# Patient Record
Sex: FEMALE | Race: WHITE | NOT HISPANIC OR LATINO | Employment: OTHER | URBAN - METROPOLITAN AREA
[De-identification: names, ages, dates, MRNs, and addresses within clinical notes are randomized per-mention and may not be internally consistent; named-entity substitution may affect disease eponyms.]

---

## 2017-08-07 DIAGNOSIS — R91.1 SOLITARY PULMONARY NODULE: ICD-10-CM

## 2017-08-07 DIAGNOSIS — J20.9 ACUTE BRONCHITIS: ICD-10-CM

## 2017-08-17 ENCOUNTER — HOSPITAL ENCOUNTER (OUTPATIENT)
Dept: RADIOLOGY | Facility: HOSPITAL | Age: 67
Discharge: HOME | End: 2017-08-17
Payer: MEDICARE

## 2017-08-17 DIAGNOSIS — J20.9 ACUTE BRONCHITIS: ICD-10-CM

## 2017-08-17 DIAGNOSIS — R91.1 SOLITARY PULMONARY NODULE: ICD-10-CM

## 2017-08-17 PROCEDURE — 71250 CT THORAX DX C-: CPT

## 2017-08-21 ENCOUNTER — ALLSCRIPTS OFFICE VISIT (OUTPATIENT)
Dept: OTHER | Facility: OTHER | Age: 67
End: 2017-08-21

## 2017-11-29 ENCOUNTER — ALLSCRIPTS OFFICE VISIT (OUTPATIENT)
Dept: OTHER | Facility: OTHER | Age: 67
End: 2017-11-29

## 2017-11-29 DIAGNOSIS — E78.2 MIXED HYPERLIPIDEMIA: ICD-10-CM

## 2017-11-29 DIAGNOSIS — Z12.31 ENCOUNTER FOR SCREENING MAMMOGRAM FOR MALIGNANT NEOPLASM OF BREAST: ICD-10-CM

## 2017-11-29 DIAGNOSIS — I10 ESSENTIAL (PRIMARY) HYPERTENSION: ICD-10-CM

## 2017-11-29 DIAGNOSIS — F41.8 OTHER SPECIFIED ANXIETY DISORDERS: ICD-10-CM

## 2017-11-30 NOTE — PROGRESS NOTES
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)   7  Mixed hyperlipidemia (272 2) (E78 2)   8  Rosacea (695 3) (L71 9)   9  Encounter for special screening examination for genitourinary disorder (V81 6) (Z13 89)   10  History of fall (V15 88) (Z91 81)   11  Insomnia (780 52) (G47 00)   12  BMI 29 0-29 9,adult (V85 25) (Z68 29)    Plan   Anxiety and depression    · ALPRAZolam 0 25 MG Oral Tablet; 1 Three Times A Day, As Needed   · Escitalopram Oxalate 10 MG Oral Tablet; take 1 tablet by mouth once daily   · (1) TSH WITH FT4 REFLEX; Status:Active; Requested for:29Nov2017;    · Avoid alcoholic beverages ; Status:Complete;   Done: 85NZB9133  Anxiety and depression, Benign essential hypertension    · Follow-up visit in 3 months Evaluation and Treatment  Follow-Emelinar  travis  Status: Complete - Scheduling  Done: 62NDU7612 10:26AM  Benign essential hypertension    · Begin a limited exercise program ; Status:Complete;   Done: 86WUG7880   · Begin or continue regular aerobic exercise  Gradually work up to at least {count1}sessions of {dur1} of exercise a week ; Status:Complete;   Done: 55QGY5026   · Eat a low fat and low cholesterol diet ; Status:Complete;   Done: 18OCK2481  Benign essential hypertension, Mixed hyperlipidemia    · (1) CBC/PLT/DIFF; Status:Active; Requested for:29Nov2017;    · (1) COMPREHENSIVE METABOLIC PANEL; Status:Active; Requested for:29Nov2017;    · (1) LIPID PANEL, FASTING; Status:Active; Requested for:29Nov2017;   Encounter for special screening examination for genitourinary disorder    · *VB - Urinary Incontinence Screen (Dx Z13 89 Screen for UI);  Status:Complete; Done:29Nov2017 09:40AM  Encounter for special screening examination for nervous system disorder    · *VB - Fall Risk Assessment  (Dx Z13 89 Screen for Neurologic Disorder);Status:Complete;   Done: 95KQC5458 09:40AM  Gastrojejunal ulcer, Status post gastric bypass for obesity    · Esomeprazole Magnesium 20 MG Oral Capsule Delayed Release; TAKE 2CAPSULES DAILY  History of fall    · There ways to avoid falling ; Status:Complete;   Done: 75RPI0484   · These are things you can do to prevent falls in and around the home ; Status:Complete;  Done: 00XUG0999  Insomnia    · Zolpidem Tartrate 10 MG Oral Tablet (Ambien); TAKE 1 TABLET Bedtime PRN  Mixed hyperlipidemia    · Atorvastatin Calcium 20 MG Oral Tablet; 1/2 tablet daily  Need for influenza vaccination    · Fluzone High-Dose 0 5 ML Intramuscular Suspension Prefilled Syringe  Primary osteoarthritis    · Diclofenac Sodium 1 % Transdermal Gel (Voltaren); APPLY TO LOWEREXTREMITIES, 4 GM OF GEL TO AFFECTED AREA 4 TIMES DAILY  DONOT APPLY MORE THAN 16 GM DAILY TO ANY ONE AFFECTED JOINT  Rosacea    · MetroNIDAZOLE 0 75 % External Gel; APPLY  AND RUB  IN A THIN FILM TOAFFECTED AREAS TWICE DAILY  (AM AND PM)  * MAMMO SCREENING BILATERAL W CAD; Status:Hold For - Scheduling; Requested for:29Nov2017;  Perform:[facility] Radiology; [phone]; Ordered;   For:Encounter for screening mammogram for malignant neoplasm of breast; Ordered By:Dilcia Hernandez;    Discussion/Summary    Anxiety and depression- worsening  Will start Lexapro as she has done well with this in the past  Advised to use Xanax sparingly, and advised not to take this in combination with her Ambien  Do not drink alcohol while taking these medications  and hyperlipidemia- due for labs  Medications refilled  up in 3 months with Dr Keshia Osorio  Possible side effects of new medications were reviewed with the patient/guardian today  The treatment plan was reviewed with the patient/guardian   The patient/guardian understands and agrees with the treatment plan      Chief Complaint  Discuss anxiety  Her son is ill and in the [facility] LPN      History of Present Illness  HPI: Here today for medication refills and bloodwork order  is under a lot of stress and her anxiety has been worse  She takes care of her son who had a brainstem stroke and is now in the hospital with heart failure  Her other son also lives with her, as well as her grandchildren  She is also working while taking care of her family  Review of Systems   Constitutional: No fever, no chills, feels well, no tiredness, no recent weight gain or loss  Cardiovascular: no chest pain-- and-- no palpitations  Respiratory: no shortness of breath-- and-- no cough  Neurological: no headache  Preventive Quality 65 and Older: Falls Risk:   2  Urinary Incontinence Symptoms includes: urinary incontinence and nocturia, but no incomplete bladder emptying, no urinary frequency, no urinary urgency, no dysuria, no straining, no weak stream, no intermittent stream, no post-void dribbling, no vaginal pressure and no vaginal dryness       Active Problems    1  Actinic keratosis (702 0) (L57 0)   2  Allergic rhinitis (477 9) (J30 9)   3  Arteriosclerotic cardiovascular disease (429 2,440 9) (I25 10)   4  Arthropathy (716 90) (M12 9)   5  Benign essential hypertension (401 1) (I10)   6  Chest pain (786 50) (R07 9)   7  Chronic obstructive pulmonary disease (496) (J44 9)   8  Degenerative spondylolisthesis (738 4) (M43 10)   9  Dyspnea on exertion (786 09) (R06 09)   10  Encounter for screening mammogram for malignant neoplasm of breast (V76 12)  (Z12 31)   11  Esophageal reflux (530 81) (K21 9)   12  Female Stress Incontinence (625 6)   13  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)   14  History of tobacco use (V15 82) (Z87 891)   15  Insomnia (780 52) (G47 00)   16  Intervertebral Disc Degeneration (722 6)   17  Mixed hyperlipidemia (272 2) (E78 2)   18   Mononeuritis (355 9) (G58 9)   19  Need for influenza vaccination (V04 81) (Z23)   20  Obesity (278 00) (E66 9)   21  Onychomycosis of toenail (110 1) (B35 1)   22  Osteoarthrosis Of The Hand (715 94)   23  Postgastrectomy malabsorption (579 3) (K91 2,Z90 3)   24  Primary osteoarthritis (715 10) (M19 91)   25  Rosacea (695 3) (L71 9)   26  Solitary pulmonary nodule (793 11) (R91 1)   27  Status post gastric bypass for obesity (V45 86) (Z98 84)   28  Thiamin deficiency (265 1) (E51 9)   29  Vitamin B12 deficiency (266 2) (E53 8)   30  Vitamin D deficiency (268 9) (E55 9)    Past Medical History  1  History of _ (272 2)   2  History of Abnormal glucose (790 29) (R73 09)   3  Acute upper respiratory infection (465 9) (J06 9)   4  History of Acute upper respiratory infection (465 9) (J06 9)   5  History of Asymptomatic postmenopausal status (age-related) (natural) (V49 81) (Z78 0)   6  History of Diabetes Mellitus (250 00)   7  History of Diabetes Mellitus (250 00)   8  History of Disorders Of The Globe (360 9)   9  Diverticular Disease Of Intestine (562)   10  Encounter for routine pelvic examination (V72 31) (Z01 419)   11  History of acute bronchitis (V12 69) (Z87 09)   12  History of acute sinusitis (V12 69) (Z87 09)   13  History of acute sinusitis (V12 69) (Z87 09)   14  History of backache (V13 59) (Z87 39)   15  History of breast lump (V13 89) (Z87 898)   16  History of dermatitis (V13 3) (Z87 2)   17  History of diverticulitis of colon (V12 79) (Z87 19)   18  History of ear pain (V12 49) (Z86 69)   19  History of epigastric pain (V12 79) (Z87 19)   20  History of nicotine dependence (V15 82) (Z87 891)   21  History of numbness (V17 2) (Z87 898)   22  History of pneumonia (V12 61) (Z87 01)   23  History of seborrheic keratosis (V13 3) (Z87 2)   24  History of tachycardia (V13 89) (Z87 898)   25  History of tinea corporis (V12 09) (Z86 19)   26  History of wheezing (V12 69) (Z87 898)   27   History of Laceration of finger, subsequent encounter (V58 89,883 0) (S64 276D)   28  History of Morbid or severe obesity due to excess calories (278 01) (E66 01)   29  History of Morbid or severe obesity due to excess calories (278 01) (E66 01)   30  History of Myalgia And Myositis (729 1)   31  History of Shoulder joint pain, unspecified laterality   32  History of Sprain Of The Left Thumb (842 13)    Family History  Mother    1  Family history of Congenital Heart Disease   2  Family history of Congestive Heart Failure   3  Family history of Hypertension (V17 49)   4  Family history of Stroke Syndrome (V17 1)  Father    5  Family history of Congestive Heart Failure    Social History   · Active smoker (305 1) (Z72 0)   · Denied: History of Alcohol Use (History)   · Denied: History of Drug Use   · Former smoker   · History of tobacco use (V15 82) (Z87 891)  The social history was reviewed and is unchanged  Surgical History    1  History of Appendectomy   2  History of Cholecystectomy   3  History of Gastric Surgery For Morbid Obesity Bypass With Debra-en-Y   4  History of Hysterectomy   5  History of Tonsillectomy  Surgical History Reviewed: The surgical history was reviewed and updated today  Current Meds   1  Advair Diskus 250-50 MCG/DOSE Inhalation Aerosol Powder Breath Activated; INHALE 1 PUFF EVERY 12 HOURS; Therapy: 71QAT5278 to (Evaluate:72Kcv3647)  Requested for: 76Wzb8907; Last Rx:93Fol9331 Ordered   2  ALPRAZolam 0 25 MG Oral Tablet; 1 Three Times A Day, As Needed; Therapy: 81LLH0250 to (Last Rx:01Buk7525)  Requested for: 34VTS2574 Ordered   3  Atorvastatin Calcium 20 MG Oral Tablet; 1/2 tablet daily  Requested for: 06IQZ3373; Last Rx:03Een8246 Ordered   4  Baby Aspirin 81 MG CHEW; 1 DAILY; Therapy: (Recorded:29Ggj0693) to Recorded   5  Esomeprazole Magnesium 20 MG Oral Capsule Delayed Release; TAKE 2 CAPSULES DAILY; Therapy: 82CZG2670 to 51-30-20-57)  Requested for: 0676 543 19 15; Last Rx:83Rrl1697 Ordered   6   Fluticasone Propionate 50 MCG/ACT Nasal Suspension; USE 2 SPRAYS IN EACH NOSTRIL ONCE DAILY; Therapy: 24Kwd1902 to (Evaluate:09Rsj4521)  Requested for: 64Dns0331; Last Rx:30Kte6623 Ordered   7  MetroNIDAZOLE 0 75 % External Gel; APPLY  AND RUB  IN A THIN FILM TO AFFECTED AREAS TWICE DAILY  (AM AND PM); Therapy: 41IHI3083 to (Last Rx:06Apr2015)  Requested for: 32Khg0723 Ordered   8  Multi-Day Oral Tablet; 1 Every Day; Therapy: 71XCI2514 to  Requested for: 95AMK5660 Recorded   9  Patanol 0 1 % Ophthalmic Solution; 2 drops OU BID; Therapy: 39OQM8543 to (Last Rx:03Paj4375)  Requested for: 28Hsz0637 Ordered   10  ProAir  (90 Base) MCG/ACT Inhalation Aerosol Solution; 2 PUFFS EVERY 6  HOURS; Therapy: 50VAJ6866 to (Gabi Alvarez)  Requested for: 05PXV7410; Last  Rx:24Cdu4091 Ordered   11  Voltaren 1 % Transdermal Gel; APPLY TO LOWER EXTREMITIES, 4 GM OF GEL TO  AFFECTED AREA 4 TIMES DAILY  DO NOT APPLY MORE THAN 16 GM DAILY TO ANY  ONE AFFECTED JOINT; Therapy: 91XUT4746 to (Last Rx:63Ttb7444)  Requested for: 78Ugf1256 Ordered   12  Zolpidem Tartrate 10 MG Oral Tablet; TAKE 1 TABLET Bedtime PRN; Therapy: 14RNX9948 to (Evaluate:25Nue1992)  Requested for: 46KRJ3966; Last  Rx:69Wga7348 Ordered    The medication list was reviewed and updated today  Allergies  1  AUGMENTIN   2  Sulfa Drugs    Vitals   Recorded: 80WIL9644 09:32AM   Temperature 97 F   Heart Rate 64   Respiration 20   Systolic 520   Diastolic 70   Height 4 ft 11 in   Weight 148 lb    BMI Calculated 29 89   BSA Calculated 1 62       Physical Exam   Constitutional  General appearance: No acute distress, well appearing and well nourished  Eyes  Conjunctiva and lids: No swelling, erythema or discharge  Ears, Nose, Mouth, and Throat  External inspection of ears and nose: Abnormal  -- hearing aids  Otoscopic examination: Tympanic membranes translucent with normal light reflex  Canals patent without erythema     Nasal mucosa, septum, and turbinates: Normal without edema or erythema  Oropharynx: Normal with no erythema, edema, exudate or lesions  Pulmonary  Respiratory effort: No increased work of breathing or signs of respiratory distress  Auscultation of lungs: Clear to auscultation  Cardiovascular  Auscultation of heart: Normal rate and rhythm, normal S1 and S2, without murmurs  Examination of extremities for edema and/or varicosities: Normal    Lymphatic  Palpation of lymph nodes in neck: No lymphadenopathy  Skin  Skin and subcutaneous tissue: Normal without rashes or lesions  Psychiatric  Mood and affect: Normal          Results/Data  *VB - Fall Risk Assessment  (Dx Z13 89 Screen for Neurologic Disorder) 68SMO1102 09:40AM Sugey Mohamuddy     Test Name Result Flag Reference   Falls Risk      2 or more falls past year     *VB - Urinary Incontinence Screen (Dx Z13 89 Screen for UI) 44LGE9005 09:40AM Sugey Marcus     Test Name Result Flag Reference   Urinary Incontinence Assessment 69NXN4922         Attending Note  Collaborating Physician Note: Collaborating Note: I agree with the Advanced Practitioner note  Future Appointments    Date/Time Provider Specialty Site   03/01/2018 08:45 AM CLAUDIO Rankin   [address] [place] [address]   02/21/2018 08:45 AM HARSHA Petersen [facility]       Signatures   Electronically signed by : Marta Shook; Nov 29 2017  1:40PM EST                       (Author)    Electronically signed by : CLAUDIO Kirkpatrick ; Nov 29 2017  1:45PM EST                       (Author)

## 2017-12-01 ENCOUNTER — TRANSCRIBE ORDERS (OUTPATIENT)
Dept: ADMINISTRATIVE | Facility: HOSPITAL | Age: 67
End: 2017-12-01

## 2017-12-01 ENCOUNTER — APPOINTMENT (OUTPATIENT)
Dept: LAB | Facility: HOSPITAL | Age: 67
End: 2017-12-01
Payer: MEDICARE

## 2017-12-01 ENCOUNTER — GENERIC CONVERSION - ENCOUNTER (OUTPATIENT)
Dept: OTHER | Facility: OTHER | Age: 67
End: 2017-12-01

## 2017-12-01 DIAGNOSIS — F41.8 OTHER SPECIFIED ANXIETY DISORDERS: ICD-10-CM

## 2017-12-01 DIAGNOSIS — I10 ESSENTIAL (PRIMARY) HYPERTENSION: ICD-10-CM

## 2017-12-01 DIAGNOSIS — E78.2 MIXED HYPERLIPIDEMIA: ICD-10-CM

## 2017-12-01 LAB
ALBUMIN SERPL BCP-MCNC: 3.7 G/DL (ref 3.5–5)
ALP SERPL-CCNC: 85 U/L (ref 46–116)
ALT SERPL W P-5'-P-CCNC: 17 U/L (ref 12–78)
ANION GAP SERPL CALCULATED.3IONS-SCNC: 11 MMOL/L (ref 4–13)
AST SERPL W P-5'-P-CCNC: 15 U/L (ref 5–45)
BASOPHILS # BLD AUTO: 0 THOUSANDS/ΜL (ref 0–0.1)
BASOPHILS NFR BLD AUTO: 1 % (ref 0–1)
BILIRUB SERPL-MCNC: 0.7 MG/DL (ref 0.2–1)
BUN SERPL-MCNC: 12 MG/DL (ref 5–25)
CALCIUM SERPL-MCNC: 9.2 MG/DL (ref 8.3–10.1)
CHLORIDE SERPL-SCNC: 103 MMOL/L (ref 100–108)
CHOLEST SERPL-MCNC: 175 MG/DL (ref 50–200)
CO2 SERPL-SCNC: 24 MMOL/L (ref 21–32)
CREAT SERPL-MCNC: 0.63 MG/DL (ref 0.6–1.3)
EOSINOPHIL # BLD AUTO: 0.2 THOUSAND/ΜL (ref 0–0.61)
EOSINOPHIL NFR BLD AUTO: 3 % (ref 0–6)
ERYTHROCYTE [DISTWIDTH] IN BLOOD BY AUTOMATED COUNT: 14.7 % (ref 11.6–15.1)
GFR SERPL CREATININE-BSD FRML MDRD: 92 ML/MIN/1.73SQ M
GLUCOSE P FAST SERPL-MCNC: 94 MG/DL (ref 65–99)
HCT VFR BLD AUTO: 46.3 % (ref 37–47)
HDLC SERPL-MCNC: 49 MG/DL (ref 40–60)
HGB BLD-MCNC: 15.2 G/DL (ref 12–16)
LDLC SERPL CALC-MCNC: 113 MG/DL (ref 0–100)
LYMPHOCYTES # BLD AUTO: 1.5 THOUSANDS/ΜL (ref 0.6–4.47)
LYMPHOCYTES NFR BLD AUTO: 23 % (ref 14–44)
MCH RBC QN AUTO: 30.1 PG (ref 27–31)
MCHC RBC AUTO-ENTMCNC: 32.9 G/DL (ref 31.4–37.4)
MCV RBC AUTO: 91 FL (ref 82–98)
MONOCYTES # BLD AUTO: 0.7 THOUSAND/ΜL (ref 0.17–1.22)
MONOCYTES NFR BLD AUTO: 10 % (ref 4–12)
NEUTROPHILS # BLD AUTO: 4.3 THOUSANDS/ΜL (ref 1.85–7.62)
NEUTS SEG NFR BLD AUTO: 64 % (ref 43–75)
NRBC BLD AUTO-RTO: 0 /100 WBCS
PLATELET # BLD AUTO: 259 THOUSANDS/UL (ref 130–400)
PMV BLD AUTO: 8.9 FL (ref 8.9–12.7)
POTASSIUM SERPL-SCNC: 3.9 MMOL/L (ref 3.5–5.3)
PROT SERPL-MCNC: 7.5 G/DL (ref 6.4–8.2)
RBC # BLD AUTO: 5.07 MILLION/UL (ref 4.2–5.4)
SODIUM SERPL-SCNC: 138 MMOL/L (ref 136–145)
TRIGL SERPL-MCNC: 66 MG/DL
TSH SERPL DL<=0.05 MIU/L-ACNC: 1.72 UIU/ML (ref 0.36–3.74)
WBC # BLD AUTO: 6.7 THOUSAND/UL (ref 4.8–10.8)

## 2017-12-01 PROCEDURE — 85025 COMPLETE CBC W/AUTO DIFF WBC: CPT

## 2017-12-01 PROCEDURE — 80061 LIPID PANEL: CPT

## 2017-12-01 PROCEDURE — 36415 COLL VENOUS BLD VENIPUNCTURE: CPT

## 2017-12-01 PROCEDURE — 80053 COMPREHEN METABOLIC PANEL: CPT

## 2017-12-01 PROCEDURE — 84443 ASSAY THYROID STIM HORMONE: CPT

## 2018-01-10 NOTE — MISCELLANEOUS
Message  I spoke with Porfirio Arevalo on 2/16/16 after her hospital discharge to her home on 2/10/16  She is feeling better but just tired  She denies chest pain and dyspnea  She has a follow up appt with Dr Pineda Whittington scheduled in 3 weeks as instructed  She is using the NicoDerm Patch as instructed  She is aware to call our office at any time with any questions or concerns and she is also aware that if she develops any further episodes of chest pain or any dizziness, weakness, dyspnea, fevers, palpitations, etc she should go to the ER [facility]      Active Problems    1  _ (272 2)   2  Abdominal pain, epigastric (789 06) (R10 13)   3  Actinic keratosis (702 0) (L57 0)   4  Acute bronchitis (466 0) (J20 9)   5  Acute sinusitis (461 9) (J01 90)   6  Acute upper respiratory infection (465 9) (J06 9)   7  Allergic rhinitis (477 9) (J30 9)   8  Anxiety disorder (300 00) (F41 9)   9  Arteriosclerotic cardiovascular disease (429 2,440 9) (I25 10)   10  Arthropathy (716 90) (M12 9)   11  Asymptomatic postmenopausal status (age-related) (natural) (V49 81) (Z78 0)   12  Benign essential hypertension (401 1) (I10)   13  Chronic obstructive pulmonary disease (496) (J44 9)   14  Degenerative spondylolisthesis (738 4) (M43 10)   15  Depression (311) (F32 9)   16  Dermatitis (692 9) (L30 9)   17  Disorders Of The Globe (360 9)   18  Diverticulitis of colon (562 11) (K57 32)   19  Encounter for screening mammogram for malignant neoplasm of breast (V76 12)    (Z12 31)   20  Esophageal reflux (530 81) (K21 9)   21  Female Stress Incontinence (625 6)   22  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)   23  History of tobacco use (V15 82) (Z87 891)   24  Insomnia (780 52) (G47 00)   25  Intervertebral Disc Degeneration (722 6)   26  Laceration of finger, subsequent encounter (V58 89,883 0) (S61 219D)   27  Lump or mass in breast (611 72) (N63)   28  Mixed hyperlipidemia (272 2) (E78 2)   29  Mononeuritis (355 9) (G58 9)   30   Myalgia And Myositis (729 1) 31  Need for influenza vaccination (V04 81) (Z23)   32  Numbness (782 0) (R20 0)   33  Obesity (278 00) (E66 9)   34  Onychomycosis of toenail (110 1) (B35 1)   35  Osteoarthrosis Of The Hand (715 94)   36  Otalgia, unspecified laterality (388 70) (H92 09)   37  Postgastrectomy malabsorption (579 3) (K91 2)   38  Primary osteoarthritis (715 10) (M19 91)   39  Rosacea (695 3) (L71 9)   40  Seborrheic keratosis (702 19) (L82 1)   41  Shoulder joint pain, unspecified laterality (719 41) (M25 519)   42  Sprain Of The Left Thumb (842 13)   43  Status post gastric bypass for obesity (V45 86) (Z98 84)   44  Tachycardia (785 0) (R00 0)   45  Thiamin deficiency (265 1) (E51 9)   46  Tinea corporis (110 5) (B35 4)   47  Vitamin B12 deficiency (266 2) (E53 8)   48  Vitamin D deficiency (268 9) (E55 9)    Current Meds   1  ALPRAZolam 0 25 MG Oral Tablet; 1 Three Times A Day, As Needed; Therapy: 94OET5847 to (Last Rx:61Yga2551)  Requested for: 51Whb0866 Ordered   2  Atorvastatin Calcium 20 MG Oral Tablet; 1/2 tablet daily; Therapy: (Recorded:63Yxy5359) to Recorded   3  Baby Aspirin 81 MG CHEW; 1 DAILY; Therapy: (Recorded:62Vbe7184) to Recorded   4  Bystolic 5 MG Oral Tablet; 1 DAILY; Therapy: (Recorded:83Ide7763) to Recorded   5  Esomeprazole Magnesium 20 MG Oral Capsule Delayed Release; TAKE 2 CAPSULES   DAILY; Therapy: 14TQI6035 to )  Requested for: 0676 543 19 15; Last   Rx:05Uxa9119 Ordered   6  MetroNIDAZOLE 0 75 % External Gel; APPLY  AND RUB  IN A THIN FILM TO   AFFECTED AREAS TWICE DAILY  (AM AND PM); Therapy: 48ZWT3981 to (Last Rx:50Fgo1450)  Requested for: 79Glf8727 Ordered   7  Multi-Day Oral Tablet; 1 Every Day; Therapy: 07TFD7306 to  Requested for: 84TGO9513 Recorded   8  Nasonex 50 MCG/ACT Nasal Suspension; 2 puffs daily to each nostril; Therapy: 66ARZ4232 to (Last Rx:06Apr2015)  Requested for: 06Apr2015 Ordered   9   Nicoderm CQ 14 MG/24HR Transdermal Patch 24 Hour; USE AS DIRECTED; Therapy: (Recorded:42Hgd1820) to Recorded   10  Patanol 0 1 % Ophthalmic Solution; 2 drops OU BID; Therapy: 40NOB7786 to (Last Rx:06Apr2015)  Requested for: 06Apr2015 Ordered   11  Voltaren 1 % Transdermal Gel; APPLY TO LOWER EXTREMITIES, 4 GM OF GEL TO    AFFECTED AREA 4 TIMES DAILY  DO NOT APPLY MORE THAN 16 GM DAILY TO ANY    ONE AFFECTED JOINT; Therapy: 39KND4858 to (Last Rx:06Apr2015)  Requested for: 06Apr2015 Ordered   12  Zolpidem Tartrate 10 MG Oral Tablet; TAKE 1 TABLET Bedtime PRN; Therapy: 92RPD8171 to (Deborra Mins)  Requested for: 61EQL4652; Last    Rx:59Qor0330 Ordered    Allergies    1  AUGMENTIN   2   Sulfa Drugs    Signatures   Electronically signed by : CLAUDIO Grover ; Feb 17 2016  9:22AM EST                       (Author)

## 2018-01-12 VITALS
SYSTOLIC BLOOD PRESSURE: 132 MMHG | HEART RATE: 64 BPM | WEIGHT: 148 LBS | DIASTOLIC BLOOD PRESSURE: 70 MMHG | RESPIRATION RATE: 20 BRPM | HEIGHT: 59 IN | TEMPERATURE: 97 F | BODY MASS INDEX: 29.84 KG/M2

## 2018-01-13 NOTE — MISCELLANEOUS
Provider Comments  Provider Comments:     Dear Felicia Deleon had a scheduled appointment at our office for 5/4/2016 but were unable to keep  We attempted to call you back but were unable to reach you  It is very important that you follow up with us so that we can assess your physical and nutritional safety after your surgery  Please call our office at [phone] to reschedule your appointment       Sincerely,     [facility]              Signatures   Electronically signed by : Miguel Alston, ; May  3 2016  8:19AM EST                       (Author)    Electronically signed by : Philomena Burris, [facility]; May  3 2016  1:46PM EST                       (Author)

## 2018-01-16 NOTE — PROGRESS NOTES
Assessment    1  Chronic obstructive pulmonary disease (496) (J44 9)   2  Solitary pulmonary nodule (793 11) (R91 1)    Plan  Allergic rhinitis    · Fluticasone Propionate 50 MCG/ACT Nasal Suspension (Flonase Allergy Relief); USE 2 SPRAYS IN EACH NOSTRIL ONCE DAILY   Rx By: Aravind Danielson; Dispense: 30 Days ; #:1 ML; Refill: 0; For: Allergic rhinitis; LILI = N; Verified Transmission to [address] [place] [address]; Last Updated By: System, [employer]; 8/21/2017 9:18:58 AM  Chronic obstructive pulmonary disease    · Advair Diskus 250-50 MCG/DOSE Inhalation Aerosol Powder Breath Activated;  INHALE 1 PUFF EVERY 12 HOURS   Rx By: Aravind Danielson; Dispense: 90 Days ; #:3 Aerosol Powder Breath Activated; Refill: 3; For: Chronic obstructive pulmonary disease; LILI = N; Verified Transmission to [facility]; Last Updated By: System, [employer]Script[employer]; 8/21/2017 9:21:03 AM  Chronic obstructive pulmonary disease, Solitary pulmonary nodule    · * CT CHEST WO CONTRAST; Status:Need Information - Financial Authorization; Requested for:03Apr2018; Perform:[facility]; NSJ:[id number];VQULPHATTIE; For:Chronic obstructive pulmonary disease, Solitary pulmonary nodule; Ordered By:Keara Carolina; Results/Data  PFT Results v2:     Spirometry: Forced vital capacity: 1 83L and 75% Predicted Values  Forced expiratory volume in one second: 1 22L and 66% Predicted Value  FEV1/FVC ratio is 66  Post Bronchodilator Spirometry:   Lung Volumes:   DLCO:    PFT Interpretation:   moderate airway obstruction  Discussion/Summary  Discussion Summary:   Kareem Zee is here today for follow up  She had repeat CT of chest done August 17, 2017 and stable 10 x 7 mm RLL nodule is noted She will have repeat CT of chest in May 2018  Kareem Zee continues to smoke and cessation is encouraged  She has moderate COPD  FEV 1 today is 1 22L or 66% of predicted; FEV1 is March 2016 was 1 48L or 81% of predicted   She is aware of the decline in FEV1 and I encouraged her in compliance with Advair 250/50 1 puff BID and smoking cessation  Follow up in 6 months  I do not think she has acute sinusitis; she is going to trial Flonase nasal spray 2 sprays each nostril daily; avoid allergic irritants (cat and dog hair) and report to PCP if symptoms worsen  Counseling Documentation With Imm: The patient was counseled regarding  total time of encounter was 25 minutes  Goals and Barriers: The patient has the current Goals: Amadeo Whitehead will continue to decrease and or stop cigarette smoking  Patient's Capacity to Self-Care: Patient is able to Self-Care  Active Problems    1  _ (272 2)   2  Abdominal pain, epigastric (789 06) (R10 13)   3  Actinic keratosis (702 0) (L57 0)   4  Acute bronchitis (466 0) (J20 9)   5  Acute sinusitis (461 9) (J01 90)   6  Acute upper respiratory infection (465 9) (J06 9)   7  Allergic rhinitis (477 9) (J30 9)   8  Anxiety disorder (300 00) (F41 9)   9  Arteriosclerotic cardiovascular disease (429 2,440 9) (I25 10)   10  Arthropathy (716 90) (M12 9)   11  Asymptomatic postmenopausal status (age-related) (natural) (V49 81) (Z78 0)   12  Benign essential hypertension (401 1) (I10)   13  Chest pain (786 50) (R07 9)   14  Chronic obstructive pulmonary disease (496) (J44 9)   15  Degenerative spondylolisthesis (738 4) (M43 10)   16  Depression (311) (F32 9)   17  Dermatitis (692 9) (L30 9)   18  Disorders Of The Globe (360 9)   19  Diverticulitis of colon (562 11) (K57 32)   20  Dyspnea on exertion (786 09) (R06 09)   21  Encounter for screening mammogram for malignant neoplasm of breast (V76 12)    (Z12 31)   22  Esophageal reflux (530 81) (K21 9)   23  Female Stress Incontinence (625 6)   24  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)   25  History of tobacco use (V15 82) (Z87 891)   26  Insomnia (780 52) (G47 00)   27  Intervertebral Disc Degeneration (722 6)   28  Laceration of finger, subsequent encounter   29   Lump or mass in breast (611 72) (N63) 30  Mixed hyperlipidemia (272 2) (E78 2)   31  Mononeuritis (355 9) (G58 9)   32  Myalgia And Myositis (729 1)   33  Need for influenza vaccination (V04 81) (Z23)   34  Numbness (782 0) (R20 0)   35  Obesity (278 00) (E66 9)   36  Onychomycosis of toenail (110 1) (B35 1)   37  Osteoarthrosis Of The Hand (715 94)   38  Otalgia, unspecified laterality (388 70) (H92 09)   39  Postgastrectomy malabsorption (579 3) (K91 2,Z90 3)   40  Primary osteoarthritis (715 10) (M19 91)   41  Rosacea (695 3) (L71 9)   42  Seborrheic keratosis (702 19) (L82 1)   43  Shoulder joint pain, unspecified laterality   44  Solitary pulmonary nodule (793 11) (R91 1)   45  Sprain Of The Left Thumb (842 13)   46  Status post gastric bypass for obesity (V45 86) (Z98 84)   47  Tachycardia (785 0) (R00 0)   48  Thiamin deficiency (265 1) (E51 9)   49  Tinea corporis (110 5) (B35 4)   50  Vitamin B12 deficiency (266 2) (E53 8)   51  Vitamin D deficiency (268 9) (E55 9)   52  Wheezing (786 07) (R06 2)    Chief Complaint  Chief Complaint Free Text Note Form: Pt presents today for ct rvw  History of Present Illness  HPI: Melany Louis is here today for follow up  She is a smoker; she smokes 1PPD every other day  Melany Louis had repeat CT of chest on 8/11/17 and 10mm right pulmonary nodule is noted  IT has been stable for 18 months  Based on current FLeishchner guidelines, a follow up non contrast of chest is recommended 6-12 months from this exam    Melany Louis has been using Advair Diskus 250/50 1 puff BID but not every day  She does not use ProAir often  she had been doing well  Melany Louis reports having "sinus infection"  She has some thick mucous and mild headache  Review of Systems  Complete-Female - Pulm:   Constitutional: No fever, no chills, feels well, no tiredness, no recent weight gain or weight loss  Eyes: no complaints of vision problems     ENT: no rhinitis, no PND, no epistaxis    The patient presents with complaints of gradual onset of moderate bilateral nasal discharge  Her symptoms are caused by allergen exposure  Symptoms are made worse by inhaled irritants and tobacco smoke  Symptoms are worsening  Cardiovascular: no palpitations, no chest pain  Respiratory: shortness of breath and shortness of breath during exertion, but as noted in HPI    The patient presents with complaints of sudden onset of moderate cough, described as loose and productive  Her symptoms are caused by no known event  Symptoms are improved by avoiding irritants  Symptoms are made worse by animal exposure  Symptoms are unchanged  Gastrointestinal: no complaints of esophageal reflux, no abdominal pain  Genitourinary: no dysuria  Musculoskeletal: no arthralgias, no joint swelling, no myalgias  Integumentary: no rash, no lesions  Neurological: no headache, no fainting, no weakness  Psychiatric: no anxiety, no depression  Hematologic/Lymphatic: - no complaints of swollen glands  Past Medical History    1  History of Abnormal glucose (790 29) (R73 09)   2  Acute upper respiratory infection (465 9) (J06 9)   3  History of Diabetes Mellitus (250 00)   4  History of Diabetes Mellitus (250 00)   5  Diverticular Disease Of Intestine (562)   6  Encounter for routine pelvic examination (V72 31) (Z01 419)   7  History of acute sinusitis (V12 69) (Z87 09)   8  History of backache (V13 59) (Z87 39)   9  History of nicotine dependence (V15 82) (Z87 891)   10  History of pneumonia (V12 61) (Z87 01)   11  History of Morbid or severe obesity due to excess calories (278 01) (E66 01)   12  History of Morbid or severe obesity due to excess calories (278 01) (E66 01)    Surgical History    1  History of Appendectomy   2  History of Cholecystectomy   3  History of Gastric Surgery For Morbid Obesity Bypass With Debra-en-Y   4  History of Hysterectomy   5  History of Tonsillectomy  Surgical History Reviewed: The surgical history was reviewed and updated today         Family History  Mother    1  Family history of Congenital Heart Disease   2  Family history of Congestive Heart Failure   3  Family history of Hypertension (V17 49)   4  Family history of Stroke Syndrome (V17 1)  Father    5  Family history of Congestive Heart Failure  Family History Reviewed: The family history was reviewed and updated today  Social History    · Active smoker (305 1) (Z72 0)   · Denied: History of Alcohol Use (History)   · Denied: History of Drug Use   · Former smoker   · History of tobacco use (V15 82) (U85 081)  Social History Reviewed: The social history was reviewed and updated today  Current Meds   1  Advair Diskus 250-50 MCG/DOSE Inhalation Aerosol Powder Breath Activated; INHALE 1   PUFF EVERY 12 HOURS; Therapy: 70EVI7122 to (Debbie Barriga)  Requested for: 02Jun2016; Last   Rx:02Jun2016 Ordered   2  ALPRAZolam 0 25 MG Oral Tablet; 1 Three Times A Day, As Needed; Therapy: 43OSW8916 to (Last Rx:18Feb2016)  Requested for: 11JDR2938 Ordered   3  Atorvastatin Calcium 20 MG Oral Tablet; 1/2 tablet daily  Requested for: 14YHL7859; Last   Rx:72Lev6125 Ordered   4  Baby Aspirin 81 MG CHEW; 1 DAILY; Therapy: (Recorded:62Xqd5042) to Recorded   5  Bystolic 5 MG Oral Tablet; 1 DAILY  Requested for: 00VDV8621; Last Rx:25Nhc4860   Ordered   6  Esomeprazole Magnesium 20 MG Oral Capsule Delayed Release; TAKE 2 CAPSULES   DAILY; Therapy: 97NOI6181 to [phone])  Requested for: 0676 543 19 15; Last   Rx:21Kzh8445 Ordered   7  MetroNIDAZOLE 0 75 % External Gel; APPLY  AND RUB  IN A THIN FILM TO AFFECTED   AREAS TWICE DAILY  (AM AND PM); Therapy: 37LEZ4414 to (Last Rx:06Apr2015)  Requested for: 94Vga3269 Ordered   8  Multi-Day Oral Tablet; 1 Every Day; Therapy: 57HCY0649 to  Requested for: 93PNV0025 Recorded   9  Nasonex 50 MCG/ACT Nasal Suspension; 2 puffs daily to each nostril; Therapy: 16QQZ8860 to (Last Rx:06Apr2015)  Requested for: 06Apr2015 Ordered   10   Nicoderm CQ 14 MG/24HR Transdermal Patch 24 Hour; USE AS DIRECTED; Therapy: (Recorded:62Huj5734) to Recorded   11  Patanol 0 1 % Ophthalmic Solution; 2 drops OU BID; Therapy: 10SUB0434 to (Last Rx:06Apr2015)  Requested for: 06Apr2015 Ordered   12  ProAir  (90 Base) MCG/ACT Inhalation Aerosol Solution; 2 PUFFS EVERY 6    HOURS; Therapy: 61DPM3607 to (William Cool)  Requested for: 10KHR5571; Last    Rx:02Jun2016 Ordered   13  Voltaren 1 % Transdermal Gel; APPLY TO LOWER EXTREMITIES, 4 GM OF GEL TO    AFFECTED AREA 4 TIMES DAILY  DO NOT APPLY MORE THAN 16 GM DAILY TO ANY    ONE AFFECTED JOINT; Therapy: 99QED9765 to (Last Rx:06Apr2015)  Requested for: 06Apr2015 Ordered   14  Zolpidem Tartrate 10 MG Oral Tablet; TAKE 1 TABLET Bedtime PRN; Therapy: 11CXH2416 to (Evaluate:62Pew2386)  Requested for: 54IBO5129; Last    Rx:18Feb2016 Ordered  Medication List Reviewed: The medication list was reviewed and updated today  Allergies    1  AUGMENTIN   2  Sulfa Drugs    Vitals  Vital Signs    Recorded: 23Jlg7044 08:53AM   Temperature 97 6 F, Oral   Heart Rate 70   Pulse Quality Normal   Respiration Quality Normal   Respiration 12   Systolic 606, LUE, Sitting   Diastolic 84, LUE, Sitting   Height 4 ft 11 in   Weight 148 lb    BMI Calculated 29 89   BSA Calculated 1 62   O2 Saturation 95, RA     Physical Exam    Constitutional   General appearance: No acute distress, well appearing and well nourished  Eyes   Examination of pupil and irises: Anicteric, pupils reactive  Ears, Nose, Mouth, and Throat   External inspection of ears and nose: Normal     Nasal mucosa, septum, and turbinates: Normal without edema or erythema  Lips, teeth, and gums: Normal, good dentition  Oropharynx: Normal with no erythema, edema, exudate or lesions  Mallampati Classification: 3  Neck   Neck: Supple, symmetric, trachea midline, no masses      Jugular veins: Normal     Pulmonary   Chest: Normal     Respiratory effort: No increased work of breathing or signs of respiratory distress  Auscultation of lungs: Abnormal   wheezing over the right midlung field and wheezing over the right base  Cardiovascular   Auscultation of heart: Normal rate and rhythm, normal S1 and S2, no murmurs  Examination of extremities for edema and/or varicosities: Normal     Abdomen   Abdomen: Soft, non-tender  Lymphatic   Palpation of lymph nodes in neck: No lymphadenopathy  Musculoskeletal   Gait and station: Normal     Digits and nails: Normal without clubbing or cyanosis  Neurologic   Mental Status: Normal  Not confused, no evidence of dementia, good comprehension, good concentration  Skin   Skin and subcutaneous tissue: Limited exam shows no rash  Psychiatric   Orientation to person, place and time: Normal     Mood and affect: Normal        Signatures   Electronically signed by : HARSHA Griffith;  Aug 21 2017  9:35AM EST                       (Author)

## 2018-01-22 VITALS
OXYGEN SATURATION: 95 % | RESPIRATION RATE: 12 BRPM | WEIGHT: 148 LBS | BODY MASS INDEX: 29.84 KG/M2 | SYSTOLIC BLOOD PRESSURE: 138 MMHG | TEMPERATURE: 97.6 F | DIASTOLIC BLOOD PRESSURE: 84 MMHG | HEIGHT: 59 IN | HEART RATE: 70 BPM

## 2018-01-23 NOTE — RESULT NOTES
Discussion/Summary   Zenobia Lopez- your labs look very good  Your bad cholesterol is slightly elevated, but sugar, thyroid, kidney and liver function are normal   SHAHEED Abreu Barak     Verified Results  (1) CBC/PLT/DIFF 26UNG0999 08:26AM Aisha Certain Order Number: [id number]     Test Name Result Flag Reference   WBC COUNT 6 70 Thousand/uL  4 80-10 80   RBC COUNT 5 07 Million/uL  4 20-5 40   HEMOGLOBIN 15 2 g/dL  12 0-16 0   HEMATOCRIT 46 3 %  37 0-47 0   MCV 91 fL  82-98   MCH 30 1 pg  27 0-31 0   MCHC 32 9 g/dL  31 4-37 4   RDW 14 7 %  11 6-15 1   MPV 8 9 fL  8 9-12 7   PLATELET COUNT 226 Thousands/uL  130-400   nRBC AUTOMATED 0 /100 WBCs     NEUTROPHILS RELATIVE PERCENT 64 %  43-75   LYMPHOCYTES RELATIVE PERCENT 23 %  14-44   MONOCYTES RELATIVE PERCENT 10 %  4-12   EOSINOPHILS RELATIVE PERCENT 3 %  0-6   BASOPHILS RELATIVE PERCENT 1 %  0-1   NEUTROPHILS ABSOLUTE COUNT 4 30 Thousands/? ??L  1 85-7 62   LYMPHOCYTES ABSOLUTE COUNT 1 50 Thousands/? ??L  0 60-4 47   MONOCYTES ABSOLUTE COUNT 0 70 Thousand/? ??L  0 17-1 22   EOSINOPHILS ABSOLUTE COUNT 0 20 Thousand/? ??L  0 00-0 61   BASOPHILS ABSOLUTE COUNT 0 00 Thousands/? ??L  0 00-0 10     (1) COMPREHENSIVE METABOLIC PANEL 81MIC7479 71:91FU Aisha Certain Order Number: [id number]     Test Name Result Flag Reference   SODIUM 138 mmol/L  136-145   POTASSIUM 3 9 mmol/L  3 5-5 3   CHLORIDE 103 mmol/L  100-108   CARBON DIOXIDE 24 mmol/L  21-32   ANION GAP (CALC) 11 mmol/L  4-13   BLOOD UREA NITROGEN 12 mg/dL  5-25   CREATININE 0 63 mg/dL  0 60-1 30   Standardized to IDMS reference method   CALCIUM 9 2 mg/dL  8 3-10 1   BILI, TOTAL 0 70 mg/dL  0 20-1 00   ALK PHOSPHATAS 85 U/L     ALT (SGPT) 17 U/L  12-78   Specimen collection should occur prior to Sulfasalazine administration due to the potential for falsely depressed results     AST(SGOT) 15 U/L  5-45   Specimen collection should occur prior to Sulfasalazine administration due to the potential for falsely depressed results  ALBUMIN 3 7 g/dL  3 5-5 0   TOTAL PROTEIN 7 5 g/dL  6 4-8 2   eGFR 92 ml/min/1 73sq m     National Kidney Disease Education Program recommendations are as follows:  GFR calculation is accurate only with a steady state creatinine  Chronic Kidney disease less than 60 ml/min/1 73 sq  meters  Kidney failure less than 15 ml/min/1 73 sq  meters  GLUCOSE FASTING 94 mg/dL  65-99   Specimen collection should occur prior to Sulfasalazine administration due to the potential for falsely depressed results  Specimen collection should occur prior to Sulfapyridine administration due to the potential for falsely elevated results  (1) LIPID PANEL, FASTING 01Dec2017 08:26AM [employer] Order Number: [id number]     Test Name Result Flag Reference   CHOLESTEROL 175 mg/dL     HDL,DIRECT 49 mg/dL  40-60   Specimen collection should occur prior to Metamizole administration due to the potential for falsley depressed results  LDL CHOLESTEROL CALCULATED 113 mg/dL H 0-100   Triglyceride:        Normal <150 mg/dl   Borderline High 150-199 mg/dl   High 200-499 mg/dl   Very High >499 mg/dl      Cholesterol:       Desirable <200 mg/dl    Borderline High 200-239 mg/dl    High >239 mg/dl      HDL Cholesterol:       High>59 mg/dL    Low <41 mg/dL      This screening LDL is a calculated result  It does not have the accuracy of the Direct Measured LDL in the monitoring of patients with hyperlipidemia and/or statin therapy  Direct Measure LDL (XOO297) must be ordered separately in these patients  TRIGLYCERIDES 66 mg/dL  <=150   Specimen collection should occur prior to N-Acetylcysteine or Metamizole administration due to the potential for falsely depressed results       (1) TSH WITH FT4 REFLEX 01Dec2017 08:26AM [employer] Order Number: [id number]     Test Name Result Flag Reference   TSH 1 719 uIU/mL  0 358-3 740   Patients undergoing fluorescein dye angiography may retain small amounts of fluorescein in the body for 48-72 hours post procedure  Samples containing fluorescein can produce falsely depressed TSH values  If the patient had this procedure,a specimen should be resubmitted post fluorescein clearance            The recommended reference ranges for TSH during pregnancy are as follows:  First trimester 0 1 to 2 5 uIU/mL  Second trimester  0 2 to 3 0 uIU/mL  Third trimester 0 3 to 3 0 uIU/m       Signatures   Electronically signed by : Mary Jane Altman; Dec  1 2017 10:26AM EST                       (Author)

## 2018-03-13 PROBLEM — F32.A ANXIETY AND DEPRESSION: Status: ACTIVE | Noted: 2017-11-29

## 2018-03-13 PROBLEM — F41.9 ANXIETY AND DEPRESSION: Status: ACTIVE | Noted: 2017-11-29

## 2018-03-13 RX ORDER — METRONIDAZOLE 7.5 MG/G
GEL TOPICAL 2 TIMES DAILY
COMMUNITY
Start: 2015-04-06 | End: 2018-09-20 | Stop reason: SDUPTHER

## 2018-03-13 RX ORDER — ESCITALOPRAM OXALATE 10 MG/1
1 TABLET ORAL DAILY
COMMUNITY
Start: 2017-11-29 | End: 2018-03-15 | Stop reason: SDUPTHER

## 2018-03-13 RX ORDER — OLOPATADINE HYDROCHLORIDE 1 MG/ML
SOLUTION/ DROPS OPHTHALMIC
COMMUNITY
Start: 2011-01-17 | End: 2018-09-20 | Stop reason: SDUPTHER

## 2018-03-13 RX ORDER — FLUTICASONE PROPIONATE 50 MCG
2 SPRAY, SUSPENSION (ML) NASAL DAILY
COMMUNITY
Start: 2017-08-21 | End: 2018-09-20 | Stop reason: SDUPTHER

## 2018-03-15 ENCOUNTER — OFFICE VISIT (OUTPATIENT)
Dept: FAMILY MEDICINE CLINIC | Facility: CLINIC | Age: 68
End: 2018-03-15
Payer: MEDICARE

## 2018-03-15 VITALS
DIASTOLIC BLOOD PRESSURE: 82 MMHG | RESPIRATION RATE: 18 BRPM | HEIGHT: 59 IN | SYSTOLIC BLOOD PRESSURE: 128 MMHG | WEIGHT: 153 LBS | HEART RATE: 78 BPM | TEMPERATURE: 97.2 F | BODY MASS INDEX: 30.84 KG/M2

## 2018-03-15 DIAGNOSIS — F41.9 ANXIETY AND DEPRESSION: ICD-10-CM

## 2018-03-15 DIAGNOSIS — I10 ESSENTIAL HYPERTENSION: ICD-10-CM

## 2018-03-15 DIAGNOSIS — F32.A ANXIETY AND DEPRESSION: ICD-10-CM

## 2018-03-15 DIAGNOSIS — J01.80 ACUTE NON-RECURRENT SINUSITIS OF OTHER SINUS: ICD-10-CM

## 2018-03-15 DIAGNOSIS — Z12.11 SCREEN FOR COLON CANCER: ICD-10-CM

## 2018-03-15 DIAGNOSIS — E78.2 MIXED HYPERLIPIDEMIA: Primary | ICD-10-CM

## 2018-03-15 PROCEDURE — 99214 OFFICE O/P EST MOD 30 MIN: CPT | Performed by: INTERNAL MEDICINE

## 2018-03-15 RX ORDER — AMOXICILLIN 875 MG/1
875 TABLET, COATED ORAL 2 TIMES DAILY
Qty: 20 TABLET | Refills: 0 | Status: SHIPPED | OUTPATIENT
Start: 2018-03-15 | End: 2018-03-25

## 2018-03-15 RX ORDER — ALPRAZOLAM 0.25 MG/1
0.25 TABLET ORAL AS NEEDED
Qty: 30 TABLET | Refills: 0 | Status: SHIPPED | OUTPATIENT
Start: 2018-03-15 | End: 2019-01-24 | Stop reason: SDUPTHER

## 2018-03-15 RX ORDER — ESCITALOPRAM OXALATE 10 MG/1
10 TABLET ORAL DAILY
Qty: 90 TABLET | Refills: 1 | Status: SHIPPED | OUTPATIENT
Start: 2018-03-15 | End: 2018-09-20 | Stop reason: SDUPTHER

## 2018-03-15 RX ORDER — ZOLPIDEM TARTRATE 12.5 MG/1
12.5 TABLET, FILM COATED, EXTENDED RELEASE ORAL
Qty: 30 TABLET | Refills: 0 | Status: SHIPPED | OUTPATIENT
Start: 2018-03-15 | End: 2018-09-20 | Stop reason: SDUPTHER

## 2018-03-15 NOTE — PROGRESS NOTES
Subjective:      Patient ID: Laurie Whittington is a 69 y o  female  Chief Complaint   Patient presents with    Follow-up     medication review   [id number]       For past 3 days has congestion, mucus, productive cough, mild dyspnea and wheeze  No fever  No meds tried  Otherwise here for follow up of chronic conditions  Needs her lexapro refilled for anxiety/depression  Is almost out  Is working well for her  Needs a refill for zolpidem and xanax as well  Uses these infrequently  Denies cardiac symptoms  Had labs in December for lipids  The following portions of the patient's history were reviewed and updated as appropriate: allergies, current medications, past family history, past medical history, past social history, past surgical history and problem list     Review of Systems   Constitutional: Negative  Respiratory: Negative  Cardiovascular: Negative  Current Outpatient Prescriptions   Medication Sig Dispense Refill    acetaminophen (TYLENOL) 325 mg tablet Take 2 tablets (650 mg total) by mouth every 6 (six) hours as needed for mild pain, headaches or fever  30 tablet 0    albuterol (PROVENTIL HFA,VENTOLIN HFA) 90 mcg/act inhaler Inhale 2 puffs every 6 (six) hours as needed for wheezing   ALPRAZolam (XANAX) 0 25 mg tablet Take 1 tablet (0 25 mg total) by mouth as needed for anxiety 30 tablet 0    atorvastatin (LIPITOR) 20 mg tablet Take 20 mg by mouth daily   diclofenac sodium (VOLTAREN) 1 % Place on the skin      ergocalciferol (VITAMIN D2) 50,000 units Take 50,000 Units by mouth once a week  On wednesday      escitalopram (LEXAPRO) 10 mg tablet Take 1 tablet (10 mg total) by mouth daily 90 tablet 1    esomeprazole (NexIUM) 20 mg capsule Take 20 mg by mouth every morning before breakfast       fluticasone (FLONASE) 50 mcg/act nasal spray 2 sprays into each nostril daily      fluticasone-salmeterol (ADVAIR HFA) 115-21 MCG/ACT inhaler Inhale 1 puff daily        metroNIDAZOLE (METROGEL) 0 75 % gel Apply topically Twice daily      Multiple Vitamin (MULTIVITAMIN) tablet Take 1 tablet by mouth daily   olopatadine (PATANOL) 0 1 % ophthalmic solution Apply to eye      zolpidem (AMBIEN CR) 12 5 MG CR tablet Take 1 tablet (12 5 mg total) by mouth daily at bedtime as needed for sleep 30 tablet 0    amoxicillin (AMOXIL) 875 mg tablet Take 1 tablet (875 mg total) by mouth 2 (two) times a day for 10 days 20 tablet 0    b complex vitamins tablet Take 1 tablet by mouth daily   nebivolol (BYSTOLIC) 5 mg tablet Take 5 mg by mouth daily  No current facility-administered medications for this visit  Objective:    /82   Pulse 78   Temp (!) 97 2 °F (36 2 °C)   Resp 18   Ht 4' 11" (1 499 m)   Wt 69 4 kg (153 lb)   LMP  (LMP Unknown)   BMI 30 90 kg/m²        Physical Exam   Constitutional: She appears well-developed and well-nourished  HENT:   Head: Normocephalic and atraumatic  Right Ear: Tympanic membrane is retracted  Left Ear: Tympanic membrane is retracted  Nose: Mucosal edema present  Mouth/Throat: Oropharynx is clear and moist  No oropharyngeal exudate or posterior oropharyngeal edema  Eyes: Conjunctivae and EOM are normal  Pupils are equal, round, and reactive to light  Neck: Neck supple  No JVD present  No thyromegaly present  Cardiovascular: Normal rate, regular rhythm, normal heart sounds and intact distal pulses  Pulmonary/Chest: Effort normal and breath sounds normal  She has no wheezes  She has no rales  Abdominal: Soft  Bowel sounds are normal    Musculoskeletal: She exhibits no edema  Skin: No rash noted  Assessment/Plan:    Essential hypertension  Hypertension stable on bystolic, will continue  Mixed hyperlipidemia  Labs were reviewed from December, continue atorvastatin  Check labs Q6 months  Anxiety and depression  Stable, renewed medications          Diagnoses and all orders for this visit:    Mixed hyperlipidemia  -     Comprehensive metabolic panel; Future  -     Lipid panel; Future    Anxiety and depression  -     escitalopram (LEXAPRO) 10 mg tablet; Take 1 tablet (10 mg total) by mouth daily  -     ALPRAZolam (XANAX) 0 25 mg tablet; Take 1 tablet (0 25 mg total) by mouth as needed for anxiety  -     zolpidem (AMBIEN CR) 12 5 MG CR tablet; Take 1 tablet (12 5 mg total) by mouth daily at bedtime as needed for sleep    Screen for colon cancer  -     Ambulatory referral to Gastroenterology; Future    Essential hypertension    Acute non-recurrent sinusitis of other sinus  Comments: Will treat with amoxicillin as above, recommended saline NS, mucinex DM, hot steamy shower and clearance of mucus  Follow if not improving  Orders:  -     amoxicillin (AMOXIL) 875 mg tablet; Take 1 tablet (875 mg total) by mouth 2 (two) times a day for 10 days    Other orders  -     diclofenac sodium (VOLTAREN) 1 %; Place on the skin  -     Discontinue: escitalopram (LEXAPRO) 10 mg tablet; Take 1 tablet by mouth daily  -     fluticasone (FLONASE) 50 mcg/act nasal spray; 2 sprays into each nostril daily  -     metroNIDAZOLE (METROGEL) 0 75 % gel; Apply topically Twice daily  -     olopatadine (PATANOL) 0 1 % ophthalmic solution; Apply to eye          Return in about 6 months (around 9/15/2018)         Susan Chavez MD

## 2018-04-03 DIAGNOSIS — J44.9 CHRONIC OBSTRUCTIVE PULMONARY DISEASE (HCC): ICD-10-CM

## 2018-04-03 DIAGNOSIS — R91.1 SOLITARY PULMONARY NODULE: ICD-10-CM

## 2018-06-25 ENCOUNTER — OFFICE VISIT (OUTPATIENT)
Dept: FAMILY MEDICINE CLINIC | Facility: CLINIC | Age: 68
End: 2018-06-25
Payer: MEDICARE

## 2018-06-25 VITALS
WEIGHT: 156 LBS | RESPIRATION RATE: 16 BRPM | HEART RATE: 84 BPM | SYSTOLIC BLOOD PRESSURE: 132 MMHG | BODY MASS INDEX: 31.45 KG/M2 | HEIGHT: 59 IN | TEMPERATURE: 98.7 F | DIASTOLIC BLOOD PRESSURE: 72 MMHG

## 2018-06-25 DIAGNOSIS — M10.071 ACUTE IDIOPATHIC GOUT OF RIGHT FOOT: ICD-10-CM

## 2018-06-25 DIAGNOSIS — I10 ESSENTIAL HYPERTENSION: Primary | ICD-10-CM

## 2018-06-25 DIAGNOSIS — Z12.11 SCREEN FOR COLON CANCER: ICD-10-CM

## 2018-06-25 DIAGNOSIS — E78.2 MIXED HYPERLIPIDEMIA: ICD-10-CM

## 2018-06-25 PROCEDURE — 99214 OFFICE O/P EST MOD 30 MIN: CPT | Performed by: INTERNAL MEDICINE

## 2018-06-25 RX ORDER — COLCHICINE 0.6 MG/1
TABLET ORAL
Qty: 20 TABLET | Refills: 0 | Status: SHIPPED | OUTPATIENT
Start: 2018-06-25 | End: 2018-09-20 | Stop reason: SDUPTHER

## 2018-06-25 RX ORDER — COLCHICINE 0.6 MG/1
0.6 TABLET ORAL DAILY
Refills: 0
Start: 2018-06-25 | End: 2018-06-25 | Stop reason: SDUPTHER

## 2018-06-25 NOTE — PROGRESS NOTES
Subjective:      Patient ID: Shelly Terry is a 77 y o  female  Chief Complaint   Patient presents with    Pain     right foot pain-lj       Started with R foot great toe pain and swelling about 3 days ago, while at work  Had this once before  Took tylenol and ice and it is improving  Still a little swollen but improving  She still has a lot of pain and is interested in an acute medication  Cannot take NSAIDS due to history of bariatric surgery  The following portions of the patient's history were reviewed and updated as appropriate: allergies, current medications, past family history, past medical history, past social history, past surgical history and problem list     Review of Systems   Constitutional: Negative  Respiratory: Negative  Cardiovascular: Negative  Current Outpatient Prescriptions   Medication Sig Dispense Refill    acetaminophen (TYLENOL) 325 mg tablet Take 2 tablets (650 mg total) by mouth every 6 (six) hours as needed for mild pain, headaches or fever  30 tablet 0    albuterol (PROVENTIL HFA,VENTOLIN HFA) 90 mcg/act inhaler Inhale 2 puffs every 6 (six) hours as needed for wheezing   ALPRAZolam (XANAX) 0 25 mg tablet Take 1 tablet (0 25 mg total) by mouth as needed for anxiety 30 tablet 0    atorvastatin (LIPITOR) 20 mg tablet Take 20 mg by mouth daily   b complex vitamins tablet Take 1 tablet by mouth daily   diclofenac sodium (VOLTAREN) 1 % Place on the skin      ergocalciferol (VITAMIN D2) 50,000 units Take 50,000 Units by mouth once a week  On wednesday      escitalopram (LEXAPRO) 10 mg tablet Take 1 tablet (10 mg total) by mouth daily 90 tablet 1    esomeprazole (NexIUM) 20 mg capsule Take 20 mg by mouth every morning before breakfast       fluticasone (FLONASE) 50 mcg/act nasal spray 2 sprays into each nostril daily      fluticasone-salmeterol (ADVAIR HFA) 115-21 MCG/ACT inhaler Inhale 1 puff daily        metroNIDAZOLE (METROGEL) 0 75 % gel Apply topically Twice daily      Multiple Vitamin (MULTIVITAMIN) tablet Take 1 tablet by mouth daily   olopatadine (PATANOL) 0 1 % ophthalmic solution Apply to eye      zolpidem (AMBIEN CR) 12 5 MG CR tablet Take 1 tablet (12 5 mg total) by mouth daily at bedtime as needed for sleep 30 tablet 0    colchicine (COLCRYS) 0 6 mg tablet Take 2 po x 1 dose, then 1 po 12 hours later 20 tablet 0     No current facility-administered medications for this visit  Objective:    /72   Pulse 84   Temp 98 7 °F (37 1 °C)   Resp 16   Ht 4' 11" (1 499 m)   Wt 70 8 kg (156 lb)   LMP  (LMP Unknown)   BMI 31 51 kg/m²        Physical Exam   Constitutional: She appears well-developed and well-nourished  Eyes: Conjunctivae are normal    Neck: Neck supple  No JVD present  No thyromegaly present  Cardiovascular: Normal rate, regular rhythm, normal heart sounds and intact distal pulses  Exam reveals no gallop and no friction rub  No murmur heard  Pulmonary/Chest: Effort normal and breath sounds normal  She has no wheezes  She has no rales  Abdominal: Soft  Bowel sounds are normal  She exhibits no distension  There is no tenderness  Musculoskeletal: She exhibits no edema  R 1st toe with tenderness and erythema, mild edema at MTP joint  Assessment/Plan:    Essential hypertension  Stable, continue meds  Acute idiopathic gout of right foot  She will check labs  She prefers not to use prophylactic medication at this time, as she has only ever had one other outbreak  Will use colchicine PRN for now  Mixed hyperlipidemia  Reprinted lab slip, continue atorvastatin  Diagnoses and all orders for this visit:    Essential hypertension  -     CBC and differential; Future  -     Comprehensive metabolic panel; Future  -     Lipid panel; Future    Mixed hyperlipidemia  -     CBC and differential; Future  -     Comprehensive metabolic panel; Future  -     Lipid panel;  Future  -     TSH, 3rd generation with T4 reflex; Future    Acute idiopathic gout of right foot  -     Uric acid; Future  -     Discontinue: colchicine (COLCRYS) 0 6 mg tablet; Take 1 tablet (0 6 mg total) by mouth daily  -     colchicine (COLCRYS) 0 6 mg tablet; Take 2 po x 1 dose, then 1 po 12 hours later    Screen for colon cancer  -     Ambulatory referral to Gastroenterology; Future          Return in about 6 months (around 12/25/2018)         Jere Bo MD

## 2018-06-25 NOTE — LETTER
June 25, 2018     Patient: Eber Nolan   YOB: 1950   Date of Visit: 6/25/2018       To Whom it May Concern:    Jocelyn Coleman is under my professional care  She was seen in my office on 6/25/2018  Excuse due to illness 6/26/19  If you have any questions or concerns, please don't hesitate to call           Sincerely,          Patricia Roland MD        CC: No Recipients

## 2018-06-25 NOTE — ASSESSMENT & PLAN NOTE
She will check labs  She prefers not to use prophylactic medication at this time, as she has only ever had one other outbreak  Will use colchicine PRN for now

## 2018-06-26 ENCOUNTER — APPOINTMENT (OUTPATIENT)
Dept: LAB | Facility: HOSPITAL | Age: 68
End: 2018-06-26
Attending: INTERNAL MEDICINE
Payer: MEDICARE

## 2018-06-26 ENCOUNTER — TRANSCRIBE ORDERS (OUTPATIENT)
Dept: ADMINISTRATIVE | Facility: HOSPITAL | Age: 68
End: 2018-06-26

## 2018-06-26 DIAGNOSIS — E78.2 MIXED HYPERLIPIDEMIA: ICD-10-CM

## 2018-06-26 DIAGNOSIS — M10.071 ACUTE IDIOPATHIC GOUT OF RIGHT FOOT: ICD-10-CM

## 2018-06-26 DIAGNOSIS — I10 ESSENTIAL HYPERTENSION: ICD-10-CM

## 2018-06-26 LAB
ALBUMIN SERPL BCP-MCNC: 3.4 G/DL (ref 3.5–5)
ALP SERPL-CCNC: 84 U/L (ref 46–116)
ALT SERPL W P-5'-P-CCNC: 29 U/L (ref 12–78)
ANION GAP SERPL CALCULATED.3IONS-SCNC: 8 MMOL/L (ref 4–13)
AST SERPL W P-5'-P-CCNC: 15 U/L (ref 5–45)
BASOPHILS # BLD AUTO: 0.05 THOUSANDS/ΜL (ref 0–0.1)
BASOPHILS NFR BLD AUTO: 0 % (ref 0–1)
BILIRUB SERPL-MCNC: 0.7 MG/DL (ref 0.2–1)
BUN SERPL-MCNC: 15 MG/DL (ref 5–25)
CALCIUM SERPL-MCNC: 8.7 MG/DL (ref 8.3–10.1)
CHLORIDE SERPL-SCNC: 103 MMOL/L (ref 100–108)
CHOLEST SERPL-MCNC: 145 MG/DL (ref 50–200)
CO2 SERPL-SCNC: 27 MMOL/L (ref 21–32)
CREAT SERPL-MCNC: 0.58 MG/DL (ref 0.6–1.3)
EOSINOPHIL # BLD AUTO: 0.29 THOUSAND/ΜL (ref 0–0.61)
EOSINOPHIL NFR BLD AUTO: 2 % (ref 0–6)
ERYTHROCYTE [DISTWIDTH] IN BLOOD BY AUTOMATED COUNT: 14.2 % (ref 11.6–15.1)
GFR SERPL CREATININE-BSD FRML MDRD: 94 ML/MIN/1.73SQ M
GLUCOSE P FAST SERPL-MCNC: 95 MG/DL (ref 65–99)
HCT VFR BLD AUTO: 46.5 % (ref 34.8–46.1)
HDLC SERPL-MCNC: 45 MG/DL (ref 40–60)
HGB BLD-MCNC: 14.9 G/DL (ref 11.5–15.4)
IMM GRANULOCYTES # BLD AUTO: 0.08 THOUSAND/UL (ref 0–0.2)
IMM GRANULOCYTES NFR BLD AUTO: 1 % (ref 0–2)
LDLC SERPL CALC-MCNC: 86 MG/DL (ref 0–100)
LYMPHOCYTES # BLD AUTO: 2.78 THOUSANDS/ΜL (ref 0.6–4.47)
LYMPHOCYTES NFR BLD AUTO: 23 % (ref 14–44)
MCH RBC QN AUTO: 29.7 PG (ref 26.8–34.3)
MCHC RBC AUTO-ENTMCNC: 32 G/DL (ref 31.4–37.4)
MCV RBC AUTO: 93 FL (ref 82–98)
MONOCYTES # BLD AUTO: 0.72 THOUSAND/ΜL (ref 0.17–1.22)
MONOCYTES NFR BLD AUTO: 6 % (ref 4–12)
NEUTROPHILS # BLD AUTO: 8.39 THOUSANDS/ΜL (ref 1.85–7.62)
NEUTS SEG NFR BLD AUTO: 68 % (ref 43–75)
NONHDLC SERPL-MCNC: 100 MG/DL
NRBC BLD AUTO-RTO: 0 /100 WBCS
PLATELET # BLD AUTO: 307 THOUSANDS/UL (ref 149–390)
PMV BLD AUTO: 11.1 FL (ref 8.9–12.7)
POTASSIUM SERPL-SCNC: 3.7 MMOL/L (ref 3.5–5.3)
PROT SERPL-MCNC: 7.2 G/DL (ref 6.4–8.2)
RBC # BLD AUTO: 5.02 MILLION/UL (ref 3.81–5.12)
SODIUM SERPL-SCNC: 138 MMOL/L (ref 136–145)
TRIGL SERPL-MCNC: 72 MG/DL
TSH SERPL DL<=0.05 MIU/L-ACNC: 1.37 UIU/ML (ref 0.36–3.74)
URATE SERPL-MCNC: 3.7 MG/DL (ref 2–6.8)
WBC # BLD AUTO: 12.31 THOUSAND/UL (ref 4.31–10.16)

## 2018-06-26 PROCEDURE — 84550 ASSAY OF BLOOD/URIC ACID: CPT

## 2018-06-26 PROCEDURE — 80061 LIPID PANEL: CPT

## 2018-06-26 PROCEDURE — 85025 COMPLETE CBC W/AUTO DIFF WBC: CPT

## 2018-06-26 PROCEDURE — 36415 COLL VENOUS BLD VENIPUNCTURE: CPT

## 2018-06-26 PROCEDURE — 84443 ASSAY THYROID STIM HORMONE: CPT

## 2018-06-26 PROCEDURE — 80053 COMPREHEN METABOLIC PANEL: CPT

## 2018-09-17 NOTE — PROGRESS NOTES
Subjective:      Patient ID: Keron Zacarias is a 77 y o  female  Chief Complaint   Patient presents with    Follow-up     on medications akrma        Here for follow up of hypertension, hyperlipidemia, COPD, gout  Feels she is still having some gouty flares frequently and would like to try a preventative medication  Denies chest pain or increased dyspnea  Would like to try chantix again for smoking cessation  She has done well with this in the past and did not have side effects  Needs all her meds refilled  The following portions of the patient's history were reviewed and updated as appropriate: allergies, current medications, past family history, past medical history, past social history, past surgical history and problem list     Review of Systems   Constitutional: Negative  Respiratory: Negative  Cardiovascular: Negative  Musculoskeletal:        As per HPI  Current Outpatient Prescriptions   Medication Sig Dispense Refill    acetaminophen (TYLENOL) 325 mg tablet Take 2 tablets (650 mg total) by mouth every 6 (six) hours as needed for mild pain, headaches or fever  30 tablet 0    albuterol (PROVENTIL HFA,VENTOLIN HFA) 90 mcg/act inhaler Inhale 2 puffs every 6 (six) hours as needed for wheezing 3 Inhaler 3    ALPRAZolam (XANAX) 0 25 mg tablet Take 1 tablet (0 25 mg total) by mouth as needed for anxiety 30 tablet 0    Apple Cider Vinegar 188 MG CAPS Take by mouth daily      atorvastatin (LIPITOR) 20 mg tablet Take 1 tablet (20 mg total) by mouth daily 90 tablet 3    b complex vitamins tablet Take 1 tablet by mouth daily   colchicine (COLCRYS) 0 6 mg tablet Take 2 po x 1 dose, then 1 po 12 hours later 20 tablet 0    diclofenac sodium (VOLTAREN) 1 % Place on the skin      ergocalciferol (VITAMIN D2) 50,000 units Take 50,000 Units by mouth once a week   On wednesday      escitalopram (LEXAPRO) 10 mg tablet Take 1 tablet (10 mg total) by mouth daily 90 tablet 3    esomeprazole (NexIUM) 20 mg capsule Take 1 capsule (20 mg total) by mouth daily in the early morning 90 capsule 3    fluticasone (FLONASE) 50 mcg/act nasal spray 2 sprays into each nostril daily 48 g 3    fluticasone-salmeterol (ADVAIR HFA) 115-21 MCG/ACT inhaler Inhale 1 puff daily 3 Inhaler 3    metroNIDAZOLE (METROGEL) 0 75 % gel Apply topically 2 (two) times a day 45 g 3    Multiple Vitamin (MULTIVITAMIN) tablet Take 1 tablet by mouth daily   olopatadine (PATANOL) 0 1 % ophthalmic solution Apply 1 drop to eye 2 (two) times a day 15 mL 3    zolpidem (AMBIEN CR) 12 5 MG CR tablet Take 1 tablet (12 5 mg total) by mouth daily at bedtime as needed for sleep 30 tablet 0    allopurinol (ZYLOPRIM) 100 mg tablet Take 1 tablet (100 mg total) by mouth daily 90 tablet 1    varenicline (CHANTIX CHRISTINA) 0 5 MG X 11 & 1 MG X 42 tablet Take one 0 5mg tab by mouth 1x daily for 3 days, then increase to one 0 5mg tab 2x daily for 3 days, then increase to one 1mg tab 2x daily 53 tablet 0    varenicline (CHANTIX) 1 mg tablet Take 1 tablet (1 mg total) by mouth 2 (two) times a day 60 tablet 1     No current facility-administered medications for this visit  Objective:    /88   Pulse 64   Temp 98 2 °F (36 8 °C)   Resp 18   Ht 4' 11" (1 499 m)   Wt 70 kg (154 lb 6 4 oz)   LMP  (LMP Unknown)   BMI 31 19 kg/m²        Physical Exam   Constitutional: She appears well-developed and well-nourished  Eyes: Conjunctivae are normal    Neck: Neck supple  No JVD present  No thyromegaly present  Cardiovascular: Normal rate, regular rhythm, normal heart sounds and intact distal pulses  Exam reveals no gallop and no friction rub  No murmur heard  Pulmonary/Chest: Effort normal and breath sounds normal  She has no wheezes  She has no rales  Abdominal: Soft  Bowel sounds are normal  She exhibits no distension  There is no tenderness  Musculoskeletal: She exhibits no edema     Psychiatric: She has a normal mood and affect  Her behavior is normal  Judgment and thought content normal          Assessment/Plan:    Chronic obstructive pulmonary disease (HCC)  Stable and sees pulm regularly  Essential hypertension  Hypertension stable, continue meds as ordered  Acute idiopathic gout of right foot  Will start allopurinol for prevention as she has been having recurrence  Advised she may get a flare after starting this and should take colchicine once daily for first 3-4 days while starting this  If she continues to have flares we can titrate the allopurinol  Anxiety and depression  Doing well on lexapro with prn xanax, she will continue  Mixed hyperlipidemia  Due for labwork in November and will continue her statin  Recommended low fat diet and exercise  Diagnoses and all orders for this visit:    Essential hypertension  -     CBC and differential; Future  -     Comprehensive metabolic panel; Future  -     Lipid panel; Future    Anxiety and depression  -     zolpidem (AMBIEN CR) 12 5 MG CR tablet; Take 1 tablet (12 5 mg total) by mouth daily at bedtime as needed for sleep  -     escitalopram (LEXAPRO) 10 mg tablet; Take 1 tablet (10 mg total) by mouth daily    Mixed hyperlipidemia  -     CBC and differential; Future  -     Comprehensive metabolic panel; Future  -     Lipid panel; Future  -     atorvastatin (LIPITOR) 20 mg tablet; Take 1 tablet (20 mg total) by mouth daily    Acute idiopathic gout of right foot  -     colchicine (COLCRYS) 0 6 mg tablet; Take 2 po x 1 dose, then 1 po 12 hours later  -     allopurinol (ZYLOPRIM) 100 mg tablet; Take 1 tablet (100 mg total) by mouth daily  -     Uric acid; Future    Insomnia, unspecified type  -     influenza vaccine, 1319-0184, high-dose, PF 0 5 mL, for patients 65 yr+ (FLUZONE HIGH-DOSE)    Tobacco abuse  -     varenicline (CHANTIX CHRISTINA) 0 5 MG X 11 & 1 MG X 42 tablet;  Take one 0 5mg tab by mouth 1x daily for 3 days, then increase to one 0 5mg tab 2x daily for 3 days, then increase to one 1mg tab 2x daily  -     varenicline (CHANTIX) 1 mg tablet; Take 1 tablet (1 mg total) by mouth 2 (two) times a day    Chronic obstructive pulmonary disease, unspecified COPD type (HCC)  -     albuterol (PROVENTIL HFA,VENTOLIN HFA) 90 mcg/act inhaler; Inhale 2 puffs every 6 (six) hours as needed for wheezing  -     fluticasone (FLONASE) 50 mcg/act nasal spray; 2 sprays into each nostril daily  -     fluticasone-salmeterol (ADVAIR HFA) 115-21 MCG/ACT inhaler; Inhale 1 puff daily    Gastroesophageal reflux disease without esophagitis  -     esomeprazole (NexIUM) 20 mg capsule; Take 1 capsule (20 mg total) by mouth daily in the early morning    Rosacea  -     metroNIDAZOLE (METROGEL) 0 75 % gel; Apply topically 2 (two) times a day    Allergic conjunctivitis of both eyes  -     olopatadine (PATANOL) 0 1 % ophthalmic solution; Apply 1 drop to eye 2 (two) times a day    Other orders  -     Apple Cider Vinegar 188 MG CAPS; Take by mouth daily          Return in about 4 months (around 1/20/2019)         Shady Boston MD

## 2018-09-20 ENCOUNTER — OFFICE VISIT (OUTPATIENT)
Dept: FAMILY MEDICINE CLINIC | Facility: CLINIC | Age: 68
End: 2018-09-20
Payer: MEDICARE

## 2018-09-20 VITALS
HEART RATE: 64 BPM | RESPIRATION RATE: 18 BRPM | HEIGHT: 59 IN | BODY MASS INDEX: 31.12 KG/M2 | DIASTOLIC BLOOD PRESSURE: 88 MMHG | TEMPERATURE: 98.2 F | WEIGHT: 154.4 LBS | SYSTOLIC BLOOD PRESSURE: 150 MMHG

## 2018-09-20 DIAGNOSIS — I10 ESSENTIAL HYPERTENSION: Primary | ICD-10-CM

## 2018-09-20 DIAGNOSIS — M10.071 ACUTE IDIOPATHIC GOUT OF RIGHT FOOT: ICD-10-CM

## 2018-09-20 DIAGNOSIS — H10.13 ALLERGIC CONJUNCTIVITIS OF BOTH EYES: ICD-10-CM

## 2018-09-20 DIAGNOSIS — F32.A ANXIETY AND DEPRESSION: ICD-10-CM

## 2018-09-20 DIAGNOSIS — J44.9 CHRONIC OBSTRUCTIVE PULMONARY DISEASE, UNSPECIFIED COPD TYPE (HCC): ICD-10-CM

## 2018-09-20 DIAGNOSIS — L71.9 ROSACEA: ICD-10-CM

## 2018-09-20 DIAGNOSIS — F41.9 ANXIETY AND DEPRESSION: ICD-10-CM

## 2018-09-20 DIAGNOSIS — E78.2 MIXED HYPERLIPIDEMIA: ICD-10-CM

## 2018-09-20 DIAGNOSIS — Z72.0 TOBACCO ABUSE: ICD-10-CM

## 2018-09-20 DIAGNOSIS — K21.9 GASTROESOPHAGEAL REFLUX DISEASE WITHOUT ESOPHAGITIS: ICD-10-CM

## 2018-09-20 DIAGNOSIS — G47.00 INSOMNIA, UNSPECIFIED TYPE: ICD-10-CM

## 2018-09-20 PROCEDURE — 99214 OFFICE O/P EST MOD 30 MIN: CPT | Performed by: INTERNAL MEDICINE

## 2018-09-20 PROCEDURE — 90662 IIV NO PRSV INCREASED AG IM: CPT

## 2018-09-20 PROCEDURE — G0008 ADMIN INFLUENZA VIRUS VAC: HCPCS

## 2018-09-20 RX ORDER — METRONIDAZOLE 7.5 MG/G
GEL TOPICAL 2 TIMES DAILY
Qty: 45 G | Refills: 3 | Status: SHIPPED | OUTPATIENT
Start: 2018-09-20 | End: 2021-05-10

## 2018-09-20 RX ORDER — FLUTICASONE PROPIONATE 50 MCG
2 SPRAY, SUSPENSION (ML) NASAL DAILY
Qty: 48 G | Refills: 3 | Status: SHIPPED | OUTPATIENT
Start: 2018-09-20 | End: 2021-05-10 | Stop reason: SDUPTHER

## 2018-09-20 RX ORDER — ZOLPIDEM TARTRATE 12.5 MG/1
12.5 TABLET, FILM COATED, EXTENDED RELEASE ORAL
Qty: 30 TABLET | Refills: 0 | Status: SHIPPED | OUTPATIENT
Start: 2018-09-20 | End: 2019-01-24 | Stop reason: SDUPTHER

## 2018-09-20 RX ORDER — ATORVASTATIN CALCIUM 20 MG/1
20 TABLET, FILM COATED ORAL DAILY
Qty: 90 TABLET | Refills: 3 | Status: SHIPPED | OUTPATIENT
Start: 2018-09-20 | End: 2020-04-23

## 2018-09-20 RX ORDER — COLCHICINE 0.6 MG/1
TABLET ORAL
Qty: 20 TABLET | Refills: 0 | Status: SHIPPED | OUTPATIENT
Start: 2018-09-20 | End: 2020-03-26 | Stop reason: SDUPTHER

## 2018-09-20 RX ORDER — OLOPATADINE HYDROCHLORIDE 1 MG/ML
1 SOLUTION/ DROPS OPHTHALMIC 2 TIMES DAILY
Qty: 15 ML | Refills: 3 | Status: SHIPPED | OUTPATIENT
Start: 2018-09-20 | End: 2021-05-10 | Stop reason: SDUPTHER

## 2018-09-20 RX ORDER — ALBUTEROL SULFATE 90 UG/1
2 AEROSOL, METERED RESPIRATORY (INHALATION) EVERY 6 HOURS PRN
Qty: 3 INHALER | Refills: 3 | Status: SHIPPED | OUTPATIENT
Start: 2018-09-20 | End: 2020-12-03 | Stop reason: SDUPTHER

## 2018-09-20 RX ORDER — ESCITALOPRAM OXALATE 10 MG/1
10 TABLET ORAL DAILY
Qty: 90 TABLET | Refills: 3 | Status: SHIPPED | OUTPATIENT
Start: 2018-09-20 | End: 2019-04-17 | Stop reason: SDUPTHER

## 2018-09-20 RX ORDER — ALLOPURINOL 100 MG/1
100 TABLET ORAL DAILY
Qty: 90 TABLET | Refills: 1 | Status: SHIPPED | OUTPATIENT
Start: 2018-09-20 | End: 2019-09-11 | Stop reason: SDUPTHER

## 2018-09-20 RX ORDER — VARENICLINE TARTRATE 25 MG
KIT ORAL
Qty: 53 TABLET | Refills: 0 | Status: SHIPPED | OUTPATIENT
Start: 2018-09-20 | End: 2018-10-08

## 2018-09-20 RX ORDER — VARENICLINE TARTRATE 1 MG/1
1 TABLET, FILM COATED ORAL 2 TIMES DAILY
Qty: 60 TABLET | Refills: 1 | Status: SHIPPED | OUTPATIENT
Start: 2018-09-20 | End: 2018-10-08

## 2018-09-20 NOTE — PATIENT INSTRUCTIONS
Start allopurinol once a day for gout prevention  For the first 3 days, take a colchicine with the allopurinol, once daily

## 2018-09-20 NOTE — ASSESSMENT & PLAN NOTE
Will start allopurinol for prevention as she has been having recurrence  Advised she may get a flare after starting this and should take colchicine once daily for first 3-4 days while starting this  If she continues to have flares we can titrate the allopurinol

## 2018-09-21 ENCOUNTER — TELEPHONE (OUTPATIENT)
Dept: FAMILY MEDICINE CLINIC | Facility: CLINIC | Age: 68
End: 2018-09-21

## 2018-09-21 NOTE — TELEPHONE ENCOUNTER
Prescription Pharmacy does not cover this medication  Called pt to stated  she could pay out of pocket   Task Complete   [id number]

## 2018-10-08 ENCOUNTER — OFFICE VISIT (OUTPATIENT)
Dept: FAMILY MEDICINE CLINIC | Facility: CLINIC | Age: 68
End: 2018-10-08
Payer: MEDICARE

## 2018-10-08 ENCOUNTER — TELEPHONE (OUTPATIENT)
Dept: FAMILY MEDICINE CLINIC | Facility: CLINIC | Age: 68
End: 2018-10-08

## 2018-10-08 VITALS
TEMPERATURE: 98.4 F | RESPIRATION RATE: 16 BRPM | WEIGHT: 151 LBS | BODY MASS INDEX: 30.44 KG/M2 | DIASTOLIC BLOOD PRESSURE: 72 MMHG | SYSTOLIC BLOOD PRESSURE: 132 MMHG | HEART RATE: 72 BPM | HEIGHT: 59 IN

## 2018-10-08 DIAGNOSIS — H81.11 BENIGN PAROXYSMAL POSITIONAL VERTIGO OF RIGHT EAR: Primary | ICD-10-CM

## 2018-10-08 PROCEDURE — 99213 OFFICE O/P EST LOW 20 MIN: CPT | Performed by: NURSE PRACTITIONER

## 2018-10-08 RX ORDER — MECLIZINE HYDROCHLORIDE 25 MG/1
25 TABLET ORAL EVERY 8 HOURS PRN
Qty: 30 TABLET | Refills: 0 | Status: SHIPPED | OUTPATIENT
Start: 2018-10-08 | End: 2020-01-16

## 2018-10-08 NOTE — PROGRESS NOTES
Assessment/Plan:    Referred to balance center  She will call today to schedule an appt  Problem List Items Addressed This Visit     None      Visit Diagnoses     Benign paroxysmal positional vertigo of right ear    -  Primary    Relevant Medications    meclizine (ANTIVERT) 25 mg tablet    Other Relevant Orders    Ambulatory referral to Physical Therapy          Patient Instructions     Benign Paroxysmal Positional Vertigo   AMBULATORY CARE:   Benign paroxysmal positional vertigo (BPPV)  is an inner ear condition that causes you to suddenly feel dizzy  Benign means it is not serious or life-threatening  BPPV is caused by a problem with the nerves and structure of your inner ear  BPPV happens when small pieces of calcium break loose and lump together in one of your inner ear canals  Common symptoms include the following: You may feel that you or the room is moving or spinning  Turning your head, rolling over in bed, getting up or lying down may lead to sudden vertigo  You may also have any of the following symptoms:  · Nystagmus (quick shaky eye movement that you cannot control)    · Nausea    · Poor balance and feeling unsteady when you walk  Seek care immediately if:   · You fall during a BPPV episode and are injured  · You have a severe headache that does not go away  · You have new changes in your vision or feel weak or confused  · You have problems hearing, or you have ringing or buzzing in your ears  Contact your healthcare provider if:   · Your BPPV symptoms do not go away or they return  · You have problems with your balance, or you are falling often  · You have new or increased nausea or vomiting with vertigo  · You feel anxious or depressed and do not want to leave your home  · You have questions or concerns about your condition or care  Management of BPPV:   · Your healthcare provider will teach you how to move your head and body to prevent symptoms    For example, he or she may teach you certain ways to move your head or body  These movements usually help relieve your symptoms and keep the dizziness from returning  The exercises help move the calcium pieces to a different part of your ear  Do the movements only as directed  · Vestibular and balance rehabilitation therapy (VBRT)  is used to teach you exercises to improve your balance and strength  VBRT may help decrease your dizziness and prevent injuries if you are at risk for falls  · Medicines  may be recommended or prescribed to treat dizziness or nausea  Prevent your symptoms:   · Try to avoid sudden head movements  Stand up and lie down slowly  · Raise and support your head when you lie down  Place pillows under your upper back and head or rest in a recliner  · Change your position often when you are lying down  Try not to lie with your head on the same side for long periods of time  Roll over slowly  · Wear protective gear  when you ride a bike or play sports  A helmet helps protect your head from injury  Follow up with your healthcare provider as directed: You may need to return in 1 month to check the progress of your treatment  Write down your questions so you remember to ask them during your visits  © 2017 [address] [facility] Information is for End User's use only and may not be sold, redistributed or otherwise used for commercial purposes  All illustrations and images included in CareNotes® are the copyrighted property of A D A M [employer] Inc  or Kalyan [employer]  The above information is an  only  It is not intended as medical advice for individual conditions or treatments  Talk to your doctor, nurse or pharmacist before following any medical regimen to see if it is safe and effective for you  Return if symptoms worsen or fail to improve  Subjective:      Patient ID: Madalyn Muñoz is a 77 y o  female      Chief Complaint   Patient presents with    Dizziness random spells-       She has been getting dizzy spells for the past couple of weeks  They typically last a few minutes, but yesterday was bad  Symptoms occur on the right side  No identifiable pattern or triggers  She still feels a little shaky today  One episode it felt like her eye was crossing  Denies any ear pain, chest pain, or SOB  No recent changes        The following portions of the patient's history were reviewed and updated as appropriate: allergies, current medications, past family history, past medical history, past social history, past surgical history and problem list     Review of Systems   Constitutional: Negative  Eyes: Negative for photophobia and visual disturbance  Respiratory: Negative for cough and shortness of breath  Cardiovascular: Negative for chest pain  Neurological: Positive for dizziness  Negative for syncope, weakness and headaches  All other systems reviewed and are negative  Current Outpatient Prescriptions   Medication Sig Dispense Refill    acetaminophen (TYLENOL) 325 mg tablet Take 2 tablets (650 mg total) by mouth every 6 (six) hours as needed for mild pain, headaches or fever  30 tablet 0    albuterol (PROVENTIL HFA,VENTOLIN HFA) 90 mcg/act inhaler Inhale 2 puffs every 6 (six) hours as needed for wheezing 3 Inhaler 3    allopurinol (ZYLOPRIM) 100 mg tablet Take 1 tablet (100 mg total) by mouth daily 90 tablet 1    ALPRAZolam (XANAX) 0 25 mg tablet Take 1 tablet (0 25 mg total) by mouth as needed for anxiety 30 tablet 0    Apple Cider Vinegar 188 MG CAPS Take by mouth daily      atorvastatin (LIPITOR) 20 mg tablet Take 1 tablet (20 mg total) by mouth daily 90 tablet 3    b complex vitamins tablet Take 1 tablet by mouth daily        colchicine (COLCRYS) 0 6 mg tablet Take 2 po x 1 dose, then 1 po 12 hours later 20 tablet 0    diclofenac sodium (VOLTAREN) 1 % Place on the skin      ergocalciferol (VITAMIN D2) 50,000 units Take 50,000 Units by mouth once a week  On wednesday      escitalopram (LEXAPRO) 10 mg tablet Take 1 tablet (10 mg total) by mouth daily 90 tablet 3    esomeprazole (NexIUM) 20 mg capsule Take 1 capsule (20 mg total) by mouth daily in the early morning 90 capsule 3    fluticasone (FLONASE) 50 mcg/act nasal spray 2 sprays into each nostril daily 48 g 3    fluticasone-salmeterol (ADVAIR HFA) 115-21 MCG/ACT inhaler Inhale 1 puff daily 3 Inhaler 3    metroNIDAZOLE (METROGEL) 0 75 % gel Apply topically 2 (two) times a day 45 g 3    Multiple Vitamin (MULTIVITAMIN) tablet Take 1 tablet by mouth daily   olopatadine (PATANOL) 0 1 % ophthalmic solution Apply 1 drop to eye 2 (two) times a day 15 mL 3    zolpidem (AMBIEN CR) 12 5 MG CR tablet Take 1 tablet (12 5 mg total) by mouth daily at bedtime as needed for sleep 30 tablet 0    meclizine (ANTIVERT) 25 mg tablet Take 1 tablet (25 mg total) by mouth every 8 (eight) hours as needed for dizziness 30 tablet 0     No current facility-administered medications for this visit  Objective:    /72   Pulse 72   Temp 98 4 °F (36 9 °C)   Resp 16   Ht 4' 11" (1 499 m)   Wt 68 5 kg (151 lb)   LMP  (LMP Unknown)   BMI 30 50 kg/m²        Physical Exam   Constitutional: She appears well-developed and well-nourished  HENT:   Head: Normocephalic and atraumatic  Right Ear: Tympanic membrane, external ear and ear canal normal    Left Ear: Tympanic membrane, external ear and ear canal normal    Nose: No mucosal edema or rhinorrhea  Mouth/Throat: Uvula is midline, oropharynx is clear and moist and mucous membranes are normal    Eyes: Pupils are equal, round, and reactive to light  Conjunctivae, EOM and lids are normal  Right eye exhibits no nystagmus  Neck: Neck supple  No edema present  No thyromegaly present  Cardiovascular: Normal rate, regular rhythm, normal heart sounds and intact distal pulses  No murmur heard    Pulmonary/Chest: Effort normal and breath sounds normal    Abdominal: Bowel sounds are normal  She exhibits no distension  There is no splenomegaly or hepatomegaly  There is no tenderness  Lymphadenopathy:        Right cervical: No superficial cervical adenopathy present  Left cervical: No superficial cervical adenopathy present  Neurological:   Negative [place] bilaterally   Skin: Skin is warm, dry and intact  No rash noted  Psychiatric: She has a normal mood and affect  Nursing note and vitals reviewed               Jered Mortensen

## 2018-10-08 NOTE — LETTER
October 8, 2018     Patient: Michelle Alvares   YOB: 1950   Date of Visit: 10/8/2018       To Whom it May Concern:    Cary Rahman is under my professional care  She was seen in my office on 10/8/2018  Please excuse from work 10/7/18  May return to work 10/9/18  If you have any questions or concerns, please don't hesitate to call           Sincerely,          KYLE Stapleton        CC: No Recipients

## 2018-10-08 NOTE — PATIENT INSTRUCTIONS
Benign Paroxysmal Positional Vertigo   AMBULATORY CARE:   Benign paroxysmal positional vertigo (BPPV)  is an inner ear condition that causes you to suddenly feel dizzy  Benign means it is not serious or life-threatening  BPPV is caused by a problem with the nerves and structure of your inner ear  BPPV happens when small pieces of calcium break loose and lump together in one of your inner ear canals  Common symptoms include the following: You may feel that you or the room is moving or spinning  Turning your head, rolling over in bed, getting up or lying down may lead to sudden vertigo  You may also have any of the following symptoms:  · Nystagmus (quick shaky eye movement that you cannot control)    · Nausea    · Poor balance and feeling unsteady when you walk  Seek care immediately if:   · You fall during a BPPV episode and are injured  · You have a severe headache that does not go away  · You have new changes in your vision or feel weak or confused  · You have problems hearing, or you have ringing or buzzing in your ears  Contact your healthcare provider if:   · Your BPPV symptoms do not go away or they return  · You have problems with your balance, or you are falling often  · You have new or increased nausea or vomiting with vertigo  · You feel anxious or depressed and do not want to leave your home  · You have questions or concerns about your condition or care  Management of BPPV:   · Your healthcare provider will teach you how to move your head and body to prevent symptoms  For example, he or she may teach you certain ways to move your head or body  These movements usually help relieve your symptoms and keep the dizziness from returning  The exercises help move the calcium pieces to a different part of your ear  Do the movements only as directed  · Vestibular and balance rehabilitation therapy (VBRT)  is used to teach you exercises to improve your balance and strength   VBRT may help decrease your dizziness and prevent injuries if you are at risk for falls  · Medicines  may be recommended or prescribed to treat dizziness or nausea  Prevent your symptoms:   · Try to avoid sudden head movements  Stand up and lie down slowly  · Raise and support your head when you lie down  Place pillows under your upper back and head or rest in a recliner  · Change your position often when you are lying down  Try not to lie with your head on the same side for long periods of time  Roll over slowly  · Wear protective gear  when you ride a bike or play sports  A helmet helps protect your head from injury  Follow up with your healthcare provider as directed: You may need to return in 1 month to check the progress of your treatment  Write down your questions so you remember to ask them during your visits  © 2017 [employer] Information is for End User's use only and may not be sold, redistributed or otherwise used for commercial purposes  All illustrations and images included in CareNotes® are the copyrighted property of A D [employer] M [employer] Inc  or Kalyan Black  The above information is an  only  It is not intended as medical advice for individual conditions or treatments  Talk to your doctor, nurse or pharmacist before following any medical regimen to see if it is safe and effective for you

## 2018-10-08 NOTE — TELEPHONE ENCOUNTER
Marion Servant with pt  Dizzy and shaky for 2 days  Pt denies nausea chest  pain sob     Pt coming in with [employer]  At 1pm today 10/8/2018  dominicma

## 2018-10-09 ENCOUNTER — EVALUATION (OUTPATIENT)
Dept: PHYSICAL THERAPY | Facility: CLINIC | Age: 68
End: 2018-10-09
Payer: MEDICARE

## 2018-10-09 DIAGNOSIS — H81.11 BENIGN PAROXYSMAL POSITIONAL VERTIGO OF RIGHT EAR: Primary | ICD-10-CM

## 2018-10-09 DIAGNOSIS — R42 VERTIGO: ICD-10-CM

## 2018-10-09 PROCEDURE — G8978 MOBILITY CURRENT STATUS: HCPCS

## 2018-10-09 PROCEDURE — 97110 THERAPEUTIC EXERCISES: CPT

## 2018-10-09 PROCEDURE — 97163 PT EVAL HIGH COMPLEX 45 MIN: CPT

## 2018-10-09 PROCEDURE — G8979 MOBILITY GOAL STATUS: HCPCS

## 2018-10-09 NOTE — PROGRESS NOTES
PT Evaluation     Today's date: 10/9/2018  Patient name: Mena Alexander  : 1950  MRN: [id number]  Referring provider: YVONNE Gibson  Dx:   Encounter Diagnosis     ICD-10-CM    1  Benign paroxysmal positional vertigo of right ear H81 11 Ambulatory referral to Physical Therapy                  Assessment  Impairments: abnormal gait, impaired balance, lacks appropriate home exercise program and safety issue    Assessment details: 77 yr old female referred with BPPV, presents with BPPV by history, but negative exam today  She took meclizine last night which may mask symptoms/nystagmus  Pt presents with dynamic gait deficits, reduced static balance  She exceeds age norm for 5XSTS test, but is not at increased risk for fall per DGI  Advise continue PT for reassessment when pt has not had meclizine and to progress a clinical and home program for imbalance  Pt is in agreement with this plan  Understanding of Dx/Px/POC: excellent  Goals  STG 2-4 weeks  1  Eliminate BPPV if found under further reassessment  2  Pt will be independent with HEP  LTG 4-8 weeks  1  No position or activity of avoidance  2 No veering during gait with head turns  3  Pt will be independent with advanced HEP  4  Improve 5XSTS to age norm (10 seconds )  5  Pt will return to full speed of movement during ADL's at home and at work  Plan  Planned therapy interventions: canalith repositioning, home exercise program, therapeutic exercise, therapeutic activities, patient education and neuromuscular re-education  Frequency: 2x week  Duration in weeks: 8  Plan of Care beginning date: 10/9/2018  Plan of Care expiration date: 2018  Treatment plan discussed with: patient        Subjective Evaluation    History of Present Illness  Mechanism of injury: 77year old female reports noting lightheadedness a few months ago    It got worse this past Saturday, when she was out at a festival   She had a very small alcoholic drink and she began to notice spinning and veering and feeling a spin backwards toward the right  The symptoms lasted 20 minutes or so and then they were improved  She went home to bed  The next morning she continued to feel dizzy  She went to work, 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  She had to go home due to dizziness  Previous: She had spinning dizziness twice before, one month ago, and 6 months prior to that  These resolved spontaneously  Fall: while walking out of work in the dark while rushing, may have tripped, 8 months ago  Pt denies starting any new meds in this time frame  Pain  Current pain ratin  At best pain ratin  At worst pain ratin  Location: Right foot gout  Quality: dull ache    Patient Goals  Patient goals for therapy: improved balance  Patient goal: eliminate dizziness  Improve speed of movement  Objective     Ambulation     Ambulation: Level Surfaces     Additional Level Surfaces Ambulation Details  Coordination:   Finger to nose: nl  Heel to shin:  nl  KENYA:  UEnl     LEnl  Strength:  Hip flexion:  R=5L=5  Quads:  R = 5   L = 5  Ankles: R=5  L=5  Oculomotor:  SP:nl  Saccades:nl  Head thrust: negative   REO:30 sec   REC: 30 sec   SREO:20 sec   SREC:NT  SLS:NT    Gait speed:0 87m/sec or  2 87ft/sec no SAD  DGI:  TUG:NT  5XSTS:13 93    Positionals: She last took meclizine last night 5 pm    Right Roll test: neg  Left Roll test:neg  Right DixHallpike:negative  Left DixHallpike:neg    VOR x1 60 sec  H: no sx, holding chair  V: no sx, holding chair          Precautions:  has a past medical history of Acid reflux; Arthritis; Asthma; Cardiac disease; Chronic kidney disease; Diverticulitis; GERD (gastroesophageal reflux disease); Hayfever; [place] (hard of hearing); Hyperlipemia; Hypertension; and Tachycardia        Specialty Daily Treatment Diary     Manual                                                     Exercise Diary  10/9       Alicia Jacobo Gait w/HT HEP issued  Veering noted                  VOR x 1  60 sec  H  V   no sx                                                                                                                                    Modalities

## 2018-10-11 ENCOUNTER — OFFICE VISIT (OUTPATIENT)
Dept: PHYSICAL THERAPY | Facility: CLINIC | Age: 68
End: 2018-10-11
Payer: MEDICARE

## 2018-10-11 DIAGNOSIS — R42 VERTIGO: ICD-10-CM

## 2018-10-11 DIAGNOSIS — H81.11 BENIGN PAROXYSMAL POSITIONAL VERTIGO OF RIGHT EAR: Primary | ICD-10-CM

## 2018-10-11 PROCEDURE — 97110 THERAPEUTIC EXERCISES: CPT

## 2018-10-11 PROCEDURE — G8979 MOBILITY GOAL STATUS: HCPCS

## 2018-10-11 PROCEDURE — 97530 THERAPEUTIC ACTIVITIES: CPT

## 2018-10-11 PROCEDURE — 97112 NEUROMUSCULAR REEDUCATION: CPT

## 2018-10-11 PROCEDURE — G8980 MOBILITY D/C STATUS: HCPCS

## 2018-10-11 NOTE — PROGRESS NOTES
Daily Note and DISCHARGE NOTE     Today's date: 10/11/2018  Patient name: Albin Padilla  : 1950  MRN: [id number]  Referring provider: YVONNE Jackson  Dx:   Encounter Diagnosis     ICD-10-CM    1  Benign paroxysmal positional vertigo of right ear H81 11    2  Vertigo R42        Start Time: 73  Stop Time: 1234  Total time in clinic (min): 46 minutes    Subjective: Pt states she got dizzy at home after eval 10/9, she noted double vision for a few minutes  Diplopia resolved and the dizziness went away  She has not been dizzy since  She is able to do all her normal activities since then  Objective: See treatment diary below    Precautions:  has a past medical history of Acid reflux; Arthritis; Asthma; Cardiac disease; Chronic kidney disease; Diverticulitis; GERD (gastroesophageal reflux disease); Hayfever; [place] (hard of hearing); Hyperlipemia; Hypertension; and Tachycardia  Specialty Daily Treatment Diary     Manual   10/11        5xSTS=11 43 sec  Age norm= 10 sec                                           Exercise Diary  10/9 10/11      Recheck Hallpikes   L DHP & return to sit  No sx, negative  R DHP & RTS  Negative, no sx  Gait w/HT HEP issued  Veering noted  No veering              VOR x 1  60 sec  H  V   no sx  VOR x1  VS  90 sec  H&V  No dizziness       VOR x2  Standing  90 sec   H&V  No sx                Cone pu to shelf from foam   10 reps no LOB               SREO  30 sec      SrEC   1-2 sec                                                                                  Modalities                                    Motiion Sensitivity testing: sitting flexion and return to sit: no sx  Quick 180 degree turns: no sx   Smooth pursuits remain normal without c/o diplopia  SREO=30 sec   SREC= 1sec  Assessment: Tolerated treatment well  Resolved (suspected) BPPV  No sx  Full activities at full speed  All questions answered     Pt educated to return to PT for dizziness or imbalance  Eye exam suggested, with possibility of neuro exam need if diplopia returns  Pt verbalized understanding  Goals  STG 2-4 weeks  1  Eliminate BPPV if found under further reassessment MET  2  Pt will be independent with HEP  MET    LTG 4-8 weeks  1  No position or activity of avoidance  MET  2  No veering during gait with head turns  MET  3  Pt will be independent with advanced HEP  MET  4  Improve 5XSTS to age norm (10 seconds )  PARTIALLY MET  5  Pt will return to full speed of movement during ADL's at home and at work  MET    Plan: DC from PT  Continue HEP, tandem stance with EC  See copies in media  Episode will be left open for 2 weeks in case pt calls with return of dizziness

## 2018-10-18 ENCOUNTER — APPOINTMENT (OUTPATIENT)
Dept: PHYSICAL THERAPY | Facility: CLINIC | Age: 68
End: 2018-10-18
Payer: MEDICARE

## 2019-01-21 NOTE — PROGRESS NOTES
Subjective:      Patient ID: Greg Modi is a 77 y o  female  Chief Complaint   Patient presents with    Follow-up     blood pressure check [id number]    Nasal Congestion       Here for follow up or hypertension, hyperlipidemia  Denies chest pain, dyspnea, other cardiac symptoms  Taking meds without side effects  Depression is well controlled on current medications  Thinks she is getting a sinus infection, has sinus headaches, congestion, sore throat, cough  Breathing is not worse  The following portions of the patient's history were reviewed and updated as appropriate: allergies, current medications, past family history, past medical history, past social history, past surgical history and problem list     Review of Systems   Constitutional: Positive for fatigue  HENT: Positive for congestion and sinus pressure  Respiratory: Positive for choking  Cardiovascular: Negative  Current Outpatient Prescriptions   Medication Sig Dispense Refill    acetaminophen (TYLENOL) 325 mg tablet Take 2 tablets (650 mg total) by mouth every 6 (six) hours as needed for mild pain, headaches or fever  30 tablet 0    albuterol (PROVENTIL HFA,VENTOLIN HFA) 90 mcg/act inhaler Inhale 2 puffs every 6 (six) hours as needed for wheezing 3 Inhaler 3    allopurinol (ZYLOPRIM) 100 mg tablet Take 1 tablet (100 mg total) by mouth daily 90 tablet 1    ALPRAZolam (XANAX) 0 25 mg tablet Take 1 tablet (0 25 mg total) by mouth as needed for anxiety 30 tablet 0    Apple Cider Vinegar 188 MG CAPS Take by mouth daily      atorvastatin (LIPITOR) 20 mg tablet Take 1 tablet (20 mg total) by mouth daily 90 tablet 3    b complex vitamins tablet Take 1 tablet by mouth daily   colchicine (COLCRYS) 0 6 mg tablet Take 2 po x 1 dose, then 1 po 12 hours later 20 tablet 0    diclofenac sodium (VOLTAREN) 1 % Place on the skin      ergocalciferol (VITAMIN D2) 50,000 units Take 50,000 Units by mouth once a week   On wednesday      escitalopram (LEXAPRO) 10 mg tablet Take 1 tablet (10 mg total) by mouth daily 90 tablet 3    esomeprazole (NexIUM) 20 mg capsule Take 1 capsule (20 mg total) by mouth daily in the early morning 90 capsule 3    fluticasone (FLONASE) 50 mcg/act nasal spray 2 sprays into each nostril daily 48 g 3    fluticasone-salmeterol (ADVAIR HFA) 115-21 MCG/ACT inhaler Inhale 1 puff daily 3 Inhaler 3    meclizine (ANTIVERT) 25 mg tablet Take 1 tablet (25 mg total) by mouth every 8 (eight) hours as needed for dizziness 30 tablet 0    metroNIDAZOLE (METROGEL) 0 75 % gel Apply topically 2 (two) times a day 45 g 3    Multiple Vitamin (MULTIVITAMIN) tablet Take 1 tablet by mouth daily   olopatadine (PATANOL) 0 1 % ophthalmic solution Apply 1 drop to eye 2 (two) times a day 15 mL 3    zolpidem (AMBIEN CR) 12 5 MG CR tablet Take 1 tablet (12 5 mg total) by mouth daily at bedtime as needed for sleep 30 tablet 0    azithromycin (ZITHROMAX) 250 mg tablet Take 2 tablets today then 1 tablet daily x 4 days 6 tablet 0     No current facility-administered medications for this visit  Objective:    /70   Pulse 68   Temp 98 6 °F (37 °C)   Resp 20   Ht 4' 11" (1 499 m)   Wt 68 9 kg (152 lb)   LMP  (LMP Unknown)   BMI 30 70 kg/m²        Physical Exam   Constitutional: She appears well-developed and well-nourished  HENT:   Right Ear: Tympanic membrane normal    Left Ear: Tympanic membrane normal    Nose: Mucosal edema and rhinorrhea present  Mouth/Throat: Posterior oropharyngeal erythema present  Eyes: Conjunctivae are normal    Neck: Neck supple  No JVD present  No thyromegaly present  Cardiovascular: Normal rate, regular rhythm, normal heart sounds and intact distal pulses  Exam reveals no gallop and no friction rub  No murmur heard  Pulmonary/Chest: Effort normal  She has decreased breath sounds  She has no wheezes  She has no rales  Abdominal: Soft   Bowel sounds are normal  She exhibits no distension  There is no tenderness  Musculoskeletal: She exhibits no edema  Assessment/Plan:    Essential hypertension  Stable on current medications and will continue  Tobacco abuse  She was urged to quit and does not want any help with this currently  Will check CT screening lung study  Mixed hyperlipidemia  Will continue atorvastatin and check labs  Encouraged low fat diet  Anxiety and depression  Stable and she feels things are well controlled on lexapro with PRN xanax  Diagnoses and all orders for this visit:    Essential hypertension    Mixed hyperlipidemia    Anxiety and depression  -     zolpidem (AMBIEN CR) 12 5 MG CR tablet; Take 1 tablet (12 5 mg total) by mouth daily at bedtime as needed for sleep  -     ALPRAZolam (XANAX) 0 25 mg tablet; Take 1 tablet (0 25 mg total) by mouth as needed for anxiety    Tobacco abuse  -     CT lung screening program; Future    Upper respiratory tract infection, unspecified type  Comments:  Supportive care discussed, antibiotics as above  Follow up if not improving  Orders:  -     azithromycin (ZITHROMAX) 250 mg tablet; Take 2 tablets today then 1 tablet daily x 4 days          Return in about 4 months (around 5/24/2019) for 1/2 AWV and follow up         Kim Polk MD

## 2019-01-24 ENCOUNTER — OFFICE VISIT (OUTPATIENT)
Dept: FAMILY MEDICINE CLINIC | Facility: CLINIC | Age: 69
End: 2019-01-24
Payer: MEDICARE

## 2019-01-24 VITALS
HEIGHT: 59 IN | DIASTOLIC BLOOD PRESSURE: 70 MMHG | SYSTOLIC BLOOD PRESSURE: 130 MMHG | WEIGHT: 152 LBS | HEART RATE: 68 BPM | RESPIRATION RATE: 20 BRPM | TEMPERATURE: 98.6 F | BODY MASS INDEX: 30.64 KG/M2

## 2019-01-24 DIAGNOSIS — J06.9 UPPER RESPIRATORY TRACT INFECTION, UNSPECIFIED TYPE: ICD-10-CM

## 2019-01-24 DIAGNOSIS — F41.9 ANXIETY AND DEPRESSION: ICD-10-CM

## 2019-01-24 DIAGNOSIS — E78.2 MIXED HYPERLIPIDEMIA: ICD-10-CM

## 2019-01-24 DIAGNOSIS — Z72.0 TOBACCO ABUSE: Chronic | ICD-10-CM

## 2019-01-24 DIAGNOSIS — F32.A ANXIETY AND DEPRESSION: ICD-10-CM

## 2019-01-24 DIAGNOSIS — I10 ESSENTIAL HYPERTENSION: Primary | ICD-10-CM

## 2019-01-24 PROCEDURE — 99214 OFFICE O/P EST MOD 30 MIN: CPT | Performed by: INTERNAL MEDICINE

## 2019-01-24 RX ORDER — ZOLPIDEM TARTRATE 12.5 MG/1
12.5 TABLET, FILM COATED, EXTENDED RELEASE ORAL
Qty: 30 TABLET | Refills: 0 | Status: SHIPPED | OUTPATIENT
Start: 2019-01-24 | End: 2019-10-10 | Stop reason: SDUPTHER

## 2019-01-24 RX ORDER — AZITHROMYCIN 250 MG/1
TABLET, FILM COATED ORAL
Qty: 6 TABLET | Refills: 0 | Status: SHIPPED | OUTPATIENT
Start: 2019-01-24 | End: 2019-01-28

## 2019-01-24 RX ORDER — ALPRAZOLAM 0.25 MG/1
0.25 TABLET ORAL AS NEEDED
Qty: 30 TABLET | Refills: 0 | Status: SHIPPED | OUTPATIENT
Start: 2019-01-24 | End: 2019-10-10 | Stop reason: SDUPTHER

## 2019-01-24 NOTE — ASSESSMENT & PLAN NOTE
She was urged to quit and does not want any help with this currently  Will check CT screening lung study

## 2019-01-26 ENCOUNTER — APPOINTMENT (OUTPATIENT)
Dept: LAB | Facility: HOSPITAL | Age: 69
End: 2019-01-26
Attending: INTERNAL MEDICINE
Payer: MEDICARE

## 2019-01-26 ENCOUNTER — TRANSCRIBE ORDERS (OUTPATIENT)
Dept: ADMINISTRATIVE | Facility: HOSPITAL | Age: 69
End: 2019-01-26

## 2019-01-26 DIAGNOSIS — I10 ESSENTIAL HYPERTENSION: ICD-10-CM

## 2019-01-26 DIAGNOSIS — M10.071 ACUTE IDIOPATHIC GOUT OF RIGHT FOOT: ICD-10-CM

## 2019-01-26 DIAGNOSIS — E78.2 MIXED HYPERLIPIDEMIA: ICD-10-CM

## 2019-01-26 LAB
ALBUMIN SERPL BCP-MCNC: 3.5 G/DL (ref 3.5–5)
ALP SERPL-CCNC: 88 U/L (ref 46–116)
ALT SERPL W P-5'-P-CCNC: 9 U/L (ref 12–78)
ANION GAP SERPL CALCULATED.3IONS-SCNC: 8 MMOL/L (ref 4–13)
AST SERPL W P-5'-P-CCNC: 11 U/L (ref 5–45)
BASOPHILS # BLD AUTO: 0.07 THOUSANDS/ΜL (ref 0–0.1)
BASOPHILS NFR BLD AUTO: 1 % (ref 0–1)
BILIRUB SERPL-MCNC: 0.6 MG/DL (ref 0.2–1)
BUN SERPL-MCNC: 16 MG/DL (ref 5–25)
CALCIUM SERPL-MCNC: 9.1 MG/DL (ref 8.3–10.1)
CHLORIDE SERPL-SCNC: 104 MMOL/L (ref 100–108)
CHOLEST SERPL-MCNC: 153 MG/DL (ref 50–200)
CO2 SERPL-SCNC: 28 MMOL/L (ref 21–32)
CREAT SERPL-MCNC: 0.69 MG/DL (ref 0.6–1.3)
EOSINOPHIL # BLD AUTO: 0.4 THOUSAND/ΜL (ref 0–0.61)
EOSINOPHIL NFR BLD AUTO: 5 % (ref 0–6)
ERYTHROCYTE [DISTWIDTH] IN BLOOD BY AUTOMATED COUNT: 14.5 % (ref 11.6–15.1)
GFR SERPL CREATININE-BSD FRML MDRD: 88 ML/MIN/1.73SQ M
GLUCOSE P FAST SERPL-MCNC: 100 MG/DL (ref 65–99)
HCT VFR BLD AUTO: 47.3 % (ref 34.8–46.1)
HDLC SERPL-MCNC: 47 MG/DL (ref 40–60)
HGB BLD-MCNC: 15.2 G/DL (ref 11.5–15.4)
IMM GRANULOCYTES # BLD AUTO: 0.05 THOUSAND/UL (ref 0–0.2)
IMM GRANULOCYTES NFR BLD AUTO: 1 % (ref 0–2)
LDLC SERPL CALC-MCNC: 91 MG/DL (ref 0–100)
LYMPHOCYTES # BLD AUTO: 3.09 THOUSANDS/ΜL (ref 0.6–4.47)
LYMPHOCYTES NFR BLD AUTO: 37 % (ref 14–44)
MCH RBC QN AUTO: 29.4 PG (ref 26.8–34.3)
MCHC RBC AUTO-ENTMCNC: 32.1 G/DL (ref 31.4–37.4)
MCV RBC AUTO: 92 FL (ref 82–98)
MONOCYTES # BLD AUTO: 0.63 THOUSAND/ΜL (ref 0.17–1.22)
MONOCYTES NFR BLD AUTO: 7 % (ref 4–12)
NEUTROPHILS # BLD AUTO: 4.22 THOUSANDS/ΜL (ref 1.85–7.62)
NEUTS SEG NFR BLD AUTO: 49 % (ref 43–75)
NONHDLC SERPL-MCNC: 106 MG/DL
NRBC BLD AUTO-RTO: 0 /100 WBCS
PLATELET # BLD AUTO: 272 THOUSANDS/UL (ref 149–390)
PMV BLD AUTO: 10.4 FL (ref 8.9–12.7)
POTASSIUM SERPL-SCNC: 3.9 MMOL/L (ref 3.5–5.3)
PROT SERPL-MCNC: 7.2 G/DL (ref 6.4–8.2)
RBC # BLD AUTO: 5.17 MILLION/UL (ref 3.81–5.12)
SODIUM SERPL-SCNC: 140 MMOL/L (ref 136–145)
TRIGL SERPL-MCNC: 74 MG/DL
URATE SERPL-MCNC: 3.6 MG/DL (ref 2–6.8)
WBC # BLD AUTO: 8.46 THOUSAND/UL (ref 4.31–10.16)

## 2019-01-26 PROCEDURE — 80053 COMPREHEN METABOLIC PANEL: CPT

## 2019-01-26 PROCEDURE — 36415 COLL VENOUS BLD VENIPUNCTURE: CPT

## 2019-01-26 PROCEDURE — 84550 ASSAY OF BLOOD/URIC ACID: CPT

## 2019-01-26 PROCEDURE — 85025 COMPLETE CBC W/AUTO DIFF WBC: CPT

## 2019-01-26 PROCEDURE — 80061 LIPID PANEL: CPT

## 2019-01-31 ENCOUNTER — HOSPITAL ENCOUNTER (OUTPATIENT)
Dept: RADIOLOGY | Facility: HOSPITAL | Age: 69
Discharge: HOME | End: 2019-01-31
Attending: INTERNAL MEDICINE
Payer: COMMERCIAL

## 2019-01-31 DIAGNOSIS — Z72.0 TOBACCO ABUSE: Chronic | ICD-10-CM

## 2019-03-25 ENCOUNTER — OFFICE VISIT (OUTPATIENT)
Dept: FAMILY MEDICINE CLINIC | Facility: CLINIC | Age: 69
End: 2019-03-25
Payer: MEDICARE

## 2019-03-25 VITALS
HEIGHT: 59 IN | TEMPERATURE: 97.7 F | WEIGHT: 152 LBS | RESPIRATION RATE: 16 BRPM | BODY MASS INDEX: 30.64 KG/M2 | SYSTOLIC BLOOD PRESSURE: 146 MMHG | HEART RATE: 84 BPM | DIASTOLIC BLOOD PRESSURE: 76 MMHG

## 2019-03-25 DIAGNOSIS — K04.7 DENTAL INFECTION: Primary | ICD-10-CM

## 2019-03-25 PROCEDURE — 99213 OFFICE O/P EST LOW 20 MIN: CPT | Performed by: INTERNAL MEDICINE

## 2019-03-25 RX ORDER — CLINDAMYCIN HYDROCHLORIDE 300 MG/1
300 CAPSULE ORAL EVERY 8 HOURS SCHEDULED
Qty: 21 CAPSULE | Refills: 0 | Status: SHIPPED | OUTPATIENT
Start: 2019-03-25 | End: 2019-04-01

## 2019-03-25 NOTE — PROGRESS NOTES
Subjective:      Patient ID: Greg Modi is a 77 y o  female  Chief Complaint   Patient presents with    jaw pain-ear pain     left -tooth broke off-lj       L posterior tooth broke off about a couple of months ago and does see the dentist tomorrow  Dentist wanted her to come here for antibiotics  She is allergic to augmentin  Using ice and tylenol with some minimal relief  Can't take NSAIDS because of a history of bariatric surgery  The following portions of the patient's history were reviewed and updated as appropriate: allergies, current medications, past family history, past medical history, past social history, past surgical history and problem list     Review of Systems   Constitutional: Negative  HENT: Positive for dental problem  Respiratory: Negative  Cardiovascular: Negative  Current Outpatient Medications   Medication Sig Dispense Refill    acetaminophen (TYLENOL) 325 mg tablet Take 2 tablets (650 mg total) by mouth every 6 (six) hours as needed for mild pain, headaches or fever  30 tablet 0    albuterol (PROVENTIL HFA,VENTOLIN HFA) 90 mcg/act inhaler Inhale 2 puffs every 6 (six) hours as needed for wheezing 3 Inhaler 3    allopurinol (ZYLOPRIM) 100 mg tablet Take 1 tablet (100 mg total) by mouth daily 90 tablet 1    ALPRAZolam (XANAX) 0 25 mg tablet Take 1 tablet (0 25 mg total) by mouth as needed for anxiety 30 tablet 0    Apple Cider Vinegar 188 MG CAPS Take by mouth daily      atorvastatin (LIPITOR) 20 mg tablet Take 1 tablet (20 mg total) by mouth daily 90 tablet 3    b complex vitamins tablet Take 1 tablet by mouth daily   colchicine (COLCRYS) 0 6 mg tablet Take 2 po x 1 dose, then 1 po 12 hours later 20 tablet 0    diclofenac sodium (VOLTAREN) 1 % Place on the skin      ergocalciferol (VITAMIN D2) 50,000 units Take 50,000 Units by mouth once a week   On wednesday      escitalopram (LEXAPRO) 10 mg tablet Take 1 tablet (10 mg total) by mouth daily 90 tablet 3    esomeprazole (NexIUM) 20 mg capsule Take 1 capsule (20 mg total) by mouth daily in the early morning 90 capsule 3    fluticasone (FLONASE) 50 mcg/act nasal spray 2 sprays into each nostril daily 48 g 3    fluticasone-salmeterol (ADVAIR HFA) 115-21 MCG/ACT inhaler Inhale 1 puff daily 3 Inhaler 3    meclizine (ANTIVERT) 25 mg tablet Take 1 tablet (25 mg total) by mouth every 8 (eight) hours as needed for dizziness 30 tablet 0    metroNIDAZOLE (METROGEL) 0 75 % gel Apply topically 2 (two) times a day 45 g 3    Multiple Vitamin (MULTIVITAMIN) tablet Take 1 tablet by mouth daily   olopatadine (PATANOL) 0 1 % ophthalmic solution Apply 1 drop to eye 2 (two) times a day 15 mL 3    zolpidem (AMBIEN CR) 12 5 MG CR tablet Take 1 tablet (12 5 mg total) by mouth daily at bedtime as needed for sleep 30 tablet 0    clindamycin (CLEOCIN) 300 MG capsule Take 1 capsule (300 mg total) by mouth every 8 (eight) hours for 7 days 21 capsule 0     No current facility-administered medications for this visit  Objective:    /76   Pulse 84   Temp 97 7 °F (36 5 °C)   Resp 16   Ht 4' 11" (1 499 m)   Wt 68 9 kg (152 lb)   LMP  (LMP Unknown)   BMI 30 70 kg/m²        Physical Exam   Constitutional: She appears well-developed and well-nourished  HENT:   L lower dentitian with tooth broken off at gumline, has surrounding erythema and tenderness  Eyes: Conjunctivae are normal    Neck: Neck supple  Cardiovascular: Normal rate, regular rhythm, normal heart sounds and intact distal pulses  Exam reveals no gallop and no friction rub  No murmur heard  Pulmonary/Chest: Effort normal and breath sounds normal  She has no wheezes  Musculoskeletal: She exhibits no edema  Lymphadenopathy:     She has no cervical adenopathy  Assessment/Plan:    No problem-specific Assessment & Plan notes found for this encounter         Diagnoses and all orders for this visit:    Dental infection  Comments:  Antibiotics prescribed, has dental appointment tomorrow  Continue tylenol and ice prn  Orders:  -     clindamycin (CLEOCIN) 300 MG capsule; Take 1 capsule (300 mg total) by mouth every 8 (eight) hours for 7 days          No follow-ups on file         Pauline Kawasaki, MD

## 2019-04-17 ENCOUNTER — OFFICE VISIT (OUTPATIENT)
Dept: FAMILY MEDICINE CLINIC | Facility: CLINIC | Age: 69
End: 2019-04-17
Payer: MEDICARE

## 2019-04-17 VITALS
RESPIRATION RATE: 20 BRPM | SYSTOLIC BLOOD PRESSURE: 154 MMHG | WEIGHT: 152 LBS | HEIGHT: 59 IN | DIASTOLIC BLOOD PRESSURE: 86 MMHG | TEMPERATURE: 99.3 F | HEART RATE: 88 BPM | BODY MASS INDEX: 30.64 KG/M2

## 2019-04-17 DIAGNOSIS — F32.A ANXIETY AND DEPRESSION: ICD-10-CM

## 2019-04-17 DIAGNOSIS — I10 ESSENTIAL HYPERTENSION: ICD-10-CM

## 2019-04-17 DIAGNOSIS — F41.9 ANXIETY AND DEPRESSION: ICD-10-CM

## 2019-04-17 DIAGNOSIS — Z12.11 SCREENING FOR COLON CANCER: Primary | ICD-10-CM

## 2019-04-17 DIAGNOSIS — R09.89 BILATERAL CAROTID BRUITS: ICD-10-CM

## 2019-04-17 PROCEDURE — 93000 ELECTROCARDIOGRAM COMPLETE: CPT | Performed by: NURSE PRACTITIONER

## 2019-04-17 PROCEDURE — 99214 OFFICE O/P EST MOD 30 MIN: CPT | Performed by: NURSE PRACTITIONER

## 2019-04-17 RX ORDER — ESCITALOPRAM OXALATE 10 MG/1
10 TABLET ORAL DAILY
Qty: 90 TABLET | Refills: 1 | Status: SHIPPED | OUTPATIENT
Start: 2019-04-17 | End: 2019-09-11 | Stop reason: SDUPTHER

## 2019-04-18 ENCOUNTER — HOSPITAL ENCOUNTER (OUTPATIENT)
Dept: RADIOLOGY | Facility: HOSPITAL | Age: 69
Discharge: HOME | End: 2019-04-18
Payer: MEDICARE

## 2019-04-18 DIAGNOSIS — R09.89 BILATERAL CAROTID BRUITS: ICD-10-CM

## 2019-04-18 PROCEDURE — 93880 EXTRACRANIAL BILAT STUDY: CPT

## 2019-04-18 PROCEDURE — 93880 EXTRACRANIAL BILAT STUDY: CPT | Performed by: SURGERY

## 2019-04-22 ENCOUNTER — TELEPHONE (OUTPATIENT)
Dept: FAMILY MEDICINE CLINIC | Facility: CLINIC | Age: 69
End: 2019-04-22

## 2019-04-23 ENCOUNTER — TELEPHONE (OUTPATIENT)
Dept: FAMILY MEDICINE CLINIC | Facility: CLINIC | Age: 69
End: 2019-04-23

## 2019-04-23 RX ORDER — ASPIRIN 81 MG/1
81 TABLET ORAL DAILY
COMMUNITY

## 2019-04-25 ENCOUNTER — DOCUMENTATION (OUTPATIENT)
Dept: CARDIOLOGY CLINIC | Facility: CLINIC | Age: 69
End: 2019-04-25

## 2019-04-25 ENCOUNTER — OFFICE VISIT (OUTPATIENT)
Dept: CARDIOLOGY CLINIC | Facility: CLINIC | Age: 69
End: 2019-04-25
Payer: MEDICARE

## 2019-04-25 VITALS
OXYGEN SATURATION: 98 % | BODY MASS INDEX: 31.04 KG/M2 | WEIGHT: 154 LBS | SYSTOLIC BLOOD PRESSURE: 142 MMHG | HEART RATE: 84 BPM | DIASTOLIC BLOOD PRESSURE: 80 MMHG | HEIGHT: 59 IN

## 2019-04-25 DIAGNOSIS — I10 ESSENTIAL HYPERTENSION: ICD-10-CM

## 2019-04-25 DIAGNOSIS — R06.00 EXERTIONAL DYSPNEA: Primary | ICD-10-CM

## 2019-04-25 DIAGNOSIS — R01.1 MURMUR, CARDIAC: ICD-10-CM

## 2019-04-25 DIAGNOSIS — R07.9 CHEST PAIN IN ADULT: ICD-10-CM

## 2019-04-25 PROCEDURE — 99215 OFFICE O/P EST HI 40 MIN: CPT | Performed by: INTERNAL MEDICINE

## 2019-05-16 ENCOUNTER — HOSPITAL ENCOUNTER (OUTPATIENT)
Dept: RADIOLOGY | Facility: HOSPITAL | Age: 69
Discharge: HOME | End: 2019-05-16
Attending: INTERNAL MEDICINE
Payer: MEDICARE

## 2019-05-16 ENCOUNTER — HOSPITAL ENCOUNTER (OUTPATIENT)
Dept: NON INVASIVE DIAGNOSTICS | Facility: HOSPITAL | Age: 69
Discharge: HOME | End: 2019-05-16
Attending: INTERNAL MEDICINE
Payer: MEDICARE

## 2019-05-16 DIAGNOSIS — I10 ESSENTIAL HYPERTENSION: ICD-10-CM

## 2019-05-16 DIAGNOSIS — R06.00 EXERTIONAL DYSPNEA: ICD-10-CM

## 2019-05-16 DIAGNOSIS — R01.1 MURMUR, CARDIAC: ICD-10-CM

## 2019-05-16 DIAGNOSIS — R07.9 CHEST PAIN IN ADULT: ICD-10-CM

## 2019-05-16 LAB
CHEST PAIN STATEMENT: NORMAL
MAX DIASTOLIC BP: 86 MMHG
MAX HEART RATE: 96 BPM
MAX PREDICTED HEART RATE: 150 BPM
MAX. SYSTOLIC BP: 188 MMHG
PROTOCOL NAME: NORMAL
REASON FOR TERMINATION: NORMAL
TARGET HR FORMULA: NORMAL
TIME IN EXERCISE PHASE: NORMAL

## 2019-05-16 PROCEDURE — A9502 TC99M TETROFOSMIN: HCPCS

## 2019-05-16 PROCEDURE — 78452 HT MUSCLE IMAGE SPECT MULT: CPT

## 2019-05-16 PROCEDURE — 93306 TTE W/DOPPLER COMPLETE: CPT | Performed by: INTERNAL MEDICINE

## 2019-05-16 PROCEDURE — 93306 TTE W/DOPPLER COMPLETE: CPT

## 2019-05-16 PROCEDURE — 93017 CV STRESS TEST TRACING ONLY: CPT

## 2019-05-16 RX ADMIN — REGADENOSON 0.4 MG: 0.08 INJECTION, SOLUTION INTRAVENOUS at 10:09

## 2019-05-17 PROCEDURE — 78452 HT MUSCLE IMAGE SPECT MULT: CPT | Performed by: INTERNAL MEDICINE

## 2019-05-17 PROCEDURE — 93016 CV STRESS TEST SUPVJ ONLY: CPT | Performed by: INTERNAL MEDICINE

## 2019-05-17 PROCEDURE — 93018 CV STRESS TEST I&R ONLY: CPT | Performed by: INTERNAL MEDICINE

## 2019-05-22 ENCOUNTER — TELEPHONE (OUTPATIENT)
Dept: CARDIOLOGY CLINIC | Facility: CLINIC | Age: 69
End: 2019-05-22

## 2019-05-29 ENCOUNTER — OFFICE VISIT (OUTPATIENT)
Dept: CARDIOLOGY CLINIC | Facility: CLINIC | Age: 69
End: 2019-05-29
Payer: MEDICARE

## 2019-05-29 VITALS
DIASTOLIC BLOOD PRESSURE: 68 MMHG | OXYGEN SATURATION: 95 % | SYSTOLIC BLOOD PRESSURE: 136 MMHG | HEART RATE: 74 BPM | BODY MASS INDEX: 31.45 KG/M2 | WEIGHT: 156 LBS | HEIGHT: 59 IN

## 2019-05-29 DIAGNOSIS — I35.0 MILD AORTIC STENOSIS: ICD-10-CM

## 2019-05-29 DIAGNOSIS — I05.0 MILD MITRAL STENOSIS: Primary | ICD-10-CM

## 2019-05-29 DIAGNOSIS — R06.00 EXERTIONAL DYSPNEA: ICD-10-CM

## 2019-05-29 PROCEDURE — 99214 OFFICE O/P EST MOD 30 MIN: CPT | Performed by: INTERNAL MEDICINE

## 2019-05-29 RX ORDER — NEBIVOLOL 2.5 MG/1
2.5 TABLET ORAL EVERY MORNING
Qty: 90 TABLET | Refills: 3 | Status: SHIPPED | OUTPATIENT
Start: 2019-05-29 | End: 2019-05-29 | Stop reason: SDUPTHER

## 2019-05-29 RX ORDER — NEBIVOLOL 2.5 MG/1
2.5 TABLET ORAL EVERY MORNING
Qty: 90 TABLET | Refills: 3 | Status: SHIPPED | OUTPATIENT
Start: 2019-05-29 | End: 2020-01-09 | Stop reason: SDUPTHER

## 2019-09-11 DIAGNOSIS — F41.9 ANXIETY AND DEPRESSION: ICD-10-CM

## 2019-09-11 DIAGNOSIS — F32.A ANXIETY AND DEPRESSION: ICD-10-CM

## 2019-09-11 DIAGNOSIS — M10.071 ACUTE IDIOPATHIC GOUT OF RIGHT FOOT: ICD-10-CM

## 2019-09-11 DIAGNOSIS — K21.9 GASTROESOPHAGEAL REFLUX DISEASE WITHOUT ESOPHAGITIS: ICD-10-CM

## 2019-09-11 RX ORDER — ESCITALOPRAM OXALATE 10 MG/1
10 TABLET ORAL DAILY
Qty: 90 TABLET | Refills: 1 | Status: SHIPPED | OUTPATIENT
Start: 2019-09-11 | End: 2020-03-26 | Stop reason: SDUPTHER

## 2019-09-11 RX ORDER — ALLOPURINOL 100 MG/1
100 TABLET ORAL DAILY
Qty: 90 TABLET | Refills: 1 | Status: SHIPPED | OUTPATIENT
Start: 2019-09-11 | End: 2020-05-19

## 2019-10-09 ENCOUNTER — TELEPHONE (OUTPATIENT)
Dept: FAMILY MEDICINE CLINIC | Facility: CLINIC | Age: 69
End: 2019-10-09

## 2019-10-09 NOTE — TELEPHONE ENCOUNTER
Pt burned her hand yesterday morning on boiling water, states it is under control, not blistering  Its now a purpley color and it hurts  She wanted to know if a cream could be called into the pharmacy at [facility]  Pls call pt to let her know or if she needs to be seen   PLs call her tonight at [phone]

## 2019-10-09 NOTE — TELEPHONE ENCOUNTER
Spoke to pt-she is in [place] at work- appt made with you for tomorrow afternoon-told her if hand gets worse to all in am for a sooner appt with some one else--lj

## 2019-10-10 ENCOUNTER — OFFICE VISIT (OUTPATIENT)
Dept: FAMILY MEDICINE CLINIC | Facility: CLINIC | Age: 69
End: 2019-10-10
Payer: MEDICARE

## 2019-10-10 VITALS
HEIGHT: 59 IN | SYSTOLIC BLOOD PRESSURE: 150 MMHG | DIASTOLIC BLOOD PRESSURE: 80 MMHG | BODY MASS INDEX: 32.66 KG/M2 | HEART RATE: 81 BPM | WEIGHT: 162 LBS | TEMPERATURE: 98.4 F | OXYGEN SATURATION: 95 % | RESPIRATION RATE: 16 BRPM

## 2019-10-10 DIAGNOSIS — F32.A ANXIETY AND DEPRESSION: ICD-10-CM

## 2019-10-10 DIAGNOSIS — Z00.00 MEDICARE ANNUAL WELLNESS VISIT, SUBSEQUENT: Primary | ICD-10-CM

## 2019-10-10 DIAGNOSIS — T23.162A SUPERFICIAL BURN OF BACK OF LEFT HAND, INITIAL ENCOUNTER: ICD-10-CM

## 2019-10-10 DIAGNOSIS — F41.9 ANXIETY AND DEPRESSION: ICD-10-CM

## 2019-10-10 DIAGNOSIS — H91.93 BILATERAL HEARING LOSS, UNSPECIFIED HEARING LOSS TYPE: ICD-10-CM

## 2019-10-10 DIAGNOSIS — Z23 NEED FOR VACCINATION: ICD-10-CM

## 2019-10-10 DIAGNOSIS — R32 URINARY INCONTINENCE, UNSPECIFIED TYPE: ICD-10-CM

## 2019-10-10 DIAGNOSIS — Z12.11 COLON CANCER SCREENING: ICD-10-CM

## 2019-10-10 DIAGNOSIS — Z12.39 SCREENING BREAST EXAMINATION: ICD-10-CM

## 2019-10-10 PROCEDURE — G0008 ADMIN INFLUENZA VIRUS VAC: HCPCS

## 2019-10-10 PROCEDURE — 99213 OFFICE O/P EST LOW 20 MIN: CPT | Performed by: INTERNAL MEDICINE

## 2019-10-10 PROCEDURE — 90662 IIV NO PRSV INCREASED AG IM: CPT

## 2019-10-10 PROCEDURE — G0439 PPPS, SUBSEQ VISIT: HCPCS | Performed by: INTERNAL MEDICINE

## 2019-10-10 RX ORDER — ALPRAZOLAM 0.25 MG/1
0.25 TABLET ORAL AS NEEDED
Qty: 30 TABLET | Refills: 0 | Status: SHIPPED | OUTPATIENT
Start: 2019-10-10 | End: 2020-03-26 | Stop reason: SDUPTHER

## 2019-10-10 RX ORDER — ZOLPIDEM TARTRATE 12.5 MG/1
12.5 TABLET, FILM COATED, EXTENDED RELEASE ORAL
Qty: 30 TABLET | Refills: 0 | Status: SHIPPED | OUTPATIENT
Start: 2019-10-10 | End: 2020-03-26 | Stop reason: SDUPTHER

## 2019-10-10 NOTE — PROGRESS NOTES
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  RTO for worsening symptoms  Orders:  -     silver sulfadiazine (SILVADENE,SSD) 1 % cream; Apply topically daily To left hand    3  Anxiety and depression  -     ALPRAZolam (XANAX) 0 25 mg tablet; Take 1 tablet (0 25 mg total) by mouth as needed for anxiety  -     zolpidem (AMBIEN CR) 12 5 MG CR tablet; Take 1 tablet (12 5 mg total) by mouth daily at bedtime as needed for sleep    4  Need for vaccination  -     influenza vaccine, [id number], high-dose, PF 0 5 mL (FLUZONE HIGH-DOSE)    5  Bilateral hearing loss, unspecified hearing loss type  -     Ambulatory referral to Audiology; Future    6  Urinary incontinence, unspecified type  -     Ambulatory referral to Urology; Future    7  Colon cancer screening  -     Cologuard; Future    8  Screening breast examination  -     Mammo screening bilateral w cad; Future; Expected date: 10/10/2019    9  BMI 32 0-32 9,adult          BMI Counseling: Body mass index is 32 72 kg/m²  Discussed the patient's BMI with her  The BMI is above normal  Nutrition recommendations include reducing portion sizes and decreasing overall calorie intake  Exercise recommendations include moderate aerobic physical activity for 150 minutes/week  Tobacco Cessation Counseling: Tobacco cessation counseling was provided  The patient is sincerely urged to quit consumption of tobacco  She is not ready to quit tobacco          Patient Instructions       Medicare Preventive Visit Patient Instructions  Thank you for completing your Welcome to Medicare Visit or Medicare Annual Wellness Visit today  Your next wellness visit will be due in one year (10/10/2020)    The screening/preventive services that you may require over the next 5-10 years are detailed below  Some tests may not apply to you based off risk factors and/or age  Screening tests ordered at today's visit but not completed yet may show as past due  Also, please note that scanned in results may not display below  Preventive Screenings:  Service Recommendations Previous Testing/Comments   Colorectal Cancer Screening  * Colonoscopy    * Fecal Occult Blood Test (FOBT)/Fecal Immunochemical Test (FIT)  * Fecal DNA/Cologuard Test  * Flexible Sigmoidoscopy Age: 54-65 years old   Colonoscopy: every 10 years (may be performed more frequently if at higher risk)  OR  FOBT/FIT: every 1 year  OR  Cologuard: every 3 years  OR  Sigmoidoscopy: every 5 years  Screening may be recommended earlier than age 48 if at higher risk for colorectal cancer  Also, an individualized decision between you and your healthcare provider will decide whether screening between the ages of 74-80 would be appropriate  Colonoscopy: Not on file  FOBT/FIT: Not on file  Cologuard: Not on file  Sigmoidoscopy: Not on file         Breast Cancer Screening Age: 36 years old  Frequency: every 1-2 years  Not required if history of left and right mastectomy Mammogram: 04/13/2015    Risks and Benefits Discussed   Cervical Cancer Screening Between the ages of 21-29, pap smear recommended once every 3 years  Between the ages of 33-67, can perform pap smear with HPV co-testing every 5 years     Recommendations may differ for women with a history of total hysterectomy, cervical cancer, or abnormal pap smears in past  Pap Smear: Not on file    Screening Not Indicated   Hepatitis C Screening Once for adults born between 1945 and 1965  More frequently in patients at high risk for Hepatitis C Hep C Antibody: Not on file    Risks and Benefits Discussed   Diabetes Screening 1-2 times per year if you're at risk for diabetes or have pre-diabetes Fasting glucose: 100 mg/dL   A1C: 6 0 %    Screening Current   Cholesterol Screening Once every 5 years if you don't have a lipid disorder  May order more often based on risk factors  Lipid panel: 01/26/2019    Screening Not Indicated  History Lipid Disorder     Other Preventive Screenings Covered by Medicare:  1  Abdominal Aortic Aneurysm (AAA) Screening: covered once if your at risk  You're considered to be at risk if you have a family history of AAA  2  Lung Cancer Screening: covers low dose CT scan once per year if you meet all of the following conditions: (1) Age 50-69; (2) No signs or symptoms of lung cancer; (3) Current smoker or have quit smoking within the last 15 years; (4) You have a tobacco smoking history of at least 30 pack years (packs per day multiplied by number of years you smoked); (5) You get a written order from a healthcare provider  3  Glaucoma Screening: covered annually if you're considered high risk: (1) You have diabetes OR (2) Family history of glaucoma OR (3)  aged 48 and older OR (3)  American aged 72 and older  3  Osteoporosis Screening: covered every 2 years if you meet one of the following conditions: (1) You're estrogen deficient and at risk for osteoporosis based off medical history and other findings; (2) Have a vertebral abnormality; (3) On glucocorticoid therapy for more than 3 months; (4) Have primary hyperparathyroidism; (5) On osteoporosis medications and need to assess response to drug therapy  · Last bone density test (DXA Scan): Not on file  5  HIV Screening: covered annually if you're between the age of 12-76  Also covered annually if you are younger than 13 and older than 72 with risk factors for HIV infection  For pregnant patients, it is covered up to 3 times per pregnancy      Immunizations:  Immunization Recommendations   Influenza Vaccine Annual influenza vaccination during flu season is recommended for all persons aged >= 6 months who do not have contraindications   Pneumococcal Vaccine (Prevnar and Pneumovax)  * Prevnar = PCV13  * Pneumovax = PPSV23 Adults 25-60 years old: 1-3 doses may be recommended based on certain risk factors  Adults 72 years old: Prevnar (PCV13) vaccine recommended followed by Pneumovax (PPSV23) vaccine  If already received PPSV23 since turning 65, then PCV13 recommended at least one year after PPSV23 dose  Hepatitis B Vaccine 3 dose series if at intermediate or high risk (ex: diabetes, end stage renal disease, liver disease)   Tetanus (Td) Vaccine - COST NOT COVERED BY MEDICARE PART B Following completion of primary series, a booster dose should be given every 10 years to maintain immunity against tetanus  Td may also be given as tetanus wound prophylaxis  Tdap Vaccine - COST NOT COVERED BY MEDICARE PART B Recommended at least once for all adults  For pregnant patients, recommended with each pregnancy  Shingles Vaccine (Shingrix) - COST NOT COVERED BY MEDICARE PART B  2 shot series recommended in those aged 48 and above     Health Maintenance Due:      Topic Date Due    Hepatitis C Screening  1950    CRC Screening: Colonoscopy  1950    MAMMOGRAM  04/13/2016     Immunizations Due:      Topic Date Due    DTaP,Tdap,and Td Vaccines (2 - Td) 07/09/2017    INFLUENZA VACCINE  07/01/2019     Advance Directives   What are advance directives? Advance directives are legal documents that state your wishes and plans for medical care  These plans are made ahead of time in case you lose your ability to make decisions for yourself  Advance directives can apply to any medical decision, such as the treatments you want, and if you want to donate organs  What are the types of advance directives? There are many types of advance directives, and each state has rules about how to use them  You may choose a combination of any of the following:  · Living will: This is a written record of the treatment you want  You can also choose which treatments you do not want, which to limit, and which to stop at a certain time   This includes surgery, medicine, IV fluid, and tube feedings  · Durable power of  for healthcare [facility]): This is a written record that states who you want to make healthcare choices for you when you are unable to make them for yourself  This person, called a proxy, is usually a family member or a friend  You may choose more than 1 proxy  · Do not resuscitate (DNR) order:  A DNR order is used in case your heart stops beating or you stop breathing  It is a request not to have certain forms of treatment, such as CPR  A DNR order may be included in other types of advance directives  · Medical directive: This covers the care that you want if you are in a coma, near death, or unable to make decisions for yourself  You can list the treatments you want for each condition  Treatment may include pain medicine, surgery, blood transfusions, dialysis, IV or tube feedings, and a ventilator (breathing machine)  · Values history: This document has questions about your views, beliefs, and how you feel and think about life  This information can help others choose the care that you would choose  Why are advance directives important? An advance directive helps you control your care  Although spoken wishes may be used, it is better to have your wishes written down  Spoken wishes can be misunderstood, or not followed  Treatments may be given even if you do not want them  An advance directive may make it easier for your family to make difficult choices about your care  Fall Prevention    Fall prevention  includes ways to make your home and other areas safer  It also includes ways you can move more carefully to prevent a fall  Health conditions that cause changes in your blood pressure, vision, or muscle strength and coordination may increase your risk for falls  Medicines may also increase your risk for falls if they make you dizzy, weak, or sleepy  Fall prevention tips:   · Stand or sit up slowly      · Use assistive devices as directed  · Wear shoes that fit well and have soles that   · Wear a personal alarm  · Stay active  · Manage your medical conditions  Home Safety Tips:  · Add items to prevent falls in the bathroom  · Keep paths clear  · Install bright lights in your home  · Keep items you use often on shelves within reach  · Paint or place reflective tape on the edges of your stairs  Urinary Incontinence   Urinary incontinence (UI)  is when you lose control of your bladder  UI develops because your bladder cannot store or empty urine properly  The 3 most common types of UI are stress incontinence, urge incontinence, or both  Medicines:   · May be given to help strengthen your bladder control  Report any side effects of medication to your healthcare provider  Do pelvic muscle exercises often:  Your pelvic muscles help you stop urinating  Squeeze these muscles tight for 5 seconds, then relax for 5 seconds  Gradually work up to squeezing for 10 seconds  Do 3 sets of 15 repetitions a day, or as directed  This will help strengthen your pelvic muscles and improve bladder control  Train your bladder:  Go to the bathroom at set times, such as every 2 hours, even if you do not feel the urge to go  You can also try to hold your urine when you feel the urge to go  For example, hold your urine for 5 minutes when you feel the urge to go  As that becomes easier, hold your urine for 10 minutes  Self-care:   · Keep a UI record  Write down how often you leak urine and how much you leak  Make a note of what you were doing when you leaked urine  · Drink liquids as directed  You may need to limit the amount of liquid you drink to help control your urine leakage  Do not drink any liquid right before you go to bed  Limit or do not have drinks that contain caffeine or alcohol  · Prevent constipation  Eat a variety of high-fiber foods   Good examples are high-fiber cereals, beans, vegetables, and whole-grain breads  Walking is the best way to trigger your intestines to have a bowel movement  · Exercise regularly and maintain a healthy weight  Weight loss and exercise will decrease pressure on your bladder and help you control your leakage  · Use a catheter as directed  to help empty your bladder  A catheter is a tiny, plastic tube that is put into your bladder to drain your urine  · Go to behavior therapy as directed  Behavior therapy may be used to help you learn to control your urge to urinate  Cigarette Smoking and Your Health   Risks to your health if you smoke:  Nicotine and other chemicals found in tobacco damage every cell in your body  Even if you are a light smoker, you have an increased risk for cancer, heart disease, and lung disease  If you are pregnant or have diabetes, smoking increases your risk for complications  Benefits to your health if you stop smoking:   · You decrease respiratory symptoms such as coughing, wheezing, and shortness of breath  · You reduce your risk for cancers of the lung, mouth, throat, kidney, bladder, pancreas, stomach, and cervix  If you already have cancer, you increase the benefits of chemotherapy  You also reduce your risk for cancer returning or a second cancer from developing  · You reduce your risk for heart disease, blood clots, heart attack, and stroke  · You reduce your risk for lung infections, and diseases such as pneumonia, asthma, chronic bronchitis, and emphysema  · Your circulation improves  More oxygen can be delivered to your body  If you have diabetes, you lower your risk for complications, such as kidney, artery, and eye diseases  You also lower your risk for nerve damage  Nerve damage can lead to amputations, poor vision, and blindness  · You improve your body's ability to heal and to fight infections  For more information and support to stop smoking:   · [place]  gov  Phone: 1- 871 - 993-0314  Web Address: www [employer]  Weight Management   Why it is important to manage your weight:  Being overweight increases your risk of health conditions such as heart disease, high blood pressure, type 2 diabetes, and certain types of cancer  It can also increase your risk for osteoarthritis, sleep apnea, and other respiratory problems  Aim for a slow, steady weight loss  Even a small amount of weight loss can lower your risk of health problems  How to lose weight safely:  A safe and healthy way to lose weight is to eat fewer calories and get regular exercise  You can lose up about 1 pound a week by decreasing the number of calories you eat by 500 calories each day  Healthy meal plan for weight management:  A healthy meal plan includes a variety of foods, contains fewer calories, and helps you stay healthy  A healthy meal plan includes the following:  · Eat whole-grain foods more often  A healthy meal plan should contain fiber  Fiber is the part of grains, fruits, and vegetables that is not broken down by your body  Whole-grain foods are healthy and provide extra fiber in your diet  Some examples of whole-grain foods are whole-wheat breads and pastas, oatmeal, brown rice, and bulgur  · Eat a variety of vegetables every day  Include dark, leafy greens such as spinach, kale, david greens, and mustard greens  Eat yellow and orange vegetables such as carrots, sweet potatoes, and winter squash  · Eat a variety of fruits every day  Choose fresh or canned fruit (canned in its own juice or light syrup) instead of juice  Fruit juice has very little or no fiber  · Eat low-fat dairy foods  Drink fat-free (skim) milk or 1% milk  Eat fat-free yogurt and low-fat cottage cheese  Try low-fat cheeses such as mozzarella and other reduced-fat cheeses  · Choose meat and other protein foods that are low in fat  Choose beans or other legumes such as split peas or lentils   Choose fish, skinless poultry (chicken or turkey), or lean cuts of red meat (beef or pork)  Before you cook meat or poultry, cut off any visible fat  · Use less fat and oil  Try baking foods instead of frying them  Add less fat, such as margarine, sour cream, regular salad dressing and mayonnaise to foods  Eat fewer high-fat foods  Some examples of high-fat foods include french fries, doughnuts, ice cream, and cakes  · Eat fewer sweets  Limit foods and drinks that are high in sugar  This includes candy, cookies, regular soda, and sweetened drinks  Exercise:  Exercise at least 30 minutes per day on most days of the week  Some examples of exercise include walking, biking, dancing, and swimming  You can also fit in more physical activity by taking the stairs instead of the elevator or parking farther away from stores  Ask your healthcare provider about the best exercise plan for you  © Copyright [employer] 2018 Information is for End User's use only and may not be sold, redistributed or otherwise used for commercial purposes  All illustrations and images included in CareNotes® are the copyrighted property of [employer]  or [employer] [facility] in about 6 months (around 4/10/2020)  Subjective:      Patient ID: Melinda Acosta is a 68 y o  female  Chief Complaint   Patient presents with    burned hand     boiling water-wmcma       She was making macaroni yesterday around 11 and spilled boiling water on L 2nd finger and dorsum of hand  This is painful, taking advil with relief  Using neosporin with pain relief  No blistering, no trouble moving fingers, no erythema or loss of sensation  The following portions of the patient's history were reviewed and updated as appropriate:  past social history    Review of Systems   Constitutional: Negative  Respiratory: Negative  Cardiovascular: Negative  Skin:        As per HPI           Current Outpatient Medications   Medication Sig Dispense Refill    acetaminophen (TYLENOL) 325 mg tablet Take 2 tablets (650 mg total) by mouth every 6 (six) hours as needed for mild pain, headaches or fever  30 tablet 0    albuterol (PROVENTIL HFA,VENTOLIN HFA) 90 mcg/act inhaler Inhale 2 puffs every 6 (six) hours as needed for wheezing 3 Inhaler 3    allopurinol (ZYLOPRIM) 100 mg tablet Take 1 tablet (100 mg total) by mouth daily 90 tablet 1    ALPRAZolam (XANAX) 0 25 mg tablet Take 1 tablet (0 25 mg total) by mouth as needed for anxiety 30 tablet 0    Apple Cider Vinegar 188 MG CAPS Take by mouth daily      aspirin (ECOTRIN LOW STRENGTH) 81 mg EC tablet Take 81 mg by mouth daily      atorvastatin (LIPITOR) 20 mg tablet Take 1 tablet (20 mg total) by mouth daily 90 tablet 3    b complex vitamins tablet Take 1 tablet by mouth daily   colchicine (COLCRYS) 0 6 mg tablet Take 2 po x 1 dose, then 1 po 12 hours later 20 tablet 0    diclofenac sodium (VOLTAREN) 1 % Place on the skin      ergocalciferol (VITAMIN D2) 50,000 units Take 50,000 Units by mouth once a week  On wednesday      escitalopram (LEXAPRO) 10 mg tablet Take 1 tablet (10 mg total) by mouth daily 90 tablet 1    esomeprazole (NexIUM) 20 mg capsule Take 1 capsule (20 mg total) by mouth daily in the early morning 90 capsule 1    fluticasone (FLONASE) 50 mcg/act nasal spray 2 sprays into each nostril daily 48 g 3    fluticasone-salmeterol (ADVAIR HFA) 115-21 MCG/ACT inhaler Inhale 1 puff daily 3 Inhaler 3    meclizine (ANTIVERT) 25 mg tablet Take 1 tablet (25 mg total) by mouth every 8 (eight) hours as needed for dizziness 30 tablet 0    metroNIDAZOLE (METROGEL) 0 75 % gel Apply topically 2 (two) times a day 45 g 3    Multiple Vitamin (MULTIVITAMIN) tablet Take 1 tablet by mouth daily        nebivolol (BYSTOLIC) 2 5 mg tablet Take 1 tablet (2 5 mg total) by mouth every morning 90 tablet 3    olopatadine (PATANOL) 0 1 % ophthalmic solution Apply 1 drop to eye 2 (two) times a day 15 mL 3    zolpidem (AMBIEN CR) 12 5 MG CR tablet Take 1 tablet (12 5 mg total) by mouth daily at bedtime as needed for sleep 30 tablet 0    silver sulfadiazine (SILVADENE,SSD) 1 % cream Apply topically daily To left hand 50 g 0     No current facility-administered medications for this visit  Objective:    /80   Pulse 81   Temp 98 4 °F (36 9 °C)   Resp 16   Ht 4' 11" (1 499 m)   Wt 73 5 kg (162 lb)   LMP  (LMP Unknown)   SpO2 95%   BMI 32 72 kg/m²        Physical Exam   Constitutional: She appears well-developed and well-nourished  Eyes: Conjunctivae are normal    Neck: Neck supple  No JVD present  No thyromegaly present  Cardiovascular: Normal rate, regular rhythm, normal heart sounds and intact distal pulses  Exam reveals no gallop and no friction rub  No murmur heard  Pulmonary/Chest: Effort normal and breath sounds normal  She has no wheezes  She has no rales  Musculoskeletal: She exhibits no edema  Skin:   Dorsum of L second finger and skin overlying 2nd and 3rd MCP joints with brownish, mildly thickened skin  No blistering or open wounds                 Filiberto Weiss MD

## 2019-10-10 NOTE — PROGRESS NOTES
Diabetic Foot Exam    Diabetic Foot Exam    Assessment and Plan:     Problem List Items Addressed This Visit        Respiratory    Chronic obstructive pulmonary disease (HCC)       Other    Anxiety and depression    Relevant Medications    ALPRAZolam (XANAX) 0 25 mg tablet    zolpidem (AMBIEN CR) 12 5 MG CR tablet      Other Visit Diagnoses     Superficial burn of back of left hand, initial encounter    -  Primary    Relevant Medications    silver sulfadiazine (SILVADENE,SSD) 1 % cream    Need for vaccination        Relevant Orders    influenza vaccine, 3675-3620, high-dose, PF 0 5 mL (FLUZONE HIGH-DOSE)           Preventive health issues were discussed with patient, and age appropriate screening tests were ordered as noted in patient's After Visit Summary  Personalized health advice and appropriate referrals for health education or preventive services given if needed, as noted in patient's After Visit Summary       History of Present Illness:     Patient presents for Medicare Annual Wellness visit    Patient Care Team:  Jovany Palacios MD as PCP - General  DO Stephanie Ghotra PA-C [facility], MD     Problem List:     Patient Active Problem List   Diagnosis    Tobacco abuse    Essential hypertension    Allergic rhinitis    Anxiety and depression    Arteriosclerotic cardiovascular disease    Arthropathy    Chronic obstructive pulmonary disease ([address] )    Esophageal reflux    Mixed hyperlipidemia    Postgastrectomy malabsorption    Primary osteoarthritis    Solitary pulmonary nodule    Vitamin B12 deficiency    Vitamin D deficiency    Thiamin deficiency    Acute idiopathic gout of right foot      Past Medical and Surgical History:     Past Medical History:   Diagnosis Date    Acid reflux     Arthritis     Asthma     Cardiac disease     Chronic kidney disease     passed on own kidney stone    Diverticulitis     GERD (gastroesophageal reflux disease)     Hayfever     [place] (hard of hearing)     bilateral hearing aids    Hyperlipemia     Hypertension     Tachycardia      Past Surgical History:   Procedure Laterality Date    ABLATION OF DYSRHYTHMIC FOCUS      APPENDECTOMY      CHOLECYSTECTOMY      open    FRACTURE SURGERY      ORIF fx (L) tibia, fibula 2009    GASTRIC BYPASS OPEN      HYSTERECTOMY      NH REMV CATARACT EXTRACAP,INSERT LENS Left 4/18/2016    Procedure: EXTRACTION EXTRACAPSULAR CATARACT PHACO INTRAOCULAR LENS (IOL); Surgeon: Vivien Rock MD;  Location: [facility] MAIN OR;  Service: Ophthalmology    TONSILECTOMY AND ADNOIDECTOMY        Family History:     Family History   Problem Relation Age of Onset    Heart disease Mother     Stroke Son     Stroke Maternal Grandfather       Social History:     Social History     Socioeconomic History    Marital status:       Spouse name: None    Number of children: None    Years of education: None    Highest education level: None   Occupational History    None   Social Needs    Financial resource strain: None    Food insecurity:     Worry: None     Inability: None    Transportation needs:     Medical: None     Non-medical: None   Tobacco Use    Smoking status: Current Every Day Smoker     Packs/day: 0 50     Years: 25 00     Pack years: 12 50    Smokeless tobacco: Never Used   Substance and Sexual Activity    Alcohol use: Yes     Comment: rarely    Drug use: No    Sexual activity: Never   Lifestyle    Physical activity:     Days per week: None     Minutes per session: None    Stress: None   Relationships    Social connections:     Talks on phone: None     Gets together: None     Attends [employer] service: None     Active member of club or organization: None     Attends meetings of clubs or organizations: None     Relationship status: None    Intimate partner violence:     Fear of current or ex partner: None     Emotionally abused: None     Physically abused: None     Forced sexual activity: None Other Topics Concern    None   Social History Narrative    None       Medications and Allergies:     Current Outpatient Medications   Medication Sig Dispense Refill    acetaminophen (TYLENOL) 325 mg tablet Take 2 tablets (650 mg total) by mouth every 6 (six) hours as needed for mild pain, headaches or fever  30 tablet 0    albuterol (PROVENTIL HFA,VENTOLIN HFA) 90 mcg/act inhaler Inhale 2 puffs every 6 (six) hours as needed for wheezing 3 Inhaler 3    allopurinol (ZYLOPRIM) 100 mg tablet Take 1 tablet (100 mg total) by mouth daily 90 tablet 1    ALPRAZolam (XANAX) 0 25 mg tablet Take 1 tablet (0 25 mg total) by mouth as needed for anxiety 30 tablet 0    Apple Cider Vinegar 188 MG CAPS Take by mouth daily      aspirin (ECOTRIN LOW STRENGTH) 81 mg EC tablet Take 81 mg by mouth daily      atorvastatin (LIPITOR) 20 mg tablet Take 1 tablet (20 mg total) by mouth daily 90 tablet 3    b complex vitamins tablet Take 1 tablet by mouth daily   colchicine (COLCRYS) 0 6 mg tablet Take 2 po x 1 dose, then 1 po 12 hours later 20 tablet 0    diclofenac sodium (VOLTAREN) 1 % Place on the skin      ergocalciferol (VITAMIN D2) 50,000 units Take 50,000 Units by mouth once a week  On wednesday      escitalopram (LEXAPRO) 10 mg tablet Take 1 tablet (10 mg total) by mouth daily 90 tablet 1    esomeprazole (NexIUM) 20 mg capsule Take 1 capsule (20 mg total) by mouth daily in the early morning 90 capsule 1    fluticasone (FLONASE) 50 mcg/act nasal spray 2 sprays into each nostril daily 48 g 3    fluticasone-salmeterol (ADVAIR HFA) 115-21 MCG/ACT inhaler Inhale 1 puff daily 3 Inhaler 3    meclizine (ANTIVERT) 25 mg tablet Take 1 tablet (25 mg total) by mouth every 8 (eight) hours as needed for dizziness 30 tablet 0    metroNIDAZOLE (METROGEL) 0 75 % gel Apply topically 2 (two) times a day 45 g 3    Multiple Vitamin (MULTIVITAMIN) tablet Take 1 tablet by mouth daily        nebivolol (BYSTOLIC) 2 5 mg tablet Take 1 tablet (2 5 mg total) by mouth every morning 90 tablet 3    olopatadine (PATANOL) 0 1 % ophthalmic solution Apply 1 drop to eye 2 (two) times a day 15 mL 3    zolpidem (AMBIEN CR) 12 5 MG CR tablet Take 1 tablet (12 5 mg total) by mouth daily at bedtime as needed for sleep 30 tablet 0    silver sulfadiazine (SILVADENE,SSD) 1 % cream Apply topically daily To left hand 50 g 0     No current facility-administered medications for this visit  Allergies   Allergen Reactions    Augmentin [Amoxicillin-Pot Clavulanate] GI Intolerance, Other (See Comments) and Hives     VOMITING  VOMITING  Reaction Date: [id number];     Sulfa Antibiotics Itching, Other (See Comments) and Hives     RASH, ITCHY  RASH, ITCHY    Morphine Other (See Comments)     "DOESN'T WORK"    Latex Rash     Unsure if this is an allergy      Immunizations:     Immunization History   Administered Date(s) Administered    Influenza Split High Dose Preservative Free IM 10/30/2013, 10/08/2014, 02/18/2016, 11/29/2017    Influenza TIV (IM) 11/20/2007, 10/10/2008    Influenza, high dose seasonal 0 5 mL 09/20/2018    Pneumococcal Conjugate 13-Valent 02/18/2016    Pneumococcal Polysaccharide PPV23 07/09/2007, 11/07/2013    Tdap 07/09/2007    Zoster 10/01/2014      Health Maintenance:         Topic Date Due    Hepatitis C Screening  1950    CRC Screening: Colonoscopy  1950    MAMMOGRAM  04/13/2016         Topic Date Due    DTaP,Tdap,and Td Vaccines (2 - Td) 07/09/2017    INFLUENZA VACCINE  07/01/2019      Medicare Health Risk Assessment:     /80   Pulse 81   Temp 98 4 °F (36 9 °C)   Resp 16   Ht 4' 11" (1 499 m)   Wt 73 5 kg (162 lb)   LMP  (LMP Unknown)   SpO2 95%   BMI 32 72 kg/m²          Health Risk Assessment:   Patient rates overall health as good  Patient feels that their physical health rating is same  Eyesight was rated as slightly worse  Hearing was rated as slightly worse   Patient feels that their emotional and mental health rating is slightly better  Pain experienced in the last 7 days has been none  Patient states that she has experienced no weight loss or gain in last 6 months  Depression Screening:   PHQ-2 Score: 0      Fall Risk Screening: In the past year, patient has experienced: history of falling in past year    Number of falls: 1  Injured during fall?: No    Feels unsteady when standing or walking?: No    Worried about falling?: No      Urinary Incontinence Screening:   Patient has leaked urine accidently in the last six months  Slight, would like to see urologist     Home Safety:  Patient does not have trouble with stairs inside or outside of their home  Patient has working smoke alarms and has working carbon monoxide detector  Home safety hazards include: none  Nutrition:   Current diet is Regular  Medications:   Patient is not currently taking any over-the-counter supplements  Patient is able to manage medications  Activities of Daily Living (ADLs)/Instrumental Activities of Daily Living (IADLs):   Walk and transfer into and out of bed and chair?: Yes  Dress and groom yourself?: Yes    Bathe or shower yourself?: Yes    Feed yourself? Yes  Do your laundry/housekeeping?: Yes  Manage your money, pay your bills and track your expenses?: Yes  Make your own meals?: Yes    Do your own shopping?: Yes    Previous Hospitalizations:   Any hospitalizations or ED visits within the last 12 months?: No      Advance Care Planning:   Living will: Yes    Durable POA for healthcare:  Yes    Advanced directive: Yes    End of Life Decisions reviewed with patient: Yes      Cognitive Screening:   Provider or family/friend/caregiver concerned regarding cognition?: No    PREVENTIVE SCREENINGS      Cardiovascular Screening:    General: Screening Not Indicated and History Lipid Disorder      Diabetes Screening:     General: Screening Current      Colorectal Cancer Screening:       Due for: Cologuard      Breast Cancer Screening:     General: Risks and Benefits Discussed    Due for: Mammogram        Cervical Cancer Screening:    General: Screening Not Indicated      Abdominal Aortic Aneurysm (AAA) Screening:        General: Screening Not Indicated      Lung Cancer Screening:     General: Screening Not Indicated      Hepatitis C Screening:    General: Risks and Benefits Discussed    Other Counseling Topics:   Calcium and vitamin D intake and regular weightbearing exercise         Arlin Veloz MD

## 2019-10-10 NOTE — PATIENT INSTRUCTIONS
Medicare Preventive Visit Patient Instructions  Thank you for completing your Welcome to Medicare Visit or Medicare Annual Wellness Visit today  Your next wellness visit will be due in one year (10/10/2020)  The screening/preventive services that you may require over the next 5-10 years are detailed below  Some tests may not apply to you based off risk factors and/or age  Screening tests ordered at today's visit but not completed yet may show as past due  Also, please note that scanned in results may not display below  Preventive Screenings:  Service Recommendations Previous Testing/Comments   Colorectal Cancer Screening  * Colonoscopy    * Fecal Occult Blood Test (FOBT)/Fecal Immunochemical Test (FIT)  * Fecal DNA/Cologuard Test  * Flexible Sigmoidoscopy Age: 54-65 years old   Colonoscopy: every 10 years (may be performed more frequently if at higher risk)  OR  FOBT/FIT: every 1 year  OR  Cologuard: every 3 years  OR  Sigmoidoscopy: every 5 years  Screening may be recommended earlier than age 48 if at higher risk for colorectal cancer  Also, an individualized decision between you and your healthcare provider will decide whether screening between the ages of 74-80 would be appropriate  Colonoscopy: Not on file  FOBT/FIT: Not on file  Cologuard: Not on file  Sigmoidoscopy: Not on file         Breast Cancer Screening Age: 36 years old  Frequency: every 1-2 years  Not required if history of left and right mastectomy Mammogram: 04/13/2015    Risks and Benefits Discussed   Cervical Cancer Screening Between the ages of 21-29, pap smear recommended once every 3 years  Between the ages of 33-67, can perform pap smear with HPV co-testing every 5 years     Recommendations may differ for women with a history of total hysterectomy, cervical cancer, or abnormal pap smears in past  Pap Smear: Not on file    Screening Not Indicated   Hepatitis C Screening Once for adults born between 1945 and 1965  More frequently in patients at high risk for Hepatitis C Hep C Antibody: Not on file    Risks and Benefits Discussed   Diabetes Screening 1-2 times per year if you're at risk for diabetes or have pre-diabetes Fasting glucose: 100 mg/dL   A1C: 6 0 %    Screening Current   Cholesterol Screening Once every 5 years if you don't have a lipid disorder  May order more often based on risk factors  Lipid panel: 01/26/2019    Screening Not Indicated  History Lipid Disorder     Other Preventive Screenings Covered by Medicare:  1  Abdominal Aortic Aneurysm (AAA) Screening: covered once if your at risk  You're considered to be at risk if you have a family history of AAA  2  Lung Cancer Screening: covers low dose CT scan once per year if you meet all of the following conditions: (1) Age 50-69; (2) No signs or symptoms of lung cancer; (3) Current smoker or have quit smoking within the last 15 years; (4) You have a tobacco smoking history of at least 30 pack years (packs per day multiplied by number of years you smoked); (5) You get a written order from a healthcare provider  3  Glaucoma Screening: covered annually if you're considered high risk: (1) You have diabetes OR (2) Family history of glaucoma OR (3)  aged 48 and older OR (3)  American aged 72 and older  3  Osteoporosis Screening: covered every 2 years if you meet one of the following conditions: (1) You're estrogen deficient and at risk for osteoporosis based off medical history and other findings; (2) Have a vertebral abnormality; (3) On glucocorticoid therapy for more than 3 months; (4) Have primary hyperparathyroidism; (5) On osteoporosis medications and need to assess response to drug therapy  · Last bone density test (DXA Scan): Not on file  5  HIV Screening: covered annually if you're between the age of 12-76  Also covered annually if you are younger than 13 and older than 72 with risk factors for HIV infection   For pregnant patients, it is covered up to 3 times per pregnancy  Immunizations:  Immunization Recommendations   Influenza Vaccine Annual influenza vaccination during flu season is recommended for all persons aged >= 6 months who do not have contraindications   Pneumococcal Vaccine (Prevnar and Pneumovax)  * Prevnar = PCV13  * Pneumovax = PPSV23   Adults 25-60 years old: 1-3 doses may be recommended based on certain risk factors  Adults 72 years old: Prevnar (PCV13) vaccine recommended followed by Pneumovax (PPSV23) vaccine  If already received PPSV23 since turning 65, then PCV13 recommended at least one year after PPSV23 dose  Hepatitis B Vaccine 3 dose series if at intermediate or high risk (ex: diabetes, end stage renal disease, liver disease)   Tetanus (Td) Vaccine - COST NOT COVERED BY MEDICARE PART B Following completion of primary series, a booster dose should be given every 10 years to maintain immunity against tetanus  Td may also be given as tetanus wound prophylaxis  Tdap Vaccine - COST NOT COVERED BY MEDICARE PART B Recommended at least once for all adults  For pregnant patients, recommended with each pregnancy  Shingles Vaccine (Shingrix) - COST NOT COVERED BY MEDICARE PART B  2 shot series recommended in those aged 48 and above     Health Maintenance Due:      Topic Date Due    Hepatitis C Screening  1950    CRC Screening: Colonoscopy  1950    MAMMOGRAM  04/13/2016     Immunizations Due:      Topic Date Due    DTaP,Tdap,and Td Vaccines (2 - Td) 07/09/2017    INFLUENZA VACCINE  07/01/2019     Advance Directives   What are advance directives? Advance directives are legal documents that state your wishes and plans for medical care  These plans are made ahead of time in case you lose your ability to make decisions for yourself  Advance directives can apply to any medical decision, such as the treatments you want, and if you want to donate organs  What are the types of advance directives?   There are many types of advance directives, and each state has rules about how to use them  You may choose a combination of any of the following:  · Living will: This is a written record of the treatment you want  You can also choose which treatments you do not want, which to limit, and which to stop at a certain time  This includes surgery, medicine, IV fluid, and tube feedings  · Durable power of  for healthcare [facility]): This is a written record that states who you want to make healthcare choices for you when you are unable to make them for yourself  This person, called a proxy, is usually a family member or a friend  You may choose more than 1 proxy  · Do not resuscitate (DNR) order:  A DNR order is used in case your heart stops beating or you stop breathing  It is a request not to have certain forms of treatment, such as CPR  A DNR order may be included in other types of advance directives  · Medical directive: This covers the care that you want if you are in a coma, near death, or unable to make decisions for yourself  You can list the treatments you want for each condition  Treatment may include pain medicine, surgery, blood transfusions, dialysis, IV or tube feedings, and a ventilator (breathing machine)  · Values history: This document has questions about your views, beliefs, and how you feel and think about life  This information can help others choose the care that you would choose  Why are advance directives important? An advance directive helps you control your care  Although spoken wishes may be used, it is better to have your wishes written down  Spoken wishes can be misunderstood, or not followed  Treatments may be given even if you do not want them  An advance directive may make it easier for your family to make difficult choices about your care  Fall Prevention    Fall prevention  includes ways to make your home and other areas safer  It also includes ways you can move more carefully to prevent a fall   Health conditions that cause changes in your blood pressure, vision, or muscle strength and coordination may increase your risk for falls  Medicines may also increase your risk for falls if they make you dizzy, weak, or sleepy  Fall prevention tips:   · Stand or sit up slowly  · Use assistive devices as directed  · Wear shoes that fit well and have soles that   · Wear a personal alarm  · Stay active  · Manage your medical conditions  Home Safety Tips:  · Add items to prevent falls in the bathroom  · Keep paths clear  · Install bright lights in your home  · Keep items you use often on shelves within reach  · Paint or place reflective tape on the edges of your stairs  Urinary Incontinence   Urinary incontinence (UI)  is when you lose control of your bladder  UI develops because your bladder cannot store or empty urine properly  The 3 most common types of UI are stress incontinence, urge incontinence, or both  Medicines:   · May be given to help strengthen your bladder control  Report any side effects of medication to your healthcare provider  Do pelvic muscle exercises often:  Your pelvic muscles help you stop urinating  Squeeze these muscles tight for 5 seconds, then relax for 5 seconds  Gradually work up to squeezing for 10 seconds  Do 3 sets of 15 repetitions a day, or as directed  This will help strengthen your pelvic muscles and improve bladder control  Train your bladder:  Go to the bathroom at set times, such as every 2 hours, even if you do not feel the urge to go  You can also try to hold your urine when you feel the urge to go  For example, hold your urine for 5 minutes when you feel the urge to go  As that becomes easier, hold your urine for 10 minutes  Self-care:   · Keep a UI record  Write down how often you leak urine and how much you leak  Make a note of what you were doing when you leaked urine  · Drink liquids as directed   You may need to limit the amount of liquid you drink to help control your urine leakage  Do not drink any liquid right before you go to bed  Limit or do not have drinks that contain caffeine or alcohol  · Prevent constipation  Eat a variety of high-fiber foods  Good examples are high-fiber cereals, beans, vegetables, and whole-grain breads  Walking is the best way to trigger your intestines to have a bowel movement  · Exercise regularly and maintain a healthy weight  Weight loss and exercise will decrease pressure on your bladder and help you control your leakage  · Use a catheter as directed  to help empty your bladder  A catheter is a tiny, plastic tube that is put into your bladder to drain your urine  · Go to behavior therapy as directed  Behavior therapy may be used to help you learn to control your urge to urinate  Cigarette Smoking and Your Health   Risks to your health if you smoke:  Nicotine and other chemicals found in tobacco damage every cell in your body  Even if you are a light smoker, you have an increased risk for cancer, heart disease, and lung disease  If you are pregnant or have diabetes, smoking increases your risk for complications  Benefits to your health if you stop smoking:   · You decrease respiratory symptoms such as coughing, wheezing, and shortness of breath  · You reduce your risk for cancers of the lung, mouth, throat, kidney, bladder, pancreas, stomach, and cervix  If you already have cancer, you increase the benefits of chemotherapy  You also reduce your risk for cancer returning or a second cancer from developing  · You reduce your risk for heart disease, blood clots, heart attack, and stroke  · You reduce your risk for lung infections, and diseases such as pneumonia, asthma, chronic bronchitis, and emphysema  · Your circulation improves  More oxygen can be delivered to your body  If you have diabetes, you lower your risk for complications, such as kidney, artery, and eye diseases   You also lower your risk for nerve damage  Nerve damage can lead to amputations, poor vision, and blindness  · You improve your body's ability to heal and to fight infections  For more information and support to stop smoking:   · [employer]  Phone: 7- 323 - [phone]  Web Address: [employer]  Weight Management   Why it is important to manage your weight:  Being overweight increases your risk of health conditions such as heart disease, high blood pressure, type 2 diabetes, and certain types of cancer  It can also increase your risk for osteoarthritis, sleep apnea, and other respiratory problems  Aim for a slow, steady weight loss  Even a small amount of weight loss can lower your risk of health problems  How to lose weight safely:  A safe and healthy way to lose weight is to eat fewer calories and get regular exercise  You can lose up about 1 pound a week by decreasing the number of calories you eat by 500 calories each day  Healthy meal plan for weight management:  A healthy meal plan includes a variety of foods, contains fewer calories, and helps you stay healthy  A healthy meal plan includes the following:  · Eat whole-grain foods more often  A healthy meal plan should contain fiber  Fiber is the part of grains, fruits, and vegetables that is not broken down by your body  Whole-grain foods are healthy and provide extra fiber in your diet  Some examples of whole-grain foods are whole-wheat breads and pastas, oatmeal, brown rice, and bulgur  · Eat a variety of vegetables every day  Include dark, leafy greens such as spinach, kale, david greens, and mustard greens  Eat yellow and orange vegetables such as carrots, sweet potatoes, and winter squash  · Eat a variety of fruits every day  Choose fresh or canned fruit (canned in its own juice or light syrup) instead of juice  Fruit juice has very little or no fiber  · Eat low-fat dairy foods  Drink fat-free (skim) milk or 1% milk   Eat fat-free yogurt and low-fat cottage cheese  Try low-fat cheeses such as mozzarella and other reduced-fat cheeses  · Choose meat and other protein foods that are low in fat  Choose beans or other legumes such as split peas or lentils  Choose fish, skinless poultry (chicken or turkey), or lean cuts of red meat (beef or pork)  Before you cook meat or poultry, cut off any visible fat  · Use less fat and oil  Try baking foods instead of frying them  Add less fat, such as margarine, sour cream, regular salad dressing and mayonnaise to foods  Eat fewer high-fat foods  Some examples of high-fat foods include french fries, doughnuts, ice cream, and cakes  · Eat fewer sweets  Limit foods and drinks that are high in sugar  This includes candy, cookies, regular soda, and sweetened drinks  Exercise:  Exercise at least 30 minutes per day on most days of the week  Some examples of exercise include walking, biking, dancing, and swimming  You can also fit in more physical activity by taking the stairs instead of the elevator or parking farther away from stores  Ask your healthcare provider about the best exercise plan for you  © Copyright [employer] 2018 Information is for End User's use only and may not be sold, redistributed or otherwise used for commercial purposes   All illustrations and images included in CareNotes® are the copyrighted property of [employer]  or [address] [place] [address]

## 2020-01-09 ENCOUNTER — OFFICE VISIT (OUTPATIENT)
Dept: CARDIOLOGY CLINIC | Facility: CLINIC | Age: 70
End: 2020-01-09
Payer: MEDICARE

## 2020-01-09 VITALS
OXYGEN SATURATION: 95 % | WEIGHT: 160 LBS | HEIGHT: 59 IN | DIASTOLIC BLOOD PRESSURE: 80 MMHG | HEART RATE: 76 BPM | BODY MASS INDEX: 32.25 KG/M2 | SYSTOLIC BLOOD PRESSURE: 150 MMHG

## 2020-01-09 DIAGNOSIS — R06.00 EXERTIONAL DYSPNEA: Primary | ICD-10-CM

## 2020-01-09 DIAGNOSIS — R07.9 CHEST PAIN IN ADULT: ICD-10-CM

## 2020-01-09 DIAGNOSIS — I05.0 MILD MITRAL STENOSIS: ICD-10-CM

## 2020-01-09 DIAGNOSIS — R01.1 MURMUR, CARDIAC: ICD-10-CM

## 2020-01-09 DIAGNOSIS — I10 ESSENTIAL HYPERTENSION: ICD-10-CM

## 2020-01-09 DIAGNOSIS — I35.0 MILD AORTIC STENOSIS: ICD-10-CM

## 2020-01-09 PROCEDURE — 93000 ELECTROCARDIOGRAM COMPLETE: CPT | Performed by: INTERNAL MEDICINE

## 2020-01-09 PROCEDURE — 99214 OFFICE O/P EST MOD 30 MIN: CPT | Performed by: INTERNAL MEDICINE

## 2020-01-09 RX ORDER — NEBIVOLOL 2.5 MG/1
2.5 TABLET ORAL EVERY MORNING
Qty: 90 TABLET | Refills: 3 | Status: SHIPPED | OUTPATIENT
Start: 2020-01-09 | End: 2021-12-16 | Stop reason: SDUPTHER

## 2020-01-09 NOTE — PROGRESS NOTES
Cardiology Outpatient Follow-up                                                          Miki Asher  [id number]  1950      Consult for:  SVT/dyspnea  Appreciate consult by: Priyanka Rodriguez MD    1  Exertional dyspnea  POCT ECG   2  Mild mitral stenosis  POCT ECG   3  Mild aortic stenosis  POCT ECG   4  Essential hypertension  POCT ECG   5  Murmur, cardiac  POCT ECG   6  Chest pain in adult  POCT ECG       Discussion/Summary:  Exertional shortness of breath/abnormal stress test- nuclear stress test was negative for obstructive cad  Likely multifactorial including smoking, copd, deconditioning  Structured exercise program  Stress test is 80% sensitive  Possible chance of false negative  Will continue to monitor  Grade 1 diastolic dysfunction- plan to keep heart rate better suppressed  She will restart bystolic 6 7WP daily    Palpitations- no hx of atrial fibrillation    Mild AS/Mild MS- avoid sodium- less than 2gm sodium daily  Will repeat u/s in one year  Hx of rheumatic fevers as a child? Possible etiology  SVT status post prior ablation- no evidence of atrial fibrillation  Will continue to monitor    Smoking history- cessation  Currently not willing to stop    Mild carotid artery disease- aspirin 81mg + atorvastatin 20mg daily       HPI:  78 yo hx smoking, mild cad, SVT status post ablation presents for initial visitation  She reports having significant exertional shortness of breath  She reports having an abnormal stress test 1 year prior  She was continued on medical therapy  She has been compliant with her statin and an aspirin  She currently has exertional shortness of breath but not chest heaviness  She denies having prior atrial fibrillation  Her prior medical records were reviewed  05/29/2019: We reviewed through her recent stress test which did not show significant focal ischemia    Her echocardiogram does show mild aortic stenosis and mild mitral stenosis  She has not had lower extremity edema  She does feel infrequent episodes of palpitations  However she dies at with stress  She denies having any irregular heart rhythm  There is no history of atrial fibrillation  01/09/2020:  She stopped her Bystolic  She felt tired with the beta-blocker  She denies having lower extremity edema  She denies feeling dizziness or lightheadedness  Her EKG remains unchanged  She reports having some salt indiscretion  She will monitor her salt intake  Social Hx  Windowed, children- olderson siabled- 62, grand-daughter- burned down- 25  3 dogs - 4 room house  smoking    PMH Summarized  This 77year old lady with history of SVT status post ambulation, and no significant CAD cardiac catheterization in 2007 who presents with intermittent left chest tightness which started about a week ago; there is no evidence of acute coronary syndrome; the patient's symptoms appears to be musculoskeletal, however because of ongoing smoking, and other cardiovascular risk factors, progression of coronary artery disease remains in differential diagnosis      Fall in December 2015 with head and left shoulder/left chest injury rule out fracture  Obstructive sleep apnea or dyspnea on CPAP; apparently this resolved after significant weight loss following her gastric bypass surgery in 2011  COPD; tobacco abuse  Hypertension  Hyperlipidemia  History of borderline diabetes mellitus  Gastric bypass surgery in 2011 with weight loss of more than 70 pounds; GERD on Nexium  History of SVT status post ablation 2007  No significant obstructive coronary artery disease on cardiac catheterization performed on 4/11/2007 at [facility]; this was done because of wide-complex tachycardia at a rate of about 189 bpm ) Right Ventriculography showed normal right ventricular systolic function  Right ventricular was nondilated with no evidence of right ventricular dysplasia   The patient had normal right heart pressures with no evidence of pulmonary hypertension  Cardiac output and cardiac index were also normal   Cardiolite SPECT study performed on 4/9/2007 did not reveal any evidence of myocardial ischemia  Echocardiography performed on 8/6/2013 showed LVEF of 65-70 percent with moderate LVH and hyperdynamic left ventricle  Left atrium was mildly enlarged  1+ MR was noted  Aortic root was mildly dilated at 4 0 cm    Last adenosine Cardiolite SPECT study performed on 2/16/2011 for preoperative evaluation was mildly abnormal with evidence of fixed anterior defect suggestive of breast attenuation artifact  No reversible ischemia was noted  Gated wall motion analysis was normal  Left and he was normal in size with calculated ejection fraction of 76%  Compared to the prior similar study of 4/9/2007 performed at [facility], no significant interval changes were noted  Seasonal allergy  Left fifth finger accidental laceration 8 stitches on 12/3/2015  Fall with a complex right lower extremity fracture involving ankle, tibia, and fibula requiring open reduction internal fixation in 2012 with no postoperative complications  Status post hysterectomy in 1995 because of ovarian cysts  Elevated TSH  Prominent main pulmonary arteries concerning for mild pulmonary arterial hypertension noted on CT scan  8 mm right lower lobe pulmonary nodule  Follow-up in 3-6 months recommended as described above  Left adrenal adenoma  Past Medical History:   Diagnosis Date    Acid reflux     Arthritis     Asthma     Cardiac disease     Chronic kidney disease     passed on own kidney stone    Diverticulitis     GERD (gastroesophageal reflux disease)     Hayfever     [place] (hard of hearing)     bilateral hearing aids    Hyperlipemia     Hypertension     Tachycardia      Social History     Socioeconomic History    Marital status:       Spouse name: Not on file    Number of children: Not on file    Years of education: Not on file    Highest education level: Not on file   Occupational History    Not on file   Social Needs    Financial resource strain: Not on file    Food insecurity:     Worry: Not on file     Inability: Not on file    Transportation needs:     Medical: Not on file     Non-medical: Not on file   Tobacco Use    Smoking status: Current Every Day Smoker     Packs/day: 0 50     Years: 25 00     Pack years: 12 50    Smokeless tobacco: Never Used   Substance and Sexual Activity    Alcohol use: Yes     Comment: rarely    Drug use: No    Sexual activity: Never   Lifestyle    Physical activity:     Days per week: Not on file     Minutes per session: Not on file    Stress: Not on file   Relationships    Social connections:     Talks on phone: Not on file     Gets together: Not on file     Attends [employer] service: Not on file     Active member of club or organization: Not on file     Attends meetings of clubs or organizations: Not on file     Relationship status: Not on file    Intimate partner violence:     Fear of current or ex partner: Not on file     Emotionally abused: Not on file     Physically abused: Not on file     Forced sexual activity: Not on file   Other Topics Concern    Not on file   Social History Narrative    Not on file      Family History   Problem Relation Age of Onset    Heart disease Mother     Stroke Son     Stroke Maternal Grandfather      Past Surgical History:   Procedure Laterality Date    ABLATION OF DYSRHYTHMIC FOCUS      APPENDECTOMY      CHOLECYSTECTOMY      open    FRACTURE SURGERY      ORIF fx (L) tibia, fibula 2009    GASTRIC BYPASS OPEN      HYSTERECTOMY      MO XCAPSL CTRC RMVL INSJ IO LENS PROSTH W/O ECP Left 4/18/2016    Procedure: EXTRACTION EXTRACAPSULAR CATARACT PHACO INTRAOCULAR LENS (IOL);   Surgeon: Moncho Fulton MD;  Location: [facility] MAIN OR;  Service: Ophthalmology    TONSILECTOMY AND ADNOIDECTOMY         Current Outpatient Medications:    acetaminophen (TYLENOL) 325 mg tablet, Take 2 tablets (650 mg total) by mouth every 6 (six) hours as needed for mild pain, headaches or fever  , Disp: 30 tablet, Rfl: 0    albuterol (PROVENTIL HFA,VENTOLIN HFA) 90 mcg/act inhaler, Inhale 2 puffs every 6 (six) hours as needed for wheezing, Disp: 3 Inhaler, Rfl: 3    allopurinol (ZYLOPRIM) 100 mg tablet, Take 1 tablet (100 mg total) by mouth daily, Disp: 90 tablet, Rfl: 1    ALPRAZolam (XANAX) 0 25 mg tablet, Take 1 tablet (0 25 mg total) by mouth as needed for anxiety, Disp: 30 tablet, Rfl: 0    Apple Cider Vinegar 188 MG CAPS, Take by mouth daily, Disp: , Rfl:     aspirin (ECOTRIN LOW STRENGTH) 81 mg EC tablet, Take 81 mg by mouth daily, Disp: , Rfl:     atorvastatin (LIPITOR) 20 mg tablet, Take 1 tablet (20 mg total) by mouth daily, Disp: 90 tablet, Rfl: 3    colchicine (COLCRYS) 0 6 mg tablet, Take 2 po x 1 dose, then 1 po 12 hours later, Disp: 20 tablet, Rfl: 0    ergocalciferol (VITAMIN D2) 50,000 units, Take 50,000 Units by mouth once a week  On wednesday, Disp: , Rfl:     escitalopram (LEXAPRO) 10 mg tablet, Take 1 tablet (10 mg total) by mouth daily, Disp: 90 tablet, Rfl: 1    esomeprazole (NexIUM) 20 mg capsule, Take 1 capsule (20 mg total) by mouth daily in the early morning, Disp: 90 capsule, Rfl: 1    fluticasone (FLONASE) 50 mcg/act nasal spray, 2 sprays into each nostril daily, Disp: 48 g, Rfl: 3    fluticasone-salmeterol (ADVAIR HFA) 115-21 MCG/ACT inhaler, Inhale 1 puff daily, Disp: 3 Inhaler, Rfl: 3    metroNIDAZOLE (METROGEL) 0 75 % gel, Apply topically 2 (two) times a day, Disp: 45 g, Rfl: 3    Multiple Vitamin (MULTIVITAMIN) tablet, Take 1 tablet by mouth daily  , Disp: , Rfl:     olopatadine (PATANOL) 0 1 % ophthalmic solution, Apply 1 drop to eye 2 (two) times a day, Disp: 15 mL, Rfl: 3    zolpidem (AMBIEN CR) 12 5 MG CR tablet, Take 1 tablet (12 5 mg total) by mouth daily at bedtime as needed for sleep, Disp: 30 tablet, Rfl: 0   b complex vitamins tablet, Take 1 tablet by mouth daily  , Disp: , Rfl:     diclofenac sodium (VOLTAREN) 1 %, Place on the skin, Disp: , Rfl:     meclizine (ANTIVERT) 25 mg tablet, Take 1 tablet (25 mg total) by mouth every 8 (eight) hours as needed for dizziness (Patient not taking: Reported on 1/9/2020), Disp: 30 tablet, Rfl: 0    nebivolol (BYSTOLIC) 2 5 mg tablet, Take 1 tablet (2 5 mg total) by mouth every morning (Patient not taking: Reported on 1/9/2020), Disp: 90 tablet, Rfl: 3    silver sulfadiazine (SILVADENE,SSD) 1 % cream, Apply topically daily To left hand (Patient not taking: Reported on 1/9/2020), Disp: 50 g, Rfl: 0  Allergies   Allergen Reactions    Augmentin [Amoxicillin-Pot Clavulanate] GI Intolerance, Other (See Comments) and Hives     VOMITING  VOMITING  Reaction Date: [id number];     Sulfa Antibiotics Itching, Other (See Comments) and Hives     RASH, ITCHY  RASH, ITCHY    Morphine Other (See Comments)     "DOESN'T WORK"    Latex Rash     Unsure if this is an allergy     Vitals:    01/09/20 1313   BP: 150/80   BP Location: Right arm   Patient Position: Sitting   Cuff Size: Standard   Pulse: 76   SpO2: 95%   Weight: 72 6 kg (160 lb)   Height: 4' 11" (1 499 m)       Review of Systems:  Review of Systems   Constitutional: Negative  Negative for activity change, appetite change, chills, diaphoresis, fatigue, fever and unexpected weight change  HENT: Negative  Negative for congestion, dental problem, drooling, ear discharge, ear pain, facial swelling, hearing loss, mouth sores, nosebleeds, postnasal drip, rhinorrhea, sinus pressure, sinus pain, sneezing, sore throat, tinnitus, trouble swallowing and voice change  Eyes: Negative  Negative for photophobia, pain, redness, itching and visual disturbance  Respiratory: Positive for shortness of breath  Negative for apnea, cough, choking, chest tightness, wheezing and stridor  Cardiovascular: Positive for chest pain   Negative for palpitations and leg swelling  Gastrointestinal: Negative  Negative for abdominal distention, abdominal pain, anal bleeding, blood in stool, constipation, diarrhea, nausea, rectal pain and vomiting  Endocrine: Negative  Negative for cold intolerance, heat intolerance, polydipsia, polyphagia and polyuria  Genitourinary: Negative  Negative for decreased urine volume, difficulty urinating, dyspareunia, dysuria, enuresis, flank pain, frequency, genital sores, hematuria, menstrual problem, pelvic pain, urgency, vaginal bleeding, vaginal discharge and vaginal pain  Musculoskeletal: Negative  Negative for arthralgias, back pain, gait problem, joint swelling, myalgias, neck pain and neck stiffness  Skin: Negative  Negative for color change, pallor, rash and wound  Allergic/Immunologic: Negative  Negative for environmental allergies, food allergies and immunocompromised state  Neurological: Negative  Negative for dizziness, tremors, seizures, syncope, facial asymmetry, speech difficulty, weakness, light-headedness, numbness and headaches  Hematological: Negative  Negative for adenopathy  Does not bruise/bleed easily  Psychiatric/Behavioral: Negative  Negative for agitation, behavioral problems, confusion, decreased concentration, dysphoric mood, hallucinations, self-injury, sleep disturbance and suicidal ideas  The patient is not nervous/anxious and is not hyperactive  All other systems reviewed and are negative  Vitals:    01/09/20 1313   BP: 150/80   BP Location: Right arm   Patient Position: Sitting   Cuff Size: Standard   Pulse: 76   SpO2: 95%   Weight: 72 6 kg (160 lb)   Height: 4' 11" (1 499 m)     Physical Exam:  Physical Exam   Constitutional: She is oriented to person, place, and time  She appears well-developed and well-nourished  No distress  HENT:   Head: Normocephalic and atraumatic     Right Ear: External ear normal    Left Ear: External ear normal    Eyes: Pupils are equal, round, and reactive to light  Conjunctivae are normal  Right eye exhibits no discharge  Left eye exhibits no discharge  No scleral icterus  Neck: Normal range of motion  Neck supple  No JVD present  No tracheal deviation present  No thyromegaly present  Cardiovascular: Normal rate and regular rhythm  Exam reveals gallop  Exam reveals no friction rub  Murmur heard  Pulmonary/Chest: Effort normal  No stridor  No respiratory distress  She has wheezes  She has no rales  She exhibits no tenderness  Abdominal: Soft  Bowel sounds are normal  She exhibits no distension and no mass  There is no tenderness  There is no rebound and no guarding  Musculoskeletal: Normal range of motion  She exhibits no edema, tenderness or deformity  Neurological: She is alert and oriented to person, place, and time  She has normal reflexes  She displays normal reflexes  No cranial nerve deficit  She exhibits normal muscle tone  Coordination normal    Skin: Skin is warm and dry  No rash noted  She is not diaphoretic  No erythema  No pallor  Psychiatric: She has a normal mood and affect   Her behavior is normal  Judgment and thought content normal        Labs:     Lab Results   Component Value Date    WBC 8 46 01/26/2019    HGB 15 2 01/26/2019    HCT 47 3 (H) 01/26/2019    MCV 92 01/26/2019     01/26/2019     Lab Results   Component Value Date    K 3 9 01/26/2019     01/26/2019    CO2 28 01/26/2019    BUN 16 01/26/2019    CREATININE 0 69 01/26/2019    GLUF 100 (H) 01/26/2019    CALCIUM 9 1 01/26/2019    AST 11 01/26/2019    ALT 9 (L) 01/26/2019    ALKPHOS 88 01/26/2019    EGFR 88 01/26/2019     No results found for: CHOL  Lab Results   Component Value Date    HDL 47 01/26/2019    HDL 45 06/26/2018    HDL 49 12/01/2017     Lab Results   Component Value Date    LDLCALC 91 01/26/2019    LDLCALC 86 06/26/2018    LDLCALC 113 (H) 12/01/2017     Lab Results   Component Value Date    TRIG 74 01/26/2019    TRIG 72 06/26/2018 TRIG 66 12/01/2017     Lab Results   Component Value Date    HGBA1C 6 0 (H) 02/10/2016     No results found for: TSH    Imaging & Testing   I have personally reviewed pertinent reports  EKG: Personally reviewed  Normal sinus rhythm right bundle-branch block no acute ST T wave change      Cardiac testing:   No results found for this or any previous visit  Carotid- Impression  RIGHT:  There is <50% stenosis noted in the internal carotid artery  Plaque is  heterogenous and smooth  Vertebral artery flow is antegrade  There is no significant subclavian artery  disease  LEFT:  There is <50% stenosis noted in the internal carotid artery  Plaque is  heterogenous and smooth  Vertebral artery flow is antegrade  There is no significant subclavian artery  disease  Rome Keith MD [facility] [phone]  Please call with any questions or suggestions    Counseling :  A description of the counseling:   Goals and Barriers:  Patient's ability to self care:  Medication side effect reviewed with patient in detail and all their questions answered  "This note has been constructed using a voice recognition system  Therefore there may be syntax, spelling, and/or grammatical errors   Please call if you have any questions  "

## 2020-01-16 PROBLEM — N39.46 MIXED STRESS AND URGE URINARY INCONTINENCE: Status: ACTIVE | Noted: 2020-01-16

## 2020-02-24 ENCOUNTER — TELEPHONE (OUTPATIENT)
Dept: UROLOGY | Facility: CLINIC | Age: 70
End: 2020-02-24

## 2020-03-26 DIAGNOSIS — F41.9 ANXIETY AND DEPRESSION: ICD-10-CM

## 2020-03-26 DIAGNOSIS — F32.A ANXIETY AND DEPRESSION: ICD-10-CM

## 2020-03-26 DIAGNOSIS — M10.071 ACUTE IDIOPATHIC GOUT OF RIGHT FOOT: ICD-10-CM

## 2020-03-26 RX ORDER — ALPRAZOLAM 0.25 MG/1
0.25 TABLET ORAL AS NEEDED
Qty: 30 TABLET | Refills: 0 | Status: SHIPPED | OUTPATIENT
Start: 2020-03-26 | End: 2020-04-23 | Stop reason: SDUPTHER

## 2020-03-26 RX ORDER — ESCITALOPRAM OXALATE 10 MG/1
10 TABLET ORAL DAILY
Qty: 90 TABLET | Refills: 1 | Status: SHIPPED | OUTPATIENT
Start: 2020-03-26 | End: 2020-04-23 | Stop reason: SDUPTHER

## 2020-03-26 RX ORDER — ZOLPIDEM TARTRATE 12.5 MG/1
12.5 TABLET, FILM COATED, EXTENDED RELEASE ORAL
Qty: 30 TABLET | Refills: 0 | Status: SHIPPED | OUTPATIENT
Start: 2020-03-26 | End: 2020-04-23 | Stop reason: SDUPTHER

## 2020-03-26 RX ORDER — COLCHICINE 0.6 MG/1
TABLET ORAL
Qty: 20 TABLET | Refills: 0 | Status: SHIPPED | OUTPATIENT
Start: 2020-03-26 | End: 2020-04-23

## 2020-03-26 NOTE — TELEPHONE ENCOUNTER
Requested medication(s) are due for refill today: Yes  Patient has already received a courtesy refill: No  Other reason request has been forwarded to provider: failed protcol

## 2020-03-26 NOTE — TELEPHONE ENCOUNTER
Patient also needs escitalopram 10 mg and colchicine sent to [facility]  She states she asked for these two weeks ago

## 2020-04-23 ENCOUNTER — TELEMEDICINE (OUTPATIENT)
Dept: FAMILY MEDICINE CLINIC | Facility: CLINIC | Age: 70
End: 2020-04-23
Payer: MEDICARE

## 2020-04-23 VITALS — WEIGHT: 153 LBS | BODY MASS INDEX: 30.9 KG/M2

## 2020-04-23 DIAGNOSIS — J44.9 CHRONIC OBSTRUCTIVE PULMONARY DISEASE, UNSPECIFIED COPD TYPE (HCC): ICD-10-CM

## 2020-04-23 DIAGNOSIS — M10.071 ACUTE IDIOPATHIC GOUT OF RIGHT FOOT: ICD-10-CM

## 2020-04-23 DIAGNOSIS — Z72.0 TOBACCO ABUSE: Chronic | ICD-10-CM

## 2020-04-23 DIAGNOSIS — I10 ESSENTIAL HYPERTENSION: Primary | ICD-10-CM

## 2020-04-23 DIAGNOSIS — F32.A ANXIETY AND DEPRESSION: ICD-10-CM

## 2020-04-23 DIAGNOSIS — F41.9 ANXIETY AND DEPRESSION: ICD-10-CM

## 2020-04-23 PROCEDURE — 99442 PR PHYS/QHP TELEPHONE EVALUATION 11-20 MIN: CPT | Performed by: INTERNAL MEDICINE

## 2020-04-23 RX ORDER — ZOLPIDEM TARTRATE 12.5 MG/1
12.5 TABLET, FILM COATED, EXTENDED RELEASE ORAL
Qty: 30 TABLET | Refills: 0 | Status: SHIPPED | OUTPATIENT
Start: 2020-04-23 | End: 2020-07-09 | Stop reason: SDUPTHER

## 2020-04-23 RX ORDER — ALPRAZOLAM 0.25 MG/1
0.25 TABLET ORAL AS NEEDED
Qty: 30 TABLET | Refills: 0 | Status: SHIPPED | OUTPATIENT
Start: 2020-04-23 | End: 2020-07-09 | Stop reason: SDUPTHER

## 2020-04-23 RX ORDER — ESCITALOPRAM OXALATE 20 MG/1
20 TABLET ORAL DAILY
Qty: 90 TABLET | Refills: 0 | Status: SHIPPED | OUTPATIENT
Start: 2020-04-23 | End: 2020-05-19

## 2020-05-19 ENCOUNTER — TELEPHONE (OUTPATIENT)
Dept: FAMILY MEDICINE CLINIC | Facility: CLINIC | Age: 70
End: 2020-05-19

## 2020-05-19 DIAGNOSIS — K21.9 GASTROESOPHAGEAL REFLUX DISEASE WITHOUT ESOPHAGITIS: ICD-10-CM

## 2020-05-19 DIAGNOSIS — M10.071 ACUTE IDIOPATHIC GOUT OF RIGHT FOOT: ICD-10-CM

## 2020-05-19 DIAGNOSIS — F41.9 ANXIETY AND DEPRESSION: ICD-10-CM

## 2020-05-19 DIAGNOSIS — F32.A ANXIETY AND DEPRESSION: ICD-10-CM

## 2020-05-19 RX ORDER — ALLOPURINOL 100 MG/1
TABLET ORAL
Qty: 90 TABLET | Refills: 1 | Status: SHIPPED | OUTPATIENT
Start: 2020-05-19 | End: 2020-12-12 | Stop reason: SDUPTHER

## 2020-05-19 RX ORDER — ESCITALOPRAM OXALATE 20 MG/1
TABLET ORAL
Qty: 90 TABLET | Refills: 0 | Status: SHIPPED | OUTPATIENT
Start: 2020-05-19 | End: 2021-05-10 | Stop reason: SDUPTHER

## 2020-06-18 ENCOUNTER — TELEMEDICINE (OUTPATIENT)
Dept: UROLOGY | Facility: CLINIC | Age: 70
End: 2020-06-18
Payer: MEDICARE

## 2020-06-18 DIAGNOSIS — R32 URINARY INCONTINENCE, UNSPECIFIED TYPE: ICD-10-CM

## 2020-06-18 PROCEDURE — 99214 OFFICE O/P EST MOD 30 MIN: CPT | Performed by: PHYSICIAN ASSISTANT

## 2020-06-18 RX ORDER — SOLIFENACIN SUCCINATE 5 MG/1
5 TABLET, FILM COATED ORAL DAILY
Qty: 90 TABLET | Refills: 3 | Status: SHIPPED | OUTPATIENT
Start: 2020-06-18 | End: 2021-12-16

## 2020-07-09 ENCOUNTER — TELEMEDICINE (OUTPATIENT)
Dept: FAMILY MEDICINE CLINIC | Facility: CLINIC | Age: 70
End: 2020-07-09
Payer: MEDICARE

## 2020-07-09 DIAGNOSIS — A08.4 VIRAL GASTROENTERITIS: Primary | ICD-10-CM

## 2020-07-09 DIAGNOSIS — F32.A ANXIETY AND DEPRESSION: ICD-10-CM

## 2020-07-09 DIAGNOSIS — F41.9 ANXIETY AND DEPRESSION: ICD-10-CM

## 2020-07-09 PROCEDURE — 99441 PR PHYS/QHP TELEPHONE EVALUATION 5-10 MIN: CPT | Performed by: NURSE PRACTITIONER

## 2020-07-09 RX ORDER — ZOLPIDEM TARTRATE 12.5 MG/1
12.5 TABLET, FILM COATED, EXTENDED RELEASE ORAL
Qty: 30 TABLET | Refills: 0 | Status: SHIPPED | OUTPATIENT
Start: 2020-07-09 | End: 2021-05-13

## 2020-07-09 RX ORDER — PROCHLORPERAZINE MALEATE 10 MG
10 TABLET ORAL EVERY 6 HOURS PRN
Qty: 30 TABLET | Refills: 0 | Status: ON HOLD | OUTPATIENT
Start: 2020-07-09 | End: 2021-03-01 | Stop reason: ALTCHOICE

## 2020-07-09 RX ORDER — ALPRAZOLAM 0.25 MG/1
0.25 TABLET ORAL AS NEEDED
Qty: 30 TABLET | Refills: 0 | Status: SHIPPED | OUTPATIENT
Start: 2020-07-09 | End: 2021-05-13

## 2020-07-09 NOTE — LETTER
July 9, 2020     Patient: Dusty Wise   YOB: 1950   Date of Visit: 7/9/2020       To Whom it May Concern:    Rola Harvey is under my professional care  She was seen in my office on 7/9/2020  Please excuse from work 7/10/20  If you have any questions or concerns, please don't hesitate to call           Sincerely,          KYLE Monaco        CC: No Recipients

## 2020-07-09 NOTE — PROGRESS NOTES
Virtual Brief Visit    Assessment/Plan:    Problem List Items Addressed This Visit        Other    Anxiety and depression    Relevant Medications    prochlorperazine (COMPAZINE) 10 mg tablet    ALPRAZolam (XANAX) 0 25 mg tablet    zolpidem (AMBIEN CR) 12 5 MG CR tablet      Other Visit Diagnoses     Viral gastroenteritis    -  Primary    Relevant Medications    prochlorperazine (COMPAZINE) 10 mg tablet        Reviewed supportive care of viral illness  Recommended fluids and bland diet as tolerated  Advised to monitor for fever, abdominal pain, cough, SOB, or persistent symptoms and call with any changes  PDMP report ran including [place], [place], and [place] in the search criteria  Report reviewed and was appropriate  Reason for visit is   Chief Complaint   Patient presents with    Virtual Brief Visit        Encounter provider KYLE Lopez    Provider located at  [address]  [place] [address] [address]    Recent Visits  No visits were found meeting these conditions  Showing recent visits within past 7 days and meeting all other requirements     Today's Visits  Date Type Provider Dept   07/09/20 Telemedicine Inderjit Lopez today's visits and meeting all other requirements     Future Appointments  No visits were found meeting these conditions  Showing future appointments within next 150 days and meeting all other requirements        After connecting through telephone, the patient was identified by name and date of birth  Fortunato Govea was informed that this is a telemedicine visit and that the visit is being conducted through telephone  My office door was closed  No one else was in the room  She acknowledged consent and understanding of privacy and security of the platform  The patient has agreed to participate and understands she can discontinue the visit at any time      Patient is aware this is a billable service  Subjective    Meryle Cruel is a 65 y o  female  Yesterday afternoon, she developed nausea, vomiting, and diarrhea  She took some Imodium, which helps with the diarrhea  She is still having headaches and vomiting  Denies fevers, cough, SOB, or sinus symptoms  No known sick contacts  She is requesting a refill on her Alprazolam and Ambien       Past Medical History:   Diagnosis Date    Acid reflux     Arthritis     Asthma     Cardiac disease     Chronic kidney disease     passed on own kidney stone    Diverticulitis     GERD (gastroesophageal reflux disease)     Hayfever     [place] (hard of hearing)     bilateral hearing aids    Hyperlipemia     Hypertension     Tachycardia        Past Surgical History:   Procedure Laterality Date    ABLATION OF DYSRHYTHMIC FOCUS      APPENDECTOMY      CHOLECYSTECTOMY      open    FRACTURE SURGERY      ORIF fx (L) tibia, fibula 2009    GASTRIC BYPASS OPEN      HYSTERECTOMY      ID XCAPSL CTRC RMVL INSJ IO LENS PROSTH W/O ECP Left 4/18/2016    Procedure: EXTRACTION EXTRACAPSULAR CATARACT PHACO INTRAOCULAR LENS (IOL); Surgeon: Gaurav Shell MD;  Location[facility] MA[facility];  Service: Ophthalmology    TONSILECTOMY AND ADNOIDECTOMY         Current Outpatient Medications   Medication Sig Dispense Refill    acetaminophen (TYLENOL) 325 mg tablet Take 2 tablets (650 mg total) by mouth every 6 (six) hours as needed for mild pain, headaches or fever   30 tablet 0    albuterol (PROVENTIL HFA,VENTOLIN HFA) 90 mcg/act inhaler Inhale 2 puffs every 6 (six) hours as needed for wheezing 3 Inhaler 3    allopurinol (ZYLOPRIM) 100 mg tablet TAKE ONE TABLET BY MOUTH EVERY DAY 90 tablet 1    ALPRAZolam (XANAX) 0 25 mg tablet Take 1 tablet (0 25 mg total) by mouth as needed for anxiety 30 tablet 0    Apple Cider Vinegar 188 MG CAPS Take by mouth daily      aspirin (ECOTRIN LOW STRENGTH) 81 mg EC tablet Take 81 mg by mouth daily      diclofenac sodium (VOLTAREN) 1 % Place on the skin      ergocalciferol (VITAMIN D2) 50,000 units Take 50,000 Units by mouth once a week  On wednesday      escitalopram (LEXAPRO) 20 mg tablet TAKE ONE TABLET BY MOUTH EVERY DAY 90 tablet 0    esomeprazole (NexIUM) 20 mg capsule TAKE ONE CAPSULE BY MOUTH EVERY DAY IN THE EARLY MORNING 84 capsule 1    fluticasone (FLONASE) 50 mcg/act nasal spray 2 sprays into each nostril daily 48 g 3    fluticasone-salmeterol (ADVAIR HFA) 115-21 MCG/ACT inhaler Inhale 1 puff daily 3 Inhaler 3    metroNIDAZOLE (METROGEL) 0 75 % gel Apply topically 2 (two) times a day 45 g 3    Multiple Vitamin (MULTIVITAMIN) tablet Take 1 tablet by mouth daily   nebivolol (BYSTOLIC) 2 5 mg tablet Take 1 tablet (2 5 mg total) by mouth every morning 90 tablet 3    olopatadine (PATANOL) 0 1 % ophthalmic solution Apply 1 drop to eye 2 (two) times a day 15 mL 3    prochlorperazine (COMPAZINE) 10 mg tablet Take 1 tablet (10 mg total) by mouth every 6 (six) hours as needed for nausea or vomiting 30 tablet 0    solifenacin (VESICARE) 5 mg tablet Take 1 tablet (5 mg total) by mouth daily 90 tablet 3    zolpidem (AMBIEN CR) 12 5 MG CR tablet Take 1 tablet (12 5 mg total) by mouth daily at bedtime as needed for sleep 30 tablet 0     No current facility-administered medications for this visit  Allergies   Allergen Reactions    Augmentin [Amoxicillin-Pot Clavulanate] GI Intolerance, Other (See Comments) and Hives     VOMITING  VOMITING  Reaction Date: [id number];     Sulfa Antibiotics Itching, Other (See Comments) and Hives     RASH, ITCHY  RASH, ITCHY    Morphine Other (See Comments)     "DOESN'T WORK"    Latex Rash     Unsure if this is an allergy       Review of Systems   Constitutional: Negative for chills, fatigue and fever  HENT: Negative for congestion, ear pain, postnasal drip, rhinorrhea, sinus pressure and sore throat      Respiratory: Negative for cough, shortness of breath and wheezing  Cardiovascular: Negative for chest pain  Gastrointestinal: Positive for diarrhea, nausea and vomiting  Negative for abdominal pain  Musculoskeletal: Negative for arthralgias  Skin: Negative for rash  Neurological: Positive for headaches  There were no vitals filed for this visit  I spent 8 minutes directly with the patient during this visit    JHON Denney 57 acknowledges that she has consented to an online visit or consultation  She understands that the online visit is based solely on information provided by her, and that, in the absence of a face-to-face physical evaluation by the physician, the diagnosis she receives is both limited and provisional in terms of accuracy and completeness  This is not intended to replace a full medical face-to-face evaluation by the physician  Blease Hamman understands and accepts these terms

## 2020-12-03 ENCOUNTER — TELEMEDICINE (OUTPATIENT)
Dept: FAMILY MEDICINE CLINIC | Facility: CLINIC | Age: 70
End: 2020-12-03
Payer: MEDICARE

## 2020-12-03 ENCOUNTER — TELEPHONE (OUTPATIENT)
Dept: FAMILY MEDICINE CLINIC | Facility: CLINIC | Age: 70
End: 2020-12-03

## 2020-12-03 DIAGNOSIS — J44.9 CHRONIC OBSTRUCTIVE PULMONARY DISEASE, UNSPECIFIED COPD TYPE (HCC): ICD-10-CM

## 2020-12-03 DIAGNOSIS — J06.9 ACUTE UPPER RESPIRATORY INFECTION: ICD-10-CM

## 2020-12-03 DIAGNOSIS — J06.9 ACUTE UPPER RESPIRATORY INFECTION: Primary | ICD-10-CM

## 2020-12-03 PROCEDURE — 99213 OFFICE O/P EST LOW 20 MIN: CPT | Performed by: NURSE PRACTITIONER

## 2020-12-03 PROCEDURE — U0003 INFECTIOUS AGENT DETECTION BY NUCLEIC ACID (DNA OR RNA); SEVERE ACUTE RESPIRATORY SYNDROME CORONAVIRUS 2 (SARS-COV-2) (CORONAVIRUS DISEASE [COVID-19]), AMPLIFIED PROBE TECHNIQUE, MAKING USE OF HIGH THROUGHPUT TECHNOLOGIES AS DESCRIBED BY CMS-2020-01-R: HCPCS | Performed by: NURSE PRACTITIONER

## 2020-12-03 RX ORDER — ALBUTEROL SULFATE 90 UG/1
2 AEROSOL, METERED RESPIRATORY (INHALATION) EVERY 6 HOURS PRN
Qty: 3 INHALER | Refills: 0 | Status: SHIPPED | OUTPATIENT
Start: 2020-12-03 | End: 2021-12-16 | Stop reason: SDUPTHER

## 2020-12-04 ENCOUNTER — TELEPHONE (OUTPATIENT)
Dept: FAMILY MEDICINE CLINIC | Facility: CLINIC | Age: 70
End: 2020-12-04

## 2020-12-04 LAB — SARS-COV-2 RNA SPEC QL NAA+PROBE: NOT DETECTED

## 2020-12-04 NOTE — TELEPHONE ENCOUNTER
Melanie Valentino is aware of her negative Covid test/Dilcia's message, and she knows to follow up next week if she is not feeling better   No further action is required [address] [place] [address]

## 2020-12-04 NOTE — TELEPHONE ENCOUNTER
Please let pt know that her COVID test was negative  She should stay home from work until she is feeling better and follow up next week if her symptoms do not improve    Henna Marrero

## 2020-12-07 ENCOUNTER — TELEPHONE (OUTPATIENT)
Dept: FAMILY MEDICINE CLINIC | Facility: CLINIC | Age: 70
End: 2020-12-07

## 2020-12-12 DIAGNOSIS — M10.071 ACUTE IDIOPATHIC GOUT OF RIGHT FOOT: ICD-10-CM

## 2020-12-12 RX ORDER — ALLOPURINOL 100 MG/1
100 TABLET ORAL DAILY
Qty: 90 TABLET | Refills: 0 | Status: SHIPPED | OUTPATIENT
Start: 2020-12-12 | End: 2021-05-10 | Stop reason: SDUPTHER

## 2021-02-22 NOTE — PRE-PROCEDURE INSTRUCTIONS
Pre-Surgery Instructions:   Medication Instructions    acetaminophen (TYLENOL) 325 mg tablet Patient was instructed by Physician and understands   albuterol (PROVENTIL HFA,VENTOLIN HFA) 90 mcg/act inhaler Instructed patient per Anesthesia Guidelines   allopurinol (ZYLOPRIM) 100 mg tablet Patient was instructed by Physician and understands   ALPRAZolam (XANAX) 0 25 mg tablet Patient was instructed by Physician and understands   Apple Cider Vinegar 188 MG CAPS Patient was instructed by Physician and understands   aspirin (ECOTRIN LOW STRENGTH) 81 mg EC tablet Patient was instructed by Physician and understands   diclofenac sodium (VOLTAREN) 1 % Patient was instructed by Physician and understands   ergocalciferol (VITAMIN D2) 50,000 units Patient was instructed by Physician and understands   escitalopram (LEXAPRO) 20 mg tablet Patient was instructed by Physician and understands   esomeprazole (NexIUM) 20 mg capsule Instructed patient per Anesthesia Guidelines   fluticasone (FLONASE) 50 mcg/act nasal spray Patient was instructed by Physician and understands   fluticasone-salmeterol (ADVAIR HFA) 115-21 MCG/ACT inhaler Instructed patient per Anesthesia Guidelines   metroNIDAZOLE (METROGEL) 0 75 % gel Patient was instructed by Physician and understands   Multiple Vitamin (MULTIVITAMIN) tablet Patient was instructed by Physician and understands   nebivolol (BYSTOLIC) 2 5 mg tablet Instructed patient per Anesthesia Guidelines   olopatadine (PATANOL) 0 1 % ophthalmic solution Patient was instructed by Physician and understands   prochlorperazine (COMPAZINE) 10 mg tablet Patient was instructed by Physician and understands   solifenacin (VESICARE) 5 mg tablet Patient was instructed by Physician and understands   zolpidem (AMBIEN CR) 12 5 MG CR tablet Patient was instructed by Physician and understands  Pre op instructions reviewed with pt via phone   Instructed to take bystolic, nexium and advair a m of surgery   sue Moffett [phone]  My Surgical Experience    The following information was developed to assist you to prepare for your operation  What do I need to do before coming to the hospital?   Arrange for a responsible person to drive you to and from the hospital    Arrange care for your children at home  Children are not allowed in the recovery areas of the hospital   Plan to wear clothing that is easy to put on and take off  If you are having shoulder surgery, wear a shirt that buttons or zippers in the front  Bathing  o Shower the evening before and the morning of your surgery with an antibacterial soap  Please refer to the Pre Op Showering Instructions for Surgery Patients Sheet   o Remove nail polish and all body piercing jewelry  o Do not shave any body part for at least 24 hours before surgery-this includes face, arms, legs and upper body  Food  o Nothing to eat or drink after midnight the night before your surgery  This includes candy and chewing gum  o Exception: If your surgery is after 12:00pm (noon), you may have clear liquids such as 7-Up®, ginger ale, apple or cranberry juice, Jell-O®, water, or clear broth until 8:00 am  o Do not drink milk or juice with pulp on the morning before surgery  o Do not drink alcohol 24 hours before surgery  Medicine  o Follow instructions you received from your surgeon about which medicines you may take on the day of surgery  o If instructed to take medicine on the morning of surgery, take pills with just a small sip of water  Call your prescribing doctor for specific information on what to do if you take insulin    What should I bring to the hospital?    Bring:  Elham Gentleman or a walker, if you have them, for foot or knee surgery   A list of the daily medicines, vitamins, minerals, herbals and nutritional supplements you take   Include the dosages of medicines and the time you take them each day   Glasses, dentures or hearing aids   Minimal clothing; you will be wearing hospital sleepwear   Photo ID; required to verify your identity   If you have a Living Will or Power of , bring a copy of the documents   If you have an ostomy, bring an extra pouch and any supplies you use    Do not bring   Medicines or inhalers   Money, valuables or jewelry    What other information should I know about the day of surgery?  Notify your surgeons if you develop a cold, sore throat, cough, fever, rash or any other illness   Report to the Ambulatory Surgical/Same Day Surgery Unit   You will be instructed to stop at Registration only if you have not been pre-registered   Inform your  fi they do not stay that they will be asked by the staff to leave a phone number where they can be reached   Be available to be reached before surgery  In the event the operating room schedule changes, you may be asked to come in earlier or later than expected    *It is important to tell your doctor and others involved in your health care if you are taking or have been taking any non-prescription drugs, vitamins, minerals, herbals or other nutritional supplements   Any of these may interact with some food or medicines and cause a reaction

## 2021-02-23 DIAGNOSIS — Z20.822 COVID-19 RULED OUT BY LABORATORY TESTING: ICD-10-CM

## 2021-02-23 PROCEDURE — U0003 INFECTIOUS AGENT DETECTION BY NUCLEIC ACID (DNA OR RNA); SEVERE ACUTE RESPIRATORY SYNDROME CORONAVIRUS 2 (SARS-COV-2) (CORONAVIRUS DISEASE [COVID-19]), AMPLIFIED PROBE TECHNIQUE, MAKING USE OF HIGH THROUGHPUT TECHNOLOGIES AS DESCRIBED BY CMS-2020-01-R: HCPCS | Performed by: OPHTHALMOLOGY

## 2021-02-23 PROCEDURE — U0005 INFEC AGEN DETEC AMPLI PROBE: HCPCS | Performed by: OPHTHALMOLOGY

## 2021-02-24 LAB — SARS-COV-2 RNA RESP QL NAA+PROBE: NEGATIVE

## 2021-03-01 ENCOUNTER — HOSPITAL ENCOUNTER (OUTPATIENT)
Facility: AMBULARY SURGERY CENTER | Age: 71
Setting detail: OUTPATIENT SURGERY
Discharge: HOME | End: 2021-03-01
Attending: OPHTHALMOLOGY | Admitting: OPHTHALMOLOGY
Payer: MEDICARE

## 2021-03-01 ENCOUNTER — ANESTHESIA EVENT (OUTPATIENT)
Dept: PERIOP | Facility: AMBULARY SURGERY CENTER | Age: 71
End: 2021-03-01
Payer: MEDICARE

## 2021-03-01 ENCOUNTER — ANESTHESIA (OUTPATIENT)
Dept: PERIOP | Facility: AMBULARY SURGERY CENTER | Age: 71
End: 2021-03-01
Payer: MEDICARE

## 2021-03-01 VITALS
OXYGEN SATURATION: 96 % | RESPIRATION RATE: 18 BRPM | DIASTOLIC BLOOD PRESSURE: 62 MMHG | SYSTOLIC BLOOD PRESSURE: 128 MMHG | HEART RATE: 57 BPM | TEMPERATURE: 96.7 F

## 2021-03-01 DIAGNOSIS — Z20.822 COVID-19 RULED OUT BY LABORATORY TESTING: Primary | ICD-10-CM

## 2021-03-01 DIAGNOSIS — H25.011 CORTICAL AGE-RELATED CATARACT OF RIGHT EYE: ICD-10-CM

## 2021-03-01 PROCEDURE — V2632 POST CHMBR INTRAOCULAR LENS: HCPCS | Performed by: OPHTHALMOLOGY

## 2021-03-01 DEVICE — ACRYSOF(R) IQ ASPHERIC NATURAL IOL, SINGLE-PIECE ACRYLIC FOLDABLE PCL, UV WITH BLUE LIGHTFILTER, 13.0MM LENGTH, 6.0MM ANTERIORASYMMETRIC BICONVEX OPTIC, PLANAR HAPTICS.
Type: IMPLANTABLE DEVICE | Status: FUNCTIONAL
Brand: ACRYSOF®

## 2021-03-01 RX ORDER — LIDOCAINE HYDROCHLORIDE 10 MG/ML
INJECTION, SOLUTION EPIDURAL; INFILTRATION; INTRACAUDAL; PERINEURAL AS NEEDED
Status: DISCONTINUED | OUTPATIENT
Start: 2021-03-01 | End: 2021-03-01 | Stop reason: HOSPADM

## 2021-03-01 RX ORDER — BALANCED SALT SOLUTION 6.4; .75; .48; .3; 3.9; 1.7 MG/ML; MG/ML; MG/ML; MG/ML; MG/ML; MG/ML
SOLUTION OPHTHALMIC AS NEEDED
Status: DISCONTINUED | OUTPATIENT
Start: 2021-03-01 | End: 2021-03-01 | Stop reason: HOSPADM

## 2021-03-01 RX ORDER — PHENYLEPHRINE HCL 2.5 %
1 DROPS OPHTHALMIC (EYE)
Status: COMPLETED | OUTPATIENT
Start: 2021-03-01 | End: 2021-03-01

## 2021-03-01 RX ORDER — CYCLOPENTOLATE HYDROCHLORIDE 10 MG/ML
1 SOLUTION/ DROPS OPHTHALMIC
Status: COMPLETED | OUTPATIENT
Start: 2021-03-01 | End: 2021-03-01

## 2021-03-01 RX ORDER — KETOROLAC TROMETHAMINE 5 MG/ML
1 SOLUTION OPHTHALMIC
Status: COMPLETED | OUTPATIENT
Start: 2021-03-01 | End: 2021-03-01

## 2021-03-01 RX ORDER — GATIFLOXACIN 5 MG/ML
SOLUTION/ DROPS OPHTHALMIC AS NEEDED
Status: DISCONTINUED | OUTPATIENT
Start: 2021-03-01 | End: 2021-03-01 | Stop reason: HOSPADM

## 2021-03-01 RX ORDER — MIDAZOLAM HYDROCHLORIDE 2 MG/2ML
INJECTION, SOLUTION INTRAMUSCULAR; INTRAVENOUS AS NEEDED
Status: DISCONTINUED | OUTPATIENT
Start: 2021-03-01 | End: 2021-03-01

## 2021-03-01 RX ORDER — KETOROLAC TROMETHAMINE 5 MG/ML
1 SOLUTION OPHTHALMIC 4 TIMES DAILY
Qty: 5 ML | Refills: 0
Start: 2021-03-01 | End: 2021-05-10

## 2021-03-01 RX ORDER — TETRACAINE HYDROCHLORIDE 5 MG/ML
1 SOLUTION OPHTHALMIC ONCE
Status: COMPLETED | OUTPATIENT
Start: 2021-03-01 | End: 2021-03-01

## 2021-03-01 RX ORDER — LIDOCAINE HYDROCHLORIDE 20 MG/ML
1 JELLY TOPICAL
Status: COMPLETED | OUTPATIENT
Start: 2021-03-01 | End: 2021-03-01

## 2021-03-01 RX ORDER — ONDANSETRON 2 MG/ML
4 INJECTION INTRAMUSCULAR; INTRAVENOUS ONCE AS NEEDED
Status: CANCELLED | OUTPATIENT
Start: 2021-03-01

## 2021-03-01 RX ORDER — GATIFLOXACIN 5 MG/ML
1 SOLUTION/ DROPS OPHTHALMIC 2 TIMES DAILY
Qty: 3 ML | Refills: 0
Start: 2021-03-01 | End: 2021-05-10

## 2021-03-01 RX ORDER — TETRACAINE HYDROCHLORIDE 5 MG/ML
SOLUTION OPHTHALMIC AS NEEDED
Status: DISCONTINUED | OUTPATIENT
Start: 2021-03-01 | End: 2021-03-01 | Stop reason: HOSPADM

## 2021-03-01 RX ADMIN — KETOROLAC TROMETHAMINE 1 DROP: 5 SOLUTION OPHTHALMIC at 11:25

## 2021-03-01 RX ADMIN — CYCLOPENTOLATE HYDROCHLORIDE 1 DROP: 10 SOLUTION/ DROPS OPHTHALMIC at 10:45

## 2021-03-01 RX ADMIN — LIDOCAINE HYDROCHLORIDE 1 APPLICATION: 20 JELLY TOPICAL at 10:45

## 2021-03-01 RX ADMIN — LIDOCAINE HYDROCHLORIDE 1 APPLICATION: 20 JELLY TOPICAL at 11:00

## 2021-03-01 RX ADMIN — TETRACAINE HYDROCHLORIDE 1 DROP: 5 SOLUTION OPHTHALMIC at 10:45

## 2021-03-01 RX ADMIN — MIDAZOLAM 1 MG: 1 INJECTION INTRAMUSCULAR; INTRAVENOUS at 11:30

## 2021-03-01 RX ADMIN — PHENYLEPHRINE HYDROCHLORIDE 1 DROP: 25 SOLUTION/ DROPS OPHTHALMIC at 10:45

## 2021-03-01 RX ADMIN — LIDOCAINE HYDROCHLORIDE 1 APPLICATION: 20 JELLY TOPICAL at 11:15

## 2021-03-01 RX ADMIN — CYCLOPENTOLATE HYDROCHLORIDE 1 DROP: 10 SOLUTION/ DROPS OPHTHALMIC at 11:00

## 2021-03-01 RX ADMIN — KETOROLAC TROMETHAMINE 1 DROP: 5 SOLUTION OPHTHALMIC at 11:15

## 2021-03-01 RX ADMIN — PHENYLEPHRINE HYDROCHLORIDE 1 DROP: 25 SOLUTION/ DROPS OPHTHALMIC at 11:00

## 2021-03-01 RX ADMIN — KETOROLAC TROMETHAMINE 1 DROP: 5 SOLUTION OPHTHALMIC at 11:00

## 2021-03-01 RX ADMIN — PHENYLEPHRINE HYDROCHLORIDE 1 DROP: 25 SOLUTION/ DROPS OPHTHALMIC at 11:15

## 2021-03-01 RX ADMIN — MIDAZOLAM 1 MG: 1 INJECTION INTRAMUSCULAR; INTRAVENOUS at 11:33

## 2021-03-01 RX ADMIN — KETOROLAC TROMETHAMINE 1 DROP: 5 SOLUTION OPHTHALMIC at 10:45

## 2021-03-01 RX ADMIN — CYCLOPENTOLATE HYDROCHLORIDE 1 DROP: 10 SOLUTION/ DROPS OPHTHALMIC at 11:15

## 2021-03-01 RX ADMIN — CYCLOPENTOLATE HYDROCHLORIDE 1 DROP: 10 SOLUTION/ DROPS OPHTHALMIC at 11:30

## 2021-03-01 RX ADMIN — PHENYLEPHRINE HYDROCHLORIDE 1 DROP: 25 SOLUTION/ DROPS OPHTHALMIC at 11:30

## 2021-03-01 NOTE — ANESTHESIA PREPROCEDURE EVALUATION
Procedure:  EXTRACTION EXTRACAPSULAR CATARACT PHACO INTRAOCULAR LENS (IOL) (Right Eye)    Relevant Problems   ANESTHESIA (within normal limits)      CARDIO   (+) Essential hypertension   (+) Mixed hyperlipidemia      GI/HEPATIC   (+) Esophageal reflux      MUSCULOSKELETAL   (+) Acute idiopathic gout of right foot   (+) Primary osteoarthritis      NEURO/PSYCH   (+) Anxiety and depression      PULMONARY   (+) Chronic obstructive pulmonary disease (HCC)        Physical Exam    Airway    Mallampati score: II  TM Distance: >3 FB  Neck ROM: full     Dental   upper dentures and lower dentures,     Cardiovascular  Rhythm: regular, Rate: normal,     Pulmonary  Breath sounds clear to auscultation,     Other Findings        Anesthesia Plan  ASA Score- 3     Anesthesia Type- IV sedation with anesthesia with ASA Monitors  Additional Monitors:   Airway Plan:           Plan Factors-Exercise tolerance (METS): >4 METS  Chart reviewed  EKG reviewed  Existing labs reviewed  Patient summary reviewed  Patient is a current smoker  Patient instructed to abstain from smoking on day of procedure  Patient smoked on day of surgery  Obstructive sleep apnea risk education given perioperatively  Induction- intravenous  Postoperative Plan-     Informed Consent- Anesthetic plan and risks discussed with patient  I personally reviewed this patient with the CRNA  Discussed and agreed on the Anesthesia Plan with the CRNA  [facility]

## 2021-03-01 NOTE — DISCHARGE INSTRUCTIONS
Dr Bismark Stevenson Cataract Instructions    Activity:     1  No Driving until instructed     2  Keep shield on until seen tomorrow except when administering drops     3  No heavy lifting    30 pound limit     4  No water in eye   For one week      Diet:     1  Resume normal diet    Normal Symptoms:     1  Mild Headache   2  Scratchy or picky feeling around eye    Call the office if:     1  You have any questions or concerns   2  If eye pain is not relieved by extra strength tylenol    Office phone number:  [phone]      Next appointment:     1  See Dr Bismark Stevenson at his office tomorrow as scheduled   __________________________________________________________   2  Bring blue eye kit with you and eyedrops to the office    A new set of comprehensive instructions will be given and reviewed with you during your office visit tomorrow

## 2021-03-01 NOTE — OP NOTE
OPERATIVE REPORT    PATIENT NAME: Winsome Hodgson    :  1950  MRN: [id number]  Pt Location: [facility] OR ROOM 01    Surgery Date: 3/1/2021    Surgeon(s) and Role:     * Dafne Black MD - Primary    Cortical age-related cataract, right eye [H25 011]    Post-Op Diagnosis Codes:     * Cortical age-related cataract, right eye [H25 011]    Procedure(s):  EXTRACTION EXTRACAPSULAR CATARACT PHACO INTRAOCULAR LENS (IOL)    Anesthesia Type:   IV Sedation with Anesthesia    Operative Indications:  Cortical age-related cataract, right eye [H25 011]  Decreased vision to 20/50  With problems glare and driving  Pt requested cataract sx the right eye    Procedure and Technique:    Procedure Details     The patient was brought in the OR in stable condition and placed on the operative table  The right eye was prepped and draped in the usual sterile manner  Attention was directed to the right eye where a lid speculum was placed  A 2 4 mm clear corneal incision was made temperally  1/2 cc of 1% MPF Lidocaine was irrigated into the anterior chamber followed by viscoat  The side port incision was placed superiorly  The capsularrhexis was made and the nucleus was hydrodissected with BSS  The nucleus was then removed with the phaco handpiece followed by removal of the cortical material with the I/A handpiece  The capsular bag was then filled with Provisc  The IOL was folded and placed in to the capsular bag and centered well  The remaining Provisc was removed from the eye with the I/A  The wounds were hydrated with BSS and found to be water tight  The lid speculum was removed and 2 drops of Gatifloxicin were placed over the cornea  A protective eye shield was taped over the eye and the patient went to PACU in stable condition  I will see the patient in the office tomorrow and the expected post op period is a few weeks         Complications: None        Disposition: PACU   Condition: Stable    SIGNATURE: Dafne Black MD  DATE: March 1, 2021  TIME: 11:47 AM

## 2021-03-26 ENCOUNTER — TELEPHONE (OUTPATIENT)
Dept: FAMILY MEDICINE CLINIC | Facility: CLINIC | Age: 71
End: 2021-03-26

## 2021-04-23 ENCOUNTER — IMMUNIZATIONS (OUTPATIENT)
Dept: FAMILY MEDICINE CLINIC | Facility: HOSPITAL | Age: 71
End: 2021-04-23

## 2021-04-23 DIAGNOSIS — Z23 ENCOUNTER FOR IMMUNIZATION: Primary | ICD-10-CM

## 2021-04-23 PROCEDURE — 91300 SARS-COV-2 / COVID-19 MRNA VACCINE (PFIZER-BIONTECH) 30 MCG: CPT

## 2021-04-23 PROCEDURE — 0001A SARS-COV-2 / COVID-19 MRNA VACCINE (PFIZER-BIONTECH) 30 MCG: CPT

## 2021-05-10 ENCOUNTER — OFFICE VISIT (OUTPATIENT)
Dept: FAMILY MEDICINE CLINIC | Facility: CLINIC | Age: 71
End: 2021-05-10
Payer: MEDICARE

## 2021-05-10 ENCOUNTER — APPOINTMENT (OUTPATIENT)
Dept: RADIOLOGY | Facility: CLINIC | Age: 71
End: 2021-05-10
Payer: MEDICARE

## 2021-05-10 VITALS
WEIGHT: 158.2 LBS | OXYGEN SATURATION: 95 % | RESPIRATION RATE: 20 BRPM | HEIGHT: 59 IN | BODY MASS INDEX: 31.89 KG/M2 | DIASTOLIC BLOOD PRESSURE: 90 MMHG | HEART RATE: 64 BPM | TEMPERATURE: 98.6 F | SYSTOLIC BLOOD PRESSURE: 140 MMHG

## 2021-05-10 DIAGNOSIS — J44.9 CHRONIC OBSTRUCTIVE PULMONARY DISEASE, UNSPECIFIED COPD TYPE (HCC): ICD-10-CM

## 2021-05-10 DIAGNOSIS — Z11.59 ENCOUNTER FOR HEPATITIS C SCREENING TEST FOR LOW RISK PATIENT: ICD-10-CM

## 2021-05-10 DIAGNOSIS — F33.9 DEPRESSION, RECURRENT (HCC): ICD-10-CM

## 2021-05-10 DIAGNOSIS — Z00.00 MEDICARE ANNUAL WELLNESS VISIT, SUBSEQUENT: Primary | ICD-10-CM

## 2021-05-10 DIAGNOSIS — M25.511 ACUTE PAIN OF RIGHT SHOULDER: ICD-10-CM

## 2021-05-10 DIAGNOSIS — I25.10 ARTERIOSCLEROTIC CARDIOVASCULAR DISEASE: ICD-10-CM

## 2021-05-10 DIAGNOSIS — Z12.31 BREAST CANCER SCREENING BY MAMMOGRAM: ICD-10-CM

## 2021-05-10 DIAGNOSIS — M10.071 ACUTE IDIOPATHIC GOUT OF RIGHT FOOT: ICD-10-CM

## 2021-05-10 DIAGNOSIS — E53.8 VITAMIN B12 DEFICIENCY: ICD-10-CM

## 2021-05-10 DIAGNOSIS — Z72.0 TOBACCO ABUSE: Chronic | ICD-10-CM

## 2021-05-10 DIAGNOSIS — F41.9 ANXIETY AND DEPRESSION: ICD-10-CM

## 2021-05-10 DIAGNOSIS — I10 ESSENTIAL HYPERTENSION: ICD-10-CM

## 2021-05-10 DIAGNOSIS — F32.A ANXIETY AND DEPRESSION: ICD-10-CM

## 2021-05-10 DIAGNOSIS — H10.13 ALLERGIC CONJUNCTIVITIS OF BOTH EYES: ICD-10-CM

## 2021-05-10 DIAGNOSIS — Z12.39 SCREENING BREAST EXAMINATION: ICD-10-CM

## 2021-05-10 DIAGNOSIS — E55.9 VITAMIN D DEFICIENCY: ICD-10-CM

## 2021-05-10 PROCEDURE — 1123F ACP DISCUSS/DSCN MKR DOCD: CPT | Performed by: INTERNAL MEDICINE

## 2021-05-10 PROCEDURE — G0439 PPPS, SUBSEQ VISIT: HCPCS | Performed by: INTERNAL MEDICINE

## 2021-05-10 PROCEDURE — 99215 OFFICE O/P EST HI 40 MIN: CPT | Performed by: INTERNAL MEDICINE

## 2021-05-10 PROCEDURE — 73030 X-RAY EXAM OF SHOULDER: CPT

## 2021-05-10 RX ORDER — ALLOPURINOL 100 MG/1
100 TABLET ORAL DAILY
Qty: 90 TABLET | Refills: 3 | Status: SHIPPED | OUTPATIENT
Start: 2021-05-10 | End: 2021-12-16 | Stop reason: SDUPTHER

## 2021-05-10 RX ORDER — OLOPATADINE HYDROCHLORIDE 1 MG/ML
1 SOLUTION/ DROPS OPHTHALMIC 2 TIMES DAILY
Qty: 15 ML | Refills: 3 | Status: SHIPPED | OUTPATIENT
Start: 2021-05-10 | End: 2022-06-15 | Stop reason: SDUPTHER

## 2021-05-10 RX ORDER — ESCITALOPRAM OXALATE 20 MG/1
20 TABLET ORAL DAILY
Qty: 90 TABLET | Refills: 1 | Status: SHIPPED | OUTPATIENT
Start: 2021-05-10 | End: 2022-02-21 | Stop reason: SDUPTHER

## 2021-05-10 RX ORDER — FLUTICASONE PROPIONATE 50 MCG
2 SPRAY, SUSPENSION (ML) NASAL DAILY
Qty: 48 G | Refills: 3 | Status: SHIPPED | OUTPATIENT
Start: 2021-05-10 | End: 2021-12-16 | Stop reason: SDUPTHER

## 2021-05-10 NOTE — PATIENT INSTRUCTIONS
Medicare Preventive Visit Patient Instructions  Thank you for completing your Welcome to Medicare Visit or Medicare Annual Wellness Visit today  Your next wellness visit will be due in one year (5/11/2022)  The screening/preventive services that you may require over the next 5-10 years are detailed below  Some tests may not apply to you based off risk factors and/or age  Screening tests ordered at today's visit but not completed yet may show as past due  Also, please note that scanned in results may not display below  Preventive Screenings:  Service Recommendations Previous Testing/Comments   Colorectal Cancer Screening  * Colonoscopy    * Fecal Occult Blood Test (FOBT)/Fecal Immunochemical Test (FIT)  * Fecal DNA/Cologuard Test  * Flexible Sigmoidoscopy Age: 54-65 years old   Colonoscopy: every 10 years (may be performed more frequently if at higher risk)  OR  FOBT/FIT: every 1 year  OR  Cologuard: every 3 years  OR  Sigmoidoscopy: every 5 years  Screening may be recommended earlier than age 48 if at higher risk for colorectal cancer  Also, an individualized decision between you and your healthcare provider will decide whether screening between the ages of 74-80 would be appropriate  Colonoscopy: Not on file  FOBT/FIT: Not on file  Cologuard: Not on file  Sigmoidoscopy: Not on file          Breast Cancer Screening Age: 36 years old  Frequency: every 1-2 years  Not required if history of left and right mastectomy Mammogram: 04/13/2015        Cervical Cancer Screening Between the ages of 21-29, pap smear recommended once every 3 years  Between the ages of 33-67, can perform pap smear with HPV co-testing every 5 years     Recommendations may differ for women with a history of total hysterectomy, cervical cancer, or abnormal pap smears in past  Pap Smear: Not on file    Screening Not Indicated   Hepatitis C Screening Once for adults born between [facility]  More frequently in patients at high risk for Hepatitis C Hep C Antibody: Not on file        Diabetes Screening 1-2 times per year if you're at risk for diabetes or have pre-diabetes Fasting glucose: 100 mg/dL   A1C: No results in last 5 years        Cholesterol Screening Once every 5 years if you don't have a lipid disorder  May order more often based on risk factors  Lipid panel: 01/26/2019    Screening Not Indicated  History Lipid Disorder     Other Preventive Screenings Covered by Medicare:  1  Abdominal Aortic Aneurysm (AAA) Screening: covered once if your at risk  You're considered to be at risk if you have a family history of AAA  2  Lung Cancer Screening: covers low dose CT scan once per year if you meet all of the following conditions: (1) Age 50-69; (2) No signs or symptoms of lung cancer; (3) Current smoker or have quit smoking within the last 15 years; (4) You have a tobacco smoking history of at least 30 pack years (packs per day multiplied by number of years you smoked); (5) You get a written order from a healthcare provider  3  Glaucoma Screening: covered annually if you're considered high risk: (1) You have diabetes OR (2) Family history of glaucoma OR (3)  aged 48 and older OR (3)  American aged 72 and older  3  Osteoporosis Screening: covered every 2 years if you meet one of the following conditions: (1) You're estrogen deficient and at risk for osteoporosis based off medical history and other findings; (2) Have a vertebral abnormality; (3) On glucocorticoid therapy for more than 3 months; (4) Have primary hyperparathyroidism; (5) On osteoporosis medications and need to assess response to drug therapy  · Last bone density test (DXA Scan): Not on file  5  HIV Screening: covered annually if you're between the age of 12-76  Also covered annually if you are younger than 13 and older than 72 with risk factors for HIV infection   For pregnant patients, it is covered up to 3 times per pregnancy  Immunizations:  Immunization Recommendations   Influenza Vaccine Annual influenza vaccination during flu season is recommended for all persons aged >= 6 months who do not have contraindications   Pneumococcal Vaccine (Prevnar and Pneumovax)  * Prevnar = PCV13  * Pneumovax = PPSV23   Adults 25-60 years old: 1-3 doses may be recommended based on certain risk factors  Adults 72 years old: Prevnar (PCV13) vaccine recommended followed by Pneumovax (PPSV23) vaccine  If already received PPSV23 since turning 65, then PCV13 recommended at least one year after PPSV23 dose  Hepatitis B Vaccine 3 dose series if at intermediate or high risk (ex: diabetes, end stage renal disease, liver disease)   Tetanus (Td) Vaccine - COST NOT COVERED BY MEDICARE PART B Following completion of primary series, a booster dose should be given every 10 years to maintain immunity against tetanus  Td may also be given as tetanus wound prophylaxis  Tdap Vaccine - COST NOT COVERED BY MEDICARE PART B Recommended at least once for all adults  For pregnant patients, recommended with each pregnancy  Shingles Vaccine (Shingrix) - COST NOT COVERED BY MEDICARE PART B  2 shot series recommended in those aged 48 and above     Health Maintenance Due:      Topic Date Due    Hepatitis C Screening  Never done    Colorectal Cancer Screening  Never done    MAMMOGRAM  04/13/2016    Lung Cancer Screening  01/31/2020     Immunizations Due:      Topic Date Due    DTaP,Tdap,and Td Vaccines (2 - Td) 07/09/2017    COVID-19 Vaccine (2 - Pfizer 2-dose series) 05/14/2021     Advance Directives   What are advance directives? Advance directives are legal documents that state your wishes and plans for medical care  These plans are made ahead of time in case you lose your ability to make decisions for yourself  Advance directives can apply to any medical decision, such as the treatments you want, and if you want to donate organs     What are the types of advance directives? There are many types of advance directives, and each state has rules about how to use them  You may choose a combination of any of the following:  · Living will: This is a written record of the treatment you want  You can also choose which treatments you do not want, which to limit, and which to stop at a certain time  This includes surgery, medicine, IV fluid, and tube feedings  · Durable power of  for healthcare [facility]): This is a written record that states who you want to make healthcare choices for you when you are unable to make them for yourself  This person, called a proxy, is usually a family member or a friend  You may choose more than 1 proxy  · Do not resuscitate (DNR) order:  A DNR order is used in case your heart stops beating or you stop breathing  It is a request not to have certain forms of treatment, such as CPR  A DNR order may be included in other types of advance directives  · Medical directive: This covers the care that you want if you are in a coma, near death, or unable to make decisions for yourself  You can list the treatments you want for each condition  Treatment may include pain medicine, surgery, blood transfusions, dialysis, IV or tube feedings, and a ventilator (breathing machine)  · Values history: This document has questions about your views, beliefs, and how you feel and think about life  This information can help others choose the care that you would choose  Why are advance directives important? An advance directive helps you control your care  Although spoken wishes may be used, it is better to have your wishes written down  Spoken wishes can be misunderstood, or not followed  Treatments may be given even if you do not want them  An advance directive may make it easier for your family to make difficult choices about your care  Urinary Incontinence   Urinary incontinence (UI)  is when you lose control of your bladder   UI develops because your bladder cannot store or empty urine properly  The 3 most common types of UI are stress incontinence, urge incontinence, or both  Medicines:   · May be given to help strengthen your bladder control  Report any side effects of medication to your healthcare provider  Do pelvic muscle exercises often:  Your pelvic muscles help you stop urinating  Squeeze these muscles tight for 5 seconds, then relax for 5 seconds  Gradually work up to squeezing for 10 seconds  Do 3 sets of 15 repetitions a day, or as directed  This will help strengthen your pelvic muscles and improve bladder control  Train your bladder:  Go to the bathroom at set times, such as every 2 hours, even if you do not feel the urge to go  You can also try to hold your urine when you feel the urge to go  For example, hold your urine for 5 minutes when you feel the urge to go  As that becomes easier, hold your urine for 10 minutes  Self-care:   · Keep a UI record  Write down how often you leak urine and how much you leak  Make a note of what you were doing when you leaked urine  · Drink liquids as directed  You may need to limit the amount of liquid you drink to help control your urine leakage  Do not drink any liquid right before you go to bed  Limit or do not have drinks that contain caffeine or alcohol  · Prevent constipation  Eat a variety of high-fiber foods  Good examples are high-fiber cereals, beans, vegetables, and whole-grain breads  Walking is the best way to trigger your intestines to have a bowel movement  · Exercise regularly and maintain a healthy weight  Weight loss and exercise will decrease pressure on your bladder and help you control your leakage  · Use a catheter as directed  to help empty your bladder  A catheter is a tiny, plastic tube that is put into your bladder to drain your urine  · Go to behavior therapy as directed  Behavior therapy may be used to help you learn to control your urge to urinate      Cigarette Smoking and Your Health   Risks to your health if you smoke:  Nicotine and other chemicals found in tobacco damage every cell in your body  Even if you are a light smoker, you have an increased risk for cancer, heart disease, and lung disease  If you are pregnant or have diabetes, smoking increases your risk for complications  Benefits to your health if you stop smoking:   · You decrease respiratory symptoms such as coughing, wheezing, and shortness of breath  · You reduce your risk for cancers of the lung, mouth, throat, kidney, bladder, pancreas, stomach, and cervix  If you already have cancer, you increase the benefits of chemotherapy  You also reduce your risk for cancer returning or a second cancer from developing  · You reduce your risk for heart disease, blood clots, heart attack, and stroke  · You reduce your risk for lung infections, and diseases such as pneumonia, asthma, chronic bronchitis, and emphysema  · Your circulation improves  More oxygen can be delivered to your body  If you have diabetes, you lower your risk for complications, such as kidney, artery, and eye diseases  You also lower your risk for nerve damage  Nerve damage can lead to amputations, poor vision, and blindness  · You improve your body's ability to heal and to fight infections  For more information and support to stop smoking:   · [employer]  Phone: 7- 121 - [phone]  Web Address: www [facility]  Weight Management   Why it is important to manage your weight:  Being overweight increases your risk of health conditions such as heart disease, high blood pressure, type 2 diabetes, and certain types of cancer  It can also increase your risk for osteoarthritis, sleep apnea, and other respiratory problems  Aim for a slow, steady weight loss  Even a small amount of weight loss can lower your risk of health problems    How to lose weight safely:  A safe and healthy way to lose weight is to eat fewer calories and get regular exercise  You can lose up about 1 pound a week by decreasing the number of calories you eat by 500 calories each day  Healthy meal plan for weight management:  A healthy meal plan includes a variety of foods, contains fewer calories, and helps you stay healthy  A healthy meal plan includes the following:  · Eat whole-grain foods more often  A healthy meal plan should contain fiber  Fiber is the part of grains, fruits, and vegetables that is not broken down by your body  Whole-grain foods are healthy and provide extra fiber in your diet  Some examples of whole-grain foods are whole-wheat breads and pastas, oatmeal, brown rice, and bulgur  · Eat a variety of vegetables every day  Include dark, leafy greens such as spinach, kale, david greens, and mustard greens  Eat yellow and orange vegetables such as carrots, sweet potatoes, and winter squash  · Eat a variety of fruits every day  Choose fresh or canned fruit (canned in its own juice or light syrup) instead of juice  Fruit juice has very little or no fiber  · Eat low-fat dairy foods  Drink fat-free (skim) milk or 1% milk  Eat fat-free yogurt and low-fat cottage cheese  Try low-fat cheeses such as mozzarella and other reduced-fat cheeses  · Choose meat and other protein foods that are low in fat  Choose beans or other legumes such as split peas or lentils  Choose fish, skinless poultry (chicken or turkey), or lean cuts of red meat (beef or pork)  Before you cook meat or poultry, cut off any visible fat  · Use less fat and oil  Try baking foods instead of frying them  Add less fat, such as margarine, sour cream, regular salad dressing and mayonnaise to foods  Eat fewer high-fat foods  Some examples of high-fat foods include french fries, doughnuts, ice cream, and cakes  · Eat fewer sweets  Limit foods and drinks that are high in sugar  This includes candy, cookies, regular soda, and sweetened drinks    Exercise:  Exercise at least 30 minutes per day on most days of the week  Some examples of exercise include walking, biking, dancing, and swimming  You can also fit in more physical activity by taking the stairs instead of the elevator or parking farther away from stores  Ask your healthcare provider about the best exercise plan for you  © Copyright [employer] 2018 Information is for End User's use only and may not be sold, redistributed or otherwise used for commercial purposes   All illustrations and images included in CareNotes® are the copyrighted property of [employer]  or [address] [place] [address]

## 2021-05-10 NOTE — PROGRESS NOTES
Assessment and Plan:     Problem List Items Addressed This Visit     None           Preventive health issues were discussed with patient, and age appropriate screening tests were ordered as noted in patient's After Visit Summary  Personalized health advice and appropriate referrals for health education or preventive services given if needed, as noted in patient's After Visit Summary       History of Present Illness:     Patient presents for Medicare Annual Wellness visit    Patient Care Team:  Mamta Barrera MD as PCP - General  DO Leigh Barnard PA-C Epifania Hurst, MD     Problem List:     Patient Active Problem List   Diagnosis    Tobacco abuse    Essential hypertension    Allergic rhinitis    Anxiety and depression    Arteriosclerotic cardiovascular disease    Arthropathy    Chronic obstructive pulmonary disease ([address] )    Esophageal reflux    Mixed hyperlipidemia    Postgastrectomy malabsorption    Primary osteoarthritis    Solitary pulmonary nodule    Vitamin B12 deficiency    Vitamin D deficiency    Thiamin deficiency    Acute idiopathic gout of right foot    Mixed stress and urge urinary incontinence      Past Medical and Surgical History:     Past Medical History:   Diagnosis Date    Acid reflux     Arthritis     Asthma     Cardiac disease     Chronic kidney disease     passed on own kidney stone    Diverticulitis     GERD (gastroesophageal reflux disease)     Hayfever     [place] (hard of hearing)     bilateral hearing aids    Hyperlipemia     Hypertension     Tachycardia      Past Surgical History:   Procedure Laterality Date    ABLATION OF DYSRHYTHMIC FOCUS      APPENDECTOMY      CHOLECYSTECTOMY      open    FRACTURE SURGERY      ORIF fx (L) tibia, fibula 2009    GASTRIC BYPASS OPEN      HYSTERECTOMY      SD XCAPSL CTRC RMVL INSJ IO LENS PROSTH W/O ECP Left 4/18/2016    Procedure: EXTRACTION EXTRACAPSULAR CATARACT PHACO INTRAOCULAR LENS (IOL); Surgeon: Shawanda Del Valle MD;  Location: [facility] MAIN OR;  Service: Ophthalmology    MO XCAPSL CTRC RMVL INSJ IO LENS PROSTH W/O ECP Right 3/1/2021    Procedure: EXTRACTION EXTRACAPSULAR CATARACT PHACO INTRAOCULAR LENS (IOL); Surgeon: Shawanda Del Valle MD;  Location: [facility] MAIN OR;  Service: Ophthalmology    TONSILECTOMY AND ADNOIDECTOMY        Family History:     Family History   Problem Relation Age of Onset    Heart disease Mother     Stroke Son     Stroke Maternal Grandfather       Social History:        Social History     Socioeconomic History    Marital status:       Spouse name: None    Number of children: None    Years of education: None    Highest education level: None   Occupational History    None   Social Needs    Financial resource strain: None    Food insecurity     Worry: None     Inability: None    Transportation needs     Medical: None     Non-medical: None   Tobacco Use    Smoking status: Current Every Day Smoker     Packs/day: 1 00     Years: 25 00     Pack years: 25 00    Smokeless tobacco: Never Used   Substance and Sexual Activity    Alcohol Use     Frequency: Monthly or less     Comment: rarely    Drug use: No    Sexual activity: Never   Lifestyle    Physical activity     Days per week: None     Minutes per session: None    Stress: None   Relationships    Social connections     Talks on phone: None     Gets together: None     Attends [employer] service: None     Active member of club or organization: None     Attends meetings of clubs or organizations: None     Relationship status: None    Intimate partner violence     Fear of current or ex partner: None     Emotionally abused: None     Physically abused: None     Forced sexual activity: None   Other Topics Concern    None   Social History Narrative    None      Medications and Allergies:     Current Outpatient Medications   Medication Sig Dispense Refill    albuterol (PROVENTIL HFA,VENTOLIN HFA) 90 mcg/act inhaler Inhale 2 puffs every 6 (six) hours as needed for wheezing 3 Inhaler 0    ALPRAZolam (XANAX) 0 25 mg tablet Take 1 tablet (0 25 mg total) by mouth as needed for anxiety 30 tablet 0    aspirin (ECOTRIN LOW STRENGTH) 81 mg EC tablet Take 81 mg by mouth daily      esomeprazole (NexIUM) 20 mg capsule TAKE ONE CAPSULE BY MOUTH EVERY DAY IN THE EARLY MORNING 84 capsule 1    nebivolol (BYSTOLIC) 2 5 mg tablet Take 1 tablet (2 5 mg total) by mouth every morning 90 tablet 3    acetaminophen (TYLENOL) 325 mg tablet Take 2 tablets (650 mg total) by mouth every 6 (six) hours as needed for mild pain, headaches or fever  (Patient not taking: Reported on 5/10/2021) 30 tablet 0    allopurinol (ZYLOPRIM) 100 mg tablet Take 1 tablet (100 mg total) by mouth daily (Patient not taking: Reported on 5/10/2021) 90 tablet 0    Apple Cider Vinegar 188 MG CAPS Take by mouth daily      diclofenac sodium (VOLTAREN) 1 % Place on the skin      ergocalciferol (VITAMIN D2) 50,000 units Take 50,000 Units by mouth once a week  On wednesday      escitalopram (LEXAPRO) 20 mg tablet TAKE ONE TABLET BY MOUTH EVERY DAY (Patient not taking: Reported on 5/10/2021) 90 tablet 0    fluticasone (FLONASE) 50 mcg/act nasal spray 2 sprays into each nostril daily (Patient not taking: Reported on 5/10/2021) 48 g 3    fluticasone-salmeterol (ADVAIR HFA) 115-21 MCG/ACT inhaler Inhale 1 puff daily (Patient not taking: Reported on 5/10/2021) 3 Inhaler 3    gatifloxacin (ZYMAXID) 0 5 % Administer 1 drop to the right eye 2 (two) times a day (Patient not taking: Reported on 5/10/2021) 3 mL 0    ketorolac (ACULAR) 0 5 % ophthalmic solution Administer 1 drop to the right eye 4 (four) times a day (Patient not taking: Reported on 5/10/2021) 5 mL 0    metroNIDAZOLE (METROGEL) 0 75 % gel Apply topically 2 (two) times a day (Patient not taking: Reported on 5/10/2021) 45 g 3    Multiple Vitamin (MULTIVITAMIN) tablet Take 1 tablet by mouth daily        olopatadine (PATANOL) 0 1 % ophthalmic solution Apply 1 drop to eye 2 (two) times a day (Patient not taking: Reported on 5/10/2021) 15 mL 3    solifenacin (VESICARE) 5 mg tablet Take 1 tablet (5 mg total) by mouth daily (Patient not taking: Reported on 5/10/2021) 90 tablet 3    zolpidem (AMBIEN CR) 12 5 MG CR tablet Take 1 tablet (12 5 mg total) by mouth daily at bedtime as needed for sleep (Patient not taking: Reported on 5/10/2021) 30 tablet 0     No current facility-administered medications for this visit        Allergies   Allergen Reactions    Augmentin [Amoxicillin-Pot Clavulanate] GI Intolerance, Other (See Comments) and Hives     VOMITING  VOMITING  Reaction Date: [id number];     Sulfa Antibiotics Itching, Other (See Comments) and Hives     RASH, ITCHY  RASH, ITCHY    Morphine Other (See Comments)     "DOESN'T WORK"    Latex Rash     Unsure if this is an allergy      Immunizations:     Immunization History   Administered Date(s) Administered    Influenza Split High Dose Preservative Free IM 10/30/2013, 10/08/2014, 02/18/2016, 11/29/2017    Influenza, high dose seasonal 0 7 mL 09/20/2018, 10/10/2019    Influenza, seasonal, injectable 11/20/2007, 10/10/2008    Pneumococcal Conjugate 13-Valent 02/18/2016    Pneumococcal Polysaccharide PPV23 07/09/2007, 11/07/2013    SARS-CoV-2 / COVID-19 mRNA IM (Pfizer-BioNTAdspringr) 04/23/2021    Tdap 07/09/2007    Zoster 11/07/2013, 10/01/2014      Health Maintenance:         Topic Date Due    Hepatitis C Screening  Never done    Colorectal Cancer Screening  Never done    MAMMOGRAM  04/13/2016    Lung Cancer Screening  01/31/2020         Topic Date Due    DTaP,Tdap,and Td Vaccines (2 - Td) 07/09/2017    COVID-19 Vaccine (2 - Pfizer 2-dose series) 05/14/2021      Medicare Health Risk Assessment:     /90   Pulse 64   Temp 98 6 °F (37 °C)   Resp 20   Ht 4' 11" (1 499 m)   Wt 71 8 kg (158 lb 3 2 oz)   LMP  (LMP Unknown)   SpO2 95%   BMI 31 95 kg/m²      Corrina Mujica is here for her Subsequent Wellness visit  Health Risk Assessment:   Patient rates overall health as good  Patient feels that their physical health rating is same  Patient is dissatisfied with their life  Eyesight was rated as same  Hearing was rated as slightly worse  Patient feels that their emotional and mental health rating is slightly worse  Patients states they are never, rarely angry  Patient states they are often unusually tired/fatigued  Pain experienced in the last 7 days has been some  Patient's pain rating has been 7/10  Patient states that she has experienced no weight loss or gain in last 6 months  Depression Screening:   PHQ-2 Score: 6  PHQ-9 Score: 20      Fall Risk Screening: In the past year, patient has experienced: no history of falling in past year      Urinary Incontinence Screening:   Patient has leaked urine accidently in the last six months  Home Safety:  Patient does not have trouble with stairs inside or outside of their home  Patient has working smoke alarms and has working carbon monoxide detector  Home safety hazards include: none  Nutrition:   Current diet is Regular  Medications:   Patient is currently taking over-the-counter supplements  OTC medications include: see medication list  Patient is able to manage medications  Activities of Daily Living (ADLs)/Instrumental Activities of Daily Living (IADLs):   Walk and transfer into and out of bed and chair?: Yes  Dress and groom yourself?: Yes    Bathe or shower yourself?: Yes    Feed yourself?  Yes  Do your laundry/housekeeping?: Yes  Manage your money, pay your bills and track your expenses?: Yes  Make your own meals?: Yes    Do your own shopping?: Yes    Previous Hospitalizations:   Any hospitalizations or ED visits within the last 12 months?: No      Advance Care Planning:   Living will: Yes    Advanced directive: Yes    End of Life Decisions reviewed with patient: Yes      Comments: SonAstrid Davian    Cognitive Screening:   Provider or family/friend/caregiver concerned regarding cognition?: No    PREVENTIVE SCREENINGS      Cardiovascular Screening:    General: Screening Not Indicated and History Lipid Disorder      Diabetes Screening:     General: Risks and Benefits Discussed    Due for: Blood Glucose      Colorectal Cancer Screening:     General: Risks and Benefits Discussed and Patient Declines      Breast Cancer Screening:     General: Risks and Benefits Discussed    Due for: Mammogram        Cervical Cancer Screening:    General: Screening Not Indicated      Osteoporosis Screening:    General: Risks and Benefits Discussed and Patient Declines      Abdominal Aortic Aneurysm (AAA) Screening:        General: Screening Not Indicated      Lung Cancer Screening:     General: Screening Not Indicated      Hepatitis C Screening:    General: Risks and Benefits Discussed    Hep C Screening Accepted: Yes      Screening, Brief Intervention, and Referral to Treatment (SBIRT)    Screening  Typical number of drinks in a day: 0  Typical number of drinks in a week: 0  Interpretation: Low risk drinking behavior  Single Item Drug Screening:  How often have you used an illegal drug (including marijuana) or a prescription medication for non-medical reasons in the past year? never    Single Item Drug Screen Score: 0  Interpretation: Negative screen for possible drug use disorder    Brief Intervention  Alcohol & drug use screenings were reviewed  No concerns regarding substance use disorder identified  Other Counseling Topics:   Car/seat belt/driving safety, skin self-exam, sunscreen and regular weightbearing exercise and calcium and vitamin D intake         Daniella Lee MD

## 2021-05-10 NOTE — ASSESSMENT & PLAN NOTE
She feels she can't walk to the car without dyspnea  Will restart her inhalers  She was urged to quit smoking but refuses

## 2021-05-10 NOTE — PROGRESS NOTES
Assessment/Plan:    1  Medicare annual wellness visit, subsequent    2  Essential hypertension  Assessment & Plan:  She has been taking her bystolic and bp is well controlled  Continue bystolic at current dose  Orders:  -     CBC and differential; Future  -     Comprehensive metabolic panel; Future  -     Lipid panel; Future    3  Anxiety and depression  -     escitalopram (LEXAPRO) 20 mg tablet; Take 1 tablet (20 mg total) by mouth daily    4  Chronic obstructive pulmonary disease, unspecified COPD type ([facility] )  Assessment & Plan:  She feels she can't walk to the car without dyspnea  Will restart her inhalers  She was urged to quit smoking but refuses  Orders:  -     fluticasone-salmeterol (ADVAIR HFA) 115-21 MCG/ACT inhaler; Inhale 1 puff daily  -     fluticasone (FLONASE) 50 mcg/act nasal spray; 2 sprays into each nostril daily    5  Acute idiopathic gout of right foot  Assessment & Plan:  She will restart low dose allopurinol and will follow uric acid once back on her stable dose  Orders:  -     allopurinol (ZYLOPRIM) 100 mg tablet; Take 1 tablet (100 mg total) by mouth daily  -     Uric acid; Future    6  Allergic conjunctivitis of both eyes  -     olopatadine (Patanol) 0 1 % ophthalmic solution; Apply 1 drop to eye 2 (two) times a day    7  Tobacco abuse  Assessment & Plan:  She refuses to quit at this time  She is aware this could contribute to multiple forms of cancer  Orders:  -     CT lung screening program; Future; Expected date: 05/10/2021    8  Vitamin D deficiency  -     Vitamin D 25 hydroxy; Future    9  Vitamin B12 deficiency  -     Vitamin B2; Future    10  Depression, recurrent ([facility] )  Assessment & Plan: Will restart escitalopram   This worked well for her before she ran out  Asked patient to call when she needs refills so she would not run out of her medications          11  Screening breast examination  -     Mammo screening bilateral w 3d & cad; Future; Expected date: 05/10/2021    12  Acute pain of right shoulder  Comments: Will check xray and t/c ortho consult  Orders:  -     XR shoulder 2+ vw right; Future; Expected date: 05/10/2021  -     Diclofenac Sodium (VOLTAREN) 1 %; Apply 2 g topically 4 (four) times a day    13  Encounter for hepatitis C screening test for low risk patient  -     Hepatitis C antibody; Future    14  Breast cancer screening by mammogram   -     Mammo screening bilateral w 3d & cad; Future; Expected date: 05/10/2021    15  Arteriosclerotic cardiovascular disease  Assessment & Plan:  I gave her a new referral to cardiology  Orders:  -     Ambulatory referral to Cardiology; Future    16  BMI 31 0-31 9,adult        BMI Counseling: Body mass index is 31 95 kg/m²  The BMI is above normal  Nutrition recommendations include reducing portion sizes and decreasing overall calorie intake  Exercise recommendations include moderate aerobic physical activity for 150 minutes/week  Patient Instructions       Medicare Preventive Visit Patient Instructions  Thank you for completing your Welcome to Medicare Visit or Medicare Annual Wellness Visit today  Your next wellness visit will be due in one year (5/11/2022)  The screening/preventive services that you may require over the next 5-10 years are detailed below  Some tests may not apply to you based off risk factors and/or age  Screening tests ordered at today's visit but not completed yet may show as past due  Also, please note that scanned in results may not display below    Preventive Screenings:  Service Recommendations Previous Testing/Comments   Colorectal Cancer Screening  * Colonoscopy    * Fecal Occult Blood Test (FOBT)/Fecal Immunochemical Test (FIT)  * Fecal DNA/Cologuard Test  * Flexible Sigmoidoscopy Age: 54-65 years old   Colonoscopy: every 10 years (may be performed more frequently if at higher risk)  OR  FOBT/FIT: every 1 year  OR  Cologuard: every 3 years  OR  Sigmoidoscopy: every 5 years  Screening may be recommended earlier than age 48 if at higher risk for colorectal cancer  Also, an individualized decision between you and your healthcare provider will decide whether screening between the ages of 74-80 would be appropriate  Colonoscopy: Not on file  FOBT/FIT: Not on file  Cologuard: Not on file  Sigmoidoscopy: Not on file          Breast Cancer Screening Age: 36 years old  Frequency: every 1-2 years  Not required if history of left and right mastectomy Mammogram: 04/13/2015        Cervical Cancer Screening Between the ages of 21-29, pap smear recommended once every 3 years  Between the ages of 33-67, can perform pap smear with HPV co-testing every 5 years  Recommendations may differ for women with a history of total hysterectomy, cervical cancer, or abnormal pap smears in past  Pap Smear: Not on file    Screening Not Indicated   Hepatitis C Screening Once for adults born between 1945 and 1965  More frequently in patients at high risk for Hepatitis C Hep C Antibody: Not on file        Diabetes Screening 1-2 times per year if you're at risk for diabetes or have pre-diabetes Fasting glucose: 100 mg/dL   A1C: No results in last 5 years        Cholesterol Screening Once every 5 years if you don't have a lipid disorder  May order more often based on risk factors  Lipid panel: 01/26/2019    Screening Not Indicated  History Lipid Disorder     Other Preventive Screenings Covered by Medicare:  1  Abdominal Aortic Aneurysm (AAA) Screening: covered once if your at risk  You're considered to be at risk if you have a family history of AAA    2  Lung Cancer Screening: covers low dose CT scan once per year if you meet all of the following conditions: (1) Age 50-69; (2) No signs or symptoms of lung cancer; (3) Current smoker or have quit smoking within the last 15 years; (4) You have a tobacco smoking history of at least 30 pack years (packs per day multiplied by number of years you smoked); (5) You get a written order from a healthcare provider  3  Glaucoma Screening: covered annually if you're considered high risk: (1) You have diabetes OR (2) Family history of glaucoma OR (3)  aged 48 and older OR (3)  American aged 72 and older  3  Osteoporosis Screening: covered every 2 years if you meet one of the following conditions: (1) You're estrogen deficient and at risk for osteoporosis based off medical history and other findings; (2) Have a vertebral abnormality; (3) On glucocorticoid therapy for more than 3 months; (4) Have primary hyperparathyroidism; (5) On osteoporosis medications and need to assess response to drug therapy  · Last bone density test (DXA Scan): Not on file  5  HIV Screening: covered annually if you're between the age of 12-76  Also covered annually if you are younger than 13 and older than 72 with risk factors for HIV infection  For pregnant patients, it is covered up to 3 times per pregnancy  Immunizations:  Immunization Recommendations   Influenza Vaccine Annual influenza vaccination during flu season is recommended for all persons aged >= 6 months who do not have contraindications   Pneumococcal Vaccine (Prevnar and Pneumovax)  * Prevnar = PCV13  * Pneumovax = PPSV23   Adults 25-60 years old: 1-3 doses may be recommended based on certain risk factors  Adults 72 years old: Prevnar (PCV13) vaccine recommended followed by Pneumovax (PPSV23) vaccine  If already received PPSV23 since turning 65, then PCV13 recommended at least one year after PPSV23 dose  Hepatitis B Vaccine 3 dose series if at intermediate or high risk (ex: diabetes, end stage renal disease, liver disease)   Tetanus (Td) Vaccine - COST NOT COVERED BY MEDICARE PART B Following completion of primary series, a booster dose should be given every 10 years to maintain immunity against tetanus  Td may also be given as tetanus wound prophylaxis     Tdap Vaccine - COST NOT COVERED BY MEDICARE PART B Recommended at least once for all adults  For pregnant patients, recommended with each pregnancy  Shingles Vaccine (Shingrix) - COST NOT COVERED BY MEDICARE PART B  2 shot series recommended in those aged 48 and above     Health Maintenance Due:      Topic Date Due    Hepatitis C Screening  Never done    Colorectal Cancer Screening  Never done    MAMMOGRAM  04/13/2016    Lung Cancer Screening  01/31/2020     Immunizations Due:      Topic Date Due    DTaP,Tdap,and Td Vaccines (2 - Td) 07/09/2017    COVID-19 Vaccine (2 - Pfizer 2-dose series) 05/14/2021     Advance Directives   What are advance directives? Advance directives are legal documents that state your wishes and plans for medical care  These plans are made ahead of time in case you lose your ability to make decisions for yourself  Advance directives can apply to any medical decision, such as the treatments you want, and if you want to donate organs  What are the types of advance directives? There are many types of advance directives, and each state has rules about how to use them  You may choose a combination of any of the following:  · Living will: This is a written record of the treatment you want  You can also choose which treatments you do not want, which to limit, and which to stop at a certain time  This includes surgery, medicine, IV fluid, and tube feedings  · Durable power of  for healthcare [facility]): This is a written record that states who you want to make healthcare choices for you when you are unable to make them for yourself  This person, called a proxy, is usually a family member or a friend  You may choose more than 1 proxy  · Do not resuscitate (DNR) order:  A DNR order is used in case your heart stops beating or you stop breathing  It is a request not to have certain forms of treatment, such as CPR  A DNR order may be included in other types of advance directives  · Medical directive:   This covers the care that you want if you are in a coma, near death, or unable to make decisions for yourself  You can list the treatments you want for each condition  Treatment may include pain medicine, surgery, blood transfusions, dialysis, IV or tube feedings, and a ventilator (breathing machine)  · Values history: This document has questions about your views, beliefs, and how you feel and think about life  This information can help others choose the care that you would choose  Why are advance directives important? An advance directive helps you control your care  Although spoken wishes may be used, it is better to have your wishes written down  Spoken wishes can be misunderstood, or not followed  Treatments may be given even if you do not want them  An advance directive may make it easier for your family to make difficult choices about your care  Urinary Incontinence   Urinary incontinence (UI)  is when you lose control of your bladder  UI develops because your bladder cannot store or empty urine properly  The 3 most common types of UI are stress incontinence, urge incontinence, or both  Medicines:   · May be given to help strengthen your bladder control  Report any side effects of medication to your healthcare provider  Do pelvic muscle exercises often:  Your pelvic muscles help you stop urinating  Squeeze these muscles tight for 5 seconds, then relax for 5 seconds  Gradually work up to squeezing for 10 seconds  Do 3 sets of 15 repetitions a day, or as directed  This will help strengthen your pelvic muscles and improve bladder control  Train your bladder:  Go to the bathroom at set times, such as every 2 hours, even if you do not feel the urge to go  You can also try to hold your urine when you feel the urge to go  For example, hold your urine for 5 minutes when you feel the urge to go  As that becomes easier, hold your urine for 10 minutes  Self-care:   · Keep a UI record    Write down how often you leak urine and how much you leak  Make a note of what you were doing when you leaked urine  · Drink liquids as directed  You may need to limit the amount of liquid you drink to help control your urine leakage  Do not drink any liquid right before you go to bed  Limit or do not have drinks that contain caffeine or alcohol  · Prevent constipation  Eat a variety of high-fiber foods  Good examples are high-fiber cereals, beans, vegetables, and whole-grain breads  Walking is the best way to trigger your intestines to have a bowel movement  · Exercise regularly and maintain a healthy weight  Weight loss and exercise will decrease pressure on your bladder and help you control your leakage  · Use a catheter as directed  to help empty your bladder  A catheter is a tiny, plastic tube that is put into your bladder to drain your urine  · Go to behavior therapy as directed  Behavior therapy may be used to help you learn to control your urge to urinate  Cigarette Smoking and Your Health   Risks to your health if you smoke:  Nicotine and other chemicals found in tobacco damage every cell in your body  Even if you are a light smoker, you have an increased risk for cancer, heart disease, and lung disease  If you are pregnant or have diabetes, smoking increases your risk for complications  Benefits to your health if you stop smoking:   · You decrease respiratory symptoms such as coughing, wheezing, and shortness of breath  · You reduce your risk for cancers of the lung, mouth, throat, kidney, bladder, pancreas, stomach, and cervix  If you already have cancer, you increase the benefits of chemotherapy  You also reduce your risk for cancer returning or a second cancer from developing  · You reduce your risk for heart disease, blood clots, heart attack, and stroke  · You reduce your risk for lung infections, and diseases such as pneumonia, asthma, chronic bronchitis, and emphysema  · Your circulation improves   More oxygen can be delivered to your body  If you have diabetes, you lower your risk for complications, such as kidney, artery, and eye diseases  You also lower your risk for nerve damage  Nerve damage can lead to amputations, poor vision, and blindness  · You improve your body's ability to heal and to fight infections  For more information and support to stop smoking:   · [employer]  Phone: 9- 063 - 823-4080  Web Address: [employer]  Weight Management   Why it is important to manage your weight:  Being overweight increases your risk of health conditions such as heart disease, high blood pressure, type 2 diabetes, and certain types of cancer  It can also increase your risk for osteoarthritis, sleep apnea, and other respiratory problems  Aim for a slow, steady weight loss  Even a small amount of weight loss can lower your risk of health problems  How to lose weight safely:  A safe and healthy way to lose weight is to eat fewer calories and get regular exercise  You can lose up about 1 pound a week by decreasing the number of calories you eat by 500 calories each day  Healthy meal plan for weight management:  A healthy meal plan includes a variety of foods, contains fewer calories, and helps you stay healthy  A healthy meal plan includes the following:  · Eat whole-grain foods more often  A healthy meal plan should contain fiber  Fiber is the part of grains, fruits, and vegetables that is not broken down by your body  Whole-grain foods are healthy and provide extra fiber in your diet  Some examples of whole-grain foods are whole-wheat breads and pastas, oatmeal, brown rice, and bulgur  · Eat a variety of vegetables every day  Include dark, leafy greens such as spinach, kale, david greens, and mustard greens  Eat yellow and orange vegetables such as carrots, sweet potatoes, and winter squash  · Eat a variety of fruits every day  Choose fresh or canned fruit (canned in its own juice or light syrup) instead of juice   Fruit juice has very little or no fiber  · Eat low-fat dairy foods  Drink fat-free (skim) milk or 1% milk  Eat fat-free yogurt and low-fat cottage cheese  Try low-fat cheeses such as mozzarella and other reduced-fat cheeses  · Choose meat and other protein foods that are low in fat  Choose beans or other legumes such as split peas or lentils  Choose fish, skinless poultry (chicken or turkey), or lean cuts of red meat (beef or pork)  Before you cook meat or poultry, cut off any visible fat  · Use less fat and oil  Try baking foods instead of frying them  Add less fat, such as margarine, sour cream, regular salad dressing and mayonnaise to foods  Eat fewer high-fat foods  Some examples of high-fat foods include french fries, doughnuts, ice cream, and cakes  · Eat fewer sweets  Limit foods and drinks that are high in sugar  This includes candy, cookies, regular soda, and sweetened drinks  Exercise:  Exercise at least 30 minutes per day on most days of the week  Some examples of exercise include walking, biking, dancing, and swimming  You can also fit in more physical activity by taking the stairs instead of the elevator or parking farther away from stores  Ask your healthcare provider about the best exercise plan for you  © Copyright [employer] 2018 Information is for End User's use only and may not be sold, redistributed or otherwise used for commercial purposes  All illustrations and images included in CareNotes® are the copyrighted property of [employer]  or [employer]         Return in about 6 months (around 11/10/2021)  Subjective:      Patient ID: Loretta Stockton is a 65 y o  female  Chief Complaint   Patient presents with   [facility] Wellness Visit     [facility]       Here for follow up and AWV  She has not been taking almost any of her medications  She ran out of these weeks ago  Has not had any gouty flares since stopping the allopurinol    She feels as though she does need the escitalopram   She does not feel good without it  Denies SI/HI  Continues to have  Issues with her sleeping  Needs the name of a new cardiologist   Was given this in the past but lost this  Denies chest pain, dyspnea, cough, fever  Continues to smoke and does not want to stop, does not want any help with this  She is overdue for CT screening lung study  Has been having right shoulder pain, feels like "someone punched her in the shoulder "  Hard to sleep at night  Tylenol is not helping  No prior injury  Abduction makes this worse  There is no numbness or tingling  Got two new dogs so is having to scrub her kitchen floor a lot more  The following portions of the patient's history were reviewed and updated as appropriate: allergies, current medications, past family history, past medical history, past social history, past surgical history and problem list     Review of Systems   Constitutional: Negative  Respiratory: Negative  Cardiovascular: Negative  Gastrointestinal: Negative  Endocrine: Negative  Musculoskeletal: Negative  Psychiatric/Behavioral: Positive for sleep disturbance  Negative for self-injury and suicidal ideas  The patient is nervous/anxious            Current Outpatient Medications   Medication Sig Dispense Refill    albuterol (PROVENTIL HFA,VENTOLIN HFA) 90 mcg/act inhaler Inhale 2 puffs every 6 (six) hours as needed for wheezing 3 Inhaler 0    ALPRAZolam (XANAX) 0 25 mg tablet Take 1 tablet (0 25 mg total) by mouth as needed for anxiety 30 tablet 0    aspirin (ECOTRIN LOW STRENGTH) 81 mg EC tablet Take 81 mg by mouth daily      esomeprazole (NexIUM) 20 mg capsule TAKE ONE CAPSULE BY MOUTH EVERY DAY IN THE EARLY MORNING 84 capsule 1    nebivolol (BYSTOLIC) 2 5 mg tablet Take 1 tablet (2 5 mg total) by mouth every morning 90 tablet 3    acetaminophen (TYLENOL) 325 mg tablet Take 2 tablets (650 mg total) by mouth every 6 (six) hours as needed for mild pain, headaches or fever  (Patient not taking: Reported on 5/10/2021) 30 tablet 0    allopurinol (ZYLOPRIM) 100 mg tablet Take 1 tablet (100 mg total) by mouth daily 90 tablet 3    Apple Cider Vinegar 188 MG CAPS Take by mouth daily      diclofenac sodium (VOLTAREN) 1 % Place on the skin      Diclofenac Sodium (VOLTAREN) 1 % Apply 2 g topically 4 (four) times a day 3 g 3    ergocalciferol (VITAMIN D2) 50,000 units Take 50,000 Units by mouth once a week  On wednesday      escitalopram (LEXAPRO) 20 mg tablet Take 1 tablet (20 mg total) by mouth daily 90 tablet 1    fluticasone (FLONASE) 50 mcg/act nasal spray 2 sprays into each nostril daily 48 g 3    fluticasone-salmeterol (ADVAIR HFA) 115-21 MCG/ACT inhaler Inhale 1 puff daily 48 g 3    Multiple Vitamin (MULTIVITAMIN) tablet Take 1 tablet by mouth daily   olopatadine (Patanol) 0 1 % ophthalmic solution Apply 1 drop to eye 2 (two) times a day 15 mL 3    solifenacin (VESICARE) 5 mg tablet Take 1 tablet (5 mg total) by mouth daily (Patient not taking: Reported on 5/10/2021) 90 tablet 3    zolpidem (AMBIEN CR) 12 5 MG CR tablet Take 1 tablet (12 5 mg total) by mouth daily at bedtime as needed for sleep (Patient not taking: Reported on 5/10/2021) 30 tablet 0     No current facility-administered medications for this visit          Objective:    /90   Pulse 64   Temp 98 6 °F (37 °C)   Resp 20   Ht 4' 11" (1 499 m)   Wt 71 8 kg (158 lb 3 2 oz)   LMP  (LMP Unknown)   SpO2 95%   BMI 31 95 kg/m²        Physical Exam           Lavina Gosselin, MD

## 2021-05-10 NOTE — ASSESSMENT & PLAN NOTE
Will restart escitalopram   This worked well for her before she ran out  Asked patient to call when she needs refills so she would not run out of her medications

## 2021-05-13 DIAGNOSIS — F32.A ANXIETY AND DEPRESSION: ICD-10-CM

## 2021-05-13 DIAGNOSIS — F41.9 ANXIETY AND DEPRESSION: ICD-10-CM

## 2021-05-13 RX ORDER — ZOLPIDEM TARTRATE 12.5 MG/1
TABLET, FILM COATED, EXTENDED RELEASE ORAL
Qty: 30 TABLET | Refills: 0 | Status: SHIPPED | OUTPATIENT
Start: 2021-05-13 | End: 2021-08-06 | Stop reason: SDUPTHER

## 2021-05-13 RX ORDER — ALPRAZOLAM 0.25 MG/1
TABLET ORAL
Qty: 30 TABLET | Refills: 0 | Status: SHIPPED | OUTPATIENT
Start: 2021-05-13 | End: 2021-08-06 | Stop reason: SDUPTHER

## 2021-05-14 ENCOUNTER — IMMUNIZATIONS (OUTPATIENT)
Dept: FAMILY MEDICINE CLINIC | Facility: HOSPITAL | Age: 71
End: 2021-05-14

## 2021-05-14 DIAGNOSIS — Z23 ENCOUNTER FOR IMMUNIZATION: Primary | ICD-10-CM

## 2021-05-14 PROCEDURE — 91300 SARS-COV-2 / COVID-19 MRNA VACCINE (PFIZER-BIONTECH) 30 MCG: CPT

## 2021-05-14 PROCEDURE — 0002A SARS-COV-2 / COVID-19 MRNA VACCINE (PFIZER-BIONTECH) 30 MCG: CPT

## 2021-05-15 ENCOUNTER — TELEPHONE (OUTPATIENT)
Dept: FAMILY MEDICINE CLINIC | Facility: CLINIC | Age: 71
End: 2021-05-15

## 2021-05-15 DIAGNOSIS — M67.819 TENDINOSIS OF ROTATOR CUFF: Primary | ICD-10-CM

## 2021-05-15 NOTE — TELEPHONE ENCOUNTER
Looking for xray results  Done on 5/10 at [facility]      Please call patient back with results    Thank you

## 2021-05-17 NOTE — TELEPHONE ENCOUNTER
This shows arthritis at the shoulder joint as well as rotator cuff tendin issues  She should see ortho, please give order for Dr Leander Odell if she is agreeable

## 2021-05-18 ENCOUNTER — APPOINTMENT (OUTPATIENT)
Dept: RADIOLOGY | Facility: CLINIC | Age: 71
End: 2021-05-18
Payer: MEDICARE

## 2021-05-18 ENCOUNTER — OFFICE VISIT (OUTPATIENT)
Dept: OBGYN CLINIC | Facility: CLINIC | Age: 71
End: 2021-05-18
Payer: MEDICARE

## 2021-05-18 VITALS
WEIGHT: 156 LBS | DIASTOLIC BLOOD PRESSURE: 74 MMHG | HEART RATE: 68 BPM | SYSTOLIC BLOOD PRESSURE: 131 MMHG | BODY MASS INDEX: 31.45 KG/M2 | HEIGHT: 59 IN

## 2021-05-18 DIAGNOSIS — M75.81 RIGHT ROTATOR CUFF TENDINITIS: Primary | ICD-10-CM

## 2021-05-18 DIAGNOSIS — M62.838 TRAPEZIUS MUSCLE SPASM: ICD-10-CM

## 2021-05-18 DIAGNOSIS — M47.812 SPONDYLOSIS OF CERVICAL REGION WITHOUT MYELOPATHY OR RADICULOPATHY: ICD-10-CM

## 2021-05-18 DIAGNOSIS — M54.2 NECK PAIN: ICD-10-CM

## 2021-05-18 PROCEDURE — 20610 DRAIN/INJ JOINT/BURSA W/O US: CPT | Performed by: ORTHOPAEDIC SURGERY

## 2021-05-18 PROCEDURE — 99203 OFFICE O/P NEW LOW 30 MIN: CPT | Performed by: ORTHOPAEDIC SURGERY

## 2021-05-18 PROCEDURE — 72040 X-RAY EXAM NECK SPINE 2-3 VW: CPT

## 2021-05-18 RX ORDER — LIDOCAINE HYDROCHLORIDE 10 MG/ML
4 INJECTION, SOLUTION INFILTRATION; PERINEURAL
Status: COMPLETED | OUTPATIENT
Start: 2021-05-18 | End: 2021-05-18

## 2021-05-18 RX ORDER — DEXAMETHASONE SODIUM PHOSPHATE 100 MG/10ML
40 INJECTION INTRAMUSCULAR; INTRAVENOUS
Status: COMPLETED | OUTPATIENT
Start: 2021-05-18 | End: 2021-05-18

## 2021-05-18 RX ORDER — CYCLOBENZAPRINE HCL 10 MG
10 TABLET ORAL 3 TIMES DAILY PRN
Qty: 20 TABLET | Refills: 0 | Status: SHIPPED | OUTPATIENT
Start: 2021-05-18 | End: 2021-11-10

## 2021-05-18 RX ADMIN — LIDOCAINE HYDROCHLORIDE 4 ML: 10 INJECTION, SOLUTION INFILTRATION; PERINEURAL at 15:20

## 2021-05-18 RX ADMIN — DEXAMETHASONE SODIUM PHOSPHATE 40 MG: 100 INJECTION INTRAMUSCULAR; INTRAVENOUS at 15:20

## 2021-05-18 NOTE — LETTER
May 18, 2021     Daniella Lee MD  [address]  [address] [facility] [address]    Patient: Madalyn Muñoz   YOB: 1950   Date of Visit: 5/18/2021       Dear Dr Daralyn Jeans:    Thank you for referring Néstor Cox to me for evaluation  Below are my notes for this consultation  If you have questions, please do not hesitate to call me  I look forward to following your patient along with you  Sincerely,        Yoselin Arias DO        CC: No Recipients  Yoselin Arias DO  5/18/2021  3:25 PM  Sign when Signing Visit  Assessment/Plan:  1  Right rotator cuff tendinitis  Ambulatory referral to Orthopedic Surgery    Ambulatory referral to Physical Therapy    Large joint arthrocentesis: R subacromial bursa   2  Spondylosis of cervical region without myelopathy or radiculopathy  XR spine cervical 2 or 3 vw injury    Ambulatory referral to Physical Therapy    cyclobenzaprine (FLEXERIL) 10 mg tablet   3  Trapezius muscle spasm  Ambulatory referral to Physical Therapy    cyclobenzaprine (FLEXERIL) 10 mg tablet     Opal Kimberly has right-sided shoulder pain consistent with subacromial bursitis and rotator cuff tendinitis  She did tolerate a right subacromial cortisone injection the office today which gave her some relief  I would like for her to undergo formal physical therapy on her shoulder at this time which can continue to help with her ongoing pain  She also has significant pain along the right trapezius muscle which can often happen after modification with activity following shoulder issues  She has some osteoarthritis in the cervical spine which can be related some of her pain but she has no signs of radiculopathy on exam   Would like for her to begin PT on her neck and shoulder at this time and follow up in the office in 6 weeks for repeat evaluation  Large joint arthrocentesis: R subacromial bursa  Universal Protocol:  Consent: Verbal consent obtained    Risks and benefits: risks, benefits and alternatives were discussed  Consent given by: patient  Site marked: the operative site was marked  Supporting Documentation  Indications: pain   Procedure Details  Location: shoulder - R subacromial bursa  Preparation: Patient was prepped and draped in the usual sterile fashion  Needle size: 22 G  Ultrasound guidance: no  Approach: posterior  Medications administered: 40 mg dexamethasone 100 mg/10 mL; 4 mL lidocaine 1 %    Patient tolerance: patient tolerated the procedure well with no immediate complications  Dressing:  Sterile dressing applied          Subjective:   Alida Orozco is a 65 y o  female who presents to the office for evaluation for right-sided shoulder and neck pain  She was referred by her primary care physician Dr Valerio Schaefer  She recently had a shoulder x-ray showing some mild arthritic changes  She denies any recent injury or trauma  She denies any falls  She has been experiencing gradually increasing discomfort over the anterior aspect of her right shoulder that radiates into her right trapezius and neck region  This worsens with any movement or motion  She has been having a lot of trouble sleeping  She has been over 200 dollars on pillows in the last several months try to help with this pain which has not been helping  She denies any numbness or tingling or radiating pain down her arm  She has limited on medication she can take after gastric bypass  She has only been able take Tylenol  Review of Systems   Constitutional: Negative for chills, fever and unexpected weight change  HENT: Negative for hearing loss, nosebleeds and sore throat  Eyes: Negative for pain, redness and visual disturbance  Respiratory: Negative for cough, shortness of breath and wheezing  Cardiovascular: Negative for chest pain, palpitations and leg swelling  Gastrointestinal: Negative for abdominal pain, nausea and vomiting  Endocrine: Negative for polydipsia and polyuria     Genitourinary: Negative for dysuria and hematuria  Musculoskeletal:        See HPI   Skin: Negative for rash and wound  Neurological: Negative for dizziness, numbness and headaches  Psychiatric/Behavioral: Negative for decreased concentration and suicidal ideas  The patient is not nervous/anxious  Past Medical History:   Diagnosis Date    Acid reflux     Arthritis     Asthma     Cardiac disease     Chronic kidney disease     passed on own kidney stone    Diverticulitis     GERD (gastroesophageal reflux disease)     Hayfever     [place] (hard of hearing)     bilateral hearing aids    Hyperlipemia     Hypertension     Tachycardia        Past Surgical History:   Procedure Laterality Date    ABLATION OF DYSRHYTHMIC FOCUS      APPENDECTOMY      CHOLECYSTECTOMY      open    FRACTURE SURGERY      ORIF fx (L) tibia, fibula 2009    GASTRIC BYPASS OPEN      HYSTERECTOMY      [place] XCAPSL CTRC RMVL INSJ IO LENS PROSTH W/O ECP Left 4/18/2016    Procedure: EXTRACTION EXTRACAPSULAR CATARACT PHACO INTRAOCULAR LENS (IOL); Surgeon: Colin Kellogg MD;  Location: [facility] MAIN OR;  Service: Ophthalmology    [place] XCAPSL CTRC RMVL INSJ IO LENS PROSTH W/O ECP Right 3/1/2021    Procedure: EXTRACTION EXTRACAPSULAR CATARACT PHACO INTRAOCULAR LENS (IOL);   Surgeon: Colin Kellogg MD;  Location: [facility] MAIN OR;  Service: Ophthalmology    TONSILECTOMY AND ADNOIDECTOMY         Family History   Problem Relation Age of Onset    Heart disease Mother     Stroke Son     Stroke Maternal Grandfather        Social History     Occupational History    Not on file   Tobacco Use    Smoking status: Current Every Day Smoker     Packs/day: 1 00     Years: 25 00     Pack years: 25 00    Smokeless tobacco: Never Used   Substance and Sexual Activity    Alcohol Use     Frequency: Monthly or less     Comment: rarely    Drug use: No    Sexual activity: Never         Current Outpatient Medications:     albuterol (PROVENTIL HFA,VENTOLIN HFA) 90 mcg/act inhaler, Inhale 2 puffs every 6 (six) hours as needed for wheezing, Disp: 3 Inhaler, Rfl: 0    allopurinol (ZYLOPRIM) 100 mg tablet, Take 1 tablet (100 mg total) by mouth daily, Disp: 90 tablet, Rfl: 3    ALPRAZolam (XANAX) 0 25 mg tablet, take 1 tablet by mouth if needed for anxiety, Disp: 30 tablet, Rfl: 0    Apple Cider Vinegar 188 MG CAPS, Take by mouth daily, Disp: , Rfl:     aspirin (ECOTRIN LOW STRENGTH) 81 mg EC tablet, Take 81 mg by mouth daily, Disp: , Rfl:     diclofenac sodium (VOLTAREN) 1 %, Place on the skin, Disp: , Rfl:     Diclofenac Sodium (VOLTAREN) 1 %, Apply 2 g topically 4 (four) times a day, Disp: 3 g, Rfl: 3    ergocalciferol (VITAMIN D2) 50,000 units, Take 50,000 Units by mouth once a week  On wednesday, Disp: , Rfl:     escitalopram (LEXAPRO) 20 mg tablet, Take 1 tablet (20 mg total) by mouth daily, Disp: 90 tablet, Rfl: 1    esomeprazole (NexIUM) 20 mg capsule, TAKE ONE CAPSULE BY MOUTH EVERY DAY IN THE EARLY MORNING, Disp: 84 capsule, Rfl: 1    fluticasone (FLONASE) 50 mcg/act nasal spray, 2 sprays into each nostril daily, Disp: 48 g, Rfl: 3    fluticasone-salmeterol (ADVAIR HFA) 115-21 MCG/ACT inhaler, Inhale 1 puff daily, Disp: 48 g, Rfl: 3    Multiple Vitamin (MULTIVITAMIN) tablet, Take 1 tablet by mouth daily  , Disp: , Rfl:     nebivolol (BYSTOLIC) 2 5 mg tablet, Take 1 tablet (2 5 mg total) by mouth every morning, Disp: 90 tablet, Rfl: 3    olopatadine (Patanol) 0 1 % ophthalmic solution, Apply 1 drop to eye 2 (two) times a day, Disp: 15 mL, Rfl: 3    zolpidem (AMBIEN CR) 12 5 MG CR tablet, take 1 tablet by mouth at bedtime if needed for sleep, Disp: 30 tablet, Rfl: 0    acetaminophen (TYLENOL) 325 mg tablet, Take 2 tablets (650 mg total) by mouth every 6 (six) hours as needed for mild pain, headaches or fever   (Patient not taking: Reported on 5/10/2021), Disp: 30 tablet, Rfl: 0    solifenacin (VESICARE) 5 mg tablet, Take 1 tablet (5 mg total) by mouth daily (Patient not taking: Reported on 5/10/2021), Disp: 90 tablet, Rfl: 3    Allergies   Allergen Reactions    Augmentin [Amoxicillin-Pot Clavulanate] GI Intolerance, Other (See Comments) and Hives     VOMITING  VOMITING  Reaction Date: 07Dec2004;     Sulfa Antibiotics Itching, Other (See Comments) and Hives     RASH, ITCHY  RASH, ITCHY    Morphine Other (See Comments)     "DOESN'T WORK"    Latex Rash     Unsure if this is an allergy       Objective:  Vitals:    05/18/21 1419   BP: 131/74   Pulse: 68       Right Shoulder Exam     Tenderness   The patient is experiencing tenderness in the acromioclavicular joint (Subacromial bursa, right trapezius)  Range of Motion   Active abduction:  160 abnormal   Passive abduction: normal   Extension: normal   External rotation: normal   Forward flexion:  160 abnormal   Internal rotation 0 degrees:  Sacrum abnormal     Muscle Strength   Abduction: 5/5   Internal rotation: 5/5   External rotation: 5/5   Supraspinatus: 5/5   Subscapularis: 5/5   Biceps: 5/5     Tests   Matthews test: positive  Impingement: positive  Drop arm: negative    Other   Erythema: absent  Sensation: normal  Pulse: present            Physical Exam  Vitals signs reviewed  Constitutional:       Appearance: She is well-developed  HENT:      Head: Normocephalic and atraumatic  Eyes:      Conjunctiva/sclera: Conjunctivae normal       Pupils: Pupils are equal, round, and reactive to light  Neck:      Musculoskeletal: Normal range of motion and neck supple  Cardiovascular:      Rate and Rhythm: Normal rate  Pulmonary:      Effort: Pulmonary effort is normal  No respiratory distress  Skin:     General: Skin is warm and dry  Neurological:      Mental Status: She is alert and oriented to person, place, and time  Psychiatric:         Behavior: Behavior normal            I have personally reviewed pertinent films in PACS and my interpretation is as follows:   Three-view x-rays of the right shoulder demonstrates mild AC joint osteoarthritis  No evidence of acute fracture  Three-view x-rays of cervical spine demonstrate mild-to-moderate degenerative changes  No evidence of acute fracture

## 2021-05-18 NOTE — PROGRESS NOTES
Assessment/Plan:  1  Right rotator cuff tendinitis  Ambulatory referral to Orthopedic Surgery    Ambulatory referral to Physical Therapy    Large joint arthrocentesis: R subacromial bursa   2  Spondylosis of cervical region without myelopathy or radiculopathy  XR spine cervical 2 or 3 vw injury    Ambulatory referral to Physical Therapy    cyclobenzaprine (FLEXERIL) 10 mg tablet   3  Trapezius muscle spasm  Ambulatory referral to Physical Therapy    cyclobenzaprine (FLEXERIL) 10 mg tablet     Amanda Hamilton has right-sided shoulder pain consistent with subacromial bursitis and rotator cuff tendinitis  She did tolerate a right subacromial cortisone injection the office today which gave her some relief  I would like for her to undergo formal physical therapy on her shoulder at this time which can continue to help with her ongoing pain  She also has significant pain along the right trapezius muscle which can often happen after modification with activity following shoulder issues  She has some osteoarthritis in the cervical spine which can be related some of her pain but she has no signs of radiculopathy on exam   Would like for her to begin PT on her neck and shoulder at this time and follow up in the office in 6 weeks for repeat evaluation  Large joint arthrocentesis: R subacromial bursa  Universal Protocol:  Consent: Verbal consent obtained    Risks and benefits: risks, benefits and alternatives were discussed  Consent given by: patient  Site marked: the operative site was marked  Supporting Documentation  Indications: pain   Procedure Details  Location: shoulder - R subacromial bursa  Preparation: Patient was prepped and draped in the usual sterile fashion  Needle size: 22 G  Ultrasound guidance: no  Approach: posterior  Medications administered: 40 mg dexamethasone 100 mg/10 mL; 4 mL lidocaine 1 %    Patient tolerance: patient tolerated the procedure well with no immediate complications  Dressing:  Sterile dressing applied          Subjective:   Albin Padilla is a 65 y o  female who presents to the office for evaluation for right-sided shoulder and neck pain  She was referred by her primary care physician Dr Hermelindo Dumont  She recently had a shoulder x-ray showing some mild arthritic changes  She denies any recent injury or trauma  She denies any falls  She has been experiencing gradually increasing discomfort over the anterior aspect of her right shoulder that radiates into her right trapezius and neck region  This worsens with any movement or motion  She has been having a lot of trouble sleeping  She has been over 200 dollars on pillows in the last several months try to help with this pain which has not been helping  She denies any numbness or tingling or radiating pain down her arm  She has limited on medication she can take after gastric bypass  She has only been able take Tylenol  Review of Systems   Constitutional: Negative for chills, fever and unexpected weight change  HENT: Negative for hearing loss, nosebleeds and sore throat  Eyes: Negative for pain, redness and visual disturbance  Respiratory: Negative for cough, shortness of breath and wheezing  Cardiovascular: Negative for chest pain, palpitations and leg swelling  Gastrointestinal: Negative for abdominal pain, nausea and vomiting  Endocrine: Negative for polydipsia and polyuria  Genitourinary: Negative for dysuria and hematuria  Musculoskeletal:        See HPI   Skin: Negative for rash and wound  Neurological: Negative for dizziness, numbness and headaches  Psychiatric/Behavioral: Negative for decreased concentration and suicidal ideas  The patient is not nervous/anxious            Past Medical History:   Diagnosis Date    Acid reflux     Arthritis     Asthma     Cardiac disease     Chronic kidney disease     passed on own kidney stone    Diverticulitis     GERD (gastroesophageal reflux disease)     Hayfever     [place] (hard of hearing)     bilateral hearing aids    Hyperlipemia     Hypertension     Tachycardia        Past Surgical History:   Procedure Laterality Date    ABLATION OF DYSRHYTHMIC FOCUS      APPENDECTOMY      CHOLECYSTECTOMY      open    FRACTURE SURGERY      ORIF fx (L) tibia, fibula 2009    GASTRIC BYPASS OPEN      HYSTERECTOMY      [place] XCAPSL CTRC RMVL INSJ IO LENS PROSTH W/O ECP Left 4/18/2016    Procedure: EXTRACTION EXTRACAPSULAR CATARACT PHACO INTRAOCULAR LENS (IOL); Surgeon: Colin Kellogg MD;  Location: [facility] MAIN OR;  Service: Ophthalmology    [place] XCAPSL CTRC RMVL INSJ IO LENS PROSTH W/O ECP Right 3/1/2021    Procedure: EXTRACTION EXTRACAPSULAR CATARACT PHACO INTRAOCULAR LENS (IOL);   Surgeon: Colin Kellogg MD;  Location: [facility] MAIN OR;  Service: Ophthalmology    TONSILECTOMY AND ADNOIDECTOMY         Family History   Problem Relation Age of Onset    Heart disease Mother     Stroke Son     Stroke Maternal Grandfather        Social History     Occupational History    Not on file   Tobacco Use    Smoking status: Current Every Day Smoker     Packs/day: 1 00     Years: 25 00     Pack years: 25 00    Smokeless tobacco: Never Used   Substance and Sexual Activity    Alcohol Use     Frequency: Monthly or less     Comment: rarely    Drug use: No    Sexual activity: Never         Current Outpatient Medications:     albuterol (PROVENTIL HFA,VENTOLIN HFA) 90 mcg/act inhaler, Inhale 2 puffs every 6 (six) hours as needed for wheezing, Disp: 3 Inhaler, Rfl: 0    allopurinol (ZYLOPRIM) 100 mg tablet, Take 1 tablet (100 mg total) by mouth daily, Disp: 90 tablet, Rfl: 3    ALPRAZolam (XANAX) 0 25 mg tablet, take 1 tablet by mouth if needed for anxiety, Disp: 30 tablet, Rfl: 0    Apple Cider Vinegar 188 MG CAPS, Take by mouth daily, Disp: , Rfl:     aspirin (ECOTRIN LOW STRENGTH) 81 mg EC tablet, Take 81 mg by mouth daily, Disp: , Rfl:     diclofenac sodium (VOLTAREN) 1 %, Place on the skin, Disp: , Rfl:     Diclofenac Sodium (VOLTAREN) 1 %, Apply 2 g topically 4 (four) times a day, Disp: 3 g, Rfl: 3    ergocalciferol (VITAMIN D2) 50,000 units, Take 50,000 Units by mouth once a week  On wednesday, Disp: , Rfl:     escitalopram (LEXAPRO) 20 mg tablet, Take 1 tablet (20 mg total) by mouth daily, Disp: 90 tablet, Rfl: 1    esomeprazole (NexIUM) 20 mg capsule, TAKE ONE CAPSULE BY MOUTH EVERY DAY IN THE EARLY MORNING, Disp: 84 capsule, Rfl: 1    fluticasone (FLONASE) 50 mcg/act nasal spray, 2 sprays into each nostril daily, Disp: 48 g, Rfl: 3    fluticasone-salmeterol (ADVAIR HFA) 115-21 MCG/ACT inhaler, Inhale 1 puff daily, Disp: 48 g, Rfl: 3    Multiple Vitamin (MULTIVITAMIN) tablet, Take 1 tablet by mouth daily  , Disp: , Rfl:     nebivolol (BYSTOLIC) 2 5 mg tablet, Take 1 tablet (2 5 mg total) by mouth every morning, Disp: 90 tablet, Rfl: 3    olopatadine (Patanol) 0 1 % ophthalmic solution, Apply 1 drop to eye 2 (two) times a day, Disp: 15 mL, Rfl: 3    zolpidem (AMBIEN CR) 12 5 MG CR tablet, take 1 tablet by mouth at bedtime if needed for sleep, Disp: 30 tablet, Rfl: 0    acetaminophen (TYLENOL) 325 mg tablet, Take 2 tablets (650 mg total) by mouth every 6 (six) hours as needed for mild pain, headaches or fever   (Patient not taking: Reported on 5/10/2021), Disp: 30 tablet, Rfl: 0    solifenacin (VESICARE) 5 mg tablet, Take 1 tablet (5 mg total) by mouth daily (Patient not taking: Reported on 5/10/2021), Disp: 90 tablet, Rfl: 3    Allergies   Allergen Reactions    Augmentin [Amoxicillin-Pot Clavulanate] GI Intolerance, Other (See Comments) and Hives     VOMITING  VOMITING  Reaction Date: 07Dec2004;     Sulfa Antibiotics Itching, Other (See Comments) and Hives     RASH, ITCHY  RASH, ITCHY    Morphine Other (See Comments)     "DOESN'T WORK"    Latex Rash     Unsure if this is an allergy       Objective:  Vitals:    05/18/21 1419   BP: 131/74   Pulse: 68       Right Shoulder Exam     Tenderness The patient is experiencing tenderness in the acromioclavicular joint (Subacromial bursa, right trapezius)  Range of Motion   Active abduction:  160 abnormal   Passive abduction: normal   Extension: normal   External rotation: normal   Forward flexion:  160 abnormal   Internal rotation 0 degrees:  Sacrum abnormal     Muscle Strength   Abduction: 5/5   Internal rotation: 5/5   External rotation: 5/5   Supraspinatus: 5/5   Subscapularis: 5/5   Biceps: 5/5     Tests   Matthews test: positive  Impingement: positive  Drop arm: negative    Other   Erythema: absent  Sensation: normal  Pulse: present            Physical Exam  Vitals signs reviewed  Constitutional:       Appearance: She is well-developed  HENT:      Head: Normocephalic and atraumatic  Eyes:      Conjunctiva/sclera: Conjunctivae normal       Pupils: Pupils are equal, round, and reactive to light  Neck:      Musculoskeletal: Normal range of motion and neck supple  Cardiovascular:      Rate and Rhythm: Normal rate  Pulmonary:      Effort: Pulmonary effort is normal  No respiratory distress  Skin:     General: Skin is warm and dry  Neurological:      Mental Status: She is alert and oriented to person, place, and time  Psychiatric:         Behavior: Behavior normal            I have personally reviewed pertinent films in PACS and my interpretation is as follows: Three-view x-rays of the right shoulder demonstrates mild AC joint osteoarthritis  No evidence of acute fracture  Three-view x-rays of cervical spine demonstrate mild-to-moderate degenerative changes  No evidence of acute fracture

## 2021-05-21 ENCOUNTER — HOSPITAL ENCOUNTER (OUTPATIENT)
Dept: RADIOLOGY | Facility: HOSPITAL | Age: 71
Discharge: HOME | End: 2021-05-21
Attending: INTERNAL MEDICINE
Payer: MEDICARE

## 2021-05-21 ENCOUNTER — APPOINTMENT (OUTPATIENT)
Dept: LAB | Facility: HOSPITAL | Age: 71
End: 2021-05-21
Attending: INTERNAL MEDICINE
Payer: MEDICARE

## 2021-05-21 ENCOUNTER — TRANSCRIBE ORDERS (OUTPATIENT)
Dept: ADMINISTRATIVE | Facility: HOSPITAL | Age: 71
End: 2021-05-21

## 2021-05-21 VITALS — WEIGHT: 156 LBS | BODY MASS INDEX: 31.45 KG/M2 | HEIGHT: 59 IN

## 2021-05-21 DIAGNOSIS — I10 ESSENTIAL HYPERTENSION: ICD-10-CM

## 2021-05-21 DIAGNOSIS — E53.8 VITAMIN B12 DEFICIENCY: ICD-10-CM

## 2021-05-21 DIAGNOSIS — Z12.31 BREAST CANCER SCREENING BY MAMMOGRAM: ICD-10-CM

## 2021-05-21 DIAGNOSIS — Z12.39 SCREENING BREAST EXAMINATION: ICD-10-CM

## 2021-05-21 DIAGNOSIS — M10.071 ACUTE IDIOPATHIC GOUT OF RIGHT FOOT: ICD-10-CM

## 2021-05-21 DIAGNOSIS — Z72.0 TOBACCO ABUSE: Chronic | ICD-10-CM

## 2021-05-21 DIAGNOSIS — E55.9 VITAMIN D DEFICIENCY: ICD-10-CM

## 2021-05-21 DIAGNOSIS — Z11.59 ENCOUNTER FOR HEPATITIS C SCREENING TEST FOR LOW RISK PATIENT: ICD-10-CM

## 2021-05-21 LAB
25(OH)D3 SERPL-MCNC: 33.7 NG/ML (ref 30–100)
ALBUMIN SERPL BCP-MCNC: 3.3 G/DL (ref 3.5–5)
ALP SERPL-CCNC: 83 U/L (ref 46–116)
ALT SERPL W P-5'-P-CCNC: 13 U/L (ref 12–78)
ANION GAP SERPL CALCULATED.3IONS-SCNC: 10 MMOL/L (ref 4–13)
AST SERPL W P-5'-P-CCNC: 16 U/L (ref 5–45)
BASOPHILS # BLD AUTO: 0.08 THOUSANDS/ΜL (ref 0–0.1)
BASOPHILS NFR BLD AUTO: 1 % (ref 0–1)
BILIRUB SERPL-MCNC: 0.68 MG/DL (ref 0.2–1)
BUN SERPL-MCNC: 16 MG/DL (ref 5–25)
CALCIUM ALBUM COR SERPL-MCNC: 9.4 MG/DL (ref 8.3–10.1)
CALCIUM SERPL-MCNC: 8.8 MG/DL (ref 8.3–10.1)
CHLORIDE SERPL-SCNC: 103 MMOL/L (ref 100–108)
CHOLEST SERPL-MCNC: 154 MG/DL (ref 50–200)
CO2 SERPL-SCNC: 26 MMOL/L (ref 21–32)
CREAT SERPL-MCNC: 0.69 MG/DL (ref 0.6–1.3)
EOSINOPHIL # BLD AUTO: 0.2 THOUSAND/ΜL (ref 0–0.61)
EOSINOPHIL NFR BLD AUTO: 2 % (ref 0–6)
ERYTHROCYTE [DISTWIDTH] IN BLOOD BY AUTOMATED COUNT: 14.1 % (ref 11.6–15.1)
GFR SERPL CREATININE-BSD FRML MDRD: 87 ML/MIN/1.73SQ M
GLUCOSE P FAST SERPL-MCNC: 99 MG/DL (ref 65–99)
HCT VFR BLD AUTO: 48.3 % (ref 34.8–46.1)
HCV AB SER QL: NORMAL
HDLC SERPL-MCNC: 38 MG/DL
HGB BLD-MCNC: 15.5 G/DL (ref 11.5–15.4)
IMM GRANULOCYTES # BLD AUTO: 0.09 THOUSAND/UL (ref 0–0.2)
IMM GRANULOCYTES NFR BLD AUTO: 1 % (ref 0–2)
LDLC SERPL CALC-MCNC: 101 MG/DL (ref 0–100)
LYMPHOCYTES # BLD AUTO: 2.4 THOUSANDS/ΜL (ref 0.6–4.47)
LYMPHOCYTES NFR BLD AUTO: 26 % (ref 14–44)
MCH RBC QN AUTO: 29.8 PG (ref 26.8–34.3)
MCHC RBC AUTO-ENTMCNC: 32.1 G/DL (ref 31.4–37.4)
MCV RBC AUTO: 93 FL (ref 82–98)
MONOCYTES # BLD AUTO: 0.73 THOUSAND/ΜL (ref 0.17–1.22)
MONOCYTES NFR BLD AUTO: 8 % (ref 4–12)
NEUTROPHILS # BLD AUTO: 5.74 THOUSANDS/ΜL (ref 1.85–7.62)
NEUTS SEG NFR BLD AUTO: 62 % (ref 43–75)
NONHDLC SERPL-MCNC: 116 MG/DL
NRBC BLD AUTO-RTO: 0 /100 WBCS
PLATELET # BLD AUTO: 273 THOUSANDS/UL (ref 149–390)
PMV BLD AUTO: 10.7 FL (ref 8.9–12.7)
POTASSIUM SERPL-SCNC: 4.3 MMOL/L (ref 3.5–5.3)
PROT SERPL-MCNC: 7.1 G/DL (ref 6.4–8.2)
RBC # BLD AUTO: 5.21 MILLION/UL (ref 3.81–5.12)
SODIUM SERPL-SCNC: 139 MMOL/L (ref 136–145)
TRIGL SERPL-MCNC: 73 MG/DL
URATE SERPL-MCNC: 3.4 MG/DL (ref 2–6.8)
WBC # BLD AUTO: 9.24 THOUSAND/UL (ref 4.31–10.16)

## 2021-05-21 PROCEDURE — 86803 HEPATITIS C AB TEST: CPT

## 2021-05-21 PROCEDURE — 84550 ASSAY OF BLOOD/URIC ACID: CPT

## 2021-05-21 PROCEDURE — 80061 LIPID PANEL: CPT

## 2021-05-21 PROCEDURE — 85025 COMPLETE CBC W/AUTO DIFF WBC: CPT

## 2021-05-21 PROCEDURE — 82306 VITAMIN D 25 HYDROXY: CPT

## 2021-05-21 PROCEDURE — 77063 BREAST TOMOSYNTHESIS BI: CPT

## 2021-05-21 PROCEDURE — 77067 SCR MAMMO BI INCL CAD: CPT

## 2021-05-21 PROCEDURE — 80053 COMPREHEN METABOLIC PANEL: CPT

## 2021-05-21 PROCEDURE — 84252 ASSAY OF VITAMIN B-2: CPT

## 2021-05-21 PROCEDURE — 71271 CT THORAX LUNG CANCER SCR C-: CPT

## 2021-05-21 PROCEDURE — 36415 COLL VENOUS BLD VENIPUNCTURE: CPT

## 2021-05-27 ENCOUNTER — EVALUATION (OUTPATIENT)
Dept: PHYSICAL THERAPY | Facility: CLINIC | Age: 71
End: 2021-05-27
Payer: MEDICARE

## 2021-05-27 DIAGNOSIS — M75.81 RIGHT ROTATOR CUFF TENDINITIS: Primary | ICD-10-CM

## 2021-05-27 DIAGNOSIS — M47.812 SPONDYLOSIS OF CERVICAL REGION WITHOUT MYELOPATHY OR RADICULOPATHY: ICD-10-CM

## 2021-05-27 DIAGNOSIS — M62.838 TRAPEZIUS MUSCLE SPASM: ICD-10-CM

## 2021-05-27 PROCEDURE — 97162 PT EVAL MOD COMPLEX 30 MIN: CPT

## 2021-05-27 NOTE — PROGRESS NOTES
PT Evaluation     Today's date: 2021  Patient name: Dusty Wise  : 1950  MRN: [id number]  Referring provider: Annie Goode DO  Dx:   Encounter Diagnosis     ICD-10-CM    1  Right rotator cuff tendinitis  M75 81 Ambulatory referral to Physical Therapy   2  Spondylosis of cervical region without myelopathy or radiculopathy  M47 812 Ambulatory referral to Physical Therapy   3  Trapezius muscle spasm  M62 838 Ambulatory referral to Physical Therapy                  Assessment  Assessment details: Dusty Wise is a 66 y o  female who presents with pain, decreased strength, decreased ROM, decreased joint mobility and postural  dysfunction  Due to these impairments, patient has difficulty performing ADL's, recreational activities, work-related activities, engaging in social activities, lifting/carrying, transfers  Patient's clinical presentation is consistent with their referring diagnosis of Right rotator cuff tendinitis, Spondylosis of cervical region without myelopathy or radiculopathy, and Trapezius muscle spasm  Patient has been educated in home exercise program and plan of care  Patient would benefit from skilled physical therapy services to address their aforementioned functional limitations and progress towards prior level of function and independence with home exercise program    Impairments: abnormal or restricted ROM, activity intolerance, impaired physical strength, lacks appropriate home exercise program, pain with function, scapular dyskinesis and poor posture     Goals  STG 1: independence in home exercise program   STG 2: Increase AROM /PROM from 90 to 120 with pain free mobility   STG 3: Demonstrates understanding of precautions for shoulder       LTG 1: increase UE AROM to be without deficit and without pain in all planes   LTG 2:increase UE strength to be 4+ to 5/5 throughout   LTG 3: increase scapular and shoulder strength to be 4+ to 5/5 throughout     Plan  Plan details: HEP development, stretching, strengthening, A/AA/PROM, joint mobilizations, posture education, STM/MI as needed to reduce muscle tension, muscle reeducation,Patient has been educated in Dx, prognosis and plan of care and is in agreement  Patient would benefit from: PT eval and skilled physical therapy  Planned modality interventions: cryotherapy, TENS and manual electrical stimulation  Planned therapy interventions: manual therapy, neuromuscular re-education, self care, therapeutic activities, therapeutic exercise, home exercise program and activity modification  Frequency: 2x week  Duration in weeks: 6  Treatment plan discussed with: patient        Subjective Evaluation    History of Present Illness  Mechanism of injury: Pt reports that she has been dealing with pain in the right shoulder for approximately one month  States that she is unable to reach overhead, don on and off her shirt, vacuum, lean to the right, or wash her hair  Pt reports that x-ray indicated RTC involvement in the right shoulder  Received an injection last week which helped improve symptoms slightly  Pt feels very limited in her mobility and restricted with her ADls and recreational activities  Denies any numbness/tingling in the right arm however, reports feeling pain occasionally radiating up her neck  Pain gradually cam on and she can not attribute pain to a specific event     Quality of life: fair    Pain  Current pain ratin  At best pain rating: 3  At worst pain rating: 10  Quality: sharp, tight, pulling, knife-like and discomfort  Relieving factors: relaxation and medications  Aggravating factors: overhead activity, lifting and keyboarding  Progression: worsening    Social Support  Lives in: multiple-level home  Lives with: adult children    Employment status: not working  Hand dominance: right      Diagnostic Tests  X-ray: abnormal  Treatments  Previous treatment: physical therapy  Current treatment: physical therapy  Patient Goals  Patient goals for therapy: increased strength, independence with ADLs/IADLs, return to sport/leisure activities, increased motion and decreased pain          Objective     Postural Observations    Additional Postural Observation Details  Forward head, rounded shoulders, and increase thoracic kyphosis      Cervical/Thoracic Screen   Cervical range of motion within normal limits  Cervical range of motion within normal limits with the following exceptions: Painful with lateral flexion to the right and complains of some tightness noted with forward flexion       Active Range of Motion     Right Shoulder   Flexion: 90 degrees with pain  Abduction: 60 degrees   External rotation 0°: 15 degrees with pain    Passive Range of Motion     Right Shoulder   Flexion: 100 degrees with pain  Abduction: 95 degrees with pain  External rotation 0°: 25 degrees with pain    Strength/Myotome Testing     Right Shoulder     Planes of Motion   Flexion: 3-   Extension: 3   Abduction: 3   Adduction: 3+   External rotation at 0°: 3   Internal rotation at 0°: 3     Tests     Right Shoulder   Positive empty can, Hawkin's, Neer's, painful arc and Speed's  Negative drop arm                Precautions: standard       Manuals                                                                 Neuro Re-Ed                                                                                                        Ther Ex                                                                                                                     Ther Activity                                       Gait Training                                       Modalities

## 2021-05-28 LAB — VIT B2 BLD-MCNC: 302 UG/L (ref 137–370)

## 2021-06-04 ENCOUNTER — OFFICE VISIT (OUTPATIENT)
Dept: PHYSICAL THERAPY | Facility: CLINIC | Age: 71
End: 2021-06-04
Payer: MEDICARE

## 2021-06-04 DIAGNOSIS — M47.812 SPONDYLOSIS OF CERVICAL REGION WITHOUT MYELOPATHY OR RADICULOPATHY: ICD-10-CM

## 2021-06-04 DIAGNOSIS — M62.838 TRAPEZIUS MUSCLE SPASM: ICD-10-CM

## 2021-06-04 DIAGNOSIS — M75.81 RIGHT ROTATOR CUFF TENDINITIS: Primary | ICD-10-CM

## 2021-06-04 PROCEDURE — 97140 MANUAL THERAPY 1/> REGIONS: CPT

## 2021-06-04 PROCEDURE — 97110 THERAPEUTIC EXERCISES: CPT

## 2021-06-04 NOTE — PROGRESS NOTES
Daily Note     Today's date: 2021  Patient name: Rose Andrade  : 1950  MRN: [id number]  Referring provider: Elen Bravo DO  Dx:   Encounter Diagnosis     ICD-10-CM    1  Right rotator cuff tendinitis  M75 81    2  Spondylosis of cervical region without myelopathy or radiculopathy  M47 812    3  Trapezius muscle spasm  M62 838                   Subjective: Pt reports feeling a little better since eval  Pain level is 3/10  Objective: See treatment diary below      Assessment: Tolerated treatment well  Patient demonstrated fatigue post treatment, exhibited good technique with therapeutic exercises and would benefit from continued PT      Plan: Continue per plan of care  Precautions: standard       Manuals 21            STM UT, CX PVM, and lev IM             Cx man   Distraction  IM                                       Neuro Re-Ed             Scap ret  5sx 20             Chin tuck  5sx 20                                                                             Ther Ex             TB rows and ext  YTB x 20             UT stretch  20s x 5             Lev stretch  20s x 5             Cx Rot  20s x 4                                                                 Ther Activity                                       Gait Training                                       Modalities

## 2021-06-08 ENCOUNTER — OFFICE VISIT (OUTPATIENT)
Dept: PHYSICAL THERAPY | Facility: CLINIC | Age: 71
End: 2021-06-08
Payer: MEDICARE

## 2021-06-08 DIAGNOSIS — M75.81 RIGHT ROTATOR CUFF TENDINITIS: Primary | ICD-10-CM

## 2021-06-08 DIAGNOSIS — M62.838 TRAPEZIUS MUSCLE SPASM: ICD-10-CM

## 2021-06-08 DIAGNOSIS — M47.812 SPONDYLOSIS OF CERVICAL REGION WITHOUT MYELOPATHY OR RADICULOPATHY: ICD-10-CM

## 2021-06-08 PROCEDURE — 97140 MANUAL THERAPY 1/> REGIONS: CPT

## 2021-06-08 PROCEDURE — 97110 THERAPEUTIC EXERCISES: CPT

## 2021-06-08 NOTE — PROGRESS NOTES
Daily Note     Today's date: 2021  Patient name: Sherlyn Centeno  : 1950  MRN: [id number]  Referring provider: Patricia Graham DO  Dx:   Encounter Diagnosis     ICD-10-CM    1  Right rotator cuff tendinitis  M75 81    2  Spondylosis of cervical region without myelopathy or radiculopathy  M47 812    3  Trapezius muscle spasm  M62 838                   Subjective: Pain level is 3/10  States that she is feeling better since starting therapy  Objective: See treatment diary below      Assessment: Tolerated treatment well  Patient demonstrated fatigue post treatment, exhibited good technique with therapeutic exercises and would benefit from continued PT      Plan: Continue per plan of care  Precautions: standard       Manuals 21           STM UT, CX PVM, and lev IM  IM            Cx man  Distraction  IM  IM                                      Neuro Re-Ed             Scap ret  5sx 20  5s x 20            Chin tuck  5sx 20 5s x 20                                                                             Ther Ex             TB rows and ext  YTB x 20  YTB x 20 ea              UT stretch  20s x 5  20s x 5            Lev stretch  20s x 5  20s x 5            Cx Rot  20s x 4  20s x 5            Pulley's   5 min            nustep   10 min                                      Ther Activity                                       Gait Training                                       Modalities

## 2021-06-11 ENCOUNTER — APPOINTMENT (OUTPATIENT)
Dept: PHYSICAL THERAPY | Facility: CLINIC | Age: 71
End: 2021-06-11
Payer: MEDICARE

## 2021-06-17 ENCOUNTER — OFFICE VISIT (OUTPATIENT)
Dept: PHYSICAL THERAPY | Facility: CLINIC | Age: 71
End: 2021-06-17
Payer: MEDICARE

## 2021-06-17 DIAGNOSIS — M47.812 SPONDYLOSIS OF CERVICAL REGION WITHOUT MYELOPATHY OR RADICULOPATHY: ICD-10-CM

## 2021-06-17 DIAGNOSIS — M62.838 TRAPEZIUS MUSCLE SPASM: ICD-10-CM

## 2021-06-17 DIAGNOSIS — M75.81 RIGHT ROTATOR CUFF TENDINITIS: Primary | ICD-10-CM

## 2021-06-17 PROCEDURE — 97140 MANUAL THERAPY 1/> REGIONS: CPT

## 2021-06-17 PROCEDURE — 97110 THERAPEUTIC EXERCISES: CPT

## 2021-06-17 NOTE — PROGRESS NOTES
Daily Note   Update: 21: Pt reports feeling well and is ready to be discharged to an HEP at this time  Educated on appropriate HEP progression and provided with a program  Pt will be officially discharged to an HEP  Today's date: 2021  Patient name: Fortunato Govea  : 1950  MRN: [id number]  Referring provider: Bebeto Giron DO  Dx:   Encounter Diagnosis     ICD-10-CM    1  Right rotator cuff tendinitis  M75 81    2  Spondylosis of cervical region without myelopathy or radiculopathy  M47 812    3  Trapezius muscle spasm  M62 838                   Subjective: Pt reports feeling well and denies having any pain in her shoulder  States that she has noted improvements in symptoms  Objective: See treatment diary below      Assessment: Tolerated treatment well  Patient demonstrated fatigue post treatment, exhibited good technique with therapeutic exercises and would benefit from continued PT      Plan: Continue per plan of care  Precautions: standard       Manuals 21          STM UT, CX PVM, and lev IM  IM  IM           Cx man  Distraction  IM  IM                                      Neuro Re-Ed             Scap ret  5sx 20  5s x 20  5s x20           Chin tuck  5sx 20 5s x 20  5s x 20                                                                            Ther Ex             TB rows and ext  YTB x 20  YTB x 20 ea  RTB x  X 20 ea             UT stretch  20s x 5  20s x 5  20s x 4           Lev stretch  20s x 5  20s x 5  Bicep curls and triceps # 1           Cx Rot  20s x 4  20s x 5  20s x 3 B           Pulley's   5 min  5 min           nustep   10 min  6 min UE only                                     Ther Activity                                       Gait Training                                       Modalities             MH    5 min

## 2021-06-22 ENCOUNTER — OFFICE VISIT (OUTPATIENT)
Dept: PHYSICAL THERAPY | Facility: CLINIC | Age: 71
End: 2021-06-22
Payer: MEDICARE

## 2021-06-22 DIAGNOSIS — M47.812 SPONDYLOSIS OF CERVICAL REGION WITHOUT MYELOPATHY OR RADICULOPATHY: ICD-10-CM

## 2021-06-22 DIAGNOSIS — M62.838 TRAPEZIUS MUSCLE SPASM: ICD-10-CM

## 2021-06-22 DIAGNOSIS — M75.81 RIGHT ROTATOR CUFF TENDINITIS: Primary | ICD-10-CM

## 2021-06-22 PROCEDURE — 97140 MANUAL THERAPY 1/> REGIONS: CPT

## 2021-06-22 PROCEDURE — 97110 THERAPEUTIC EXERCISES: CPT

## 2021-06-22 PROCEDURE — 97112 NEUROMUSCULAR REEDUCATION: CPT

## 2021-06-22 NOTE — PROGRESS NOTES
Daily Note      Today's date: 2021  Patient name: Dusty Wise  : 1950  MRN: [id number]  Referring provider: Annie Goode DO  Dx:   Encounter Diagnosis     ICD-10-CM    1  Right rotator cuff tendinitis  M75 81    2  Spondylosis of cervical region without myelopathy or radiculopathy  M47 812    3  Trapezius muscle spasm  M62 838        Subjective: Pt reports no neck/shoulder pain currently  Pt states that she has seen "a great amount" of improvement since starting PT  Objective: See treatment diary below    Assessment: Pt tolerated treatment well  Pt provided with verbal cues for tempo of pulleys exercise in order to maximize benefit of stretching during this exercise  Pt requires verbal cuing for form with upper trap and levator scap stretches as she was not able to recall these after they were explained and demonstrated  Pt introduced to shoulder IR/ER with theraband and required verbal cuing and demonstration in order to prevent elbow extension and to prevent shoulder abduction  Pt was educated to continue with previously assigned HEP as she may have limited benefit from performing shoulder ER with poor form  Pt presented with R LS trigger point that referred to the right biceps region  Plan: Continue per plan of care  Progress treatment as tolerated  Precautions: standard       Manuals 21         STM UT, CX PVM, and lev IM  IM  IM  DJA         Cx man  Distraction  IM  IM                                      Neuro Re-Ed             Scap ret  5sx 20  5s x 20  5s x20  Reviewed         Chin tuck  5sx 20 5s x 20  5s x 20  20x5s                                                                          Ther Ex             TB rows and ext  YTB x 20  YTB x 20 ea  RTB x  X 20 ea  20x ea   RTB         UT stretch  20s x 5  20s x 5  20s x 4  5x10s         Lev stretch  20s x 5  20s x 5  Bicep curls and triceps # 1  5x10s         Cx Rot  20s x 4  20s x 5  20s x 3 B           Pulley's   5 min  5 min  5 min         nustep   10 min  6 min UE only  6 min         R shoulder IR/ER    20x ea   RTB                      Ther Activity                                       Gait Training                                       Modalities             MH    5 min

## 2021-06-23 ENCOUNTER — OFFICE VISIT (OUTPATIENT)
Dept: PHYSICAL THERAPY | Facility: CLINIC | Age: 71
End: 2021-06-23
Payer: MEDICARE

## 2021-06-23 DIAGNOSIS — M47.812 SPONDYLOSIS OF CERVICAL REGION WITHOUT MYELOPATHY OR RADICULOPATHY: ICD-10-CM

## 2021-06-23 DIAGNOSIS — M75.81 RIGHT ROTATOR CUFF TENDINITIS: Primary | ICD-10-CM

## 2021-06-23 DIAGNOSIS — M62.838 TRAPEZIUS MUSCLE SPASM: ICD-10-CM

## 2021-06-23 PROCEDURE — 97112 NEUROMUSCULAR REEDUCATION: CPT

## 2021-06-23 PROCEDURE — 97110 THERAPEUTIC EXERCISES: CPT

## 2021-06-23 NOTE — PROGRESS NOTES
Daily Note      Today's date: 2021  Patient name: Narinder Garza  : 1950  MRN: [id number]  Referring provider: Kaykay Meyer DO  Dx:   Encounter Diagnosis     ICD-10-CM    1  Right rotator cuff tendinitis  M75 81    2  Spondylosis of cervical region without myelopathy or radiculopathy  M47 812    3  Trapezius muscle spasm  M62 838        Subjective: Pt reports no neck pain or shoulder pain currently  Pt states she wishes to make today her last PT visit and then she will return to orthopedics for follow up  Pt states she would like to wait and see what she is advised at this visit before returning to PT  Objective: See treatment diary below; pt arrived for 21 11AM appointment today in error  Communicated with primary PT about D/C planning    Assessment: Pt tolerated treatment well  Pt was instructed in comprehensive HEP and provided with printed hand out including the exercises listed below  Pt demonstrates good understanding of HEP  Pt notes that she feels much better and has minimal pain and much less frequently  Plan: Discharge from PT to comprehensive HEP  Precautions: standard       Manuals 21        STM UT, CX PVM, and lev IM  IM  IM  DJA DJA        Cx man  Distraction  IM  IM                                      Neuro Re-Ed             Scap ret  5sx 20  5s x 20  5s x20  Reviewed         Chin tuck  5sx 20 5s x 20  5s x 20  20x5s 20x5s                                                                         Ther Ex             TB rows and ext  YTB x 20  YTB x 20 ea  RTB x  X 20 ea  20x ea  RTB 20x RTB        UT stretch  20s x 5  20s x 5  20s x 4  5x10s 5x10s        Lev stretch  20s x 5  20s x 5  Bicep curls and triceps # 1  5x10s 5x10s        Cx Rot  20s x 4  20s x 5  20s x 3 B   20x3s        Pulley's   5 min  5 min  5 min 20x5s        nustep   10 min  6 min UE only  6 min 8 min        R shoulder IR/ER    20x ea   RTB Ther Activity                                       Gait Training                                       Modalities             MH    5 min                           Access Code: Silverio Starr  URL: [email or web address]  Date: 06/23/2021  Prepared by: Maxcine Gills    Exercises  Seated Cervical Retraction - 1 x daily - 7 x weekly - 2 sets - 10 reps - 5 hold  Seated Upper Trapezius Stretch - 1 x daily - 7 x weekly - 1 sets - 5 reps - 10 hold  Gentle Levator Scapulae Stretch - 1 x daily - 7 x weekly - 1 sets - 5 reps - 10 hold  Standing Row with Anchored Resistance - 1 x daily - 7 x weekly - 2 sets - 10 reps - 3 hold  Shoulder Extension with Resistance - 1 x daily - 7 x weekly - 2 sets - 10 reps - 3 hold

## 2021-06-24 ENCOUNTER — APPOINTMENT (OUTPATIENT)
Dept: PHYSICAL THERAPY | Facility: CLINIC | Age: 71
End: 2021-06-24
Payer: MEDICARE

## 2021-06-29 ENCOUNTER — OFFICE VISIT (OUTPATIENT)
Dept: OBGYN CLINIC | Facility: CLINIC | Age: 71
End: 2021-06-29
Payer: MEDICARE

## 2021-06-29 VITALS
HEART RATE: 60 BPM | DIASTOLIC BLOOD PRESSURE: 70 MMHG | HEIGHT: 59 IN | BODY MASS INDEX: 31.65 KG/M2 | WEIGHT: 157 LBS | SYSTOLIC BLOOD PRESSURE: 180 MMHG

## 2021-06-29 DIAGNOSIS — M47.812 SPONDYLOSIS OF CERVICAL REGION WITHOUT MYELOPATHY OR RADICULOPATHY: Primary | ICD-10-CM

## 2021-06-29 DIAGNOSIS — M19.011 OSTEOARTHRITIS OF RIGHT AC (ACROMIOCLAVICULAR) JOINT: ICD-10-CM

## 2021-06-29 DIAGNOSIS — M62.838 TRAPEZIUS MUSCLE SPASM: ICD-10-CM

## 2021-06-29 PROCEDURE — 20605 DRAIN/INJ JOINT/BURSA W/O US: CPT | Performed by: ORTHOPAEDIC SURGERY

## 2021-06-29 PROCEDURE — 99213 OFFICE O/P EST LOW 20 MIN: CPT | Performed by: ORTHOPAEDIC SURGERY

## 2021-06-29 RX ORDER — TRIAMCINOLONE ACETONIDE 40 MG/ML
40 INJECTION, SUSPENSION INTRA-ARTICULAR; INTRAMUSCULAR
Status: COMPLETED | OUTPATIENT
Start: 2021-06-29 | End: 2021-06-29

## 2021-06-29 RX ORDER — LIDOCAINE HYDROCHLORIDE 10 MG/ML
1 INJECTION, SOLUTION INFILTRATION; PERINEURAL
Status: COMPLETED | OUTPATIENT
Start: 2021-06-29 | End: 2021-06-29

## 2021-06-29 RX ADMIN — TRIAMCINOLONE ACETONIDE 40 MG: 40 INJECTION, SUSPENSION INTRA-ARTICULAR; INTRAMUSCULAR at 11:35

## 2021-06-29 RX ADMIN — LIDOCAINE HYDROCHLORIDE 1 ML: 10 INJECTION, SOLUTION INFILTRATION; PERINEURAL at 11:35

## 2021-06-29 NOTE — PROGRESS NOTES
Assessment/Plan:  1  Spondylosis of cervical region without myelopathy or radiculopathy  MRI cervical spine wo contrast   2  Trapezius muscle spasm  MRI cervical spine wo contrast   3  Osteoarthritis of right AC (acromioclavicular) joint         Delisa Mcknight continues to have right shoulder pain which seems to be localized to the right AC joint  We did proceed with a right AC joint cortisone injection today which she tolerated well  Hopefully this will give her increased relief of her right shoulder pain going forward  I also discussed obtaining an MRI of her cervical spine for further evaluation which could potentially be treated with a localized injection clinically indicated  She will follow up in the office after the MRI of the cervical spine is complete  Medium joint arthrocentesis: R acromioclavicular  Universal Protocol:  Consent: Verbal consent obtained  Risks and benefits: risks, benefits and alternatives were discussed  Consent given by: patient  Site marked: the operative site was marked  Radiology Images displayed and confirmed  If images not available, report reviewed: imaging studies available    Supporting Documentation  Indications: pain   Procedure Details  Location: shoulder - R acromioclavicular  Needle size: 25 G  Ultrasound guidance: no  Approach: superior  Medications administered: 1 mL lidocaine 1 %; 40 mg triamcinolone acetonide 40 mg/mL    Patient tolerance: patient tolerated the procedure well with no immediate complications  Dressing:  Sterile dressing applied          Subjective:   Quintin Bazan is a 66 y o  female who presents for follow-up for right-sided shoulder pain and neck pain  At last office visit she had subacromial bursitis symptoms in her right shoulder and AC joint osteoarthritis on her x-ray  She had trapezius pain at that time as well  She also had ongoing neck pain with radiating symptoms into her right shoulder    We sent her for formal physical therapy focusing on the neck and right shoulder  She states that the therapy was helping and she was initially feeling a lot better  Now she has had recurrent symptoms and seems to have plateaued and is feeling pain in the superior aspect of the right shoulder  She states the other night pain became quite severe and it seems to be related to increasing weather changes and thunder storms  She denies any new injury  She does have increased pain sleeping on her right shoulder at night  She does report some symptoms radiating from her neck down to her right elbow  She denies any pain or weakness or numbness into her right hand  Review of Systems   Constitutional: Negative for chills, fever and unexpected weight change  HENT: Negative for hearing loss, nosebleeds and sore throat  Eyes: Negative for pain, redness and visual disturbance  Respiratory: Negative for cough, shortness of breath and wheezing  Cardiovascular: Negative for chest pain, palpitations and leg swelling  Gastrointestinal: Negative for abdominal pain, nausea and vomiting  Endocrine: Negative for polydipsia and polyuria  Genitourinary: Negative for dysuria and hematuria  Musculoskeletal:        See HPI   Skin: Negative for rash and wound  Neurological: Negative for dizziness, numbness and headaches  Psychiatric/Behavioral: Negative for decreased concentration and suicidal ideas  The patient is not nervous/anxious            Past Medical History:   Diagnosis Date    Acid reflux     Arthritis     Asthma     Cardiac disease     Chronic kidney disease     passed on own kidney stone    Diverticulitis     GERD (gastroesophageal reflux disease)     Hayfever     Torres Martinez (hard of hearing)     bilateral hearing aids    Hyperlipemia     Hypertension     Tachycardia        Past Surgical History:   Procedure Laterality Date    ABLATION OF DYSRHYTHMIC FOCUS      APPENDECTOMY      CHOLECYSTECTOMY      open    FRACTURE SURGERY      ORIF fx (L) tibia, fibula 2009    GASTRIC BYPASS OPEN      HYSTERECTOMY      VT XCAPSL CTRC RMVL INSJ IO LENS PROSTH W/O ECP Left 4/18/2016    Procedure: EXTRACTION EXTRACAPSULAR CATARACT PHACO INTRAOCULAR LENS (IOL); Surgeon: Angela Jo MD;  Location: [facility] MAIN OR;  Service: Ophthalmology    VT XCAPSL CTRC RMVL INSJ IO LENS PROSTH W/O ECP Right 3/1/2021    Procedure: EXTRACTION EXTRACAPSULAR CATARACT PHACO INTRAOCULAR LENS (IOL); Surgeon: Angela Jo MD;  Location: [facility] MAIN OR;  Service: Ophthalmology    TONSILECTOMY AND ADNOIDECTOMY         Family History   Problem Relation Age of Onset    Heart disease Mother     Stroke Son     Stroke Maternal Grandfather     Breast cancer Daughter 36       Social History     Occupational History    Not on file   Tobacco Use    Smoking status: Current Every Day Smoker     Packs/day: 1 00     Years: 25 00     Pack years: 25 00    Smokeless tobacco: Never Used   Substance and Sexual Activity    Alcohol use: Not on file     Comment: rarely    Drug use: No    Sexual activity: Never         Current Outpatient Medications:     acetaminophen (TYLENOL) 325 mg tablet, Take 2 tablets (650 mg total) by mouth every 6 (six) hours as needed for mild pain, headaches or fever   (Patient not taking: Reported on 5/10/2021), Disp: 30 tablet, Rfl: 0    albuterol (PROVENTIL HFA,VENTOLIN HFA) 90 mcg/act inhaler, Inhale 2 puffs every 6 (six) hours as needed for wheezing, Disp: 3 Inhaler, Rfl: 0    allopurinol (ZYLOPRIM) 100 mg tablet, Take 1 tablet (100 mg total) by mouth daily, Disp: 90 tablet, Rfl: 3    ALPRAZolam (XANAX) 0 25 mg tablet, take 1 tablet by mouth if needed for anxiety, Disp: 30 tablet, Rfl: 0    Apple Cider Vinegar 188 MG CAPS, Take by mouth daily, Disp: , Rfl:     aspirin (ECOTRIN LOW STRENGTH) 81 mg EC tablet, Take 81 mg by mouth daily, Disp: , Rfl:     cyclobenzaprine (FLEXERIL) 10 mg tablet, Take 1 tablet (10 mg total) by mouth 3 (three) times a day as needed for muscle spasms, Disp: 20 tablet, Rfl: 0    diclofenac sodium (VOLTAREN) 1 %, Place on the skin, Disp: , Rfl:     Diclofenac Sodium (VOLTAREN) 1 %, Apply 2 g topically 4 (four) times a day, Disp: 3 g, Rfl: 3    ergocalciferol (VITAMIN D2) 50,000 units, Take 50,000 Units by mouth once a week  On wednesday, Disp: , Rfl:     escitalopram (LEXAPRO) 20 mg tablet, Take 1 tablet (20 mg total) by mouth daily, Disp: 90 tablet, Rfl: 1    esomeprazole (NexIUM) 20 mg capsule, TAKE ONE CAPSULE BY MOUTH EVERY DAY IN THE EARLY MORNING, Disp: 84 capsule, Rfl: 1    fluticasone (FLONASE) 50 mcg/act nasal spray, 2 sprays into each nostril daily, Disp: 48 g, Rfl: 3    fluticasone-salmeterol (ADVAIR HFA) 115-21 MCG/ACT inhaler, Inhale 1 puff daily, Disp: 48 g, Rfl: 3    Multiple Vitamin (MULTIVITAMIN) tablet, Take 1 tablet by mouth daily  , Disp: , Rfl:     nebivolol (BYSTOLIC) 2 5 mg tablet, Take 1 tablet (2 5 mg total) by mouth every morning, Disp: 90 tablet, Rfl: 3    olopatadine (Patanol) 0 1 % ophthalmic solution, Apply 1 drop to eye 2 (two) times a day, Disp: 15 mL, Rfl: 3    solifenacin (VESICARE) 5 mg tablet, Take 1 tablet (5 mg total) by mouth daily (Patient not taking: Reported on 5/10/2021), Disp: 90 tablet, Rfl: 3    zolpidem (AMBIEN CR) 12 5 MG CR tablet, take 1 tablet by mouth at bedtime if needed for sleep, Disp: 30 tablet, Rfl: 0    Allergies   Allergen Reactions    Augmentin [Amoxicillin-Pot Clavulanate] GI Intolerance, Other (See Comments) and Hives     VOMITING  VOMITING  Reaction Date: [id number];     Sulfa Antibiotics Itching, Other (See Comments) and Hives     RASH, ITCHY  RASH, ITCHY    Morphine Other (See Comments)     "DOESN'T WORK"    Latex Rash     Unsure if this is an allergy       Objective:  Vitals:    06/29/21 1054   BP: (!) 180/70   Pulse: 60       Right Shoulder Exam     Tenderness   The patient is experiencing tenderness in the acromioclavicular joint      Range of Motion   Active abduction: normal   Passive abduction: normal   Extension: normal   External rotation: normal   Forward flexion: normal   Internal rotation 0 degrees: normal     Muscle Strength   Abduction: 5/5   Internal rotation: 5/5   External rotation: 5/5   Supraspinatus: 5/5   Subscapularis: 5/5   Biceps: 5/5     Tests   Matthews test: positive  Cross arm: positive  Impingement: negative  Drop arm: negative    Other   Erythema: absent  Sensation: normal  Pulse: present            Physical Exam  Vitals reviewed  Constitutional:       Appearance: She is well-developed  HENT:      Head: Normocephalic and atraumatic  Eyes:      Conjunctiva/sclera: Conjunctivae normal       Pupils: Pupils are equal, round, and reactive to light  Neck:     Cardiovascular:      Rate and Rhythm: Normal rate  Pulmonary:      Effort: Pulmonary effort is normal  No respiratory distress  Musculoskeletal:      Cervical back: Neck supple  Pain with movement and muscular tenderness present  No spinous process tenderness  Decreased range of motion  Skin:     General: Skin is warm and dry  Neurological:      Mental Status: She is alert and oriented to person, place, and time     Psychiatric:         Behavior: Behavior normal

## 2021-07-28 ENCOUNTER — HOSPITAL ENCOUNTER (OUTPATIENT)
Dept: RADIOLOGY | Facility: HOSPITAL | Age: 71
Discharge: HOME | End: 2021-07-28
Attending: ORTHOPAEDIC SURGERY
Payer: MEDICARE

## 2021-07-28 DIAGNOSIS — M62.838 TRAPEZIUS MUSCLE SPASM: ICD-10-CM

## 2021-07-28 DIAGNOSIS — M47.812 SPONDYLOSIS OF CERVICAL REGION WITHOUT MYELOPATHY OR RADICULOPATHY: ICD-10-CM

## 2021-07-28 PROCEDURE — G1004 CDSM NDSC: HCPCS

## 2021-07-28 PROCEDURE — 72141 MRI NECK SPINE W/O DYE: CPT

## 2021-08-06 DIAGNOSIS — F32.A ANXIETY AND DEPRESSION: ICD-10-CM

## 2021-08-06 DIAGNOSIS — F41.9 ANXIETY AND DEPRESSION: ICD-10-CM

## 2021-08-09 RX ORDER — ALPRAZOLAM 0.25 MG/1
0.25 TABLET ORAL DAILY PRN
Qty: 30 TABLET | Refills: 0 | Status: SHIPPED | OUTPATIENT
Start: 2021-08-09 | End: 2021-11-12 | Stop reason: SDUPTHER

## 2021-08-09 RX ORDER — ZOLPIDEM TARTRATE 12.5 MG/1
12.5 TABLET, FILM COATED, EXTENDED RELEASE ORAL
Qty: 30 TABLET | Refills: 0 | Status: SHIPPED | OUTPATIENT
Start: 2021-08-09 | End: 2021-11-12 | Stop reason: SDUPTHER

## 2021-08-12 ENCOUNTER — OFFICE VISIT (OUTPATIENT)
Dept: OBGYN CLINIC | Facility: CLINIC | Age: 71
End: 2021-08-12
Payer: MEDICARE

## 2021-08-12 VITALS
DIASTOLIC BLOOD PRESSURE: 75 MMHG | BODY MASS INDEX: 31.37 KG/M2 | SYSTOLIC BLOOD PRESSURE: 139 MMHG | HEIGHT: 59 IN | HEART RATE: 73 BPM | WEIGHT: 155.6 LBS

## 2021-08-12 DIAGNOSIS — M75.51 ACUTE SHOULDER BURSITIS, RIGHT: ICD-10-CM

## 2021-08-12 DIAGNOSIS — M47.812 SPONDYLOSIS OF CERVICAL REGION WITHOUT MYELOPATHY OR RADICULOPATHY: Primary | ICD-10-CM

## 2021-08-12 PROCEDURE — 99213 OFFICE O/P EST LOW 20 MIN: CPT | Performed by: ORTHOPAEDIC SURGERY

## 2021-08-12 NOTE — PROGRESS NOTES
Assessment/Plan:  1  Spondylosis of cervical region without myelopathy or radiculopathy     2  Acute shoulder bursitis, right        Aria Hartley has an MRI demonstrating mild disc bulging without significant disc herniation  She has normal examination and no pain in her shoulder  The [facility] joint injection relieved the significant  Amount of pain she was feeling  I do not think she needs any treatment on her neck at this time  If the pain returns we could consider treatment the cervical spine or AC joint the future  Follow-up as needed  Subjective:   Yanci Villafuerte is a 66 y o  female who presents to the office for follow up for cervical neck pain and right shoulder pain  She was seen in the office 6 weeks ago with discomfort in her shoulder and in her neck  We will proceed with an MRI of the cervical spine at that time as well as a right AC joint cortisone injection  She states that the injection significantly helped and has taken away all of her pain  She no longer has shoulder pain or neck pain or any radiating pain down the arm  She denies any new injury  She reports 0/10 pain today      Review of Systems   Constitutional: Negative for chills, fever and unexpected weight change  HENT: Negative for hearing loss, nosebleeds and sore throat  Eyes: Negative for pain, redness and visual disturbance  Respiratory: Negative for cough, shortness of breath and wheezing  Cardiovascular: Negative for chest pain, palpitations and leg swelling  Gastrointestinal: Negative for abdominal pain, nausea and vomiting  Endocrine: Negative for polydipsia and polyuria  Genitourinary: Negative for dysuria and hematuria  Musculoskeletal:        See HPI   Skin: Negative for rash and wound  Neurological: Negative for dizziness, numbness and headaches  Psychiatric/Behavioral: Negative for decreased concentration and suicidal ideas  The patient is not nervous/anxious            Past Medical History:   Diagnosis Date  Acid reflux     Arthritis     Asthma     Cardiac disease     Chronic kidney disease     passed on own kidney stone    Diverticulitis     GERD (gastroesophageal reflux disease)     Hayfever     [place] (hard of hearing)     bilateral hearing aids    Hyperlipemia     Hypertension     Tachycardia        Past Surgical History:   Procedure Laterality Date    ABLATION OF DYSRHYTHMIC FOCUS      APPENDECTOMY      CHOLECYSTECTOMY      open    FRACTURE SURGERY      ORIF fx (L) tibia, fibula 2009    GASTRIC BYPASS OPEN      HYSTERECTOMY      AL XCAPSL CTRC RMVL INSJ IO LENS PROSTH W/O ECP Left 4/18/2016    Procedure: EXTRACTION EXTRACAPSULAR CATARACT PHACO INTRAOCULAR LENS (IOL); Surgeon: Anette Burnett MD;  Location: [facility] MAIN OR;  Service: Ophthalmology    AL XCAPSL CTRC RMVL INSJ IO LENS PROSTH W/O ECP Right 3/1/2021    Procedure: EXTRACTION EXTRACAPSULAR CATARACT PHACO INTRAOCULAR LENS (IOL);   Surgeon: Anette Burnett MD;  Location: [facility] MAIN OR;  Service: Ophthalmology    TONSILECTOMY AND ADNOIDECTOMY         Family History   Problem Relation Age of Onset    Heart disease Mother     Stroke Son     Stroke Maternal Grandfather     Breast cancer Daughter 36       Social History     Occupational History    Not on file   Tobacco Use    Smoking status: Current Every Day Smoker     Packs/day: 1 00     Years: 25 00     Pack years: 25 00    Smokeless tobacco: Never Used   Substance and Sexual Activity    Alcohol use: Not on file     Comment: rarely    Drug use: No    Sexual activity: Never         Current Outpatient Medications:     albuterol (PROVENTIL HFA,VENTOLIN HFA) 90 mcg/act inhaler, Inhale 2 puffs every 6 (six) hours as needed for wheezing, Disp: 3 Inhaler, Rfl: 0    allopurinol (ZYLOPRIM) 100 mg tablet, Take 1 tablet (100 mg total) by mouth daily, Disp: 90 tablet, Rfl: 3    ALPRAZolam (XANAX) 0 25 mg tablet, Take 1 tablet (0 25 mg total) by mouth daily as needed for anxiety, Disp: 30 tablet, Rfl: 0    Apple Cider Vinegar 188 MG CAPS, Take by mouth daily, Disp: , Rfl:     aspirin (ECOTRIN LOW STRENGTH) 81 mg EC tablet, Take 81 mg by mouth daily, Disp: , Rfl:     cyclobenzaprine (FLEXERIL) 10 mg tablet, Take 1 tablet (10 mg total) by mouth 3 (three) times a day as needed for muscle spasms, Disp: 20 tablet, Rfl: 0    diclofenac sodium (VOLTAREN) 1 %, Place on the skin, Disp: , Rfl:     Diclofenac Sodium (VOLTAREN) 1 %, Apply 2 g topically 4 (four) times a day, Disp: 3 g, Rfl: 3    ergocalciferol (VITAMIN D2) 50,000 units, Take 50,000 Units by mouth once a week  On wednesday, Disp: , Rfl:     escitalopram (LEXAPRO) 20 mg tablet, Take 1 tablet (20 mg total) by mouth daily, Disp: 90 tablet, Rfl: 1    esomeprazole (NexIUM) 20 mg capsule, TAKE ONE CAPSULE BY MOUTH EVERY DAY IN THE EARLY MORNING, Disp: 84 capsule, Rfl: 1    fluticasone (FLONASE) 50 mcg/act nasal spray, 2 sprays into each nostril daily, Disp: 48 g, Rfl: 3    fluticasone-salmeterol (ADVAIR HFA) 115-21 MCG/ACT inhaler, Inhale 1 puff daily, Disp: 48 g, Rfl: 3    Multiple Vitamin (MULTIVITAMIN) tablet, Take 1 tablet by mouth daily  , Disp: , Rfl:     nebivolol (BYSTOLIC) 2 5 mg tablet, Take 1 tablet (2 5 mg total) by mouth every morning, Disp: 90 tablet, Rfl: 3    olopatadine (Patanol) 0 1 % ophthalmic solution, Apply 1 drop to eye 2 (two) times a day, Disp: 15 mL, Rfl: 3    solifenacin (VESICARE) 5 mg tablet, Take 1 tablet (5 mg total) by mouth daily, Disp: 90 tablet, Rfl: 3    zolpidem (AMBIEN CR) 12 5 MG CR tablet, Take 1 tablet (12 5 mg total) by mouth daily at bedtime as needed for sleep, Disp: 30 tablet, Rfl: 0    acetaminophen (TYLENOL) 325 mg tablet, Take 2 tablets (650 mg total) by mouth every 6 (six) hours as needed for mild pain, headaches or fever   (Patient not taking: Reported on 5/10/2021), Disp: 30 tablet, Rfl: 0    Allergies   Allergen Reactions    Augmentin [Amoxicillin-Pot Clavulanate] GI Intolerance, Other (See Comments) and Hives     VOMITING  VOMITING  Reaction Date: [id number];     Sulfa Antibiotics Itching, Other (See Comments) and Hives     RASH, ITCHY  RASH, ITCHY    Morphine Other (See Comments)     "DOESN'T WORK"    Latex Rash     Unsure if this is an allergy       Objective:  Vitals:    08/12/21 0814   BP: 139/75   Pulse: 73       Right Shoulder Exam     Tenderness   The patient is experiencing no tenderness  Range of Motion   Active abduction: normal   Passive abduction: normal   Extension: normal   External rotation: normal   Forward flexion: normal     Muscle Strength   Abduction: 5/5   Internal rotation: 5/5   External rotation: 5/5   Supraspinatus: 5/5   Subscapularis: 5/5     Other   Erythema: absent  Sensation: normal  Pulse: present            Physical Exam  Vitals reviewed  Constitutional:       Appearance: She is well-developed  HENT:      Head: Normocephalic and atraumatic  Eyes:      Conjunctiva/sclera: Conjunctivae normal       Pupils: Pupils are equal, round, and reactive to light  Neck:      Comments: No pain  Cardiovascular:      Rate and Rhythm: Normal rate  Pulmonary:      Effort: Pulmonary effort is normal  No respiratory distress  Musculoskeletal:      Cervical back: Normal range of motion and neck supple  No spinous process tenderness or muscular tenderness  Normal range of motion  Skin:     General: Skin is warm and dry  Neurological:      Mental Status: She is alert and oriented to person, place, and time  Psychiatric:         Behavior: Behavior normal          I have personally reviewed pertinent films in PACS and my interpretation is as follows:    MRI of the cervical spine demonstrates mild degenerative changes without significant disc herniation    Small disc bulge at C4-5 and C5-6

## 2021-10-27 ENCOUNTER — IMMUNIZATIONS (OUTPATIENT)
Dept: FAMILY MEDICINE CLINIC | Facility: CLINIC | Age: 71
End: 2021-10-27
Payer: MEDICARE

## 2021-10-27 DIAGNOSIS — Z23 NEED FOR INFLUENZA VACCINATION: Primary | ICD-10-CM

## 2021-10-27 PROCEDURE — G0008 ADMIN INFLUENZA VIRUS VAC: HCPCS

## 2021-10-27 PROCEDURE — 90662 IIV NO PRSV INCREASED AG IM: CPT

## 2021-11-10 ENCOUNTER — OFFICE VISIT (OUTPATIENT)
Dept: FAMILY MEDICINE CLINIC | Facility: CLINIC | Age: 71
End: 2021-11-10
Payer: MEDICARE

## 2021-11-10 VITALS
HEART RATE: 88 BPM | BODY MASS INDEX: 30.64 KG/M2 | DIASTOLIC BLOOD PRESSURE: 82 MMHG | TEMPERATURE: 97.8 F | SYSTOLIC BLOOD PRESSURE: 142 MMHG | WEIGHT: 152 LBS | HEIGHT: 59 IN | OXYGEN SATURATION: 96 % | RESPIRATION RATE: 16 BRPM

## 2021-11-10 DIAGNOSIS — I10 ESSENTIAL HYPERTENSION: Primary | ICD-10-CM

## 2021-11-10 DIAGNOSIS — J44.9 CHRONIC OBSTRUCTIVE PULMONARY DISEASE, UNSPECIFIED COPD TYPE (HCC): ICD-10-CM

## 2021-11-10 DIAGNOSIS — F41.9 ANXIETY AND DEPRESSION: ICD-10-CM

## 2021-11-10 DIAGNOSIS — J01.90 ACUTE NON-RECURRENT SINUSITIS, UNSPECIFIED LOCATION: ICD-10-CM

## 2021-11-10 DIAGNOSIS — M1A.9XX0 CHRONIC GOUT WITHOUT TOPHUS, UNSPECIFIED CAUSE, UNSPECIFIED SITE: ICD-10-CM

## 2021-11-10 DIAGNOSIS — F32.A ANXIETY AND DEPRESSION: ICD-10-CM

## 2021-11-10 PROCEDURE — 99214 OFFICE O/P EST MOD 30 MIN: CPT | Performed by: INTERNAL MEDICINE

## 2021-11-10 RX ORDER — AZITHROMYCIN 250 MG/1
TABLET, FILM COATED ORAL
Qty: 6 TABLET | Refills: 0 | Status: SHIPPED | OUTPATIENT
Start: 2021-11-10 | End: 2021-11-14

## 2021-11-10 RX ORDER — BUPROPION HYDROCHLORIDE 150 MG/1
150 TABLET ORAL EVERY MORNING
Qty: 30 TABLET | Refills: 1 | Status: SHIPPED | OUTPATIENT
Start: 2021-11-10 | End: 2021-12-29

## 2021-11-12 DIAGNOSIS — F32.A ANXIETY AND DEPRESSION: ICD-10-CM

## 2021-11-12 DIAGNOSIS — F41.9 ANXIETY AND DEPRESSION: ICD-10-CM

## 2021-11-15 RX ORDER — ZOLPIDEM TARTRATE 12.5 MG/1
12.5 TABLET, FILM COATED, EXTENDED RELEASE ORAL
Qty: 30 TABLET | Refills: 0 | Status: SHIPPED | OUTPATIENT
Start: 2021-11-15 | End: 2021-12-16 | Stop reason: SDUPTHER

## 2021-11-15 RX ORDER — ALPRAZOLAM 0.25 MG/1
0.25 TABLET ORAL DAILY PRN
Qty: 30 TABLET | Refills: 0 | Status: SHIPPED | OUTPATIENT
Start: 2021-11-15 | End: 2021-12-16 | Stop reason: SDUPTHER

## 2021-12-11 ENCOUNTER — IMMUNIZATIONS (OUTPATIENT)
Dept: FAMILY MEDICINE CLINIC | Facility: HOSPITAL | Age: 71
End: 2021-12-11

## 2021-12-11 DIAGNOSIS — Z23 ENCOUNTER FOR IMMUNIZATION: Primary | ICD-10-CM

## 2021-12-11 PROCEDURE — 91300 COVID-19 PFIZER VACC 0.3 ML: CPT

## 2021-12-11 PROCEDURE — 0001A COVID-19 PFIZER VACC 0.3 ML: CPT

## 2021-12-16 ENCOUNTER — OFFICE VISIT (OUTPATIENT)
Dept: FAMILY MEDICINE CLINIC | Facility: CLINIC | Age: 71
End: 2021-12-16
Payer: MEDICARE

## 2021-12-16 VITALS
RESPIRATION RATE: 16 BRPM | WEIGHT: 147 LBS | OXYGEN SATURATION: 97 % | HEIGHT: 59 IN | BODY MASS INDEX: 29.64 KG/M2 | DIASTOLIC BLOOD PRESSURE: 78 MMHG | HEART RATE: 95 BPM | TEMPERATURE: 97.7 F | SYSTOLIC BLOOD PRESSURE: 142 MMHG

## 2021-12-16 DIAGNOSIS — I05.0 MILD MITRAL STENOSIS: ICD-10-CM

## 2021-12-16 DIAGNOSIS — M10.071 ACUTE IDIOPATHIC GOUT OF RIGHT FOOT: ICD-10-CM

## 2021-12-16 DIAGNOSIS — F41.9 ANXIETY AND DEPRESSION: Primary | ICD-10-CM

## 2021-12-16 DIAGNOSIS — K21.9 GASTROESOPHAGEAL REFLUX DISEASE WITHOUT ESOPHAGITIS: ICD-10-CM

## 2021-12-16 DIAGNOSIS — F32.A ANXIETY AND DEPRESSION: Primary | ICD-10-CM

## 2021-12-16 DIAGNOSIS — J44.9 CHRONIC OBSTRUCTIVE PULMONARY DISEASE, UNSPECIFIED COPD TYPE (HCC): ICD-10-CM

## 2021-12-16 DIAGNOSIS — I35.0 MILD AORTIC STENOSIS: ICD-10-CM

## 2021-12-16 DIAGNOSIS — S61.210A LACERATION OF RIGHT INDEX FINGER WITHOUT FOREIGN BODY WITHOUT DAMAGE TO NAIL, INITIAL ENCOUNTER: ICD-10-CM

## 2021-12-16 DIAGNOSIS — R06.00 EXERTIONAL DYSPNEA: ICD-10-CM

## 2021-12-16 PROCEDURE — 99213 OFFICE O/P EST LOW 20 MIN: CPT | Performed by: INTERNAL MEDICINE

## 2021-12-16 PROCEDURE — 90714 TD VACC NO PRESV 7 YRS+ IM: CPT

## 2021-12-16 PROCEDURE — 90471 IMMUNIZATION ADMIN: CPT

## 2021-12-16 RX ORDER — ZOLPIDEM TARTRATE 12.5 MG/1
12.5 TABLET, FILM COATED, EXTENDED RELEASE ORAL
Qty: 30 TABLET | Refills: 0 | Status: SHIPPED | OUTPATIENT
Start: 2021-12-16 | End: 2022-02-02 | Stop reason: SDUPTHER

## 2021-12-16 RX ORDER — FLUTICASONE PROPIONATE 50 MCG
2 SPRAY, SUSPENSION (ML) NASAL DAILY
Qty: 48 G | Refills: 3 | Status: SHIPPED | OUTPATIENT
Start: 2021-12-16

## 2021-12-16 RX ORDER — ALPRAZOLAM 0.25 MG/1
0.25 TABLET ORAL DAILY PRN
Qty: 30 TABLET | Refills: 0 | Status: SHIPPED | OUTPATIENT
Start: 2021-12-16 | End: 2022-02-18 | Stop reason: SDUPTHER

## 2021-12-16 RX ORDER — NEBIVOLOL 2.5 MG/1
2.5 TABLET ORAL EVERY MORNING
Qty: 90 TABLET | Refills: 3 | Status: SHIPPED | OUTPATIENT
Start: 2021-12-16

## 2021-12-16 RX ORDER — ALLOPURINOL 100 MG/1
100 TABLET ORAL DAILY
Qty: 90 TABLET | Refills: 3 | Status: SHIPPED | OUTPATIENT
Start: 2021-12-16

## 2021-12-16 RX ORDER — ALBUTEROL SULFATE 90 UG/1
2 AEROSOL, METERED RESPIRATORY (INHALATION) EVERY 6 HOURS PRN
Qty: 54 G | Refills: 3 | Status: SHIPPED | OUTPATIENT
Start: 2021-12-16

## 2021-12-26 DIAGNOSIS — F32.A ANXIETY AND DEPRESSION: ICD-10-CM

## 2021-12-26 DIAGNOSIS — F41.9 ANXIETY AND DEPRESSION: ICD-10-CM

## 2021-12-27 ENCOUNTER — TELEPHONE (OUTPATIENT)
Dept: FAMILY MEDICINE CLINIC | Facility: CLINIC | Age: 71
End: 2021-12-27

## 2021-12-27 DIAGNOSIS — B34.9 VIRAL INFECTION, UNSPECIFIED: Primary | ICD-10-CM

## 2021-12-28 PROCEDURE — U0005 INFEC AGEN DETEC AMPLI PROBE: HCPCS | Performed by: FAMILY MEDICINE

## 2021-12-28 PROCEDURE — U0003 INFECTIOUS AGENT DETECTION BY NUCLEIC ACID (DNA OR RNA); SEVERE ACUTE RESPIRATORY SYNDROME CORONAVIRUS 2 (SARS-COV-2) (CORONAVIRUS DISEASE [COVID-19]), AMPLIFIED PROBE TECHNIQUE, MAKING USE OF HIGH THROUGHPUT TECHNOLOGIES AS DESCRIBED BY CMS-2020-01-R: HCPCS | Performed by: FAMILY MEDICINE

## 2021-12-29 ENCOUNTER — TELEPHONE (OUTPATIENT)
Dept: FAMILY MEDICINE CLINIC | Facility: CLINIC | Age: 71
End: 2021-12-29

## 2021-12-29 RX ORDER — BUPROPION HYDROCHLORIDE 150 MG/1
TABLET ORAL
Qty: 30 TABLET | Refills: 1 | Status: SHIPPED | OUTPATIENT
Start: 2021-12-29 | End: 2022-01-05 | Stop reason: SDUPTHER

## 2022-01-05 DIAGNOSIS — F41.9 ANXIETY AND DEPRESSION: ICD-10-CM

## 2022-01-05 DIAGNOSIS — F32.A ANXIETY AND DEPRESSION: ICD-10-CM

## 2022-01-05 RX ORDER — BUPROPION HYDROCHLORIDE 150 MG/1
150 TABLET ORAL EVERY MORNING
Qty: 30 TABLET | Refills: 5 | Status: SHIPPED | OUTPATIENT
Start: 2022-01-05 | End: 2022-03-21 | Stop reason: SDUPTHER

## 2022-02-02 DIAGNOSIS — F32.A ANXIETY AND DEPRESSION: ICD-10-CM

## 2022-02-02 DIAGNOSIS — F41.9 ANXIETY AND DEPRESSION: ICD-10-CM

## 2022-02-02 RX ORDER — ZOLPIDEM TARTRATE 12.5 MG/1
12.5 TABLET, FILM COATED, EXTENDED RELEASE ORAL
Qty: 30 TABLET | Refills: 0 | Status: SHIPPED | OUTPATIENT
Start: 2022-02-02 | End: 2022-02-21 | Stop reason: SDUPTHER

## 2022-02-03 ENCOUNTER — OFFICE VISIT (OUTPATIENT)
Dept: FAMILY MEDICINE CLINIC | Facility: CLINIC | Age: 72
End: 2022-02-03
Payer: MEDICARE

## 2022-02-03 VITALS
OXYGEN SATURATION: 96 % | HEART RATE: 70 BPM | BODY MASS INDEX: 29.23 KG/M2 | HEIGHT: 59 IN | TEMPERATURE: 98.5 F | SYSTOLIC BLOOD PRESSURE: 142 MMHG | WEIGHT: 145 LBS | RESPIRATION RATE: 22 BRPM | DIASTOLIC BLOOD PRESSURE: 70 MMHG

## 2022-02-03 DIAGNOSIS — J44.1 CHRONIC OBSTRUCTIVE PULMONARY DISEASE WITH ACUTE EXACERBATION (HCC): Primary | ICD-10-CM

## 2022-02-03 DIAGNOSIS — B34.9 VIRAL INFECTION, UNSPECIFIED: ICD-10-CM

## 2022-02-03 DIAGNOSIS — F33.9 DEPRESSION, RECURRENT (HCC): ICD-10-CM

## 2022-02-03 DIAGNOSIS — J12.89 OTHER VIRAL PNEUMONIA: ICD-10-CM

## 2022-02-03 PROCEDURE — 87636 SARSCOV2 & INF A&B AMP PRB: CPT | Performed by: INTERNAL MEDICINE

## 2022-02-03 PROCEDURE — 99213 OFFICE O/P EST LOW 20 MIN: CPT | Performed by: INTERNAL MEDICINE

## 2022-02-03 PROCEDURE — 96372 THER/PROPH/DIAG INJ SC/IM: CPT

## 2022-02-03 RX ORDER — AZITHROMYCIN 250 MG/1
TABLET, FILM COATED ORAL
Qty: 6 TABLET | Refills: 0 | Status: SHIPPED | OUTPATIENT
Start: 2022-02-03 | End: 2022-02-07

## 2022-02-03 RX ORDER — METHYLPREDNISOLONE ACETATE 80 MG/ML
80 INJECTION, SUSPENSION INTRA-ARTICULAR; INTRALESIONAL; INTRAMUSCULAR; SOFT TISSUE ONCE
Status: COMPLETED | OUTPATIENT
Start: 2022-02-03 | End: 2022-02-03

## 2022-02-03 RX ORDER — ALBUTEROL SULFATE 2.5 MG/3ML
2.5 SOLUTION RESPIRATORY (INHALATION) EVERY 6 HOURS PRN
Qty: 150 ML | Refills: 3 | Status: SHIPPED | OUTPATIENT
Start: 2022-02-03

## 2022-02-03 RX ADMIN — METHYLPREDNISOLONE ACETATE 80 MG: 80 INJECTION, SUSPENSION INTRA-ARTICULAR; INTRALESIONAL; INTRAMUSCULAR; SOFT TISSUE at 11:13

## 2022-02-03 NOTE — PROGRESS NOTES
Assessment/Plan:    1  Chronic obstructive pulmonary disease with acute exacerbation [facility])  Assessment & Plan: Will try to treat as outpatient, depomedrol given and antibiotics and nebulizer were sent to her pharmacy  Antibiotics as ordered  Advised ER for any worsening dyspnea  Will check COVID swab and f/u with patient with results and for follow up  Orders:  -     azithromycin (ZITHROMAX) 250 mg tablet; Take 2 tablets today then 1 tablet daily x 4 days  -     methylPREDNISolone acetate (DEPO-MEDROL) injection 80 mg  -     Nebulizer  -     Nebulizer Supplies  -     albuterol (2 5 mg/3 mL) 0 083 % nebulizer solution; Take 3 mL (2 5 mg total) by nebulization every 6 (six) hours as needed for wheezing or shortness of breath    2  Depression, recurrent ([address] )  Assessment & Plan:  She feels this is well controlled and will continue lexapro at current dose  3  Other viral pneumonia   -     Covid/Flu- Office Collect    4  Viral infection, unspecified  -     Covid/Flu- Office Collect          There are no Patient Instructions on file for this visit  No follow-ups on file  Subjective:      Patient ID: Rose Andrade is a 66 y o  female  Chief Complaint   Patient presents with    Shortness of Breath     cold for a week  SOB since yesterday  ac/lpn    Fever     taking dayquil and nyquil    Cough    Wheezing       Has had a cold for about a week, taking dayquil and nyquil  Now has shortness of breath for the past 3 days  Has a lot of wheezing  Has a fever at home, has a headache  No loss of taste or smell  Is boosted  Is taking albuterol inhaler without relief  No other medications tried  The following portions of the patient's history were reviewed and updated as appropriate: allergies, current medications, past family history, past medical history, past social history, past surgical history and problem list     Review of Systems   Constitutional: Positive for fatigue and fever     HENT: Positive for congestion, rhinorrhea and sore throat  Respiratory: Positive for cough, shortness of breath and wheezing  Current Outpatient Medications   Medication Sig Dispense Refill    acetaminophen (TYLENOL) 325 mg tablet Take 2 tablets (650 mg total) by mouth every 6 (six) hours as needed for mild pain, headaches or fever  30 tablet 0    albuterol (PROVENTIL HFA,VENTOLIN HFA) 90 mcg/act inhaler Inhale 2 puffs every 6 (six) hours as needed for wheezing 54 g 3    allopurinol (ZYLOPRIM) 100 mg tablet Take 1 tablet (100 mg total) by mouth daily 90 tablet 3    ALPRAZolam (XANAX) 0 25 mg tablet Take 1 tablet (0 25 mg total) by mouth daily as needed for anxiety 30 tablet 0    Apple Cider Vinegar 188 MG CAPS Take by mouth daily      aspirin (ECOTRIN LOW STRENGTH) 81 mg EC tablet Take 81 mg by mouth daily      buPROPion (WELLBUTRIN XL) 150 mg 24 hr tablet Take 1 tablet (150 mg total) by mouth every morning 30 tablet 5    Diclofenac Sodium (VOLTAREN) 1 % Apply 2 g topically 4 (four) times a day 3 g 3    ergocalciferol (VITAMIN D2) 50,000 units Take 50,000 Units by mouth once a week  On wednesday      escitalopram (LEXAPRO) 20 mg tablet Take 1 tablet (20 mg total) by mouth daily 90 tablet 1    esomeprazole (NexIUM) 20 mg capsule Take 1 capsule (20 mg total) by mouth daily in the early morning 90 capsule 3    fluticasone (FLONASE) 50 mcg/act nasal spray 2 sprays into each nostril daily 48 g 3    fluticasone-salmeterol (ADVAIR HFA) 115-21 MCG/ACT inhaler Inhale 1 puff daily 48 g 3    Multiple Vitamin (MULTIVITAMIN) tablet Take 1 tablet by mouth daily        nebivolol (BYSTOLIC) 2 5 mg tablet Take 1 tablet (2 5 mg total) by mouth every morning 90 tablet 3    olopatadine (Patanol) 0 1 % ophthalmic solution Apply 1 drop to eye 2 (two) times a day 15 mL 3    zolpidem (AMBIEN CR) 12 5 MG CR tablet Take 1 tablet (12 5 mg total) by mouth daily at bedtime as needed for sleep 30 tablet 0    albuterol (2 5 mg/3 mL) 0 083 % nebulizer solution Take 3 mL (2 5 mg total) by nebulization every 6 (six) hours as needed for wheezing or shortness of breath 150 mL 3    azithromycin (ZITHROMAX) 250 mg tablet Take 2 tablets today then 1 tablet daily x 4 days 6 tablet 0     No current facility-administered medications for this visit  Objective:    /70   Pulse 70   Temp 98 5 °F (36 9 °C)   Resp 22   Ht 4' 11" (1 499 m)   Wt 65 8 kg (145 lb)   LMP  (LMP Unknown)   SpO2 96%   BMI 29 29 kg/m²        Physical Exam  Constitutional:       Appearance: She is well-developed  HENT:      Right Ear: Tympanic membrane is retracted  Left Ear: Tympanic membrane is retracted  Nose: Mucosal edema and rhinorrhea present  Cardiovascular:      Rate and Rhythm: Normal rate and regular rhythm  Heart sounds: Normal heart sounds  Pulmonary:      Effort: Pulmonary effort is normal       Breath sounds: Decreased breath sounds present  No wheezing or rales  Comments: Decreased breath sounds throughout  Musculoskeletal:      Cervical back: Neck supple  Lymphadenopathy:      Cervical: No cervical adenopathy  Neurological:      Mental Status: She is alert                  Kaila Hatfield MD

## 2022-02-03 NOTE — ASSESSMENT & PLAN NOTE
Will try to treat as outpatient, depomedrol given and antibiotics and nebulizer were sent to her pharmacy  Antibiotics as ordered  Advised ER for any worsening dyspnea  Will check COVID swab and f/u with patient with results and for follow up

## 2022-02-04 ENCOUNTER — TELEPHONE (OUTPATIENT)
Dept: FAMILY MEDICINE CLINIC | Facility: CLINIC | Age: 72
End: 2022-02-04

## 2022-02-04 LAB
FLUAV RNA RESP QL NAA+PROBE: NEGATIVE
FLUBV RNA RESP QL NAA+PROBE: NEGATIVE
SARS-COV-2 RNA RESP QL NAA+PROBE: NEGATIVE

## 2022-02-04 NOTE — TELEPHONE ENCOUNTER
----- Message from Ebenezer Potter MD sent at 2/4/2022 11:17 AM EST -----  Can we please let pt know that her covid/flu is negative  Thank you

## 2022-02-18 DIAGNOSIS — F32.A ANXIETY AND DEPRESSION: ICD-10-CM

## 2022-02-18 DIAGNOSIS — F41.9 ANXIETY AND DEPRESSION: ICD-10-CM

## 2022-02-19 RX ORDER — ALPRAZOLAM 0.25 MG/1
0.25 TABLET ORAL DAILY PRN
Qty: 30 TABLET | Refills: 0 | Status: SHIPPED | OUTPATIENT
Start: 2022-02-19 | End: 2022-03-21 | Stop reason: SDUPTHER

## 2022-02-21 ENCOUNTER — OFFICE VISIT (OUTPATIENT)
Dept: FAMILY MEDICINE CLINIC | Facility: CLINIC | Age: 72
End: 2022-02-21
Payer: MEDICARE

## 2022-02-21 ENCOUNTER — TELEPHONE (OUTPATIENT)
Dept: GERIATRICS | Age: 72
End: 2022-02-21

## 2022-02-21 ENCOUNTER — TELEPHONE (OUTPATIENT)
Dept: FAMILY MEDICINE CLINIC | Facility: CLINIC | Age: 72
End: 2022-02-21

## 2022-02-21 VITALS
RESPIRATION RATE: 16 BRPM | BODY MASS INDEX: 28.63 KG/M2 | SYSTOLIC BLOOD PRESSURE: 132 MMHG | OXYGEN SATURATION: 98 % | HEART RATE: 61 BPM | TEMPERATURE: 97.4 F | DIASTOLIC BLOOD PRESSURE: 78 MMHG | HEIGHT: 59 IN | WEIGHT: 142 LBS

## 2022-02-21 DIAGNOSIS — F51.01 PRIMARY INSOMNIA: ICD-10-CM

## 2022-02-21 DIAGNOSIS — F33.1 MODERATE EPISODE OF RECURRENT MAJOR DEPRESSIVE DISORDER (HCC): Primary | ICD-10-CM

## 2022-02-21 PROCEDURE — 99213 OFFICE O/P EST LOW 20 MIN: CPT | Performed by: FAMILY MEDICINE

## 2022-02-21 RX ORDER — ESCITALOPRAM OXALATE 20 MG/1
20 TABLET ORAL DAILY
Qty: 90 TABLET | Refills: 1 | Status: SHIPPED | OUTPATIENT
Start: 2022-02-21 | End: 2022-06-15 | Stop reason: SDUPTHER

## 2022-02-21 RX ORDER — ZOLPIDEM TARTRATE 12.5 MG/1
12.5 TABLET, FILM COATED, EXTENDED RELEASE ORAL
Qty: 30 TABLET | Refills: 1 | Status: SHIPPED | OUTPATIENT
Start: 2022-02-21 | End: 2022-03-21 | Stop reason: SDUPTHER

## 2022-02-21 NOTE — ASSESSMENT & PLAN NOTE
Not controlled  Depression had worsened when she stopped her lexapro  lexapro restarted and will continue wellbutrin      I let her know that I strongly support her starting therapy this week as scheduled and going to [facility]

## 2022-02-21 NOTE — TELEPHONE ENCOUNTER
[place] [address]  [phone]    Telephone Intake: Geriatric Assessment     Referral source: Family took mother in to the office                                         Caller who is scheduling/relationship to patient: Carlito Bowman 0 daughter  Caller's phone number: [phone]              Is there a living will/healthcare POA in place/If so, who? No, not right now    Reason for referral: Patient concerns  regarding memory concerns  What is the goal of the visit? not able to drive, short term memory off and on, emothional mood swings     Has the patient been seen by a Neurologist or Geriatrician? No  Has the patient ever been diagnosed with dementia? No      Preferred language? eniglish  Highest education level? High school  Does the patient wear glasses? Yes   Does the patient use hearing aids? Yes      Does the patient and/or caregiver have access for a virtual visit (computer or smart phone, working audio and video)? Yes     Caller was informed:   Please make sure the pt is accompanied by someone who knows them well / caregiver / family member to participate in this appointment  If a pt plans to attend alone, please route a message to the assigned provider for approval  Who will accompany the patient (name and relationship)? Carlito Bowman daughter    Office packet mailed out to: Carlito Bowman,  [address] [place] [address]  Patient added to wait list for sooner appointments? Yes     NOTE FOR : If the patient was recently hospitalized, please route intake to assigned provider for chart review

## 2022-02-21 NOTE — TELEPHONE ENCOUNTER
Daughter of Noemitrisha Heriberto calling stating her mom doesn't want to live anymore and she is worried      [facility] to [facility]

## 2022-02-21 NOTE — TELEPHONE ENCOUNTER
Spoke to patients daughter  She says the patient was expressing feeling depressed  She states she said, "im better off dead"  The patient is currently home with her sons and they will stay with her until she can be seen/evaluated  Patient scheduled to see Dr Lorin Schwab today   Sending as Slade Yang MA

## 2022-03-16 NOTE — PROGRESS NOTES
Assessment/Plan:    1  Moderate episode of recurrent major depressive disorder [facility])  Assessment & Plan: This is improved but still not to goal  She also feels she has a lot of issues she needs to work through and would like to see a therapist, referral was provided  Will continue escitalopram at current dose but increase bupropion to 300 mg daily  She was asked to follow up in one month for reassessment  Asked patient to call sooner than next scheduled appointment for any complaints or issues  Orders:  -     Ambulatory Referral to Psychiatry; Future    2  Anxiety and depression  -     Ambulatory Referral to Psychiatry; Future  -     buPROPion (WELLBUTRIN XL) 300 mg 24 hr tablet; Take 1 tablet (300 mg total) by mouth every morning  -     ALPRAZolam (XANAX) 0 25 mg tablet; Take 1 tablet (0 25 mg total) by mouth daily as needed for anxiety    3  Primary insomnia  -     zolpidem (AMBIEN CR) 12 5 MG CR tablet; Take 1 tablet (12 5 mg total) by mouth daily at bedtime as needed for sleep    4  Colon cancer screening  -     Ambulatory referral for colonoscopy; Future    5  Tobacco abuse  Assessment & Plan:  She is unwilling and feels she is unable at this point to quit  She will repeat her CT screening lung exam in May  Orders:  -     CT lung screening program; Future; Expected date: 03/21/2022    6  Personal history of nicotine dependence   -     CT lung screening program; Future; Expected date: 03/21/2022          There are no Patient Instructions on file for this visit  Return in about 4 weeks (around 4/18/2022)  Subjective:      Patient ID: Candace Enriquez is a 66 y o  female  Chief Complaint   Patient presents with    Follow-up     1 month [facility]       Here for one month follow up of depression  She is feeling a bit better but still has a lot of depression  Daughter (present at visit) feels she is improved as well  She feels she could be a bit better and would like to try to titrate her medication  There is no SI/HI  She feels she is eating too much due to depression  Sleeping okay  No side effects with meds  Does not feel she is able to quit smoking at this time  The following portions of the patient's history were reviewed and updated as appropriate: allergies, current medications, past family history, past medical history, past social history, past surgical history and problem list     Review of Systems   Constitutional: Negative  Respiratory: Negative  Cardiovascular: Negative  Psychiatric/Behavioral: Positive for dysphoric mood  Current Outpatient Medications   Medication Sig Dispense Refill    acetaminophen (TYLENOL) 325 mg tablet Take 2 tablets (650 mg total) by mouth every 6 (six) hours as needed for mild pain, headaches or fever   30 tablet 0    albuterol (2 5 mg/3 mL) 0 083 % nebulizer solution Take 3 mL (2 5 mg total) by nebulization every 6 (six) hours as needed for wheezing or shortness of breath 150 mL 3    albuterol (PROVENTIL HFA,VENTOLIN HFA) 90 mcg/act inhaler Inhale 2 puffs every 6 (six) hours as needed for wheezing 54 g 3    allopurinol (ZYLOPRIM) 100 mg tablet Take 1 tablet (100 mg total) by mouth daily 90 tablet 3    ALPRAZolam (XANAX) 0 25 mg tablet Take 1 tablet (0 25 mg total) by mouth daily as needed for anxiety 30 tablet 0    Apple Cider Vinegar 188 MG CAPS Take by mouth daily      aspirin (ECOTRIN LOW STRENGTH) 81 mg EC tablet Take 81 mg by mouth daily      buPROPion (WELLBUTRIN XL) 300 mg 24 hr tablet Take 1 tablet (300 mg total) by mouth every morning 30 tablet 3    Diclofenac Sodium (VOLTAREN) 1 % Apply 2 g topically 4 (four) times a day 3 g 3    escitalopram (LEXAPRO) 20 mg tablet Take 1 tablet (20 mg total) by mouth daily 90 tablet 1    esomeprazole (NexIUM) 20 mg capsule Take 1 capsule (20 mg total) by mouth daily in the early morning 90 capsule 3    fluticasone (FLONASE) 50 mcg/act nasal spray 2 sprays into each nostril daily 48 g 3    fluticasone-salmeterol (ADVAIR HFA) 115-21 MCG/ACT inhaler Inhale 1 puff daily 48 g 3    Multiple Vitamin (MULTIVITAMIN) tablet Take 1 tablet by mouth daily   nebivolol (BYSTOLIC) 2 5 mg tablet Take 1 tablet (2 5 mg total) by mouth every morning 90 tablet 3    olopatadine (Patanol) 0 1 % ophthalmic solution Apply 1 drop to eye 2 (two) times a day 15 mL 3    VITAMIN D, ERGOCALCIFEROL, PO Take by mouth      zolpidem (AMBIEN CR) 12 5 MG CR tablet Take 1 tablet (12 5 mg total) by mouth daily at bedtime as needed for sleep 30 tablet 1     No current facility-administered medications for this visit  Objective:    /88   Pulse 67   Temp (!) 97 3 °F (36 3 °C)   Resp 16   Ht 4' 11" (1 499 m)   Wt 65 3 kg (144 lb)   LMP  (LMP Unknown)   SpO2 97%   BMI 29 08 kg/m²        Physical Exam  Constitutional:       Appearance: She is well-developed  Eyes:      Conjunctiva/sclera: Conjunctivae normal    Neck:      Thyroid: No thyromegaly  Vascular: No JVD  Cardiovascular:      Rate and Rhythm: Normal rate and regular rhythm  Heart sounds: Normal heart sounds  No murmur heard  No friction rub  No gallop  Pulmonary:      Effort: Pulmonary effort is normal       Breath sounds: Normal breath sounds  No wheezing or rales  Abdominal:      General: Bowel sounds are normal  There is no distension  Palpations: Abdomen is soft  Tenderness: There is no abdominal tenderness  Musculoskeletal:      Cervical back: Neck supple  Psychiatric:         Mood and Affect: Mood is depressed  Behavior: Behavior normal          Thought Content: Thought content normal  Thought content does not include homicidal or suicidal ideation  Thought content does not include homicidal or suicidal plan           Judgment: Judgment normal                 Daniella Lee MD

## 2022-03-21 ENCOUNTER — CONSULT (OUTPATIENT)
Dept: GERIATRICS | Age: 72
End: 2022-03-21
Payer: MEDICARE

## 2022-03-21 ENCOUNTER — OFFICE VISIT (OUTPATIENT)
Dept: FAMILY MEDICINE CLINIC | Facility: CLINIC | Age: 72
End: 2022-03-21
Payer: MEDICARE

## 2022-03-21 VITALS
BODY MASS INDEX: 29.03 KG/M2 | TEMPERATURE: 97.3 F | WEIGHT: 144 LBS | RESPIRATION RATE: 16 BRPM | SYSTOLIC BLOOD PRESSURE: 144 MMHG | DIASTOLIC BLOOD PRESSURE: 88 MMHG | HEART RATE: 67 BPM | OXYGEN SATURATION: 97 % | HEIGHT: 59 IN

## 2022-03-21 VITALS
SYSTOLIC BLOOD PRESSURE: 142 MMHG | OXYGEN SATURATION: 97 % | DIASTOLIC BLOOD PRESSURE: 78 MMHG | BODY MASS INDEX: 28.16 KG/M2 | RESPIRATION RATE: 16 BRPM | TEMPERATURE: 97.5 F | HEART RATE: 61 BPM | WEIGHT: 143.4 LBS | HEIGHT: 60 IN

## 2022-03-21 DIAGNOSIS — J44.1 CHRONIC OBSTRUCTIVE PULMONARY DISEASE WITH ACUTE EXACERBATION (HCC): ICD-10-CM

## 2022-03-21 DIAGNOSIS — N39.46 MIXED STRESS AND URGE URINARY INCONTINENCE: ICD-10-CM

## 2022-03-21 DIAGNOSIS — Z12.11 COLON CANCER SCREENING: ICD-10-CM

## 2022-03-21 DIAGNOSIS — K21.9 GASTROESOPHAGEAL REFLUX DISEASE WITHOUT ESOPHAGITIS: ICD-10-CM

## 2022-03-21 DIAGNOSIS — H91.93 BILATERAL HEARING LOSS, UNSPECIFIED HEARING LOSS TYPE: ICD-10-CM

## 2022-03-21 DIAGNOSIS — F43.21 ADJUSTMENT DISORDER WITH DEPRESSED MOOD: ICD-10-CM

## 2022-03-21 DIAGNOSIS — I25.10 ARTERIOSCLEROTIC CARDIOVASCULAR DISEASE: ICD-10-CM

## 2022-03-21 DIAGNOSIS — Z87.891 PERSONAL HISTORY OF NICOTINE DEPENDENCE: ICD-10-CM

## 2022-03-21 DIAGNOSIS — R53.81 DEBILITY: ICD-10-CM

## 2022-03-21 DIAGNOSIS — J30.2 SEASONAL ALLERGIC RHINITIS, UNSPECIFIED TRIGGER: ICD-10-CM

## 2022-03-21 DIAGNOSIS — F41.9 ANXIETY AND DEPRESSION: ICD-10-CM

## 2022-03-21 DIAGNOSIS — F32.A ANXIETY AND DEPRESSION: ICD-10-CM

## 2022-03-21 DIAGNOSIS — E78.2 MIXED HYPERLIPIDEMIA: ICD-10-CM

## 2022-03-21 DIAGNOSIS — F17.210 TOBACCO DEPENDENCE DUE TO CIGARETTES: ICD-10-CM

## 2022-03-21 DIAGNOSIS — I10 ESSENTIAL HYPERTENSION: ICD-10-CM

## 2022-03-21 DIAGNOSIS — F33.1 MODERATE EPISODE OF RECURRENT MAJOR DEPRESSIVE DISORDER (HCC): Primary | ICD-10-CM

## 2022-03-21 DIAGNOSIS — Z72.0 TOBACCO ABUSE: ICD-10-CM

## 2022-03-21 DIAGNOSIS — M17.0 PRIMARY OSTEOARTHRITIS OF BOTH KNEES: ICD-10-CM

## 2022-03-21 DIAGNOSIS — F51.02 ADJUSTMENT INSOMNIA: ICD-10-CM

## 2022-03-21 DIAGNOSIS — F51.01 PRIMARY INSOMNIA: ICD-10-CM

## 2022-03-21 DIAGNOSIS — R68.89 FORGETFULNESS: ICD-10-CM

## 2022-03-21 DIAGNOSIS — E55.9 VITAMIN D DEFICIENCY: ICD-10-CM

## 2022-03-21 PROBLEM — F33.9 DEPRESSION, RECURRENT (HCC): Status: RESOLVED | Noted: 2021-05-10 | Resolved: 2022-03-21

## 2022-03-21 PROCEDURE — 99213 OFFICE O/P EST LOW 20 MIN: CPT | Performed by: INTERNAL MEDICINE

## 2022-03-21 PROCEDURE — 99205 OFFICE O/P NEW HI 60 MIN: CPT | Performed by: INTERNAL MEDICINE

## 2022-03-21 RX ORDER — BUPROPION HYDROCHLORIDE 300 MG/1
300 TABLET ORAL EVERY MORNING
Qty: 30 TABLET | Refills: 3 | Status: SHIPPED | OUTPATIENT
Start: 2022-03-21 | End: 2022-04-27 | Stop reason: SDUPTHER

## 2022-03-21 RX ORDER — ALPRAZOLAM 0.25 MG/1
0.25 TABLET ORAL DAILY PRN
Qty: 30 TABLET | Refills: 0 | Status: SHIPPED | OUTPATIENT
Start: 2022-03-21 | End: 2022-04-27 | Stop reason: SDUPTHER

## 2022-03-21 RX ORDER — ZOLPIDEM TARTRATE 12.5 MG/1
12.5 TABLET, FILM COATED, EXTENDED RELEASE ORAL
Qty: 30 TABLET | Refills: 1 | Status: SHIPPED | OUTPATIENT
Start: 2022-03-21 | End: 2022-04-27 | Stop reason: SDUPTHER

## 2022-03-21 NOTE — ASSESSMENT & PLAN NOTE
She is unwilling and feels she is unable at this point to quit  She will repeat her CT screening lung exam in May
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
## 2022-03-21 NOTE — ASSESSMENT & PLAN NOTE
This is improved but still not to goal  She also feels she has a lot of issues she needs to work through and would like to see a therapist, referral was provided  Will continue escitalopram at current dose but increase bupropion to 300 mg daily  She was asked to follow up in one month for reassessment  Asked patient to call sooner than next scheduled appointment for any complaints or issues

## 2022-03-22 ENCOUNTER — TELEPHONE (OUTPATIENT)
Dept: PSYCHIATRY | Facility: CLINIC | Age: 72
End: 2022-03-22

## 2022-03-29 ENCOUNTER — OFFICE VISIT (OUTPATIENT)
Dept: GERIATRICS | Age: 72
End: 2022-03-29
Payer: MEDICARE

## 2022-03-29 DIAGNOSIS — R41.3 MEMORY LOSS: ICD-10-CM

## 2022-03-29 PROCEDURE — 96138 PSYCL/NRPSYC TECH 1ST: CPT | Performed by: INTERNAL MEDICINE

## 2022-03-29 PROCEDURE — 96139 PSYCL/NRPSYC TST TECH EA: CPT | Performed by: INTERNAL MEDICINE

## 2022-03-29 NOTE — PROGRESS NOTES
[address]  [phone]    Alida Orozco was here today for [facility] comprehensive test  It took the patient 76 minutes to complete

## 2022-03-30 ENCOUNTER — HOSPITAL ENCOUNTER (OUTPATIENT)
Dept: RADIOLOGY | Facility: HOSPITAL | Age: 72
Discharge: HOME | End: 2022-03-30
Attending: INTERNAL MEDICINE
Payer: MEDICARE

## 2022-03-30 DIAGNOSIS — Z72.0 TOBACCO ABUSE: ICD-10-CM

## 2022-03-30 DIAGNOSIS — Z87.891 PERSONAL HISTORY OF NICOTINE DEPENDENCE: ICD-10-CM

## 2022-03-30 PROCEDURE — 71271 CT THORAX LUNG CANCER SCR C-: CPT

## 2022-04-13 ENCOUNTER — CONSULT (OUTPATIENT)
Dept: GASTROENTEROLOGY | Facility: CLINIC | Age: 72
End: 2022-04-13
Payer: MEDICARE

## 2022-04-13 VITALS
BODY MASS INDEX: 28.27 KG/M2 | HEART RATE: 62 BPM | WEIGHT: 144 LBS | SYSTOLIC BLOOD PRESSURE: 140 MMHG | TEMPERATURE: 98.2 F | DIASTOLIC BLOOD PRESSURE: 82 MMHG | OXYGEN SATURATION: 96 % | HEIGHT: 60 IN

## 2022-04-13 DIAGNOSIS — Z86.010 HISTORY OF COLON POLYPS: Primary | ICD-10-CM

## 2022-04-13 DIAGNOSIS — Z98.84 HISTORY OF GASTRIC BYPASS: ICD-10-CM

## 2022-04-13 DIAGNOSIS — Z12.11 COLON CANCER SCREENING: ICD-10-CM

## 2022-04-13 DIAGNOSIS — K21.9 GASTROESOPHAGEAL REFLUX DISEASE WITHOUT ESOPHAGITIS: ICD-10-CM

## 2022-04-13 PROCEDURE — 99204 OFFICE O/P NEW MOD 45 MIN: CPT | Performed by: PHYSICIAN ASSISTANT

## 2022-04-13 RX ORDER — SODIUM PICOSULFATE, MAGNESIUM OXIDE, AND ANHYDROUS CITRIC ACID 10; 3.5; 12 MG/160ML; G/160ML; G/160ML
1 LIQUID ORAL ONCE
Qty: 320 ML | Refills: 0 | Status: SHIPPED | OUTPATIENT
Start: 2022-04-13 | End: 2022-04-13

## 2022-04-13 NOTE — PROGRESS NOTES
[facility] - Outpatient Follow-up Note  Winsome Hodgson 66 y o  female MRN: [id number]  Encounter: [id number]          ASSESSMENT AND PLAN:      1  History of colon polyps  Reports last colonoscopy around 10-15 years ago  Reports having polyps and repeat recommended in 5 years  Bowel movements regular at this time  No blood in stool, decreased appetite or weight loss  No family history of GI malignancies  Lab work last year with normal hemoglobin  -schedule colonoscopy   -discussed the risks of procedure including bleeding, infection perforation   -clenpiq ordered  Can use MiraLax with magnesium citrate if not covered      2  History of gastric bypass complicated by marginal ulcer  3  Tobacco abuse  4  GERD  Patient had EGD in 2015 showing marginal ulcer with pouchitis  Patient takes Nexium daily  Denies any upper abdominal pain or melena  Does continue to smoke at least half a pack a day     -will schedule EGD at same time as colonoscopy to assess for healing of marginal ulcer   -recommend continuing Nexium daily   -continue to avoid NSAIDs  -highly recommended smoking cessation  -further recommendations after endoscopy      5  Diarrhea with incontinence  Symptoms now resolved  Occurred few weeks ago, possibly secondary to medication adjustments at that time     -discussed that if symptoms reoccur, would recommend reaching out to our office  She did have some antibiotics several weeks prior to this starting  Would rule out infection if persistent   -would also recommend starting fiber supplement such as Metamucil, Benefiber or Citrucel daily now or if this were to occur again          Follow-up after procedures  ______________________________________________________________________    SUBJECTIVE:      Winsome Hodgson is a 70-year-old female with past medical history of COPD, hypertension, osteoarthritis, gastric bypass who presents as a new patient for colon cancer screening  Patient reports few weeks ago she had persistent watery bowel movements which led to episodes of incontinence  This lasted for about 3 weeks  Since then symptoms have completely stopped  She reports 2 formed bowel movements daily with no further episodes of incontinence  The believe this may be secondary to medication change around that time and significant stressors due to this  Denies any blood in the stool, decreased appetite or unintentional weight loss  Patient gastric bypass over 15 years ago complicated by marginal ulcer  She reports she is doing relatively well without any nausea, vomiting, dysphagia or odynophagia  She takes Nexium daily which controls her reflux symptoms  Avoids NSAIDs  Patient does smoke about half a pack at least a day  No alcohol use  Abdominal surgeries consistent for hysterectomy, cholecystectomy, appendectomy, gastric bypass  No family history of colon cancer  Reports having colonoscopy about 10-15 years ago  Believe she was due for repeat in 5 years due to history of polyps  EGD in 2015 showed marginal ulcer  REVIEW OF SYSTEMS IS OTHERWISE NEGATIVE  Historical Information   Past Medical History:   Diagnosis Date    Acid reflux     Arthritis     Asthma     Cardiac disease     Chronic kidney disease     passed on own kidney stone    Diverticulitis     GERD (gastroesophageal reflux disease)     Hayfever     [place] (hard of hearing)     bilateral hearing aids    Hyperlipemia     Hypertension     Tachycardia      Past Surgical History:   Procedure Laterality Date    ABLATION OF DYSRHYTHMIC FOCUS      APPENDECTOMY      CHOLECYSTECTOMY      open    FRACTURE SURGERY      ORIF fx (L) tibia, fibula 2009    GASTRIC BYPASS OPEN      HYSTERECTOMY      [place] XCAPSL CTRC RMVL INSJ IO LENS PROSTH W/O ECP Left 4/18/2016    Procedure: EXTRACTION EXTRACAPSULAR CATARACT PHACO INTRAOCULAR LENS (IOL);   Surgeon: Hosea Barth MD;  [facility];  Service: Ophthalmology    [place] XCAPSL CTRC RMVL INSJ IO LENS PROSTH W/O ECP Right 3/1/2021    Procedure: EXTRACTION EXTRACAPSULAR CATARACT PHACO INTRAOCULAR LENS (IOL);   Surgeon: Jovani Romero MD;  Location: [facility] MAIN [facility];  Service: Ophthalmology    TONSILECTOMY AND ADNOIDECTOMY       Social History   Social History     Substance and Sexual Activity   Alcohol Use Yes    Comment: rarely     Social History     Substance and Sexual Activity   Drug Use No     Social History     Tobacco Use   Smoking Status Current Every Day Smoker    Packs/day: 0 50    Years: 25 00    Pack years: 12 50   Smokeless Tobacco Never Used     Family History   Problem Relation Age of Onset    Heart disease Mother     Stroke Son     Stroke Maternal Grandfather     Breast cancer Daughter 36       Meds/Allergies       Current Outpatient Medications:     acetaminophen (TYLENOL) 325 mg tablet    albuterol (2 5 mg/3 mL) 0 083 % nebulizer solution    albuterol (PROVENTIL HFA,VENTOLIN HFA) 90 mcg/act inhaler    allopurinol (ZYLOPRIM) 100 mg tablet    ALPRAZolam (XANAX) 0 25 mg tablet    Apple Cider Vinegar 188 MG CAPS    aspirin (ECOTRIN LOW STRENGTH) 81 mg EC tablet    buPROPion (WELLBUTRIN XL) 300 mg 24 hr tablet    Diclofenac Sodium (VOLTAREN) 1 %    escitalopram (LEXAPRO) 20 mg tablet    esomeprazole (NexIUM) 20 mg capsule    fluticasone (FLONASE) 50 mcg/act nasal spray    fluticasone-salmeterol (ADVAIR HFA) 115-21 MCG/ACT inhaler    Multiple Vitamin (MULTIVITAMIN) tablet    nebivolol (BYSTOLIC) 2 5 mg tablet    olopatadine (Patanol) 0 1 % ophthalmic solution    VITAMIN D, ERGOCALCIFEROL, PO    zolpidem (AMBIEN CR) 12 5 MG CR tablet    Sod Picosulfate-Mag Ox-Cit Acd (Clenpiq) 10-3 5-12 MG-GM -GM/160ML SOLN    Allergies   Allergen Reactions    Augmentin [Amoxicillin-Pot Clavulanate] GI Intolerance, Other (See Comments) and Hives     VOMITING  VOMITING  Reaction Date: 07Dec2004;     Sulfa Antibiotics Itching, Other (See Comments) and Hives     RASH, ITCHY  RASH, ITCHY    Morphine Other (See Comments)     "DOESN'T WORK"    Latex Rash     Unsure if this is an allergy           Objective     Blood pressure 140/82, pulse 62, temperature 98 2 °F (36 8 °C), height 5' 0 25" (1 53 m), weight 65 3 kg (144 lb), SpO2 96 %, not currently breastfeeding  Body mass index is 27 89 kg/m²  PHYSICAL EXAM:      General Appearance:   Alert, cooperative, no distress   HEENT:   Normocephalic, atraumatic, anicteric      Neck:  Supple, symmetrical, trachea midline   Lungs:   Clear to auscultation bilaterally; no rales, rhonchi or wheezing; respirations unlabored    Heart[de-identified]   Regular rate and rhythm; no murmur, rub, or gallop  Abdomen:   Soft, non-tender, non-distended; normal bowel sounds; no masses, no organomegaly    Genitalia:   Deferred    Rectal:   Deferred    Extremities:  No cyanosis, clubbing or edema    Pulses:  2+ and symmetric    Skin:  No jaundice, rashes, or lesions    Lymph nodes:  No palpable cervical lymphadenopathy        Lab Results:   No visits with results within 1 Day(s) from this visit  Latest known visit with results is:   Office Visit on 02/03/2022   Component Date Value    SARS-CoV-2 02/03/2022 Negative     INFLUENZA A PCR 02/03/2022 Negative     INFLUENZA B PCR 02/03/2022 Negative          Radiology Results:   CT lung screening program    Result Date: 4/3/2022  Narrative: CT CHEST LUNG CANCER SCREENING WITHOUT IV CONTRAST INDICATION:   Z72 0: Tobacco use Z87 891: Personal history of nicotine dependence  COMPARISON: CT lung screening 1/31/2019 and 5/21/2021  CTA chest 2/9/2016  Dinorah Carr TECHNIQUE:  Unenhanced CT examination of the chest was performed utilizing a low dose protocol  Reformatted images were created in axial, sagittal, and coronal planes  Radiation dose length product (DLP) for this visit:  93 04 mGy-cm     This examination, like all CT scans performed in the [facility], was performed utilizing techniques to minimize radiation dose exposure, including the use of iterative reconstruction and automated exposure control  FINDINGS: LUNGS:  A known well-circumscribed oval 10 mm right lower lobe nodule #3/60 is unchanged since 2016  No new pulmonary findings  Mild upper lobe predominant pulmonary emphysema  Mild biapical pleural/parenchymal scarring  Mild linear areas of scarring in both lower lobes  PLEURA:  Unremarkable  HEART/GREAT VESSELS: Unchanged cardiomegaly  Dense mitral annulus and mild aortic annulus calcifications  No thoracic aortic aneurysm  Atherosclerotic calcifications of the aorta and coronary arteries  MEDIASTINUM AND ASHLY:  Unremarkable  CHEST WALL AND LOWER NECK:   Unremarkable  VISUALIZED STRUCTURES IN THE UPPER ABDOMEN:  Unchanged left adrenal adenoma  OSSEOUS STRUCTURES:  No acute fracture or destructive osseous lesion  Thoracic spine kyphosis  Impression: No nodules and/or definitely benign nodules  Lung-RADS1, negative  Continued annual screening with LDCT in 12 months   Workstation performed: [id number]

## 2022-04-13 NOTE — PATIENT INSTRUCTIONS
Scheduled date of colonoscopyEGD (as of today): 05/25/22  Physician performing colonoscopy: Jenn Fox  Location of colonoscopy: [facility]  Bowel prep reviewed with patient: CLENPIQ (LACI/MAG AS ALTERNATE)  Instructions reviewed with patient by: VIRGINIA  Clearances: MITA

## 2022-04-21 NOTE — PROGRESS NOTES
Assessment/Plan:    1  Moderate episode of recurrent major depressive disorder [facility])  Assessment & Plan:  Much improved and advised not to stop medications without medical advice and to call for any needed refills prior to next appointment  Asked patient to call sooner than next scheduled appointment for any complaints or issues  2  Anxiety and depression  -     buPROPion (WELLBUTRIN XL) 300 mg 24 hr tablet; Take 1 tablet (300 mg total) by mouth every morning  -     ALPRAZolam (XANAX) 0 25 mg tablet; Take 1 tablet (0 25 mg total) by mouth daily as needed for anxiety    3  Primary insomnia  -     zolpidem (AMBIEN CR) 12 5 MG CR tablet; Take 1 tablet (12 5 mg total) by mouth daily at bedtime as needed for sleep            There are no Patient Instructions on file for this visit  Return in about 4 months (around 8/27/2022) for 1/2 hour follow up and AWV  Subjective:      Patient ID: Carli Dyer is a 66 y o  female  Chief Complaint   Patient presents with    Follow-up     [id number]       Here for follow up of depression  She is feeling much better since she is back on her medications  There are no side effects  Feels her mood is well controlled and does not feel she needs to make any adjustments at this time  Denies HI/SI  She continues to smoke and but is down to less than 1/2 pack per day  She does not want help in quitting  Reviewed CT screening lung exam       The following portions of the patient's history were reviewed and updated as appropriate: allergies, current medications, past family history, past medical history, past social history, past surgical history and problem list     Review of Systems   Constitutional: Negative  Respiratory: Negative  Cardiovascular: Negative  Psychiatric/Behavioral: Negative            Current Outpatient Medications   Medication Sig Dispense Refill    acetaminophen (TYLENOL) 325 mg tablet Take 2 tablets (650 mg total) by mouth every 6 (six) hours as needed for mild pain, headaches or fever  30 tablet 0    albuterol (2 5 mg/3 mL) 0 083 % nebulizer solution Take 3 mL (2 5 mg total) by nebulization every 6 (six) hours as needed for wheezing or shortness of breath 150 mL 3    albuterol (PROVENTIL HFA,VENTOLIN HFA) 90 mcg/act inhaler Inhale 2 puffs every 6 (six) hours as needed for wheezing 54 g 3    allopurinol (ZYLOPRIM) 100 mg tablet Take 1 tablet (100 mg total) by mouth daily 90 tablet 3    ALPRAZolam (XANAX) 0 25 mg tablet Take 1 tablet (0 25 mg total) by mouth daily as needed for anxiety 30 tablet 0    Apple Cider Vinegar 188 MG CAPS Take by mouth daily      aspirin (ECOTRIN LOW STRENGTH) 81 mg EC tablet Take 81 mg by mouth daily      buPROPion (WELLBUTRIN XL) 300 mg 24 hr tablet Take 1 tablet (300 mg total) by mouth every morning 90 tablet 1    Diclofenac Sodium (VOLTAREN) 1 % Apply 2 g topically 4 (four) times a day 3 g 3    escitalopram (LEXAPRO) 20 mg tablet Take 1 tablet (20 mg total) by mouth daily 90 tablet 1    esomeprazole (NexIUM) 20 mg capsule Take 1 capsule (20 mg total) by mouth daily in the early morning 90 capsule 3    fluticasone (FLONASE) 50 mcg/act nasal spray 2 sprays into each nostril daily 48 g 3    fluticasone-salmeterol (ADVAIR HFA) 115-21 MCG/ACT inhaler Inhale 1 puff daily 48 g 3    Multiple Vitamin (MULTIVITAMIN) tablet Take 1 tablet by mouth daily   nebivolol (BYSTOLIC) 2 5 mg tablet Take 1 tablet (2 5 mg total) by mouth every morning 90 tablet 3    olopatadine (Patanol) 0 1 % ophthalmic solution Apply 1 drop to eye 2 (two) times a day 15 mL 3    VITAMIN D, ERGOCALCIFEROL, PO Take by mouth      zolpidem (AMBIEN CR) 12 5 MG CR tablet Take 1 tablet (12 5 mg total) by mouth daily at bedtime as needed for sleep 30 tablet 1     No current facility-administered medications for this visit         Objective:    /76   Pulse 63   Temp (!) 97 3 °F (36 3 °C)   Resp 18   Ht 5' 0 25" (1 53 m)   Wt 66 3 kg (146 lb 3 2 oz)   LMP  (LMP Unknown)   SpO2 95%   BMI 28 32 kg/m²        Physical Exam  Constitutional:       Appearance: She is well-developed  Eyes:      Conjunctiva/sclera: Conjunctivae normal    Neck:      Thyroid: No thyromegaly  Vascular: No JVD  Cardiovascular:      Rate and Rhythm: Normal rate and regular rhythm  Heart sounds: Normal heart sounds  No murmur heard  No friction rub  No gallop  Pulmonary:      Effort: Pulmonary effort is normal       Breath sounds: Normal breath sounds  No wheezing or rales  Abdominal:      General: Bowel sounds are normal  There is no distension  Palpations: Abdomen is soft  Tenderness: There is no abdominal tenderness  Musculoskeletal:      Cervical back: Neck supple  Psychiatric:         Mood and Affect: Mood normal          Behavior: Behavior normal          Thought Content:  Thought content normal          Judgment: Judgment normal                 Sheela Boston MD

## 2022-04-27 ENCOUNTER — OFFICE VISIT (OUTPATIENT)
Dept: FAMILY MEDICINE CLINIC | Facility: CLINIC | Age: 72
End: 2022-04-27
Payer: MEDICARE

## 2022-04-27 VITALS
WEIGHT: 146.2 LBS | SYSTOLIC BLOOD PRESSURE: 138 MMHG | TEMPERATURE: 97.3 F | RESPIRATION RATE: 18 BRPM | DIASTOLIC BLOOD PRESSURE: 76 MMHG | HEIGHT: 60 IN | OXYGEN SATURATION: 95 % | HEART RATE: 63 BPM | BODY MASS INDEX: 28.7 KG/M2

## 2022-04-27 DIAGNOSIS — F32.A ANXIETY AND DEPRESSION: ICD-10-CM

## 2022-04-27 DIAGNOSIS — F33.1 MODERATE EPISODE OF RECURRENT MAJOR DEPRESSIVE DISORDER (HCC): Primary | ICD-10-CM

## 2022-04-27 DIAGNOSIS — F51.01 PRIMARY INSOMNIA: ICD-10-CM

## 2022-04-27 DIAGNOSIS — F41.9 ANXIETY AND DEPRESSION: ICD-10-CM

## 2022-04-27 PROCEDURE — 99213 OFFICE O/P EST LOW 20 MIN: CPT | Performed by: INTERNAL MEDICINE

## 2022-04-27 RX ORDER — ZOLPIDEM TARTRATE 12.5 MG/1
12.5 TABLET, FILM COATED, EXTENDED RELEASE ORAL
Qty: 30 TABLET | Refills: 1 | Status: SHIPPED | OUTPATIENT
Start: 2022-04-27 | End: 2022-06-15 | Stop reason: SDUPTHER

## 2022-04-27 RX ORDER — BUPROPION HYDROCHLORIDE 300 MG/1
300 TABLET ORAL EVERY MORNING
Qty: 90 TABLET | Refills: 1 | Status: SHIPPED | OUTPATIENT
Start: 2022-04-27

## 2022-04-27 RX ORDER — ALPRAZOLAM 0.25 MG/1
0.25 TABLET ORAL DAILY PRN
Qty: 30 TABLET | Refills: 0 | Status: SHIPPED | OUTPATIENT
Start: 2022-04-27 | End: 2022-06-15 | Stop reason: SDUPTHER

## 2022-05-06 NOTE — PROGRESS NOTES
[facility]  [address], [address], [facility]  [phone]    Care Conference    NAME: Faisal Marcelo  AGE: 66 y o  SEX: female  YOB: 1950  DATE OF ASSESSMENT: 03/21/22  DATE OF CONFERENCE: 05/09/22    Family Present: Shauna Hess (Daughter)  Staff Present: Dr Jessie Medel, Renzo Livingston, LSW; Dr Simon Pederson    Patient / Family Goals of Care: Review cognitive decline and associated symptoms, discuss treatment options and care planning       Medical Concerns (Current/Historical): Depression-moderate episode of recurring major depressive disorder, anxiety, mixed stress and urge urinary incontinence, essential hypertension, mixed hyperlipidemia, chronic gout, gastroesophageal reflux disease without esophagitis, allergic rhinitis, arteriosclerotic cardiovascular disease, tobacco use disorder, COPD, Vitamin D deficiency, Insomnia, Impaired mastication    Geriatric Syndromes/Age Related Syndromes: Forgetfulness, hearing loss, glaucoma, debility, primary osteoarthritis    Neuropsychological   Forgetfulness   Severe depression likely contributing to forgetfulness  o Praful Cognitive Assessment: 27/30  o Geriatric Depression Screen: 13/15  o NeuroTrax:   - 95 9 (global cognitive score)  - 108 2 (memory)   - 86 3 (executive function)  - 102 2 (attention)   - 86 4 (information processing speed)  - 85 9 (visual spatial)  - 105 4 (verbal function)  - 96 9 (motor skills)     Remain active physically, mentally and socially   Encourage to keep appointment as scheduled by PCP for mental health evaluation   Maintain normal sleep/wake cycle   Provide frequent reorientation and redirection as needed   Pharmaceutical and non-pharmaceutical interventions discussed   Engage in cognitively challenging exercises such as crosswords and puzzles   Encourage hydration and nutrition   Encourage smoking cessation   Maintain chronic conditions under control   Repeat cognitive assessment in 6 months    Diagnostic Studies  Review of bloodwork  CT lung screening (3/20/22)- A known well-circumscribed oval 10 mm right lower lobe nodule #3/60 is unchanged since 2016  Yearly follow-up per radiology recommendation      Physical Finding Impacting Function   Timed Up and Go Test: 9 seconds   Activities of Daily Living: Independent   Instrumental Activities of Daily Living: Independent     Encourage appropriate footwear at all times   Fall 8-moths ago due to dizziness-had a sinus infection at the time   Review fall risk prevention tips and adjust within the home environment as needed    Medications Reviewed    Medications seem appropriate for present conditions   Check with PCP before using over the counter medications   Avoid over the counter medications that can affect cognition (e g , Benadryl, Tylenol PM)    Avoid medications which may precipitate or worsen delirium such as tramadol, benzodiazepine, anticholinergics, and benadryl   Avoid NSAIDs due to risk of GI bleed and renal impairment    Other Findings   Overall health   BMI: 28 32 kg/m2   Encourage a low-cholesterol, low-salt heart healthy diet and decrease caffeine intake    Continue following with primary care physician regularly  Depression-moderate episode of recurring major depressive disorder   Psychiatry and therapist referral made by PCP   Continue emotional support   Continue Lexapro 20mg p o q  day   Continue bupropion  mg p o q am  Anxiety   On chronic alprazolam 0 25mg p  o prn    Consider biofeedback, relaxation techniques: yoga  Hearing Loss   Compliant with hearing aids use  Glaucoma   Continue olopatadine 0 1% gtt twice daily  Debility   Multifactorial including age, COPD, tobacco use and multiple additional medication comorbidities   Continue optimization of chronic medical conditions   Ensure that mood symptoms are well controlled as may impact patient response to therapies as well as overall sense of well-being and quality of life  Mixed stress and urge urinary incontinence   Continue management as per her provider  Essential Hypertension   Encourage smoking cessation   Continue nebivolol 2 5 mg p o q  am            Mixed Hyperlipidemia   , HDL, 38, TG 73  Primary Osteoartritis   Continue pain management with acetaminophen 325 mg, 2 tablets po every 6 hours as needed   Continue adjuncts such as diclofenac sodium 1% topical to affected joint 4 times daily as needed for pain   Encourage addition of non-pharmacologic pain treatment including ice  Chronic gout   No recent flares   Continue Allopurinol 100mg p o  daily  Gastroesophageal reflux disease without esophagitis   Continue dietary and behavioral modifications   Continue esomeprazole 20 mg p o  daily  Allergic rhinitis   Continue symptomatic treatment   Continue fluticasone 50 mcg/act nasal, 2 sprays each nostril daily  Arteriosclerotic cardiovascular disease   Encourage exercise as tolerated   Continue aspirin 81 mg p o q day  Tobacco Use Disorder   Reports smoking cigarettes since age 4   Smoke cessation encouraged   Reports cutting down from 1PPD to 8 cigarettes   COPD   Chronic dyspnea   Smoking cessation advised   Continue albuterol 0 083% nebulizer solution 2 5 mg per 3 mL every 12 hrs as needed   Continue Proventil HFA 90 mcg/act inhaler 2 puff inhalations every 6 hours as needed for shortness of breath   Continue fluticasone-salmeterol 115-21 mcg/ACT inhaler, 1 puff inhaled q day   Consider pulmonary rehab if clinically indicated and follow up  Vitamin D Deficiency   Continue Vitamin-D supplementation  Insomnia   Avoid use of sedative hypnotics for sleep aid   Consider low-dose melatonin and increase as tolerated   Encourage sleep hygiene: dim lights, close blinds and turn off tv to minimize stimulation and encourage sleep environment in evenings  Impaired mastication   Wear dentures   Continue good dental care    Recommended Health Maintenance   Immunizations, if not contraindicated:    Influenza vaccine yearly   Pneumo vaccine every 5 years (65 years and over)   Shingles vaccine   COVID-19 vaccine    Social / Safety Concerns   Consider a [address] for positive socialization, physical exercise, cognitive stimulation and family respite   Stay in touch with family and friends   Plan self-care activities for your mental well-being each week   Recommend review of fall risk prevention tips   Recommend use of fall precautions including fall alert device   Consider assistance for medication administration such as blister packaging or use of an automated pill dispenser    Long Term Care Issues   Maintain updated advance directives and provide a copy to your primary care provider   Consider caregiver support groups and educational resources through the Alzheimer's [employer]; access [employer]'s [employer] 24/7 Helpline at 4-441.530.6148  ? [facility] does offer a monthly caregiver support group  If interested, please speak with a    Utilize reorientation and redirection as needed (dependent on situation)   Educational information provided    Patient and family verbalized understanding of above care plan  For care coordination purposes, this care plan will be shared with your primary care provider  With any questions, please contact our office at [phone]

## 2022-05-09 ENCOUNTER — TELEMEDICINE (OUTPATIENT)
Dept: GERIATRICS | Age: 72
End: 2022-05-09
Payer: MEDICARE

## 2022-05-09 DIAGNOSIS — K21.9 GASTROESOPHAGEAL REFLUX DISEASE WITHOUT ESOPHAGITIS: ICD-10-CM

## 2022-05-09 DIAGNOSIS — R53.81 DEBILITY: ICD-10-CM

## 2022-05-09 DIAGNOSIS — J44.9 CHRONIC OBSTRUCTIVE PULMONARY DISEASE, UNSPECIFIED COPD TYPE (HCC): ICD-10-CM

## 2022-05-09 DIAGNOSIS — N39.46 MIXED STRESS AND URGE URINARY INCONTINENCE: Primary | ICD-10-CM

## 2022-05-09 DIAGNOSIS — E78.2 MIXED HYPERLIPIDEMIA: ICD-10-CM

## 2022-05-09 DIAGNOSIS — H91.93 BILATERAL HEARING LOSS, UNSPECIFIED HEARING LOSS TYPE: ICD-10-CM

## 2022-05-09 DIAGNOSIS — I10 ESSENTIAL HYPERTENSION: ICD-10-CM

## 2022-05-09 DIAGNOSIS — E55.9 VITAMIN D DEFICIENCY: ICD-10-CM

## 2022-05-09 DIAGNOSIS — F33.1 MODERATE EPISODE OF RECURRENT MAJOR DEPRESSIVE DISORDER (HCC): ICD-10-CM

## 2022-05-09 DIAGNOSIS — M17.0 PRIMARY OSTEOARTHRITIS OF BOTH KNEES: ICD-10-CM

## 2022-05-09 DIAGNOSIS — R68.89 FORGETFULNESS: ICD-10-CM

## 2022-05-09 PROCEDURE — 99215 OFFICE O/P EST HI 40 MIN: CPT | Performed by: INTERNAL MEDICINE

## 2022-05-09 NOTE — PROGRESS NOTES
Virtual Regular Visit  Care Conference     Verification of patient location:    Patient is located in the following state in which I hold an active license [place]      Assessment/Plan:    Problem list items addressed this visit    Patient / Family Goals of Care: Review cognitive decline and associated symptoms, discuss treatment options and care planning       Medical Concerns (Current/Historical): Depression-moderate episode of recurring major depressive disorder, anxiety, mixed stress and urge urinary incontinence, essential hypertension, mixed hyperlipidemia, chronic gout, gastroesophageal reflux disease without esophagitis, allergic rhinitis, arteriosclerotic cardiovascular disease, tobacco use disorder, COPD, Vitamin D deficiency, Insomnia, Impaired mastication    Geriatric Syndromes/Age Related Syndromes: Forgetfulness, hearing loss, glaucoma, debility, primary osteoarthritis    Neuropsychological   Forgetfulness   Severe depression likely contributing to forgetfulness  o Praful Cognitive Assessment: 27/30  o Geriatric Depression Screen: 13/15  o NeuroTrax:   - 95 9 (global cognitive score)  - 108 2 (memory)   - 86 3 (executive function)  - 102 2 (attention)   - 86 4 (information processing speed)  - 85 9 (visual spatial)  - 105 4 (verbal function)  - 96 9 (motor skills)     Remain active physically, mentally and socially   Encourage to keep appointment as scheduled by PCP for mental health evaluation   Maintain normal sleep/wake cycle   Provide frequent reorientation and redirection as needed  [facility] Pharmaceutical and non-pharmaceutical interventions discussed   Engage in cognitively challenging exercises such as crosswords and puzzles   Encourage hydration and nutrition   Encourage smoking cessation   Maintain chronic conditions under control   Repeat cognitive assessment in 6 months    Diagnostic Studies  Review of bloodwork  CT lung screening (3/20/22)- A known well-circumscribed oval 10 mm right lower lobe nodule #3/60 is unchanged since 2016  Yearly follow-up per radiology recommendation      Physical Finding Impacting Function   Timed Up and Go Test: 9 seconds   Activities of Daily Living: Independent   Instrumental Activities of Daily Living: Independent     Encourage appropriate footwear at all times   Fall 8-moths ago due to dizziness-had a sinus infection at the time   Review fall risk prevention tips and adjust within the home environment as needed    Medications Reviewed    Medications seem appropriate for present conditions   Check with PCP before using over the counter medications   Avoid over the counter medications that can affect cognition (e g , Benadryl, Tylenol PM)    Avoid medications which may precipitate or worsen delirium such as tramadol, benzodiazepine, anticholinergics, and benadryl   Avoid NSAIDs due to risk of GI bleed and renal impairment    Other Findings   Overall health   BMI: 28 32 kg/m2   Encourage a low-cholesterol, low-salt heart healthy diet and decrease caffeine intake    Continue following with primary care physician regularly  Depression-moderate episode of recurring major depressive disorder   Psychiatry and therapist referral made by PCP   Continue emotional support   Continue Lexapro 20mg p o q  day   Continue bupropion  mg p o q am  Anxiety   On chronic alprazolam 0 25mg p  o prn    Consider biofeedback, relaxation techniques: yoga  Hearing Loss   Compliant with hearing aids use  Glaucoma   Continue olopatadine 0 1% gtt twice daily  Debility   Multifactorial including age, COPD, tobacco use and multiple additional medication comorbidities   Continue optimization of chronic medical conditions   Ensure that mood symptoms are well controlled as may impact patient response to therapies as well as overall sense of well-being and quality of life  Mixed stress and urge urinary incontinence   Continue management as per her provider  Essential Hypertension   Encourage smoking cessation   Continue nebivolol 2 5 mg p o q  am            Mixed Hyperlipidemia   , HDL, 38, TG 73  Primary Osteoartritis   Continue pain management with acetaminophen 325 mg, 2 tablets po every 6 hours as needed   Continue adjuncts such as diclofenac sodium 1% topical to affected joint 4 times daily as needed for pain   Encourage addition of non-pharmacologic pain treatment including ice  Chronic gout   No recent flares   Continue Allopurinol 100mg p o  daily  Gastroesophageal reflux disease without esophagitis   Continue dietary and behavioral modifications   Continue esomeprazole 20 mg p o  daily  Allergic rhinitis   Continue symptomatic treatment   Continue fluticasone 50 mcg/act nasal, 2 sprays each nostril daily  Arteriosclerotic cardiovascular disease   Encourage exercise as tolerated   Continue aspirin 81 mg p o q day  Tobacco Use Disorder   Reports smoking cigarettes since age 4   Smoke cessation encouraged   Reports cutting down from 1PPD to 8 cigarettes   COPD   Chronic dyspnea   Smoking cessation advised   Continue albuterol 0 083% nebulizer solution 2 5 mg per 3 mL every 12 hrs as needed   Continue Proventil HFA 90 mcg/act inhaler 2 puff inhalations every 6 hours as needed for shortness of breath   Continue fluticasone-salmeterol 115-21 mcg/ACT inhaler, 1 puff inhaled q day   Consider pulmonary rehab if clinically indicated and follow up  Vitamin D Deficiency   Continue Vitamin-D supplementation  Insomnia   Avoid use of sedative hypnotics for sleep aid   Consider low-dose melatonin and increase as tolerated   Encourage sleep hygiene: dim lights, close blinds and turn off tv to minimize stimulation and encourage sleep environment in evenings  Impaired mastication   Wear dentures   Continue good dental care    Recommended Health Maintenance   Immunizations, if not contraindicated:    Influenza vaccine yearly   Pneumo vaccine every 5 years (72 years and over)   Shingles vaccine   COVID-19 vaccine    Social / Safety Concerns   Consider a [address] for positive socialization, physical exercise, cognitive stimulation and family respite   Stay in touch with family and friends   Plan self-care activities for your mental well-being each week   Recommend review of fall risk prevention tips   Recommend use of fall precautions including fall alert device   Consider assistance for medication administration such as blister packaging or use of an automated pill dispenser    Long Term Care Issues   Maintain updated advance directives and provide a copy to your primary care provider   Consider caregiver support groups and educational resources through the Alzheimer's Association; access Alzheimer's Association 24/7 Helpline at 3-855.221.1621  ? [facility] does offer a monthly caregiver support group  If interested, please speak with a    Utilize reorientation and redirection as needed (dependent on situation)   Educational information provided    Patient and family verbalized understanding of above care plan  Reason for visit is   Chief Complaint   Patient presents with   [address]        Encounter provider Vita Lopez MD    Provider located at [address] [place] [address] Road  [address] [place]  [address]  [phone]      Recent Visits  No visits were found meeting these conditions  Showing recent visits within past 7 days and meeting all other requirements  Today's Visits  Date Type Provider Dept   05/09/22 Concepcion Carr  [address]   Showing today's visits and meeting all other requirements  Future Appointments  No visits were found meeting these conditions    Showing future appointments within next 150 days and meeting all other requirements       The patient was identified by name and date of birth  Melinda Acosta was informed that this is a telemedicine visit and that the visit is being conducted through [employer] and patient was informed that this is a secure, HIPAA-compliant platform  She agrees to proceed     My office door was closed  The patient was notified the following individuals were present in the room Dr Mary Escobar  She acknowledged consent and understanding of privacy and security of the video platform  The patient has agreed to participate and understands they can discontinue the visit at any time  Patient is aware this is a billable service  Subjective  Melinda Acosta is a 66 y o  female with history of forgetfulness in setting of depression, recurring major depressive disorder, Anxiety, hearing loss, glaucoma, debility, mixed stress and urge urinary incontinence, essential htn, mixed hld, primary osteoarthritis, chronic gout, GERD, Allergic rhinitis, arteriosclerotic cardiovascular disease, tobacco use, COPD, vitamin D deficiency, insomnia presenting for followup care conference  Since last visit, patient has been stable  She does have some trouble sleeping  No falls since the last visit  She has been socializing with friends and even is traveling to the [employer] and going to wine tasting events  She also goes regularly for walks  Her previous MOCA was a 27/30 and has had a relatively good Neurotrax score although executive function was around 86  She is continuing to take Lexapro for depression  She has been consuming regularly fresh green vegetable and fruits and has been using [employer] for these produce  Previous CAT scan noted a stable nodule when compared with the imaging from 2016  She is continuing to smoke although states she has been cutting down on her smoking  She currently smokes half a pack a day        HPI     Past Medical History:   Diagnosis Date    Acid reflux     Arthritis     Asthma     Cardiac disease     Chronic kidney disease     passed on own kidney stone    Diverticulitis     GERD (gastroesophageal reflux disease)     Hayfever     [place] (hard of hearing)     bilateral hearing aids    Hyperlipemia     Hypertension     Tachycardia        Past Surgical History:   Procedure Laterality Date    ABLATION OF DYSRHYTHMIC FOCUS      APPENDECTOMY      CHOLECYSTECTOMY      open    FRACTURE SURGERY      ORIF fx (L) tibia, fibula 2009    GASTRIC BYPASS OPEN      HYSTERECTOMY      [place] XCAPSL CTRC RMVL INSJ IO LENS PROSTH W/O ECP Left 4/18/2016    Procedure: EXTRACTION EXTRACAPSULAR CATARACT PHACO INTRAOCULAR LENS (IOL); Surgeon: Jovani Romero MD;  Location: [facility] MAIN OR;  Service: Ophthalmology    [place] XCAPSL CTRC RMVL INSJ IO LENS PROSTH W/O ECP Right 3/1/2021    Procedure: EXTRACTION EXTRACAPSULAR CATARACT PHACO INTRAOCULAR LENS (IOL); Surgeon: Jovani Romero MD;  Location: [facility] MAIN OR;  Service: Ophthalmology    TONSILECTOMY AND ADNOIDECTOMY         Current Outpatient Medications   Medication Sig Dispense Refill    acetaminophen (TYLENOL) 325 mg tablet Take 2 tablets (650 mg total) by mouth every 6 (six) hours as needed for mild pain, headaches or fever   30 tablet 0    albuterol (2 5 mg/3 mL) 0 083 % nebulizer solution Take 3 mL (2 5 mg total) by nebulization every 6 (six) hours as needed for wheezing or shortness of breath 150 mL 3    albuterol (PROVENTIL HFA,VENTOLIN HFA) 90 mcg/act inhaler Inhale 2 puffs every 6 (six) hours as needed for wheezing 54 g 3    allopurinol (ZYLOPRIM) 100 mg tablet Take 1 tablet (100 mg total) by mouth daily 90 tablet 3    ALPRAZolam (XANAX) 0 25 mg tablet Take 1 tablet (0 25 mg total) by mouth daily as needed for anxiety 30 tablet 0    Apple Cider Vinegar 188 MG CAPS Take by mouth daily      aspirin (ECOTRIN LOW STRENGTH) 81 mg EC tablet Take 81 mg by mouth daily      buPROPion (WELLBUTRIN XL) 300 mg 24 hr tablet Take 1 tablet (300 mg total) by mouth every morning 90 tablet 1    Diclofenac Sodium (VOLTAREN) 1 % Apply 2 g topically 4 (four) times a day 3 g 3    escitalopram (LEXAPRO) 20 mg tablet Take 1 tablet (20 mg total) by mouth daily 90 tablet 1    esomeprazole (NexIUM) 20 mg capsule Take 1 capsule (20 mg total) by mouth daily in the early morning 90 capsule 3    fluticasone (FLONASE) 50 mcg/act nasal spray 2 sprays into each nostril daily 48 g 3    fluticasone-salmeterol (ADVAIR HFA) 115-21 MCG/ACT inhaler Inhale 1 puff daily 48 g 3    Multiple Vitamin (MULTIVITAMIN) tablet Take 1 tablet by mouth daily   nebivolol (BYSTOLIC) 2 5 mg tablet Take 1 tablet (2 5 mg total) by mouth every morning 90 tablet 3    olopatadine (Patanol) 0 1 % ophthalmic solution Apply 1 drop to eye 2 (two) times a day 15 mL 3    VITAMIN D, ERGOCALCIFEROL, PO Take by mouth      zolpidem (AMBIEN CR) 12 5 MG CR tablet Take 1 tablet (12 5 mg total) by mouth daily at bedtime as needed for sleep 30 tablet 1     No current facility-administered medications for this visit  Allergies   Allergen Reactions    Augmentin [Amoxicillin-Pot Clavulanate] GI Intolerance, Other (See Comments) and Hives     VOMITING  VOMITING  Reaction Date: 07Dec2004;     Sulfa Antibiotics Itching, Other (See Comments) and Hives     RASH, ITCHY  RASH, ITCHY    Morphine Other (See Comments)     "DOESN'T WORK"    Latex Rash     Unsure if this is an allergy       Review of Systems    Video Exam    There were no vitals filed for this visit  Physical Exam  Constitutional:       Appearance: Normal appearance  HENT:      Head: Normocephalic and atraumatic  Musculoskeletal:      Cervical back: Normal range of motion  Neurological:      Mental Status: She is alert  Sensory: No sensory deficit  Motor: No weakness     Psychiatric:         Mood and Affect: Mood normal       Full physical exam limited due to nature of video case conference    I spent 1 hour with Dr Marino Nova and Chery loyd directly with the patient during this visit    VIRTUAL VISIT DISCLAIMER      Lars Irenechavez Marcelo verbally agrees to participate in [employer]  Pt is aware that Virtual Care Services could be limited without vital signs or the ability to perform a full hands-on physical exam  Zulma Marcelo understands she or the provider may request at any time to terminate the video visit and request the patient to seek care or treatment in person

## 2022-05-24 RX ORDER — SODIUM CHLORIDE, SODIUM LACTATE, POTASSIUM CHLORIDE, CALCIUM CHLORIDE 600; 310; 30; 20 MG/100ML; MG/100ML; MG/100ML; MG/100ML
75 INJECTION, SOLUTION INTRAVENOUS CONTINUOUS
Status: CANCELLED | OUTPATIENT
Start: 2022-05-24

## 2022-05-25 ENCOUNTER — ANESTHESIA (OUTPATIENT)
Dept: GASTROENTEROLOGY | Facility: AMBULARY SURGERY CENTER | Age: 72
End: 2022-05-25

## 2022-05-25 ENCOUNTER — HOSPITAL ENCOUNTER (OUTPATIENT)
Dept: GASTROENTEROLOGY | Facility: AMBULARY SURGERY CENTER | Age: 72
Setting detail: OUTPATIENT SURGERY
Discharge: HOME | End: 2022-05-25
Attending: INTERNAL MEDICINE
Payer: MEDICARE

## 2022-05-25 ENCOUNTER — ANESTHESIA EVENT (OUTPATIENT)
Dept: GASTROENTEROLOGY | Facility: AMBULARY SURGERY CENTER | Age: 72
End: 2022-05-25

## 2022-05-25 VITALS
HEART RATE: 81 BPM | BODY MASS INDEX: 28.66 KG/M2 | HEIGHT: 60 IN | OXYGEN SATURATION: 96 % | RESPIRATION RATE: 16 BRPM | WEIGHT: 146 LBS | TEMPERATURE: 97.4 F | SYSTOLIC BLOOD PRESSURE: 151 MMHG | DIASTOLIC BLOOD PRESSURE: 79 MMHG

## 2022-05-25 DIAGNOSIS — Z98.84 HISTORY OF GASTRIC BYPASS: ICD-10-CM

## 2022-05-25 DIAGNOSIS — K21.9 GASTROESOPHAGEAL REFLUX DISEASE WITHOUT ESOPHAGITIS: ICD-10-CM

## 2022-05-25 DIAGNOSIS — Z86.010 HISTORY OF COLON POLYPS: ICD-10-CM

## 2022-05-25 PROBLEM — J45.909 ASTHMA: Status: ACTIVE | Noted: 2022-05-25

## 2022-05-25 PROCEDURE — 88305 TISSUE EXAM BY PATHOLOGIST: CPT | Performed by: PATHOLOGY

## 2022-05-25 PROCEDURE — 43239 EGD BIOPSY SINGLE/MULTIPLE: CPT | Performed by: INTERNAL MEDICINE

## 2022-05-25 PROCEDURE — 45385 COLONOSCOPY W/LESION REMOVAL: CPT | Performed by: INTERNAL MEDICINE

## 2022-05-25 PROCEDURE — 45380 COLONOSCOPY AND BIOPSY: CPT | Performed by: INTERNAL MEDICINE

## 2022-05-25 PROCEDURE — 88302 TISSUE EXAM BY PATHOLOGIST: CPT | Performed by: PATHOLOGY

## 2022-05-25 RX ORDER — SODIUM CHLORIDE, SODIUM LACTATE, POTASSIUM CHLORIDE, CALCIUM CHLORIDE 600; 310; 30; 20 MG/100ML; MG/100ML; MG/100ML; MG/100ML
75 INJECTION, SOLUTION INTRAVENOUS CONTINUOUS
Status: DISCONTINUED | OUTPATIENT
Start: 2022-05-25 | End: 2022-05-29 | Stop reason: HOSPADM

## 2022-05-25 RX ORDER — LIDOCAINE HYDROCHLORIDE 10 MG/ML
INJECTION, SOLUTION EPIDURAL; INFILTRATION; INTRACAUDAL; PERINEURAL AS NEEDED
Status: DISCONTINUED | OUTPATIENT
Start: 2022-05-25 | End: 2022-05-25

## 2022-05-25 RX ORDER — EPHEDRINE SULFATE 50 MG/ML
INJECTION INTRAVENOUS AS NEEDED
Status: DISCONTINUED | OUTPATIENT
Start: 2022-05-25 | End: 2022-05-25

## 2022-05-25 RX ORDER — PROPOFOL 10 MG/ML
INJECTION, EMULSION INTRAVENOUS AS NEEDED
Status: DISCONTINUED | OUTPATIENT
Start: 2022-05-25 | End: 2022-05-25

## 2022-05-25 RX ORDER — PROPOFOL 10 MG/ML
INJECTION, EMULSION INTRAVENOUS CONTINUOUS PRN
Status: DISCONTINUED | OUTPATIENT
Start: 2022-05-25 | End: 2022-05-25

## 2022-05-25 RX ADMIN — EPHEDRINE SULFATE 10 MG: 50 INJECTION, SOLUTION INTRAVENOUS at 14:37

## 2022-05-25 RX ADMIN — LIDOCAINE HYDROCHLORIDE 50 MG: 10 INJECTION, SOLUTION EPIDURAL; INFILTRATION; INTRACAUDAL at 14:12

## 2022-05-25 RX ADMIN — SODIUM CHLORIDE, SODIUM LACTATE, POTASSIUM CHLORIDE, AND CALCIUM CHLORIDE 75 ML/HR: .6; .31; .03; .02 INJECTION, SOLUTION INTRAVENOUS at 13:04

## 2022-05-25 RX ADMIN — PROPOFOL 130 MG: 10 INJECTION, EMULSION INTRAVENOUS at 14:12

## 2022-05-25 RX ADMIN — PROPOFOL 130 MCG/KG/MIN: 10 INJECTION, EMULSION INTRAVENOUS at 14:12

## 2022-05-25 NOTE — ANESTHESIA PREPROCEDURE EVALUATION
Procedure:  COLONOSCOPY  EGD    Relevant Problems   CARDIO   (+) Essential hypertension   (+) Mixed hyperlipidemia      GI/HEPATIC   (+) Esophageal reflux      MUSCULOSKELETAL  hardware LLE   (+) Chronic gout   (+) Primary osteoarthritis      NEURO/PSYCH   (+) Moderate episode of recurrent major depressive disorder (HCC)      PULMONARY   (+) Asthma   (+) Chronic obstructive pulmonary disease with acute exacerbation (HCC)   (+) Smoking        Physical Exam    Airway    Mallampati score: III  TM Distance: >3 FB  Neck ROM: full     Dental   upper dentures and lower dentures,     Cardiovascular  Rhythm: regular, Rate: normal,     Pulmonary  Breath sounds clear to auscultation,     Other Findings        Anesthesia Plan  ASA Score- 2     Anesthesia Type- IV sedation with anesthesia with ASA Monitors  Additional Monitors:   Airway Plan:           Plan Factors-    Chart reviewed  Patient is not a current smoker  Induction- intravenous  Postoperative Plan-     Informed Consent- Anesthetic plan and risks discussed with patient  I personally reviewed this patient with the CRNA  Discussed and agreed on the Anesthesia Plan with the CRNA  Prateek Rodriguez

## 2022-05-25 NOTE — ANESTHESIA POSTPROCEDURE EVALUATION
Post-Op Assessment Note    CV Status:  Stable  Pain Score: 0    Pain management: adequate     Mental Status:  Awake and alert   Hydration Status:  Stable   PONV Controlled:  None   Airway Patency:  Patent and adequate      Post Op Vitals Reviewed: Yes      Staff: CRNA         No complications documented      BP      Temp     Pulse     Resp      SpO2

## 2022-05-25 NOTE — H&P
History and Physical - [facility]  Sheridan Hernández 74 y o  female MRN: [id number]                  HPI: Sheridan Hernández is a 74y o  year old female who presents for colon polyps, Debra-en-Y gastric bypass, reflux, diarrhea      REVIEW OF SYSTEMS: Per the HPI, and otherwise unremarkable  Historical Information   Past Medical History:   Diagnosis Date    Acid reflux     Arthritis     Asthma     Cardiac disease     Chronic kidney disease     passed on own kidney stone    Diverticulitis     GERD (gastroesophageal reflux disease)     Hayfever     [place] (hard of hearing)     bilateral hearing aids    Hyperlipemia     Hypertension     Tachycardia      Past Surgical History:   Procedure Laterality Date    ABLATION OF DYSRHYTHMIC FOCUS      APPENDECTOMY      CHOLECYSTECTOMY      open    FRACTURE SURGERY      ORIF fx (L) tibia, fibula 2009    GASTRIC BYPASS OPEN      HYSTERECTOMY      [place] XCAPSL CTRC RMVL INSJ IO LENS PROSTH W/O ECP Left 4/18/2016    Procedure: EXTRACTION EXTRACAPSULAR CATARACT PHACO INTRAOCULAR LENS (IOL); Surgeon: Krysta Velásquez MD;  Location: [facility] MAIN OR;  Service: Ophthalmology    [place] XCAPSL CTRC RMVL INSJ IO LENS PROSTH W/O ECP Right 3/1/2021    Procedure: EXTRACTION EXTRACAPSULAR CATARACT PHACO INTRAOCULAR LENS (IOL);   Surgeon: Krysta Velásquez MD;  Location: [facility] MAIN OR;  Service: Ophthalmology    TONSILECTOMY AND ADNOIDECTOMY       Social History   Social History     Substance and Sexual Activity   Alcohol Use Yes    Comment: rarely     Social History     Substance and Sexual Activity   Drug Use No     Social History     Tobacco Use   Smoking Status Current Every Day Smoker    Packs/day: 0 50    Years: 25 00    Pack years: 12 50   Smokeless Tobacco Never Used     Family History   Problem Relation Age of Onset    Heart disease Mother     Stroke Son     Stroke Maternal Grandfather     Breast cancer Daughter 36       Meds/Allergies       Current Outpatient Medications:     albuterol (2 5 mg/3 mL) 0 083 % nebulizer solution    albuterol (PROVENTIL HFA,VENTOLIN HFA) 90 mcg/act inhaler    allopurinol (ZYLOPRIM) 100 mg tablet    ALPRAZolam (XANAX) 0 25 mg tablet    Apple Cider Vinegar 188 MG CAPS    aspirin (ECOTRIN LOW STRENGTH) 81 mg EC tablet    buPROPion (WELLBUTRIN XL) 300 mg 24 hr tablet    Diclofenac Sodium (VOLTAREN) 1 %    escitalopram (LEXAPRO) 20 mg tablet    esomeprazole (NexIUM) 20 mg capsule    fluticasone (FLONASE) 50 mcg/act nasal spray    fluticasone-salmeterol (ADVAIR HFA) 115-21 MCG/ACT inhaler    Multiple Vitamin (MULTIVITAMIN) tablet    nebivolol (BYSTOLIC) 2 5 mg tablet    VITAMIN D, ERGOCALCIFEROL, PO    zolpidem (AMBIEN CR) 12 5 MG CR tablet    acetaminophen (TYLENOL) 325 mg tablet    olopatadine (Patanol) 0 1 % ophthalmic solution    Current Facility-Administered Medications:     lactated ringers infusion, 75 mL/hr, Intravenous, Continuous, 75 mL/hr at 05/25/22 1304    Allergies   Allergen Reactions    Augmentin [Amoxicillin-Pot Clavulanate] GI Intolerance, Other (See Comments) and Hives     VOMITING  VOMITING  Reaction Date: 07Dec2004;     Sulfa Antibiotics Itching, Other (See Comments) and Hives     RASH, ITCHY  RASH, ITCHY    Morphine Other (See Comments)     "DOESN'T WORK"    Latex Rash     Unsure if this is an allergy       Objective     /73   Pulse 61   Temp (!) 97 4 °F (36 3 °C) (Tympanic)   Resp 18   Ht 5' 0 25" (1 53 m)   Wt 66 2 kg (146 lb)   LMP  (LMP Unknown)   SpO2 98%   BMI 28 28 kg/m²       PHYSICAL EXAM    Gen: NAD  Head: NCAT  CV: RRR  CHEST: Clear  ABD: soft, NT/ND  EXT: no edema      ASSESSMENT/PLAN:  This is a 74y o  year old female here for EGD and colonoscopy, and she is stable and optimized for her procedure

## 2022-06-14 ENCOUNTER — TELEPHONE (OUTPATIENT)
Dept: GASTROENTEROLOGY | Facility: AMBULARY SURGERY CENTER | Age: 72
End: 2022-06-14

## 2022-06-14 NOTE — TELEPHONE ENCOUNTER
Patient returned call  Made aware of results  patient verbalized understanding  Advised to contact our office with any questions or concerns

## 2022-06-15 ENCOUNTER — OFFICE VISIT (OUTPATIENT)
Dept: FAMILY MEDICINE CLINIC | Facility: CLINIC | Age: 72
End: 2022-06-15
Payer: MEDICARE

## 2022-06-15 VITALS
TEMPERATURE: 97.6 F | BODY MASS INDEX: 30.63 KG/M2 | HEIGHT: 60 IN | DIASTOLIC BLOOD PRESSURE: 70 MMHG | OXYGEN SATURATION: 95 % | WEIGHT: 156 LBS | SYSTOLIC BLOOD PRESSURE: 126 MMHG | HEART RATE: 55 BPM | RESPIRATION RATE: 16 BRPM

## 2022-06-15 DIAGNOSIS — I05.0 MILD MITRAL STENOSIS: ICD-10-CM

## 2022-06-15 DIAGNOSIS — F41.9 ANXIETY AND DEPRESSION: ICD-10-CM

## 2022-06-15 DIAGNOSIS — M10.071 ACUTE IDIOPATHIC GOUT OF RIGHT FOOT: ICD-10-CM

## 2022-06-15 DIAGNOSIS — Z00.00 MEDICARE ANNUAL WELLNESS VISIT, SUBSEQUENT: Primary | ICD-10-CM

## 2022-06-15 DIAGNOSIS — M25.511 ACUTE PAIN OF RIGHT SHOULDER: ICD-10-CM

## 2022-06-15 DIAGNOSIS — K21.9 GASTROESOPHAGEAL REFLUX DISEASE WITHOUT ESOPHAGITIS: ICD-10-CM

## 2022-06-15 DIAGNOSIS — F33.1 MODERATE EPISODE OF RECURRENT MAJOR DEPRESSIVE DISORDER (HCC): ICD-10-CM

## 2022-06-15 DIAGNOSIS — Z78.0 MENOPAUSE: ICD-10-CM

## 2022-06-15 DIAGNOSIS — I35.0 MILD AORTIC STENOSIS: ICD-10-CM

## 2022-06-15 DIAGNOSIS — E55.9 VITAMIN D DEFICIENCY: ICD-10-CM

## 2022-06-15 DIAGNOSIS — E53.8 VITAMIN B12 DEFICIENCY: ICD-10-CM

## 2022-06-15 DIAGNOSIS — Z12.39 SCREENING BREAST EXAMINATION: ICD-10-CM

## 2022-06-15 DIAGNOSIS — F51.01 PRIMARY INSOMNIA: ICD-10-CM

## 2022-06-15 DIAGNOSIS — H10.13 ALLERGIC CONJUNCTIVITIS OF BOTH EYES: ICD-10-CM

## 2022-06-15 DIAGNOSIS — R06.00 EXERTIONAL DYSPNEA: ICD-10-CM

## 2022-06-15 DIAGNOSIS — J44.1 CHRONIC OBSTRUCTIVE PULMONARY DISEASE WITH ACUTE EXACERBATION (HCC): ICD-10-CM

## 2022-06-15 DIAGNOSIS — J44.9 CHRONIC OBSTRUCTIVE PULMONARY DISEASE, UNSPECIFIED COPD TYPE (HCC): ICD-10-CM

## 2022-06-15 DIAGNOSIS — Z12.31 ENCOUNTER FOR SCREENING MAMMOGRAM FOR MALIGNANT NEOPLASM OF BREAST: ICD-10-CM

## 2022-06-15 DIAGNOSIS — I10 ESSENTIAL HYPERTENSION: ICD-10-CM

## 2022-06-15 DIAGNOSIS — F32.A ANXIETY AND DEPRESSION: ICD-10-CM

## 2022-06-15 PROCEDURE — G0438 PPPS, INITIAL VISIT: HCPCS | Performed by: INTERNAL MEDICINE

## 2022-06-15 RX ORDER — ERGOCALCIFEROL 1.25 MG/1
CAPSULE ORAL
Status: CANCELLED | OUTPATIENT
Start: 2022-06-15

## 2022-06-15 RX ORDER — FLUTICASONE PROPIONATE 50 MCG
2 SPRAY, SUSPENSION (ML) NASAL DAILY
Qty: 48 G | Refills: 3 | Status: CANCELLED | OUTPATIENT
Start: 2022-06-15

## 2022-06-15 RX ORDER — ALBUTEROL SULFATE 2.5 MG/3ML
2.5 SOLUTION RESPIRATORY (INHALATION) EVERY 6 HOURS PRN
Qty: 150 ML | Refills: 3 | Status: CANCELLED | OUTPATIENT
Start: 2022-06-15

## 2022-06-15 RX ORDER — OLOPATADINE HYDROCHLORIDE 1 MG/ML
1 SOLUTION/ DROPS OPHTHALMIC 2 TIMES DAILY
Qty: 15 ML | Refills: 3 | Status: SHIPPED | OUTPATIENT
Start: 2022-06-15

## 2022-06-15 RX ORDER — ALPRAZOLAM 0.25 MG/1
0.25 TABLET ORAL DAILY PRN
Qty: 30 TABLET | Refills: 0 | Status: SHIPPED | OUTPATIENT
Start: 2022-06-15

## 2022-06-15 RX ORDER — ALLOPURINOL 100 MG/1
100 TABLET ORAL DAILY
Qty: 90 TABLET | Refills: 3 | Status: CANCELLED | OUTPATIENT
Start: 2022-06-15

## 2022-06-15 RX ORDER — ZOLPIDEM TARTRATE 12.5 MG/1
12.5 TABLET, FILM COATED, EXTENDED RELEASE ORAL
Qty: 30 TABLET | Refills: 1 | Status: CANCELLED | OUTPATIENT
Start: 2022-06-15

## 2022-06-15 RX ORDER — BUPROPION HYDROCHLORIDE 300 MG/1
300 TABLET ORAL EVERY MORNING
Qty: 90 TABLET | Refills: 1 | Status: CANCELLED | OUTPATIENT
Start: 2022-06-15

## 2022-06-15 RX ORDER — NEBIVOLOL 2.5 MG/1
2.5 TABLET ORAL EVERY MORNING
Qty: 90 TABLET | Refills: 3 | Status: CANCELLED | OUTPATIENT
Start: 2022-06-15

## 2022-06-15 RX ORDER — ESCITALOPRAM OXALATE 20 MG/1
20 TABLET ORAL DAILY
Qty: 90 TABLET | Refills: 1 | Status: SHIPPED | OUTPATIENT
Start: 2022-06-15

## 2022-06-15 RX ORDER — ALBUTEROL SULFATE 90 UG/1
2 AEROSOL, METERED RESPIRATORY (INHALATION) EVERY 6 HOURS PRN
Qty: 54 G | Refills: 3 | Status: CANCELLED | OUTPATIENT
Start: 2022-06-15

## 2022-06-15 RX ORDER — ZOLPIDEM TARTRATE 12.5 MG/1
12.5 TABLET, FILM COATED, EXTENDED RELEASE ORAL
Qty: 30 TABLET | Refills: 1 | Status: SHIPPED | OUTPATIENT
Start: 2022-06-15 | End: 2022-07-07 | Stop reason: SDUPTHER

## 2022-06-15 NOTE — PATIENT INSTRUCTIONS
Medicare Preventive Visit Patient Instructions  Thank you for completing your Welcome to Medicare Visit or Medicare Annual Wellness Visit today  Your next wellness visit will be due in one year (6/16/2023)  The screening/preventive services that you may require over the next 5-10 years are detailed below  Some tests may not apply to you based off risk factors and/or age  Screening tests ordered at today's visit but not completed yet may show as past due  Also, please note that scanned in results may not display below  Preventive Screenings:  Service Recommendations Previous Testing/Comments   Colorectal Cancer Screening  * Colonoscopy    * Fecal Occult Blood Test (FOBT)/Fecal Immunochemical Test (FIT)  * Fecal DNA/Cologuard Test  * Flexible Sigmoidoscopy Age: 54-65 years old   Colonoscopy: every 10 years (may be performed more frequently if at higher risk)  OR  FOBT/FIT: every 1 year  OR  Cologuard: every 3 years  OR  Sigmoidoscopy: every 5 years  Screening may be recommended earlier than age 48 if at higher risk for colorectal cancer  Also, an individualized decision between you and your healthcare provider will decide whether screening between the ages of 74-80 would be appropriate  Colonoscopy: 05/25/2022  FOBT/FIT: Not on file  Cologuard: Not on file  Sigmoidoscopy: Not on file    Screening Current     Breast Cancer Screening Age: 36 years old  Frequency: every 1-2 years  Not required if history of left and right mastectomy Mammogram: 05/21/2021    Screening Current   Cervical Cancer Screening Between the ages of 21-29, pap smear recommended once every 3 years  Between the ages of 33-67, can perform pap smear with HPV co-testing every 5 years     Recommendations may differ for women with a history of total hysterectomy, cervical cancer, or abnormal pap smears in past  Pap Smear: Not on file    Screening Not Indicated   Hepatitis C Screening Once for adults born between 1945 and 1965  More frequently in patients at high risk for Hepatitis C Hep C Antibody: 05/21/2021    Screening Current   Diabetes Screening 1-2 times per year if you're at risk for diabetes or have pre-diabetes Fasting glucose: 99 mg/dL   A1C: No results in last 5 years        Cholesterol Screening Once every 5 years if you don't have a lipid disorder  May order more often based on risk factors  Lipid panel: 05/21/2021    Screening Not Indicated  History Lipid Disorder     Other Preventive Screenings Covered by Medicare:  1  Abdominal Aortic Aneurysm (AAA) Screening: covered once if your at risk  You're considered to be at risk if you have a family history of AAA  2  Lung Cancer Screening: covers low dose CT scan once per year if you meet all of the following conditions: (1) Age 50-69; (2) No signs or symptoms of lung cancer; (3) Current smoker or have quit smoking within the last 15 years; (4) You have a tobacco smoking history of at least 30 pack years (packs per day multiplied by number of years you smoked); (5) You get a written order from a healthcare provider  3  Glaucoma Screening: covered annually if you're considered high risk: (1) You have diabetes OR (2) Family history of glaucoma OR (3)  aged 48 and older OR (3)  American aged 72 and older  3  Osteoporosis Screening: covered every 2 years if you meet one of the following conditions: (1) You're estrogen deficient and at risk for osteoporosis based off medical history and other findings; (2) Have a vertebral abnormality; (3) On glucocorticoid therapy for more than 3 months; (4) Have primary hyperparathyroidism; (5) On osteoporosis medications and need to assess response to drug therapy  · Last bone density test (DXA Scan): Not on file  5  HIV Screening: covered annually if you're between the age of 12-76  Also covered annually if you are younger than 13 and older than 72 with risk factors for HIV infection   For pregnant patients, it is covered up to 3 times per pregnancy  Immunizations:  Immunization Recommendations   Influenza Vaccine Annual influenza vaccination during flu season is recommended for all persons aged >= 6 months who do not have contraindications   Pneumococcal Vaccine (Prevnar and Pneumovax)  * Prevnar = PCV13  * Pneumovax = PPSV23   Adults 25-60 years old: 1-3 doses may be recommended based on certain risk factors  Adults 72 years old: Prevnar (PCV13) vaccine recommended followed by Pneumovax (PPSV23) vaccine  If already received PPSV23 since turning 65, then PCV13 recommended at least one year after PPSV23 dose  Hepatitis B Vaccine 3 dose series if at intermediate or high risk (ex: diabetes, end stage renal disease, liver disease)   Tetanus (Td) Vaccine - COST NOT COVERED BY MEDICARE PART B Following completion of primary series, a booster dose should be given every 10 years to maintain immunity against tetanus  Td may also be given as tetanus wound prophylaxis  Tdap Vaccine - COST NOT COVERED BY MEDICARE PART B Recommended at least once for all adults  For pregnant patients, recommended with each pregnancy  Shingles Vaccine (Shingrix) - COST NOT COVERED BY MEDICARE PART B  2 shot series recommended in those aged 48 and above     Health Maintenance Due:      Topic Date Due    Breast Cancer Screening: Mammogram  05/21/2022    Colorectal Cancer Screening  05/24/2025    Hepatitis C Screening  Completed     Immunizations Due:      Topic Date Due    COVID-19 Vaccine (4 - Booster for Cedexis series) 04/11/2022     Advance Directives   What are advance directives? Advance directives are legal documents that state your wishes and plans for medical care  These plans are made ahead of time in case you lose your ability to make decisions for yourself  Advance directives can apply to any medical decision, such as the treatments you want, and if you want to donate organs  What are the types of advance directives?   There are many types of advance directives, and each state has rules about how to use them  You may choose a combination of any of the following:  · Living will: This is a written record of the treatment you want  You can also choose which treatments you do not want, which to limit, and which to stop at a certain time  This includes surgery, medicine, IV fluid, and tube feedings  · Durable power of  for healthcare [facility]): This is a written record that states who you want to make healthcare choices for you when you are unable to make them for yourself  This person, called a proxy, is usually a family member or a friend  You may choose more than 1 proxy  · Do not resuscitate (DNR) order:  A DNR order is used in case your heart stops beating or you stop breathing  It is a request not to have certain forms of treatment, such as CPR  A DNR order may be included in other types of advance directives  · Medical directive: This covers the care that you want if you are in a coma, near death, or unable to make decisions for yourself  You can list the treatments you want for each condition  Treatment may include pain medicine, surgery, blood transfusions, dialysis, IV or tube feedings, and a ventilator (breathing machine)  · Values history: This document has questions about your views, beliefs, and how you feel and think about life  This information can help others choose the care that you would choose  Why are advance directives important? An advance directive helps you control your care  Although spoken wishes may be used, it is better to have your wishes written down  Spoken wishes can be misunderstood, or not followed  Treatments may be given even if you do not want them  An advance directive may make it easier for your family to make difficult choices about your care  Fall Prevention    Fall prevention  includes ways to make your home and other areas safer  It also includes ways you can move more carefully to prevent a fall   Health conditions that cause changes in your blood pressure, vision, or muscle strength and coordination may increase your risk for falls  Medicines may also increase your risk for falls if they make you dizzy, weak, or sleepy  Fall prevention tips:   · Stand or sit up slowly  · Use assistive devices as directed  · Wear shoes that fit well and have soles that   · Wear a personal alarm  · Stay active  · Manage your medical conditions  Home Safety Tips:  · Add items to prevent falls in the bathroom  · Keep paths clear  · Install bright lights in your home  · Keep items you use often on shelves within reach  · Paint or place reflective tape on the edges of your stairs  Urinary Incontinence   Urinary incontinence (UI)  is when you lose control of your bladder  UI develops because your bladder cannot store or empty urine properly  The 3 most common types of UI are stress incontinence, urge incontinence, or both  Medicines:   · May be given to help strengthen your bladder control  Report any side effects of medication to your healthcare provider  Do pelvic muscle exercises often:  Your pelvic muscles help you stop urinating  Squeeze these muscles tight for 5 seconds, then relax for 5 seconds  Gradually work up to squeezing for 10 seconds  Do 3 sets of 15 repetitions a day, or as directed  This will help strengthen your pelvic muscles and improve bladder control  Train your bladder:  Go to the bathroom at set times, such as every 2 hours, even if you do not feel the urge to go  You can also try to hold your urine when you feel the urge to go  For example, hold your urine for 5 minutes when you feel the urge to go  As that becomes easier, hold your urine for 10 minutes  Self-care:   · Keep a UI record  Write down how often you leak urine and how much you leak  Make a note of what you were doing when you leaked urine  · Drink liquids as directed   You may need to limit the amount of liquid you drink to help control your urine leakage  Do not drink any liquid right before you go to bed  Limit or do not have drinks that contain caffeine or alcohol  · Prevent constipation  Eat a variety of high-fiber foods  Good examples are high-fiber cereals, beans, vegetables, and whole-grain breads  Walking is the best way to trigger your intestines to have a bowel movement  · Exercise regularly and maintain a healthy weight  Weight loss and exercise will decrease pressure on your bladder and help you control your leakage  · Use a catheter as directed  to help empty your bladder  A catheter is a tiny, plastic tube that is put into your bladder to drain your urine  · Go to behavior therapy as directed  Behavior therapy may be used to help you learn to control your urge to urinate  Cigarette Smoking and Your Health   Risks to your health if you smoke:  Nicotine and other chemicals found in tobacco damage every cell in your body  Even if you are a light smoker, you have an increased risk for cancer, heart disease, and lung disease  If you are pregnant or have diabetes, smoking increases your risk for complications  Benefits to your health if you stop smoking:   · You decrease respiratory symptoms such as coughing, wheezing, and shortness of breath  · You reduce your risk for cancers of the lung, mouth, throat, kidney, bladder, pancreas, stomach, and cervix  If you already have cancer, you increase the benefits of chemotherapy  You also reduce your risk for cancer returning or a second cancer from developing  · You reduce your risk for heart disease, blood clots, heart attack, and stroke  · You reduce your risk for lung infections, and diseases such as pneumonia, asthma, chronic bronchitis, and emphysema  · Your circulation improves  More oxygen can be delivered to your body  If you have diabetes, you lower your risk for complications, such as kidney, artery, and eye diseases   You also lower your risk for nerve damage  Nerve damage can lead to amputations, poor vision, and blindness  · You improve your body's ability to heal and to fight infections  For more information and support to stop smoking:   · [employer]  Phone: 1- 379 - 852-8695  Web Address: [employer]  Weight Management   Why it is important to manage your weight:  Being overweight increases your risk of health conditions such as heart disease, high blood pressure, type 2 diabetes, and certain types of cancer  It can also increase your risk for osteoarthritis, sleep apnea, and other respiratory problems  Aim for a slow, steady weight loss  Even a small amount of weight loss can lower your risk of health problems  How to lose weight safely:  A safe and healthy way to lose weight is to eat fewer calories and get regular exercise  You can lose up about 1 pound a week by decreasing the number of calories you eat by 500 calories each day  Healthy meal plan for weight management:  A healthy meal plan includes a variety of foods, contains fewer calories, and helps you stay healthy  A healthy meal plan includes the following:  · Eat whole-grain foods more often  A healthy meal plan should contain fiber  Fiber is the part of grains, fruits, and vegetables that is not broken down by your body  Whole-grain foods are healthy and provide extra fiber in your diet  Some examples of whole-grain foods are whole-wheat breads and pastas, oatmeal, brown rice, and bulgur  · Eat a variety of vegetables every day  Include dark, leafy greens such as spinach, kale, david greens, and mustard greens  Eat yellow and orange vegetables such as carrots, sweet potatoes, and winter squash  · Eat a variety of fruits every day  Choose fresh or canned fruit (canned in its own juice or light syrup) instead of juice  Fruit juice has very little or no fiber  · Eat low-fat dairy foods  Drink fat-free (skim) milk or 1% milk   Eat fat-free yogurt and low-fat cottage cheese  Try low-fat cheeses such as mozzarella and other reduced-fat cheeses  · Choose meat and other protein foods that are low in fat  Choose beans or other legumes such as split peas or lentils  Choose fish, skinless poultry (chicken or turkey), or lean cuts of red meat (beef or pork)  Before you cook meat or poultry, cut off any visible fat  · Use less fat and oil  Try baking foods instead of frying them  Add less fat, such as margarine, sour cream, regular salad dressing and mayonnaise to foods  Eat fewer high-fat foods  Some examples of high-fat foods include french fries, doughnuts, ice cream, and cakes  · Eat fewer sweets  Limit foods and drinks that are high in sugar  This includes candy, cookies, regular soda, and sweetened drinks  Exercise:  Exercise at least 30 minutes per day on most days of the week  Some examples of exercise include walking, biking, dancing, and swimming  You can also fit in more physical activity by taking the stairs instead of the elevator or parking farther away from stores  Ask your healthcare provider about the best exercise plan for you  © Copyright [employer] 2018 Information is for End User's use only and may not be sold, redistributed or otherwise used for commercial purposes   All illustrations and images included in CareNotes® are the copyrighted property of [employer]  or [address] [place] [address]

## 2022-06-15 NOTE — PROGRESS NOTES
Assessment and Plan:     Problem List Items Addressed This Visit        Digestive    Esophageal reflux       Respiratory    Chronic obstructive pulmonary disease with acute exacerbation (HCC)       Cardiovascular and Mediastinum    Essential hypertension    Relevant Orders    CBC and differential    Comprehensive metabolic panel    Lipid panel       Other    Moderate episode of recurrent major depressive disorder (HCC)    Relevant Medications    ALPRAZolam (XANAX) 0 25 mg tablet    escitalopram (LEXAPRO) 20 mg tablet    zolpidem (AMBIEN CR) 12 5 MG CR tablet    Vitamin B12 deficiency    Relevant Orders    Vitamin B12    Vitamin D deficiency    Relevant Orders    Vitamin D 25 hydroxy      Other Visit Diagnoses     Medicare annual wellness visit, subsequent    -  Primary    Acute idiopathic gout of right foot        Acute pain of right shoulder        Relevant Orders    Uric acid    Allergic conjunctivitis of both eyes        Relevant Medications    olopatadine (Patanol) 0 1 % ophthalmic solution    Anxiety and depression        Relevant Medications    ALPRAZolam (XANAX) 0 25 mg tablet    escitalopram (LEXAPRO) 20 mg tablet    zolpidem (AMBIEN CR) 12 5 MG CR tablet    Chronic obstructive pulmonary disease, unspecified COPD type (HCC)        Mild mitral stenosis        Mild aortic stenosis        Exertional dyspnea        Primary insomnia        Relevant Medications    zolpidem (AMBIEN CR) 12 5 MG CR tablet    Screening breast examination        Relevant Orders    Mammo screening bilateral w 3d & cad    Menopause        Relevant Orders    DXA bone density spine hip and pelvis    Encounter for screening mammogram for malignant neoplasm of breast         Relevant Orders    Mammo screening bilateral w 3d & cad           Preventive health issues were discussed with patient, and age appropriate screening tests were ordered as noted in patient's After Visit Summary    Personalized health advice and appropriate referrals for health education or preventive services given if needed, as noted in patient's After Visit Summary  History of Present Illness:     Patient presents for a Medicare Wellness Visit    HPI   Patient Care Team:  Priyanka Rodriguez MD as PCP - DO Areli Dent PA-C Cyndi Conner, MD     Review of Systems:     Review of Systems   Constitutional: Negative  Respiratory: Negative  Cardiovascular: Negative  Problem List:     Patient Active Problem List   Diagnosis    Smoking    Essential hypertension    Allergic rhinitis    Moderate episode of recurrent major depressive disorder ([address] )    Arteriosclerotic cardiovascular disease    Arthropathy    Chronic obstructive pulmonary disease with acute exacerbation (HCC)    Esophageal reflux    Mixed hyperlipidemia    Postgastrectomy malabsorption    Primary osteoarthritis    Solitary pulmonary nodule    Vitamin B12 deficiency    Vitamin D deficiency    Thiamin deficiency    Chronic gout    Mixed stress and urge urinary incontinence    Forgetfulness    Debility    Bilateral hearing loss    Asthma      Past Medical and Surgical History:     Past Medical History:   Diagnosis Date    Acid reflux     Arthritis     Asthma     Cardiac disease     Chronic kidney disease     passed on own kidney stone    Diverticulitis     GERD (gastroesophageal reflux disease)     Hayfever     [place] (hard of hearing)     bilateral hearing aids    Hyperlipemia     Hypertension     Tachycardia      Past Surgical History:   Procedure Laterality Date    ABLATION OF DYSRHYTHMIC FOCUS      APPENDECTOMY      CHOLECYSTECTOMY      open    FRACTURE SURGERY      ORIF fx (L) tibia, fibula 2009    GASTRIC BYPASS OPEN      HYSTERECTOMY      AL XCAPSL CTRC RMVL INSJ IO LENS PROSTH W/O ECP Left 4/18/2016    Procedure: EXTRACTION EXTRACAPSULAR CATARACT PHACO INTRAOCULAR LENS (IOL);   Surgeon: Moncho Fulton MD;  Location: [facility] MAIN OR; Service: Ophthalmology    GA XCAPSL CTRC RMVL INSJ IO LENS PROSTH W/O ECP Right 3/1/2021    Procedure: EXTRACTION EXTRACAPSULAR CATARACT PHACO INTRAOCULAR LENS (IOL); Surgeon: Stanislaw Sampson MD;  Location: [facility] MAIN OR;  Service: Ophthalmology    TONSILECTOMY AND ADNOIDECTOMY        Family History:     Family History   Problem Relation Age of Onset    Heart disease Mother     Stroke Son     Stroke Maternal Grandfather     Breast cancer Daughter 36      Social History:     Social History     Socioeconomic History    Marital status:      Spouse name: None    Number of children: None    Years of education: None    Highest education level: None   Occupational History    None   Tobacco Use    Smoking status: Current Every Day Smoker     Packs/day: 0 50     Years: 25 00     Pack years: 12 50    Smokeless tobacco: Never Used   Vaping Use    Vaping Use: Never used   Substance and Sexual Activity    Alcohol use: Yes     Comment: rarely    Drug use: No    Sexual activity: Never   Other Topics Concern    None   Social History Narrative    None     Social Determinants of Health     Financial Resource Strain: Not on file   Food Insecurity: Not on file   Transportation Needs: Not on file   Physical Activity: Not on file   Stress: Not on file   Social Connections: Not on file   Intimate Partner Violence: Not on file   Housing Stability: Not on file      Medications and Allergies:     Current Outpatient Medications   Medication Sig Dispense Refill    acetaminophen (TYLENOL) 325 mg tablet Take 2 tablets (650 mg total) by mouth every 6 (six) hours as needed for mild pain, headaches or fever   30 tablet 0    albuterol (2 5 mg/3 mL) 0 083 % nebulizer solution Take 3 mL (2 5 mg total) by nebulization every 6 (six) hours as needed for wheezing or shortness of breath 150 mL 3    albuterol (PROVENTIL HFA,VENTOLIN HFA) 90 mcg/act inhaler Inhale 2 puffs every 6 (six) hours as needed for wheezing 54 g 3    allopurinol (ZYLOPRIM) 100 mg tablet Take 1 tablet (100 mg total) by mouth daily 90 tablet 3    ALPRAZolam (XANAX) 0 25 mg tablet Take 1 tablet (0 25 mg total) by mouth daily as needed for anxiety 30 tablet 0    Apple Cider Vinegar 188 MG CAPS Take by mouth daily      aspirin (ECOTRIN LOW STRENGTH) 81 mg EC tablet Take 81 mg by mouth daily      buPROPion (WELLBUTRIN XL) 300 mg 24 hr tablet Take 1 tablet (300 mg total) by mouth every morning 90 tablet 1    Diclofenac Sodium (VOLTAREN) 1 % Apply 2 g topically 4 (four) times a day 3 g 3    escitalopram (LEXAPRO) 20 mg tablet Take 1 tablet (20 mg total) by mouth daily 90 tablet 1    esomeprazole (NexIUM) 20 mg capsule Take 1 capsule (20 mg total) by mouth daily in the early morning 90 capsule 3    fluticasone (FLONASE) 50 mcg/act nasal spray 2 sprays into each nostril daily 48 g 3    fluticasone-salmeterol (ADVAIR HFA) 115-21 MCG/ACT inhaler Inhale 1 puff daily 48 g 3    Multiple Vitamin (MULTIVITAMIN) tablet Take 1 tablet by mouth daily   nebivolol (BYSTOLIC) 2 5 mg tablet Take 1 tablet (2 5 mg total) by mouth every morning 90 tablet 3    olopatadine (Patanol) 0 1 % ophthalmic solution Apply 1 drop to eye 2 (two) times a day 15 mL 3    VITAMIN D, ERGOCALCIFEROL, PO Take by mouth      zolpidem (AMBIEN CR) 12 5 MG CR tablet Take 1 tablet (12 5 mg total) by mouth daily at bedtime as needed for sleep 30 tablet 1     No current facility-administered medications for this visit       Allergies   Allergen Reactions    Augmentin [Amoxicillin-Pot Clavulanate] GI Intolerance, Other (See Comments) and Hives     VOMITING  VOMITING  Reaction Date: [id number];     Sulfa Antibiotics Itching, Other (See Comments) and Hives     RASH, ITCHY  RASH, ITCHY    Morphine Other (See Comments)     "DOESN'T WORK"    Latex Rash     Unsure if this is an allergy      Immunizations:     Immunization History   Administered Date(s) Administered    COVID-19 PFIZER VACCINE 0 3 ML IM 04/23/2021, 05/14/2021, 12/11/2021    Influenza Split High Dose Preservative Free IM 10/30/2013, 10/08/2014, 02/18/2016, 11/29/2017    Influenza, high dose seasonal 0 7 mL 09/20/2018, 10/10/2019, 10/27/2021    Influenza, seasonal, injectable 11/20/2007, 10/10/2008    Pneumococcal Conjugate 13-Valent 02/18/2016    Pneumococcal Polysaccharide PPV23 07/09/2007, 11/07/2013    TD (adult) Preservative Free 12/16/2021    Tdap 07/09/2007    Zoster 11/07/2013, 10/01/2014      Health Maintenance:         Topic Date Due    Breast Cancer Screening: Mammogram  05/21/2022    Colorectal Cancer Screening  05/24/2025    Hepatitis C Screening  Completed         Topic Date Due    COVID-19 Vaccine (4 - Booster for [place] series) 04/11/2022      Medicare Screening Tests and Risk Assessments:     Samaria Ragland is here for her Subsequent Wellness visit  Health Risk Assessment:   Patient rates overall health as good  Patient feels that their physical health rating is slightly worse  Patient is satisfied with their life  Eyesight was rated as same  Hearing was rated as slightly worse  Patient feels that their emotional and mental health rating is same  Patients states they are never, rarely angry  Patient states they are sometimes unusually tired/fatigued  Pain experienced in the last 7 days has been some  Patient's pain rating has been 3/10  Patient states that she has experienced no weight loss or gain in last 6 months  Depression Screening:   PHQ-9 Score: 0      Fall Risk Screening: In the past year, patient has experienced: history of falling in past year    Number of falls: 1  Injured during fall?: Yes    Feels unsteady when standing or walking?: No    Worried about falling?: No      Urinary Incontinence Screening:   Patient has leaked urine accidently in the last six months  Home Safety:  Patient does not have trouble with stairs inside or outside of their home   Patient has working smoke alarms and has working carbon monoxide detector  Home safety hazards include: none  Nutrition:   Current diet is Regular and Limited junk food  Medications:   Patient is currently taking over-the-counter supplements  OTC medications include: see medication list  Patient is able to manage medications  Activities of Daily Living (ADLs)/Instrumental Activities of Daily Living (IADLs):   Walk and transfer into and out of bed and chair?: Yes  Dress and groom yourself?: Yes    Bathe or shower yourself?: Yes    Feed yourself?  Yes  Do your laundry/housekeeping?: Yes  Manage your money, pay your bills and track your expenses?: Yes  Make your own meals?: Yes    Do your own shopping?: Yes    ADL comments: Daughter helps    Previous Hospitalizations:   Any hospitalizations or ED visits within the last 12 months?: No      Advance Care Planning:   Living will: Yes    Advanced directive: Yes    End of Life Decisions reviewed with patient: Yes      Cognitive Screening:   Provider or family/friend/caregiver concerned regarding cognition?: No    PREVENTIVE SCREENINGS      Cardiovascular Screening:    General: Screening Not Indicated, History Lipid Disorder and Risks and Benefits Discussed    Due for: Lipid Panel      Diabetes Screening:     General: Risks and Benefits Discussed    Due for: Blood Glucose      Colorectal Cancer Screening:     General: Screening Current      Breast Cancer Screening:     General: Risks and Benefits Discussed    Due for: Mammogram        Cervical Cancer Screening:    General: Screening Not Indicated      Osteoporosis Screening:    General: Risks and Benefits Discussed    Due for: DXA Axial      Abdominal Aortic Aneurysm (AAA) Screening:        General: Screening Not Indicated      Lung Cancer Screening:     General: Screening Not Indicated and Screening Current      Hepatitis C Screening:    General: Screening Current    Screening, Brief Intervention, and Referral to Treatment (SBIRT)    Screening      AUDIT-C Screenin) How often did you have a drink containing alcohol in the past year? never  2) How many drinks did you have on a typical day when you were drinking in the past year? 0  3) How often did you have 6 or more drinks on one occasion in the past year? never    AUDIT-C Score: 0  Interpretation: Score 0-2 (female): Negative screen for alcohol misuse    Single Item Drug Screening:  How often have you used an illegal drug (including marijuana) or a prescription medication for non-medical reasons in the past year? never    Single Item Drug Screen Score: 0  Interpretation: Negative screen for possible drug use disorder    Brief Intervention  Alcohol & drug use screenings were reviewed  No concerns regarding substance use disorder identified  Other Counseling Topics:   Car/seat belt/driving safety, skin self-exam, sunscreen and calcium and vitamin D intake and regular weightbearing exercise  No exam data present     Physical Exam:     /70   Pulse 55   Temp 97 6 °F (36 4 °C)   Resp 16   Ht 5' 0 25" (1 53 m)   Wt 70 8 kg (156 lb)   LMP  (LMP Unknown)   SpO2 95%   BMI 30 21 kg/m²     Physical Exam  Constitutional:       General: She is not in acute distress  Appearance: She is well-developed  She is not diaphoretic  HENT:      Head: Normocephalic and atraumatic  Right Ear: External ear normal       Left Ear: External ear normal       Nose: Nose normal    Eyes:      Pupils: Pupils are equal, round, and reactive to light  Neck:      Thyroid: No thyromegaly  Vascular: No JVD  Cardiovascular:      Rate and Rhythm: Regular rhythm  Heart sounds: No murmur heard  No friction rub  No gallop  Pulmonary:      Effort: Pulmonary effort is normal       Breath sounds: Normal breath sounds  No wheezing or rales  Abdominal:      General: Bowel sounds are normal  There is no distension  Palpations: Abdomen is soft  Tenderness: There is no abdominal tenderness  Musculoskeletal:         General: Normal range of motion  Cervical back: Normal range of motion and neck supple  Skin:     General: Skin is warm and dry  Findings: No rash  Neurological:      Mental Status: She is alert and oriented to person, place, and time  Cranial Nerves: No cranial nerve deficit  Sensory: No sensory deficit  Motor: No abnormal muscle tone  Coordination: Coordination normal       Deep Tendon Reflexes: Reflexes normal    Psychiatric:         Behavior: Behavior normal          Thought Content:  Thought content normal          Judgment: Judgment normal           Katelin Anthony MD

## 2022-06-16 ENCOUNTER — TELEPHONE (OUTPATIENT)
Dept: PSYCHIATRY | Facility: CLINIC | Age: 72
End: 2022-06-16

## 2022-07-07 DIAGNOSIS — F41.9 ANXIETY AND DEPRESSION: ICD-10-CM

## 2022-07-07 DIAGNOSIS — F32.A ANXIETY AND DEPRESSION: ICD-10-CM

## 2022-07-07 DIAGNOSIS — F51.01 PRIMARY INSOMNIA: ICD-10-CM

## 2022-07-07 RX ORDER — ZOLPIDEM TARTRATE 12.5 MG/1
12.5 TABLET, FILM COATED, EXTENDED RELEASE ORAL
Qty: 30 TABLET | Refills: 0 | Status: SHIPPED | OUTPATIENT
Start: 2022-07-07 | End: 2022-08-30 | Stop reason: SDUPTHER

## 2022-07-07 RX ORDER — ALPRAZOLAM 0.25 MG/1
0.25 TABLET ORAL DAILY PRN
Qty: 30 TABLET | Refills: 0 | OUTPATIENT
Start: 2022-07-07

## 2022-08-01 ENCOUNTER — OFFICE VISIT (OUTPATIENT)
Dept: FAMILY MEDICINE CLINIC | Facility: CLINIC | Age: 72
End: 2022-08-01
Payer: MEDICARE

## 2022-08-01 VITALS
OXYGEN SATURATION: 98 % | HEART RATE: 60 BPM | BODY MASS INDEX: 31.65 KG/M2 | TEMPERATURE: 96 F | RESPIRATION RATE: 19 BRPM | HEIGHT: 60 IN | DIASTOLIC BLOOD PRESSURE: 88 MMHG | SYSTOLIC BLOOD PRESSURE: 140 MMHG | WEIGHT: 161.2 LBS

## 2022-08-01 DIAGNOSIS — H66.92 LEFT OTITIS MEDIA, UNSPECIFIED OTITIS MEDIA TYPE: Primary | ICD-10-CM

## 2022-08-01 DIAGNOSIS — J44.9 CHRONIC OBSTRUCTIVE PULMONARY DISEASE, UNSPECIFIED COPD TYPE (HCC): ICD-10-CM

## 2022-08-01 PROCEDURE — 99213 OFFICE O/P EST LOW 20 MIN: CPT | Performed by: INTERNAL MEDICINE

## 2022-08-01 RX ORDER — AZITHROMYCIN 250 MG/1
TABLET, FILM COATED ORAL
Qty: 6 TABLET | Refills: 0 | Status: SHIPPED | OUTPATIENT
Start: 2022-08-01 | End: 2022-08-05 | Stop reason: SDUPTHER

## 2022-08-01 RX ORDER — AZITHROMYCIN 250 MG/1
TABLET, FILM COATED ORAL
Qty: 6 TABLET | Refills: 0 | Status: SHIPPED | OUTPATIENT
Start: 2022-08-01 | End: 2022-08-01 | Stop reason: SDUPTHER

## 2022-08-01 NOTE — PROGRESS NOTES
Assessment/Plan:    1  Left otitis media, unspecified otitis media type  Comments:  Continue flonase and may use tylenol and warm compresses for comfort  F/u if not improving  Orders:  -     azithromycin (ZITHROMAX) 250 mg tablet; Take 2 tablets today then 1 tablet daily x 4 days    2  Chronic obstructive pulmonary disease, unspecified COPD type ([facility])  -     fluticasone-salmeterol (ADVAIR HFA) 115-21 MCG/ACT inhaler; Inhale 1 puff daily          There are no Patient Instructions on file for this visit  Return if symptoms worsen or fail to improve  Subjective:      Patient ID: Josefina Card is a 74 y o  female  Chief Complaint   Patient presents with   Stephanie Myles in left ear, started Wednesday night nm lpn       She has had a L earache for 5 days  There is no discharge  She has a lot of pressure  Taking tylenol, has had a fever  Has congestion  The following portions of the patient's history were reviewed and updated as appropriate: allergies, current medications, past family history, past medical history, past social history, past surgical history and problem list     Review of Systems   Constitutional: Positive for fatigue and fever  HENT: Positive for congestion and ear pain  Negative for ear discharge, postnasal drip, rhinorrhea, sinus pressure and sinus pain  Respiratory: Negative  Cardiovascular: Negative  Current Outpatient Medications   Medication Sig Dispense Refill    acetaminophen (TYLENOL) 325 mg tablet Take 2 tablets (650 mg total) by mouth every 6 (six) hours as needed for mild pain, headaches or fever   30 tablet 0    albuterol (2 5 mg/3 mL) 0 083 % nebulizer solution Take 3 mL (2 5 mg total) by nebulization every 6 (six) hours as needed for wheezing or shortness of breath 150 mL 3    albuterol (PROVENTIL HFA,VENTOLIN HFA) 90 mcg/act inhaler Inhale 2 puffs every 6 (six) hours as needed for wheezing 54 g 3    allopurinol (ZYLOPRIM) 100 mg tablet Take 1 tablet (100 mg total) by mouth daily 90 tablet 3    ALPRAZolam (XANAX) 0 25 mg tablet Take 1 tablet (0 25 mg total) by mouth daily as needed for anxiety 30 tablet 0    Apple Cider Vinegar 188 MG CAPS Take by mouth daily      aspirin (ECOTRIN LOW STRENGTH) 81 mg EC tablet Take 81 mg by mouth daily      azithromycin (ZITHROMAX) 250 mg tablet Take 2 tablets today then 1 tablet daily x 4 days 6 tablet 0    buPROPion (WELLBUTRIN XL) 300 mg 24 hr tablet Take 1 tablet (300 mg total) by mouth every morning 90 tablet 1    Diclofenac Sodium (VOLTAREN) 1 % Apply 2 g topically 4 (four) times a day 3 g 3    escitalopram (LEXAPRO) 20 mg tablet Take 1 tablet (20 mg total) by mouth daily 90 tablet 1    esomeprazole (NexIUM) 20 mg capsule Take 1 capsule (20 mg total) by mouth daily in the early morning 90 capsule 3    fluticasone (FLONASE) 50 mcg/act nasal spray 2 sprays into each nostril daily 48 g 3    fluticasone-salmeterol (ADVAIR HFA) 115-21 MCG/ACT inhaler Inhale 1 puff daily 48 g 3    Multiple Vitamin (MULTIVITAMIN) tablet Take 1 tablet by mouth daily   nebivolol (BYSTOLIC) 2 5 mg tablet Take 1 tablet (2 5 mg total) by mouth every morning 90 tablet 3    olopatadine (Patanol) 0 1 % ophthalmic solution Apply 1 drop to eye 2 (two) times a day 15 mL 3    VITAMIN D, ERGOCALCIFEROL, PO Take by mouth      zolpidem (AMBIEN CR) 12 5 MG CR tablet Take 1 tablet (12 5 mg total) by mouth daily at bedtime as needed for sleep 30 tablet 0     No current facility-administered medications for this visit  Objective:    /88   Pulse 60   Temp (!) 96 °F (35 6 °C)   Resp 19   Ht 5' 0 25" (1 53 m)   Wt 73 1 kg (161 lb 3 2 oz)   LMP  (LMP Unknown)   SpO2 98%   BMI 31 22 kg/m²        Physical Exam  HENT:      Right Ear: Tympanic membrane is retracted  Left Ear: Tympanic membrane is erythematous and bulging  Tympanic membrane is not retracted  Nose: Mucosal edema and rhinorrhea present     Cardiovascular: Rate and Rhythm: Normal rate and regular rhythm  Heart sounds: Normal heart sounds  Pulmonary:      Effort: Pulmonary effort is normal       Breath sounds: Normal breath sounds  No wheezing or rales  Musculoskeletal:      Cervical back: Neck supple  Lymphadenopathy:      Cervical: No cervical adenopathy                  Nick Garvey MD

## 2022-08-05 ENCOUNTER — OFFICE VISIT (OUTPATIENT)
Dept: FAMILY MEDICINE CLINIC | Facility: CLINIC | Age: 72
End: 2022-08-05
Payer: MEDICARE

## 2022-08-05 VITALS
TEMPERATURE: 97.6 F | WEIGHT: 160 LBS | HEIGHT: 60 IN | OXYGEN SATURATION: 98 % | RESPIRATION RATE: 18 BRPM | SYSTOLIC BLOOD PRESSURE: 132 MMHG | DIASTOLIC BLOOD PRESSURE: 80 MMHG | HEART RATE: 66 BPM | BODY MASS INDEX: 31.41 KG/M2

## 2022-08-05 DIAGNOSIS — H66.92 LEFT OTITIS MEDIA, UNSPECIFIED OTITIS MEDIA TYPE: ICD-10-CM

## 2022-08-05 DIAGNOSIS — H66.92 LEFT OTITIS MEDIA, UNSPECIFIED OTITIS MEDIA TYPE: Primary | ICD-10-CM

## 2022-08-05 PROCEDURE — 99213 OFFICE O/P EST LOW 20 MIN: CPT | Performed by: FAMILY MEDICINE

## 2022-08-05 RX ORDER — AZITHROMYCIN 250 MG/1
TABLET, FILM COATED ORAL
Qty: 6 TABLET | Refills: 0 | Status: SHIPPED | OUTPATIENT
Start: 2022-08-05 | End: 2022-08-09

## 2022-08-05 NOTE — PROGRESS NOTES
Assessment/Plan:       Diagnoses and all orders for this visit:    Left otitis media, unspecified otitis media type  Comments:  Continue flonase and may use tylenol and warm compresses for comfort  Will repeat course of azithromcyin given some improvement, but not complete  She is allergic to penicillins  Orders:  -     azithromycin (ZITHROMAX) 250 mg tablet; Take 2 tablets today then 1 tablet daily x 4 days        Subjective:      Patient ID: Blease Hamman is a 74 y o  female  Earache   There is pain in the left ear  This is a new problem  Episode onset: 1 week ago  The problem occurs constantly  The problem has been unchanged  There has been no fever  Associated symptoms include headaches and a sore throat  Pertinent negatives include no abdominal pain, coughing, diarrhea, ear discharge, hearing loss, neck pain, rash, rhinorrhea or vomiting  Treatments tried: azithromycin, completed course yesterday  The treatment provided no relief  There is no history of a chronic ear infection, hearing loss or a tympanostomy tube  The following portions of the patient's history were reviewed and updated as appropriate: allergies, current medications, past family history, past medical history, past social history, past surgical history and problem list     Review of Systems   Constitutional: Negative  HENT: Positive for ear pain and sore throat  Negative for ear discharge, hearing loss and rhinorrhea  Eyes: Negative  Respiratory: Negative  Negative for cough  Cardiovascular: Negative  Gastrointestinal: Negative  Negative for abdominal pain, diarrhea and vomiting  Endocrine: Negative  Genitourinary: Negative  Musculoskeletal: Negative  Negative for neck pain  Skin: Negative  Negative for rash  Allergic/Immunologic: Negative  Neurological: Positive for headaches  Hematological: Negative  Psychiatric/Behavioral: Negative            Objective:      /80   Pulse 66   Temp 97 6 °F (36 4 °C)   Resp 18   Ht 5' 0 25" (1 53 m)   Wt 72 6 kg (160 lb)   LMP  (LMP Unknown)   SpO2 98%   BMI 30 99 kg/m²          Physical Exam  Constitutional:       General: She is not in acute distress  Appearance: She is well-developed  She is not diaphoretic  HENT:      Head: Normocephalic and atraumatic  Right Ear: Tympanic membrane, ear canal and external ear normal  There is no impacted cerumen  Left Ear: Ear canal and external ear normal  A middle ear effusion is present  There is no impacted cerumen  Nose: Nose normal  No congestion or rhinorrhea  Mouth/Throat:      Mouth: Mucous membranes are moist       Pharynx: Oropharynx is clear  No oropharyngeal exudate or posterior oropharyngeal erythema  Eyes:      General: No scleral icterus  Right eye: No discharge  Left eye: No discharge  Conjunctiva/sclera: Conjunctivae normal    Cardiovascular:      Rate and Rhythm: Normal rate and regular rhythm  Heart sounds: Normal heart sounds  No murmur heard  No friction rub  No gallop  Pulmonary:      Effort: Pulmonary effort is normal  No respiratory distress  Breath sounds: Normal breath sounds  No wheezing or rales  Chest:      Chest wall: No tenderness  Musculoskeletal:         General: No deformity  Normal range of motion  Cervical back: Normal range of motion and neck supple  Skin:     General: Skin is warm and dry  Neurological:      Mental Status: She is alert and oriented to person, place, and time  Psychiatric:         Behavior: Behavior normal          Thought Content:  Thought content normal          Judgment: Judgment normal

## 2022-08-08 NOTE — TELEPHONE ENCOUNTER
Called and left message for return call, number provided  If patient returns call please offer her sooner appointment tomorrow 2/25/20 with Dr Gricel Devine  5

## 2022-08-30 DIAGNOSIS — F51.01 PRIMARY INSOMNIA: ICD-10-CM

## 2022-08-31 RX ORDER — ZOLPIDEM TARTRATE 12.5 MG/1
12.5 TABLET, FILM COATED, EXTENDED RELEASE ORAL
Qty: 30 TABLET | Refills: 0 | Status: SHIPPED | OUTPATIENT
Start: 2022-08-31 | End: 2022-10-07 | Stop reason: SDUPTHER

## 2022-09-06 DIAGNOSIS — F33.1 MODERATE EPISODE OF RECURRENT MAJOR DEPRESSIVE DISORDER (HCC): ICD-10-CM

## 2022-09-07 RX ORDER — ESCITALOPRAM OXALATE 20 MG/1
20 TABLET ORAL DAILY
Qty: 90 TABLET | Refills: 1 | Status: SHIPPED | OUTPATIENT
Start: 2022-09-07

## 2022-09-15 ENCOUNTER — TELEPHONE (OUTPATIENT)
Dept: PSYCHIATRY | Facility: CLINIC | Age: 72
End: 2022-09-15

## 2022-09-15 NOTE — TELEPHONE ENCOUNTER
left message for pt to return call to intake regarding the [facility] waitlist for schedulng   Once pt returns call transfer over to me please

## 2022-10-03 ENCOUNTER — TELEPHONE (OUTPATIENT)
Dept: PSYCHIATRY | Facility: CLINIC | Age: 72
End: 2022-10-03

## 2022-10-03 NOTE — TELEPHONE ENCOUNTER
unable to leave a VM for pt because mailbox is full, reached out to pt in regards to scheduling services in [facility]

## 2022-10-07 DIAGNOSIS — F51.01 PRIMARY INSOMNIA: ICD-10-CM

## 2022-10-07 DIAGNOSIS — F41.9 ANXIETY AND DEPRESSION: ICD-10-CM

## 2022-10-07 DIAGNOSIS — F32.A ANXIETY AND DEPRESSION: ICD-10-CM

## 2022-10-07 RX ORDER — ZOLPIDEM TARTRATE 12.5 MG/1
12.5 TABLET, FILM COATED, EXTENDED RELEASE ORAL
Qty: 30 TABLET | Refills: 0 | Status: SHIPPED | OUTPATIENT
Start: 2022-10-07

## 2022-10-07 RX ORDER — ALPRAZOLAM 0.25 MG/1
0.25 TABLET ORAL DAILY PRN
Qty: 30 TABLET | Refills: 0 | Status: SHIPPED | OUTPATIENT
Start: 2022-10-07

## 2022-10-07 NOTE — TELEPHONE ENCOUNTER
Patient would like to have medications delivered to her home by pharmacy per message on refill line    Naga Finders

## 2022-10-28 ENCOUNTER — TELEPHONE (OUTPATIENT)
Dept: PSYCHIATRY | Facility: CLINIC | Age: 72
End: 2022-10-28

## 2022-10-28 NOTE — TELEPHONE ENCOUNTER
left message for pt to return call to intake in regards to scheduling services in [facility]

## 2022-11-10 DIAGNOSIS — F51.01 PRIMARY INSOMNIA: ICD-10-CM

## 2022-11-10 RX ORDER — ZOLPIDEM TARTRATE 12.5 MG/1
12.5 TABLET, FILM COATED, EXTENDED RELEASE ORAL
Qty: 30 TABLET | Refills: 0 | Status: SHIPPED | OUTPATIENT
Start: 2022-11-10

## 2022-12-02 DIAGNOSIS — F32.A ANXIETY AND DEPRESSION: ICD-10-CM

## 2022-12-02 DIAGNOSIS — F41.9 ANXIETY AND DEPRESSION: ICD-10-CM

## 2022-12-03 DIAGNOSIS — J44.1 CHRONIC OBSTRUCTIVE PULMONARY DISEASE WITH ACUTE EXACERBATION (HCC): ICD-10-CM

## 2022-12-04 RX ORDER — BUPROPION HYDROCHLORIDE 300 MG/1
300 TABLET ORAL EVERY MORNING
Qty: 90 TABLET | Refills: 1 | Status: SHIPPED | OUTPATIENT
Start: 2022-12-04

## 2022-12-05 DIAGNOSIS — F41.9 ANXIETY AND DEPRESSION: ICD-10-CM

## 2022-12-05 DIAGNOSIS — F32.A ANXIETY AND DEPRESSION: ICD-10-CM

## 2022-12-05 RX ORDER — BUPROPION HYDROCHLORIDE 300 MG/1
300 TABLET ORAL EVERY MORNING
Qty: 90 TABLET | Refills: 1 | OUTPATIENT
Start: 2022-12-05

## 2022-12-05 RX ORDER — ALBUTEROL SULFATE 2.5 MG/3ML
SOLUTION RESPIRATORY (INHALATION)
Qty: 150 ML | Refills: 1 | Status: SHIPPED | OUTPATIENT
Start: 2022-12-05

## 2022-12-09 ENCOUNTER — RA CDI HCC (OUTPATIENT)
Dept: OTHER | Facility: HOSPITAL | Age: 72
End: 2022-12-09

## 2022-12-09 NOTE — PROGRESS NOTES
[address]  coding opportunities       Chart reviewed, no opportunity found: CHART REVIEWED, NO OPPORTUNITY FOUND        Patients Insurance     Medicare Insurance: Medicare

## 2022-12-14 DIAGNOSIS — F32.A ANXIETY AND DEPRESSION: ICD-10-CM

## 2022-12-14 DIAGNOSIS — F41.9 ANXIETY AND DEPRESSION: ICD-10-CM

## 2022-12-14 DIAGNOSIS — F51.01 PRIMARY INSOMNIA: ICD-10-CM

## 2022-12-14 DIAGNOSIS — F33.1 MODERATE EPISODE OF RECURRENT MAJOR DEPRESSIVE DISORDER (HCC): ICD-10-CM

## 2022-12-14 RX ORDER — ZOLPIDEM TARTRATE 12.5 MG/1
12.5 TABLET, FILM COATED, EXTENDED RELEASE ORAL
Qty: 30 TABLET | Refills: 0 | Status: SHIPPED | OUTPATIENT
Start: 2022-12-14

## 2022-12-14 RX ORDER — ESCITALOPRAM OXALATE 20 MG/1
20 TABLET ORAL DAILY
Qty: 90 TABLET | Refills: 1 | Status: SHIPPED | OUTPATIENT
Start: 2022-12-14

## 2022-12-14 RX ORDER — ALPRAZOLAM 0.25 MG/1
0.25 TABLET ORAL DAILY PRN
Qty: 30 TABLET | Refills: 0 | Status: SHIPPED | OUTPATIENT
Start: 2022-12-14

## 2022-12-21 ENCOUNTER — OFFICE VISIT (OUTPATIENT)
Dept: FAMILY MEDICINE CLINIC | Facility: CLINIC | Age: 72
End: 2022-12-21

## 2022-12-21 VITALS
TEMPERATURE: 97.4 F | HEART RATE: 64 BPM | DIASTOLIC BLOOD PRESSURE: 68 MMHG | HEIGHT: 60 IN | RESPIRATION RATE: 22 BRPM | WEIGHT: 165 LBS | SYSTOLIC BLOOD PRESSURE: 116 MMHG | BODY MASS INDEX: 32.39 KG/M2

## 2022-12-21 DIAGNOSIS — I35.0 AORTIC VALVE STENOSIS, ETIOLOGY OF CARDIAC VALVE DISEASE UNSPECIFIED: ICD-10-CM

## 2022-12-21 DIAGNOSIS — E78.2 MIXED HYPERLIPIDEMIA: ICD-10-CM

## 2022-12-21 DIAGNOSIS — I10 ESSENTIAL HYPERTENSION: Primary | ICD-10-CM

## 2022-12-21 DIAGNOSIS — F33.1 MODERATE EPISODE OF RECURRENT MAJOR DEPRESSIVE DISORDER (HCC): ICD-10-CM

## 2022-12-21 DIAGNOSIS — Z23 NEED FOR VACCINATION: ICD-10-CM

## 2022-12-21 PROBLEM — R53.81 DEBILITY: Status: RESOLVED | Noted: 2022-05-09 | Resolved: 2022-12-21

## 2022-12-21 NOTE — PROGRESS NOTES
Name: Ciro Barrera      : 1950      MRN: [id number]  Encounter Provider: Yara Dockery MD  Encounter Date: 2022   Encounter department: [facility]     1  Essential hypertension  Assessment & Plan:  Well controlled and will continue current medications  2  Moderate episode of recurrent major depressive disorder [facility])  Assessment & Plan:  She feels control is adequate and will continue with bupropion and escitalopram as ordered  Asked patient to call sooner than next scheduled appointment for any complaints or issues  3  Mixed hyperlipidemia  Assessment & Plan:  Asked patient to get her labs done, she had forgotten and states she will go today  4  Aortic valve stenosis, etiology of cardiac valve disease unspecified  Assessment & Plan:  Murmur is more prominent, echo is 1years old  Will repeat to evaluate  Orders:  -     Echo complete w/ contrast if indicated; Future; Expected date: 2022    5  Need for vaccination  -     influenza vaccine, high-dose, PF 0 7 mL (FLUZONE HIGH-DOSE)           Subjective      Here for follow up visit  She had another family loss this fall but has her children around her and feels they help her cope  Feels she is doing okay with her medications  Denies SI/HI  There is no chest pain  Her dyspnea is unchanged  She is down to 2 cigarettes daily  She forgot to get her labwork  Review of Systems   Constitutional: Negative  Respiratory: Positive for shortness of breath  Cardiovascular: Negative  Psychiatric/Behavioral: Negative  Current Outpatient Medications on File Prior to Visit   Medication Sig   • acetaminophen (TYLENOL) 325 mg tablet Take 2 tablets (650 mg total) by mouth every 6 (six) hours as needed for mild pain, headaches or fever     • albuterol (2 5 mg/3 mL) 0 083 % nebulizer solution INHALE CONTENTS OF 1 VIAL (3 ML) IN NEBULIZER BY MOUTH AND INTO THE LUNGS EVERY 6 HOURS IF NEEDED • albuterol (PROVENTIL HFA,VENTOLIN HFA) 90 mcg/act inhaler Inhale 2 puffs every 6 (six) hours as needed for wheezing   • allopurinol (ZYLOPRIM) 100 mg tablet Take 1 tablet (100 mg total) by mouth daily   • ALPRAZolam (XANAX) 0 25 mg tablet Take 1 tablet (0 25 mg total) by mouth daily as needed for anxiety   • Apple Cider Vinegar 188 MG CAPS Take by mouth daily   • aspirin (ECOTRIN LOW STRENGTH) 81 mg EC tablet Take 81 mg by mouth daily   • buPROPion (WELLBUTRIN XL) 300 mg 24 hr tablet Take 1 tablet (300 mg total) by mouth every morning   • Diclofenac Sodium (VOLTAREN) 1 % Apply 2 g topically 4 (four) times a day   • escitalopram (LEXAPRO) 20 mg tablet Take 1 tablet (20 mg total) by mouth daily   • esomeprazole (NexIUM) 20 mg capsule Take 1 capsule (20 mg total) by mouth daily in the early morning   • fluticasone (FLONASE) 50 mcg/act nasal spray 2 sprays into each nostril daily   • fluticasone-salmeterol (ADVAIR HFA) 115-21 MCG/ACT inhaler Inhale 1 puff daily   • Multiple Vitamin (MULTIVITAMIN) tablet Take 1 tablet by mouth daily  • nebivolol (BYSTOLIC) 2 5 mg tablet Take 1 tablet (2 5 mg total) by mouth every morning   • olopatadine (Patanol) 0 1 % ophthalmic solution Apply 1 drop to eye 2 (two) times a day   • VITAMIN D, ERGOCALCIFEROL, PO Take by mouth   • zolpidem (AMBIEN CR) 12 5 MG CR tablet Take 1 tablet (12 5 mg total) by mouth daily at bedtime as needed for sleep       Objective     /68   Pulse 64   Temp (!) 97 4 °F (36 3 °C)   Resp 22   Ht 5' 0 25" (1 53 m)   Wt 74 8 kg (165 lb)   LMP  (LMP Unknown)   BMI 31 96 kg/m²     Physical Exam  Constitutional:       Appearance: She is well-developed  Eyes:      Conjunctiva/sclera: Conjunctivae normal    Neck:      Thyroid: No thyromegaly  Vascular: No JVD  Cardiovascular:      Rate and Rhythm: Normal rate and regular rhythm  Heart sounds: Murmur heard  Systolic murmur is present with a grade of 2/6  No friction rub  No gallop  Pulmonary:      Effort: Pulmonary effort is normal       Breath sounds: Decreased breath sounds present  No wheezing or rales  Abdominal:      General: Bowel sounds are normal  There is no distension  Palpations: Abdomen is soft  Tenderness: There is no abdominal tenderness  Musculoskeletal:      Cervical back: Neck supple  Psychiatric:         Mood and Affect: Mood normal          Behavior: Behavior normal          Thought Content:  Thought content normal          Judgment: Judgment normal        Minna Spatz, MD

## 2022-12-21 NOTE — PATIENT INSTRUCTIONS
Please get your bloodwork done  You don't need the paper, you can just go to [facility]  Please get your heart ultrasound (echo) done

## 2022-12-21 NOTE — ASSESSMENT & PLAN NOTE
She feels control is adequate and will continue with bupropion and escitalopram as ordered  Asked patient to call sooner than next scheduled appointment for any complaints or issues

## 2022-12-27 ENCOUNTER — APPOINTMENT (EMERGENCY)
Dept: RADIOLOGY | Facility: HOSPITAL | Age: 72
End: 2022-12-27

## 2022-12-27 ENCOUNTER — HOSPITAL ENCOUNTER (EMERGENCY)
Facility: HOSPITAL | Age: 72
Discharge: HOME | End: 2022-12-27
Attending: EMERGENCY MEDICINE

## 2022-12-27 VITALS
TEMPERATURE: 97.8 F | SYSTOLIC BLOOD PRESSURE: 171 MMHG | OXYGEN SATURATION: 96 % | DIASTOLIC BLOOD PRESSURE: 74 MMHG | WEIGHT: 166.23 LBS | BODY MASS INDEX: 32.2 KG/M2 | HEART RATE: 84 BPM | RESPIRATION RATE: 18 BRPM

## 2022-12-27 DIAGNOSIS — R07.89 ATYPICAL CHEST PAIN: Primary | ICD-10-CM

## 2022-12-27 DIAGNOSIS — K30 DELAYED GASTRIC EMPTYING: ICD-10-CM

## 2022-12-27 LAB
2HR DELTA HS TROPONIN: 0 NG/L
ALBUMIN SERPL BCP-MCNC: 3.8 G/DL (ref 3.5–5)
ALP SERPL-CCNC: 101 U/L (ref 46–116)
ALT SERPL W P-5'-P-CCNC: 9 U/L (ref 12–78)
ANION GAP SERPL CALCULATED.3IONS-SCNC: 13 MMOL/L (ref 4–13)
AST SERPL W P-5'-P-CCNC: 19 U/L (ref 5–45)
BASOPHILS # BLD AUTO: 0.04 THOUSANDS/ÂΜL (ref 0–0.1)
BASOPHILS NFR BLD AUTO: 0 % (ref 0–1)
BILIRUB DIRECT SERPL-MCNC: 0.15 MG/DL (ref 0–0.2)
BILIRUB SERPL-MCNC: 0.58 MG/DL (ref 0.2–1)
BUN SERPL-MCNC: 19 MG/DL (ref 5–25)
CALCIUM SERPL-MCNC: 9.1 MG/DL (ref 8.3–10.1)
CARDIAC TROPONIN I PNL SERPL HS: 5 NG/L
CARDIAC TROPONIN I PNL SERPL HS: 5 NG/L
CHLORIDE SERPL-SCNC: 100 MMOL/L (ref 96–108)
CO2 SERPL-SCNC: 23 MMOL/L (ref 21–32)
CREAT SERPL-MCNC: 0.83 MG/DL (ref 0.6–1.3)
EOSINOPHIL # BLD AUTO: 0.03 THOUSAND/ÂΜL (ref 0–0.61)
EOSINOPHIL NFR BLD AUTO: 0 % (ref 0–6)
ERYTHROCYTE [DISTWIDTH] IN BLOOD BY AUTOMATED COUNT: 14.5 % (ref 11.6–15.1)
GFR SERPL CREATININE-BSD FRML MDRD: 69 ML/MIN/1.73SQ M
GLUCOSE SERPL-MCNC: 157 MG/DL (ref 65–140)
HCT VFR BLD AUTO: 48.2 % (ref 34.8–46.1)
HGB BLD-MCNC: 15.9 G/DL (ref 11.5–15.4)
IMM GRANULOCYTES # BLD AUTO: 0.09 THOUSAND/UL (ref 0–0.2)
IMM GRANULOCYTES NFR BLD AUTO: 1 % (ref 0–2)
LIPASE SERPL-CCNC: 117 U/L (ref 73–393)
LYMPHOCYTES # BLD AUTO: 1.3 THOUSANDS/ÂΜL (ref 0.6–4.47)
LYMPHOCYTES NFR BLD AUTO: 13 % (ref 14–44)
MCH RBC QN AUTO: 29.8 PG (ref 26.8–34.3)
MCHC RBC AUTO-ENTMCNC: 33 G/DL (ref 31.4–37.4)
MCV RBC AUTO: 90 FL (ref 82–98)
MONOCYTES # BLD AUTO: 0.51 THOUSAND/ÂΜL (ref 0.17–1.22)
MONOCYTES NFR BLD AUTO: 5 % (ref 4–12)
NEUTROPHILS # BLD AUTO: 8.24 THOUSANDS/ÂΜL (ref 1.85–7.62)
NEUTS SEG NFR BLD AUTO: 81 % (ref 43–75)
NRBC BLD AUTO-RTO: 0 /100 WBCS
PLATELET # BLD AUTO: 327 THOUSANDS/UL (ref 149–390)
PMV BLD AUTO: 10.8 FL (ref 8.9–12.7)
POTASSIUM SERPL-SCNC: 4.2 MMOL/L (ref 3.5–5.3)
PROT SERPL-MCNC: 8 G/DL (ref 6.4–8.4)
RBC # BLD AUTO: 5.34 MILLION/UL (ref 3.81–5.12)
SODIUM SERPL-SCNC: 136 MMOL/L (ref 135–147)
WBC # BLD AUTO: 10.21 THOUSAND/UL (ref 4.31–10.16)

## 2022-12-27 RX ORDER — ONDANSETRON 2 MG/ML
4 INJECTION INTRAMUSCULAR; INTRAVENOUS ONCE
Status: COMPLETED | OUTPATIENT
Start: 2022-12-27 | End: 2022-12-27

## 2022-12-27 RX ORDER — PANTOPRAZOLE SODIUM 20 MG/1
20 TABLET, DELAYED RELEASE ORAL DAILY
Qty: 20 TABLET | Refills: 0 | Status: SHIPPED | OUTPATIENT
Start: 2022-12-27 | End: 2023-01-11 | Stop reason: ALTCHOICE

## 2022-12-27 RX ORDER — ACETAMINOPHEN 325 MG/1
975 TABLET ORAL ONCE
Status: COMPLETED | OUTPATIENT
Start: 2022-12-27 | End: 2022-12-27

## 2022-12-27 RX ORDER — ASPIRIN 81 MG/1
324 TABLET, CHEWABLE ORAL ONCE
Status: COMPLETED | OUTPATIENT
Start: 2022-12-27 | End: 2022-12-27

## 2022-12-27 RX ORDER — METOCLOPRAMIDE 10 MG/1
10 TABLET ORAL EVERY 6 HOURS
Qty: 12 TABLET | Refills: 0 | Status: SHIPPED | OUTPATIENT
Start: 2022-12-27 | End: 2022-12-30

## 2022-12-27 RX ORDER — LABETALOL HYDROCHLORIDE 5 MG/ML
10 INJECTION, SOLUTION INTRAVENOUS ONCE
Status: COMPLETED | OUTPATIENT
Start: 2022-12-27 | End: 2022-12-27

## 2022-12-27 RX ORDER — METOCLOPRAMIDE HYDROCHLORIDE 5 MG/ML
10 INJECTION INTRAMUSCULAR; INTRAVENOUS ONCE
Status: COMPLETED | OUTPATIENT
Start: 2022-12-27 | End: 2022-12-27

## 2022-12-27 RX ADMIN — ASPIRIN 81 MG CHEWABLE TABLET 324 MG: 81 TABLET CHEWABLE at 07:28

## 2022-12-27 RX ADMIN — ONDANSETRON 4 MG: 2 INJECTION INTRAMUSCULAR; INTRAVENOUS at 13:25

## 2022-12-27 RX ADMIN — ONDANSETRON 4 MG: 2 INJECTION INTRAMUSCULAR; INTRAVENOUS at 10:28

## 2022-12-27 RX ADMIN — LABETALOL HYDROCHLORIDE 10 MG: 5 INJECTION, SOLUTION INTRAVENOUS at 07:26

## 2022-12-27 RX ADMIN — METOCLOPRAMIDE 10 MG: 5 INJECTION, SOLUTION INTRAMUSCULAR; INTRAVENOUS at 07:30

## 2022-12-27 RX ADMIN — ACETAMINOPHEN 975 MG: 325 TABLET, FILM COATED ORAL at 14:26

## 2022-12-27 RX ADMIN — ONDANSETRON 4 MG: 2 INJECTION INTRAMUSCULAR; INTRAVENOUS at 13:24

## 2022-12-27 RX ADMIN — NITROGLYCERIN 1 INCH: 20 OINTMENT TOPICAL at 07:26

## 2022-12-27 RX ADMIN — IOHEXOL 100 ML: 350 INJECTION, SOLUTION INTRAVENOUS at 08:15

## 2022-12-27 NOTE — ED CARE HANDOFF
Emergency Department Sign Out Note        Patient signed out to me pending second troponin  CT reviewed moderate gastric distention noted patient was still nauseated and symptomatic so I spoke to the bariatric surgery PA on call and she recommended a CT of the abdomen pelvis with p o  contrast   The CT was obtained gastric distention noted spoke to her again outpatient follow-up with symptom control  I had spoken to GI as well and nothing GI can do as part of the stomach that is distended cannot be reached endoscopically  Patient was able to tolerate p o  in the ED symptoms improved discharged on appropriate medications and instructions she and her daughter had no further questions at time of discharge  Second troponin reviewed delta negative                           Procedures  MDM        Disposition  Final diagnoses:   Atypical chest pain   Delayed gastric emptying     Time reflects when diagnosis was documented in both MDM as applicable and the Disposition within this note     Time User Action Codes Description Comment    12/27/2022  9:58 AM Artist Lies Add [R07 89] Atypical chest pain     12/27/2022  9:58 AM Artist Lies Add [K30] Delayed gastric emptying       ED Disposition     ED Disposition   Discharge    Condition   Stable    Date/Time   Tue Dec 27, 2022  9:58 AM    Comment   Fidel Marcelo discharge to home/self care                 Follow-up Information     Follow up With Specialties Details Why Contact Info    Eddie Bautista MD Internal Medicine, Family Medicine Schedule an appointment as soon as possible for a visit   [facility] [address]      Denice Orantes, [address] [place] [address], General Surgery   [address] [facility] [address] [facility]  [phone]          Discharge Medication List as of 12/27/2022  3:41 PM      START taking these medications    Details   metoclopramide (Reglan) 10 mg tablet Take 1 tablet (10 mg total) by mouth every 6 (six) hours for 3 days, Starting Tue 12/27/2022, Until Fri 12/30/2022, Normal      pantoprazole (PROTONIX) 20 mg tablet Take 1 tablet (20 mg total) by mouth daily, Starting Tue 12/27/2022, Normal         CONTINUE these medications which have NOT CHANGED    Details   acetaminophen (TYLENOL) 325 mg tablet Take 2 tablets (650 mg total) by mouth every 6 (six) hours as needed for mild pain, headaches or fever , Starting Wed 2/10/2016, Print      albuterol (2 5 mg/3 mL) 0 083 % nebulizer solution INHALE CONTENTS OF 1 VIAL (3 ML) IN NEBULIZER BY MOUTH AND INTO THE LUNGS EVERY 6 HOURS IF NEEDED, Normal      albuterol (PROVENTIL HFA,VENTOLIN HFA) 90 mcg/act inhaler Inhale 2 puffs every 6 (six) hours as needed for wheezing, Starting Thu 12/16/2021, Normal      allopurinol (ZYLOPRIM) 100 mg tablet Take 1 tablet (100 mg total) by mouth daily, Starting Thu 12/16/2021, Normal      ALPRAZolam (XANAX) 0 25 mg tablet Take 1 tablet (0 25 mg total) by mouth daily as needed for anxiety, Starting Wed 12/14/2022, Normal      Apple Cider Vinegar 188 MG CAPS Take by mouth daily, Historical Med      aspirin (ECOTRIN LOW STRENGTH) 81 mg EC tablet Take 81 mg by mouth daily, Historical Med      buPROPion (WELLBUTRIN XL) 300 mg 24 hr tablet Take 1 tablet (300 mg total) by mouth every morning, Starting Sun 12/4/2022, Normal      Diclofenac Sodium (VOLTAREN) 1 % Apply 2 g topically 4 (four) times a day, Starting Mon 5/10/2021, Normal      escitalopram (LEXAPRO) 20 mg tablet Take 1 tablet (20 mg total) by mouth daily, Starting Wed 12/14/2022, Normal      esomeprazole (NexIUM) 20 mg capsule Take 1 capsule (20 mg total) by mouth daily in the early morning, Starting Thu 12/16/2021, Normal      fluticasone (FLONASE) 50 mcg/act nasal spray 2 sprays into each nostril daily, Starting Thu 12/16/2021, Normal      fluticasone-salmeterol (ADVAIR HFA) 115-21 MCG/ACT inhaler Inhale 1 puff daily, Starting Mon 8/1/2022, Normal      Multiple Vitamin (MULTIVITAMIN) tablet Take 1 tablet by mouth daily  , Historical Med      nebivolol (BYSTOLIC) 2 5 mg tablet Take 1 tablet (2 5 mg total) by mouth every morning, Starting Thu 12/16/2021, Normal      olopatadine (Patanol) 0 1 % ophthalmic solution Apply 1 drop to eye 2 (two) times a day, Starting Wed 6/15/2022, Normal      VITAMIN D, ERGOCALCIFEROL, PO Take by mouth, Historical Med      zolpidem (AMBIEN CR) 12 5 MG CR tablet Take 1 tablet (12 5 mg total) by mouth daily at bedtime as needed for sleep, Starting Wed 12/14/2022, Normal           No discharge procedures on file         ED Provider  Electronically Signed by     Tomie Ahumada, DO  12/27/22 [id number]

## 2022-12-27 NOTE — ED NOTES
Pt reports PO challenge success  Pt no longer nauseous or vomiting  Pt reports no chest pain        Alexander Bear RN  12/27/22 [id number]

## 2022-12-27 NOTE — ED PROVIDER NOTES
Final Diagnosis:  1  Atypical chest pain    2  Delayed gastric emptying        Chief Complaint   Patient presents with   • Chest Pain     Pt arrived BLS c/o chest pain, abd pain, back pain with SOB and nausea vomiting all started suddenly around midnight  HPI  immanuel arrives by BLS w/ chest pain and n/v  She notes abd pain, chest pain and back pain  She's htn and tachycardic  She also notes SOB  Started around midnight  yest normal  Nonexertional  Not diaphoretic  No o2 req  Patient really only saying "help me" between emesis  Improvement w/ reglan  Ho bariatric surgery but cannot tolerate PO contrast on initial eval  Soft abd w/o guarding  Also a vasculopath  Prior appendectomy cholecystectomy  Symptoms woke from night     - No language barrier    - History obtained from patient  - There are no limitations to the history obtained  - Previous charting underwent limited review with attention to last ED visits, labs, ekgs, and prior imaging  PMH:   has a past medical history of Acid reflux, Arthritis, Asthma, Cardiac disease, Chronic kidney disease, Diverticulitis, GERD (gastroesophageal reflux disease), Hayfever, [place] (hard of hearing), Hyperlipemia, Hypertension, and Tachycardia  PSH:   has a past surgical history that includes Ablation of dysrhythmic focus; Gastric bypass open; Appendectomy; Cholecystectomy; TONSILECTOMY AND ADNOIDECTOMY; Hysterectomy; pr xcapsl ctrc rmvl insj io lens prosth w/o ecp (Left, 4/18/2016); Fracture surgery; and pr xcapsl ctrc rmvl insj io lens prosth w/o ecp (Right, 3/1/2021)       Social History:    Tobacco Use: High Risk   • Smoking Tobacco Use: Every Day   • Smokeless Tobacco Use: Never   • Passive Exposure: Not on file     Alcohol Use: Not At Risk   • Frequency of Alcohol Consumption: Never   • Average Number of Drinks: 1 or 2   • Frequency of Binge Drinking: Never     Patient with no illicit use    ROS:    Pertinent positives/negatives:   Review of Systems CONSTITUTIONAL:  No dizziness  No weakness  No unexpected weight loss  EYES:  No pain, erythema, or discharge  No loss of vision  ENT:  No tinnitus, decreased hearing  No epistaxis/purulent drainage  No voice change, airway closing, trismus  CARDIOVASCULAR:  No chest pain  No palpitations  No new lower extremity edema  RESPIRATORY:  No purulent cough  No hemoptysis  No dyspnea  No paroxysmal nocturnal dyspnea  No stridor, audible wheezing bedside  GASTROINTESTINAL:  Normal appetite  No vomiting, diarrhea  No pain  No bloating  No melena  GENITOURINARY:  No frequency, urgency, nocturia  No hematuria or dysuria  No discharge  No sores/adenopathy  MUSCULOSKELETAL:  No arthralgias or myalgias that are new  INTEGUMENTARY:  No swelling  No unexpected contusions  No abrasions  No lymphangitis  NEUROLOGIC:  No meningismus  No numbness of the extremities  No new focal weakness  No postural instability  PSYCHIATRIC:  No SI HI AVH  HEMATOLOGICAL:  No bleeding  No petechiae  No bruising  ALLERGIES:  No urticaria  No sudden abd cramping  No stridor  PE:     Physical exam highlights:   Physical Exam       Vitals:    12/27/22 1230 12/27/22 1245 12/27/22 1300 12/27/22 1427   BP: (!) 182/84 (!) 188/80 (!) 180/82 (!) 171/74   BP Location: Right arm Right arm Right arm Right arm   Pulse: 74 74 78 84   Resp: 22 20 21 18   Temp:       TempSrc:       SpO2: 95% 95% 96% 96%   Weight:         Vitals reviewed by me  Nursing note reviewed  Chaperone present for all sensitive exam   Const: No acute distress  Alert  Nontoxic  Not diaphoretic  HEENT: External ears normal  No protrusion drainage swelling  Nose normal  No drainage/traumatic deformity  MMM  Mouth with baseline/symmetric movement  No trismus  Eyes: No squinting  No icterus  Tracks through the room with normal EOM  No tearing/swelling/drainage  Neck: ROM normal  No rigidity  No meningismus  Cards: Rate as per vitals   Compared to monitor sinus unless documented above  Regular  Well perfused  Pulm: able to verbalize without additional effort  Effort and excursion normal  No disress  No audible wheezing/ stridor  Normal resp rate  Abd: No distension beyond baseline  No fluctuant wave  Patient without peritoneal pain with shifting/bumping the bed  MSK: ROM normal and baseline  No deformity  Skin: No new rashes visible  Well perfused  Neuro: Nonfocal  Baseline  CN grossly intact  Moving all four with coordination  Psych: Normal behavior and affect  A:  - Nursing note reviewed  Ddx and MDM  R/o dissection w/ dissection study  Symptoms w/ reglan  Gastric remanant distension  Signed out to AM team    R/o atypical ACS  ekg w/o acute ischemia  Single trop obtained  req delta trop    HEART Risk Score    Flowsheet Row Most Recent Value   Heart Score Risk Calculator    History 0 Filed at: 12/30/2022 9829   ECG 0 Filed at: 12/30/2022 0478   Age 2 Filed at: 12/30/2022 3945   Risk Factors 2 Filed at: 12/30/2022 3538   Troponin 0 Filed at: 12/30/2022 4504   HEART Score 4 Filed at: 12/30/2022 0998                          CT abdomen pelvis wo contrast   Final Result      Status post gastrojejunostomy  The opacified stomach is distended measuring about 9 7 x 7 8 cm  No evidence for outlet obstruction or small bowel obstruction  Scattered colonic diverticulosis without evidence of diverticulitis  Additional incidental findings as above  Workstation performed: [id number]         CTA dissection protocol chest/abdomen/pelvis   Final Result      No acute abnormality identified in the chest abdomen or pelvis, specifically no acute vascular/aortic abnormality  The gastric remnant is moderately distended  The ascending aorta is borderline ectatic measuring 4 cm, consider follow up CTA in 1 year           Workstation performed: [id number]           Orders Placed This Encounter   Procedures   • CTA dissection protocol chest/abdomen/pelvis • CT abdomen pelvis wo contrast   • CBC and differential   • Basic metabolic panel   • HS Troponin 0hr (reflex protocol)   • Hepatic function panel   • Lipase   • HS Troponin I 2hr   • ECG 12 lead   • ECG 12 lead   • Transfer to other facility     Labs Reviewed   CBC AND DIFFERENTIAL - Abnormal       Result Value Ref Range Status    WBC 10 21 (*) 4 31 - 10 16 Thousand/uL Final    RBC 5 34 (*) 3 81 - 5 12 Million/uL Final    Hemoglobin 15 9 (*) 11 5 - 15 4 g/dL Final    Hematocrit 48 2 (*) 34 8 - 46 1 % Final    MCV 90  82 - 98 fL Final    MCH 29 8  26 8 - 34 3 pg Final    MCHC 33 0  31 4 - 37 4 g/dL Final    RDW 14 5  11 6 - 15 1 % Final    MPV 10 8  8 9 - 12 7 fL Final    Platelets 449  393 - 390 Thousands/uL Final    nRBC 0  /100 WBCs Final    Neutrophils Relative 81 (*) 43 - 75 % Final    Immat GRANS % 1  0 - 2 % Final    Lymphocytes Relative 13 (*) 14 - 44 % Final    Monocytes Relative 5  4 - 12 % Final    Eosinophils Relative 0  0 - 6 % Final    Basophils Relative 0  0 - 1 % Final    Neutrophils Absolute 8 24 (*) 1 85 - 7 62 Thousands/µL Final    Immature Grans Absolute 0 09  0 00 - 0 20 Thousand/uL Final    Lymphocytes Absolute 1 30  0 60 - 4 47 Thousands/µL Final    Monocytes Absolute 0 51  0 17 - 1 22 Thousand/µL Final    Eosinophils Absolute 0 03  0 00 - 0 61 Thousand/µL Final    Basophils Absolute 0 04  0 00 - 0 10 Thousands/µL Final   BASIC METABOLIC PANEL - Abnormal    Sodium 136  135 - 147 mmol/L Final    Potassium 4 2  3 5 - 5 3 mmol/L Final    Chloride 100  96 - 108 mmol/L Final    CO2 23  21 - 32 mmol/L Final    ANION GAP 13  4 - 13 mmol/L Final    BUN 19  5 - 25 mg/dL Final    Creatinine 0 83  0 60 - 1 30 mg/dL Final    Comment: Standardized to IDMS reference method    Glucose 157 (*) 65 - 140 mg/dL Final    Comment: If the patient is fasting, the ADA then defines impaired fasting glucose as > 100 mg/dL and diabetes as > or equal to 123 mg/dL    Specimen collection should occur prior to Sulfasalazine administration due to the potential for falsely depressed results  Specimen collection should occur prior to Sulfapyridine administration due to the potential for falsely elevated results  Calcium 9 1  8 3 - 10 1 mg/dL Final    eGFR 69  ml/min/1 73sq m Final    Narrative:     [place] guidelines for Chronic Kidney Disease (CKD):   •  Stage 1 with normal or high GFR (GFR > 90 mL/min/1 73 square meters)  •  Stage 2 Mild CKD (GFR = 60-89 mL/min/1 73 square meters)  •  Stage 3A Moderate CKD (GFR = 45-59 mL/min/1 73 square meters)  •  Stage 3B Moderate CKD (GFR = 30-44 mL/min/1 73 square meters)  •  Stage 4 Severe CKD (GFR = 15-29 mL/min/1 73 square meters)  •  Stage 5 End Stage CKD (GFR <15 mL/min/1 73 square meters)  Note: GFR calculation is accurate only with a steady state creatinine   HEPATIC FUNCTION PANEL - Abnormal    Total Bilirubin 0 58  0 20 - 1 00 mg/dL Final    Comment: Use of this assay is not recommended for patients undergoing treatment with eltrombopag due to the potential for falsely elevated results  Bilirubin, Direct 0 15  0 00 - 0 20 mg/dL Final    Comment: Slightly Hemolyzed; Results May be Affected    Alkaline Phosphatase 101  46 - 116 U/L Final    AST 19  5 - 45 U/L Final    Comment: Specimen collection should occur prior to Sulfasalazine administration due to the potential for falsely depressed results  ALT 9 (*) 12 - 78 U/L Final    Comment: Specimen collection should occur prior to Sulfasalazine administration due to the potential for falsely depressed results  Total Protein 8 0  6 4 - 8 4 g/dL Final    Albumin 3 8  3 5 - 5 0 g/dL Final   HS TROPONIN I 0HR - Normal    hs TnI 0hr 5  "Refer to ACS Flowchart"- see link ng/L Final    Comment:                                              Initial (time 0) result  If >=50 ng/L, Myocardial injury suggested ;  Type of myocardial injury and treatment strategy  to be determined    If 5-49 ng/L, a delta result at 2 hours and or 4 hours will be needed to further evaluate  If <4 ng/L, and chest pain has been >3 hours since onset, patient may qualify for discharge based on the HEART score in the ED  If <5 ng/L and <3hours since onset of chest pain, a delta result at 2 hours will be needed to further evaluate  HS Troponin 99th Percentile URL of a Health Population=12 ng/L with a 95% Confidence Interval of 8-18 ng/L  Second Troponin (time 2 hours)  If calculated delta >= 20 ng/L,  Myocardial injury suggested ; Type of myocardial injury and treatment strategy to be determined  If 5-49 ng/L and the calculated delta is 5-19 ng/L, consult medical service for evaluation  Continue evaluation for ischemia on ecg and other possible etiology and repeat hs troponin at 4 hours  If delta is <5 ng/L at 2 hours, consider discharge based on risk stratification via the HEART score (if in ED), or MATI risk score in IP/Observation  HS Troponin 99th Percentile URL of a Health Population=12 ng/L with a 95% Confidence Interval of 8-18 ng/L  LIPASE - Normal    Lipase 117  73 - 393 u/L Final   HS TROPONIN I 2HR - Normal    hs TnI 2hr 5  "Refer to ACS Flowchart"- see link ng/L Final    Comment:                                              Initial (time 0) result  If >=50 ng/L, Myocardial injury suggested ;  Type of myocardial injury and treatment strategy  to be determined  If 5-49 ng/L, a delta result at 2 hours and or 4 hours will be needed to further evaluate  If <4 ng/L, and chest pain has been >3 hours since onset, patient may qualify for discharge based on the HEART score in the ED  If <5 ng/L and <3hours since onset of chest pain, a delta result at 2 hours will be needed to further evaluate  HS Troponin 99th Percentile URL of a Health Population=12 ng/L with a 95% Confidence Interval of 8-18 ng/L      Second Troponin (time 2 hours)  If calculated delta >= 20 ng/L,  Myocardial injury suggested ; Type of myocardial injury and treatment strategy to be determined  If 5-49 ng/L and the calculated delta is 5-19 ng/L, consult medical service for evaluation  Continue evaluation for ischemia on ecg and other possible etiology and repeat hs troponin at 4 hours  If delta is <5 ng/L at 2 hours, consider discharge based on risk stratification via the HEART score (if in ED), or MATI risk score in IP/Observation  HS Troponin 99th Percentile URL of a Health Population=12 ng/L with a 95% Confidence Interval of 8-18 ng/L  Delta 2hr hsTnI 0  <20 ng/L Final       Final Diagnosis:  1  Atypical chest pain    2   Delayed gastric emptying        P:  - hospital tx includes   Medications   labetalol (NORMODYNE) injection 10 mg (10 mg Intravenous Given 12/27/22 0726)   nitroglycerin (NITRO-BID) 2 % TD ointment 1 inch (1 inch Topical Given 12/27/22 0726)   metoclopramide (REGLAN) injection 10 mg (10 mg Intravenous Given 12/27/22 0730)   aspirin chewable tablet 324 mg (324 mg Oral Given 12/27/22 0728)   iohexol (OMNIPAQUE) 350 MG/ML injection (MULTI-DOSE) 100 mL (100 mL Intravenous Given 12/27/22 0815)   ondansetron (ZOFRAN) injection 4 mg (4 mg Intravenous Given 12/27/22 1028)   barium (READI-CAT 2) suspension 900 mL (900 mL Oral Given 12/27/22 1105)   ondansetron (ZOFRAN) injection 4 mg (4 mg Intravenous Given 12/27/22 1325)   ondansetron (ZOFRAN) injection 4 mg (4 mg Intravenous Given 12/27/22 1324)   acetaminophen (TYLENOL) tablet 975 mg (975 mg Oral Given 12/27/22 1426)         - disposition  Time reflects when diagnosis was documented in both MDM as applicable and the Disposition within this note     Time User Action Codes Description Comment    12/27/2022  9:58 AM Mary Akers Add [R07 [id number]] Atypical chest pain     12/27/2022  9:58 AM Marilou Miranda Delayed gastric emptying       ED Disposition     ED Disposition   Discharge    Condition   Stable    Date/Time   Tue Dec 27, 2022  9:58 AM    Comment   Aria Roque Davian discharge to home/self care  Follow-up Information     Follow up With Specialties Details Why Contact Info    Townsend Schlatter, MD Internal Medicine, Family Medicine Schedule an appointment as soon as possible for a visit   [facility] [address]      Andrei Magdaleno, [address] [facility], General Surgery   [address] [facility] [address] [facility]  [phone]            - patient will call their PCP to let them know they were in the emergency department   We discuss return precautions       - additional tx intended, if consistent with primary provider:  - patient to follow with :      Discharge Medication List as of 12/27/2022  3:41 PM      START taking these medications    Details   metoclopramide (Reglan) 10 mg tablet Take 1 tablet (10 mg total) by mouth every 6 (six) hours for 3 days, Starting Tue 12/27/2022, Until Fri 12/30/2022, Normal      pantoprazole (PROTONIX) 20 mg tablet Take 1 tablet (20 mg total) by mouth daily, Starting Tue 12/27/2022, Normal         CONTINUE these medications which have NOT CHANGED    Details   acetaminophen (TYLENOL) 325 mg tablet Take 2 tablets (650 mg total) by mouth every 6 (six) hours as needed for mild pain, headaches or fever , Starting Wed 2/10/2016, Print      albuterol (2 5 mg/3 mL) 0 083 % nebulizer solution INHALE CONTENTS OF 1 VIAL (3 ML) IN NEBULIZER BY MOUTH AND INTO THE LUNGS EVERY 6 HOURS IF NEEDED, Normal      albuterol (PROVENTIL HFA,VENTOLIN HFA) 90 mcg/act inhaler Inhale 2 puffs every 6 (six) hours as needed for wheezing, Starting Thu 12/16/2021, Normal      allopurinol (ZYLOPRIM) 100 mg tablet Take 1 tablet (100 mg total) by mouth daily, Starting Thu 12/16/2021, Normal      ALPRAZolam (XANAX) 0 25 mg tablet Take 1 tablet (0 25 mg total) by mouth daily as needed for anxiety, Starting Wed 12/14/2022, Normal      Apple Cider Vinegar 188 MG CAPS Take by mouth daily, Historical Med      aspirin ([address]) 81 mg EC tablet Take 81 mg by mouth daily, Historical Med      buPROPion (WELLBUTRIN XL) 300 mg 24 hr tablet Take 1 tablet (300 mg total) by mouth every morning, Starting Sun 12/4/2022, Normal      Diclofenac Sodium (VOLTAREN) 1 % Apply 2 g topically 4 (four) times a day, Starting Mon 5/10/2021, Normal      escitalopram (LEXAPRO) 20 mg tablet Take 1 tablet (20 mg total) by mouth daily, Starting Wed 12/14/2022, Normal      fluticasone (FLONASE) 50 mcg/act nasal spray 2 sprays into each nostril daily, Starting Thu 12/16/2021, Normal      fluticasone-salmeterol (ADVAIR HFA) 115-21 MCG/ACT inhaler Inhale 1 puff daily, Starting Mon 8/1/2022, Normal      Multiple Vitamin (MULTIVITAMIN) tablet Take 1 tablet by mouth daily  , Historical Med      nebivolol (BYSTOLIC) 2 5 mg tablet Take 1 tablet (2 5 mg total) by mouth every morning, Starting Thu 12/16/2021, Normal      olopatadine (Patanol) 0 1 % ophthalmic solution Apply 1 drop to eye 2 (two) times a day, Starting Wed 6/15/2022, Normal      VITAMIN D, ERGOCALCIFEROL, PO Take by mouth, Historical Med      zolpidem (AMBIEN CR) 12 5 MG CR tablet Take 1 tablet (12 5 mg total) by mouth daily at bedtime as needed for sleep, Starting Wed 12/14/2022, Normal      esomeprazole (NexIUM) 20 mg capsule Take 1 capsule (20 mg total) by mouth daily in the early morning, Starting Thu 12/16/2021, Normal           No discharge procedures on file  Prior to Admission Medications   Prescriptions Last Dose Informant Patient Reported? Taking? ALPRAZolam (XANAX) 0 25 mg tablet   No No   Sig: Take 1 tablet (0 25 mg total) by mouth daily as needed for anxiety   Apple Cider Vinegar 188 MG CAPS   Yes No   Sig: Take by mouth daily   Diclofenac Sodium (VOLTAREN) 1 %   No No   Sig: Apply 2 g topically 4 (four) times a day   Multiple Vitamin (MULTIVITAMIN) tablet   Yes No   Sig: Take 1 tablet by mouth daily     VITAMIN D, ERGOCALCIFEROL, PO   Yes No   Sig: Take by mouth   acetaminophen (TYLENOL) 325 mg tablet   No No   Sig: Take 2 tablets (650 mg total) by mouth every 6 (six) hours as needed for mild pain, headaches or fever  albuterol (2 5 mg/3 mL) 0 083 % nebulizer solution   No No   Sig: INHALE CONTENTS OF 1 VIAL (3 ML) IN NEBULIZER BY MOUTH AND INTO THE LUNGS EVERY 6 HOURS IF NEEDED   albuterol (PROVENTIL HFA,VENTOLIN HFA) 90 mcg/act inhaler   No No   Sig: Inhale 2 puffs every 6 (six) hours as needed for wheezing   allopurinol (ZYLOPRIM) 100 mg tablet   No No   Sig: Take 1 tablet (100 mg total) by mouth daily   aspirin (ECOTRIN LOW STRENGTH) 81 mg EC tablet   Yes No   Sig: Take 81 mg by mouth daily   buPROPion (WELLBUTRIN XL) 300 mg 24 hr tablet   No No   Sig: Take 1 tablet (300 mg total) by mouth every morning   escitalopram (LEXAPRO) 20 mg tablet   No No   Sig: Take 1 tablet (20 mg total) by mouth daily   fluticasone (FLONASE) 50 mcg/act nasal spray   No No   Si sprays into each nostril daily   fluticasone-salmeterol (ADVAIR HFA) 115-21 MCG/ACT inhaler   No No   Sig: Inhale 1 puff daily   nebivolol (BYSTOLIC) 2 5 mg tablet   No No   Sig: Take 1 tablet (2 5 mg total) by mouth every morning   olopatadine (Patanol) 0 1 % ophthalmic solution   No No   Sig: Apply 1 drop to eye 2 (two) times a day   zolpidem (AMBIEN CR) 12 5 MG CR tablet   No No   Sig: Take 1 tablet (12 5 mg total) by mouth daily at bedtime as needed for sleep      Facility-Administered Medications: None       Portions of the record may have been created with voice recognition software  Occasional wrong word or "sound a like" substitutions may have occurred due to the inherent limitations of voice recognition software  Read the chart carefully and recognize, using context, where substitutions have occurred      Electronically signed by:  MD Velasquez Geiger MD  [id number]

## 2022-12-27 NOTE — ED NOTES
Patient transported to [facility] Yamilex, RN  12/27/22 3640

## 2022-12-27 NOTE — DISCHARGE INSTRUCTIONS
The gastric remnant is moderately distended  The ascending aorta is borderline ectatic measuring 4 cm, consider follow up CTA in 1 year      Please call bariatric surgery appointment for outpatient follow up in a week  Hydrate well, take in bread rice diet bland and easily digestible, small meals

## 2022-12-27 NOTE — ED NOTES
Provider Anepu was at beside to answer all pt questions and concerns  Pt PO currently being PO challenged  Pt daughter left bedside to get pt clothing  Will follow up        Bryce Nichols, [address] [facility]  12/27/22 0167

## 2022-12-28 DIAGNOSIS — K21.9 GASTROESOPHAGEAL REFLUX DISEASE WITHOUT ESOPHAGITIS: ICD-10-CM

## 2022-12-30 LAB
ATRIAL RATE: 54 BPM
P AXIS: 56 DEGREES
PR INTERVAL: 178 MS
QRS AXIS: 75 DEGREES
QRSD INTERVAL: 130 MS
QT INTERVAL: 532 MS
QTC INTERVAL: 504 MS
T WAVE AXIS: 51 DEGREES
VENTRICULAR RATE: 54 BPM

## 2023-01-06 DIAGNOSIS — R06.09 EXERTIONAL DYSPNEA: ICD-10-CM

## 2023-01-06 DIAGNOSIS — I05.0 MILD MITRAL STENOSIS: ICD-10-CM

## 2023-01-06 DIAGNOSIS — I35.0 MILD AORTIC STENOSIS: ICD-10-CM

## 2023-01-09 RX ORDER — NEBIVOLOL HYDROCHLORIDE 2.5 MG/1
TABLET ORAL
Qty: 90 TABLET | Refills: 3 | Status: SHIPPED | OUTPATIENT
Start: 2023-01-09 | End: 2023-01-11 | Stop reason: SDUPTHER

## 2023-01-11 ENCOUNTER — OFFICE VISIT (OUTPATIENT)
Dept: FAMILY MEDICINE CLINIC | Facility: CLINIC | Age: 73
End: 2023-01-11

## 2023-01-11 VITALS
RESPIRATION RATE: 20 BRPM | BODY MASS INDEX: 31.84 KG/M2 | WEIGHT: 162.2 LBS | SYSTOLIC BLOOD PRESSURE: 114 MMHG | TEMPERATURE: 97.7 F | HEIGHT: 60 IN | OXYGEN SATURATION: 98 % | DIASTOLIC BLOOD PRESSURE: 70 MMHG | HEART RATE: 60 BPM

## 2023-01-11 DIAGNOSIS — Z98.84 S/P GASTRIC BYPASS: ICD-10-CM

## 2023-01-11 DIAGNOSIS — I77.89 ASCENDING AORTA ENLARGEMENT (HCC): Primary | ICD-10-CM

## 2023-01-11 DIAGNOSIS — I35.0 AORTIC VALVE STENOSIS, ETIOLOGY OF CARDIAC VALVE DISEASE UNSPECIFIED: ICD-10-CM

## 2023-01-11 DIAGNOSIS — J06.9 ACUTE URI: ICD-10-CM

## 2023-01-11 DIAGNOSIS — F33.1 MODERATE EPISODE OF RECURRENT MAJOR DEPRESSIVE DISORDER (HCC): ICD-10-CM

## 2023-01-11 DIAGNOSIS — I25.10 ARTERIOSCLEROTIC CARDIOVASCULAR DISEASE: ICD-10-CM

## 2023-01-11 DIAGNOSIS — M10.071 ACUTE IDIOPATHIC GOUT OF RIGHT FOOT: ICD-10-CM

## 2023-01-11 DIAGNOSIS — K21.9 GASTROESOPHAGEAL REFLUX DISEASE WITHOUT ESOPHAGITIS: ICD-10-CM

## 2023-01-11 DIAGNOSIS — I35.0 MILD AORTIC STENOSIS: ICD-10-CM

## 2023-01-11 DIAGNOSIS — F41.1 GAD (GENERALIZED ANXIETY DISORDER): ICD-10-CM

## 2023-01-11 DIAGNOSIS — K30 DELAYED GASTRIC EMPTYING: ICD-10-CM

## 2023-01-11 DIAGNOSIS — J44.9 CHRONIC OBSTRUCTIVE PULMONARY DISEASE, UNSPECIFIED COPD TYPE (HCC): ICD-10-CM

## 2023-01-11 DIAGNOSIS — I77.810 ASCENDING AORTA DILATATION (HCC): ICD-10-CM

## 2023-01-11 DIAGNOSIS — R06.09 EXERTIONAL DYSPNEA: ICD-10-CM

## 2023-01-11 DIAGNOSIS — I05.0 MILD MITRAL STENOSIS: ICD-10-CM

## 2023-01-11 PROBLEM — K57.90 DIVERTICULOSIS: Status: ACTIVE | Noted: 2023-01-11

## 2023-01-11 RX ORDER — NEBIVOLOL 2.5 MG/1
2.5 TABLET ORAL EVERY MORNING
Qty: 30 TABLET | Refills: 0 | Status: SHIPPED | OUTPATIENT
Start: 2023-01-11

## 2023-01-11 RX ORDER — AZITHROMYCIN 250 MG/1
TABLET, FILM COATED ORAL
Qty: 6 TABLET | Refills: 0 | Status: SHIPPED | OUTPATIENT
Start: 2023-01-11 | End: 2023-01-16

## 2023-01-11 RX ORDER — NEBIVOLOL 2.5 MG/1
2.5 TABLET ORAL EVERY MORNING
Qty: 90 TABLET | Refills: 0 | Status: SHIPPED | OUTPATIENT
Start: 2023-01-11 | End: 2023-01-11 | Stop reason: SDUPTHER

## 2023-01-11 RX ORDER — NEBIVOLOL 2.5 MG/1
2.5 TABLET ORAL EVERY MORNING
Qty: 90 TABLET | Refills: 1 | Status: SHIPPED | OUTPATIENT
Start: 2023-01-11

## 2023-01-11 RX ORDER — ALLOPURINOL 100 MG/1
100 TABLET ORAL DAILY
Qty: 30 TABLET | Refills: 0 | Status: SHIPPED | OUTPATIENT
Start: 2023-01-11

## 2023-01-11 RX ORDER — ESCITALOPRAM OXALATE 20 MG/1
20 TABLET ORAL DAILY
Qty: 90 TABLET | Refills: 1 | Status: SHIPPED | OUTPATIENT
Start: 2023-01-11

## 2023-01-11 RX ORDER — ESCITALOPRAM OXALATE 20 MG/1
20 TABLET ORAL DAILY
Qty: 30 TABLET | Refills: 0 | Status: SHIPPED | OUTPATIENT
Start: 2023-01-11

## 2023-01-11 RX ORDER — BUSPIRONE HYDROCHLORIDE 5 MG/1
5 TABLET ORAL 2 TIMES DAILY
Qty: 60 TABLET | Refills: 1 | Status: SHIPPED | OUTPATIENT
Start: 2023-01-11

## 2023-01-11 RX ORDER — ALLOPURINOL 100 MG/1
100 TABLET ORAL DAILY
Qty: 90 TABLET | Refills: 1 | Status: SHIPPED | OUTPATIENT
Start: 2023-01-11

## 2023-01-11 NOTE — PROGRESS NOTES
Assessment/Plan:  I have spent 45 minutes with Patient and family today in which greater than 50% of this time was spent in counseling/coordination of care regarding Diagnostic results, Risks and benefits of tx options, Intructions for management, Patient and family education, Importance of tx compliance and Risk factor reductions  1  Ascending aorta enlargement (HCC)  Comments:  will follow back with PCP for repeat CT chest in a year    2  Delayed gastric emptying  -     Ambulatory Referral to Gastroenterology; Future    3  S/P gastric bypass  -     Ambulatory Referral to Gastroenterology; Future    4  Chronic obstructive pulmonary disease, unspecified COPD type ([facility] [address] )  Comments:  complaint with inhalers    5  Moderate episode of recurrent major depressive disorder (HCC)  -     escitalopram (LEXAPRO) 20 mg tablet; Take 1 tablet (20 mg total) by mouth daily  -     escitalopram (LEXAPRO) 20 mg tablet; Take 1 tablet (20 mg total) by mouth daily    6  Aortic valve stenosis, etiology of cardiac valve disease unspecified  -     Ambulatory Referral to Cardiology; Future    7  Arteriosclerotic cardiovascular disease  -     Ambulatory Referral to Cardiology; Future    8  Ascending aorta dilatation [facility])  -     Ambulatory Referral to Cardiology; Future    9  DANIELLA (generalized anxiety disorder)  -     busPIRone (BUSPAR) 5 mg tablet; Take 1 tablet (5 mg total) by mouth 2 (two) times a day    10  Mild mitral stenosis  -     nebivolol (Bystolic) 2 5 mg tablet; Take 1 tablet (2 5 mg total) by mouth every morning  -     nebivolol (Bystolic) 2 5 mg tablet; Take 1 tablet (2 5 mg total) by mouth every morning    11  Mild aortic stenosis  -     nebivolol (Bystolic) 2 5 mg tablet; Take 1 tablet (2 5 mg total) by mouth every morning  -     nebivolol (Bystolic) 2 5 mg tablet; Take 1 tablet (2 5 mg total) by mouth every morning    12  Exertional dyspnea  -     nebivolol (Bystolic) 2 5 mg tablet;  Take 1 tablet (2 5 mg total) by mouth every morning  -     nebivolol (Bystolic) 2 5 mg tablet; Take 1 tablet (2 5 mg total) by mouth every morning    13  Acute URI  -     azithromycin (ZITHROMAX) 250 mg tablet; Take 500mg on day 1, 250mg on days 2-5    14  Acute idiopathic gout of right foot  -     allopurinol (ZYLOPRIM) 100 mg tablet; Take 1 tablet (100 mg total) by mouth daily  -     allopurinol (ZYLOPRIM) 100 mg tablet; Take 1 tablet (100 mg total) by mouth daily    15  Gastroesophageal reflux disease without esophagitis  -     esomeprazole (NexIUM) 20 mg capsule; Take 1 capsule (20 mg total) by mouth daily in the early morning  -     esomeprazole (NexIUM) 20 mg capsule; Take 1 capsule (20 mg total) by mouth daily in the early morning            Patient Instructions:  Supportive care discussed and advised  Advised to RTO for any worsening and no improvement  Follow up for no improvement and worsening of conditions  Patient advised and educated when to see immediate medical care  Return in about 1 month (around 2/11/2023), or if symptoms worsen or fail to improve  Future Appointments   Date Time Provider Ezio Maya   2/15/2023 11:45 AM Gokul Ramesh MD [facility]   4/24/2023  9:30 AM Gokul Ramesh MD [facility]           Subjective:      Patient ID: Rose Andrade is a 74 y o  female  Chief Complaint   Patient presents with   • ER follow up     Patient is here today for Er follow up for Chest Pain, L Simpson/LPN         Vitals:  /70   Pulse 60   Temp 97 7 °F (36 5 °C) (Temporal)   Resp 20   Ht 5' 0 03" (1 525 m)   Wt 73 6 kg (162 lb 3 2 oz)   LMP  (LMP Unknown)   SpO2 98%   BMI 31 65 kg/m²     HPI  Patient is here to follow up after recent ER visit with her daughter now  Stated that thinks had the panic attack and was having left sided chest pain and then went to ER     CT chest showed ascending aorta dilatation and discussed with patient and will do CT chest in a year for follow up on that and will discuss that with PCP and get orders in a year  Stated that her anxiety is not well controlled, will reduce lexapro to 10 mg, will take half tablet and will start buspar to 5 mg BID and discussed about serotonin syndrome and will follow back for any concerns and will follow with PCP in a month  If buspar will be helping, then will slowly taper off lexapro and will increase buspar as needed  Has h/o aortic stenosis and echo was ordered by PCP and will schedule that and will follow with cardiologist  Ct abdomen showed Status post gastrojejunostomy  The opacified stomach is distended measuring about 9 7 x 7 8 cm  No evidence for outlet obstruction or small bowel obstruction  Concern for delayed gastric emptying and will follow with gastro  Having cough from about 2 weeks and denies any fever and chills          PHQ-2/9 Depression Screening    Little interest or pleasure in doing things: 3 - nearly every day  Feeling down, depressed, or hopeless: 3 - nearly every day  Trouble falling or staying asleep, or sleeping too much: 3 - nearly every day  Feeling tired or having little energy: 3 - nearly every day  Poor appetite or overeating: 3 - nearly every day  Feeling bad about yourself - or that you are a failure or have let yourself or your family down: 3 - nearly every day  Trouble concentrating on things, such as reading the newspaper or watching television: 3 - nearly every day  Moving or speaking so slowly that other people could have noticed  Or the opposite - being so fidgety or restless that you have been moving around a lot more than usual: 1 - several days  Thoughts that you would be better off dead, or of hurting yourself in some way: 0 - not at all  PHQ-9 Score: 22   PHQ-9 Interpretation: Severe depression        Depression Screening Follow-up Plan: Patient's depression screening was positive with a PHQ-2 score of   Their PHQ-9 score was 22   Continue regular follow-up with their psychologist/therapist/psychiatrist who is managing their mental health condition(s)  The following portions of the patient's history were reviewed and updated as appropriate: allergies, current medications, past family history, past medical history, past social history, past surgical history and problem list       Review of Systems   Constitutional: Negative for chills, diaphoresis, fatigue, fever and unexpected weight change  HENT: Negative for congestion, dental problem, drooling, ear discharge, ear pain, facial swelling, hearing loss, mouth sores, nosebleeds, postnasal drip, rhinorrhea, sinus pressure, sinus pain, sneezing, sore throat, tinnitus, trouble swallowing and voice change  Respiratory: Positive for cough  Negative for chest tightness, shortness of breath and wheezing  Cardiovascular: Negative  Gastrointestinal: Negative for abdominal pain, constipation, diarrhea, nausea and vomiting  Skin: Negative  Neurological: Negative for dizziness, light-headedness and headaches  Psychiatric/Behavioral: The patient is nervous/anxious  Objective:    Social History     Tobacco Use   Smoking Status Every Day   • Packs/day: 0 50   • Years: 25 00   • Pack years: 12 50   • Types: Cigarettes   Smokeless Tobacco Never       Allergies: Allergies   Allergen Reactions   • Augmentin [Amoxicillin-Pot Clavulanate] GI Intolerance, Other (See Comments) and Hives     VOMITING  VOMITING  Reaction Date: [id number];    • Sulfa Antibiotics Itching, Other (See Comments) and Hives     RASH, ITCHY  RASH, ITCHY   • Morphine Other (See Comments)     "DOESN'T WORK"   • Latex Rash     Unsure if this is an allergy         Current Outpatient Medications   Medication Sig Dispense Refill   • acetaminophen (TYLENOL) 325 mg tablet Take 2 tablets (650 mg total) by mouth every 6 (six) hours as needed for mild pain, headaches or fever   30 tablet 0   • albuterol (2 5 mg/3 mL) 0 083 % nebulizer solution INHALE CONTENTS OF 1 VIAL (3 ML) IN NEBULIZER BY MOUTH AND INTO THE LUNGS EVERY 6 HOURS IF NEEDED 150 mL 1   • albuterol (PROVENTIL HFA,VENTOLIN HFA) 90 mcg/act inhaler Inhale 2 puffs every 6 (six) hours as needed for wheezing 54 g 3   • allopurinol (ZYLOPRIM) 100 mg tablet Take 1 tablet (100 mg total) by mouth daily 90 tablet 1   • allopurinol (ZYLOPRIM) 100 mg tablet Take 1 tablet (100 mg total) by mouth daily 30 tablet 0   • ALPRAZolam (XANAX) 0 25 mg tablet Take 1 tablet (0 25 mg total) by mouth daily as needed for anxiety 30 tablet 0   • Apple Cider Vinegar 188 MG CAPS Take by mouth daily     • azithromycin (ZITHROMAX) 250 mg tablet Take 500mg on day 1, 250mg on days 2-5 6 tablet 0   • buPROPion (WELLBUTRIN XL) 300 mg 24 hr tablet Take 1 tablet (300 mg total) by mouth every morning 90 tablet 1   • busPIRone (BUSPAR) 5 mg tablet Take 1 tablet (5 mg total) by mouth 2 (two) times a day 60 tablet 1   • Diclofenac Sodium (VOLTAREN) 1 % Apply 2 g topically 4 (four) times a day 3 g 3   • escitalopram (LEXAPRO) 20 mg tablet Take 1 tablet (20 mg total) by mouth daily 90 tablet 1   • escitalopram (LEXAPRO) 20 mg tablet Take 1 tablet (20 mg total) by mouth daily 30 tablet 0   • esomeprazole (NexIUM) 20 mg capsule Take 1 capsule (20 mg total) by mouth daily in the early morning 90 capsule 1   • esomeprazole (NexIUM) 20 mg capsule Take 1 capsule (20 mg total) by mouth daily in the early morning 30 capsule 0   • fluticasone (FLONASE) 50 mcg/act nasal spray 2 sprays into each nostril daily 48 g 3   • fluticasone-salmeterol (ADVAIR HFA) 115-21 MCG/ACT inhaler Inhale 1 puff daily 48 g 3   • Multiple Vitamin (MULTIVITAMIN) tablet Take 1 tablet by mouth daily       • nebivolol (Bystolic) 2 5 mg tablet Take 1 tablet (2 5 mg total) by mouth every morning 30 tablet 0   • nebivolol (Bystolic) 2 5 mg tablet Take 1 tablet (2 5 mg total) by mouth every morning 90 tablet 1   • olopatadine (Patanol) 0 1 % ophthalmic solution Apply 1 drop to eye 2 (two) times a day 15 mL 3   • VITAMIN D, ERGOCALCIFEROL, PO Take by mouth     • zolpidem (AMBIEN CR) 12 5 MG CR tablet Take 1 tablet (12 5 mg total) by mouth daily at bedtime as needed for sleep 30 tablet 0   • aspirin (ECOTRIN LOW STRENGTH) 81 mg EC tablet Take 81 mg by mouth daily (Patient not taking: Reported on 1/11/2023)       No current facility-administered medications for this visit  Physical Exam  Vitals reviewed  Constitutional:       Appearance: She is well-developed  She is obese  HENT:      Head: Normocephalic  Right Ear: Tympanic membrane, ear canal and external ear normal       Left Ear: Tympanic membrane, ear canal and external ear normal       Nose: Mucosal edema present  Right Sinus: No maxillary sinus tenderness or frontal sinus tenderness  Left Sinus: No maxillary sinus tenderness or frontal sinus tenderness  Mouth/Throat:      Mouth: No oral lesions  Pharynx: No oropharyngeal exudate or posterior oropharyngeal erythema  Cardiovascular:      Rate and Rhythm: Normal rate and regular rhythm  Heart sounds: Murmur heard  Pulmonary:      Effort: Pulmonary effort is normal       Breath sounds: Normal breath sounds  Musculoskeletal:         General: Normal range of motion  Cervical back: Neck supple  Lymphadenopathy:      Cervical:      Right cervical: No superficial or posterior cervical adenopathy  Left cervical: No superficial or posterior cervical adenopathy  Skin:     General: Skin is warm and dry  Neurological:      Mental Status: She is alert and oriented to person, place, and time  Psychiatric:         Behavior: Behavior normal          Thought Content:  Thought content normal          Judgment: Judgment normal                      Delmus Comas, CRNP

## 2023-01-11 NOTE — PATIENT INSTRUCTIONS
Will take lexapro half tablet  Buspirone (By mouth)   Buspirone ([id number])  Treats anxiety  Brand Name(s):   There may be other brand names for this medicine  When This Medicine Should Not Be Used: You should not use this medicine if you have had an allergic reaction to buspirone  How to Use This Medicine:   Tablet  Your doctor will tell you how much of this medicine to take and how often  Do not take more medicine or take it more often than your doctor tells you to  You may take this medicine with or without food, but take it the same way each time  You may need to take this medicine for 1 or 2 weeks before you begin to feel better  If a dose is missed:   If you miss a dose or forget to take your medicine, take it as soon as you can  If it is almost time for your next dose, wait until then to take the medicine and skip the missed dose  Do not use extra medicine to make up for a missed dose  How to Store and Dispose of This Medicine:   Store the medicine at room temperature, away from heat, moisture, and direct light  Keep all medicine out of the reach of children and never share your medicine with anyone  Drugs and Foods to Avoid:   Ask your doctor or pharmacist before using any other medicine, including over-the-counter medicines, vitamins, and herbal products  You should not use buspirone when you are also using an MAO inhibitor (such as Eldepryl®, Marplan®, Nardil®, Parnate®)  Do not eat grapefruit, drink grapefruit juice, or drink alcohol while you are using buspirone  Make sure your doctor knows if you are also using cimetidine (Ples Carton), dexamethasone (Decadron®), diltiazem (Redge Quitter), erythromycin (Erythro-Tab®), itraconazole (Sporanox®), nefazodone (Serzone®), rifampin (Rifadin®, Rifamate®, Rifater®), verapamil (Calan®, Covera®), or medicine for seizures (such as Dilantin®, Luminal®, Tegretol®)    Make sure your doctor knows if you are using any medicines that make you sleepy (such as sleeping pills, cold and allergy medicine, narcotic pain relievers, or sedatives)  Warnings While Using This Medicine:   Make sure your doctor knows if you are pregnant or breastfeeding, or if you have kidney or liver disease  Do not stop using this medicine suddenly without asking your doctor  You may need to slowly decrease your dose before stopping it completely  This medicine may make you dizzy or drowsy  Avoid driving, using machines, or doing anything else that could be dangerous if you are not alert  Possible Side Effects While Using This Medicine:   Call your doctor right away if you notice any of these side effects: Fast or pounding heartbeat  Numbness or tingling feeling  Tremors or shaking  If you notice these less serious side effects, talk with your doctor:   Drowsiness or weakness  Dry mouth  Feeling restless or nervous, trouble sleeping  Headache  Nausea, constipation, upset stomach  If you notice other side effects that you think are caused by this medicine, tell your doctor  Call your doctor for medical advice about side effects  You may report side effects to FDA at 3-793-FDA-3934    © Copyright [facility] 2022 Information is for End User's use only and may not be sold, redistributed or otherwise used for commercial purposes  The above information is an  only  It is not intended as medical advice for individual conditions or treatments  Talk to your doctor, nurse or pharmacist before following any medical regimen to see if it is safe and effective for you

## 2023-01-18 ENCOUNTER — HOSPITAL ENCOUNTER (OUTPATIENT)
Dept: NON INVASIVE DIAGNOSTICS | Facility: HOSPITAL | Age: 73
Discharge: HOME | End: 2023-01-18
Attending: INTERNAL MEDICINE

## 2023-01-18 VITALS
DIASTOLIC BLOOD PRESSURE: 87 MMHG | SYSTOLIC BLOOD PRESSURE: 169 MMHG | WEIGHT: 162 LBS | BODY MASS INDEX: 31.8 KG/M2 | HEIGHT: 60 IN | HEART RATE: 65 BPM

## 2023-01-18 DIAGNOSIS — I35.0 AORTIC VALVE STENOSIS, ETIOLOGY OF CARDIAC VALVE DISEASE UNSPECIFIED: ICD-10-CM

## 2023-01-18 LAB
AORTIC ROOT: 3.3 CM
AORTIC VALVE MEAN VELOCITY: 20.1 M/S
APICAL FOUR CHAMBER EJECTION FRACTION: 52 %
AV AREA BY CONTINUOUS VTI: 1.6 CM2
AV AREA PEAK VELOCITY: 1.6 CM2
AV LVOT MEAN GRADIENT: 6 MMHG
AV LVOT PEAK GRADIENT: 13 MMHG
AV MEAN GRADIENT: 18 MMHG
AV PEAK GRADIENT: 36 MMHG
AV REGURGITATION PRESSURE HALF TIME: 455 MS
AV VALVE AREA: 1.57 CM2
AV VELOCITY RATIO: 0.59
AVA (PLAN): 1.8 CM2
DOP CALC AO PEAK VEL: 3.02 M/S
DOP CALC AO VTI: 66.46 CM
DOP CALC LVOT AREA: 2.83 CM2
DOP CALC LVOT DIAMETER: 1.9 CM
DOP CALC LVOT PEAK VEL VTI: 36.8 CM
DOP CALC LVOT PEAK VEL: 1.78 M/S
DOP CALC LVOT STROKE INDEX: 59.1 ML/M2
DOP CALC LVOT STROKE VOLUME: 104.29 CM3
DOP CALC MV VTI: 52.95 CM
E WAVE DECELERATION TIME: 477 MS
FRACTIONAL SHORTENING: 33 % (ref 28–44)
INTERVENTRICULAR SEPTUM IN DIASTOLE (PARASTERNAL SHORT AXIS VIEW): 1.1 CM
INTERVENTRICULAR SEPTUM: 1.1 CM (ref 0.6–1.1)
LAAS-AP2: 35.1 CM2
LAAS-AP4: 29.9 CM2
LEFT ATRIUM AREA SYSTOLE SINGLE PLANE A4C: 27.7 CM2
LEFT ATRIUM SIZE: 4.5 CM
LEFT INTERNAL DIMENSION IN SYSTOLE: 3.5 CM (ref 2.1–4)
LEFT VENTRICULAR INTERNAL DIMENSION IN DIASTOLE: 5.2 CM (ref 3.5–6)
LEFT VENTRICULAR POSTERIOR WALL IN END DIASTOLE: 1.1 CM
LEFT VENTRICULAR STROKE VOLUME: 77 ML
LVSV (TEICH): 77 ML
MV E'TISSUE VEL-LAT: 5 CM/S
MV E'TISSUE VEL-SEP: 6 CM/S
MV MEAN GRADIENT: 4 MMHG
MV PEAK A VEL: 1.52 M/S
MV PEAK E VEL: 141 CM/S
MV PEAK GRADIENT: 9 MMHG
MV STENOSIS PRESSURE HALF TIME: 138 MS
MV VALVE AREA BY CONTINUITY EQUATION: 1.97 CM2
MV VALVE AREA P 1/2 METHOD: 1.59 CM2
PV PEAK GRADIENT: 7 MMHG
RA PRESSURE ESTIMATED: 8 MMHG
RIGHT ATRIUM AREA SYSTOLE A4C: 15.7 CM2
RIGHT VENTRICLE ID DIMENSION: 3.2 CM
RV PSP: 24 MMHG
SL CV AV DECELERATION TIME RETROGRADE: 1569 MS
SL CV AV PEAK GRADIENT RETROGRADE: 105 MMHG
SL CV LEFT ATRIUM LENGTH A2C: 7.1 CM
SL CV LV EF: 60
SL CV PED ECHO LEFT VENTRICLE DIASTOLIC VOLUME (MOD BIPLANE) 2D: 129 ML
SL CV PED ECHO LEFT VENTRICLE SYSTOLIC VOLUME (MOD BIPLANE) 2D: 51 ML
TR MAX PG: 16 MMHG
TR PEAK VELOCITY: 2 M/S
TRICUSPID ANNULAR PLANE SYSTOLIC EXCURSION: 1.7 CM
TRICUSPID VALVE PEAK REGURGITATION VELOCITY: 2 M/S

## 2023-01-20 DIAGNOSIS — F51.01 PRIMARY INSOMNIA: ICD-10-CM

## 2023-01-23 RX ORDER — ZOLPIDEM TARTRATE 12.5 MG/1
12.5 TABLET, FILM COATED, EXTENDED RELEASE ORAL
Qty: 30 TABLET | Refills: 0 | Status: SHIPPED | OUTPATIENT
Start: 2023-01-23

## 2023-02-02 DIAGNOSIS — F41.1 GAD (GENERALIZED ANXIETY DISORDER): ICD-10-CM

## 2023-02-02 DIAGNOSIS — M10.071 ACUTE IDIOPATHIC GOUT OF RIGHT FOOT: ICD-10-CM

## 2023-02-02 RX ORDER — ALLOPURINOL 100 MG/1
TABLET ORAL
Qty: 30 TABLET | Refills: 3 | Status: SHIPPED | OUTPATIENT
Start: 2023-02-02

## 2023-02-02 RX ORDER — BUSPIRONE HYDROCHLORIDE 5 MG/1
TABLET ORAL
Qty: 60 TABLET | Refills: 3 | Status: SHIPPED | OUTPATIENT
Start: 2023-02-02

## 2023-02-07 ENCOUNTER — OFFICE VISIT (OUTPATIENT)
Dept: GASTROENTEROLOGY | Facility: CLINIC | Age: 73
End: 2023-02-07

## 2023-02-07 VITALS
HEIGHT: 60 IN | BODY MASS INDEX: 31.8 KG/M2 | OXYGEN SATURATION: 96 % | SYSTOLIC BLOOD PRESSURE: 151 MMHG | HEART RATE: 78 BPM | DIASTOLIC BLOOD PRESSURE: 92 MMHG | WEIGHT: 162 LBS

## 2023-02-07 DIAGNOSIS — K21.9 GASTROESOPHAGEAL REFLUX DISEASE WITHOUT ESOPHAGITIS: Primary | ICD-10-CM

## 2023-02-07 DIAGNOSIS — Z98.84 S/P GASTRIC BYPASS: ICD-10-CM

## 2023-02-07 DIAGNOSIS — R10.32 LLQ PAIN: ICD-10-CM

## 2023-02-07 DIAGNOSIS — K30 DELAYED GASTRIC EMPTYING: ICD-10-CM

## 2023-02-07 PROBLEM — R93.5 ABNORMAL CT OF THE ABDOMEN: Status: ACTIVE | Noted: 2023-02-07

## 2023-02-07 NOTE — PATIENT INSTRUCTIONS
Take gasx with every meal  Use Levsin (prescription hyoscyamine) as needed for pain, Left lower abdominal pain

## 2023-02-07 NOTE — PROGRESS NOTES
[facility]  Candelario Chakraborty 74 y o  female MRN: [id number]            Assessment & Plan:  Pleasant 78-year-old female, history of gastric bypass, remote history of anastomotic ulcer, left lower quadrant pain  Recent abnormal CT scan with distention of her gastric remnant  1   Abnormal CT scan: With distention of the gastric remnant, could have peptic ulcer disease, outlet obstruction  -Suspect that the distention of the gastric remnant was causing her pain which precipitated hospital stay  -Continue PPI therapy  -Recommend repeating CT scan to see if this has resolved    2  Left lower quadrant pain: Appears to be a component of irritable bowel syndrome, may be some gas and flatus  I recommend that she try Gas-X with each meal  -Recommended trial of Levsin when the left lower quadrant pain becomes quite severe  -Otherwise continue current regimen  We will have her follow-up in a few months time to reevaluate her symptoms      Cielo Marino was seen today for gerd, constipation and diarrhea  Diagnoses and all orders for this visit:    Gastroesophageal reflux disease without esophagitis    LLQ pain  -     hyoscyamine (LEVSIN/SL) 0 125 mg SL tablet; Take 1 tablet (0 125 mg total) by mouth every 4 (four) hours as needed for cramping (LLQ pain)  -     hyoscyamine (LEVSIN/SL) 0 125 mg SL tablet; Take 1 tablet (0 125 mg total) by mouth every 4 (four) hours as needed for cramping  -     CT abdomen pelvis w contrast; Future    Delayed gastric emptying  -     Ambulatory Referral to Gastroenterology    S/P gastric bypass  -     Ambulatory Referral to Gastroenterology  -     CT abdomen pelvis w contrast; Future            _____________________________________________________________        CC: Abdominal pain, abnormal CT scan    HPI:  Candelario Chakraborty is a 74 y  o female who is here for abdominal pain and abnormal CT scan    This is a 78-year-old female, remote history of gastric bypass, had been seen by our office in the past irregular stools  Recent EGD and colonoscopy including biopsies were relatively unremarkable, she did have some adenomatous polyps  Patient has had intermittent left lower quadrant abdominal pain, she reports that when she passes flatus this seems to improve  This has been ongoing for many years  Otherwise has fairly regular bowel moods, occasionally alternates between diarrhea and constipation  No significant reflux symptoms  Recently had an acute onset of retrosternal left-sided chest pain, with associated nausea, presented to emergency room, dissection protocol was negative, CT scan did demonstrate dilation of her diverted gastric pouch  Her symptoms have since subsided  She continues to have a left lower quadrant pain which is ongoing, different from the pain that brought her to the emergency room  She has 2 bowel movements daily, usually fairly soft  She reports that she has tried Gas-X in the past, and seems to help a little bit though mostly helps with passing flatus  Her weight has been stable  Appetite is good  ROS:  The remainder of the ROS was negative except for the pertinent positives mentioned in HPI  Allergies: Augmentin [amoxicillin-pot clavulanate], Sulfa antibiotics, Morphine, and Latex    Medications:   Current Outpatient Medications:   •  acetaminophen (TYLENOL) 325 mg tablet, Take 2 tablets (650 mg total) by mouth every 6 (six) hours as needed for mild pain, headaches or fever  , Disp: 30 tablet, Rfl: 0  •  albuterol (2 5 mg/3 mL) 0 083 % nebulizer solution, INHALE CONTENTS OF 1 VIAL (3 ML) IN NEBULIZER BY MOUTH AND INTO THE LUNGS EVERY 6 HOURS IF NEEDED, Disp: 150 mL, Rfl: 1  •  albuterol (PROVENTIL HFA,VENTOLIN HFA) 90 mcg/act inhaler, Inhale 2 puffs every 6 (six) hours as needed for wheezing, Disp: 54 g, Rfl: 3  •  allopurinol (ZYLOPRIM) 100 mg tablet, Take 1 tablet (100 mg total) by mouth daily, Disp: 90 tablet, Rfl: 1  •  ALPRAZolam Brucetabitha Guerin) 0 25 mg tablet, Take 1 tablet (0 25 mg total) by mouth daily as needed for anxiety, Disp: 30 tablet, Rfl: 0  •  Apple Cider Vinegar 188 MG CAPS, Take by mouth daily As needed, Disp: , Rfl:   •  aspirin (ECOTRIN LOW STRENGTH) 81 mg EC tablet, Take 81 mg by mouth daily, Disp: , Rfl:   •  buPROPion (WELLBUTRIN XL) 300 mg 24 hr tablet, Take 1 tablet (300 mg total) by mouth every morning, Disp: 90 tablet, Rfl: 1  •  busPIRone (BUSPAR) 5 mg tablet, TAKE 1 TABLET BY MOUTH TWICE A DAY, Disp: 60 tablet, Rfl: 3  •  Diclofenac Sodium (VOLTAREN) 1 %, Apply 2 g topically 4 (four) times a day, Disp: 3 g, Rfl: 3  •  escitalopram (LEXAPRO) 20 mg tablet, Take 1 tablet (20 mg total) by mouth daily, Disp: 90 tablet, Rfl: 1  •  esomeprazole (NexIUM) 20 mg capsule, Take 1 capsule (20 mg total) by mouth daily in the early morning, Disp: 90 capsule, Rfl: 1  •  fluticasone (FLONASE) 50 mcg/act nasal spray, 2 sprays into each nostril daily, Disp: 48 g, Rfl: 3  •  fluticasone-salmeterol (ADVAIR HFA) 115-21 MCG/ACT inhaler, Inhale 1 puff daily, Disp: 48 g, Rfl: 3  •  hyoscyamine (LEVSIN/SL) 0 125 mg SL tablet, Take 1 tablet (0 125 mg total) by mouth every 4 (four) hours as needed for cramping (LLQ pain), Disp: 90 tablet, Rfl: 1  •  hyoscyamine (LEVSIN/SL) 0 125 mg SL tablet, Take 1 tablet (0 125 mg total) by mouth every 4 (four) hours as needed for cramping, Disp: 90 tablet, Rfl: 3  •  Multiple Vitamin (MULTIVITAMIN) tablet, Take 1 tablet by mouth daily  , Disp: , Rfl:   •  nebivolol (Bystolic) 2 5 mg tablet, Take 1 tablet (2 5 mg total) by mouth every morning, Disp: 30 tablet, Rfl: 0  •  olopatadine (Patanol) 0 1 % ophthalmic solution, Apply 1 drop to eye 2 (two) times a day, Disp: 15 mL, Rfl: 3  •  VITAMIN D, ERGOCALCIFEROL, PO, Take by mouth, Disp: , Rfl:   •  zolpidem (AMBIEN CR) 12 5 MG CR tablet, TAKE 1 TABLET (12 5 MG TOTAL) BY MOUTH DAILY AT BEDTIME AS NEEDED FOR SLEEP, Disp: 30 tablet, Rfl: 0  •  allopurinol (ZYLOPRIM) 100 mg tablet, TAKE 1 TABLET BY MOUTH EVERY DAY (Patient not taking: Reported on 2/7/2023), Disp: 30 tablet, Rfl: 3  •  escitalopram (LEXAPRO) 20 mg tablet, Take 1 tablet (20 mg total) by mouth daily (Patient not taking: Reported on 2/7/2023), Disp: 30 tablet, Rfl: 0  •  esomeprazole (NexIUM) 20 mg capsule, Take 1 capsule (20 mg total) by mouth daily in the early morning (Patient not taking: Reported on 2/7/2023), Disp: 30 capsule, Rfl: 0  •  nebivolol (Bystolic) 2 5 mg tablet, Take 1 tablet (2 5 mg total) by mouth every morning (Patient not taking: Reported on 2/7/2023), Disp: 90 tablet, Rfl: 1    Past Medical History:   Diagnosis Date   • Acid reflux    • Arthritis    • Asthma    • Cardiac disease    • Chronic kidney disease     passed on own kidney stone   • Diverticulitis    • GERD (gastroesophageal reflux disease)    • Hayfever    • [place] (hard of hearing)     bilateral hearing aids   • Hyperlipemia    • Hypertension    • Tachycardia        Past Surgical History:   Procedure Laterality Date   • ABLATION OF DYSRHYTHMIC FOCUS     • APPENDECTOMY     • CHOLECYSTECTOMY      open   • FRACTURE SURGERY      ORIF fx (L) tibia, fibula 2009   • GASTRIC BYPASS OPEN     • HYSTERECTOMY     • WA XCAPSL CTRC RMVL INSJ IO LENS PROSTH W/O ECP Left 4/18/2016    Procedure: EXTRACTION EXTRACAPSULAR CATARACT PHACO INTRAOCULAR LENS (IOL); Surgeon: Eran Smith MD;  Location: [facility] MAIN OR;  Service: Ophthalmology   • WA XCAPSL CTRC RMVL INSJ IO LENS PROSTH W/O ECP Right 3/1/2021    Procedure: EXTRACTION EXTRACAPSULAR CATARACT PHACO INTRAOCULAR LENS (IOL); Surgeon: Eran Smith MD;  Location: [facility] MAIN OR;  Service: Ophthalmology   • TONSILECTOMY AND ADNOIDECTOMY         Family History   Problem Relation Age of Onset   • Heart disease Mother    • Stroke Son    • Stroke Maternal Grandfather    • Breast cancer Daughter 36        reports that she has been smoking cigarettes  She has a 12 50 pack-year smoking history   She has never used smokeless tobacco  She reports current alcohol use  She reports that she does not use drugs        Physical Exam:    /92 (BP Location: Left arm, Patient Position: Sitting, Cuff Size: Standard)   Pulse 78   Ht 5' (1 524 m)   Wt 73 5 kg (162 lb)   LMP  (LMP Unknown)   SpO2 96%   BMI 31 64 kg/m²     Gen: wn/wd, NAD  HEENT: anicteric, MMM, no cervical LAD  CVS: RRR, no m/r/g  CHEST: CTA b/l  ABD: +BS, soft, NT,ND, no hepatosplenomegaly, no reproducible left lower quadrant pain  EXT: no c/c/e  NEURO: aaox3  SKIN: NO rashes

## 2023-02-07 NOTE — LETTER
February 7, 2023     [facility]    Patient: Beulah Zhao   YOB: 1950   Date of Visit: 2/7/2023       Dear Dr Natacha Perez: Thank you for referring Gene Moore to me for evaluation  Below are my notes for this consultation  If you have questions, please do not hesitate to call me  I look forward to following your patient along with you  Sincerely,        Hallie Thomas MD        CC: MD Hallie Webber MD  2/7/2023  4:33 PM  Sign when Signing Visit  [facility]  Beulah Zhao 74 y o  female MRN: [id number]            Assessment & Plan:  Pleasant 77-year-old female, history of gastric bypass, remote history of anastomotic ulcer, left lower quadrant pain  Recent abnormal CT scan with distention of her gastric remnant  1   Abnormal CT scan: With distention of the gastric remnant, could have peptic ulcer disease, outlet obstruction  -Suspect that the distention of the gastric remnant was causing her pain which precipitated hospital stay  -Continue PPI therapy  -Recommend repeating CT scan to see if this has resolved    2  Left lower quadrant pain: Appears to be a component of irritable bowel syndrome, may be some gas and flatus  I recommend that she try Gas-X with each meal  -Recommended trial of Levsin when the left lower quadrant pain becomes quite severe  -Otherwise continue current regimen  We will have her follow-up in a few months time to reevaluate her symptoms      Tiff Click was seen today for gerd, constipation and diarrhea  Diagnoses and all orders for this visit:    Gastroesophageal reflux disease without esophagitis    LLQ pain  -     hyoscyamine (LEVSIN/SL) 0 125 mg SL tablet; Take 1 tablet (0 125 mg total) by mouth every 4 (four) hours as needed for cramping (LLQ pain)  -     hyoscyamine (LEVSIN/SL) 0 125 mg SL tablet;  Take 1 tablet (0 125 mg total) by mouth every 4 (four) hours as needed for cramping  -     CT abdomen pelvis w contrast; Future    Delayed gastric emptying  -     Ambulatory Referral to Gastroenterology    S/P gastric bypass  -     Ambulatory Referral to Gastroenterology  -     CT abdomen pelvis w contrast; Future            _____________________________________________________________        CC: Abdominal pain, abnormal CT scan    HPI:  Larissa Alanis is a 74 y  o female who is here for abdominal pain and abnormal CT scan  This is a 26-year-old female, remote history of gastric bypass, had been seen by our office in the past irregular stools  Recent EGD and colonoscopy including biopsies were relatively unremarkable, she did have some adenomatous polyps  Patient has had intermittent left lower quadrant abdominal pain, she reports that when she passes flatus this seems to improve  This has been ongoing for many years  Otherwise has fairly regular bowel moods, occasionally alternates between diarrhea and constipation  No significant reflux symptoms  Recently had an acute onset of retrosternal left-sided chest pain, with associated nausea, presented to emergency room, dissection protocol was negative, CT scan did demonstrate dilation of her diverted gastric pouch  Her symptoms have since subsided  She continues to have a left lower quadrant pain which is ongoing, different from the pain that brought her to the emergency room  She has 2 bowel movements daily, usually fairly soft  She reports that she has tried Gas-X in the past, and seems to help a little bit though mostly helps with passing flatus  Her weight has been stable  Appetite is good  ROS:  The remainder of the ROS was negative except for the pertinent positives mentioned in HPI        Allergies: Augmentin [amoxicillin-pot clavulanate], Sulfa antibiotics, Morphine, and Latex    Medications:   Current Outpatient Medications:   •  acetaminophen (TYLENOL) 325 mg tablet, Take 2 tablets (650 mg total) by mouth every 6 (six) hours as needed for mild pain, headaches or fever  , Disp: 30 tablet, Rfl: 0  •  albuterol (2 5 mg/3 mL) 0 083 % nebulizer solution, INHALE CONTENTS OF 1 VIAL (3 ML) IN NEBULIZER BY MOUTH AND INTO THE LUNGS EVERY 6 HOURS IF NEEDED, Disp: 150 mL, Rfl: 1  •  albuterol (PROVENTIL HFA,VENTOLIN HFA) 90 mcg/act inhaler, Inhale 2 puffs every 6 (six) hours as needed for wheezing, Disp: 54 g, Rfl: 3  •  allopurinol (ZYLOPRIM) 100 mg tablet, Take 1 tablet (100 mg total) by mouth daily, Disp: 90 tablet, Rfl: 1  •  ALPRAZolam (XANAX) 0 25 mg tablet, Take 1 tablet (0 25 mg total) by mouth daily as needed for anxiety, Disp: 30 tablet, Rfl: 0  •  Apple Cider Vinegar 188 MG CAPS, Take by mouth daily As needed, Disp: , Rfl:   •  aspirin (ECOTRIN LOW STRENGTH) 81 mg EC tablet, Take 81 mg by mouth daily, Disp: , Rfl:   •  buPROPion (WELLBUTRIN XL) 300 mg 24 hr tablet, Take 1 tablet (300 mg total) by mouth every morning, Disp: 90 tablet, Rfl: 1  •  busPIRone (BUSPAR) 5 mg tablet, TAKE 1 TABLET BY MOUTH TWICE A DAY, Disp: 60 tablet, Rfl: 3  •  Diclofenac Sodium (VOLTAREN) 1 %, Apply 2 g topically 4 (four) times a day, Disp: 3 g, Rfl: 3  •  escitalopram (LEXAPRO) 20 mg tablet, Take 1 tablet (20 mg total) by mouth daily, Disp: 90 tablet, Rfl: 1  •  esomeprazole (NexIUM) 20 mg capsule, Take 1 capsule (20 mg total) by mouth daily in the early morning, Disp: 90 capsule, Rfl: 1  •  fluticasone (FLONASE) 50 mcg/act nasal spray, 2 sprays into each nostril daily, Disp: 48 g, Rfl: 3  •  fluticasone-salmeterol (ADVAIR HFA) 115-21 MCG/ACT inhaler, Inhale 1 puff daily, Disp: 48 g, Rfl: 3  •  hyoscyamine (LEVSIN/SL) 0 125 mg SL tablet, Take 1 tablet (0 125 mg total) by mouth every 4 (four) hours as needed for cramping (LLQ pain), Disp: 90 tablet, Rfl: 1  •  hyoscyamine (LEVSIN/SL) 0 125 mg SL tablet, Take 1 tablet (0 125 mg total) by mouth every 4 (four) hours as needed for cramping, Disp: 90 tablet, Rfl: 3  •  Multiple Vitamin (MULTIVITAMIN) tablet, Take 1 tablet by mouth daily  , Disp: , Rfl:   •  nebivolol (Bystolic) 2 5 mg tablet, Take 1 tablet (2 5 mg total) by mouth every morning, Disp: 30 tablet, Rfl: 0  •  olopatadine (Patanol) 0 1 % ophthalmic solution, Apply 1 drop to eye 2 (two) times a day, Disp: 15 mL, Rfl: 3  •  VITAMIN D, ERGOCALCIFEROL, PO, Take by mouth, Disp: , Rfl:   •  zolpidem (AMBIEN CR) 12 5 MG CR tablet, TAKE 1 TABLET (12 5 MG TOTAL) BY MOUTH DAILY AT BEDTIME AS NEEDED FOR SLEEP, Disp: 30 tablet, Rfl: 0  •  allopurinol (ZYLOPRIM) 100 mg tablet, TAKE 1 TABLET BY MOUTH EVERY DAY (Patient not taking: Reported on 2/7/2023), Disp: 30 tablet, Rfl: 3  •  escitalopram (LEXAPRO) 20 mg tablet, Take 1 tablet (20 mg total) by mouth daily (Patient not taking: Reported on 2/7/2023), Disp: 30 tablet, Rfl: 0  •  esomeprazole (NexIUM) 20 mg capsule, Take 1 capsule (20 mg total) by mouth daily in the early morning (Patient not taking: Reported on 2/7/2023), Disp: 30 capsule, Rfl: 0  •  nebivolol (Bystolic) 2 5 mg tablet, Take 1 tablet (2 5 mg total) by mouth every morning (Patient not taking: Reported on 2/7/2023), Disp: 90 tablet, Rfl: 1    Past Medical History:   Diagnosis Date   • Acid reflux    • Arthritis    • Asthma    • Cardiac disease    • Chronic kidney disease     passed on own kidney stone   • Diverticulitis    • GERD (gastroesophageal reflux disease)    • Hayfever    • [place] (hard of hearing)     bilateral hearing aids   • Hyperlipemia    • Hypertension    • Tachycardia        Past Surgical History:   Procedure Laterality Date   • ABLATION OF DYSRHYTHMIC FOCUS     • APPENDECTOMY     • CHOLECYSTECTOMY      open   • FRACTURE SURGERY      ORIF fx (L) tibia, fibula 2009   • GASTRIC BYPASS OPEN     • HYSTERECTOMY     • IN XCAPSL CTRC RMVL INSJ IO LENS PROSTH W/O ECP Left 4/18/2016    Procedure: EXTRACTION EXTRACAPSULAR CATARACT PHACO INTRAOCULAR LENS (IOL);   Surgeon: Syeda Castro MD;  Location: [facility] MAIN OR;  Service: Ophthalmology   • IA XCAPSL CTRC RMVL INSJ IO LENS PROSTH W/O ECP Right 3/1/2021    Procedure: EXTRACTION EXTRACAPSULAR CATARACT PHACO INTRAOCULAR LENS (IOL); Surgeon: Toy Martinez MD;  Location: [facility] MAIN OR;  Service: Ophthalmology   • TONSILECTOMY AND ADNOIDECTOMY         Family History   Problem Relation Age of Onset   • Heart disease Mother    • Stroke Son    • Stroke Maternal Grandfather    • Breast cancer Daughter 36        reports that she has been smoking cigarettes  She has a 12 50 pack-year smoking history  She has never used smokeless tobacco  She reports current alcohol use  She reports that she does not use drugs        Physical Exam:    /92 (BP Location: Left arm, Patient Position: Sitting, Cuff Size: Standard)   Pulse 78   Ht 5' (1 524 m)   Wt 73 5 kg (162 lb)   LMP  (LMP Unknown)   SpO2 96%   BMI 31 64 kg/m²     Gen: wn/wd, NAD  HEENT: anicteric, MMM, no cervical LAD  CVS: RRR, no m/r/g  CHEST: CTA b/l  ABD: +BS, soft, NT,ND, no hepatosplenomegaly, no reproducible left lower quadrant pain  EXT: no c/c/e  NEURO: aaox3  SKIN: NO rashes

## 2023-02-08 ENCOUNTER — RA CDI HCC (OUTPATIENT)
Dept: OTHER | Facility: HOSPITAL | Age: 73
End: 2023-02-08

## 2023-02-14 DIAGNOSIS — R10.32 LLQ PAIN: ICD-10-CM

## 2023-02-15 ENCOUNTER — CONSULT (OUTPATIENT)
Dept: CARDIOLOGY CLINIC | Facility: CLINIC | Age: 73
End: 2023-02-15

## 2023-02-15 VITALS
HEART RATE: 61 BPM | DIASTOLIC BLOOD PRESSURE: 54 MMHG | BODY MASS INDEX: 32 KG/M2 | OXYGEN SATURATION: 96 % | SYSTOLIC BLOOD PRESSURE: 158 MMHG | WEIGHT: 163 LBS | HEIGHT: 60 IN

## 2023-02-15 DIAGNOSIS — E78.2 MIXED HYPERLIPIDEMIA: ICD-10-CM

## 2023-02-15 DIAGNOSIS — I77.810 ASCENDING AORTA DILATATION (HCC): ICD-10-CM

## 2023-02-15 DIAGNOSIS — I47.1 SVT (SUPRAVENTRICULAR TACHYCARDIA) (HCC): ICD-10-CM

## 2023-02-15 DIAGNOSIS — R06.09 EXERTIONAL DYSPNEA: ICD-10-CM

## 2023-02-15 DIAGNOSIS — I25.10 ARTERIOSCLEROTIC CARDIOVASCULAR DISEASE: ICD-10-CM

## 2023-02-15 DIAGNOSIS — J44.9 CHRONIC OBSTRUCTIVE PULMONARY DISEASE, UNSPECIFIED COPD TYPE (HCC): ICD-10-CM

## 2023-02-15 DIAGNOSIS — I05.0 MILD MITRAL STENOSIS: ICD-10-CM

## 2023-02-15 DIAGNOSIS — I10 ESSENTIAL HYPERTENSION: ICD-10-CM

## 2023-02-15 DIAGNOSIS — R06.02 SHORTNESS OF BREATH: Primary | ICD-10-CM

## 2023-02-15 DIAGNOSIS — I35.0 MILD AORTIC STENOSIS: ICD-10-CM

## 2023-02-15 DIAGNOSIS — I35.0 AORTIC VALVE STENOSIS, ETIOLOGY OF CARDIAC VALVE DISEASE UNSPECIFIED: ICD-10-CM

## 2023-02-15 PROBLEM — I47.10 SVT (SUPRAVENTRICULAR TACHYCARDIA): Status: ACTIVE | Noted: 2023-02-15

## 2023-02-15 RX ORDER — NEBIVOLOL 5 MG/1
5 TABLET ORAL EVERY MORNING
Start: 2023-02-15

## 2023-02-15 NOTE — PROGRESS NOTES
[facility]  [address]  [address]   [place], [address]    Cardiology Consultation    Emiliano Bernardo  [id number]  1950      Consult for: chest pain  Appreciate consult by: Yaz Griffiths MD      Discussion/Summary:     1  Shortness of breath  Complete PFT with post bronchodilator    NM myocardial perfusion spect (rx stress and/or rest)      2  Ascending aorta dilatation [facility])  Ambulatory Referral to Cardiology      3  Aortic valve stenosis, etiology of cardiac valve disease unspecified  Ambulatory Referral to Cardiology      4  Arteriosclerotic cardiovascular disease  Ambulatory Referral to Cardiology      5  Mixed hyperlipidemia        6  Essential hypertension        7  Chronic obstructive pulmonary disease, unspecified COPD type ([facility] [address] )  Complete PFT with post bronchodilator    NM myocardial perfusion spect (rx stress and/or rest)      8  SVT (supraventricular tachycardia) ([facility] )             - Patient's recent echocardiogram and stress test reviewed  She has exertional dyspnea likely due to chronic lung disease    - Will schedule for nuclear stress test with pharmacological agent as she cannot walk on treadmill due to advanced lung disease   -Blood pressure is elevated today  We will increase Bystolic to 5 mg daily  We will reassess during stress testing  May need to increase further   -Will need repeat echocardiogram in 1 year   -Pulmonary function test also ordered   -Follow-up with pulmonary for reinitiation of CPAP       HPI:     Emiliano Bernardo is a 74 y o  here for evaluation of chest pain and shortness of breath  The patient notes pain in the left side of her chest   It last occurred 1 month ago while she was vomiting  Pain woke her up from sleep  She has not had it since then  She does feel shortness of breath with exertion  This has been present for the past few years  She has been smoking 1 pack/day since childhood  She has COPD but no recent PFT    She also has obstructive sleep apnea but does not use CPAP  Echocardiogram was done which showed moderate valve disease  She has a history of mitral stenosis and aortic stenosis  There was some progression in his disease  She had aortic root dilation seen on CT scan in the emergency room as well  She denies any lower extremity edema, orthopnea or paroxysmal nocturnal dyspnea  She gets short of breath with minimal exertion  Cannot do her own grocery shopping  Past Medical History:   Diagnosis Date   • Acid reflux    • Arthritis    • Asthma    • Cardiac disease    • Chronic kidney disease     passed on own kidney stone   • Diverticulitis    • GERD (gastroesophageal reflux disease)    • Hayfever    • Lummi (hard of hearing)     bilateral hearing aids   • Hyperlipemia    • Hypertension    • Tachycardia      Social History     Tobacco Use   • Smoking status: Every Day     Packs/day: 0 25     Years: 25 00     Pack years: 6 25     Types: Cigarettes   • Smokeless tobacco: Never   Vaping Use   • Vaping Use: Never used   Substance Use Topics   • Alcohol use: Yes     Comment: rarely   • Drug use: No      Family History   Problem Relation Age of Onset   • Heart disease Mother    • Stroke Son    • Stroke Maternal Grandfather    • Breast cancer Daughter 36     Past Surgical History:   Procedure Laterality Date   • ABLATION OF DYSRHYTHMIC FOCUS     • APPENDECTOMY     • CHOLECYSTECTOMY      open   • FRACTURE SURGERY      ORIF fx (L) tibia, fibula 2009   • GASTRIC BYPASS OPEN     • HYSTERECTOMY     • WI XCAPSL CTRC RMVL INSJ IO LENS PROSTH W/O ECP Left 4/18/2016    Procedure: EXTRACTION EXTRACAPSULAR CATARACT PHACO INTRAOCULAR LENS (IOL); Surgeon: Stephon Eric MD;  Location: [facility] MAIN OR;  Service: Ophthalmology   • WI XCAPSL CTRC RMVL INSJ IO LENS PROSTH W/O ECP Right 3/1/2021    Procedure: EXTRACTION EXTRACAPSULAR CATARACT PHACO INTRAOCULAR LENS (IOL);   Surgeon: Stephon Eric MD;  Location: [facility] MAIN OR;  Service: Ophthalmology   • TONSILECTOMY AND ADNOIDECTOMY         Current Outpatient Medications:   •  acetaminophen (TYLENOL) 325 mg tablet, Take 2 tablets (650 mg total) by mouth every 6 (six) hours as needed for mild pain, headaches or fever  , Disp: 30 tablet, Rfl: 0  •  albuterol (2 5 mg/3 mL) 0 083 % nebulizer solution, INHALE CONTENTS OF 1 VIAL (3 ML) IN NEBULIZER BY MOUTH AND INTO THE LUNGS EVERY 6 HOURS IF NEEDED, Disp: 150 mL, Rfl: 1  •  albuterol (PROVENTIL HFA,VENTOLIN HFA) 90 mcg/act inhaler, Inhale 2 puffs every 6 (six) hours as needed for wheezing, Disp: 54 g, Rfl: 3  •  allopurinol (ZYLOPRIM) 100 mg tablet, Take 1 tablet (100 mg total) by mouth daily, Disp: 90 tablet, Rfl: 1  •  allopurinol (ZYLOPRIM) 100 mg tablet, TAKE 1 TABLET BY MOUTH EVERY DAY, Disp: 30 tablet, Rfl: 3  •  ALPRAZolam (XANAX) 0 25 mg tablet, Take 1 tablet (0 25 mg total) by mouth daily as needed for anxiety, Disp: 30 tablet, Rfl: 0  •  Apple Cider Vinegar 188 MG CAPS, Take by mouth daily As needed, Disp: , Rfl:   •  aspirin (ECOTRIN LOW STRENGTH) 81 mg EC tablet, Take 81 mg by mouth daily, Disp: , Rfl:   •  buPROPion (WELLBUTRIN XL) 300 mg 24 hr tablet, Take 1 tablet (300 mg total) by mouth every morning, Disp: 90 tablet, Rfl: 1  •  busPIRone (BUSPAR) 5 mg tablet, TAKE 1 TABLET BY MOUTH TWICE A DAY, Disp: 60 tablet, Rfl: 3  •  Diclofenac Sodium (VOLTAREN) 1 %, Apply 2 g topically 4 (four) times a day, Disp: 3 g, Rfl: 3  •  escitalopram (LEXAPRO) 20 mg tablet, Take 1 tablet (20 mg total) by mouth daily, Disp: 90 tablet, Rfl: 1  •  esomeprazole (NexIUM) 20 mg capsule, Take 1 capsule (20 mg total) by mouth daily in the early morning, Disp: 90 capsule, Rfl: 1  •  fluticasone (FLONASE) 50 mcg/act nasal spray, 2 sprays into each nostril daily, Disp: 48 g, Rfl: 3  •  fluticasone-salmeterol (ADVAIR HFA) 115-21 MCG/ACT inhaler, Inhale 1 puff daily, Disp: 48 g, Rfl: 3  •  hyoscyamine (LEVSIN/SL) 0 125 mg SL tablet, Take 1 tablet (0 125 mg total) by mouth every 4 (four) hours as needed for cramping, Disp: 90 tablet, Rfl: 3  •  Multiple Vitamin (MULTIVITAMIN) tablet, Take 1 tablet by mouth daily  , Disp: , Rfl:   •  nebivolol (BYSTOLIC) 2 5 mg tablet, TAKE 1 TABLET BY MOUTH EVERY MORNING, Disp: 90 tablet, Rfl: 1  •  olopatadine (Patanol) 0 1 % ophthalmic solution, Apply 1 drop to eye 2 (two) times a day, Disp: 15 mL, Rfl: 3  •  VITAMIN D, ERGOCALCIFEROL, PO, Take by mouth, Disp: , Rfl:   •  zolpidem (AMBIEN CR) 12 5 MG CR tablet, TAKE 1 TABLET (12 5 MG TOTAL) BY MOUTH DAILY AT BEDTIME AS NEEDED FOR SLEEP, Disp: 30 tablet, Rfl: 0  •  escitalopram (LEXAPRO) 20 mg tablet, Take 1 tablet (20 mg total) by mouth daily, Disp: 30 tablet, Rfl: 0  •  esomeprazole (NexIUM) 20 mg capsule, Take 1 capsule (20 mg total) by mouth daily in the early morning, Disp: 30 capsule, Rfl: 0  •  hyoscyamine (LEVSIN/SL) 0 125 mg SL tablet, TAKE 1 TABLET (0 125 MG TOTAL) BY MOUTH EVERY 4 (FOUR) HOURS AS NEEDED FOR CRAMPING (LLQ PAIN), Disp: 90 tablet, Rfl: 1  •  nebivolol (Bystolic) 2 5 mg tablet, Take 1 tablet (2 5 mg total) by mouth every morning, Disp: 90 tablet, Rfl: 1  Allergies   Allergen Reactions   • Augmentin [Amoxicillin-Pot Clavulanate] GI Intolerance, Other (See Comments) and Hives     VOMITING  VOMITING  Reaction Date: 07Dec2004;    • Sulfa Antibiotics Itching, Other (See Comments) and Hives     RASH, ITCHY  RASH, ITCHY   • Morphine Other (See Comments)     "DOESN'T WORK"   • Latex Rash     Unsure if this is an allergy       Review of Systems:   Review of Systems   Constitutional: Positive for fatigue  Respiratory: Positive for shortness of breath  Cardiovascular: Positive for chest pain  Negative for palpitations and leg swelling  Musculoskeletal: Positive for arthralgias, gait problem and myalgias  All other systems reviewed and are negative        Physical Examination:     Vitals:    02/15/23 0814   BP: 158/54   BP Location: Left arm   Patient Position: Sitting   Cuff Size: Standard   Pulse: 61   SpO2: 96%   Weight: 73 9 kg (163 lb)   Height: 5' (1 524 m)       Physical Exam   Constitutional: She appears healthy  No distress  Eyes: Pupils are equal, round, and reactive to light  Conjunctivae are normal    Neck: No JVD present  Cardiovascular: Normal rate and regular rhythm  Exam reveals no gallop and no friction rub  Murmur heard  Systolic murmur is present  Pulmonary/Chest: Effort normal  She has no rales  She has scattered wheezes  Musculoskeletal:         General: No tenderness, deformity or edema  Cervical back: Normal range of motion and neck supple  Neurological: She is alert and oriented to person, place, and time  Skin: Skin is warm and dry          Labs:     Lab Results   Component Value Date    WBC 10 21 (H) 12/27/2022    HGB 15 9 (H) 12/27/2022    HCT 48 2 (H) 12/27/2022    MCV 90 12/27/2022    RDW 14 5 12/27/2022     12/27/2022     BMP:  Lab Results   Component Value Date    SODIUM 136 12/27/2022    K 4 2 12/27/2022     12/27/2022    CO2 23 12/27/2022    BUN 19 12/27/2022    CREATININE 0 83 12/27/2022    GLUC 157 (H) 12/27/2022    GLUF 99 05/21/2021    CALCIUM 9 1 12/27/2022    CORRECTEDCA 9 4 05/21/2021    EGFR 69 12/27/2022    MG 2 1 02/10/2016     LFT:  Lab Results   Component Value Date    AST 19 12/27/2022    ALT 9 (L) 12/27/2022    ALKPHOS 101 12/27/2022    TP 8 0 12/27/2022    ALB 3 8 12/27/2022      Lab Results   Component Value Date    IXP2FPNZWPCO 1 372 06/26/2018     Lab Results   Component Value Date    HGBA1C 6 0 (H) 02/10/2016     Lipid Profile:   Lab Results   Component Value Date    CHOLESTEROL 154 05/21/2021    HDL 38 (L) 05/21/2021    LDLCALC 101 (H) 05/21/2021    TRIG 73 05/21/2021     Lab Results   Component Value Date    CHOLESTEROL 154 05/21/2021    CHOLESTEROL 153 01/26/2019     Lab Results   Component Value Date    TROPONINI <0 02 02/10/2016     No results found for: NTBNP   Recent Results (from the past 672 hour(s))   Echo complete w/ contrast if indicated    Collection Time: 01/18/23  1:51 PM   Result Value Ref Range    AV area peak elie 1 6 cm2    AV peak gradient 105 mmHg    LA size 4 5 cm    Aortic valve mean velocity 20 10 m/s    Triscuspid Valve Regurgitation Peak Gradient 16 0 mmHg    Tricuspid valve peak regurgitation velocity 2 00 m/s    LVPWd 1 10 cm    Left Atrium Area-systolic Apical Two Chamber 35 1 cm2    Left Atrium Area-systolic Four Chamber 50 2 cm2    MV E' Tissue Velocity Septal 6 cm/s    MV E' Tissue Velocity Lateral 5 cm/s    Tricuspid annular plane systolic excursion 3 72 cm    TR Peak Elie 2 0 m/s    IVSd 7 50 cm    LV DIASTOLIC VOLUME (MOD BIPLANE) 2D 129 mL    LEFT VENTRICLE SYSTOLIC VOLUME (MOD BIPLANE) 2D 51 mL    Left ventricular stroke volume (2D) 77 00 mL    AV Deceleration Time 1,569 ms    A4C EF 52 %    LA length (A2C) 7 10 cm    LVIDd 5 20 cm    IVS 1 1 cm    LVIDS 3 50 cm    FS 33 28 - 44 %    Ao root 3 30 cm    RVID d 3 2 cm    LVOT mn grad 6 0 mmHg    AV area by cont VTI 1 6 cm2    AV regurgitation pressure 1/2 time 455 ms    AV mean gradient 18 mmHg    AV LVOT peak gradient 13 mmHg    MV mean gradient antegrade 4 mmHg    MV valve area p 1/2 method 1 59 cm2    PV peak gradient antegrade 7 mmHg    E wave deceleration time 477 ms    LVOT diameter 1 9 cm    LVOT peak elie 1 78 m/s    LVOT peak VTI 36 8 cm    Aortic valve peak velocity 3 02 m/s    Ao VTI 66 46 cm    LVOT stroke volume 104 29 cm3    AV peak gradient 36 mmHg    MV peak gradient antegrade 9 mmHg    MV Peak E Elie 141 cm/s    MV VTI 52 95 cm    MV Peak A Elie 1 52 m/s    TOSIN A4C 27 7 cm2    RAA A4C 15 7 cm2    MV stenosis pressure 1/2 time 138 ms    LVOT stroke volume index 59 10 ml/m2    Aortic Valve Area by Planimetry 1 8 cm2    LVSV, 2D 77 mL    LVOT area 2 83 cm2    DVI 0 59     AV valve area 1 57 cm2    MV valve area by continuity eq 1 97 cm2    LV EF 60     Est  RA pres 8 0 mmHg    Right Ventricular Peak Systolic Pressure 24 00 mmHg       Imaging & Testing   I have personally reviewed pertinent reports  Cardiac Testing   Results for orders placed during the hospital encounter of 19    Echo complete with contrast if indicated    Narrative  [address]  [address] [facility]  [place] [address] [phone]    Transthoracic Echocardiogram  2D, M-mode, Doppler, and Color Doppler    Study date:  16-May-2019    Patient: Lori Gan  MR number: [id number]  Account number: [de-identified]  : 1950  Age: 77 years  Gender: Female  Status: Outpatient  Location: Echo lab  Height: 59 in  Weight: 153 8 lb  BP: 142/ 80 mmHg    Indications: Dyspnea    Diagnoses: R06 00 - Dyspnea, unspecified    Sonographer:  MARCELL Barajas  Primary Physician:  Florentino Torres MD  Referring Physician:  Lety Manuel MD  Group:  [facility]  Interpreting Physician:  Krystle Perez DO    SUMMARY    LEFT VENTRICLE:  Systolic function was normal by visual assessment  Ejection fraction was estimated to be 55 %  There were no regional wall motion abnormalities  There was moderate concentric hypertrophy  Doppler parameters were consistent with abnormal left ventricular relaxation (grade 1 diastolic dysfunction)  LEFT ATRIUM:  The atrium was moderately dilated  MITRAL VALVE:  Transmitral velocity was increased due to valvular stenosis  There was mild stenosis  Mean transmitral gradient was 4 mmHg  AORTIC VALVE:  The valve was trileaflet  Leaflets exhibited normal thickness, moderate calcification, and moderately reduced cuspal separation  There was mild stenosis  There was no regurgitation  Valve peak gradient was 30 mmHg  Valve mean gradient was 14 mmHg  TRICUSPID VALVE:  There was mild regurgitation  Pulmonary artery systolic pressure was within the normal range      HISTORY: PRIOR HISTORY: HTN, Hyperlipidemia, GERD, Hayfever, CKD, Asthma, Arthritis    PROCEDURE: The procedure was performed in the echo lab  This was a routine study  The transthoracic approach was used  The study included complete 2D imaging, M-mode, complete spectral Doppler, and color Doppler  The heart rate was 72 bpm,  at the start of the study  Image quality was adequate  LEFT VENTRICLE: Size was normal  Systolic function was normal by visual assessment  Ejection fraction was estimated to be 55 %  There were no regional wall motion abnormalities  There was moderate concentric hypertrophy  DOPPLER: Doppler  parameters were consistent with abnormal left ventricular relaxation (grade 1 diastolic dysfunction)  RIGHT VENTRICLE: The size was normal  Systolic function was normal  DOPPLER: Systolic pressure was within the normal range  LEFT ATRIUM: The atrium was moderately dilated  No thrombus was identified  RIGHT ATRIUM: Size was normal     MITRAL VALVE: There was annular calcification  Valve structure was normal  There was normal leaflet separation  No echocardiographic evidence for prolapse  DOPPLER: Transmitral velocity was increased due to valvular stenosis  There was  mild stenosis  There was no regurgitation  AORTIC VALVE: The valve was trileaflet  Leaflets exhibited normal thickness, moderate calcification, and moderately reduced cuspal separation  DOPPLER: Transaortic velocity was increased due to valvular stenosis  There was mild stenosis  There was no regurgitation  TRICUSPID VALVE: The valve structure was normal  There was normal leaflet separation  DOPPLER: The transtricuspid velocity was within the normal range  There was mild regurgitation  Pulmonary artery systolic pressure was within the normal  range  Estimated peak PA pressure was 29 mmHg  PULMONIC VALVE: Leaflets exhibited normal thickness, no calcification, and normal cuspal separation  DOPPLER: The transpulmonic velocity was within the normal range  There was no regurgitation  PERICARDIUM: There was no thickening   There was no pericardial effusion  AORTA: The root exhibited normal size  PULMONARY ARTERY: The size was normal  The morphology appeared normal     MEASUREMENT TABLES    DOPPLER MEASUREMENTS  Aortic valve   (Reference normals)  Peak gradient   30 mmHg   (--)  Mean gradient   14 mmHg   (--)  Mitral valve   (Reference normals)  Peak gradient   11 mmHg   (--)  Mean gradient   4 mmHg   (--)    SYSTEM MEASUREMENT TABLES    2D mode  AoR Diam 2D: 3 4 cm  LA Diam (2D): 4 9 cm  LA/Ao (2D): 1 44  FS (2D Teich): 26 8 %  IVSd (2D): 1 33 cm  LVDEV: 129 cmï¾³  LVESV: 62 cmï¾³  LVIDd(2D): 5 19 cm  LVISd (2D): 3 8 cm  LVOT Area 2D: 2 84 cmï¾²  LVPWd (2D): 1 32 cm  SV (Teich): 67 cmï¾³    Apical four chamber  LVEF A4C: 57 %    Unspecified Scan Mode  JAZMYNE Cont Eq (Peak Elie): 1 14 cmï¾²  JAZMYNE Cont Eq (VTI): 1 2 cmï¾²  LVOT (VTI): 22 7 cm  LVOT Diam : 1 9 cm  LVOT Vmax: 1090 mm/s  LVOT Vmax; Mean: 1090 mm/s  Peak Grad ; Mean: 5 mm[Hg]  SV (LVOT): 64 cmï¾³  VTI;Mean: 3 mm[Hg]  JAZMYNE Cont Eq (VTI): 1 13 cmï¾²  MV Peak A Elie: 1680 mm/s  MV Peak E Elie  Mean: 1270 mm/s  MVA (PHT): 2 42 cmï¾²  PHT: 84 ms  Max P mm[Hg]  V Max: 2310 mm/s  Vmax: 2140 mm/s  RA Area: 13 cmï¾²  RA Volume: 26 3 cmï¾³  TAPSE: 2 5 cm    [employer] Accredited Echocardiography Laboratory    Prepared and electronically signed by    Violetta Camp DO  Signed 16-May-2019 14:53:29    No results found for this or any previous visit  No results found for this or any previous visit  No results found for this or any previous visit  No results found for this or any previous visit      Results for orders placed during the hospital encounter of 19    NM myocardial perfusion spect (rx stress and/or rest)    [facility]  [address]   [address] [place], [place] [address]  [phone]    Rest/Stress Gated SPECT Myocardial Perfusion Imaging After Regadenoson    Patient: Raffaele Jessica  MR number: [id number]  Account number: [de-identified]  : 1950  Age: 77 years  Gender: Female  Status: Outpatient  Location: Stress lab  Height: 59 in  Weight: 154 lb  BP: 175/ 74 mmHg    Allergies: AMOXICILLIN-POT CLAVULANATE, SULFA ANTIBIOTICS, MORPHINE, LATEX    Diagnosis: I10  - Essential (primary) hypertension, R06 09 - Other forms of dyspnea, R07 9 - Chest pain, unspecified    Primary Physician:  Yari Obrien MD  Technician:  Dustin Melton  RN:  RICK Driver  Referring Physician:  Jazmín Vasquez MD  Group:  [facility]  Report Prepared By[de-identified]  RICK Driver  Interpreting Physician:  Solis Oates DO    INDICATIONS: Evaluation of known coronary artery disease  HISTORY: The patient is a 77year old  female  Chest pain status: chest pain  Other symptoms: dyspnea  Coronary artery disease risk factors: dyslipidemia, hypertension, smoking, and family history of premature coronary artery  disease  Cardiovascular history: coronary artery disease and arrhythmia  Prior cardiovascular procedures:ablation Co-morbidity: history of lung disease, sleep apnea-CPAP  Medications: bronchodilators, aspirin and a lipid lowering agent  PHYSICAL EXAM: Baseline physical exam screening: no wheezes audible  REST ECG: Normal sinus rhythm  Normal baseline ECG  The ECG showed right bundle branch block  PROCEDURE: The study was performed in the the Stress lab  A regadenoson infusion pharmacologic stress test was performed  Gated SPECT myocardial perfusion imaging was performed after stress and at rest  Systolic blood pressure was 175  mmHg, at the start of the study  Diastolic blood pressure was 74 mmHg, at the start of the study  The heart rate was 68 bpm, at the start of the study  Patient was not experiencing chest pain at the time of the injection of the  radiopharmaceutical  IV double checked    Regadenoson protocol:  Time HR bpm SBP mmHg DBP mmHg Symptoms ST change Rhythm/conduct  Baseline 12:22 68 175 74 none, mild dyspnea none NSR, no ectopy, NSR, RBBB  Immediate 10:09 91 188 86 moderate dyspnea -- --  1 min 10:10 85 102 70 moderate dyspnea, headache, nausea -- --  2 min 10:11 86 140 75 subsiding -- --  3 min 10:12 82 156 74 subsiding -- --  No medications or fluids given  STRESS SUMMARY: Duration of pharmacologic stress was 3 min  Functional capacity was normal  Maximal heart rate during stress was 96 bpm  The heart rate response to stress was normal  There was normal resting blood pressure with an  appropriate response to stress  The rate-pressure product for the peak heart rate and blood pressure was 68601  There was no chest pain during stress  The stress test was terminated due to protocol completion  Pre oxygen saturation: 97 %  Peak oxygen saturation: 96 %  The stress ECG was normal  There were no stress arrhythmias or conduction abnormalities  ISOTOPE ADMINISTRATION:  Resting isotope administration Stress isotope administration  Agent Sestamibi Sestamibi  Dose 8 1 mCi 26 3 mCi  Date 05/16/2019 05/16/2019  Injection time 09:05 10:10    The radiopharmaceutical was injected at the peak effect of pharmacologic stress  MYOCARDIAL PERFUSION IMAGING:  The image quality was good  Left ventricular size was normal     PERFUSION DEFECTS:  -  There were no perfusion defects  GATED SPECT:  The calculated left ventricular ejection fraction was 76 %  Left ventricular ejection fraction was within normal limits by visual estimate  There was no left ventricular regional abnormality  SUMMARY:  -  Stress results: Target heart rate was achieved  There was no chest pain during stress  -  ECG conclusions: The stress ECG was normal   -  Perfusion imaging: There were no perfusion defects   -  Gated SPECT: The calculated left ventricular ejection fraction was 76 %  Left ventricular ejection fraction was within normal limits by visual estimate  There was no left ventricular regional abnormality  IMPRESSIONS: Normal study after pharmacologic stress  Myocardial perfusion imaging was normal at rest and with stress  Left ventricular systolic function was normal     Prepared and signed by    Ernesto Chacon DO  Signed 05/17/2019 14:17:18      EKG: Personally reviewed  Normal SR with PACs and RBBB      Ernesto Chacon DO, [facility]  [phone]  Please call with any questions

## 2023-02-16 ENCOUNTER — RA CDI HCC (OUTPATIENT)
Dept: OTHER | Facility: HOSPITAL | Age: 73
End: 2023-02-16

## 2023-02-17 DIAGNOSIS — K21.9 GASTROESOPHAGEAL REFLUX DISEASE WITHOUT ESOPHAGITIS: ICD-10-CM

## 2023-02-22 ENCOUNTER — HOSPITAL ENCOUNTER (OUTPATIENT)
Dept: RADIOLOGY | Facility: HOSPITAL | Age: 73
Discharge: HOME | End: 2023-02-22
Attending: INTERNAL MEDICINE

## 2023-02-22 DIAGNOSIS — R10.32 LLQ PAIN: ICD-10-CM

## 2023-02-22 DIAGNOSIS — Z98.84 S/P GASTRIC BYPASS: ICD-10-CM

## 2023-02-22 RX ADMIN — IOHEXOL 100 ML: 350 INJECTION, SOLUTION INTRAVENOUS at 08:52

## 2023-02-23 ENCOUNTER — OFFICE VISIT (OUTPATIENT)
Dept: FAMILY MEDICINE CLINIC | Facility: CLINIC | Age: 73
End: 2023-02-23

## 2023-02-23 VITALS
HEART RATE: 70 BPM | RESPIRATION RATE: 16 BRPM | SYSTOLIC BLOOD PRESSURE: 134 MMHG | OXYGEN SATURATION: 94 % | BODY MASS INDEX: 31.8 KG/M2 | HEIGHT: 60 IN | DIASTOLIC BLOOD PRESSURE: 70 MMHG | TEMPERATURE: 97.6 F | WEIGHT: 162 LBS

## 2023-02-23 DIAGNOSIS — F32.A ANXIETY AND DEPRESSION: ICD-10-CM

## 2023-02-23 DIAGNOSIS — F41.9 ANXIETY AND DEPRESSION: ICD-10-CM

## 2023-02-23 DIAGNOSIS — I10 ESSENTIAL HYPERTENSION: Primary | ICD-10-CM

## 2023-02-23 DIAGNOSIS — F51.01 PRIMARY INSOMNIA: ICD-10-CM

## 2023-02-23 DIAGNOSIS — G47.30 SLEEP APNEA, UNSPECIFIED TYPE: ICD-10-CM

## 2023-02-23 RX ORDER — ZOLPIDEM TARTRATE 12.5 MG/1
12.5 TABLET, FILM COATED, EXTENDED RELEASE ORAL
Qty: 30 TABLET | Refills: 0 | Status: SHIPPED | OUTPATIENT
Start: 2023-02-23

## 2023-02-23 RX ORDER — ALPRAZOLAM 0.25 MG/1
0.25 TABLET ORAL DAILY PRN
Qty: 30 TABLET | Refills: 0 | Status: SHIPPED | OUTPATIENT
Start: 2023-02-23

## 2023-02-23 NOTE — PROGRESS NOTES
Name: Uriel Giron      : 1950      MRN: [id number]  Encounter Provider: Neo Hollingsworth MD  Encounter Date: 2023   Encounter department: [address] [place] [address]     1  Essential hypertension    2  Sleep apnea, unspecified type  -     Ambulatory Referral to Sleep Medicine; Future    3  Primary insomnia  -     zolpidem (AMBIEN CR) 12 5 MG CR tablet; Take 1 tablet (12 5 mg total) by mouth daily at bedtime as needed for sleep    4  Anxiety and depression  -     ALPRAZolam (XANAX) 0 25 mg tablet; Take 1 tablet (0 25 mg total) by mouth daily as needed for anxiety         Subjective      Here for follow up appointment for hypertension  She is feeling well  Just saw cardiology and has upcoming stress test ordered  Was told to go back to sleep medicine for SORAYA and is asking for a referral   She is down to 2 cigarettes per day  Is trying very hard to quit  Up to date with CT  Review of Systems   Constitutional: Positive for fatigue  Respiratory: Negative  Cardiovascular: Negative  Current Outpatient Medications on File Prior to Visit   Medication Sig   • acetaminophen (TYLENOL) 325 mg tablet Take 2 tablets (650 mg total) by mouth every 6 (six) hours as needed for mild pain, headaches or fever     • albuterol (2 5 mg/3 mL) 0 083 % nebulizer solution INHALE CONTENTS OF 1 VIAL (3 ML) IN NEBULIZER BY MOUTH AND INTO THE LUNGS EVERY 6 HOURS IF NEEDED   • albuterol (PROVENTIL HFA,VENTOLIN HFA) 90 mcg/act inhaler Inhale 2 puffs every 6 (six) hours as needed for wheezing   • allopurinol (ZYLOPRIM) 100 mg tablet Take 1 tablet (100 mg total) by mouth daily   • allopurinol (ZYLOPRIM) 100 mg tablet TAKE 1 TABLET BY MOUTH EVERY DAY   • Apple Cider Vinegar 188 MG CAPS Take by mouth daily As needed   • aspirin (ECOTRIN LOW STRENGTH) 81 mg EC tablet Take 81 mg by mouth daily   • buPROPion (WELLBUTRIN XL) 300 mg 24 hr tablet Take 1 tablet (300 mg total) by mouth every morning   • busPIRone (BUSPAR) 5 mg tablet TAKE 1 TABLET BY MOUTH TWICE A DAY   • Diclofenac Sodium (VOLTAREN) 1 % Apply 2 g topically 4 (four) times a day   • escitalopram (LEXAPRO) 20 mg tablet Take 1 tablet (20 mg total) by mouth daily   • escitalopram (LEXAPRO) 20 mg tablet Take 1 tablet (20 mg total) by mouth daily   • esomeprazole (NexIUM) 20 mg capsule Take 1 capsule (20 mg total) by mouth daily in the early morning   • esomeprazole (NexIUM) 20 mg capsule TAKE 1 CAPSULE BY MOUTH EVERY DAY IN THE EARLY MORNING   • fluticasone (FLONASE) 50 mcg/act nasal spray 2 sprays into each nostril daily   • fluticasone-salmeterol (ADVAIR HFA) 115-21 MCG/ACT inhaler Inhale 1 puff daily   • hyoscyamine (LEVSIN/SL) 0 125 mg SL tablet Take 1 tablet (0 125 mg total) by mouth every 4 (four) hours as needed for cramping   • hyoscyamine (LEVSIN/SL) 0 125 mg SL tablet TAKE 1 TABLET (0 125 MG TOTAL) BY MOUTH EVERY 4 (FOUR) HOURS AS NEEDED FOR CRAMPING (LLQ PAIN)   • Multiple Vitamin (MULTIVITAMIN) tablet Take 1 tablet by mouth daily  • nebivolol (BYSTOLIC) 5 mg tablet Take 1 tablet (5 mg total) by mouth every morning   • olopatadine (Patanol) 0 1 % ophthalmic solution Apply 1 drop to eye 2 (two) times a day   • VITAMIN D, ERGOCALCIFEROL, PO Take by mouth   • [DISCONTINUED] ALPRAZolam (XANAX) 0 25 mg tablet Take 1 tablet (0 25 mg total) by mouth daily as needed for anxiety   • [DISCONTINUED] zolpidem (AMBIEN CR) 12 5 MG CR tablet TAKE 1 TABLET (12 5 MG TOTAL) BY MOUTH DAILY AT BEDTIME AS NEEDED FOR SLEEP       Objective     /70 (BP Location: Right arm, Patient Position: Sitting, Cuff Size: Large)   Pulse 70   Temp 97 6 °F (36 4 °C) (Temporal)   Resp 16   Ht 5' (1 524 m)   Wt 73 5 kg (162 lb)   LMP  (LMP Unknown)   SpO2 94%   BMI 31 64 kg/m²     Physical Exam  Constitutional:       Appearance: She is well-developed  Eyes:      Conjunctiva/sclera: Conjunctivae normal    Neck:      Thyroid: No thyromegaly  Vascular: No JVD  Cardiovascular:      Rate and Rhythm: Normal rate and regular rhythm  Heart sounds: Normal heart sounds  No murmur heard  No friction rub  No gallop  Pulmonary:      Effort: Pulmonary effort is normal       Breath sounds: Decreased breath sounds present  No wheezing or rales  Abdominal:      General: Bowel sounds are normal  There is no distension  Palpations: Abdomen is soft  Tenderness: There is no abdominal tenderness  Musculoskeletal:      Cervical back: Neck supple         Mamta Barrera MD

## 2023-02-26 DIAGNOSIS — R10.32 LLQ PAIN: ICD-10-CM

## 2023-03-01 ENCOUNTER — HOSPITAL ENCOUNTER (OUTPATIENT)
Dept: PULMONOLOGY | Facility: HOSPITAL | Age: 73
Discharge: HOME | End: 2023-03-01
Attending: INTERNAL MEDICINE

## 2023-03-01 ENCOUNTER — HOSPITAL ENCOUNTER (OUTPATIENT)
Dept: NON INVASIVE DIAGNOSTICS | Facility: HOSPITAL | Age: 73
Discharge: HOME | End: 2023-03-01
Attending: INTERNAL MEDICINE

## 2023-03-01 ENCOUNTER — HOSPITAL ENCOUNTER (OUTPATIENT)
Dept: RADIOLOGY | Facility: HOSPITAL | Age: 73
Discharge: HOME | End: 2023-03-01
Attending: INTERNAL MEDICINE

## 2023-03-01 VITALS
SYSTOLIC BLOOD PRESSURE: 132 MMHG | HEIGHT: 60 IN | OXYGEN SATURATION: 99 % | DIASTOLIC BLOOD PRESSURE: 86 MMHG | BODY MASS INDEX: 31.8 KG/M2 | HEART RATE: 60 BPM | WEIGHT: 162 LBS

## 2023-03-01 DIAGNOSIS — J44.9 CHRONIC OBSTRUCTIVE PULMONARY DISEASE, UNSPECIFIED COPD TYPE (HCC): ICD-10-CM

## 2023-03-01 DIAGNOSIS — R06.02 SHORTNESS OF BREATH: ICD-10-CM

## 2023-03-01 LAB
CHEST PAIN STATEMENT: NORMAL
MAX DIASTOLIC BP: 84 MMHG
MAX HEART RATE: 82 BPM
MAX PREDICTED HEART RATE: 146 BPM
MAX. SYSTOLIC BP: 172 MMHG
NUC STRESS EJECTION FRACTION: 74 %
PROTOCOL NAME: NORMAL
RATE PRESSURE PRODUCT: NORMAL
REASON FOR TERMINATION: NORMAL
SL CV REST NUCLEAR ISOTOPE DOSE: 10.6 MCI
SL CV STRESS NUCLEAR ISOTOPE DOSE: 33 MCI
SL CV STRESS RECOVERY BP: NORMAL MMHG
SL CV STRESS RECOVERY HR: 77 BPM
SL CV STRESS RECOVERY O2 SAT: 99 %
STRESS ANGINA INDEX: 0
STRESS BASELINE BP: NORMAL MMHG
STRESS BASELINE HR: 60 BPM
STRESS O2 SAT REST: 99 %
STRESS PEAK HR: 79 BPM
STRESS POST O2 SAT PEAK: 99 %
STRESS POST PEAK BP: 172 MMHG
STRESS ST DEPRESSION: 0 MM
TARGET HR FORMULA: NORMAL
TEST INDICATION: NORMAL
TIME IN EXERCISE PHASE: NORMAL

## 2023-03-01 RX ORDER — ALBUTEROL SULFATE 2.5 MG/3ML
2.5 SOLUTION RESPIRATORY (INHALATION) ONCE
Status: COMPLETED | OUTPATIENT
Start: 2023-03-01 | End: 2023-03-01

## 2023-03-01 RX ADMIN — REGADENOSON 0.4 MG: 0.08 INJECTION, SOLUTION INTRAVENOUS at 09:52

## 2023-03-01 RX ADMIN — ALBUTEROL SULFATE 2.5 MG: 2.5 SOLUTION RESPIRATORY (INHALATION) at 07:47

## 2023-03-03 ENCOUNTER — TELEPHONE (OUTPATIENT)
Dept: CARDIOLOGY CLINIC | Facility: CLINIC | Age: 73
End: 2023-03-03

## 2023-03-03 NOTE — TELEPHONE ENCOUNTER
----- Message from Jacob Pina DO sent at 3/3/2023 12:15 PM EST -----  Can you please let the patient know stress test was normal    PFT showed she has moderate COPD which is causing the shortness of breath  Work on the cigarettes and follow up with Dr Mary Kaur  Let me know if she has any questions    Thanks

## 2023-03-11 DIAGNOSIS — M10.071 ACUTE IDIOPATHIC GOUT OF RIGHT FOOT: ICD-10-CM

## 2023-03-11 DIAGNOSIS — F41.1 GAD (GENERALIZED ANXIETY DISORDER): ICD-10-CM

## 2023-03-11 DIAGNOSIS — F32.A ANXIETY AND DEPRESSION: ICD-10-CM

## 2023-03-11 DIAGNOSIS — F41.9 ANXIETY AND DEPRESSION: ICD-10-CM

## 2023-03-13 RX ORDER — BUPROPION HYDROCHLORIDE 300 MG/1
300 TABLET ORAL EVERY MORNING
Qty: 90 TABLET | Refills: 1 | Status: SHIPPED | OUTPATIENT
Start: 2023-03-13

## 2023-03-13 RX ORDER — BUSPIRONE HYDROCHLORIDE 5 MG/1
TABLET ORAL
Qty: 180 TABLET | Refills: 1 | Status: SHIPPED | OUTPATIENT
Start: 2023-03-13

## 2023-03-13 RX ORDER — ALLOPURINOL 100 MG/1
TABLET ORAL
Qty: 90 TABLET | Refills: 1 | Status: SHIPPED | OUTPATIENT
Start: 2023-03-13

## 2023-03-14 ENCOUNTER — OFFICE VISIT (OUTPATIENT)
Dept: SLEEP CENTER | Facility: CLINIC | Age: 73
End: 2023-03-14

## 2023-03-14 VITALS
DIASTOLIC BLOOD PRESSURE: 78 MMHG | HEIGHT: 60 IN | SYSTOLIC BLOOD PRESSURE: 138 MMHG | OXYGEN SATURATION: 98 % | HEART RATE: 67 BPM | BODY MASS INDEX: 31.61 KG/M2 | WEIGHT: 161 LBS

## 2023-03-14 DIAGNOSIS — G47.30 SLEEP APNEA, UNSPECIFIED TYPE: Primary | ICD-10-CM

## 2023-03-14 DIAGNOSIS — F51.01 PRIMARY INSOMNIA: ICD-10-CM

## 2023-03-14 DIAGNOSIS — J43.9 PULMONARY EMPHYSEMA, UNSPECIFIED EMPHYSEMA TYPE (HCC): ICD-10-CM

## 2023-03-14 NOTE — PROGRESS NOTES
Sleep Consultation   Beulah Zhao 74 y o  female MRN: [id number]      Reason for consultation: Concern for sleep apnea, history of SORAYA in the past    Requesting physician: Marisa Rajput MD    Assessment/Plan  64-year-old female with class 1 obesity and past medical history of gout, GERD, insomnia, COPD, anxiety, and depression is here for evaluation of sleep apnea  1  SORAYA- History of SORAYA long time ago and was on CPAP  Lost significant weight after bariatric surgery and repeat sleep study was negative for SORAYA  Recently gained some weight back and has symptoms of snoring, waking up with choking/gasping, unrefreshing sleep, and excessive daytime sleepiness with ESS of 11/24    -A diagnostic PSG is ordered to re-evaluate  -If positive for sleep apnea, will order CPAP  -Consequences of untreated sleep apnea discussed  -Advised to avoid driving if sleepy  -Follow up in 3 months  2  Insomnia- She has been struggling with falling asleep and staying asleep  She is on ambien for insomnia which now does not appear to work as well compared to in the past  She also have poor sleep hygiene practices including daytime naps and watching TV in the bed    -Advised to avoid daytime naps in order to get better sleep at night    -Advised to not watch TV in the bed    -Advised to quit smoking    -Advised to try and stop using ambien every night  We discussed that her insomnia may worsen initially but should eventually improve    -Follow up in 3 months  History of Present Illness   HPI:  Beulah Zhao is a 74 y o  female with class 1 obesity and past medical history of gout, GERD, insomnia, COPD, anxiety, and depression is here for evaluation of sleep apnea  Patient has a remote history of sleep apnea and was on CPAP  She underwent bariatric surgery and was able to lose about 200 lbs at that time  She subsequently had another sleep study that showed resolution of her sleep apnea   She last used her CPAP more than 5 years ago  Her CPAP was recalled and she returned the device  She currently does not have a CPAP  She says that her symptoms of sleep apnea have now returned as she gained about 40 lbs  She has symptoms of snoring, waking up with gasping/choking, unrefreshing sleep, and excessive daytime sleepiness with ESS of 11/24  She also have issues with insomnia and was started on extended release ambien by her doctor  She was not using it every night in the past but has to use it every night now  She denies any symptoms of restless legs, sleep paralysis, hallucinations, or narcolepsy/cataplexy  She denies any abnormal sleep related behavior  Sleep schedule: She takes her medications including ambien after dinner and goes to bed at 8:30 PM  She falls asleep around 11 PM eventually  She wakes up 2-3 times at night, sometimes to urinate and sometimes randomly and is able to fall back asleep within 20 minutes  Occasionally it will take her up to 1 hours to fall back asleep  She wakes up at 6:30 AM and does not feel fully rested  She takes at least 1 nap every day from 11 AM to 1 PM and sometimes twice  She takes ambien to fall asleep          Review of Systems      Genitourinary none   Cardiology none   Gastrointestinal frequent heartburn/acid reflux   Neurology frequent headaches, forgetfulness, poor concentration or confusion,  and difficulty with memory   Constitutional fatigue and weight change   Integumentary none   Psychiatry anxiety and depression   Musculoskeletal joint pain   Pulmonary snoring   ENT none   Endocrine none   Hematological none               Historical Information   Past Medical History:   Diagnosis Date   • Acid reflux    • Arthritis    • Asthma    • Cardiac disease    • Chronic kidney disease     passed on own kidney stone   • Diverticulitis    • GERD (gastroesophageal reflux disease)    • Hayfever    • [place] (hard of hearing)     bilateral hearing aids   • Hyperlipemia    • Hypertension    • Tachycardia      Past Surgical History:   Procedure Laterality Date   • ABLATION OF DYSRHYTHMIC FOCUS     • APPENDECTOMY     • CHOLECYSTECTOMY      open   • FRACTURE SURGERY      ORIF fx (L) tibia, fibula 2009   • GASTRIC BYPASS OPEN     • HYSTERECTOMY     • AR XCAPSL CTRC RMVL INSJ IO LENS PROSTH W/O ECP Left 4/18/2016    Procedure: EXTRACTION EXTRACAPSULAR CATARACT PHACO INTRAOCULAR LENS (IOL); Surgeon: Sage Alonzo MD;  Location: [facility] MAIN OR;  Service: Ophthalmology   • AR XCAPSL CTRC RMVL INSJ IO LENS PROSTH W/O ECP Right 3/1/2021    Procedure: EXTRACTION EXTRACAPSULAR CATARACT PHACO INTRAOCULAR LENS (IOL); Surgeon: Sage Alonzo MD;  Location: [facility] MAIN OR;  Service: Ophthalmology   • TONSILECTOMY AND ADNOIDECTOMY       Family History   Problem Relation Age of Onset   • Heart disease Mother    • Stroke Son    • Stroke Maternal Grandfather    • Breast cancer Daughter 36     Social History     Socioeconomic History   • Marital status:       Spouse name: Not on file   • Number of children: Not on file   • Years of education: Not on file   • Highest education level: Not on file   Occupational History   • Not on file   Tobacco Use   • Smoking status: Every Day     Packs/day: 0 25     Years: 25 00     Pack years: 6 25     Types: Cigarettes   • Smokeless tobacco: Never   Vaping Use   • Vaping Use: Never used   Substance and Sexual Activity   • Alcohol use: Yes     Comment: rarely   • Drug use: No   • Sexual activity: Never   Other Topics Concern   • Not on file   Social History Narrative   • Not on file     Social Determinants of Health     Financial Resource Strain: Not on file   Food Insecurity: Not on file   Transportation Needs: Not on file   Physical Activity: Not on file   Stress: Not on file   Social Connections: Not on file   Intimate Partner Violence: Not on file   Housing Stability: Not on file           Meds/Allergies   Allergies   Allergen Reactions   • Augmentin [Amoxicillin-Pot Clavulanate] GI Intolerance, Other (See Comments) and Hives     VOMITING  VOMITING  Reaction Date: [id number];    • Sulfa Antibiotics Itching, Other (See Comments) and Hives     RASH, ITCHY  RASH, ITCHY   • Morphine Other (See Comments)     "DOESN'T WORK"   • Latex Rash     Unsure if this is an allergy       Home medications:  Prior to Admission medications    Medication Sig Start Date End Date Taking? Authorizing Provider   acetaminophen (TYLENOL) 325 mg tablet Take 2 tablets (650 mg total) by mouth every 6 (six) hours as needed for mild pain, headaches or fever   2/10/16   Radha Medina MD   albuterol (2 5 mg/3 mL) 0 083 % nebulizer solution INHALE CONTENTS OF 1 VIAL (3 ML) IN NEBULIZER BY MOUTH AND INTO THE LUNGS EVERY 6 HOURS IF NEEDED 12/5/22   Todd Villalobos MD   albuterol (PROVENTIL HFA,VENTOLIN HFA) 90 mcg/act inhaler Inhale 2 puffs every 6 (six) hours as needed for wheezing 12/16/21   Todd Villalobos MD   allopurinol (ZYLOPRIM) 100 mg tablet Take 1 tablet (100 mg total) by mouth daily 1/11/23   KYLE Vargas   allopurinol (ZYLOPRIM) 100 mg tablet TAKE 1 TABLET BY MOUTH EVERY DAY 3/13/23   Todd Villalobos MD   ALPRAZolam Charlotte Kappa) 0 25 mg tablet Take 1 tablet (0 25 mg total) by mouth daily as needed for anxiety 2/23/23   Todd Villalobos MD   Apple Cider Vinegar 188 MG CAPS Take by mouth daily As needed    Historical Provider, MD   aspirin (ECOTRIN LOW STRENGTH) 81 mg EC tablet Take 81 mg by mouth daily    Historical Provider, MD   buPROPion (WELLBUTRIN XL) 300 mg 24 hr tablet TAKE 1 TABLET (300 MG TOTAL) BY MOUTH EVERY MORNING  3/13/23   Todd Villalobos MD   busPIRone (BUSPAR) 5 mg tablet TAKE 1 TABLET BY MOUTH TWICE A DAY 3/13/23   Todd Villalobos MD   Diclofenac Sodium (VOLTAREN) 1 % Apply 2 g topically 4 (four) times a day 5/10/21   Todd Villalobos MD   escitalopram (LEXAPRO) 20 mg tablet Take 1 tablet (20 mg total) by mouth daily 1/11/23   Irene Dress, CRNP   escitalopram (LEXAPRO) 20 mg tablet Take 1 tablet (20 mg total) by mouth daily 1/11/23   KYLE Mckenna   esomeprazole (NexIUM) 20 mg capsule Take 1 capsule (20 mg total) by mouth daily in the early morning 1/11/23   KYLE Mckenna   esomeprazole (NexIUM) 20 mg capsule TAKE 1 CAPSULE BY MOUTH EVERY DAY IN THE EARLY MORNING 2/20/23   Samantha Heard MD   fluticasone [facility]) 50 mcg/act nasal spray 2 sprays into each nostril daily 12/16/21   Samantha Heard MD   fluticasone-salmeterol ([facility]) 531-53 MCG/ACT inhaler Inhale 1 puff daily 8/1/22   Samantha Heard MD   hyoscyamine (LEVSIN/SL) 0 125 mg SL tablet Take 1 tablet (0 125 mg total) by mouth every 4 (four) hours as needed for cramping 2/7/23   Tao Gonzalez MD   hyoscyamine (LEVSIN/SL) 0 125 mg SL tablet TAKE 1 TABLET (0 125 MG TOTAL) BY MOUTH EVERY 4 (FOUR) HOURS AS NEEDED FOR CRAMPING (LLQ PAIN) 2/27/23   Tao Gonzalez MD   Multiple Vitamin (MULTIVITAMIN) tablet Take 1 tablet by mouth daily  Historical Provider, MD   nebivolol (BYSTOLIC) 5 mg tablet Take 1 tablet (5 mg total) by mouth every morning 2/15/23   Stella Garcia DO   olopatadine (Patanol) 0 1 % ophthalmic solution Apply 1 drop to eye 2 (two) times a day 6/15/22   Samantha Heard MD   VITAMIN D, ERGOCALCIFEROL, PO Take by mouth    Historical Provider, MD   zolpidem (AMBIEN CR) 12 5 MG CR tablet Take 1 tablet (12 5 mg total) by mouth daily at bedtime as needed for sleep 2/23/23   Samantha Heard MD       Vitals:   not currently breastfeeding  , There is no height or weight on file to calculate BMI         Physical Exam  General: Awake alert and oriented x 3, conversant without conversational dyspnea, NAD, normal affect  HEENT:  PERRL, Sclera noninjected, nonicteric OU, Nares patent,  no craniofacial abnormalities, Mucous membranes, moist, no oral lesions, normal dentition, Mallampati class 4  NECK: Trachea midline, no accessory muscle use, no stridor, no cervical or supraclavicular adenopathy, JVP not elevated  CARDIAC: Reg, single s1/S2, no m/r/g  PULM: CTA bilaterally no wheezing, rhonchi or rales  EXT: No cyanosis, no clubbing, no edema, normal capillary refill  NEURO: no focal neurologic deficits, AAOx3, moving all extremities appropriately     Labs: I have personally reviewed pertinent lab results    Lab Results   Component Value Date    WBC 10 21 (H) 12/27/2022    HGB 15 9 (H) 12/27/2022    HCT 48 2 (H) 12/27/2022    MCV 90 12/27/2022     12/27/2022      Lab Results   Component Value Date    CALCIUM 9 1 12/27/2022    K 4 2 12/27/2022    CO2 23 12/27/2022     12/27/2022    BUN 19 12/27/2022    CREATININE 0 83 12/27/2022     No results found for: IRON, TIBC, FERRITIN  No results found for: [id number]  No results found for: MD Sh[facility]

## 2023-03-14 NOTE — PATIENT INSTRUCTIONS
Sleep Apnea   AMBULATORY CARE:   Sleep apnea  is a condition that causes you to stop breathing often during sleep  Types of sleep apnea:   Obstructive sleep apnea (SORAYA)  is the most common kind  The muscles and tissues around your throat relax and block air from passing through  Obesity, use of alcohol or cigarettes, or a family history are common causes  SORAYA may increase your risk for complications after surgery  Central sleep apnea (CSA)  means your brain does not send signals to the muscles that control breathing  You do not take a breath even though your airway is open  Common causes include medical conditions such as heart failure, being older than 40, or use of opioids  Complex (or mixed) sleep apnea  means you have both obstructive and central sleep apnea  Common signs and symptoms:   Loud snoring or long pauses in breathing    Feeling sleepy, slow, and tired during the day    Snorting, gasping, or choking while you sleep, and waking up suddenly because of these    Feeling irritable during the day    Dry mouth or a headache in the mornings    Heavy night sweating    A hard time thinking, remembering things, or focusing on your tasks the following day    Call your local emergency number (911 in the [address] [facility]) if:   You have chest pain or trouble breathing  Call your doctor if:   You have new or worsening signs or symptoms  You have questions or concerns about your condition or care  Treatment  depends on the kind of apnea you have  A mouth device  may be needed if you have mild sleep apnea  These are designed to keep your throat open  Ask your dentist or healthcare provider about the best mouth device for you  A machine  may be used to help you get more air during sleep  A mask may be placed over your nose and mouth, or just your nose  The mask is hooked to the machine  You will get air through the mask      A continuous positive airway pressure (CPAP) machine  is used to keep your airway open during sleep  The machine blows a gentle stream of air into the mask when you breathe  This helps keep your airway open so you can breathe more regularly  Extra oxygen may be given through the machine  A bilevel positive airway pressure (BiPAP) machine  gives air but lowers the pressure when you breathe out  An adaptive servo-ventilator (ASV)  is a machine that learns your usual breathing pattern  Then, it uses pressure to give you air and prevent stops in your breathing  Surgery  to expand your airway or remove extra tissues may be needed  Surgery is usually only considered if other treatments do not work  Manage or prevent sleep apnea:   Reach and maintain a healthy weight  Ask your healthcare provider what a healthy weight is for you  Ask your provider to help you create a safe weight loss plan if you are overweight  Even a small goal of a 10% weight loss can improve your symptoms  Do not smoke  Nicotine and other chemicals in cigarettes and cigars can cause lung damage  Ask your healthcare provider for information if you currently smoke and need help to quit  E-cigarettes or smokeless tobacco still contain nicotine  Talk to your healthcare provider before you use these products  Do not drink alcohol or take sedative medicine before you go to sleep  Alcohol and sedatives can relax the muscles and tissues around your throat  This can block the airflow to your lungs  Sleep on your side or use pillows designed to prevent sleep apnea  This prevents your tongue or other tissues from blocking your throat  You can also raise the head of your bed  Follow up with your doctor or specialist as directed: You may need to have blood tests during your follow-up visits  Work with your provider to find the right breathing support equipment and settings for you  Write down your questions so you remember to ask them during your visits    © Copyright [employer]ative 2022 Information is for End User's use only and may not be sold, redistributed or otherwise used for commercial purposes  The above information is an  only  It is not intended as medical advice for individual conditions or treatments  Talk to your doctor, nurse or pharmacist before following any medical regimen to see if it is safe and effective for you

## 2023-03-15 NOTE — ASSESSMENT & PLAN NOTE
Patient previously was diagnosed with sleep apnea and was on CPAP for years  She subsequently underwent bariatric surgery and lost about 200 pounds  She had a repeat sleep study in June 2007 with an AHI of 1 4  This was [facility] at the [facility] (scanned into media)    She has gained some weight back  Current BMI is 31 that she has gained approximately 40 pounds back  She now has symptoms of snoring, awakenings with gasping/choking and unrefreshing sleep  She has excessive daytime sleepiness with an [place] of 11  We will order an in lab polysomnography  If she is diagnosed with sleep apnea, due to sleep maintenance insomnia, disruptions in sleep overnight, daytime sleepiness, we will initiate treatment with nasal CPAP

## 2023-03-15 NOTE — ASSESSMENT & PLAN NOTE
FEV1 is 58% of predicted in March 2023  She does not have recent exacerbations  She has significant tobacco use history and is currently smoking    She is on Advair and albuterol PRN

## 2023-03-15 NOTE — ASSESSMENT & PLAN NOTE
She has a long history of difficulties with sleep initiation and sleep maintenance  She is on extended release Ambien  She has developed tolerance and dependence on Ambien over time  She also has poor sleep hygiene practices including daytime naps and watching TV in bed  We provided advice for sleep hygiene, advised not to watch TV in bed, advised to decrease daytime naps to improve sleep initiation and sleep maintenance at night  She will have to try to taper Ambien over time, but this will take time and might result in rebound insomnia

## 2023-03-21 DIAGNOSIS — F51.01 PRIMARY INSOMNIA: ICD-10-CM

## 2023-03-22 RX ORDER — ZOLPIDEM TARTRATE 12.5 MG/1
12.5 TABLET, FILM COATED, EXTENDED RELEASE ORAL
Qty: 30 TABLET | Refills: 0 | Status: SHIPPED | OUTPATIENT
Start: 2023-03-22

## 2023-04-24 ENCOUNTER — OFFICE VISIT (OUTPATIENT)
Dept: FAMILY MEDICINE CLINIC | Facility: CLINIC | Age: 73
End: 2023-04-24

## 2023-04-24 VITALS
SYSTOLIC BLOOD PRESSURE: 138 MMHG | BODY MASS INDEX: 32.55 KG/M2 | WEIGHT: 165.8 LBS | RESPIRATION RATE: 18 BRPM | HEIGHT: 60 IN | TEMPERATURE: 96.8 F | DIASTOLIC BLOOD PRESSURE: 74 MMHG | HEART RATE: 60 BPM

## 2023-04-24 DIAGNOSIS — J43.9 PULMONARY EMPHYSEMA, UNSPECIFIED EMPHYSEMA TYPE (HCC): Primary | ICD-10-CM

## 2023-04-24 DIAGNOSIS — F33.1 MODERATE EPISODE OF RECURRENT MAJOR DEPRESSIVE DISORDER (HCC): ICD-10-CM

## 2023-04-24 DIAGNOSIS — I05.0 MILD MITRAL STENOSIS: ICD-10-CM

## 2023-04-24 DIAGNOSIS — J44.9 CHRONIC OBSTRUCTIVE PULMONARY DISEASE, UNSPECIFIED COPD TYPE (HCC): ICD-10-CM

## 2023-04-24 DIAGNOSIS — F51.01 PRIMARY INSOMNIA: ICD-10-CM

## 2023-04-24 DIAGNOSIS — I10 ESSENTIAL HYPERTENSION: ICD-10-CM

## 2023-04-24 DIAGNOSIS — I35.0 MILD AORTIC STENOSIS: ICD-10-CM

## 2023-04-24 DIAGNOSIS — R06.09 EXERTIONAL DYSPNEA: ICD-10-CM

## 2023-04-24 RX ORDER — ESCITALOPRAM OXALATE 20 MG/1
20 TABLET ORAL DAILY
Qty: 90 TABLET | Refills: 1 | Status: SHIPPED | OUTPATIENT
Start: 2023-04-24

## 2023-04-24 RX ORDER — ALBUTEROL SULFATE 90 UG/1
2 AEROSOL, METERED RESPIRATORY (INHALATION) EVERY 6 HOURS PRN
Qty: 54 G | Refills: 3 | Status: SHIPPED | OUTPATIENT
Start: 2023-04-24

## 2023-04-24 RX ORDER — FLUTICASONE PROPIONATE AND SALMETEROL XINAFOATE 115; 21 UG/1; UG/1
1 AEROSOL, METERED RESPIRATORY (INHALATION) DAILY
Qty: 48 G | Refills: 3 | Status: SHIPPED | OUTPATIENT
Start: 2023-04-24

## 2023-04-24 RX ORDER — ZOLPIDEM TARTRATE 12.5 MG/1
12.5 TABLET, FILM COATED, EXTENDED RELEASE ORAL
Qty: 30 TABLET | Refills: 0 | Status: SHIPPED | OUTPATIENT
Start: 2023-04-24

## 2023-04-24 NOTE — PROGRESS NOTES
Name: Lillian Dior      : 1950      MRN: [id number]  Encounter Provider: Humphrey Morris MD  Encounter Date: 2023   Encounter department: [address] [place] [address]     1  Pulmonary emphysema, unspecified emphysema type ([place] [address] )  Assessment & Plan:  Unchanged and will continue current inhalers but will see pulmonology in follow up  Referral given  Advised to continue to cut back and quit tobacco use  Orders:  -     Ambulatory Referral to Pulmonology; Future    2  Essential hypertension  Assessment & Plan:  Well controlled on current medications and will continue as ordered  3  Moderate episode of recurrent major depressive disorder [facility])  Assessment & Plan:  She feels her mood is well controlled on current medications and will continue  Asked patient to call sooner than next scheduled appointment for any complaints or issues  Orders:  -     escitalopram (LEXAPRO) 20 mg tablet; Take 1 tablet (20 mg total) by mouth daily    4  Primary insomnia  -     zolpidem (AMBIEN CR) 12 5 MG CR tablet; Take 1 tablet (12 5 mg total) by mouth daily at bedtime as needed for sleep    5  Chronic obstructive pulmonary disease, unspecified COPD type ([place] [address] )  Assessment & Plan:  Unchanged and will continue current inhalers but will see pulmonology in follow up  Referral given  Advised to continue to cut back and quit tobacco use  Orders:  -     fluticasone-salmeterol (ADVAIR HFA) 115-21 MCG/ACT inhaler; Inhale 1 puff daily  -     albuterol (PROVENTIL HFA,VENTOLIN HFA) 90 mcg/act inhaler; Inhale 2 puffs every 6 (six) hours as needed for wheezing    6  Mild mitral stenosis    7  Mild aortic stenosis    8  Exertional dyspnea           Subjective      Here for follow up visit  She is down to 3 1/2 cigarettes per day  She continues to have dyspnea and her PFT showed moderately severe COPD      She feels her mood is well controlled and needs refills on her escitalopram   There is no chest pain or palpitations  Her dyspnea is unchanged  Review of Systems   Constitutional: Negative  Respiratory: Positive for shortness of breath  Negative for cough and wheezing  Cardiovascular: Negative  Psychiatric/Behavioral: Negative  Current Outpatient Medications on File Prior to Visit   Medication Sig   • acetaminophen (TYLENOL) 325 mg tablet Take 2 tablets (650 mg total) by mouth every 6 (six) hours as needed for mild pain, headaches or fever  • albuterol (2 5 mg/3 mL) 0 083 % nebulizer solution INHALE CONTENTS OF 1 VIAL (3 ML) IN NEBULIZER BY MOUTH AND INTO THE LUNGS EVERY 6 HOURS IF NEEDED   • allopurinol (ZYLOPRIM) 100 mg tablet Take 1 tablet (100 mg total) by mouth daily   • ALPRAZolam (XANAX) 0 25 mg tablet Take 1 tablet (0 25 mg total) by mouth daily as needed for anxiety   • Apple Cider Vinegar 188 MG CAPS Take by mouth daily As needed   • aspirin (ECOTRIN LOW STRENGTH) 81 mg EC tablet Take 81 mg by mouth daily   • buPROPion (WELLBUTRIN XL) 300 mg 24 hr tablet TAKE 1 TABLET (300 MG TOTAL) BY MOUTH EVERY MORNING  • busPIRone (BUSPAR) 5 mg tablet TAKE 1 TABLET BY MOUTH TWICE A DAY   • Diclofenac Sodium (VOLTAREN) 1 % Apply 2 g topically 4 (four) times a day   • esomeprazole (NexIUM) 20 mg capsule Take 1 capsule (20 mg total) by mouth daily in the early morning   • fluticasone (FLONASE) 50 mcg/act nasal spray 2 sprays into each nostril daily   • hyoscyamine (LEVSIN/SL) 0 125 mg SL tablet Take 1 tablet (0 125 mg total) by mouth every 4 (four) hours as needed for cramping   • Multiple Vitamin (MULTIVITAMIN) tablet Take 1 tablet by mouth daily     • nebivolol (BYSTOLIC) 5 mg tablet Take 1 tablet (5 mg total) by mouth every morning   • olopatadine (Patanol) 0 1 % ophthalmic solution Apply 1 drop to eye 2 (two) times a day   • VITAMIN D, ERGOCALCIFEROL, PO Take by mouth   • [DISCONTINUED] albuterol (PROVENTIL HFA,VENTOLIN HFA) 90 mcg/act inhaler Inhale 2 puffs every 6 (six) hours as needed for wheezing   • [DISCONTINUED] escitalopram (LEXAPRO) 20 mg tablet Take 1 tablet (20 mg total) by mouth daily   • [DISCONTINUED] fluticasone-salmeterol (ADVAIR HFA) 115-21 MCG/ACT inhaler Inhale 1 puff daily   • [DISCONTINUED] zolpidem (AMBIEN CR) 12 5 MG CR tablet TAKE 1 TABLET (12 5 MG TOTAL) BY MOUTH DAILY AT BEDTIME AS NEEDED FOR SLEEP   • [DISCONTINUED] allopurinol (ZYLOPRIM) 100 mg tablet TAKE 1 TABLET BY MOUTH EVERY DAY (Patient not taking: Reported on 4/24/2023)   • [DISCONTINUED] escitalopram (LEXAPRO) 20 mg tablet Take 1 tablet (20 mg total) by mouth daily (Patient not taking: Reported on 4/24/2023)   • [DISCONTINUED] esomeprazole (NexIUM) 20 mg capsule TAKE 1 CAPSULE BY MOUTH EVERY DAY IN THE EARLY MORNING (Patient not taking: Reported on 4/24/2023)   • [DISCONTINUED] hyoscyamine (LEVSIN/SL) 0 125 mg SL tablet TAKE 1 TABLET (0 125 MG TOTAL) BY MOUTH EVERY 4 (FOUR) HOURS AS NEEDED FOR CRAMPING (LLQ PAIN) (Patient not taking: Reported on 4/24/2023)       Objective     /74   Pulse 60   Temp (!) 96 8 °F (36 °C)   Resp 18   Ht 5' (1 524 m)   Wt 75 2 kg (165 lb 12 8 oz)   LMP  (LMP Unknown)   BMI 32 38 kg/m²     Physical Exam  Constitutional:       Appearance: She is well-developed  Eyes:      Conjunctiva/sclera: Conjunctivae normal    Neck:      Thyroid: No thyromegaly  Vascular: No JVD  Cardiovascular:      Rate and Rhythm: Normal rate and regular rhythm  Heart sounds: Normal heart sounds  No murmur heard  No friction rub  No gallop  Pulmonary:      Effort: Pulmonary effort is normal       Breath sounds: Normal breath sounds  Decreased air movement present  No wheezing or rales  Abdominal:      General: Bowel sounds are normal  There is no distension  Palpations: Abdomen is soft  Tenderness: There is no abdominal tenderness  Musculoskeletal:      Cervical back: Neck supple     Psychiatric:         Mood and Affect: Mood normal          Behavior: Behavior normal  Thought Content:  Thought content normal          Judgment: Judgment normal        Adolfo Chance MD

## 2023-04-24 NOTE — ASSESSMENT & PLAN NOTE
Unchanged and will continue current inhalers but will see pulmonology in follow up  Referral given  Advised to continue to cut back and quit tobacco use

## 2023-04-24 NOTE — ASSESSMENT & PLAN NOTE
She feels her mood is well controlled on current medications and will continue  Asked patient to call sooner than next scheduled appointment for any complaints or issues

## 2023-05-01 DIAGNOSIS — F32.A ANXIETY AND DEPRESSION: ICD-10-CM

## 2023-05-01 DIAGNOSIS — F41.9 ANXIETY AND DEPRESSION: ICD-10-CM

## 2023-05-02 ENCOUNTER — TELEPHONE (OUTPATIENT)
Dept: PULMONOLOGY | Facility: MEDICAL CENTER | Age: 73
End: 2023-05-02

## 2023-05-02 NOTE — TELEPHONE ENCOUNTER
LM for patient to call office back to schedule NP appointment for Pulmonary Emphysema, schedule next available

## 2023-05-03 RX ORDER — ALPRAZOLAM 0.25 MG/1
TABLET ORAL
Qty: 30 TABLET | Refills: 0 | Status: SHIPPED | OUTPATIENT
Start: 2023-05-03

## 2023-05-09 NOTE — TELEPHONE ENCOUNTER
LM for patient to call office back to schedule NP appointment for Emphysema, schedule next available

## 2023-06-12 DIAGNOSIS — F51.01 PRIMARY INSOMNIA: ICD-10-CM

## 2023-06-14 RX ORDER — ZOLPIDEM TARTRATE 12.5 MG/1
12.5 TABLET, FILM COATED, EXTENDED RELEASE ORAL
Qty: 30 TABLET | Refills: 0 | Status: SHIPPED | OUTPATIENT
Start: 2023-06-14 | End: 2023-06-21 | Stop reason: SDUPTHER

## 2023-06-20 ENCOUNTER — TELEPHONE (OUTPATIENT)
Dept: FAMILY MEDICINE CLINIC | Facility: CLINIC | Age: 73
End: 2023-06-20

## 2023-06-20 DIAGNOSIS — M25.511 ACUTE PAIN OF RIGHT SHOULDER: ICD-10-CM

## 2023-06-20 DIAGNOSIS — R10.32 LLQ PAIN: ICD-10-CM

## 2023-06-20 DIAGNOSIS — F51.01 PRIMARY INSOMNIA: ICD-10-CM

## 2023-06-20 DIAGNOSIS — H10.13 ALLERGIC CONJUNCTIVITIS OF BOTH EYES: ICD-10-CM

## 2023-06-20 NOTE — TELEPHONE ENCOUNTER
Savannah Kellogg left a message, requesting a call back about her rx for Hyoscyamine  We need to call her back to clarify her question  She said she doesn't know if she needs it or not   Her message was unclear Junior

## 2023-06-22 RX ORDER — ZOLPIDEM TARTRATE 12.5 MG/1
12.5 TABLET, FILM COATED, EXTENDED RELEASE ORAL
Qty: 30 TABLET | Refills: 0 | Status: SHIPPED | OUTPATIENT
Start: 2023-06-22

## 2023-06-22 NOTE — TELEPHONE ENCOUNTER
This is an as needed medication for abdominal pain, so if she is not having pain she should not use this

## 2023-06-22 NOTE — TELEPHONE ENCOUNTER
Pt called back, please try her number again and if she doesn't answer that is because she is having trouble with her new phone  But she will call back

## 2023-06-22 NOTE — TELEPHONE ENCOUNTER
Spoke to pt and she stated that the abdominal pain comes and goes  So she would like to the hycosimine on hand   If you approve it, she would like it sent to the [facility]

## 2023-06-25 DIAGNOSIS — I35.0 MILD AORTIC STENOSIS: ICD-10-CM

## 2023-06-25 DIAGNOSIS — I05.0 MILD MITRAL STENOSIS: ICD-10-CM

## 2023-06-25 DIAGNOSIS — R06.09 EXERTIONAL DYSPNEA: ICD-10-CM

## 2023-06-26 RX ORDER — NEBIVOLOL 2.5 MG/1
TABLET ORAL
Qty: 90 TABLET | Refills: 3 | Status: SHIPPED | OUTPATIENT
Start: 2023-06-26 | End: 2023-06-29

## 2023-06-27 ENCOUNTER — TELEPHONE (OUTPATIENT)
Dept: FAMILY MEDICINE CLINIC | Facility: CLINIC | Age: 73
End: 2023-06-27

## 2023-06-27 NOTE — TELEPHONE ENCOUNTER
Called pt, she has a virtual appt with Dr Franklin Sesay on 06/29  She said this is why she needs to talk to the doctor, she's not herself and she's afraid to drive    HANS Rebollar

## 2023-06-27 NOTE — TELEPHONE ENCOUNTER
Susan Adam left a message yesterday stating that she was returning [facility] call and she was not able to get to the phone  I don't see a message from yesterday that Farooq Reeves called her   Junior      The message from 6/20/23 was already taken care of- Stephan Marrero spoke with her about that

## 2023-06-29 ENCOUNTER — TELEMEDICINE (OUTPATIENT)
Dept: FAMILY MEDICINE CLINIC | Facility: CLINIC | Age: 73
End: 2023-06-29
Payer: MEDICARE

## 2023-06-29 DIAGNOSIS — R06.09 EXERTIONAL DYSPNEA: ICD-10-CM

## 2023-06-29 DIAGNOSIS — R41.89 COGNITIVE IMPAIRMENT: Primary | ICD-10-CM

## 2023-06-29 DIAGNOSIS — I35.0 MILD AORTIC STENOSIS: ICD-10-CM

## 2023-06-29 DIAGNOSIS — H53.9 VISUAL CHANGES: ICD-10-CM

## 2023-06-29 DIAGNOSIS — R51.9 NONINTRACTABLE EPISODIC HEADACHE, UNSPECIFIED HEADACHE TYPE: ICD-10-CM

## 2023-06-29 DIAGNOSIS — I05.0 MILD MITRAL STENOSIS: ICD-10-CM

## 2023-06-29 PROBLEM — R68.89 FORGETFULNESS: Status: RESOLVED | Noted: 2022-05-09 | Resolved: 2023-06-29

## 2023-06-29 PROBLEM — R10.32 LLQ PAIN: Status: RESOLVED | Noted: 2023-02-07 | Resolved: 2023-06-29

## 2023-06-29 PROCEDURE — 99442 PR PHYS/QHP TELEPHONE EVALUATION 11-20 MIN: CPT | Performed by: INTERNAL MEDICINE

## 2023-06-29 RX ORDER — DONEPEZIL HYDROCHLORIDE 5 MG/1
5 TABLET, FILM COATED ORAL
Qty: 30 TABLET | Refills: 3 | Status: SHIPPED | OUTPATIENT
Start: 2023-06-29

## 2023-06-29 RX ORDER — NEBIVOLOL 5 MG/1
5 TABLET ORAL EVERY MORNING
Qty: 30 TABLET | Refills: 3 | Status: SHIPPED | OUTPATIENT
Start: 2023-06-29

## 2023-06-29 NOTE — PROGRESS NOTES
Virtual Brief Visit    This Visit is being completed by telephone  The Patient is located at Home and in the following state in which I hold an active license [place]    The patient was identified by name and date of birth  Sammy Martinez was informed that this is a telemedicine visit and that the visit is being conducted through the [facility]  She agrees to proceed     My office door was closed  No one else was in the room  She acknowledged consent and understanding of privacy and security of the video platform  The patient has agreed to participate and understands they can discontinue the visit at any time  Patient is aware this is a billable service  She has been having a hard time with her memory  She is worried that she is starting with Alzheimers  Lately she feels she is losing short term memory  If she gets up too fast she will shake and get dizzy  She feels her depression is good since she got hearing aids  Son Michael Castellano came on the phone as well, states she is very unsteady on her feet and has been very anxious about her medications from the [place], which they are having a hard time getting  She has been having headaches and was recently diagnosed by her ophthalmologist with ocular migraines  No imaging has been done  There are no behavioral issues          Assessment/Plan:    Problem List Items Addressed This Visit        Other    Cognitive impairment - Primary    Relevant Medications    donepezil (ARICEPT) 5 mg tablet    Other Relevant Orders    MRI brain w wo contrast   Other Visit Diagnoses     Mild mitral stenosis        Relevant Medications    nebivolol (BYSTOLIC) 5 mg tablet    Mild aortic stenosis        Relevant Medications    nebivolol (BYSTOLIC) 5 mg tablet    Exertional dyspnea        Relevant Medications    nebivolol (BYSTOLIC) 5 mg tablet    Nonintractable episodic headache, unspecified headache type        Relevant Orders    MRI brain w wo contrast    Visual changes        Relevant Orders    MRI brain w wo contrast      Will start donepezil empirically in case of alzheimer's vs  Vascular dementia  She and her son are aware they need actual follow up in-office for this issue  Will check MRI given her other neurologic symptoms  She will return for appointment in one month to see how she is doing with the donepezil and if tolerating this, will try to increase to 10 mg daily  Asked patient to call sooner than next scheduled appointment for any complaints or issues  Recent Visits  Date Type Provider Dept   06/27/23 Telephone Kendrick Gagnon A [facility] recent visits within past 7 days and meeting all other requirements  Today's Visits  Date Type Provider Dept   06/29/23 [id number] [facility], [address] [place] [address] today's visits and meeting all other requirements  Future Appointments  No visits were found meeting these conditions    Showing future appointments within next 150 days and meeting all other requirements         Visit Time  Total Visit Duration: 20

## 2023-07-03 DIAGNOSIS — F51.01 PRIMARY INSOMNIA: ICD-10-CM

## 2023-07-03 DIAGNOSIS — F32.A ANXIETY AND DEPRESSION: ICD-10-CM

## 2023-07-03 DIAGNOSIS — F41.9 ANXIETY AND DEPRESSION: ICD-10-CM

## 2023-07-03 RX ORDER — ZOLPIDEM TARTRATE 12.5 MG/1
12.5 TABLET, FILM COATED, EXTENDED RELEASE ORAL
Qty: 30 TABLET | Refills: 0 | Status: SHIPPED | OUTPATIENT
Start: 2023-07-03

## 2023-07-03 RX ORDER — ALPRAZOLAM 0.25 MG/1
0.25 TABLET ORAL DAILY PRN
Qty: 30 TABLET | Refills: 0 | Status: SHIPPED | OUTPATIENT
Start: 2023-07-03

## 2023-07-24 DIAGNOSIS — R10.32 LLQ PAIN: ICD-10-CM

## 2023-07-27 ENCOUNTER — TELEPHONE (OUTPATIENT)
Dept: FAMILY MEDICINE CLINIC | Facility: CLINIC | Age: 73
End: 2023-07-27

## 2023-07-27 NOTE — TELEPHONE ENCOUNTER
LEFT message for patient to call back about getting the [employer] labs. If medicare doesn't cover, typically they dont, she will get a large bill.   Waiting for patient call back

## 2023-07-28 ENCOUNTER — APPOINTMENT (EMERGENCY)
Dept: RADIOLOGY | Facility: HOSPITAL | Age: 73
DRG: 918 | End: 2023-07-28
Payer: MEDICARE

## 2023-07-28 ENCOUNTER — HOSPITAL ENCOUNTER (INPATIENT)
Facility: HOSPITAL | Age: 73
LOS: 5 days | DRG: 918 | End: 2023-08-03
Attending: EMERGENCY MEDICINE | Admitting: INTERNAL MEDICINE
Payer: MEDICARE

## 2023-07-28 DIAGNOSIS — T14.91XA SUICIDE ATTEMPT (HCC): ICD-10-CM

## 2023-07-28 DIAGNOSIS — Z00.8 MEDICAL CLEARANCE FOR PSYCHIATRIC ADMISSION: ICD-10-CM

## 2023-07-28 DIAGNOSIS — T42.72XA: ICD-10-CM

## 2023-07-28 DIAGNOSIS — T50.902A INTENTIONAL OVERDOSE, INITIAL ENCOUNTER (HCC): Primary | ICD-10-CM

## 2023-07-28 DIAGNOSIS — R10.9 ABDOMINAL PAIN: ICD-10-CM

## 2023-07-28 DIAGNOSIS — K21.9 ESOPHAGEAL REFLUX: ICD-10-CM

## 2023-07-28 DIAGNOSIS — Z99.81 HYPOXEMIA REQUIRING SUPPLEMENTAL OXYGEN: ICD-10-CM

## 2023-07-28 DIAGNOSIS — R09.02 HYPOXEMIA REQUIRING SUPPLEMENTAL OXYGEN: ICD-10-CM

## 2023-07-28 LAB
ALBUMIN SERPL BCP-MCNC: 3.5 G/DL (ref 3.5–5)
ALP SERPL-CCNC: 62 U/L (ref 34–104)
ALT SERPL W P-5'-P-CCNC: 6 U/L (ref 7–52)
ANION GAP SERPL CALCULATED.3IONS-SCNC: 6 MMOL/L
APAP SERPL-MCNC: <10 UG/ML (ref 10–20)
AST SERPL W P-5'-P-CCNC: 18 U/L (ref 13–39)
BASOPHILS # BLD AUTO: 0.06 THOUSANDS/ÂΜL (ref 0–0.1)
BASOPHILS NFR BLD AUTO: 1 % (ref 0–1)
BILIRUB SERPL-MCNC: 0.55 MG/DL (ref 0.2–1)
BUN SERPL-MCNC: 13 MG/DL (ref 5–25)
CALCIUM SERPL-MCNC: 8.1 MG/DL (ref 8.4–10.2)
CHLORIDE SERPL-SCNC: 109 MMOL/L (ref 96–108)
CO2 SERPL-SCNC: 22 MMOL/L (ref 21–32)
CREAT SERPL-MCNC: 0.67 MG/DL (ref 0.6–1.3)
EOSINOPHIL # BLD AUTO: 0.18 THOUSAND/ÂΜL (ref 0–0.61)
EOSINOPHIL NFR BLD AUTO: 2 % (ref 0–6)
ERYTHROCYTE [DISTWIDTH] IN BLOOD BY AUTOMATED COUNT: 14.9 % (ref 11.6–15.1)
ETHANOL SERPL-MCNC: <10 MG/DL
GFR SERPL CREATININE-BSD FRML MDRD: 86 ML/MIN/1.73SQ M
GLUCOSE SERPL-MCNC: 95 MG/DL (ref 65–140)
HCT VFR BLD AUTO: 46.4 % (ref 34.8–46.1)
HGB BLD-MCNC: 15.2 G/DL (ref 11.5–15.4)
IMM GRANULOCYTES # BLD AUTO: 0.05 THOUSAND/UL (ref 0–0.2)
IMM GRANULOCYTES NFR BLD AUTO: 1 % (ref 0–2)
LYMPHOCYTES # BLD AUTO: 2.59 THOUSANDS/ÂΜL (ref 0.6–4.47)
LYMPHOCYTES NFR BLD AUTO: 30 % (ref 14–44)
MCH RBC QN AUTO: 29.9 PG (ref 26.8–34.3)
MCHC RBC AUTO-ENTMCNC: 32.8 G/DL (ref 31.4–37.4)
MCV RBC AUTO: 91 FL (ref 82–98)
MONOCYTES # BLD AUTO: 0.69 THOUSAND/ÂΜL (ref 0.17–1.22)
MONOCYTES NFR BLD AUTO: 8 % (ref 4–12)
NEUTROPHILS # BLD AUTO: 5.12 THOUSANDS/ÂΜL (ref 1.85–7.62)
NEUTS SEG NFR BLD AUTO: 58 % (ref 43–75)
NRBC BLD AUTO-RTO: 0 /100 WBCS
PLATELET # BLD AUTO: 274 THOUSANDS/UL (ref 149–390)
PMV BLD AUTO: 10.2 FL (ref 8.9–12.7)
POTASSIUM SERPL-SCNC: 5.6 MMOL/L (ref 3.5–5.3)
PROT SERPL-MCNC: 5.8 G/DL (ref 6.4–8.4)
RBC # BLD AUTO: 5.09 MILLION/UL (ref 3.81–5.12)
SALICYLATES SERPL-MCNC: <5 MG/DL (ref 3–20)
SODIUM SERPL-SCNC: 137 MMOL/L (ref 135–147)
TSH SERPL DL<=0.05 MIU/L-ACNC: 4.33 UIU/ML (ref 0.45–4.5)
WBC # BLD AUTO: 8.69 THOUSAND/UL (ref 4.31–10.16)

## 2023-07-28 PROCEDURE — 96360 HYDRATION IV INFUSION INIT: CPT

## 2023-07-28 PROCEDURE — 70450 CT HEAD/BRAIN W/O DYE: CPT

## 2023-07-28 PROCEDURE — 36415 COLL VENOUS BLD VENIPUNCTURE: CPT | Performed by: EMERGENCY MEDICINE

## 2023-07-28 PROCEDURE — G1004 CDSM NDSC: HCPCS

## 2023-07-28 PROCEDURE — 82077 ASSAY SPEC XCP UR&BREATH IA: CPT | Performed by: EMERGENCY MEDICINE

## 2023-07-28 PROCEDURE — 80179 DRUG ASSAY SALICYLATE: CPT | Performed by: EMERGENCY MEDICINE

## 2023-07-28 PROCEDURE — 99449 NTRPROF PH1/NTRNET/EHR 31/>: CPT | Performed by: EMERGENCY MEDICINE

## 2023-07-28 PROCEDURE — 83735 ASSAY OF MAGNESIUM: CPT | Performed by: NURSE PRACTITIONER

## 2023-07-28 PROCEDURE — 99285 EMERGENCY DEPT VISIT HI MDM: CPT | Performed by: EMERGENCY MEDICINE

## 2023-07-28 PROCEDURE — 80143 DRUG ASSAY ACETAMINOPHEN: CPT | Performed by: EMERGENCY MEDICINE

## 2023-07-28 PROCEDURE — 84443 ASSAY THYROID STIM HORMONE: CPT | Performed by: EMERGENCY MEDICINE

## 2023-07-28 PROCEDURE — 74177 CT ABD & PELVIS W/CONTRAST: CPT

## 2023-07-28 PROCEDURE — 80053 COMPREHEN METABOLIC PANEL: CPT | Performed by: EMERGENCY MEDICINE

## 2023-07-28 PROCEDURE — 99285 EMERGENCY DEPT VISIT HI MDM: CPT

## 2023-07-28 PROCEDURE — 93005 ELECTROCARDIOGRAM TRACING: CPT

## 2023-07-28 PROCEDURE — 85025 COMPLETE CBC W/AUTO DIFF WBC: CPT | Performed by: EMERGENCY MEDICINE

## 2023-07-28 RX ADMIN — SODIUM CHLORIDE 1000 ML: 0.9 INJECTION, SOLUTION INTRAVENOUS at 21:15

## 2023-07-28 RX ADMIN — IOHEXOL 100 ML: 350 INJECTION, SOLUTION INTRAVENOUS at 23:41

## 2023-07-28 NOTE — TELEPHONE ENCOUNTER
Central faxing called,  information left on answering machine. I did not see the form in [employer] yet. Thank You, MARY Simpson/CECILIA

## 2023-07-28 NOTE — TELEPHONE ENCOUNTER
Patient was pre approved with [employer] now we need to call central faxing to get the form they have been sending or check [employer].  Can we please look into this    [phone] for [employer]

## 2023-07-28 NOTE — CONSULTS
INTERPROFESSIONAL (PHONE) [phone] [facility] Toxicology  Ronie Lennox 74 y.o. female MRN: [id number]  Unit/Bed#: ED CT2 Encounter: [id number]      Reason for Consult / Principal Problem: overdose   Inpatient consult to Toxicology  Consult performed by: Donell Gomez DO  Consult ordered by: Nikita Lutz MD        07/28/23      ASSESSMENT:  74years-old female with acute, intentional overdose on zolpidem    RECOMMENDATIONS:  Given that patient is drowsy after reported overdose, I would recommend over night observation in the hospital with supportive care, including cardiac and respiratory monitoring and fall precautions. Patient may be considered cleared from a toxicology standpoint when her mental status returns to baseline in the setting of normal vital signs and ambulatory ability. For further questions, please contact the medical  on call via [place] Text or throughl the [facility]  Service or Patient [employer]. Please see additional teaching note below:    Hx and PE limited by the dynamics of a phone consultation. I have not personally interviewed or evaluated the patient, but only advised based on the information provided to me. Primary provider is responsible for all clinical decisions. Pertinent history, physical exam and clinical findings and course discussed: Ronie Lennox is a 74y.o. year old female who presents with reported overdose on zolpidem. She was drowsy and tachypneic on arrival.     Review of systems and physical exam not performed by me.     Historical Information   Past Medical History:   Diagnosis Date   • Acid reflux    • Arthritis    • Asthma    • Cardiac disease    • Chronic kidney disease     passed on own kidney stone   • Diverticulitis    • GERD (gastroesophageal reflux disease)    • Hayfever    • [place] (hard of hearing)     bilateral hearing aids   • Hyperlipemia    • Hypertension    • Tachycardia      Past Surgical History: Procedure Laterality Date   • ABLATION OF DYSRHYTHMIC FOCUS     • APPENDECTOMY     • CHOLECYSTECTOMY      open   • FRACTURE SURGERY      ORIF fx (L) tibia, fibula 2009   • GASTRIC BYPASS OPEN     • HYSTERECTOMY     • PA XCAPSL CTRC RMVL INSJ IO LENS PROSTH W/O ECP Left 4/18/2016    Procedure: EXTRACTION EXTRACAPSULAR CATARACT PHACO INTRAOCULAR LENS (IOL); Surgeon: Carl Marquez MD;  Location: [facility] MAIN OR;  Service: Ophthalmology   • PA XCAPSL CTRC RMVL INSJ IO LENS PROSTH W/O ECP Right 3/1/2021    Procedure: EXTRACTION EXTRACAPSULAR CATARACT PHACO INTRAOCULAR LENS (IOL); Surgeon: Carl Marquez MD;  Location: [facility] MAIN OR;  Service: Ophthalmology   • TONSILECTOMY AND ADNOIDECTOMY       Social History   Social History     Substance and Sexual Activity   Alcohol Use Yes    Comment: rarely     Social History     Substance and Sexual Activity   Drug Use No     Social History     Tobacco Use   Smoking Status Every Day   • Packs/day: 0.25   • Years: 25.00   • Total pack years: 6.25   • Types: Cigarettes   Smokeless Tobacco Never     Family History   Problem Relation Age of Onset   • Heart disease Mother    • Stroke Son    • Stroke Maternal Grandfather    • Breast cancer Daughter 36        Prior to Admission medications    Medication Sig Start Date End Date Taking? Authorizing Provider   acetaminophen (TYLENOL) 325 mg tablet Take 2 tablets (650 mg total) by mouth every 6 (six) hours as needed for mild pain, headaches or fever.  2/10/16   Efraín Boswell MD   albuterol (2.5 mg/3 mL) 0.083 % nebulizer solution INHALE CONTENTS OF 1 VIAL (3 ML) IN NEBULIZER BY MOUTH AND INTO THE LUNGS EVERY 6 HOURS IF NEEDED 12/5/22   Jocelyn Carvalho MD   albuterol (PROVENTIL HFA,VENTOLIN HFA) 90 mcg/act inhaler Inhale 2 puffs every 6 (six) hours as needed for wheezing 4/24/23   Jocelyn Carvalho MD   allopurinol (ZYLOPRIM) 100 mg tablet Take 1 tablet (100 mg total) by mouth daily 1/11/23   KYLE Gomes   ALPRAZolam Clarissa Prateek) 0.25 mg tablet Take 1 tablet (0.25 mg total) by mouth daily as needed for anxiety 7/3/23   Ryne Ocampo MD   Apple Cider Vinegar 188 MG CAPS Take by mouth daily As needed    Historical Provider, MD   aspirin (ECOTRIN LOW STRENGTH) 81 mg EC tablet Take 81 mg by mouth daily    Historical Provider, MD   buPROPion (WELLBUTRIN XL) 300 mg 24 hr tablet TAKE 1 TABLET (300 MG TOTAL) BY MOUTH EVERY MORNING. 3/13/23   Ryne Ocampo MD   busPIRone (BUSPAR) 5 mg tablet TAKE 1 TABLET BY MOUTH TWICE A DAY 3/13/23   Ryne Ocampo MD   Diclofenac Sodium (VOLTAREN) 1 % Apply 2 g topically 4 (four) times a day 6/22/23   Ryne Ocampo MD   donepezil (ARICEPT) 5 mg tablet Take 1 tablet (5 mg total) by mouth daily at bedtime 6/29/23   Ryne Ocampo MD   escitalopram (LEXAPRO) 20 mg tablet Take 1 tablet (20 mg total) by mouth daily 4/24/23   Ryne Ocampo MD   esomeprazole (NexIUM) 20 mg capsule Take 1 capsule (20 mg total) by mouth daily in the early morning 1/11/23   Lorretharsh MejiarineMAURONP   fluticasone [facility]) 50 mcg/act nasal spray 2 sprays into each nostril daily 12/16/21   Ryne Ocampo MD   fluticasone-salmeterol (ADVAIR HFA) 736-80 MCG/ACT inhaler Inhale 1 puff daily 4/24/23   Ryne Ocampo MD   hyoscyamine (LEVSIN/SL) 0.125 mg SL tablet TAKE 1 TABLET BY MOUTH EVERY 4 HOURS AS NEEDED FOR LEG CRAMPING 7/24/23   Miguelito Mcpherson MD   Multiple Vitamin (MULTIVITAMIN) tablet Take 1 tablet by mouth daily.     Historical Provider, MD   nebivolol (BYSTOLIC) 5 mg tablet Take 1 tablet (5 mg total) by mouth every morning 6/29/23   Ryne Ocampo MD   olopatadine (Patanol) 0.1 % ophthalmic solution Apply 1 drop to eye 2 (two) times a day 6/15/22   Ryne Ocampo MD   VITAMIN D, ERGOCALCIFEROL, PO Take by mouth    Historical Provider, MD   zolpidem (AMBIEN CR) 12.5 MG CR tablet Take 1 tablet (12.5 mg total) by mouth daily at bedtime as needed for sleep 7/3/23   Ryne Ocampo MD       No current facility-administered medications for this encounter. Allergies   Allergen Reactions   • Augmentin [Amoxicillin-Pot Clavulanate] GI Intolerance, Other (See Comments) and Hives     VOMITING  VOMITING  Reaction Date: 07Dec2004;    • Sulfa Antibiotics Itching, Other (See Comments) and Hives     RASH, ITCHY  RASH, ITCHY   • Morphine Other (See Comments)     "DOESN'T WORK"   • Latex Rash     Unsure if this is an allergy       Objective     No intake or output data in the 24 hours ending 07/28/23 2000    Invasive Devices:   Peripheral IV 07/28/23 Right;Ventral (anterior) Forearm (Active)       Peripheral IV 07/28/23 Left Antecubital (Active)       Vitals   Vitals:    07/28/23 1912 07/28/23 1915 07/28/23 1930   BP: 123/79 114/70 115/74   TempSrc: Oral     Pulse: 87 85 82   Resp: (!) 26 (!) 35 (!) 35   Patient Position - Orthostatic VS: Lying Lying Lying   Temp: 98.3 °F (36.8 °C)           EKG, Pathology, and/or Other Studies: I have personally reviewed pertinent reports. Lab Results: I have personally reviewed pertinent reports. Labs:    Results from last 7 days   Lab Units 07/28/23  1926   WBC Thousand/uL 8.69   HEMOGLOBIN g/dL 15.2   HEMATOCRIT % 46.4*   PLATELETS Thousands/uL 274   NEUTROS PCT % 58   LYMPHS PCT % 30   MONOS PCT % 8   EOS PCT % 2          Invalid input(s): "LABALBU"           0   Lab Value Date/Time    TROPONINI <0.02 02/10/2016 0102    TROPONINI <0.02 02/09/2016 1848    TROPONINI <0.02 02/09/2016 1103         Results from last 7 days   Lab Units 07/28/23  1926   ETHANOL LVL mg/dL <10     Invalid input(s): "EXTPREGUR"      Imaging Studies: I have personally reviewed pertinent reports. Counseling / Coordination of Care  Total time spent today 32 minutes. This was a phone consultation.

## 2023-07-28 NOTE — ED PROVIDER NOTES
History  Chief Complaint   Patient presents with   • Overdose - Intentional     Patient brought in by squad for intentional overdose. Pt is a 73yo F who presents for intentional overdose. Patient reports that less than an hour prior to arrival she took multiple of her sleeping pills. Patient reports she is unsure how many. EMS reported that she took 12 but unclear as to how they obtained this number. Patient reports that she took her sleeping pills in an attempt to harm herself because she "cannot take it anymore". Patient reports recent stressors at home. Patient reports history of depression but she has always been able to handle it and states that recently its been too much. Patient denies any thoughts of harming anyone else. Patient denies any hallucinations. Patient denies any coingestants. She denies any history of self-harm. Patient reports she is tired currently. She also reports abdominal pain that she has had "for a while" but states it is worse today. Patient denying any other complaints. Prior to Admission Medications   Prescriptions Last Dose Informant Patient Reported? Taking? ALPRAZolam (XANAX) 0.25 mg tablet   No No   Sig: Take 1 tablet (0.25 mg total) by mouth daily as needed for anxiety   Apple Cider Vinegar 188 MG CAPS  Self Yes No   Sig: Take by mouth daily As needed   Diclofenac Sodium (VOLTAREN) 1 %   No No   Sig: Apply 2 g topically 4 (four) times a day   Multiple Vitamin (MULTIVITAMIN) tablet  Self Yes No   Sig: Take 1 tablet by mouth daily. VITAMIN D, ERGOCALCIFEROL, PO  Self Yes No   Sig: Take by mouth   acetaminophen (TYLENOL) 325 mg tablet  Self No No   Sig: Take 2 tablets (650 mg total) by mouth every 6 (six) hours as needed for mild pain, headaches or fever.    albuterol (2.5 mg/3 mL) 0.083 % nebulizer solution  Self No No   Sig: INHALE CONTENTS OF 1 VIAL (3 ML) IN NEBULIZER BY MOUTH AND INTO THE LUNGS EVERY 6 HOURS IF NEEDED   albuterol (PROVENTIL HFA,VENTOLIN HFA) 90 mcg/act inhaler   No No   Sig: Inhale 2 puffs every 6 (six) hours as needed for wheezing   allopurinol (ZYLOPRIM) 100 mg tablet  Self No No   Sig: Take 1 tablet (100 mg total) by mouth daily   aspirin (ECOTRIN LOW STRENGTH) 81 mg EC tablet  Self Yes No   Sig: Take 81 mg by mouth daily   buPROPion (WELLBUTRIN XL) 300 mg 24 hr tablet   No No   Sig: TAKE 1 TABLET (300 MG TOTAL) BY MOUTH EVERY MORNING.   busPIRone (BUSPAR) 5 mg tablet   No No   Sig: TAKE 1 TABLET BY MOUTH TWICE A DAY   donepezil (ARICEPT) 5 mg tablet   No No   Sig: Take 1 tablet (5 mg total) by mouth daily at bedtime   escitalopram (LEXAPRO) 20 mg tablet   No No   Sig: Take 1 tablet (20 mg total) by mouth daily   esomeprazole (NexIUM) 20 mg capsule  Self No No   Sig: Take 1 capsule (20 mg total) by mouth daily in the early morning   fluticasone (FLONASE) 50 mcg/act nasal spray  Self No No   Si sprays into each nostril daily   fluticasone-salmeterol (ADVAIR HFA) 115-21 MCG/ACT inhaler   No No   Sig: Inhale 1 puff daily   hyoscyamine (LEVSIN/SL) 0.125 mg SL tablet   No No   Sig: TAKE 1 TABLET BY MOUTH EVERY 4 HOURS AS NEEDED FOR LEG CRAMPING   nebivolol (BYSTOLIC) 5 mg tablet   No No   Sig: Take 1 tablet (5 mg total) by mouth every morning   olopatadine (Patanol) 0.1 % ophthalmic solution  Self No No   Sig: Apply 1 drop to eye 2 (two) times a day   zolpidem (AMBIEN CR) 12.5 MG CR tablet   No No   Sig: Take 1 tablet (12.5 mg total) by mouth daily at bedtime as needed for sleep      Facility-Administered Medications: None       Past Medical History:   Diagnosis Date   • Acid reflux    • Arthritis    • Asthma    • Cardiac disease    • Chronic kidney disease     passed on own kidney stone   • Diverticulitis    • GERD (gastroesophageal reflux disease)    • Hayfever    • [place] (hard of hearing)     bilateral hearing aids   • Hyperlipemia    • Hypertension    • Tachycardia        Past Surgical History:   Procedure Laterality Date   • ABLATION OF DYSRHYTHMIC FOCUS     • APPENDECTOMY     • CHOLECYSTECTOMY      open   • FRACTURE SURGERY      ORIF fx (L) tibia, fibula 2009   • GASTRIC BYPASS OPEN     • HYSTERECTOMY     • KS XCAPSL CTRC RMVL INSJ IO LENS PROSTH W/O ECP Left 4/18/2016    Procedure: EXTRACTION EXTRACAPSULAR CATARACT PHACO INTRAOCULAR LENS (IOL); Surgeon: Caroline Manzano MD;  Location: [facility] MAIN OR;  Service: Ophthalmology   • KS XCAPSL CTRC RMVL INSJ IO LENS PROSTH W/O ECP Right 3/1/2021    Procedure: EXTRACTION EXTRACAPSULAR CATARACT PHACO INTRAOCULAR LENS (IOL); Surgeon: Caroline Manzano MD;  Location: [facility] MAIN OR;  Service: Ophthalmology   • TONSILECTOMY AND ADNOIDECTOMY         Family History   Problem Relation Age of Onset   • Heart disease Mother    • Stroke Son    • Stroke Maternal Grandfather    • Breast cancer Daughter 36     I have reviewed and agree with the history as documented. E-Cigarette/Vaping   • E-Cigarette Use Never User      E-Cigarette/Vaping Substances   • Nicotine No    • THC No    • CBD No    • Flavoring No    • Other No    • Unknown No      Social History     Tobacco Use   • Smoking status: Every Day     Packs/day: 0.25     Years: 25.00     Total pack years: 6.25     Types: Cigarettes   • Smokeless tobacco: Never   Vaping Use   • Vaping Use: Never used   Substance Use Topics   • Alcohol use: Yes     Comment: rarely   • Drug use: No       Review of Systems   Constitutional: Positive for fatigue. Gastrointestinal: Positive for abdominal pain. Psychiatric/Behavioral: Positive for suicidal ideas. All other systems reviewed and are negative. Physical Exam  Physical Exam  Vitals reviewed. Constitutional:       General: She is not in acute distress. Appearance: She is well-developed. She is not diaphoretic. Comments: Tired appearing   HENT:      Head: Normocephalic and atraumatic.       Right Ear: External ear normal.      Left Ear: External ear normal.      Nose: Nose normal. Mouth/Throat:      Pharynx: Oropharynx is clear. Eyes:      Extraocular Movements: Extraocular movements intact. Pupils: Pupils are equal, round, and reactive to light. Cardiovascular:      Rate and Rhythm: Normal rate and regular rhythm. Heart sounds: Normal heart sounds. No murmur heard. Pulmonary:      Effort: Pulmonary effort is normal. Tachypnea present. No respiratory distress. Breath sounds: Normal breath sounds. Abdominal:      General: There is no distension. Palpations: Abdomen is soft. Tenderness: There is abdominal tenderness (diffuse). Musculoskeletal:         General: Normal range of motion. Cervical back: Normal range of motion and neck supple. Skin:     General: Skin is warm and dry. Capillary Refill: Capillary refill takes less than 2 seconds. Neurological:      General: No focal deficit present. Mental Status: She is alert and oriented to person, place, and time. GCS: GCS eye subscore is 4. GCS verbal subscore is 5. GCS motor subscore is 6. Cranial Nerves: No cranial nerve deficit, dysarthria or facial asymmetry. Motor: No weakness or tremor. Coordination: Coordination is intact. Comments: .Speech slowed and slightly slurred   Psychiatric:         Behavior: Behavior is cooperative. Thought Content: Thought content includes suicidal ideation. Thought content does not include homicidal ideation. Thought content includes suicidal plan. Thought content does not include homicidal plan.          Vital Signs  ED Triage Vitals   Temperature Pulse Respirations Blood Pressure SpO2   07/28/23 1912 07/28/23 1912 07/28/23 1912 07/28/23 1912 07/28/23 1912   98.3 °F (36.8 °C) 87 (!) 26 123/79 95 %      Temp Source Heart Rate Source Patient Position - Orthostatic VS BP Location FiO2 (%)   07/28/23 1912 07/28/23 1912 07/28/23 1912 07/28/23 1912 --   Oral Monitor Lying Left arm       Pain Score       07/29/23 0800       No Pain Vitals:    07/29/23 0800 07/29/23 1513 07/29/23 1940 07/29/23 2311   BP: 142/63 168/71 137/64 142/69   Pulse: 59 74 73 70   Patient Position - Orthostatic VS: Lying Sitting Sitting Lying         Visual Acuity  Visual Acuity    Flowsheet Row Most Recent Value   L Pupil Size (mm) 3   R Pupil Size (mm) 3   L Pupil Shape Round   R Pupil Shape Round          ED Medications  Medications   allopurinol (ZYLOPRIM) tablet 100 mg (100 mg Oral Not Given 7/29/23 0905)   ALPRAZolam (XANAX) tablet 0.25 mg (0.25 mg Oral Given 7/29/23 2300)   aspirin (ECOTRIN LOW STRENGTH) EC tablet 81 mg (81 mg Oral Not Given 7/29/23 0905)   buPROPion (WELLBUTRIN XL) 24 hr tablet 300 mg (300 mg Oral Not Given 7/29/23 0905)   busPIRone (BUSPAR) tablet 5 mg (5 mg Oral Given 7/29/23 1737)   donepezil (ARICEPT) tablet 5 mg (5 mg Oral Given 7/29/23 2113)   escitalopram (LEXAPRO) tablet 20 mg (20 mg Oral Not Given 7/29/23 0905)   fluticasone (FLONASE) 50 mcg/act nasal spray 2 spray (2 sprays Nasal Not Given 7/29/23 0917)   multivitamin-minerals (CENTRUM) tablet 1 tablet (1 tablet Oral Not Given 7/29/23 0905)   nebivolol (BYSTOLIC) tablet 5 mg (5 mg Oral Not Given 7/29/23 0917)   ketotifen (ZADITOR) 0.025 % ophthalmic solution 1 drop (1 drop Both Eyes Given 7/29/23 1738)   sodium chloride 0.9 % infusion (70 mL/hr Intravenous New Bag 7/29/23 1737)   ondansetron (ZOFRAN) injection 4 mg (4 mg Intravenous Given 7/29/23 1342)   nicotine (NICODERM CQ) 7 mg/24hr TD 24 hr patch 1 patch (1 patch Transdermal Not Given 7/29/23 0327)   enoxaparin (LOVENOX) subcutaneous injection 40 mg (40 mg Subcutaneous Given 7/29/23 0905)   albuterol inhalation solution 2.5 mg (has no administration in time range)   acetaminophen (TYLENOL) tablet 650 mg (650 mg Oral Given 7/29/23 2258)   Fluticasone Furoate-Vilanterol 100-25 mcg/actuation 1 puff (1 puff Inhalation Given 7/29/23 2200)   sodium chloride 0.9 % bolus 1,000 mL (0 mL Intravenous Stopped 7/28/23 2234)   iohexol (OMNIPAQUE) 350 MG/ML injection (SINGLE-DOSE) 100 mL (100 mL Intravenous Given 7/28/23 9129)       Diagnostic Studies  Results Reviewed     Procedure Component Value Units Date/Time    Comprehensive metabolic panel [[id number]]  (Abnormal) Collected: 07/29/23 0626    Lab Status: Final result Specimen: Blood from Arm, Left Updated: 07/29/23 0727     Sodium 137 mmol/L      Potassium 4.1 mmol/L      Chloride 108 mmol/L      CO2 24 mmol/L      ANION GAP 5 mmol/L      BUN 10 mg/dL      Creatinine 0.67 mg/dL      Glucose 97 mg/dL      Calcium 8.5 mg/dL      AST 12 U/L      ALT 6 U/L      Alkaline Phosphatase 67 U/L      Total Protein 6.2 g/dL      Albumin 3.6 g/dL      Total Bilirubin 0.67 mg/dL      eGFR 86 ml/min/1.73sq m     Narrative:      [place] guidelines for Chronic Kidney Disease (CKD):   •  Stage 1 with normal or high GFR (GFR > 90 mL/min/1.73 square meters)  •  Stage 2 Mild CKD (GFR = 60-89 mL/min/1.73 square meters)  •  Stage 3A Moderate CKD (GFR = 45-59 mL/min/1.73 square meters)  •  Stage 3B Moderate CKD (GFR = 30-44 mL/min/1.73 square meters)  •  Stage 4 Severe CKD (GFR = 15-29 mL/min/1.73 square meters)  •  Stage 5 End Stage CKD (GFR <15 mL/min/1.73 square meters)  Note: GFR calculation is accurate only with a steady state creatinine    Magnesium [[id number]]  (Normal) Collected: 07/29/23 0626    Lab Status: Final result Specimen: Blood from Arm, Left Updated: 07/29/23 0727     Magnesium 2.1 mg/dL     Urine Microscopic [[id number]]  (Normal) Collected: 07/29/23 0634    Lab Status: Final result Specimen: Urine, Clean Catch Updated: 07/29/23 0702     RBC, UA None Seen /hpf      WBC, UA 0-1 /hpf      Epithelial Cells Occasional /hpf      Bacteria, UA Occasional /hpf     Rapid drug screen, urine [[id number]]  (Abnormal) Collected: 07/29/23 0626    Lab Status: Final result Specimen: Urine, Clean Catch Updated: 07/29/23 0701     Amph/Meth UR Negative     Barbiturate Ur Negative Benzodiazepine Urine Negative     Cocaine Urine Negative     Methadone Urine Negative     Opiate Urine Negative     PCP Ur Negative     THC Urine Positive     Oxycodone Urine Negative    Narrative:      Presumptive report. If requested, specimen will be sent to reference lab for confirmation. FOR MEDICAL PURPOSES ONLY. IF CONFIRMATION NEEDED PLEASE CONTACT THE LAB WITHIN 5 DAYS.     Drug Screen Cutoff Levels:  AMPHETAMINE/METHAMPHETAMINES  1000 ng/mL  BARBITURATES     200 ng/mL  BENZODIAZEPINES     200 ng/mL  COCAINE      300 ng/mL  METHADONE      300 ng/mL  OPIATES      300 ng/mL  PHENCYCLIDINE     25 ng/mL  THC       50 ng/mL  OXYCODONE      100 ng/mL    UA w Reflex to Microscopic w Reflex to Culture [[id number]]  (Abnormal) Collected: 07/29/23 0634    Lab Status: Final result Specimen: Urine, Clean Catch Updated: 07/29/23 0644     Color, UA Yellow     Clarity, UA Clear     Specific Gravity, UA 1.015     pH, UA 5.0     Leukocytes, UA Negative     Nitrite, UA Positive     Protein, UA Negative mg/dl      Glucose, UA Negative mg/dl      Ketones, UA Negative mg/dl      Urobilinogen, UA 0.2 E.U./dl      Bilirubin, UA Negative     Occult Blood, UA Negative    CBC [[id number]]  (Normal) Collected: 07/29/23 0626    Lab Status: Final result Specimen: Blood from Arm, Left Updated: 07/29/23 0640     WBC 7.70 Thousand/uL      RBC 4.78 Million/uL      Hemoglobin 14.3 g/dL      Hematocrit 43.8 %      MCV 92 fL      MCH 29.9 pg      MCHC 32.6 g/dL      RDW 14.9 %      Platelets 339 Thousands/uL      MPV 10.4 fL     Magnesium [[id number]]  (Normal) Collected: 07/28/23 2201    Lab Status: Final result Specimen: Blood from Arm, Left Updated: 07/29/23 0432     Magnesium 2.0 mg/dL     Comprehensive metabolic panel [[id number]]  (Abnormal) Collected: 07/28/23 2201    Lab Status: Final result Specimen: Blood from Arm, Left Updated: 07/28/23 2237     Sodium 137 mmol/L      Potassium 5.6 mmol/L      Chloride 109 mmol/L      CO2 22 mmol/L ANION GAP 6 mmol/L      BUN 13 mg/dL      Creatinine 0.67 mg/dL      Glucose 95 mg/dL      Calcium 8.1 mg/dL      AST 18 U/L      ALT 6 U/L      Alkaline Phosphatase 62 U/L      Total Protein 5.8 g/dL      Albumin 3.5 g/dL      Total Bilirubin 0.55 mg/dL      eGFR 86 ml/min/1.73sq m     Narrative:      [facility] guidelines for Chronic Kidney Disease (CKD):   •  Stage 1 with normal or high GFR (GFR > 90 mL/min/1.73 square meters)  •  Stage 2 Mild CKD (GFR = 60-89 mL/min/1.73 square meters)  •  Stage 3A Moderate CKD (GFR = 45-59 mL/min/1.73 square meters)  •  Stage 3B Moderate CKD (GFR = 30-44 mL/min/1.73 square meters)  •  Stage 4 Severe CKD (GFR = 15-29 mL/min/1.73 square meters)  •  Stage 5 End Stage CKD (GFR <15 mL/min/1.73 square meters)  Note: GFR calculation is accurate only with a steady state creatinine    Salicylate level [[id number]]  (Normal) Collected: 07/28/23 2201    Lab Status: Final result Specimen: Blood from Arm, Left Updated: 91/44/55 3460     Salicylate Lvl <5 mg/dL     Acetaminophen level-If concentration is detectable, please discuss with medical  on call.  [[id number]]  (Abnormal) Collected: 07/28/23 2201    Lab Status: Final result Specimen: Blood from Arm, Left Updated: 07/28/23 2237     Acetaminophen Level <10 ug/mL     TSH, 3rd generation with Free T4 reflex [[id number]]  (Normal) Collected: 07/28/23 1926    Lab Status: Final result Specimen: Blood from Arm, Left Updated: 07/28/23 2013     TSH 3RD GENERATON 4.327 uIU/mL     Ethanol [[id number]]  (Normal) Collected: 07/28/23 1926    Lab Status: Final result Specimen: Blood from Arm, Left Updated: 07/28/23 1955     Ethanol Lvl <10 mg/dL     CBC and differential [[id number]]  (Abnormal) Collected: 07/28/23 1926    Lab Status: Final result Specimen: Blood from Arm, Left Updated: 07/28/23 1935     WBC 8.69 Thousand/uL      RBC 5.09 Million/uL      Hemoglobin 15.2 g/dL      Hematocrit 46.4 %      MCV 91 fL MCH 29.9 pg      MCHC 32.8 g/dL      RDW 14.9 %      MPV 10.2 fL      Platelets 212 Thousands/uL      nRBC 0 /100 WBCs      Neutrophils Relative 58 %      Immat GRANS % 1 %      Lymphocytes Relative 30 %      Monocytes Relative 8 %      Eosinophils Relative 2 %      Basophils Relative 1 %      Neutrophils Absolute 5.12 Thousands/µL      Immature Grans Absolute 0.05 Thousand/uL      Lymphocytes Absolute 2.59 Thousands/µL      Monocytes Absolute 0.69 Thousand/µL      Eosinophils Absolute 0.18 Thousand/µL      Basophils Absolute 0.06 Thousands/µL                  CT head wo contrast   Final Result by Kye Alvarado DO (07/29 0025)      No evidence of acute intracranial hemorrhage or mass effect. Mild to moderate microangiopathic change, progressed from remote prior study. Workstation performed: [id number]         CT abdomen pelvis with contrast   Final Result by Kye Alvarado DO (07/29 3393)   Addendum (preliminary) 1 of 1 by Kye Alvarado DO (07/29 0738)   ADDENDUM: Addendum is made to the initial report. The following fields    should be as follows:      REPRODUCTIVE ORGANS: Supracervical hysterectomy. IMPRESSION:   No CT evidence of acute findings. Gastric bypass. Colonic diverticulosis. Final   No CT evidence of acute findings. Gastric bypass. Colonic diverticulosis. Enlarged prostate. Correlation with PSA levels is advised. Workstation performed: [id number]                    Procedures  Procedures         ED Course  ED Course as of 07/30/23 0005 Fri Jul 28, 2023 1925 Tox made aware via TT.    1935 CBC and differential(!)  Reviewed and without actionable derangement. 2009 MEDICAL ALCOHOL: <10  Negative. 2015 TSH 3RD GENERATON: 4.327  WNL   2105 Daughter updated at bedside. 2133 Awaiting CMP and CT scans prior to admission. 2238 Potassium(!): 5.6  Slightly hemolyzed. No EKG changes. 5257 SALICYLATE LEVEL: <5  Negative.     1975 Natalie Cox ACETAMINOPHEN LEVEL(!): <10  Negative. Sat Jul 29, 2023   0029 CT head wo contrast  No evidence of acute intracranial hemorrhage or mass effect. 5603 CT abdomen pelvis with contrast  No CT evidence of acute findings. [address] contacted for admission. Medical Decision Making  Pt is a 73yo F who presents with intentional overdose. Exam pertinent for slow and slurred speech in otherwise neurointact patient. Differential diagnosis to include but not limited to EKG changes, coingestants, intracranial abnormality, intraabdominal abnormality, electrolyte abnormality. Will plan for labs including coma panel, EKG, CT head, and CT A/P for medical clearance. Will consult tox. Pt will inevitably require psych eval for intentional overdose. See ED course for results and details. Plan to [facility] pt to [facility]. Pt discussed with admitting team and admission orders placed. Pt admitted without incident. Amount and/or Complexity of Data Reviewed  Labs: ordered. Decision-making details documented in ED Course. Radiology: ordered. Decision-making details documented in ED Course. Risk  Prescription drug management. Decision regarding hospitalization.           Disposition  Final diagnoses:   Intentional overdose, initial encounter ([address])   Poisoning by hypnotic drug, intentional self-harm, initial encounter ([address])   Hypoxemia requiring supplemental oxygen     Time reflects when diagnosis was documented in both MDM as applicable and the Disposition within this note     Time User Action Codes Description Comment    7/28/2023  7:39 PM Gita Bombard Add [T50.902A] Intentional overdose, initial encounter ([address])     7/29/2023  1:15 AM Gita Bombard Add [T42.72XA] Poisoning by hypnotic drug, intentional self-harm, initial encounter ([address])     7/29/2023  1:15 AM Gita Bombard Add [R09.02,  Z99.81] Hypoxemia requiring supplemental oxygen       ED Disposition     ED Disposition   Admit    Condition   Stable    Date/Time   Sat Jul 29, 2023  1:00 AM    Comment   Case was discussed with KADY and the patient's admission status was agreed to be Admission Status: inpatient status to the service of Dr. Davy Aleman. Follow-up Information    None         Current Discharge Medication List      CONTINUE these medications which have NOT CHANGED    Details   acetaminophen (TYLENOL) 325 mg tablet Take 2 tablets (650 mg total) by mouth every 6 (six) hours as needed for mild pain, headaches or fever. Qty: 30 tablet, Refills: 0      albuterol (2.5 mg/3 mL) 0.083 % nebulizer solution INHALE CONTENTS OF 1 VIAL (3 ML) IN NEBULIZER BY MOUTH AND INTO THE LUNGS EVERY 6 HOURS IF NEEDED  Qty: 150 mL, Refills: 1    Associated Diagnoses: Chronic obstructive pulmonary disease with acute exacerbation (HCC)      albuterol (PROVENTIL HFA,VENTOLIN HFA) 90 mcg/act inhaler Inhale 2 puffs every 6 (six) hours as needed for wheezing  Qty: 54 g, Refills: 3    Comments: Substitution to a formulary equivalent within the same pharmaceutical class is authorized. Associated Diagnoses: Chronic obstructive pulmonary disease, unspecified COPD type ([address])      allopurinol (ZYLOPRIM) 100 mg tablet Take 1 tablet (100 mg total) by mouth daily  Qty: 90 tablet, Refills: 1    Associated Diagnoses: Acute idiopathic gout of right foot      ALPRAZolam (XANAX) 0.25 mg tablet Take 1 tablet (0.25 mg total) by mouth daily as needed for anxiety  Qty: 30 tablet, Refills: 0    Comments: Not to exceed 5 additional fills before 08/22/2023 DX Code Needed  . Associated Diagnoses: Anxiety and depression      Apple Cider Vinegar 188 MG CAPS Take by mouth daily As needed      aspirin (ECOTRIN LOW STRENGTH) 81 mg EC tablet Take 81 mg by mouth daily      buPROPion (WELLBUTRIN XL) 300 mg 24 hr tablet TAKE 1 TABLET (300 MG TOTAL) BY MOUTH EVERY MORNING.   Qty: 90 tablet, Refills: 1    Associated Diagnoses: Anxiety and depression      busPIRone (BUSPAR) 5 mg tablet TAKE 1 TABLET BY MOUTH TWICE A DAY  Qty: 180 tablet, Refills: 1    Associated Diagnoses: DANIELLA (generalized anxiety disorder)      Diclofenac Sodium (VOLTAREN) 1 % Apply 2 g topically 4 (four) times a day  Qty: 3 g, Refills: 3    Associated Diagnoses: Acute pain of right shoulder      donepezil (ARICEPT) 5 mg tablet Take 1 tablet (5 mg total) by mouth daily at bedtime  Qty: 30 tablet, Refills: 3    Associated Diagnoses: Cognitive impairment      escitalopram (LEXAPRO) 20 mg tablet Take 1 tablet (20 mg total) by mouth daily  Qty: 90 tablet, Refills: 1    Associated Diagnoses: Moderate episode of recurrent major depressive disorder (HCC)      esomeprazole (NexIUM) 20 mg capsule Take 1 capsule (20 mg total) by mouth daily in the early morning  Qty: 90 capsule, Refills: 1    Associated Diagnoses: Gastroesophageal reflux disease without esophagitis      fluticasone (FLONASE) 50 mcg/act nasal spray 2 sprays into each nostril daily  Qty: 48 g, Refills: 3    Associated Diagnoses: Chronic obstructive pulmonary disease, unspecified COPD type ([facility])      fluticasone-salmeterol (ADVAIR HFA) 115-21 MCG/ACT inhaler Inhale 1 puff daily  Qty: 48 g, Refills: 3    Comments: Substitution to a formulary equivalent within the same pharmaceutical class is authorized. Associated Diagnoses: Chronic obstructive pulmonary disease, unspecified COPD type (HCC)      hyoscyamine (LEVSIN/SL) 0.125 mg SL tablet TAKE 1 TABLET BY MOUTH EVERY 4 HOURS AS NEEDED FOR LEG CRAMPING  Qty: 90 tablet, Refills: 3    Associated Diagnoses: LLQ pain      Multiple Vitamin (MULTIVITAMIN) tablet Take 1 tablet by mouth daily. nebivolol (BYSTOLIC) 5 mg tablet Take 1 tablet (5 mg total) by mouth every morning  Qty: 30 tablet, Refills: 3    Associated Diagnoses: Mild mitral stenosis; Mild aortic stenosis;  Exertional dyspnea      olopatadine (Patanol) 0.1 % ophthalmic solution Apply 1 drop to eye 2 (two) times a day  Qty: 15 mL, Refills: 3 Associated Diagnoses: Allergic conjunctivitis of both eyes      VITAMIN D, ERGOCALCIFEROL, PO Take by mouth      zolpidem (AMBIEN CR) 12.5 MG CR tablet Take 1 tablet (12.5 mg total) by mouth daily at bedtime as needed for sleep  Qty: 30 tablet, Refills: 0    Comments: Not to exceed 5 additional fills before 08/22/2023  Associated Diagnoses: Primary insomnia             No discharge procedures on file.     PDMP Review       Value Time User    PDMP Reviewed  Yes 7/3/2023 12:41 PM Mary Arevalo MD          ED Provider  Electronically Signed by           Aixa Lewis MD  07/30/23 0005

## 2023-07-29 PROBLEM — T14.91XA SUICIDE ATTEMPT (HCC): Status: ACTIVE | Noted: 2023-07-29

## 2023-07-29 PROBLEM — T50.902A INTENTIONAL DRUG OVERDOSE (HCC): Status: ACTIVE | Noted: 2023-07-29

## 2023-07-29 LAB
ALBUMIN SERPL BCP-MCNC: 3.6 G/DL (ref 3.5–5)
ALP SERPL-CCNC: 67 U/L (ref 34–104)
ALT SERPL W P-5'-P-CCNC: 6 U/L (ref 7–52)
AMPHETAMINES SERPL QL SCN: NEGATIVE
ANION GAP SERPL CALCULATED.3IONS-SCNC: 5 MMOL/L
AST SERPL W P-5'-P-CCNC: 12 U/L (ref 13–39)
BACTERIA UR QL AUTO: NORMAL /HPF
BARBITURATES UR QL: NEGATIVE
BENZODIAZ UR QL: NEGATIVE
BILIRUB SERPL-MCNC: 0.67 MG/DL (ref 0.2–1)
BILIRUB UR QL STRIP: NEGATIVE
BUN SERPL-MCNC: 10 MG/DL (ref 5–25)
CALCIUM SERPL-MCNC: 8.5 MG/DL (ref 8.4–10.2)
CHLORIDE SERPL-SCNC: 108 MMOL/L (ref 96–108)
CLARITY UR: CLEAR
CO2 SERPL-SCNC: 24 MMOL/L (ref 21–32)
COCAINE UR QL: NEGATIVE
COLOR UR: YELLOW
CREAT SERPL-MCNC: 0.67 MG/DL (ref 0.6–1.3)
ERYTHROCYTE [DISTWIDTH] IN BLOOD BY AUTOMATED COUNT: 14.9 % (ref 11.6–15.1)
GFR SERPL CREATININE-BSD FRML MDRD: 86 ML/MIN/1.73SQ M
GLUCOSE SERPL-MCNC: 97 MG/DL (ref 65–140)
GLUCOSE UR STRIP-MCNC: NEGATIVE MG/DL
HCT VFR BLD AUTO: 43.8 % (ref 34.8–46.1)
HGB BLD-MCNC: 14.3 G/DL (ref 11.5–15.4)
HGB UR QL STRIP.AUTO: NEGATIVE
KETONES UR STRIP-MCNC: NEGATIVE MG/DL
LEUKOCYTE ESTERASE UR QL STRIP: NEGATIVE
MAGNESIUM SERPL-MCNC: 2 MG/DL (ref 1.9–2.7)
MAGNESIUM SERPL-MCNC: 2.1 MG/DL (ref 1.9–2.7)
MCH RBC QN AUTO: 29.9 PG (ref 26.8–34.3)
MCHC RBC AUTO-ENTMCNC: 32.6 G/DL (ref 31.4–37.4)
MCV RBC AUTO: 92 FL (ref 82–98)
METHADONE UR QL: NEGATIVE
NITRITE UR QL STRIP: POSITIVE
NON-SQ EPI CELLS URNS QL MICRO: NORMAL /HPF
OPIATES UR QL SCN: NEGATIVE
OXYCODONE+OXYMORPHONE UR QL SCN: NEGATIVE
PCP UR QL: NEGATIVE
PH UR STRIP.AUTO: 5 [PH]
PLATELET # BLD AUTO: 242 THOUSANDS/UL (ref 149–390)
PMV BLD AUTO: 10.4 FL (ref 8.9–12.7)
POTASSIUM SERPL-SCNC: 4.1 MMOL/L (ref 3.5–5.3)
PROT SERPL-MCNC: 6.2 G/DL (ref 6.4–8.4)
PROT UR STRIP-MCNC: NEGATIVE MG/DL
RBC # BLD AUTO: 4.78 MILLION/UL (ref 3.81–5.12)
RBC #/AREA URNS AUTO: NORMAL /HPF
SODIUM SERPL-SCNC: 137 MMOL/L (ref 135–147)
SP GR UR STRIP.AUTO: 1.01 (ref 1–1.03)
THC UR QL: POSITIVE
UROBILINOGEN UR QL STRIP.AUTO: 0.2 E.U./DL
WBC # BLD AUTO: 7.7 THOUSAND/UL (ref 4.31–10.16)
WBC #/AREA URNS AUTO: NORMAL /HPF

## 2023-07-29 PROCEDURE — 85027 COMPLETE CBC AUTOMATED: CPT | Performed by: NURSE PRACTITIONER

## 2023-07-29 PROCEDURE — 83735 ASSAY OF MAGNESIUM: CPT | Performed by: NURSE PRACTITIONER

## 2023-07-29 PROCEDURE — 80053 COMPREHEN METABOLIC PANEL: CPT | Performed by: NURSE PRACTITIONER

## 2023-07-29 PROCEDURE — 80307 DRUG TEST PRSMV CHEM ANLYZR: CPT | Performed by: NURSE PRACTITIONER

## 2023-07-29 PROCEDURE — 99222 1ST HOSP IP/OBS MODERATE 55: CPT | Performed by: INTERNAL MEDICINE

## 2023-07-29 PROCEDURE — 81001 URINALYSIS AUTO W/SCOPE: CPT | Performed by: NURSE PRACTITIONER

## 2023-07-29 PROCEDURE — 36415 COLL VENOUS BLD VENIPUNCTURE: CPT | Performed by: NURSE PRACTITIONER

## 2023-07-29 RX ORDER — ESCITALOPRAM OXALATE 10 MG/1
20 TABLET ORAL DAILY
Status: DISCONTINUED | OUTPATIENT
Start: 2023-07-29 | End: 2023-07-31

## 2023-07-29 RX ORDER — NEBIVOLOL 5 MG/1
5 TABLET ORAL EVERY MORNING
Status: DISCONTINUED | OUTPATIENT
Start: 2023-07-29 | End: 2023-08-03 | Stop reason: HOSPADM

## 2023-07-29 RX ORDER — ENOXAPARIN SODIUM 100 MG/ML
40 INJECTION SUBCUTANEOUS DAILY
Status: DISCONTINUED | OUTPATIENT
Start: 2023-07-29 | End: 2023-08-03 | Stop reason: HOSPADM

## 2023-07-29 RX ORDER — FLUTICASONE PROPIONATE AND SALMETEROL 100; 50 UG/1; UG/1
1 POWDER RESPIRATORY (INHALATION) EVERY 12 HOURS SCHEDULED
Status: DISCONTINUED | OUTPATIENT
Start: 2023-07-29 | End: 2023-07-29

## 2023-07-29 RX ORDER — FLUTICASONE FUROATE AND VILANTEROL 100; 25 UG/1; UG/1
1 POWDER RESPIRATORY (INHALATION) DAILY
Status: DISCONTINUED | OUTPATIENT
Start: 2023-07-29 | End: 2023-08-03 | Stop reason: HOSPADM

## 2023-07-29 RX ORDER — KETOTIFEN FUMARATE 0.35 MG/ML
1 SOLUTION/ DROPS OPHTHALMIC 2 TIMES DAILY
Status: DISCONTINUED | OUTPATIENT
Start: 2023-07-29 | End: 2023-08-03 | Stop reason: HOSPADM

## 2023-07-29 RX ORDER — SODIUM CHLORIDE 9 MG/ML
70 INJECTION, SOLUTION INTRAVENOUS CONTINUOUS
Status: DISCONTINUED | OUTPATIENT
Start: 2023-07-29 | End: 2023-07-30

## 2023-07-29 RX ORDER — ALBUTEROL SULFATE 2.5 MG/3ML
2.5 SOLUTION RESPIRATORY (INHALATION) EVERY 6 HOURS PRN
Status: DISCONTINUED | OUTPATIENT
Start: 2023-07-29 | End: 2023-08-03 | Stop reason: HOSPADM

## 2023-07-29 RX ORDER — BUSPIRONE HYDROCHLORIDE 5 MG/1
5 TABLET ORAL 2 TIMES DAILY
Status: DISCONTINUED | OUTPATIENT
Start: 2023-07-29 | End: 2023-08-03 | Stop reason: HOSPADM

## 2023-07-29 RX ORDER — DONEPEZIL HYDROCHLORIDE 5 MG/1
5 TABLET, FILM COATED ORAL
Status: DISCONTINUED | OUTPATIENT
Start: 2023-07-29 | End: 2023-08-03 | Stop reason: HOSPADM

## 2023-07-29 RX ORDER — BUPROPION HYDROCHLORIDE 150 MG/1
300 TABLET ORAL EVERY MORNING
Status: DISCONTINUED | OUTPATIENT
Start: 2023-07-29 | End: 2023-08-03 | Stop reason: HOSPADM

## 2023-07-29 RX ORDER — ALLOPURINOL 100 MG/1
100 TABLET ORAL DAILY
Status: DISCONTINUED | OUTPATIENT
Start: 2023-07-29 | End: 2023-08-03 | Stop reason: HOSPADM

## 2023-07-29 RX ORDER — ACETAMINOPHEN 325 MG/1
650 TABLET ORAL EVERY 6 HOURS PRN
Status: DISCONTINUED | OUTPATIENT
Start: 2023-07-29 | End: 2023-08-03 | Stop reason: HOSPADM

## 2023-07-29 RX ORDER — ALPRAZOLAM 0.25 MG/1
0.25 TABLET ORAL DAILY PRN
Status: DISCONTINUED | OUTPATIENT
Start: 2023-07-29 | End: 2023-08-03 | Stop reason: HOSPADM

## 2023-07-29 RX ORDER — ONDANSETRON 2 MG/ML
4 INJECTION INTRAMUSCULAR; INTRAVENOUS EVERY 6 HOURS PRN
Status: DISCONTINUED | OUTPATIENT
Start: 2023-07-29 | End: 2023-08-03 | Stop reason: HOSPADM

## 2023-07-29 RX ORDER — FLUTICASONE PROPIONATE 50 MCG
2 SPRAY, SUSPENSION (ML) NASAL DAILY
Status: DISCONTINUED | OUTPATIENT
Start: 2023-07-29 | End: 2023-07-30 | Stop reason: SDUPTHER

## 2023-07-29 RX ADMIN — ONDANSETRON 4 MG: 2 INJECTION INTRAMUSCULAR; INTRAVENOUS at 13:42

## 2023-07-29 RX ADMIN — BUSPIRONE HYDROCHLORIDE 5 MG: 5 TABLET ORAL at 17:37

## 2023-07-29 RX ADMIN — DONEPEZIL HYDROCHLORIDE 5 MG: 5 TABLET ORAL at 21:13

## 2023-07-29 RX ADMIN — ACETAMINOPHEN 650 MG: 325 TABLET ORAL at 22:58

## 2023-07-29 RX ADMIN — SODIUM CHLORIDE 70 ML/HR: 0.9 INJECTION, SOLUTION INTRAVENOUS at 03:38

## 2023-07-29 RX ADMIN — ENOXAPARIN SODIUM 40 MG: 40 INJECTION SUBCUTANEOUS at 09:05

## 2023-07-29 RX ADMIN — SODIUM CHLORIDE 70 ML/HR: 0.9 INJECTION, SOLUTION INTRAVENOUS at 17:37

## 2023-07-29 RX ADMIN — ALPRAZOLAM 0.25 MG: 0.25 TABLET ORAL at 23:00

## 2023-07-29 RX ADMIN — FLUTICASONE FUROATE AND VILANTEROL TRIFENATATE 1 PUFF: 100; 25 POWDER RESPIRATORY (INHALATION) at 22:00

## 2023-07-29 RX ADMIN — KETOTIFEN FUMARATE 1 DROP: 0.25 SOLUTION/ DROPS OPHTHALMIC at 17:38

## 2023-07-29 RX ADMIN — ACETAMINOPHEN 650 MG: 325 TABLET ORAL at 15:10

## 2023-07-29 NOTE — ASSESSMENT & PLAN NOTE
· Recently started on Aricept by PCP and ordered MRI of the brain due to complaint of short-term memory loss, unsteadiness.   · We will continue Aricept

## 2023-07-29 NOTE — ASSESSMENT & PLAN NOTE
Patient presents with intentional Ambien overdose due to suicide attempt. Patient stated she could not cope anymore and wanted to die per sitter in ED. · Patient likely took 12 tablets of Ambien CR 12.5 mg an hour prior to ED arrival per ED provider. Patient was awake but tired appearing with slow speech on ED arrival per ED provider. · ED provider discussed case with toxicology on-call, recommend supportive care, including cardiac and respiratory monitoring and fall precautions. · Patient lethargic on exam, on oxygen 2 L, satting mid 90s  · Blood work unremarkable  · CT abdomen/pelvis unremarkable per ED provider, official report pending.   · Gentle IV hydration  · Continue supplemental oxygen to maintain sats over 90%  · Telemetry  · Consult psych for suicide attempt  · Continue one-to-one  · Repeat CMP, CBC in the morning

## 2023-07-29 NOTE — ASSESSMENT & PLAN NOTE
· On Advair, Flonase, albuterol as needed at home.   · No wheeze on auscultation  · Continue Advair, Flonase  · Albuterol as needed

## 2023-07-29 NOTE — H&P
[address]  H&P  Name: Rosita Godwin 74 y.o. female I MRN: [id number]  Unit/Bed#: ED CT2 I Date of Admission: 7/28/2023   Date of Service: 7/29/2023 I Hospital Day: 0      Assessment/Plan   * Intentional drug overdose [facility])  Assessment & Plan  Patient presents with intentional Ambien overdose due to suicide attempt. Patient stated she could not cope anymore and wanted to die per sitter in ED. · Patient likely took 12 tablets of Ambien CR 12.5 mg an hour prior to ED arrival per ED provider. Patient was awake but tired appearing with slow speech on ED arrival per ED provider. · ED provider discussed case with toxicology on-call, recommend supportive care, including cardiac and respiratory monitoring and fall precautions. · Patient lethargic on exam, on oxygen 2 L, satting mid 90s  · Blood work unremarkable  · CT abdomen/pelvis unremarkable per ED provider, official report pending. · Gentle IV hydration  · Continue supplemental oxygen to maintain sats over 90%  · Telemetry  · Consult psych for suicide attempt  · Continue one-to-one  · Repeat CMP, CBC in the morning    Suicide attempt [facility])  Assessment & Plan  · Continue one-to-one  · Consult psych    Moderate episode of recurrent major depressive disorder ([address])  Assessment & Plan  · Continue Wellbutrin, BuSpar, Xanax as needed  · Consult psych    Cognitive impairment  Assessment & Plan  · Recently started on Aricept by PCP and ordered MRI of the brain due to complaint of short-term memory loss, unsteadiness. · We will continue Aricept    Primary insomnia  Assessment & Plan  · On Ambien CR 12.5 mg p.o. at bedtime. · Verified at [facility] website. · Possible sleep apnea could be contributing  · Hold Ambien due to above  · Monitor    Sleep apnea  Assessment & Plan  · Uses CPAP prior to gastric bypass. Reported snoring, awakening with gasping/choking sensation per sleep medicine note in March 2023.   Patient was ordered sleep study as outpatient which is still pending. · Follow-up sleep study as outpatient    Chronic obstructive pulmonary disease ([address])  Assessment & Plan  · On Advair, Flonase, albuterol as needed at home. · No wheeze on auscultation  · Continue Advair, Flonase  · Albuterol as needed    SVT (supraventricular tachycardia) (HCC)  Assessment & Plan  · History of SVT status post ablation  · Continue Bystolic    Essential hypertension  Assessment & Plan  · On Bystolic at home. · Will continue    Smoking  Assessment & Plan  · Nicotine patch, smoking cessation            VTE Prophylaxis: Enoxaparin (Lovenox)  / sequential compression device   Code Status: Full code  POLST: POLST form is not discussed and not completed at this time. Anticipated Length of Stay:  Patient will be admitted on an Inpatient basis with an anticipated length of stay of  > 2 midnights. Justification for Hospital Stay: Intentional Ambien overdose    Total Time for Visit, including Counseling / Coordination of Care: 45 minutes. Greater than 50% of this total time spent on direct patient counseling and coordination of care. Chief Complaint: Intentional drug overdose    History of Present Illness:    Dony Kendall is a 74 y.o. female with PMH of depression anxiety insomnia, recently diagnosed cognitive impairment, hypertension, SVT, nicotine abuse, COPD who presents with intentional drug overdose on Ambien due to suicide attempt. Patient stated she could not cope anymore and wanted to die in ED per sitter. Patient lethargic on exam, arousable to sternal rub, does not follow commands. Reviewed ED note. Patient on oxygen 2 L, satting mid 90s. Review of Systems:    Review of Systems   Unable to perform ROS: Mental status change (Reviewed ED note and spoke to staff in ED)   Psychiatric/Behavioral: Positive for suicidal ideas. Suicide attempt   All other systems reviewed and are negative.       Past Medical and Surgical History:     Past Medical History:   Diagnosis Date   • Acid reflux    • Arthritis    • Asthma    • Cardiac disease    • Chronic kidney disease     passed on own kidney stone   • Diverticulitis    • GERD (gastroesophageal reflux disease)    • Hayfever    • [place] (hard of hearing)     bilateral hearing aids   • Hyperlipemia    • Hypertension    • Tachycardia        Past Surgical History:   Procedure Laterality Date   • ABLATION OF DYSRHYTHMIC FOCUS     • APPENDECTOMY     • CHOLECYSTECTOMY      open   • FRACTURE SURGERY      ORIF fx (L) tibia, fibula 2009   • GASTRIC BYPASS OPEN     • HYSTERECTOMY     • WA XCAPSL CTRC RMVL INSJ IO LENS PROSTH W/O ECP Left 4/18/2016    Procedure: EXTRACTION EXTRACAPSULAR CATARACT PHACO INTRAOCULAR LENS (IOL); Surgeon: Vonnie Hubbard MD;  Location: [facility] MAIN OR;  Service: Ophthalmology   • WA XCAPSL CTRC RMVL INSJ IO LENS PROSTH W/O ECP Right 3/1/2021    Procedure: EXTRACTION EXTRACAPSULAR CATARACT PHACO INTRAOCULAR LENS (IOL); Surgeon: Vonnie Hubbard MD;  Location: [facility] MAIN OR;  Service: Ophthalmology   • TONSILECTOMY AND ADNOIDECTOMY         Meds/Allergies:    Prior to Admission medications    Medication Sig Start Date End Date Taking? Authorizing Provider   acetaminophen (TYLENOL) 325 mg tablet Take 2 tablets (650 mg total) by mouth every 6 (six) hours as needed for mild pain, headaches or fever.  2/10/16   Luann Dunaway MD   albuterol (2.5 mg/3 mL) 0.083 % nebulizer solution INHALE CONTENTS OF 1 VIAL (3 ML) IN NEBULIZER BY MOUTH AND INTO THE LUNGS EVERY 6 HOURS IF NEEDED 12/5/22   Twila Monte MD   albuterol (PROVENTIL HFA,VENTOLIN HFA) 90 mcg/act inhaler Inhale 2 puffs every 6 (six) hours as needed for wheezing 4/24/23   Twila Monte MD   allopurinol (ZYLOPRIM) 100 mg tablet Take 1 tablet (100 mg total) by mouth daily 1/11/23   KYLE Sharp   ALPRAZolam Chago Lilly) 0.25 mg tablet Take 1 tablet (0.25 mg total) by mouth daily as needed for anxiety 7/3/23   Twila Monte MD   Apple Cider Vinegar [id number] [facility] Take by mouth daily As needed    Historical Provider, MD   aspirin (ECOTRIN LOW STRENGTH) 81 mg EC tablet Take 81 mg by mouth daily    Historical Provider, MD   buPROPion (WELLBUTRIN XL) 300 mg 24 hr tablet TAKE 1 TABLET (300 MG TOTAL) BY MOUTH EVERY MORNING. 3/13/23   Chante Iglesias MD   busPIRone (BUSPAR) 5 mg tablet TAKE 1 TABLET BY MOUTH TWICE A DAY 3/13/23   Chante Iglesias MD   Diclofenac Sodium (VOLTAREN) 1 % Apply 2 g topically 4 (four) times a day 6/22/23   Chante Iglesias MD   donepezil (ARICEPT) 5 mg tablet Take 1 tablet (5 mg total) by mouth daily at bedtime 6/29/23   Chante Iglesias MD   escitalopram (LEXAPRO) 20 mg tablet Take 1 tablet (20 mg total) by mouth daily 4/24/23   Chante Iglesias MD   esomeprazole (NexIUM) 20 mg capsule Take 1 capsule (20 mg total) by mouth daily in the early morning 1/11/23   KYLE Miller   fluticasone [facility]) 50 mcg/act nasal spray 2 sprays into each nostril daily 12/16/21   Chante Iglesias MD   fluticasone-salmeterol (ADVAIR HFA) 037-27 MCG/ACT inhaler Inhale 1 puff daily 4/24/23   Chante Iglesias MD   hyoscyamine (LEVSIN/SL) 0.125 mg SL tablet TAKE 1 TABLET BY MOUTH EVERY 4 HOURS AS NEEDED FOR LEG CRAMPING 7/24/23   Richi Perla MD   Multiple Vitamin (MULTIVITAMIN) tablet Take 1 tablet by mouth daily. Historical Provider, MD   nebivolol (BYSTOLIC) 5 mg tablet Take 1 tablet (5 mg total) by mouth every morning 6/29/23   Chante Iglesias MD   olopatadine (Patanol) 0.1 % ophthalmic solution Apply 1 drop to eye 2 (two) times a day 6/15/22   Chante Iglesias MD   VITAMIN D, ERGOCALCIFEROL, PO Take by mouth    Historical Provider, MD   zolpidem (AMBIEN CR) 12.5 MG CR tablet Take 1 tablet (12.5 mg total) by mouth daily at bedtime as needed for sleep 7/3/23   Chante Iglesias MD     I have reviewed home medications using allscripts. Allergies:    Allergies   Allergen Reactions   • Augmentin [Amoxicillin-Pot Clavulanate] GI Intolerance, Other (See Comments) and Hives     VOMITING  VOMITING  Reaction Date: 07Dec2004;    • Sulfa Antibiotics Itching, Other (See Comments) and Hives     RASH, ITCHY  RASH, ITCHY   • Morphine Other (See Comments)     "DOESN'T WORK"   • Latex Rash     Unsure if this is an allergy       Social History:     Marital Status:    Occupation: Retired  Patient Pre-hospital Living Situation: Unclear  Patient Pre-hospital Level of Mobility: Unclear  Patient Pre-hospital Diet Restrictions: Unclear  Substance Use History:   Social History     Substance and Sexual Activity   Alcohol Use Yes    Comment: rarely     Social History     Tobacco Use   Smoking Status Every Day   • Packs/day: 0.25   • Years: 25.00   • Total pack years: 6.25   • Types: Cigarettes   Smokeless Tobacco Never     Social History     Substance and Sexual Activity   Drug Use No       Family History:    non-contributory    Physical Exam:     Vitals:   Blood Pressure: 123/58 (07/29/23 0330)  Pulse: (!) 52 (07/29/23 0330)  Temperature: 98.3 °F (36.8 °C) (07/28/23 1912)  Temp Source: Oral (07/28/23 1912)  Respirations: (!) 23 (07/29/23 0330)  SpO2: 96 % (07/29/23 0330)    Physical Exam  Vitals and nursing note reviewed. Constitutional:       Appearance: She is well-developed. Comments: Patient lethargic on exam.   HENT:      Head: Normocephalic and atraumatic. Neck:      Thyroid: No thyromegaly. Vascular: No JVD. Trachea: No tracheal deviation. Cardiovascular:      Rate and Rhythm: Normal rate and regular rhythm. Heart sounds: Normal heart sounds. Pulmonary:      Effort: Pulmonary effort is normal. No respiratory distress. Breath sounds: No wheezing or rales. Comments: Limited exam due to patient noncompliance. On oxygen 2 L, satting mid 90s. Abdominal:      General: Bowel sounds are normal. There is no distension. Palpations: Abdomen is soft. Tenderness: There is no abdominal tenderness. There is no guarding.    Musculoskeletal: General: No swelling. Cervical back: Neck supple. Right lower leg: No edema. Left lower leg: No edema. Skin:     General: Skin is warm and dry. Neurological:      Comments: Patient lethargic. Additional Data:     Lab Results: I have personally reviewed pertinent reports. Results from last 7 days   Lab Units 07/28/23  1926   WBC Thousand/uL 8.69   HEMOGLOBIN g/dL 15.2   HEMATOCRIT % 46.4*   PLATELETS Thousands/uL 274   NEUTROS PCT % 58   LYMPHS PCT % 30   MONOS PCT % 8   EOS PCT % 2     Results from last 7 days   Lab Units 07/28/23  2201   POTASSIUM mmol/L 5.6*   CHLORIDE mmol/L 109*   CO2 mmol/L 22   BUN mg/dL 13   CREATININE mg/dL 0.67   CALCIUM mg/dL 8.1*   ALK PHOS U/L 62   ALT U/L 6*   AST U/L 18           Imaging: I have personally reviewed pertinent reports. CT abdomen pelvis with contrast    Addendum Date: 7/29/2023 Addendum:   ADDENDUM: Addendum is made to the initial report. The following fields should be as follows: REPRODUCTIVE ORGANS: Supracervical hysterectomy. IMPRESSION: No CT evidence of acute findings. Gastric bypass. Colonic diverticulosis. Result Date: 7/29/2023  Narrative: CT ABDOMEN AND PELVIS WITH IV CONTRAST INDICATION:   Abdominal tenderness. COMPARISON: CT abdomen pelvis dated 2/23/2023 TECHNIQUE:  CT examination of the abdomen and pelvis was performed. Multiplanar 2D reformatted images were created from the source data. This examination, like all CT scans performed in the [facility], was performed utilizing techniques to minimize radiation dose exposure, including the use of iterative reconstruction and automated exposure control. Radiation dose length product (DLP) for this visit:  1026 mGy-cm IV Contrast:  100 mL of iohexol (OMNIPAQUE) Enteric Contrast:  Enteric contrast was not administered. FINDINGS: ABDOMEN LOWER CHEST: Atherosclerotic calcification at the aortic root. Mitral annular calcification incidentally noted.  Dependent atelectasis. Mild interstitial changes at the left lung base. LIVER/BILIARY TREE:  Unremarkable. GALLBLADDER: Cholecystectomy. SPLEEN:  Unremarkable. PANCREAS:  Unremarkable. ADRENAL GLANDS: Stable 2.7 cm left adrenal nodule, previously characterized as an adenoma and present albeit mildly increased in size from studies dating back to 2012. KIDNEYS/URETERS: Stable left renal cyst. Additional too small to characterize bilateral renal hypoattenuating lesions. No hydronephrosis. STOMACH AND BOWEL: Findings of prior gastric bypass again seen. Nondilated bowel without evidence of inflammatory changes. Colonic diverticulosis. APPENDIX:  No findings to suggest appendicitis. ABDOMINOPELVIC CAVITY:  No ascites. No pneumoperitoneum. No lymphadenopathy. VESSELS: Aortoiliac atherosclerosis without aneurysm. PELVIS REPRODUCTIVE ORGANS: Mildly enlarged prostate measuring up to 5.0 cm transaxially. URINARY BLADDER:  Unremarkable. ABDOMINAL WALL/INGUINAL REGIONS:  Unremarkable. OSSEOUS STRUCTURES:  No acute fracture or destructive osseous lesion. Grade 1 anterolisthesis at L3-L4. Scattered degenerative endplate and disc disease. Multilevel facet arthrosis. Impression: No CT evidence of acute findings. Gastric bypass. Colonic diverticulosis. Enlarged prostate. Correlation with PSA levels is advised. Workstation performed: [id number]     CT head wo contrast    Result Date: 7/29/2023  Narrative: CT BRAIN - WITHOUT CONTRAST INDICATION:   AMS. COMPARISON: CT head dated 4/23/2007 TECHNIQUE:  CT examination of the brain was performed. Multiplanar 2D reformatted images were created from the source data. Radiation dose length product (DLP) for this visit:  1061 mGy-cm . This examination, like all CT scans performed in the [facility], was performed utilizing techniques to minimize radiation dose exposure, including the use of iterative reconstruction and automated exposure control. IMAGE QUALITY:  Diagnostic. FINDINGS: PARENCHYMA: Decreased attenuation is noted in periventricular and subcortical white matter demonstrating an appearance that is statistically most likely to represent mild to moderate microangiopathic change, progressed from remote prior. No CT signs of acute infarction. No intracranial mass, mass effect or midline shift. No acute parenchymal hemorrhage. VENTRICLES AND EXTRA-AXIAL SPACES:  Normal for the patient's age. VISUALIZED ORBITS: Normal visualized orbits. PARANASAL SINUSES: Minimal ethmoid sinus mucosal thickening the patient is edentulous. CALVARIUM AND EXTRACRANIAL SOFT TISSUES:  Normal.     Impression: No evidence of acute intracranial hemorrhage or mass effect. Mild to moderate microangiopathic change, progressed from remote prior study. Workstation performed: [id number]       EKG, Pathology, and Other Studies Reviewed on Admission:   · EKG: NSR with SA,RBBB,rate 87,QTc 483. Allscripts Records Reviewed: Yes     ** Please Note: Dragon 360 Dictation voice to text software may have been used in the creation of this document.  **

## 2023-07-29 NOTE — PLAN OF CARE
Problem: RESPIRATORY - ADULT  Goal: Achieves optimal ventilation and oxygenation  Description: INTERVENTIONS:  - Assess for changes in respiratory status  - Assess for changes in mentation and behavior  - Position to facilitate oxygenation and minimize respiratory effort  - Oxygen administered by appropriate delivery if ordered  - Initiate smoking cessation education as indicated  - Encourage broncho-pulmonary hygiene including cough, deep breathe, Incentive Spirometry  - Assess the need for suctioning and aspirate as needed  - Assess and instruct to report SOB or any respiratory difficulty  - Respiratory Therapy support as indicated  7/29/2023 1031 by Radha Dickinson RN  Outcome: Progressing  7/29/2023 1031 by Radha Dickinson RN  Outcome: Progressing     Problem: MOBILITY - ADULT  Goal: Maintain or return to baseline ADL function  Description: INTERVENTIONS:  -  Assess patient's ability to carry out ADLs; assess patient's baseline for ADL function and identify physical deficits which impact ability to perform ADLs (bathing, care of mouth/teeth, toileting, grooming, dressing, etc.)  - Assess/evaluate cause of self-care deficits   - Assess range of motion  - Assess patient's mobility; develop plan if impaired  - Assess patient's need for assistive devices and provide as appropriate  - Encourage maximum independence but intervene and supervise when necessary  - Involve family in performance of ADLs  - Assess for home care needs following discharge   - Consider OT consult to assist with ADL evaluation and planning for discharge  - Provide patient education as appropriate  7/29/2023 1031 by Radha Dickinson RN  Outcome: Progressing  7/29/2023 1031 by Radha Dickinson RN  Outcome: Progressing  Goal: Maintains/Returns to pre admission functional level  Description: INTERVENTIONS:  - Perform BMAT or MOVE assessment daily.   - Set and communicate daily mobility goal to care team and patient/family/caregiver.    - Collaborate with rehabilitation services on mobility goals if consulted  - Perform Range of Motion 3 times a day. - Reposition patient every 2 hours.   - Dangle patient 3 times a day  - Stand patient 3 times a day  - Ambulate patient 3 times a day  - Out of bed to chair 3 times a day   - Out of bed for meals 3 times a day  - Out of bed for toileting  - Record patient progress and toleration of activity level   7/29/2023 1031 by Cahtie Mckeon RN  Outcome: Progressing  7/29/2023 1031 by Cathie Mckeon RN  Outcome: Progressing     Problem: PAIN - ADULT  Goal: Verbalizes/displays adequate comfort level or baseline comfort level  Description: Interventions:  - Encourage patient to monitor pain and request assistance  - Assess pain using appropriate pain scale  - Administer analgesics based on type and severity of pain and evaluate response  - Implement non-pharmacological measures as appropriate and evaluate response  - Consider cultural and social influences on pain and pain management  - Notify physician/advanced practitioner if interventions unsuccessful or patient reports new pain  Outcome: Progressing     Problem: INFECTION - ADULT  Goal: Absence or prevention of progression during hospitalization  Description: INTERVENTIONS:  - Assess and monitor for signs and symptoms of infection  - Monitor lab/diagnostic results  - Monitor all insertion sites, i.e. indwelling lines, tubes, and drains  - Monitor endotracheal if appropriate and nasal secretions for changes in amount and color  - [place] appropriate cooling/warming therapies per order  - Administer medications as ordered  - Instruct and encourage patient and family to use good hand hygiene technique  - Identify and instruct in appropriate isolation precautions for identified infection/condition  Outcome: Progressing  Goal: Absence of fever/infection during neutropenic period  Description: INTERVENTIONS:  - Monitor WBC    Outcome: Progressing     Problem: SAFETY ADULT  Goal: Patient will remain free of falls  Description: INTERVENTIONS:  - Educate patient/family on patient safety including physical limitations  - Instruct patient to call for assistance with activity   - Consult OT/PT to assist with strengthening/mobility   - Keep Call bell within reach  - Keep bed low and locked with side rails adjusted as appropriate  - Keep care items and personal belongings within reach  - Initiate and maintain comfort rounds  - Make Fall Risk Sign visible to staff  - Offer Toileting every 2 Hours, in advance of need  - Initiate/Maintain alarm  - Obtain necessary fall risk management equipment  - Apply yellow socks and bracelet for high fall risk patients  - Consider moving patient to room near nurses station  Outcome: Progressing     Problem: DISCHARGE PLANNING  Goal: Discharge to home or other facility with appropriate resources  Description: INTERVENTIONS:  - Identify barriers to discharge w/patient and caregiver  - Arrange for needed discharge resources and transportation as appropriate  - Identify discharge learning needs (meds, wound care, etc.)  - Arrange for interpretive services to assist at discharge as needed  - Refer to Case Management Department for coordinating discharge planning if the patient needs post-hospital services based on physician/advanced practitioner order or complex needs related to functional status, cognitive ability, or social support system  Outcome: Progressing     Problem: Knowledge Deficit  Goal: Patient/family/caregiver demonstrates understanding of disease process, treatment plan, medications, and discharge instructions  Description: Complete learning assessment and assess knowledge base.   Interventions:  - Provide teaching at level of understanding  - Provide teaching via preferred learning methods  Outcome: Progressing

## 2023-07-29 NOTE — NURSING NOTE
Daughter, Alfie Harper, updated. Patient made aware and stating that she does not want to see rafi or talk to her daughter. Declines any visitors except, Slime Sandwich (granddaughter).

## 2023-07-29 NOTE — ASSESSMENT & PLAN NOTE
· On Ambien CR 12.5 mg p.o. at bedtime. · Verified at [facility] website.    · Possible sleep apnea could be contributing  · Hold Ambien due to above  · Monitor

## 2023-07-29 NOTE — ASSESSMENT & PLAN NOTE
· Uses CPAP prior to gastric bypass. Reported snoring, awakening with gasping/choking sensation per sleep medicine note in March 2023. Patient was ordered sleep study as outpatient which is still pending.   · Follow-up sleep study as outpatient

## 2023-07-29 NOTE — NURSING NOTE
Patient received from ED. A/O to person, place, and situation. Disoriented to time. Patient dressed from gown into paper scrubs.

## 2023-07-30 LAB
ANION GAP SERPL CALCULATED.3IONS-SCNC: 8 MMOL/L
BASOPHILS # BLD AUTO: 0.03 THOUSANDS/ÂΜL (ref 0–0.1)
BASOPHILS NFR BLD AUTO: 0 % (ref 0–1)
BUN SERPL-MCNC: 9 MG/DL (ref 5–25)
CALCIUM SERPL-MCNC: 8.7 MG/DL (ref 8.4–10.2)
CHLORIDE SERPL-SCNC: 108 MMOL/L (ref 96–108)
CO2 SERPL-SCNC: 22 MMOL/L (ref 21–32)
CREAT SERPL-MCNC: 0.64 MG/DL (ref 0.6–1.3)
EOSINOPHIL # BLD AUTO: 0.18 THOUSAND/ÂΜL (ref 0–0.61)
EOSINOPHIL NFR BLD AUTO: 3 % (ref 0–6)
ERYTHROCYTE [DISTWIDTH] IN BLOOD BY AUTOMATED COUNT: 14.6 % (ref 11.6–15.1)
GFR SERPL CREATININE-BSD FRML MDRD: 87 ML/MIN/1.73SQ M
GLUCOSE SERPL-MCNC: 94 MG/DL (ref 65–140)
HCT VFR BLD AUTO: 42.9 % (ref 34.8–46.1)
HGB BLD-MCNC: 13.9 G/DL (ref 11.5–15.4)
IMM GRANULOCYTES # BLD AUTO: 0.03 THOUSAND/UL (ref 0–0.2)
IMM GRANULOCYTES NFR BLD AUTO: 0 % (ref 0–2)
LYMPHOCYTES # BLD AUTO: 1.44 THOUSANDS/ÂΜL (ref 0.6–4.47)
LYMPHOCYTES NFR BLD AUTO: 20 % (ref 14–44)
MAGNESIUM SERPL-MCNC: 1.9 MG/DL (ref 1.9–2.7)
MCH RBC QN AUTO: 29.5 PG (ref 26.8–34.3)
MCHC RBC AUTO-ENTMCNC: 32.4 G/DL (ref 31.4–37.4)
MCV RBC AUTO: 91 FL (ref 82–98)
MONOCYTES # BLD AUTO: 0.64 THOUSAND/ÂΜL (ref 0.17–1.22)
MONOCYTES NFR BLD AUTO: 9 % (ref 4–12)
NEUTROPHILS # BLD AUTO: 4.92 THOUSANDS/ÂΜL (ref 1.85–7.62)
NEUTS SEG NFR BLD AUTO: 68 % (ref 43–75)
NRBC BLD AUTO-RTO: 0 /100 WBCS
PLATELET # BLD AUTO: 268 THOUSANDS/UL (ref 149–390)
PMV BLD AUTO: 10.9 FL (ref 8.9–12.7)
POTASSIUM SERPL-SCNC: 3.5 MMOL/L (ref 3.5–5.3)
RBC # BLD AUTO: 4.71 MILLION/UL (ref 3.81–5.12)
SODIUM SERPL-SCNC: 138 MMOL/L (ref 135–147)
WBC # BLD AUTO: 7.24 THOUSAND/UL (ref 4.31–10.16)

## 2023-07-30 PROCEDURE — 83735 ASSAY OF MAGNESIUM: CPT | Performed by: INTERNAL MEDICINE

## 2023-07-30 PROCEDURE — 80048 BASIC METABOLIC PNL TOTAL CA: CPT | Performed by: INTERNAL MEDICINE

## 2023-07-30 PROCEDURE — 85025 COMPLETE CBC W/AUTO DIFF WBC: CPT | Performed by: INTERNAL MEDICINE

## 2023-07-30 PROCEDURE — 99232 SBSQ HOSP IP/OBS MODERATE 35: CPT | Performed by: STUDENT IN AN ORGANIZED HEALTH CARE EDUCATION/TRAINING PROGRAM

## 2023-07-30 PROCEDURE — 83690 ASSAY OF LIPASE: CPT | Performed by: NURSE PRACTITIONER

## 2023-07-30 RX ORDER — FLUTICASONE PROPIONATE 50 MCG
1 SPRAY, SUSPENSION (ML) NASAL DAILY
Status: DISCONTINUED | OUTPATIENT
Start: 2023-07-31 | End: 2023-08-03 | Stop reason: HOSPADM

## 2023-07-30 RX ORDER — PANTOPRAZOLE SODIUM 40 MG/1
40 TABLET, DELAYED RELEASE ORAL
Status: DISCONTINUED | OUTPATIENT
Start: 2023-07-31 | End: 2023-07-30

## 2023-07-30 RX ORDER — LANOLIN ALCOHOL/MO/W.PET/CERES
6 CREAM (GRAM) TOPICAL
Status: DISCONTINUED | OUTPATIENT
Start: 2023-07-30 | End: 2023-08-03 | Stop reason: HOSPADM

## 2023-07-30 RX ORDER — PANTOPRAZOLE SODIUM 40 MG/1
40 TABLET, DELAYED RELEASE ORAL
Status: DISCONTINUED | OUTPATIENT
Start: 2023-07-30 | End: 2023-07-31

## 2023-07-30 RX ORDER — GUAIFENESIN/DEXTROMETHORPHAN 100-10MG/5
10 SYRUP ORAL ONCE
Status: COMPLETED | OUTPATIENT
Start: 2023-07-31 | End: 2023-07-31

## 2023-07-30 RX ADMIN — ALPRAZOLAM 0.25 MG: 0.25 TABLET ORAL at 21:19

## 2023-07-30 RX ADMIN — Medication 1 TABLET: at 08:34

## 2023-07-30 RX ADMIN — ASPIRIN 81 MG: 81 TABLET, COATED ORAL at 08:34

## 2023-07-30 RX ADMIN — ENOXAPARIN SODIUM 40 MG: 40 INJECTION SUBCUTANEOUS at 08:33

## 2023-07-30 RX ADMIN — ONDANSETRON 4 MG: 2 INJECTION INTRAMUSCULAR; INTRAVENOUS at 16:34

## 2023-07-30 RX ADMIN — KETOTIFEN FUMARATE 1 DROP: 0.25 SOLUTION/ DROPS OPHTHALMIC at 18:01

## 2023-07-30 RX ADMIN — SODIUM CHLORIDE 70 ML/HR: 0.9 INJECTION, SOLUTION INTRAVENOUS at 07:31

## 2023-07-30 RX ADMIN — NEBIVOLOL 5 MG: 5 TABLET ORAL at 08:36

## 2023-07-30 RX ADMIN — ALLOPURINOL 100 MG: 100 TABLET ORAL at 08:33

## 2023-07-30 RX ADMIN — DONEPEZIL HYDROCHLORIDE 5 MG: 5 TABLET ORAL at 21:19

## 2023-07-30 RX ADMIN — KETOTIFEN FUMARATE 1 DROP: 0.25 SOLUTION/ DROPS OPHTHALMIC at 08:37

## 2023-07-30 RX ADMIN — FLUTICASONE PROPIONATE 2 SPRAY: 50 SPRAY, METERED NASAL at 08:34

## 2023-07-30 RX ADMIN — NICOTINE 1 PATCH: 7 PATCH, EXTENDED RELEASE TRANSDERMAL at 08:34

## 2023-07-30 RX ADMIN — BUSPIRONE HYDROCHLORIDE 5 MG: 5 TABLET ORAL at 08:34

## 2023-07-30 RX ADMIN — ONDANSETRON 4 MG: 2 INJECTION INTRAMUSCULAR; INTRAVENOUS at 03:48

## 2023-07-30 RX ADMIN — BUSPIRONE HYDROCHLORIDE 5 MG: 5 TABLET ORAL at 18:00

## 2023-07-30 RX ADMIN — ONDANSETRON 4 MG: 2 INJECTION INTRAMUSCULAR; INTRAVENOUS at 21:18

## 2023-07-30 RX ADMIN — BUPROPION HYDROCHLORIDE 300 MG: 150 TABLET, EXTENDED RELEASE ORAL at 08:34

## 2023-07-30 RX ADMIN — Medication 6 MG: at 23:27

## 2023-07-30 RX ADMIN — PANTOPRAZOLE SODIUM 40 MG: 40 TABLET, DELAYED RELEASE ORAL at 21:19

## 2023-07-30 RX ADMIN — FLUTICASONE FUROATE AND VILANTEROL TRIFENATATE 1 PUFF: 100; 25 POWDER RESPIRATORY (INHALATION) at 08:36

## 2023-07-30 RX ADMIN — ESCITALOPRAM OXALATE 20 MG: 10 TABLET ORAL at 08:34

## 2023-07-30 NOTE — PLAN OF CARE
Problem: RESPIRATORY - ADULT  Goal: Achieves optimal ventilation and oxygenation  Description: INTERVENTIONS:  - Assess for changes in respiratory status  - Assess for changes in mentation and behavior  - Position to facilitate oxygenation and minimize respiratory effort  - Oxygen administered by appropriate delivery if ordered  - Initiate smoking cessation education as indicated  - Encourage broncho-pulmonary hygiene including cough, deep breathe, Incentive Spirometry  - Assess the need for suctioning and aspirate as needed  - Assess and instruct to report SOB or any respiratory difficulty  - Respiratory Therapy support as indicated  Outcome: Progressing     Problem: MOBILITY - ADULT  Goal: Maintain or return to baseline ADL function  Description: INTERVENTIONS:  -  Assess patient's ability to carry out ADLs; assess patient's baseline for ADL function and identify physical deficits which impact ability to perform ADLs (bathing, care of mouth/teeth, toileting, grooming, dressing, etc.)  - Assess/evaluate cause of self-care deficits   - Assess range of motion  - Assess patient's mobility; develop plan if impaired  - Assess patient's need for assistive devices and provide as appropriate  - Encourage maximum independence but intervene and supervise when necessary  - Involve family in performance of ADLs  - Assess for home care needs following discharge   - Consider OT consult to assist with ADL evaluation and planning for discharge  - Provide patient education as appropriate  Outcome: Progressing  Goal: Maintains/Returns to pre admission functional level  Description: INTERVENTIONS:  - Perform BMAT or MOVE assessment daily.   - Set and communicate daily mobility goal to care team and patient/family/caregiver. - Collaborate with rehabilitation services on mobility goals if consulted  - Perform Range of Motion 2 times a day. - Reposition patient every 2 hours.   - Dangle patient 2 times a day  - Stand patient 2 times a day  - Ambulate patient 2 times a day  - Out of bed to chair 2 times a day   - Out of bed for meals 2 times a day  - Out of bed for toileting  - Record patient progress and toleration of activity level   Outcome: Progressing     Problem: PAIN - ADULT  Goal: Verbalizes/displays adequate comfort level or baseline comfort level  Description: Interventions:  - Encourage patient to monitor pain and request assistance  - Assess pain using appropriate pain scale  - Administer analgesics based on type and severity of pain and evaluate response  - Implement non-pharmacological measures as appropriate and evaluate response  - Consider cultural and social influences on pain and pain management  - Notify physician/advanced practitioner if interventions unsuccessful or patient reports new pain  Outcome: Progressing     Problem: INFECTION - ADULT  Goal: Absence or prevention of progression during hospitalization  Description: INTERVENTIONS:  - Assess and monitor for signs and symptoms of infection  - Monitor lab/diagnostic results  - Monitor all insertion sites, i.e. indwelling lines, tubes, and drains  - Monitor endotracheal if appropriate and nasal secretions for changes in amount and color  - [place] appropriate cooling/warming therapies per order  - Administer medications as ordered  - Instruct and encourage patient and family to use good hand hygiene technique  - Identify and instruct in appropriate isolation precautions for identified infection/condition  Outcome: Progressing  Goal: Absence of fever/infection during neutropenic period  Description: INTERVENTIONS:  - Monitor WBC    Outcome: Progressing     Problem: SAFETY ADULT  Goal: Patient will remain free of falls  Description: INTERVENTIONS:  - Educate patient/family on patient safety including physical limitations  - Instruct patient to call for assistance with activity   - Consult OT/PT to assist with strengthening/mobility   - Keep Call bell within reach  - Keep bed low and locked with side rails adjusted as appropriate  - Keep care items and personal belongings within reach  - Initiate and maintain comfort rounds  - Make Fall Risk Sign visible to staff  - Offer Toileting every 2 Hours, in advance of need  - Initiate/Maintain bed alarm  - Obtain necessary fall risk management equipment: call bell   - Apply yellow socks and bracelet for high fall risk patients  - Consider moving patient to room near nurses station  Outcome: Progressing     Problem: DISCHARGE PLANNING  Goal: Discharge to home or other facility with appropriate resources  Description: INTERVENTIONS:  - Identify barriers to discharge w/patient and caregiver  - Arrange for needed discharge resources and transportation as appropriate  - Identify discharge learning needs (meds, wound care, etc.)  - Arrange for interpretive services to assist at discharge as needed  - Refer to Case Management Department for coordinating discharge planning if the patient needs post-hospital services based on physician/advanced practitioner order or complex needs related to functional status, cognitive ability, or social support system  Outcome: Progressing     Problem: Knowledge Deficit  Goal: Patient/family/caregiver demonstrates understanding of disease process, treatment plan, medications, and discharge instructions  Description: Complete learning assessment and assess knowledge base.   Interventions:  - Provide teaching at level of understanding  - Provide teaching via preferred learning methods  Outcome: Progressing

## 2023-07-30 NOTE — ASSESSMENT & PLAN NOTE
· Continue wellbutrin buspirone and alprazolam.  · Psychiatry consulted recommending inpatient psychiatric treatment and decreasing escitalopram to home dosing 10 mg

## 2023-07-30 NOTE — ASSESSMENT & PLAN NOTE
History of cognitive decline depression insomnia and COPD presented to the hospital after intentional overdose of zolpidem  · This was a suicide attempt. The patient was seen by psychiatry with recommendation for inpatient treatment. · Patient is agreeable to voluntary treatment. · Continue 1: 1 observation  · Will need to wean off oxygen today.

## 2023-07-31 LAB
ATRIAL RATE: 87 BPM
LIPASE SERPL-CCNC: 316 U/L (ref 11–82)
P AXIS: 88 DEGREES
PR INTERVAL: 176 MS
QRS AXIS: 70 DEGREES
QRSD INTERVAL: 126 MS
QT INTERVAL: 402 MS
QTC INTERVAL: 483 MS
T WAVE AXIS: 27 DEGREES
VENTRICULAR RATE: 87 BPM

## 2023-07-31 PROCEDURE — 87505 NFCT AGENT DETECTION GI: CPT | Performed by: INTERNAL MEDICINE

## 2023-07-31 PROCEDURE — 99223 1ST HOSP IP/OBS HIGH 75: CPT | Performed by: PSYCHIATRY & NEUROLOGY

## 2023-07-31 PROCEDURE — 87329 GIARDIA AG IA: CPT | Performed by: INTERNAL MEDICINE

## 2023-07-31 PROCEDURE — 99232 SBSQ HOSP IP/OBS MODERATE 35: CPT | Performed by: INTERNAL MEDICINE

## 2023-07-31 PROCEDURE — 99222 1ST HOSP IP/OBS MODERATE 55: CPT | Performed by: INTERNAL MEDICINE

## 2023-07-31 PROCEDURE — 93010 ELECTROCARDIOGRAM REPORT: CPT | Performed by: INTERNAL MEDICINE

## 2023-07-31 PROCEDURE — 89055 LEUKOCYTE ASSESSMENT FECAL: CPT | Performed by: INTERNAL MEDICINE

## 2023-07-31 RX ORDER — SACCHAROMYCES BOULARDII 250 MG
250 CAPSULE ORAL 2 TIMES DAILY
Status: DISCONTINUED | OUTPATIENT
Start: 2023-07-31 | End: 2023-08-03 | Stop reason: HOSPADM

## 2023-07-31 RX ORDER — PANTOPRAZOLE SODIUM 40 MG/1
40 TABLET, DELAYED RELEASE ORAL
Status: DISCONTINUED | OUTPATIENT
Start: 2023-08-01 | End: 2023-08-03 | Stop reason: HOSPADM

## 2023-07-31 RX ORDER — ESCITALOPRAM OXALATE 10 MG/1
10 TABLET ORAL DAILY
Status: DISCONTINUED | OUTPATIENT
Start: 2023-08-01 | End: 2023-08-03 | Stop reason: HOSPADM

## 2023-07-31 RX ORDER — DIPHENHYDRAMINE HYDROCHLORIDE AND LIDOCAINE HYDROCHLORIDE AND ALUMINUM HYDROXIDE AND MAGNESIUM HYDRO
10 KIT ONCE
Status: COMPLETED | OUTPATIENT
Start: 2023-07-31 | End: 2023-07-31

## 2023-07-31 RX ORDER — LIDOCAINE HYDROCHLORIDE 20 MG/ML
15 SOLUTION OROPHARYNGEAL ONCE
Status: DISCONTINUED | OUTPATIENT
Start: 2023-07-31 | End: 2023-07-31

## 2023-07-31 RX ORDER — SUCRALFATE 1 G/1
1 TABLET ORAL
Status: DISCONTINUED | OUTPATIENT
Start: 2023-07-31 | End: 2023-08-03 | Stop reason: HOSPADM

## 2023-07-31 RX ORDER — DIPHENHYDRAMINE HYDROCHLORIDE AND LIDOCAINE HYDROCHLORIDE AND ALUMINUM HYDROXIDE AND MAGNESIUM HYDRO
10 KIT ONCE
Status: DISCONTINUED | OUTPATIENT
Start: 2023-07-31 | End: 2023-07-31

## 2023-07-31 RX ORDER — AZITHROMYCIN 250 MG/1
250 TABLET, FILM COATED ORAL EVERY 24 HOURS
Status: DISCONTINUED | OUTPATIENT
Start: 2023-08-01 | End: 2023-08-03 | Stop reason: HOSPADM

## 2023-07-31 RX ORDER — LIDOCAINE HYDROCHLORIDE 40 MG/ML
5 SOLUTION TOPICAL ONCE
Status: DISCONTINUED | OUTPATIENT
Start: 2023-07-31 | End: 2023-07-31

## 2023-07-31 RX ORDER — AZITHROMYCIN 250 MG/1
500 TABLET, FILM COATED ORAL ONCE
Status: COMPLETED | OUTPATIENT
Start: 2023-07-31 | End: 2023-07-31

## 2023-07-31 RX ADMIN — KETOTIFEN FUMARATE 1 DROP: 0.25 SOLUTION/ DROPS OPHTHALMIC at 11:05

## 2023-07-31 RX ADMIN — ONDANSETRON 4 MG: 2 INJECTION INTRAMUSCULAR; INTRAVENOUS at 11:03

## 2023-07-31 RX ADMIN — AZITHROMYCIN MONOHYDRATE 500 MG: 250 TABLET ORAL at 11:39

## 2023-07-31 RX ADMIN — FLUTICASONE PROPIONATE 1 SPRAY: 50 SPRAY, METERED NASAL at 11:05

## 2023-07-31 RX ADMIN — SUCRALFATE 1 G: 1 TABLET ORAL at 18:15

## 2023-07-31 RX ADMIN — KETOTIFEN FUMARATE 1 DROP: 0.25 SOLUTION/ DROPS OPHTHALMIC at 18:16

## 2023-07-31 RX ADMIN — ALPRAZOLAM 0.25 MG: 0.25 TABLET ORAL at 21:05

## 2023-07-31 RX ADMIN — DIPHENHYDRAMINE HYDROCHLORIDE AND LIDOCAINE HYDROCHLORIDE AND ALUMINUM HYDROXIDE AND MAGNESIUM HYDRO 10 ML: KIT at 14:51

## 2023-07-31 RX ADMIN — ASPIRIN 81 MG: 81 TABLET, COATED ORAL at 11:04

## 2023-07-31 RX ADMIN — GUAIFENESIN AND DEXTROMETHORPHAN 10 ML: 100; 10 SYRUP ORAL at 00:09

## 2023-07-31 RX ADMIN — ONDANSETRON 4 MG: 2 INJECTION INTRAMUSCULAR; INTRAVENOUS at 05:38

## 2023-07-31 RX ADMIN — FLUTICASONE PROPIONATE 1 SPRAY: 50 SPRAY, METERED NASAL at 00:09

## 2023-07-31 RX ADMIN — BUSPIRONE HYDROCHLORIDE 5 MG: 5 TABLET ORAL at 18:16

## 2023-07-31 RX ADMIN — ESCITALOPRAM OXALATE 20 MG: 10 TABLET ORAL at 11:04

## 2023-07-31 RX ADMIN — BUSPIRONE HYDROCHLORIDE 5 MG: 5 TABLET ORAL at 11:04

## 2023-07-31 RX ADMIN — ALLOPURINOL 100 MG: 100 TABLET ORAL at 11:04

## 2023-07-31 RX ADMIN — DONEPEZIL HYDROCHLORIDE 5 MG: 5 TABLET ORAL at 21:05

## 2023-07-31 RX ADMIN — NEBIVOLOL 5 MG: 5 TABLET ORAL at 11:06

## 2023-07-31 RX ADMIN — ENOXAPARIN SODIUM 40 MG: 40 INJECTION SUBCUTANEOUS at 11:03

## 2023-07-31 RX ADMIN — PANTOPRAZOLE SODIUM 40 MG: 40 TABLET, DELAYED RELEASE ORAL at 05:38

## 2023-07-31 RX ADMIN — FLUTICASONE FUROATE AND VILANTEROL TRIFENATATE 1 PUFF: 100; 25 POWDER RESPIRATORY (INHALATION) at 11:05

## 2023-07-31 RX ADMIN — NICOTINE 1 PATCH: 7 PATCH, EXTENDED RELEASE TRANSDERMAL at 11:06

## 2023-07-31 RX ADMIN — BUPROPION HYDROCHLORIDE 300 MG: 150 TABLET, EXTENDED RELEASE ORAL at 11:03

## 2023-07-31 RX ADMIN — Medication 250 MG: at 18:16

## 2023-07-31 RX ADMIN — SUCRALFATE 1 G: 1 TABLET ORAL at 21:05

## 2023-07-31 RX ADMIN — Medication 1 TABLET: at 11:03

## 2023-07-31 NOTE — ED NOTES
11:50 am BED search began     Intake- Spoke with [facility] no beds available. But can fax the information over for review if bed becomes available. [facility] : Spoke with [place]. Faxed over clinical and they will review and call us back.        Merceda Councilman MSW

## 2023-07-31 NOTE — PROGRESS NOTES
Patient having diarrhea without other associated symptoms. Does report some heartburn & requesting nexium. [place] RN, nexium non formulary so protonix ordered and stool studies also ordered. CT abdomen pelvis recently was negative for acute abnormalities. No t on abx recently.

## 2023-07-31 NOTE — ED NOTES
16:55 - 2nd PES call to 3N - there is no note of medical clearance in the chart - spoke with patient's RN - PES can't start to place patient until that note is in the chart - Rn will check with patient's physician. Attending wrote an updated note - patient should be clear by 8/3/23 - staff to contact PES once the note of medical clearance is written.

## 2023-07-31 NOTE — CASE MANAGEMENT
Case Management Assessment & Discharge Planning Note    Patient name Alix Dunn  Location 3 [place] [address]-* MRN [id number]  : 1950 Date 2023       Current Admission Date: 2023  Current Admission Diagnosis:Intentional drug overdose [facility])   Patient Active Problem List    Diagnosis Date Noted   • Intentional drug overdose ([address]) 2023   • Suicide attempt ([address]) 2023   • Cognitive impairment 2023   • Sleep apnea 2023   • Primary insomnia 2023   • Chronic obstructive pulmonary disease ([address])    • SVT (supraventricular tachycardia) ([address]) 02/15/2023   • Abnormal CT of the abdomen 2023   • Diverticulosis 2023   • Ascending aorta dilatation ([address]) 2023   • Aortic valve stenosis 2022   • Asthma 2022   • Bilateral hearing loss 2022   • Mixed stress and urge urinary incontinence 2020   • Chronic gout 2018   • Moderate episode of recurrent major depressive disorder ([address]) 2017   • Solitary pulmonary nodule 2016   • Essential hypertension 02/10/2016   • Smoking 2016   • Vitamin B12 deficiency 2015   • Thiamin deficiency 2015   • Mixed hyperlipidemia 2015   • Postgastrectomy malabsorption 2014   • Vitamin D deficiency 2013   • Allergic rhinitis 2012   • Arteriosclerotic cardiovascular disease 2012   • Arthropathy 2012   • Simple chronic bronchitis ([address]) 2012   • Esophageal reflux 2012   • Primary osteoarthritis 2012      LOS (days): 2  Geometric Mean LOS (GMLOS) (days):   Days to GMLOS:     OBJECTIVE:    Risk of Unplanned Readmission Score: 11.34      Current admission status: Inpatient     [facility] Pharm[facility]   [facility], [address] [facility]  [address]  Phone: [phone] Fax: [phone] [address] [place], [place] [address]vard [address] [address]  Phone: [phone] Fax: [phone]    Primary Care Provider: Williams Ureña MD    Primary Insurance: MEDICARE  Secondary Insurance: [employer]    ASSESSMENT:  Active Health Care Proxies    There are no active Health Care Proxies on file. Readmission Root Cause  30 Day Readmission: No    Patient Information  Admitted from[de-identified] Home  Mental Status: Alert  During Assessment patient was accompanied by: Not accompanied during assessment  Assessment information provided by[de-identified] Patient  Primary Caregiver: Self  Support Systems: Children, [address] of Residence: [address] [place] [address] do you live in?: [place], [place]  Type of Current Residence: [facility]  In the last 12 months, was there a time when you were not able to pay the mortgage or rent on time?: No  In the last 12 months, how many places have you lived?: 1  In the last 12 months, was there a time when you did not have a steady place to sleep or slept in a shelter (including now)?: No  Homeless/housing insecurity resource given?: N/A  Living Arrangements: Lives w/ Son (Lives with sons Carl Norman and Aparna Muñiz)    Activities of Daily Living Prior to Admission  Functional Status: Independent  Completes ADLs independently?: Yes  Ambulates independently?: Yes  Does patient use assisted devices?: No     Patient Information Continued  Income Source: Pension/intermediate  Does patient have prescription coverage?: Yes  Within the past 12 months, you worried that your food would run out before you got the money to buy more.: Never true  Within the past 12 months, the food you bought just didn't last and you didn't have money to get more.: Never true  Food insecurity resource given?: N/A  Does patient receive dialysis treatments?: No  Does patient have a history of Mental Health Diagnosis?: Yes (Hx of Major Depression)  Is patient receiving treatment for mental health?: No. Treatment options were provided.  (Waiting list for OP treatment)  Has patient received inpatient treatment related to mental health in the last 2 years?: No     Means of Transportation  Means of Transport to Appts[de-identified] Family transport  In the past 12 months, has lack of transportation kept you from medical appointments or from getting medications?: No  In the past 12 months, has lack of transportation kept you from meetings, work, or from getting things needed for daily living?: No  Was application for public transport provided?: N/A    DISCHARGE DETAILS:    Discharge planning discussed with[de-identified] Patient, Kb Edmonds  [place] of Choice: Yes     CM contacted family/caregiver?: Yes  Were Treatment Team discharge recommendations reviewed with patient/caregiver?: Yes  Did patient/caregiver verbalize understanding of patient care needs?: Yes  Were patient/caregiver advised of the risks associated with not following Treatment Team discharge recommendations?: Yes    Contacts  Patient Contacts: Aliya cardona)  Relationship to Patient[de-identified] Family  Contact Method: Phone  Phone Number: [phone]  Reason/Outcome: Emergency [address]         Is the patient interested in [facility] at discharge?: No    DME Referral Provided  Referral made for DME?: No    Treatment Team Recommendation: Inpatient Behavioral Health  Discharge Destination Plan[de-identified] Inpatient [facility] reached out to jatinder Costa to introduce role and discuss discharge plan. Tasha Costa confirmed she is aware of pending psychiatric evaluation and possible need for [facility] placement. SW reviewed basic information re process of [facility] placement. Tasha Costa had no questions at this time and requested to be updated with discharge planning.

## 2023-07-31 NOTE — PROGRESS NOTES
Patient who also has hx of gastric bypass, complaining of mild abdominal discomfort mostly heart burn and flatulence but reports it is more than usual and also had few episodes of non bloody diarrhea. She was on Nexium at home - ordered Protonix inpatient. No s/s of acute abdomen on examination.    Also complaining of PND - Flonase and one dose of robitussin ordered   [place] rn

## 2023-07-31 NOTE — PROGRESS NOTES
[address]  Progress Note  Name: Rosalba South  MRN: [id number]  Unit/Bed#: 3 [place] [address] I Date of Admission: 7/28/2023   Date of Service: 7/31/2023 I Hospital Day: 2    Assessment/Plan   * Intentional drug overdose [facility])  Assessment & Plan  History of cognitive decline depression insomnia and COPD presented to the hospital after intentional overdose of zolpidem  · This was a suicide attempt. The patient was seen by psychiatry with recommendation for inpatient treatment. · Patient is agreeable to voluntary treatment. · Continue 1: 1 observation  · Will need to wean off oxygen today. Suicide attempt [facility])  Assessment & Plan  · Appreciate psychiatry evaluation. Continue 1: 1 observation. · Will need psychiatric placement. Cognitive impairment  Assessment & Plan  · Continue donepezil    Primary insomnia  Assessment & Plan  · Prior to admission on zolpidem CR. · Currently on hold due to overdose    Sleep apnea  Assessment & Plan  · Has yet to follow-up for overnight sleep study    Chronic obstructive pulmonary disease ([address])  Assessment & Plan  · No exacerbation. Continue Breo  · Does have tracheobronchitis. Ordered azithromycin    SVT (supraventricular tachycardia) (HCC)  Assessment & Plan  · History of SVT status post ablation. Continue nebivolol. Esophageal reflux  Assessment & Plan  · Quite symptomatic. Continue pantoprazole. · GI was consulted    Moderate episode of recurrent major depressive disorder ([address])  Assessment & Plan  · Continue wellbutrin buspirone and alprazolam.  · Psychiatry consulted recommending inpatient psychiatric treatment and decreasing escitalopram to home dosing 10 mg    Essential hypertension  Assessment & Plan  · Continue nebivolol    Smoking  Assessment & Plan  · Nicotine patch ordered    VTE Pharmacologic Prophylaxis:   High Risk (Score >/= 5) - Pharmacological DVT Prophylaxis Ordered: enoxaparin (Lovenox).  Sequential Compression Devices Ordered. Patient Centered Rounds: I have performed bedside rounds with nursing staff today. Discussions with Specialists or Other Care Team Provider: Case management and psychiatrist    Education and Discussions with Family / Patient: Updated  (granddaughter) via phone. Time Spent for Care: This time was spent on one or more of the following: performing physical exam; counseling and coordination of care; obtaining or reviewing history; documenting in the medical record; reviewing/ordering tests, medications or procedures; communicating with other healthcare professionals and discussing with patient's family/caregivers. Current Length of Stay: 2 day(s)  Current Patient Status: Inpatient   Certification Statement: The patient will continue to require additional inpatient hospital stay due to weaning oxygen before she can go to psychiatry  Discharge Plan: Anticipate discharge tomorrow to inpatient psych. Code Status: Level 1 - Full Code      Subjective:   Patient seen and examined. Complaining of heartburn and mucus production    Objective:   Vitals: Blood pressure 139/63, pulse 61, temperature 97.6 °F (36.4 °C), temperature source Oral, resp. rate 20, SpO2 99 %, not currently breastfeeding. Intake/Output Summary (Last 24 hours) at 7/31/2023 1230  Last data filed at 7/31/2023 1050  Gross per 24 hour   Intake 120 ml   Output 300 ml   Net -180 ml       Physical Exam  Vitals reviewed. Constitutional:       General: She is not in acute distress. Appearance: Normal appearance. HENT:      Head: Atraumatic. Eyes:      General: No scleral icterus. Cardiovascular:      Rate and Rhythm: Regular rhythm. Pulmonary:      Breath sounds: Decreased breath sounds present. No wheezing. Abdominal:      Palpations: Abdomen is soft. Tenderness: There is no guarding or rebound. Musculoskeletal:         General: No swelling. Skin:     General: Skin is warm.    Neurological: Mental Status: She is alert. Psychiatric:         Mood and Affect: Mood normal.       Additional Data:   Labs:  Results from last 7 days   Lab Units 07/30/23  0456 07/29/23  0626 07/28/23  1926   WBC Thousand/uL 7.24 7.70 8.69   HEMOGLOBIN g/dL 13.9 14.3 15.2   PLATELETS Thousands/uL 268 242 274   MCV fL 91 92 91     Results from last 7 days   Lab Units 07/30/23  0456 07/29/23  0626 07/28/23  2201   SODIUM mmol/L 138 137 137   POTASSIUM mmol/L 3.5 4.1 5.6*   CHLORIDE mmol/L 108 108 109*   CO2 mmol/L 22 24 22   ANION GAP mmol/L 8 5 6   BUN mg/dL 9 10 13   CREATININE mg/dL 0.64 0.67 0.67   CALCIUM mg/dL 8.7 8.5 8.1*   ALBUMIN g/dL  --  3.6 3.5   TOTAL BILIRUBIN mg/dL  --  0.67 0.55   ALK PHOS U/L  --  67 62   ALT U/L  --  6* 6*   AST U/L  --  12* 18   EGFR ml/min/1.73sq m 87 86 86   GLUCOSE RANDOM mg/dL 94 97 95     Results from last 7 days   Lab Units 07/30/23  0456 07/29/23 0626 07/28/23 2201   MAGNESIUM mg/dL 1.9 2.1 2.0                              Results from last 7 days   Lab Units 07/28/23 1926   TSH 3RD GENERATON uIU/mL 4.327     * I Have Reviewed All Lab Data Listed Above. Cultures:                   Lines/Drains:  Invasive Devices     Peripheral Intravenous Line  Duration           Peripheral IV 07/28/23 Right;Ventral (anterior) Forearm 2 days              Telemetry:      Imaging:  Imaging Reports Reviewed Today Include:   CT abdomen pelvis with contrast    Addendum Date: 7/29/2023    ADDENDUM: Addendum is made to the initial report. The following fields should be as follows: REPRODUCTIVE ORGANS: Supracervical hysterectomy. IMPRESSION: No CT evidence of acute findings. Gastric bypass. Colonic diverticulosis. Result Date: 7/29/2023  Impression: No CT evidence of acute findings. Gastric bypass. Colonic diverticulosis. Enlarged prostate. Correlation with PSA levels is advised.  Workstation performed: [id number]     CT head wo contrast    Result Date: 7/29/2023  Impression: No evidence of acute intracranial hemorrhage or mass effect. Mild to moderate microangiopathic change, progressed from remote prior study. Workstation performed: [id number]       Scheduled Meds:  Current Facility-Administered Medications   Medication Dose Route Frequency Provider Last Rate   • acetaminophen  650 mg Oral Q6H PRN Tiburcio Shi MD     • albuterol  2.5 mg Nebulization Q6H PRN KYLE Irwin     • allopurinol  100 mg Oral Daily KYLE Irwin     • ALPRAZolam  0.25 mg Oral Daily PRN KYLE Irwin     • aspirin  81 mg Oral Daily KYLE Irwin     • [START ON 8/1/2023] azithromycin  250 mg Oral Q24H Bipin Araujo DO     • buPROPion  300 mg Oral QAM KYLE Irwin     • busPIRone  5 mg Oral BID KYLE Irwin     • donepezil  5 mg Oral HS KYLE Irwin     • enoxaparin  40 mg Subcutaneous Daily KYLE Irwin     • [START ON 8/1/2023] escitalopram  10 mg Oral Daily Moshe Lovell DO     • fluticasone  1 spray Each Nare Daily Sallye Closs, MD     • Fluticasone Furoate-Vilanterol  1 puff Inhalation Daily KYLE Irwin     • ketotifen  1 drop Both Eyes BID KYLE Irwin     • Lidocaine Viscous HCl  15 mL Swish & Spit Once Broderick Marin DO     • melatonin  6 mg Oral HS PRN Sallye Closs, MD     • multivitamin-minerals  1 tablet Oral Daily KYLE Irwin     • nebivolol  5 mg Oral QAM KYLE Irwin     • nicotine  1 patch Transdermal Daily KYLE Irwin     • ondansetron  4 mg Intravenous Q6H PRN KYLE Irwin     • pantoprazole  40 mg Oral Early Morning Sallye Closs, MD         Today, Patient Was Seen By: Broderick Marin DO    ** Please Note: Dictation voice to text software may have been used in the creation of this document.  **
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
## 2023-07-31 NOTE — PROGRESS NOTES
Plan of care. Patient needs inpatient psychiatric treatment. Unfortunately at this time not medically stable for transfer yet as GI planning EGD on Wednesday. In addition patient needs to be weaned off supplemental oxygen. Anticipated stability for discharge to psychiatry on/approximately Thursday, 8/3/2023.

## 2023-07-31 NOTE — PLAN OF CARE
Problem: RESPIRATORY - ADULT  Goal: Achieves optimal ventilation and oxygenation  Description: INTERVENTIONS:  - Assess for changes in respiratory status  - Assess for changes in mentation and behavior  - Position to facilitate oxygenation and minimize respiratory effort  - Oxygen administered by appropriate delivery if ordered  - Initiate smoking cessation education as indicated  - Encourage broncho-pulmonary hygiene including cough, deep breathe, Incentive Spirometry  - Assess the need for suctioning and aspirate as needed  - Assess and instruct to report SOB or any respiratory difficulty  - Respiratory Therapy support as indicated  Outcome: Progressing     Problem: MOBILITY - ADULT  Goal: Maintain or return to baseline ADL function  Description: INTERVENTIONS:  -  Assess patient's ability to carry out ADLs; assess patient's baseline for ADL function and identify physical deficits which impact ability to perform ADLs (bathing, care of mouth/teeth, toileting, grooming, dressing, etc.)  - Assess/evaluate cause of self-care deficits   - Assess range of motion  - Assess patient's mobility; develop plan if impaired  - Assess patient's need for assistive devices and provide as appropriate  - Encourage maximum independence but intervene and supervise when necessary  - Involve family in performance of ADLs  - Assess for home care needs following discharge   - Consider OT consult to assist with ADL evaluation and planning for discharge  - Provide patient education as appropriate  Outcome: Progressing  Goal: Maintains/Returns to pre admission functional level  Description: INTERVENTIONS:  - Perform BMAT or MOVE assessment daily.   - Set and communicate daily mobility goal to care team and patient/family/caregiver. - Collaborate with rehabilitation services on mobility goals if consulted  - Perform Range of Motion 3 times a day. - Reposition patient every 3 hours.   - Dangle patient 3 times a day  - Stand patient 3 times a day  - Ambulate patient 3 times a day  - Out of bed to chair 3 times a day   - Out of bed for meals 3 times a day  - Out of bed for toileting  - Record patient progress and toleration of activity level   Outcome: Progressing     Problem: PAIN - ADULT  Goal: Verbalizes/displays adequate comfort level or baseline comfort level  Description: Interventions:  - Encourage patient to monitor pain and request assistance  - Assess pain using appropriate pain scale  - Administer analgesics based on type and severity of pain and evaluate response  - Implement non-pharmacological measures as appropriate and evaluate response  - Consider cultural and social influences on pain and pain management  - Notify physician/advanced practitioner if interventions unsuccessful or patient reports new pain  Outcome: Progressing     Problem: INFECTION - ADULT  Goal: Absence or prevention of progression during hospitalization  Description: INTERVENTIONS:  - Assess and monitor for signs and symptoms of infection  - Monitor lab/diagnostic results  - Monitor all insertion sites, i.e. indwelling lines, tubes, and drains  - Monitor endotracheal if appropriate and nasal secretions for changes in amount and color  - [place] appropriate cooling/warming therapies per order  - Administer medications as ordered  - Instruct and encourage patient and family to use good hand hygiene technique  - Identify and instruct in appropriate isolation precautions for identified infection/condition  Outcome: Progressing  Goal: Absence of fever/infection during neutropenic period  Description: INTERVENTIONS:  - Monitor WBC    Outcome: Progressing     Problem: SAFETY ADULT  Goal: Patient will remain free of falls  Description: INTERVENTIONS:  - Educate patient/family on patient safety including physical limitations  - Instruct patient to call for assistance with activity   - Consult OT/PT to assist with strengthening/mobility   - Keep Call bell within reach  - Keep bed low and locked with side rails adjusted as appropriate  - Keep care items and personal belongings within reach  - Initiate and maintain comfort rounds  - Make Fall Risk Sign visible to staff  - Offer Toileting every 2 Hours, in advance of need  - Initiate/Maintain bed alarm  - Obtain necessary fall risk management equipment: call bell   - Apply yellow socks and bracelet for high fall risk patients  - Consider moving patient to room near nurses station  Outcome: Progressing     Problem: DISCHARGE PLANNING  Goal: Discharge to home or other facility with appropriate resources  Description: INTERVENTIONS:  - Identify barriers to discharge w/patient and caregiver  - Arrange for needed discharge resources and transportation as appropriate  - Identify discharge learning needs (meds, wound care, etc.)  - Arrange for interpretive services to assist at discharge as needed  - Refer to Case Management Department for coordinating discharge planning if the patient needs post-hospital services based on physician/advanced practitioner order or complex needs related to functional status, cognitive ability, or social support system  Outcome: Progressing     Problem: Knowledge Deficit  Goal: Patient/family/caregiver demonstrates understanding of disease process, treatment plan, medications, and discharge instructions  Description: Complete learning assessment and assess knowledge base.   Interventions:  - Provide teaching at level of understanding  - Provide teaching via preferred learning methods  Outcome: Progressing

## 2023-08-01 PROBLEM — E87.6 HYPOKALEMIA: Status: ACTIVE | Noted: 2023-08-01

## 2023-08-01 LAB
ALBUMIN SERPL BCP-MCNC: 3.6 G/DL (ref 3.5–5)
ALP SERPL-CCNC: 57 U/L (ref 34–104)
ALT SERPL W P-5'-P-CCNC: 5 U/L (ref 7–52)
ANION GAP SERPL CALCULATED.3IONS-SCNC: 8 MMOL/L
AST SERPL W P-5'-P-CCNC: 13 U/L (ref 13–39)
BILIRUB SERPL-MCNC: 0.73 MG/DL (ref 0.2–1)
BUN SERPL-MCNC: 11 MG/DL (ref 5–25)
CALCIUM SERPL-MCNC: 8.8 MG/DL (ref 8.4–10.2)
CAMPYLOBACTER DNA SPEC NAA+PROBE: NORMAL
CHLORIDE SERPL-SCNC: 104 MMOL/L (ref 96–108)
CO2 SERPL-SCNC: 25 MMOL/L (ref 21–32)
CREAT SERPL-MCNC: 0.73 MG/DL (ref 0.6–1.3)
ERYTHROCYTE [DISTWIDTH] IN BLOOD BY AUTOMATED COUNT: 14.6 % (ref 11.6–15.1)
G LAMBLIA AG STL QL IA: NEGATIVE
GFR SERPL CREATININE-BSD FRML MDRD: 80 ML/MIN/1.73SQ M
GLUCOSE SERPL-MCNC: 86 MG/DL (ref 65–140)
HCT VFR BLD AUTO: 40.3 % (ref 34.8–46.1)
HGB BLD-MCNC: 13.3 G/DL (ref 11.5–15.4)
MCH RBC QN AUTO: 29.8 PG (ref 26.8–34.3)
MCHC RBC AUTO-ENTMCNC: 33 G/DL (ref 31.4–37.4)
MCV RBC AUTO: 90 FL (ref 82–98)
PLATELET # BLD AUTO: 249 THOUSANDS/UL (ref 149–390)
PMV BLD AUTO: 10.8 FL (ref 8.9–12.7)
POTASSIUM SERPL-SCNC: 3.2 MMOL/L (ref 3.5–5.3)
PROT SERPL-MCNC: 6.2 G/DL (ref 6.4–8.4)
RBC # BLD AUTO: 4.47 MILLION/UL (ref 3.81–5.12)
SALMONELLA DNA SPEC QL NAA+PROBE: NORMAL
SHIGA TOXIN STX GENE SPEC NAA+PROBE: NORMAL
SHIGELLA DNA SPEC QL NAA+PROBE: NORMAL
SODIUM SERPL-SCNC: 137 MMOL/L (ref 135–147)
WBC # BLD AUTO: 9.2 THOUSAND/UL (ref 4.31–10.16)
WBC SPEC QL GRAM STN: NORMAL

## 2023-08-01 PROCEDURE — 99232 SBSQ HOSP IP/OBS MODERATE 35: CPT | Performed by: INTERNAL MEDICINE

## 2023-08-01 PROCEDURE — 85027 COMPLETE CBC AUTOMATED: CPT | Performed by: INTERNAL MEDICINE

## 2023-08-01 PROCEDURE — 80053 COMPREHEN METABOLIC PANEL: CPT | Performed by: INTERNAL MEDICINE

## 2023-08-01 RX ORDER — POTASSIUM CHLORIDE 20 MEQ/1
40 TABLET, EXTENDED RELEASE ORAL ONCE
Status: COMPLETED | OUTPATIENT
Start: 2023-08-01 | End: 2023-08-01

## 2023-08-01 RX ADMIN — ACETAMINOPHEN 650 MG: 325 TABLET ORAL at 05:42

## 2023-08-01 RX ADMIN — ALPRAZOLAM 0.25 MG: 0.25 TABLET ORAL at 22:04

## 2023-08-01 RX ADMIN — ASPIRIN 81 MG: 81 TABLET, COATED ORAL at 08:36

## 2023-08-01 RX ADMIN — FLUTICASONE PROPIONATE 1 SPRAY: 50 SPRAY, METERED NASAL at 08:34

## 2023-08-01 RX ADMIN — ALLOPURINOL 100 MG: 100 TABLET ORAL at 08:36

## 2023-08-01 RX ADMIN — AZITHROMYCIN MONOHYDRATE 250 MG: 250 TABLET ORAL at 09:47

## 2023-08-01 RX ADMIN — PANTOPRAZOLE SODIUM 40 MG: 40 TABLET, DELAYED RELEASE ORAL at 17:36

## 2023-08-01 RX ADMIN — ESCITALOPRAM OXALATE 10 MG: 10 TABLET ORAL at 09:47

## 2023-08-01 RX ADMIN — SUCRALFATE 1 G: 1 TABLET ORAL at 11:57

## 2023-08-01 RX ADMIN — Medication 1 TABLET: at 08:36

## 2023-08-01 RX ADMIN — SUCRALFATE 1 G: 1 TABLET ORAL at 05:41

## 2023-08-01 RX ADMIN — POTASSIUM CHLORIDE 40 MEQ: 1500 TABLET, EXTENDED RELEASE ORAL at 17:36

## 2023-08-01 RX ADMIN — DONEPEZIL HYDROCHLORIDE 5 MG: 5 TABLET ORAL at 22:07

## 2023-08-01 RX ADMIN — PANTOPRAZOLE SODIUM 40 MG: 40 TABLET, DELAYED RELEASE ORAL at 05:42

## 2023-08-01 RX ADMIN — FLUTICASONE FUROATE AND VILANTEROL TRIFENATATE 1 PUFF: 100; 25 POWDER RESPIRATORY (INHALATION) at 08:35

## 2023-08-01 RX ADMIN — NICOTINE 1 PATCH: 7 PATCH, EXTENDED RELEASE TRANSDERMAL at 08:38

## 2023-08-01 RX ADMIN — KETOTIFEN FUMARATE 1 DROP: 0.25 SOLUTION/ DROPS OPHTHALMIC at 17:37

## 2023-08-01 RX ADMIN — SUCRALFATE 1 G: 1 TABLET ORAL at 22:07

## 2023-08-01 RX ADMIN — KETOTIFEN FUMARATE 1 DROP: 0.25 SOLUTION/ DROPS OPHTHALMIC at 08:38

## 2023-08-01 RX ADMIN — Medication 250 MG: at 08:35

## 2023-08-01 RX ADMIN — ACETAMINOPHEN 650 MG: 325 TABLET ORAL at 13:20

## 2023-08-01 RX ADMIN — Medication 250 MG: at 17:36

## 2023-08-01 RX ADMIN — SUCRALFATE 1 G: 1 TABLET ORAL at 17:36

## 2023-08-01 RX ADMIN — ENOXAPARIN SODIUM 40 MG: 40 INJECTION SUBCUTANEOUS at 08:37

## 2023-08-01 RX ADMIN — BUSPIRONE HYDROCHLORIDE 5 MG: 5 TABLET ORAL at 08:36

## 2023-08-01 RX ADMIN — NEBIVOLOL 5 MG: 5 TABLET ORAL at 08:35

## 2023-08-01 RX ADMIN — BUPROPION HYDROCHLORIDE 300 MG: 150 TABLET, EXTENDED RELEASE ORAL at 08:36

## 2023-08-01 RX ADMIN — BUSPIRONE HYDROCHLORIDE 5 MG: 5 TABLET ORAL at 17:36

## 2023-08-01 NOTE — PROGRESS NOTES
Progress Note- Lary Schwab 74 y.o. female MRN: [id number]    Unit/Bed#: [address] [place] [address] Encounter: [id number]      Assessment and Plan:    1. Epigastric abdominal pain with associated nausea/vomiting  2. History of Debra-en-Y gastric bypass w/ remote history of marginal ulcer  3. GERD with esophagitis  Epigastric abdominal pain with associated nausea/vomiting may be secondary to marginal ulcer in the setting of continued tobacco use, GERD with esophagitis/gastritis, gastroparesis, mild pancreatitis. Lipase 3x ULN at 316 but CT abdomen pelvis showed no acute findings. LFTs normal and she is s/p cholecystectomy. She had EGD last May showing grade A esophagitis otherwise normal and no H. pylori on biopsy. Pt reports N/V, epigastric pain is resolved today. She is also feeling better with less PND after Z-pack ordered.     -Continue pantoprazole to twice daily, add Carafate 4 times daily  -Continue soft, non ulcerogenic, low-fat/low fiber  -Continue avoidance of NSAIDs  -Recommend smoking cessation  -We will plan for EGD tomorrow. Prep and procedure explained     4. Diarrhea:  Pt having loose stool yesterday. She is up-to-date on colonoscopy with last colonoscopy in May 2022 negative for microscopic colitis on random biopsies. She is currently on Azithromycin for suspected URI and loose stool may be antibiotic induced, viral, or from IBS, SIBO. Diarrhea now resolved.     -Continue probiotic  -Enteric panel, giardia negative; fecal leukocytes pending  -Monitor stool output    ______________________________________________________________________    Subjective:     Patient in good spirits today. She reports epigastric pain, nausea/vomiting and postnasal drip has resolved. She tolerated breakfast this morning. Has not had any further diarrhea but is passing lots of gas.     Medication Administration - last 24 hours from 07/31/2023 1639 to 08/01/2023 1639       Date/Time Order Dose Route Action Action by 08/01/2023 0836 EDT allopurinol (ZYLOPRIM) tablet 100 mg 100 mg Oral Given [facility]     07/31/2023 2105 EDT ALPRAZolam Chago Lilly) tablet 0.25 mg 0.25 mg Oral Given Ariel Carmen RN     08/01/2023 8238 EDT aspirin (ECOTRIN LOW STRENGTH) EC tablet 81 mg 81 mg Oral Given [facility]     08/01/2023 2454 EDT buPROPion (WELLBUTRIN XL) 24 hr tablet 300 mg 300 mg Oral Given [facility]     08/01/2023 0836 EDT busPIRone (BUSPAR) tablet 5 mg 5 mg Oral Given [facility]     07/31/2023 1816 EDT busPIRone (BUSPAR) tablet 5 mg 5 mg Oral Given Melody Folds, CHAD     07/31/2023 2105 EDT donepezil (ARICEPT) tablet 5 mg 5 mg Oral Given Ariel Carmen RN     08/01/2023 2134 EDT multivitamin-minerals (CENTRUM) tablet 1 tablet 1 tablet Oral Given [facility]     08/01/2023 0835 EDT nebivolol (BYSTOLIC) tablet 5 mg 5 mg Oral Given [facility]     08/01/2023 0097 EDT ketotifen (ZADITOR) 0.025 % ophthalmic solution 1 drop 1 drop Both Eyes Given [facility]     07/31/2023 1816 EDT ketotifen (ZADITOR) 0.025 % ophthalmic solution 1 drop 1 drop Both Eyes Given Melody [facility], RN     08/01/2023 6969 EDT nicotine (NICODERM CQ) 7 mg/24hr TD 24 hr patch 1 patch 1 patch Transdermal Medication Applied Katrina Merly     08/01/2023 0835 EDT nicotine (NICODERM CQ) 7 mg/24hr TD 24 hr patch 1 patch 1 patch Transdermal Patch Removed [facility]     08/01/2023 0837 EDT enoxaparin (LOVENOX) subcutaneous injection 40 mg 40 mg Subcutaneous Given [facility]     08/01/2023 1320 EDT acetaminophen (TYLENOL) tablet 650 mg 650 mg Oral Given Madiha Stoll     08/01/2023 0542 EDT acetaminophen (TYLENOL) tablet 650 mg 650 mg Oral Given Ariel Carmen RN     08/01/2023 4166 EDT Fluticasone Furoate-Vilanterol 100-25 mcg/actuation 1 puff 1 puff Inhalation Given Annamarie Cohen     08/01/2023 0834 EDT fluticasone (FLONASE) 50 mcg/act nasal spray 1 spray 1 spray Each Nare Given Annamarie Cohen     08/01/2023 0947 EDT azithromycin (ZITHROMAX) tablet 250 mg 250 mg Oral Given Madiha Stoll     08/01/2023 0947 EDT escitalopram (LEXAPRO) tablet 10 mg 10 mg Oral Given Madiha Stoll     08/01/2023 0542 EDT pantoprazole (PROTONIX) EC tablet 40 mg 40 mg Oral Given Christ Montalvo RN     08/01/2023 1157 EDT sucralfate (CARAFATE) tablet 1 g 1 g Oral Given Madiha Stoll     08/01/2023 0541 EDT sucralfate (CARAFATE) tablet 1 g 1 g Oral Given Christ Montalvo RN     07/31/2023 2105 EDT sucralfate (CARAFATE) tablet 1 g 1 g Oral Given Christ Montalvo RN     07/31/2023 1815 EDT sucralfate (CARAFATE) tablet 1 g 1 g Oral Given Blanco Hooks RN     08/01/2023 3313 EDT saccharomyces boulardii (FLORASTOR) capsule 250 mg 250 mg Oral Given Kalen Yi     07/31/2023 1816 EDT saccharomyces boulardii (FLORASTOR) capsule 250 mg 250 mg Oral Given Blanco Hooks RN          Objective:     Vitals: Blood pressure 113/61, pulse 80, temperature 98.8 °F (37.1 °C), temperature source Oral, resp. rate 18, SpO2 95 %, not currently breastfeeding. ,There is no height or weight on file to calculate BMI.     No intake or output data in the 24 hours ending 08/01/23 1639    Physical Exam:   General Appearance: Awake and alert, in no acute distress  Chest: clear to auscultation, no rales or rhonchi  Cardiac: S1 & S2 normal, no murmur or gallop  Abdomen: Soft, non-tender, non-distended; bowel sounds normal; no masses or no organomegaly    Invasive Devices     Peripheral Intravenous Line  Duration           Peripheral IV 07/28/23 Right;Ventral (anterior) Forearm 3 days                Lab Results:  Admission on 07/28/2023   Component Date Value   • WBC 07/28/2023 8.69    • RBC 07/28/2023 5.09    • Hemoglobin 07/28/2023 15.2    • Hematocrit 07/28/2023 46.4 (H)    • MCV 07/28/2023 91    • MCH 07/28/2023 29.9    • MCHC 07/28/2023 32.8    • RDW 07/28/2023 14.9    • MPV 07/28/2023 10.2    • Platelets 29/44/8388 274    • nRBC 07/28/2023 0    • Neutrophils Relative 07/28/2023 58    • Immat GRANS % 07/28/2023 1    • Lymphocytes Relative 07/28/2023 30    • Monocytes Relative 07/28/2023 8    • Eosinophils Relative 07/28/2023 2    • Basophils Relative 07/28/2023 1    • Neutrophils Absolute 07/28/2023 5.12    • Immature Grans Absolute 07/28/2023 0.05    • Lymphocytes Absolute 07/28/2023 2.59    • Monocytes Absolute 07/28/2023 0.69    • Eosinophils Absolute 07/28/2023 0.18    • Basophils Absolute 07/28/2023 0.06    • Sodium 07/28/2023 137    • Potassium 07/28/2023 5.6 (H)    • Chloride 07/28/2023 109 (H)    • CO2 07/28/2023 22    • ANION GAP 07/28/2023 6    • BUN 07/28/2023 13    • Creatinine 07/28/2023 0.67    • Glucose 07/28/2023 95    • Calcium 07/28/2023 8.1 (L)    • AST 07/28/2023 18    • ALT 07/28/2023 6 (L)    • Alkaline Phosphatase 07/28/2023 62    • Total Protein 07/28/2023 5.8 (L)    • Albumin 07/28/2023 3.5    • Total Bilirubin 07/28/2023 0.55    • eGFR 07/28/2023 86    • Amph/Meth UR 07/29/2023 Negative    • Barbiturate Ur 07/29/2023 Negative    • Benzodiazepine Urine 07/29/2023 Negative    • Cocaine Urine 07/29/2023 Negative    • Methadone Urine 07/29/2023 Negative    • Opiate Urine 07/29/2023 Negative    • PCP Ur 07/29/2023 Negative    • THC Urine 07/29/2023 Positive (A)    • Oxycodone Urine 07/29/2023 Negative    • Ethanol Lvl 39/25/7854 <34    • Salicylate Lvl 43/12/6408 <5    • Acetaminophen Level 07/28/2023 <10 (L)    • TSH 3RD GENERATON 07/28/2023 4. 327    • Color, UA 07/29/2023 Yellow    • Clarity, UA 07/29/2023 Clear    • Specific Gravity, UA 07/29/2023 1.015    • pH, UA 07/29/2023 5.0    • Leukocytes, UA 07/29/2023 Negative    • Nitrite, UA 07/29/2023 Positive (A)    • Protein, UA 07/29/2023 Negative    • Glucose, UA 07/29/2023 Negative    • Ketones, UA 07/29/2023 Negative    • Urobilinogen, UA 07/29/2023 0.2    • Bilirubin, UA 07/29/2023 Negative    • Occult Blood, UA 07/29/2023 Negative    • Magnesium 07/28/2023 2.0    • Sodium 07/29/2023 137    • Potassium 07/29/2023 4.1    • Chloride 07/29/2023 108    • CO2 07/29/2023 24    • ANION GAP 07/29/2023 5    • BUN 07/29/2023 10    • Creatinine 07/29/2023 0.67    • Glucose 07/29/2023 97    • Calcium 07/29/2023 8.5    • AST 07/29/2023 12 (L)    • ALT 07/29/2023 6 (L)    • Alkaline Phosphatase 07/29/2023 67    • Total Protein 07/29/2023 6.2 (L)    • Albumin 07/29/2023 3.6    • Total Bilirubin 07/29/2023 0.67    • eGFR 07/29/2023 86    • Magnesium 07/29/2023 2.1    • WBC 07/29/2023 7.70    • RBC 07/29/2023 4.78    • Hemoglobin 07/29/2023 14.3    • Hematocrit 07/29/2023 43.8    • MCV 07/29/2023 92    • MCH 07/29/2023 29.9    • MCHC 07/29/2023 32.6    • RDW 07/29/2023 14.9    • Platelets 96/05/3639 242    • MPV 07/29/2023 10.4    • RBC, UA 07/29/2023 None Seen    • WBC, UA 07/29/2023 0-1    • Epithelial Cells 07/29/2023 Occasional    • Bacteria, UA 07/29/2023 Occasional    • WBC 07/30/2023 7.24    • RBC 07/30/2023 4.71    • Hemoglobin 07/30/2023 13.9    • Hematocrit 07/30/2023 42.9    • MCV 07/30/2023 91    • MCH 07/30/2023 29.5    • MCHC 07/30/2023 32.4    • RDW 07/30/2023 14.6    • MPV 07/30/2023 10.9    • Platelets 75/48/3598 268    • nRBC 07/30/2023 0    • Neutrophils Relative 07/30/2023 68    • Immat GRANS % 07/30/2023 0    • Lymphocytes Relative 07/30/2023 20    • Monocytes Relative 07/30/2023 9    • Eosinophils Relative 07/30/2023 3    • Basophils Relative 07/30/2023 0    • Neutrophils Absolute 07/30/2023 4.92    • Immature Grans Absolute 07/30/2023 0.03    • Lymphocytes Absolute 07/30/2023 1.44    • Monocytes Absolute 07/30/2023 0.64    • Eosinophils Absolute 07/30/2023 0.18    • Basophils Absolute 07/30/2023 0.03    • Sodium 07/30/2023 138    • Potassium 07/30/2023 3.5    • Chloride 07/30/2023 108    • CO2 07/30/2023 22    • ANION GAP 07/30/2023 8    • BUN 07/30/2023 9    • Creatinine 07/30/2023 0.64    • Glucose 07/30/2023 94    • Calcium 07/30/2023 8.7    • eGFR 07/30/2023 87    • Magnesium 07/30/2023 1.9    • Salmonella sp PCR 07/31/2023 None Detected    • Shigella sp/Enteroinvasi* 07/31/2023 None Detected    • Campylobacter sp (jejuni* 07/31/2023 None Detected    • Shiga toxin 1/Shiga toxi* 07/31/2023 None Detected    • Giardia Ag, Stl 07/31/2023 Negative    • Ventricular Rate 07/28/2023 87    • Atrial Rate 07/28/2023 87    • RI Interval 07/28/2023 176    • QRSD Interval 07/28/2023 126    • QT Interval 07/28/2023 402    • QTC Interval 07/28/2023 483    • P Axis 07/28/2023 88    • QRS Axis 07/28/2023 70    • T Wave Axis 07/28/2023 27    • Lipase 07/30/2023 316 (H)    • Sodium 08/01/2023 137    • Potassium 08/01/2023 3.2 (L)    • Chloride 08/01/2023 104    • CO2 08/01/2023 25    • ANION GAP 08/01/2023 8    • BUN 08/01/2023 11    • Creatinine 08/01/2023 0.73    • Glucose 08/01/2023 86    • Calcium 08/01/2023 8.8    • AST 08/01/2023 13    • ALT 08/01/2023 5 (L)    • Alkaline Phosphatase 08/01/2023 57    • Total Protein 08/01/2023 6.2 (L)    • Albumin 08/01/2023 3.6    • Total Bilirubin 08/01/2023 0.73    • eGFR 08/01/2023 80    • WBC 08/01/2023 9.20    • RBC 08/01/2023 4.47    • Hemoglobin 08/01/2023 13.3    • Hematocrit 08/01/2023 40.3    • MCV 08/01/2023 90    • MCH 08/01/2023 29.8    • MCHC 08/01/2023 33.0    • RDW 08/01/2023 14.6    • Platelets 14/49/8855 249    • MPV 08/01/2023 10.8        Imaging Studies: I have personally reviewed pertinent imaging studies.

## 2023-08-01 NOTE — ASSESSMENT & PLAN NOTE
· Continue donepezil  · Patient being worked up as an outpatient and PCP has ordered MRI of brain with/without contrast

## 2023-08-01 NOTE — ASSESSMENT & PLAN NOTE
History of cognitive decline depression insomnia and COPD presented to the hospital after intentional overdose of zolpidem  · This was a suicide attempt. The patient was seen by psychiatry with recommendation for inpatient treatment. · Patient is agreeable to voluntary treatment. · Continue 1: 1 observation  · Weaned off supplemental oxygen. · Disposition:  To psychiatry when cleared by GI after EGD

## 2023-08-01 NOTE — ASSESSMENT & PLAN NOTE
· Will replace again    Results from last 7 days   Lab Units 08/02/23  0556 08/01/23  0524 07/30/23  0456 07/29/23  0626   POTASSIUM mmol/L 3.4* 3.2* 3.5 4.1

## 2023-08-01 NOTE — PROGRESS NOTES
[address] [facility] ([place], [place])  Progress Note  Name: Yari Toledo  MRN: [id number]  Unit/Bed#: 3 [address]  Date of Admission: 7/28/2023   Date of Service: 8/1/2023 I Hospital Day: 3    Assessment/Plan   * Intentional drug overdose [facility])  Assessment & Plan  History of cognitive decline depression insomnia and COPD presented to the hospital after intentional overdose of zolpidem  · This was a suicide attempt. The patient was seen by psychiatry with recommendation for inpatient treatment. · Patient is agreeable to voluntary treatment. · Continue 1: 1 observation  · Weaned off supplemental oxygen. · Disposition: To psychiatry when cleared by GI after EGD    Suicide attempt [facility])  Assessment & Plan  · Appreciate psychiatry evaluation. Continue 1: 1 observation. · Will need psychiatric placement. Cognitive impairment  Assessment & Plan  · Continue donepezil  · Patient being worked up as an outpatient and PCP has ordered MRI of brain with/without contrast    Primary insomnia  Assessment & Plan  · Prior to admission on zolpidem CR. · Currently on hold due to overdose    Sleep apnea  Assessment & Plan  · Was scheduled to see sleep medicine on day of admission    Chronic obstructive pulmonary disease (HCC)  Assessment & Plan  · No exacerbation. Continue Breo  · Does have tracheobronchitis. Ordered azithromycin with much improvement    SVT (supraventricular tachycardia) ([facility])  Assessment & Plan  · History of SVT status post ablation. Continue nebivolol. Esophageal reflux  Assessment & Plan  · Quite symptomatic. Continue pantoprazole.     · GI was consulted with recommendation for EGD    Moderate episode of recurrent major depressive disorder ([address])  Assessment & Plan  · Continue wellbutrin buspirone and alprazolam.  · Psychiatry consulted recommending inpatient psychiatric treatment and decreasing escitalopram to home dosing 10 mg    Essential hypertension  Assessment & Plan  · Continue nebivolol    Smoking  Assessment & Plan  · Nicotine patch ordered    VTE Pharmacologic Prophylaxis:   High Risk (Score >/= 5) - Pharmacological DVT Prophylaxis Ordered: enoxaparin (Lovenox). Sequential Compression Devices Ordered. Patient Centered Rounds: I have performed bedside rounds with nursing staff today. Discussions with Specialists or Other Care Team Provider: GI and case management    Education and Discussions with Family / Patient: Updated  (granddaughter) via phone. Time Spent for Care: This time was spent on one or more of the following: performing physical exam; counseling and coordination of care; obtaining or reviewing history; documenting in the medical record; reviewing/ordering tests, medications or procedures; communicating with other healthcare professionals and discussing with patient's family/caregivers. Current Length of Stay: 3 day(s)  Current Patient Status: Inpatient   Certification Statement: The patient will continue to require additional inpatient hospital stay due to Need for GI work-up  Discharge Plan: Anticipate discharge in 24-48 hrs to inpatient psych. Code Status: Level 1 - Full Code      Subjective:   Patient seen and examined. Reports that her nasal congestion/sinusitis symptoms have much improved. Skin about MRI which was ordered as an outpatient    Objective:   Vitals: Blood pressure 113/61, pulse 80, temperature 98.8 °F (37.1 °C), temperature source Oral, resp. rate 18, SpO2 95 %, not currently breastfeeding. No intake or output data in the 24 hours ending 08/01/23 1553    Physical Exam  Vitals reviewed. Constitutional:       General: She is not in acute distress. Appearance: Normal appearance. HENT:      Head: Atraumatic. Cardiovascular:      Rate and Rhythm: Regular rhythm. Heart sounds: Normal heart sounds. Pulmonary:      Effort: Pulmonary effort is normal.      Breath sounds: No wheezing.    Abdominal:      General: There is no distension. Tenderness: There is no rebound. Musculoskeletal:         General: No swelling. Skin:     General: Skin is warm. Neurological:      Mental Status: She is alert. Additional Data:   Labs:  Results from last 7 days   Lab Units 08/01/23  0524 07/30/23  0456 07/29/23  0626   WBC Thousand/uL 9.20 7.24 7.70   HEMOGLOBIN g/dL 13.3 13.9 14.3   PLATELETS Thousands/uL 249 268 242   MCV fL 90 91 92     Results from last 7 days   Lab Units 08/01/23  0524 07/30/23  0456 07/29/23  0626 07/28/23  2201   SODIUM mmol/L 137 138 137 137   POTASSIUM mmol/L 3.2* 3.5 4.1 5.6*   CHLORIDE mmol/L 104 108 108 109*   CO2 mmol/L 25 22 24 22   ANION GAP mmol/L 8 8 5 6   BUN mg/dL 11 9 10 13   CREATININE mg/dL 0.73 0.64 0.67 0.67   CALCIUM mg/dL 8.8 8.7 8.5 8.1*   ALBUMIN g/dL 3.6  --  3.6 3.5   TOTAL BILIRUBIN mg/dL 0.73  --  0.67 0.55   ALK PHOS U/L 57  --  67 62   ALT U/L 5*  --  6* 6*   AST U/L 13  --  12* 18   EGFR ml/min/1.73sq m 80 87 86 86   GLUCOSE RANDOM mg/dL 86 94 97 95     Results from last 7 days   Lab Units 07/30/23  0456 07/29/23  0626 07/28/23  2201   MAGNESIUM mg/dL 1.9 2.1 2.0                              Results from last 7 days   Lab Units 07/28/23  1926   TSH 3RD GENERATON uIU/mL 4.327     * I Have Reviewed All Lab Data Listed Above. Cultures:                   Lines/Drains:  Invasive Devices     Peripheral Intravenous Line  Duration           Peripheral IV 07/28/23 Right;Ventral (anterior) Forearm 3 days              Telemetry:      Imaging:  Imaging Reports Reviewed Today Include:   CT abdomen pelvis with contrast    Addendum Date: 7/29/2023    ADDENDUM: Addendum is made to the initial report. The following fields should be as follows: REPRODUCTIVE ORGANS: Supracervical hysterectomy. IMPRESSION: No CT evidence of acute findings. Gastric bypass. Colonic diverticulosis. Result Date: 7/29/2023  Impression: No CT evidence of acute findings. Gastric bypass. Colonic diverticulosis. Enlarged prostate. Correlation with PSA levels is advised. Workstation performed: [id number]     CT head wo contrast    Result Date: 7/29/2023  Impression: No evidence of acute intracranial hemorrhage or mass effect. Mild to moderate microangiopathic change, progressed from remote prior study. Workstation performed: [id number]       Scheduled Meds:  Current Facility-Administered Medications   Medication Dose Route Frequency Provider Last Rate   • acetaminophen  650 mg Oral Q6H PRN Demetrio Valentino MD     • albuterol  2.5 mg Nebulization Q6H PRN KYLE Irwin     • allopurinol  100 mg Oral Daily KYLE Irwin     • ALPRAZolam  0.25 mg Oral Daily PRN KYLE Irwin     • aspirin  81 mg Oral Daily KYLE Irwin     • azithromycin  250 mg Oral Q24H Bipin Araujo DO     • buPROPion  300 mg Oral QAM KYLE Irwin     • busPIRone  5 mg Oral BID KYLE Irwin     • donepezil  5 mg Oral HS KYLE Irwin     • enoxaparin  40 mg Subcutaneous Daily KYLE Irwin     • escitalopram  10 mg Oral Daily Moshe Lovell DO     • fluticasone  1 spray Each Nare Daily Akira Graves MD     • Fluticasone Furoate-Vilanterol  1 puff Inhalation Daily KYLE Irwin     • ketotifen  1 drop Both Eyes BID KYLE Irwin     • melatonin  6 mg Oral HS PRN Akira Graves MD     • multivitamin-minerals  1 tablet Oral Daily KYLE Irwin     • nebivolol  5 mg Oral QAM KYLE Irwin     • nicotine  1 patch Transdermal Daily KYLE Irwin     • ondansetron  4 mg Intravenous Q6H PRN KYLE Irwin     • pantoprazole  40 mg Oral BID AC KYLE Cam[place]     • saccharomyces boulardii  250 mg Oral BID KYLE Crawford     • sucralfate  1 g Oral 4x Daily (AC & HS) KYLE Crawford         Today, Patient Was Seen By: Rayma Other, DO    ** Please Note: Dictation voice to text software may have been used in the creation of this document.  **

## 2023-08-01 NOTE — PLAN OF CARE
Problem: RESPIRATORY - ADULT  Goal: Achieves optimal ventilation and oxygenation  Description: INTERVENTIONS:  - Assess for changes in respiratory status  - Assess for changes in mentation and behavior  - Position to facilitate oxygenation and minimize respiratory effort  - Oxygen administered by appropriate delivery if ordered  - Initiate smoking cessation education as indicated  - Encourage broncho-pulmonary hygiene including cough, deep breathe, Incentive Spirometry  - Assess the need for suctioning and aspirate as needed  - Assess and instruct to report SOB or any respiratory difficulty  - Respiratory Therapy support as indicated  Outcome: Progressing     Problem: MOBILITY - ADULT  Goal: Maintain or return to baseline ADL function  Description: INTERVENTIONS:  -  Assess patient's ability to carry out ADLs; assess patient's baseline for ADL function and identify physical deficits which impact ability to perform ADLs (bathing, care of mouth/teeth, toileting, grooming, dressing, etc.)  - Assess/evaluate cause of self-care deficits   - Assess range of motion  - Assess patient's mobility; develop plan if impaired  - Assess patient's need for assistive devices and provide as appropriate  - Encourage maximum independence but intervene and supervise when necessary  - Involve family in performance of ADLs  - Assess for home care needs following discharge   - Consider OT consult to assist with ADL evaluation and planning for discharge  - Provide patient education as appropriate  Outcome: Progressing  Goal: Maintains/Returns to pre admission functional level  Description: INTERVENTIONS:  - Perform BMAT or MOVE assessment daily.   - Set and communicate daily mobility goal to care team and patient/family/caregiver. - Collaborate with rehabilitation services on mobility goals if consulted  - Perform Range of Motion 3 times a day. - Reposition patient every 2 hours.   - Dangle patient 3 times a day  - Stand patient 3 times a day  - Ambulate patient 3 times a day  - Out of bed to chair 3 times a day   - Out of bed for meals 3 times a day  - Out of bed for toileting  - Record patient progress and toleration of activity level   Outcome: Progressing     Problem: PAIN - ADULT  Goal: Verbalizes/displays adequate comfort level or baseline comfort level  Description: Interventions:  - Encourage patient to monitor pain and request assistance  - Assess pain using appropriate pain scale  - Administer analgesics based on type and severity of pain and evaluate response  - Implement non-pharmacological measures as appropriate and evaluate response  - Consider cultural and social influences on pain and pain management  - Notify physician/advanced practitioner if interventions unsuccessful or patient reports new pain  Outcome: Progressing     Problem: INFECTION - ADULT  Goal: Absence or prevention of progression during hospitalization  Description: INTERVENTIONS:  - Assess and monitor for signs and symptoms of infection  - Monitor lab/diagnostic results  - Monitor all insertion sites, i.e. indwelling lines, tubes, and drains  - Monitor endotracheal if appropriate and nasal secretions for changes in amount and color  - [place] appropriate cooling/warming therapies per order  - Administer medications as ordered  - Instruct and encourage patient and family to use good hand hygiene technique  - Identify and instruct in appropriate isolation precautions for identified infection/condition  Outcome: Progressing  Goal: Absence of fever/infection during neutropenic period  Description: INTERVENTIONS:  - Monitor WBC    Outcome: Progressing     Problem: SAFETY ADULT  Goal: Patient will remain free of falls  Description: INTERVENTIONS:  - Educate patient/family on patient safety including physical limitations  - Instruct patient to call for assistance with activity   - Consult OT/PT to assist with strengthening/mobility   - Keep Call bell within reach  - Keep bed low and locked with side rails adjusted as appropriate  - Keep care items and personal belongings within reach  - Initiate and maintain comfort rounds  - Make Fall Risk Sign visible to staff  - Offer Toileting every 2 Hours, in advance of need  - Initiate/Maintain bed alarm  - Obtain necessary fall risk management equipment: yellow socks  - Apply yellow socks and bracelet for high fall risk patients  - Consider moving patient to room near nurses station  Outcome: Progressing     Problem: DISCHARGE PLANNING  Goal: Discharge to home or other facility with appropriate resources  Description: INTERVENTIONS:  - Identify barriers to discharge w/patient and caregiver  - Arrange for needed discharge resources and transportation as appropriate  - Identify discharge learning needs (meds, wound care, etc.)  - Arrange for interpretive services to assist at discharge as needed  - Refer to Case Management Department for coordinating discharge planning if the patient needs post-hospital services based on physician/advanced practitioner order or complex needs related to functional status, cognitive ability, or social support system  Outcome: Progressing     Problem: Knowledge Deficit  Goal: Patient/family/caregiver demonstrates understanding of disease process, treatment plan, medications, and discharge instructions  Description: Complete learning assessment and assess knowledge base.   Interventions:  - Provide teaching at level of understanding  - Provide teaching via preferred learning methods  Outcome: Progressing

## 2023-08-01 NOTE — PLAN OF CARE
Problem: RESPIRATORY - ADULT  Goal: Achieves optimal ventilation and oxygenation  Description: INTERVENTIONS:  - Assess for changes in respiratory status  - Assess for changes in mentation and behavior  - Position to facilitate oxygenation and minimize respiratory effort  - Oxygen administered by appropriate delivery if ordered  - Initiate smoking cessation education as indicated  - Encourage broncho-pulmonary hygiene including cough, deep breathe, Incentive Spirometry  - Assess the need for suctioning and aspirate as needed  - Assess and instruct to report SOB or any respiratory difficulty  - Respiratory Therapy support as indicated  Outcome: Progressing     Problem: MOBILITY - ADULT  Goal: Maintain or return to baseline ADL function  Description: INTERVENTIONS:  -  Assess patient's ability to carry out ADLs; assess patient's baseline for ADL function and identify physical deficits which impact ability to perform ADLs (bathing, care of mouth/teeth, toileting, grooming, dressing, etc.)  - Assess/evaluate cause of self-care deficits   - Assess range of motion  - Assess patient's mobility; develop plan if impaired  - Assess patient's need for assistive devices and provide as appropriate  - Encourage maximum independence but intervene and supervise when necessary  - Involve family in performance of ADLs  - Assess for home care needs following discharge   - Consider OT consult to assist with ADL evaluation and planning for discharge  - Provide patient education as appropriate  Outcome: Progressing     Problem: SAFETY ADULT  Goal: Patient will remain free of falls  Description: INTERVENTIONS:  - Educate patient/family on patient safety including physical limitations  - Instruct patient to call for assistance with activity   - Consult OT/PT to assist with strengthening/mobility   - Keep Call bell within reach  - Keep bed low and locked with side rails adjusted as appropriate  - Keep care items and personal belongings within reach  - Initiate and maintain comfort rounds  - Make Fall Risk Sign visible to staff  - Offer Toileting every 2 Hours, in advance of need  - Initiate/Maintain bed alarm  - Obtain necessary fall risk management equipment: walker  - Apply yellow socks and bracelet for high fall risk patients  - Consider moving patient to room near nurses station  Outcome: Progressing     Problem: Knowledge Deficit  Goal: Patient/family/caregiver demonstrates understanding of disease process, treatment plan, medications, and discharge instructions  Description: Complete learning assessment and assess knowledge base.   Interventions:  - Provide teaching at level of understanding  - Provide teaching via preferred learning methods  Outcome: Progressing

## 2023-08-01 NOTE — ASSESSMENT & PLAN NOTE
· No exacerbation. Continue Breo  · Does have tracheobronchitis.   Ordered azithromycin with much improvement

## 2023-08-01 NOTE — PLAN OF CARE
Problem: RESPIRATORY - ADULT  Goal: Achieves optimal ventilation and oxygenation  Description: INTERVENTIONS:  - Assess for changes in respiratory status  - Assess for changes in mentation and behavior  - Position to facilitate oxygenation and minimize respiratory effort  - Oxygen administered by appropriate delivery if ordered  - Initiate smoking cessation education as indicated  - Encourage broncho-pulmonary hygiene including cough, deep breathe, Incentive Spirometry  - Assess the need for suctioning and aspirate as needed  - Assess and instruct to report SOB or any respiratory difficulty  - Respiratory Therapy support as indicated  Outcome: Progressing     Problem: MOBILITY - ADULT  Goal: Maintain or return to baseline ADL function  Description: INTERVENTIONS:  -  Assess patient's ability to carry out ADLs; assess patient's baseline for ADL function and identify physical deficits which impact ability to perform ADLs (bathing, care of mouth/teeth, toileting, grooming, dressing, etc.)  - Assess/evaluate cause of self-care deficits   - Assess range of motion  - Assess patient's mobility; develop plan if impaired  - Assess patient's need for assistive devices and provide as appropriate  - Encourage maximum independence but intervene and supervise when necessary  - Involve family in performance of ADLs  - Assess for home care needs following discharge   - Consider OT consult to assist with ADL evaluation and planning for discharge  - Provide patient education as appropriate  Outcome: Progressing  Goal: Maintains/Returns to pre admission functional level  Description: INTERVENTIONS:  - Perform BMAT or MOVE assessment daily.   - Set and communicate daily mobility goal to care team and patient/family/caregiver. - Collaborate with rehabilitation services on mobility goals if consulted  - Perform Range of Motion 3 times a day. - Reposition patient every 2 hours.   - Dangle patient 3 times a day  - Stand patient 3 times a day  - Ambulate patient 3 times a day  - Out of bed to chair 3 times a day   - Out of bed for meals 3 times a day  - Out of bed for toileting  - Record patient progress and toleration of activity level   Outcome: Progressing     Problem: PAIN - ADULT  Goal: Verbalizes/displays adequate comfort level or baseline comfort level  Description: Interventions:  - Encourage patient to monitor pain and request assistance  - Assess pain using appropriate pain scale  - Administer analgesics based on type and severity of pain and evaluate response  - Implement non-pharmacological measures as appropriate and evaluate response  - Consider cultural and social influences on pain and pain management  - Notify physician/advanced practitioner if interventions unsuccessful or patient reports new pain  Outcome: Progressing     Problem: INFECTION - ADULT  Goal: Absence or prevention of progression during hospitalization  Description: INTERVENTIONS:  - Assess and monitor for signs and symptoms of infection  - Monitor lab/diagnostic results  - Monitor all insertion sites, i.e. indwelling lines, tubes, and drains  - Monitor endotracheal if appropriate and nasal secretions for changes in amount and color  - [place] appropriate cooling/warming therapies per order  - Administer medications as ordered  - Instruct and encourage patient and family to use good hand hygiene technique  - Identify and instruct in appropriate isolation precautions for identified infection/condition  Outcome: Progressing  Goal: Absence of fever/infection during neutropenic period  Description: INTERVENTIONS:  - Monitor WBC    Outcome: Progressing     Problem: SAFETY ADULT  Goal: Patient will remain free of falls  Description: INTERVENTIONS:  - Educate patient/family on patient safety including physical limitations  - Instruct patient to call for assistance with activity   - Consult OT/PT to assist with strengthening/mobility   - Keep Call bell within reach  - Keep bed low and locked with side rails adjusted as appropriate  - Keep care items and personal belongings within reach  - Initiate and maintain comfort rounds  - Make Fall Risk Sign visible to staff  - Offer Toileting every 2 Hours, in advance of need  - Initiate/Maintain bed alarm  - Obtain necessary fall risk management equipment: non skid socks  - Apply yellow socks and bracelet for high fall risk patients  - Consider moving patient to room near nurses station  Outcome: Progressing     Problem: DISCHARGE PLANNING  Goal: Discharge to home or other facility with appropriate resources  Description: INTERVENTIONS:  - Identify barriers to discharge w/patient and caregiver  - Arrange for needed discharge resources and transportation as appropriate  - Identify discharge learning needs (meds, wound care, etc.)  - Arrange for interpretive services to assist at discharge as needed  - Refer to Case Management Department for coordinating discharge planning if the patient needs post-hospital services based on physician/advanced practitioner order or complex needs related to functional status, cognitive ability, or social support system  Outcome: Progressing     Problem: Knowledge Deficit  Goal: Patient/family/caregiver demonstrates understanding of disease process, treatment plan, medications, and discharge instructions  Description: Complete learning assessment and assess knowledge base.   Interventions:  - Provide teaching at level of understanding  - Provide teaching via preferred learning methods  Outcome: Progressing

## 2023-08-01 NOTE — TELEPHONE ENCOUNTER
[employer] calling again stating they are faxing over a form regarding genetic testing for patient. They are stating patient wants these tests done. Please call patient before signing forms.   Please also check [employer] for these

## 2023-08-02 ENCOUNTER — APPOINTMENT (OUTPATIENT)
Dept: PERIOP | Facility: HOSPITAL | Age: 73
DRG: 918 | End: 2023-08-02
Payer: MEDICARE

## 2023-08-02 ENCOUNTER — ANESTHESIA EVENT (INPATIENT)
Dept: PERIOP | Facility: HOSPITAL | Age: 73
DRG: 918 | End: 2023-08-02
Payer: MEDICARE

## 2023-08-02 ENCOUNTER — ANESTHESIA (INPATIENT)
Dept: PERIOP | Facility: HOSPITAL | Age: 73
DRG: 918 | End: 2023-08-02
Payer: MEDICARE

## 2023-08-02 LAB
ANION GAP SERPL CALCULATED.3IONS-SCNC: 6 MMOL/L
BUN SERPL-MCNC: 13 MG/DL (ref 5–25)
CALCIUM SERPL-MCNC: 8.4 MG/DL (ref 8.4–10.2)
CHLORIDE SERPL-SCNC: 106 MMOL/L (ref 96–108)
CO2 SERPL-SCNC: 27 MMOL/L (ref 21–32)
CREAT SERPL-MCNC: 0.67 MG/DL (ref 0.6–1.3)
GFR SERPL CREATININE-BSD FRML MDRD: 86 ML/MIN/1.73SQ M
GLUCOSE SERPL-MCNC: 84 MG/DL (ref 65–140)
POTASSIUM SERPL-SCNC: 3.4 MMOL/L (ref 3.5–5.3)
SARS-COV-2 RNA RESP QL NAA+PROBE: NEGATIVE
SODIUM SERPL-SCNC: 139 MMOL/L (ref 135–147)

## 2023-08-02 PROCEDURE — 43239 EGD BIOPSY SINGLE/MULTIPLE: CPT | Performed by: INTERNAL MEDICINE

## 2023-08-02 PROCEDURE — 80048 BASIC METABOLIC PNL TOTAL CA: CPT | Performed by: INTERNAL MEDICINE

## 2023-08-02 PROCEDURE — 88313 SPECIAL STAINS GROUP 2: CPT | Performed by: PATHOLOGY

## 2023-08-02 PROCEDURE — 0DB68ZX EXCISION OF STOMACH, VIA NATURAL OR ARTIFICIAL OPENING ENDOSCOPIC, DIAGNOSTIC: ICD-10-PCS | Performed by: INTERNAL MEDICINE

## 2023-08-02 PROCEDURE — 88305 TISSUE EXAM BY PATHOLOGIST: CPT | Performed by: PATHOLOGY

## 2023-08-02 PROCEDURE — 99232 SBSQ HOSP IP/OBS MODERATE 35: CPT | Performed by: INTERNAL MEDICINE

## 2023-08-02 PROCEDURE — 88342 IMHCHEM/IMCYTCHM 1ST ANTB: CPT | Performed by: PATHOLOGY

## 2023-08-02 PROCEDURE — 87635 SARS-COV-2 COVID-19 AMP PRB: CPT | Performed by: INTERNAL MEDICINE

## 2023-08-02 RX ORDER — LIDOCAINE HYDROCHLORIDE 10 MG/ML
INJECTION, SOLUTION EPIDURAL; INFILTRATION; INTRACAUDAL; PERINEURAL AS NEEDED
Status: DISCONTINUED | OUTPATIENT
Start: 2023-08-02 | End: 2023-08-02

## 2023-08-02 RX ORDER — SODIUM CHLORIDE, SODIUM LACTATE, POTASSIUM CHLORIDE, CALCIUM CHLORIDE 600; 310; 30; 20 MG/100ML; MG/100ML; MG/100ML; MG/100ML
INJECTION, SOLUTION INTRAVENOUS CONTINUOUS PRN
Status: DISCONTINUED | OUTPATIENT
Start: 2023-08-02 | End: 2023-08-02

## 2023-08-02 RX ORDER — PROPOFOL 10 MG/ML
INJECTION, EMULSION INTRAVENOUS AS NEEDED
Status: DISCONTINUED | OUTPATIENT
Start: 2023-08-02 | End: 2023-08-02

## 2023-08-02 RX ORDER — DIPHENHYDRAMINE HYDROCHLORIDE AND LIDOCAINE HYDROCHLORIDE AND ALUMINUM HYDROXIDE AND MAGNESIUM HYDRO
10 KIT EVERY 8 HOURS PRN
Qty: 120 ML | Refills: 0 | Status: SHIPPED | OUTPATIENT
Start: 2023-08-02

## 2023-08-02 RX ORDER — POTASSIUM CHLORIDE 20 MEQ/1
40 TABLET, EXTENDED RELEASE ORAL ONCE
Status: COMPLETED | OUTPATIENT
Start: 2023-08-02 | End: 2023-08-02

## 2023-08-02 RX ADMIN — LIDOCAINE HYDROCHLORIDE 5 ML: 10 INJECTION, SOLUTION EPIDURAL; INFILTRATION; INTRACAUDAL; PERINEURAL at 14:24

## 2023-08-02 RX ADMIN — ALLOPURINOL 100 MG: 100 TABLET ORAL at 16:19

## 2023-08-02 RX ADMIN — ASPIRIN 81 MG: 81 TABLET, COATED ORAL at 16:20

## 2023-08-02 RX ADMIN — PANTOPRAZOLE SODIUM 40 MG: 40 TABLET, DELAYED RELEASE ORAL at 16:20

## 2023-08-02 RX ADMIN — Medication 1 TABLET: at 16:19

## 2023-08-02 RX ADMIN — ENOXAPARIN SODIUM 40 MG: 40 INJECTION SUBCUTANEOUS at 16:19

## 2023-08-02 RX ADMIN — SUCRALFATE 1 G: 1 TABLET ORAL at 07:33

## 2023-08-02 RX ADMIN — DONEPEZIL HYDROCHLORIDE 5 MG: 5 TABLET ORAL at 21:28

## 2023-08-02 RX ADMIN — SODIUM CHLORIDE, SODIUM LACTATE, POTASSIUM CHLORIDE, AND CALCIUM CHLORIDE: .6; .31; .03; .02 INJECTION, SOLUTION INTRAVENOUS at 14:07

## 2023-08-02 RX ADMIN — FLUTICASONE FUROATE AND VILANTEROL TRIFENATATE 1 PUFF: 100; 25 POWDER RESPIRATORY (INHALATION) at 09:19

## 2023-08-02 RX ADMIN — Medication 250 MG: at 16:21

## 2023-08-02 RX ADMIN — BUSPIRONE HYDROCHLORIDE 5 MG: 5 TABLET ORAL at 16:19

## 2023-08-02 RX ADMIN — PANTOPRAZOLE SODIUM 40 MG: 40 TABLET, DELAYED RELEASE ORAL at 07:33

## 2023-08-02 RX ADMIN — SUCRALFATE 1 G: 1 TABLET ORAL at 21:28

## 2023-08-02 RX ADMIN — KETOTIFEN FUMARATE 1 DROP: 0.25 SOLUTION/ DROPS OPHTHALMIC at 17:50

## 2023-08-02 RX ADMIN — SUCRALFATE 1 G: 1 TABLET ORAL at 16:20

## 2023-08-02 RX ADMIN — PROPOFOL 50 MG: 10 INJECTION, EMULSION INTRAVENOUS at 14:25

## 2023-08-02 RX ADMIN — AZITHROMYCIN MONOHYDRATE 250 MG: 250 TABLET ORAL at 16:19

## 2023-08-02 RX ADMIN — ALPRAZOLAM 0.25 MG: 0.25 TABLET ORAL at 21:32

## 2023-08-02 RX ADMIN — FLUTICASONE PROPIONATE 1 SPRAY: 50 SPRAY, METERED NASAL at 09:18

## 2023-08-02 RX ADMIN — POTASSIUM CHLORIDE 40 MEQ: 1500 TABLET, EXTENDED RELEASE ORAL at 16:20

## 2023-08-02 RX ADMIN — PROPOFOL 50 MG: 10 INJECTION, EMULSION INTRAVENOUS at 14:27

## 2023-08-02 RX ADMIN — NICOTINE 1 PATCH: 7 PATCH, EXTENDED RELEASE TRANSDERMAL at 09:18

## 2023-08-02 RX ADMIN — PROPOFOL 100 MG: 10 INJECTION, EMULSION INTRAVENOUS at 14:24

## 2023-08-02 RX ADMIN — NEBIVOLOL 5 MG: 5 TABLET ORAL at 09:20

## 2023-08-02 RX ADMIN — KETOTIFEN FUMARATE 1 DROP: 0.25 SOLUTION/ DROPS OPHTHALMIC at 09:19

## 2023-08-02 RX ADMIN — BUPROPION HYDROCHLORIDE 300 MG: 150 TABLET, EXTENDED RELEASE ORAL at 16:20

## 2023-08-02 RX ADMIN — ESCITALOPRAM OXALATE 10 MG: 10 TABLET ORAL at 16:19

## 2023-08-02 NOTE — ANESTHESIA POSTPROCEDURE EVALUATION
Post-Op Assessment Note    CV Status:  Stable       Mental Status:  Sleepy   Hydration Status:  Stable   PONV Controlled:  Controlled   Airway Patency:  Patent      Post Op Vitals Reviewed: Yes      Staff: CRNA         No notable events documented.     BP   118/43   Temp      Pulse 59   Resp   20   SpO2   100

## 2023-08-02 NOTE — ANESTHESIA PREPROCEDURE EVALUATION
Procedure:  EGD    Relevant Problems   CARDIO   (+) Aortic valve stenosis   (+) Ascending aorta dilatation (HCC)   (+) Essential hypertension   (+) Mixed hyperlipidemia   (+) SVT (supraventricular tachycardia) (HCC)      GI/HEPATIC   (+) Esophageal reflux      MUSCULOSKELETAL   (+) Chronic gout   (+) Primary osteoarthritis      NEURO/PSYCH   (+) Moderate episode of recurrent major depressive disorder (HCC)      PULMONARY   (+) Asthma   (+) Chronic obstructive pulmonary disease (HCC)   (+) Simple chronic bronchitis (HCC)   (+) Sleep apnea   (+) Smoking      Nervous and Auditory   (+) Bilateral hearing loss        Physical Exam    Airway    Mallampati score: II  TM Distance: >3 FB  Neck ROM: full     Dental   upper dentures and lower dentures,     Cardiovascular  Rhythm: regular, Rate: normal,     Pulmonary  Breath sounds clear to auscultation,     Other Findings        Anesthesia Plan  ASA Score- 2     Anesthesia Type- IV sedation with anesthesia with ASA Monitors. Additional Monitors:   Airway Plan:           Plan Factors-    Chart reviewed. Patient is a current smoker. Patient instructed to abstain from smoking on day of procedure. Patient did not smoke on day of surgery. Induction- intravenous. Postoperative Plan-     Informed Consent- Anesthetic plan and risks discussed with patient. I personally reviewed this patient with the CRNA. Discussed and agreed on the Anesthesia Plan with the CRNA. Helen Tian

## 2023-08-02 NOTE — PROGRESS NOTES
[id number] [facility]  Progress Note  Name: Jake Apodaca  MRN: [id number]  Unit/Bed#: 3 [address]  Date of Admission: 7/28/2023   Date of Service: 8/2/2023 I Hospital Day: 4    Assessment/Plan   * Intentional drug overdose [facility])  Assessment & Plan  History of cognitive decline depression insomnia and COPD presented to the hospital after intentional overdose of zolpidem  · This was a suicide attempt. The patient was seen by psychiatry with recommendation for inpatient treatment. · Patient is agreeable to voluntary treatment. · Continue 1: 1 observation  · Weaned off supplemental oxygen. · Disposition: To psychiatry when cleared by GI after EGD    Hypokalemia  Assessment & Plan  · Will replace again    Results from last 7 days   Lab Units 08/02/23  0556 08/01/23  0524 07/30/23  0456 07/29/23  0626   POTASSIUM mmol/L 3.4* 3.2* 3.5 4.1       Suicide attempt [facility])  Assessment & Plan  · Appreciate psychiatry evaluation. Continue 1: 1 observation. · Will need psychiatric placement when medically stable    Cognitive impairment  Assessment & Plan  · Continue donepezil  · Patient being worked up as an outpatient and PCP has ordered MRI of brain with/without contrast    Primary insomnia  Assessment & Plan  · Prior to admission on zolpidem CR. · Currently on hold due to overdose    Sleep apnea  Assessment & Plan  · Was scheduled to see sleep medicine on day of admission    Chronic obstructive pulmonary disease (HCC)  Assessment & Plan  · No exacerbation. Continue Breo  · Does have tracheobronchitis. Ordered azithromycin with much improvement    SVT (supraventricular tachycardia) ([facility])  Assessment & Plan  · History of SVT status post ablation. Continue nebivolol. Esophageal reflux  Assessment & Plan  · Quite symptomatic. Continue pantoprazole.     · GI was consulted with plan for EGD later today    Moderate episode of recurrent major depressive disorder ([address])  Assessment & Plan  · Continue wellbutrin buspirone and alprazolam.  · Psychiatry consulted recommending inpatient psychiatric treatment and decreasing escitalopram to home dosing 10 mg    Essential hypertension  Assessment & Plan  · Continue nebivolol    Smoking  Assessment & Plan  · Nicotine patch ordered    VTE Pharmacologic Prophylaxis:   Moderate Risk (Score 3-4) - Pharmacological DVT Prophylaxis Ordered: enoxaparin (Lovenox). Patient Centered Rounds: I have performed bedside rounds with nursing staff today. Discussions with Specialists or Other Care Team Provider: Case management    Education and Discussions with Family / Patient: Attempted to update  (granddaughter) via phone. Left voicemail. Time Spent for Care: This time was spent on one or more of the following: performing physical exam; counseling and coordination of care; obtaining or reviewing history; documenting in the medical record; reviewing/ordering tests, medications or procedures; communicating with other healthcare professionals and discussing with patient's family/caregivers. Current Length of Stay: 4 day(s)  Current Patient Status: Inpatient   Certification Statement: The patient will continue to require additional inpatient hospital stay due to awaiting EGD  Discharge Plan: Anticipate discharge later today or tomorrow to inpatient psych. Code Status: Level 1 - Full Code      Subjective:   Patient seen and examined. Feeling okay. Better sinus symptoms    Objective:   Vitals: Blood pressure 114/82, pulse (!) 52, temperature 99 °F (37.2 °C), temperature source Oral, resp. rate 18, SpO2 95 %, not currently breastfeeding. No intake or output data in the 24 hours ending 08/02/23 0951    Physical Exam  Vitals reviewed. Constitutional:       General: She is not in acute distress. Appearance: Normal appearance. HENT:      Head: Atraumatic. Cardiovascular:      Rate and Rhythm: Regular rhythm. Heart sounds: Normal heart sounds. Pulmonary:      Breath sounds: Normal breath sounds. No wheezing. Abdominal:      General: Bowel sounds are normal.      Palpations: Abdomen is soft. Tenderness: There is no guarding or rebound. Musculoskeletal:         General: No swelling. Cervical back: Normal range of motion. Skin:     General: Skin is warm. Neurological:      Mental Status: She is alert and oriented to person, place, and time. Mental status is at baseline. Additional Data:   Labs:  Results from last 7 days   Lab Units 08/01/23  0524 07/30/23  0456 07/29/23  0626   WBC Thousand/uL 9.20 7.24 7.70   HEMOGLOBIN g/dL 13.3 13.9 14.3   PLATELETS Thousands/uL 249 268 242   MCV fL 90 91 92     Results from last 7 days   Lab Units 08/02/23  0556 08/01/23  0524 07/30/23  0456 07/29/23  0626 07/28/23  2201   SODIUM mmol/L 139 137 138 137 137   POTASSIUM mmol/L 3.4* 3.2* 3.5 4.1 5.6*   CHLORIDE mmol/L 106 104 108 108 109*   CO2 mmol/L 27 25 22 24 22   ANION GAP mmol/L 6 8 8 5 6   BUN mg/dL 13 11 9 10 13   CREATININE mg/dL 0.67 0.73 0.64 0.67 0.67   CALCIUM mg/dL 8.4 8.8 8.7 8.5 8.1*   ALBUMIN g/dL  --  3.6  --  3.6 3.5   TOTAL BILIRUBIN mg/dL  --  0.73  --  0.67 0.55   ALK PHOS U/L  --  57  --  67 62   ALT U/L  --  5*  --  6* 6*   AST U/L  --  13  --  12* 18   EGFR ml/min/1.73sq m 86 80 87 86 86   GLUCOSE RANDOM mg/dL 84 86 94 97 95     Results from last 7 days   Lab Units 07/30/23  0456 07/29/23  0626 07/28/23  2201   MAGNESIUM mg/dL 1.9 2.1 2.0                              Results from last 7 days   Lab Units 07/28/23  1926   TSH 3RD GENERATON uIU/mL 4.327     * I Have Reviewed All Lab Data Listed Above.     Cultures:                   Lines/Drains:  Invasive Devices     Peripheral Intravenous Line  Duration           Peripheral IV 07/28/23 Right;Ventral (anterior) Forearm 4 days              Telemetry:      Imaging:  Imaging Reports Reviewed Today Include:   CT abdomen pelvis with contrast    Addendum Date: 7/29/2023    ADDENDUM: Addendum is made to the initial report. The following fields should be as follows: REPRODUCTIVE ORGANS: Supracervical hysterectomy. IMPRESSION: No CT evidence of acute findings. Gastric bypass. Colonic diverticulosis. Result Date: 7/29/2023  Impression: No CT evidence of acute findings. Gastric bypass. Colonic diverticulosis. Enlarged prostate. Correlation with PSA levels is advised. Workstation performed: [id number]     CT head wo contrast    Result Date: 7/29/2023  Impression: No evidence of acute intracranial hemorrhage or mass effect. Mild to moderate microangiopathic change, progressed from remote prior study.  Workstation performed: [id number]       Scheduled Meds:  Current Facility-Administered Medications   Medication Dose Route Frequency Provider Last Rate   • acetaminophen  650 mg Oral Q6H PRN Laz Underwood MD     • albuterol  2.5 mg Nebulization Q6H PRN KYLE Irwin     • allopurinol  100 mg Oral Daily KYLE Irwin     • ALPRAZolam  0.25 mg Oral Daily PRN KYLE Irwin     • aspirin  81 mg Oral Daily KYLE Irwin     • azithromycin  250 mg Oral Q24H Bipin Araujo DO     • buPROPion  300 mg Oral QAM KYLE Irwin     • busPIRone  5 mg Oral BID KYLE Irwin     • donepezil  5 mg Oral HS KYLE Irwin     • enoxaparin  40 mg Subcutaneous Daily KYLE Irwin     • escitalopram  10 mg Oral Daily Moshe Lovell DO     • fluticasone  1 spray Each Nare Daily Buzz Cason MD     • Fluticasone Furoate-Vilanterol  1 puff Inhalation Daily KYLE Irwin     • ketotifen  1 drop Both Eyes BID KYLE Irwin     • melatonin  6 mg Oral HS PRN Buzz Cason MD     • multivitamin-minerals  1 tablet Oral Daily YKLE Irwin     • nebivolol  5 mg Oral QAM KYLE Irwin     • nicotine  1 patch Transdermal Daily KYLE Irwin     • ondansetron  4 mg Intravenous Q6H PRN KYLE Irwin     • pantoprazole  40 mg Oral BID AC KYLE Hoover     • saccharomyces boulardii  250 mg Oral BID KYLE Gunn     • sucralfate  1 g Oral 4x Daily (AC & HS) KYLE Patricio         Today, Patient Was Seen By: Sophia Hanson DO    ** Please Note: Dictation voice to text software may have been used in the creation of this document.  **

## 2023-08-02 NOTE — ASSESSMENT & PLAN NOTE
· Appreciate psychiatry evaluation. Continue 1: 1 observation.     · Will need psychiatric placement when medically stable

## 2023-08-02 NOTE — PLAN OF CARE
Problem: RESPIRATORY - ADULT  Goal: Achieves optimal ventilation and oxygenation  Description: INTERVENTIONS:  - Assess for changes in respiratory status  - Assess for changes in mentation and behavior  - Position to facilitate oxygenation and minimize respiratory effort  - Oxygen administered by appropriate delivery if ordered  - Initiate smoking cessation education as indicated  - Encourage broncho-pulmonary hygiene including cough, deep breathe, Incentive Spirometry  - Assess the need for suctioning and aspirate as needed  - Assess and instruct to report SOB or any respiratory difficulty  - Respiratory Therapy support as indicated  Outcome: Progressing     Problem: MOBILITY - ADULT  Goal: Maintain or return to baseline ADL function  Description: INTERVENTIONS:  -  Assess patient's ability to carry out ADLs; assess patient's baseline for ADL function and identify physical deficits which impact ability to perform ADLs (bathing, care of mouth/teeth, toileting, grooming, dressing, etc.)  - Assess/evaluate cause of self-care deficits   - Assess range of motion  - Assess patient's mobility; develop plan if impaired  - Assess patient's need for assistive devices and provide as appropriate  - Encourage maximum independence but intervene and supervise when necessary  - Involve family in performance of ADLs  - Assess for home care needs following discharge   - Consider OT consult to assist with ADL evaluation and planning for discharge  - Provide patient education as appropriate  Outcome: Progressing  Goal: Maintains/Returns to pre admission functional level  Description: INTERVENTIONS:  - Perform BMAT or MOVE assessment daily.   - Set and communicate daily mobility goal to care team and patient/family/caregiver. - Collaborate with rehabilitation services on mobility goals if consulted  - Perform Range of Motion 3 times a day. - Reposition patient every 2 hours.   - Dangle patient 3 times a day  - Stand patient 3 times a day  - Ambulate patient 3 times a day  - Out of bed to chair 3 times a day   - Out of bed for meals 3 times a day  - Out of bed for toileting  - Record patient progress and toleration of activity level   Outcome: Progressing     Problem: PAIN - ADULT  Goal: Verbalizes/displays adequate comfort level or baseline comfort level  Description: Interventions:  - Encourage patient to monitor pain and request assistance  - Assess pain using appropriate pain scale  - Administer analgesics based on type and severity of pain and evaluate response  - Implement non-pharmacological measures as appropriate and evaluate response  - Consider cultural and social influences on pain and pain management  - Notify physician/advanced practitioner if interventions unsuccessful or patient reports new pain  Outcome: Progressing     Problem: INFECTION - ADULT  Goal: Absence or prevention of progression during hospitalization  Description: INTERVENTIONS:  - Assess and monitor for signs and symptoms of infection  - Monitor lab/diagnostic results  - Monitor all insertion sites, i.e. indwelling lines, tubes, and drains  - Monitor endotracheal if appropriate and nasal secretions for changes in amount and color  - [place] appropriate cooling/warming therapies per order  - Administer medications as ordered  - Instruct and encourage patient and family to use good hand hygiene technique  - Identify and instruct in appropriate isolation precautions for identified infection/condition  Outcome: Progressing  Goal: Absence of fever/infection during neutropenic period  Description: INTERVENTIONS:  - Monitor WBC    Outcome: Progressing     Problem: SAFETY ADULT  Goal: Patient will remain free of falls  Description: INTERVENTIONS:  - Educate patient/family on patient safety including physical limitations  - Instruct patient to call for assistance with activity   - Consult OT/PT to assist with strengthening/mobility   - Keep Call bell within reach  - Keep bed low and locked with side rails adjusted as appropriate  - Keep care items and personal belongings within reach  - Initiate and maintain comfort rounds  - Make Fall Risk Sign visible to staff  - Offer Toileting every 2 Hours, in advance of need  - Initiate/Maintain bed alarm  - Obtain necessary fall risk management equipment: yellow socks  - Apply yellow socks and bracelet for high fall risk patients  - Consider moving patient to room near nurses station  Outcome: Progressing     Problem: DISCHARGE PLANNING  Goal: Discharge to home or other facility with appropriate resources  Description: INTERVENTIONS:  - Identify barriers to discharge w/patient and caregiver  - Arrange for needed discharge resources and transportation as appropriate  - Identify discharge learning needs (meds, wound care, etc.)  - Arrange for interpretive services to assist at discharge as needed  - Refer to Case Management Department for coordinating discharge planning if the patient needs post-hospital services based on physician/advanced practitioner order or complex needs related to functional status, cognitive ability, or social support system  Outcome: Progressing     Problem: Knowledge Deficit  Goal: Patient/family/caregiver demonstrates understanding of disease process, treatment plan, medications, and discharge instructions  Description: Complete learning assessment and assess knowledge base.   Interventions:  - Provide teaching at level of understanding  - Provide teaching via preferred learning methods  Outcome: Progressing

## 2023-08-02 NOTE — PROGRESS NOTES
Interval update    EGD demonstrates anastomotic ulcer. Patient should continue PPI twice daily and stop smoking. Patient has been stable on room air. At this time the patient is medically stable for transfer to psychiatric facility. Will re-consult crisis. Patient's granddaughter was updated at time of this note.

## 2023-08-03 ENCOUNTER — HOSPITAL ENCOUNTER (INPATIENT)
Facility: HOSPITAL | Age: 73
LOS: 7 days | Discharge: HOME | DRG: 885 | End: 2023-08-10
Attending: STUDENT IN AN ORGANIZED HEALTH CARE EDUCATION/TRAINING PROGRAM | Admitting: STUDENT IN AN ORGANIZED HEALTH CARE EDUCATION/TRAINING PROGRAM
Payer: MEDICARE

## 2023-08-03 VITALS
TEMPERATURE: 99.5 F | SYSTOLIC BLOOD PRESSURE: 141 MMHG | RESPIRATION RATE: 18 BRPM | OXYGEN SATURATION: 97 % | HEART RATE: 55 BPM | DIASTOLIC BLOOD PRESSURE: 72 MMHG

## 2023-08-03 DIAGNOSIS — J44.9 CHRONIC OBSTRUCTIVE PULMONARY DISEASE, UNSPECIFIED COPD TYPE (HCC): ICD-10-CM

## 2023-08-03 DIAGNOSIS — F51.01 PRIMARY INSOMNIA: ICD-10-CM

## 2023-08-03 DIAGNOSIS — E53.8 VITAMIN B12 DEFICIENCY: ICD-10-CM

## 2023-08-03 DIAGNOSIS — E55.9 VITAMIN D DEFICIENCY: ICD-10-CM

## 2023-08-03 DIAGNOSIS — Z00.8 MEDICAL CLEARANCE FOR PSYCHIATRIC ADMISSION: ICD-10-CM

## 2023-08-03 DIAGNOSIS — F33.1 MODERATE EPISODE OF RECURRENT MAJOR DEPRESSIVE DISORDER (HCC): Primary | ICD-10-CM

## 2023-08-03 DIAGNOSIS — Z98.84 HISTORY OF ROUX-EN-Y GASTRIC BYPASS: ICD-10-CM

## 2023-08-03 LAB
ALBUMIN SERPL BCP-MCNC: 3.3 G/DL (ref 3.5–5)
ALP SERPL-CCNC: 56 U/L (ref 34–104)
ALT SERPL W P-5'-P-CCNC: 4 U/L (ref 7–52)
ANION GAP SERPL CALCULATED.3IONS-SCNC: 7 MMOL/L
AST SERPL W P-5'-P-CCNC: 12 U/L (ref 13–39)
ATRIAL RATE: 56 BPM
BILIRUB SERPL-MCNC: 0.69 MG/DL (ref 0.2–1)
BUN SERPL-MCNC: 14 MG/DL (ref 5–25)
CALCIUM ALBUM COR SERPL-MCNC: 8.9 MG/DL (ref 8.3–10.1)
CALCIUM SERPL-MCNC: 8.3 MG/DL (ref 8.4–10.2)
CHLORIDE SERPL-SCNC: 106 MMOL/L (ref 96–108)
CO2 SERPL-SCNC: 24 MMOL/L (ref 21–32)
CREAT SERPL-MCNC: 0.81 MG/DL (ref 0.6–1.3)
ERYTHROCYTE [DISTWIDTH] IN BLOOD BY AUTOMATED COUNT: 14.8 % (ref 11.6–15.1)
GFR SERPL CREATININE-BSD FRML MDRD: 71 ML/MIN/1.73SQ M
GLUCOSE SERPL-MCNC: 86 MG/DL (ref 65–140)
HCT VFR BLD AUTO: 41.8 % (ref 34.8–46.1)
HGB BLD-MCNC: 13.4 G/DL (ref 11.5–15.4)
MCH RBC QN AUTO: 29.8 PG (ref 26.8–34.3)
MCHC RBC AUTO-ENTMCNC: 32.1 G/DL (ref 31.4–37.4)
MCV RBC AUTO: 93 FL (ref 82–98)
P AXIS: 41 DEGREES
PLATELET # BLD AUTO: 228 THOUSANDS/UL (ref 149–390)
PMV BLD AUTO: 11 FL (ref 8.9–12.7)
POTASSIUM SERPL-SCNC: 4.1 MMOL/L (ref 3.5–5.3)
PR INTERVAL: 182 MS
PROT SERPL-MCNC: 5.9 G/DL (ref 6.4–8.4)
QRS AXIS: 27 DEGREES
QRSD INTERVAL: 130 MS
QT INTERVAL: 480 MS
QTC INTERVAL: 463 MS
RBC # BLD AUTO: 4.49 MILLION/UL (ref 3.81–5.12)
SODIUM SERPL-SCNC: 137 MMOL/L (ref 135–147)
T WAVE AXIS: 25 DEGREES
VENTRICULAR RATE: 56 BPM
WBC # BLD AUTO: 7.91 THOUSAND/UL (ref 4.31–10.16)

## 2023-08-03 PROCEDURE — 93005 ELECTROCARDIOGRAM TRACING: CPT

## 2023-08-03 PROCEDURE — 93010 ELECTROCARDIOGRAM REPORT: CPT

## 2023-08-03 PROCEDURE — 85027 COMPLETE CBC AUTOMATED: CPT | Performed by: INTERNAL MEDICINE

## 2023-08-03 PROCEDURE — 99232 SBSQ HOSP IP/OBS MODERATE 35: CPT | Performed by: INTERNAL MEDICINE

## 2023-08-03 PROCEDURE — 99239 HOSP IP/OBS DSCHRG MGMT >30: CPT | Performed by: INTERNAL MEDICINE

## 2023-08-03 PROCEDURE — 80053 COMPREHEN METABOLIC PANEL: CPT | Performed by: INTERNAL MEDICINE

## 2023-08-03 RX ORDER — HALOPERIDOL 5 MG/ML
5 INJECTION INTRAMUSCULAR
Status: DISCONTINUED | OUTPATIENT
Start: 2023-08-03 | End: 2023-08-10 | Stop reason: HOSPADM

## 2023-08-03 RX ORDER — ESCITALOPRAM OXALATE 10 MG/1
10 TABLET ORAL DAILY
Status: CANCELLED | OUTPATIENT
Start: 2023-08-04

## 2023-08-03 RX ORDER — HYDROXYZINE 50 MG/1
50 TABLET, FILM COATED ORAL
Status: DISCONTINUED | OUTPATIENT
Start: 2023-08-03 | End: 2023-08-10 | Stop reason: HOSPADM

## 2023-08-03 RX ORDER — DONEPEZIL HYDROCHLORIDE 5 MG/1
5 TABLET, FILM COATED ORAL
Status: CANCELLED | OUTPATIENT
Start: 2023-08-03

## 2023-08-03 RX ORDER — RISPERIDONE 0.25 MG/1
0.25 TABLET ORAL
Status: DISCONTINUED | OUTPATIENT
Start: 2023-08-03 | End: 2023-08-10 | Stop reason: HOSPADM

## 2023-08-03 RX ORDER — RISPERIDONE 1 MG/1
1 TABLET ORAL
Status: CANCELLED | OUTPATIENT
Start: 2023-08-03

## 2023-08-03 RX ORDER — HYDROXYZINE HYDROCHLORIDE 25 MG/1
25 TABLET, FILM COATED ORAL
Status: CANCELLED | OUTPATIENT
Start: 2023-08-03

## 2023-08-03 RX ORDER — ACETAMINOPHEN 325 MG/1
975 TABLET ORAL EVERY 6 HOURS PRN
Status: DISCONTINUED | OUTPATIENT
Start: 2023-08-03 | End: 2023-08-10 | Stop reason: HOSPADM

## 2023-08-03 RX ORDER — BUPROPION HYDROCHLORIDE 300 MG/1
300 TABLET ORAL EVERY MORNING
Status: DISCONTINUED | OUTPATIENT
Start: 2023-08-04 | End: 2023-08-10 | Stop reason: HOSPADM

## 2023-08-03 RX ORDER — ACETAMINOPHEN 325 MG/1
650 TABLET ORAL EVERY 4 HOURS PRN
Status: DISCONTINUED | OUTPATIENT
Start: 2023-08-03 | End: 2023-08-10 | Stop reason: HOSPADM

## 2023-08-03 RX ORDER — HYDROXYZINE HYDROCHLORIDE 25 MG/1
50 TABLET, FILM COATED ORAL
Status: CANCELLED | OUTPATIENT
Start: 2023-08-03

## 2023-08-03 RX ORDER — HYDROXYZINE HYDROCHLORIDE 25 MG/1
25 TABLET, FILM COATED ORAL
Status: DISCONTINUED | OUTPATIENT
Start: 2023-08-03 | End: 2023-08-10 | Stop reason: HOSPADM

## 2023-08-03 RX ORDER — RISPERIDONE 0.25 MG/1
0.25 TABLET ORAL
Status: CANCELLED | OUTPATIENT
Start: 2023-08-03

## 2023-08-03 RX ORDER — LORAZEPAM 1 MG/1
1 TABLET ORAL
Status: DISCONTINUED | OUTPATIENT
Start: 2023-08-03 | End: 2023-08-10 | Stop reason: HOSPADM

## 2023-08-03 RX ORDER — RISPERIDONE 0.5 MG/1
0.5 TABLET ORAL
Status: DISCONTINUED | OUTPATIENT
Start: 2023-08-03 | End: 2023-08-10 | Stop reason: HOSPADM

## 2023-08-03 RX ORDER — LORAZEPAM 2 MG/ML
1 INJECTION INTRAMUSCULAR
Status: CANCELLED | OUTPATIENT
Start: 2023-08-03

## 2023-08-03 RX ORDER — DONEPEZIL HYDROCHLORIDE 5 MG/1
5 TABLET, FILM COATED ORAL
Status: DISCONTINUED | OUTPATIENT
Start: 2023-08-03 | End: 2023-08-10 | Stop reason: HOSPADM

## 2023-08-03 RX ORDER — ACETAMINOPHEN 325 MG/1
650 TABLET ORAL EVERY 4 HOURS PRN
Status: CANCELLED | OUTPATIENT
Start: 2023-08-03

## 2023-08-03 RX ORDER — RISPERIDONE 1 MG/1
1 TABLET ORAL
Status: DISCONTINUED | OUTPATIENT
Start: 2023-08-03 | End: 2023-08-10 | Stop reason: HOSPADM

## 2023-08-03 RX ORDER — TRAZODONE HYDROCHLORIDE 50 MG/1
50 TABLET ORAL
Status: CANCELLED | OUTPATIENT
Start: 2023-08-03

## 2023-08-03 RX ORDER — RISPERIDONE 0.25 MG/1
0.5 TABLET ORAL
Status: CANCELLED | OUTPATIENT
Start: 2023-08-03

## 2023-08-03 RX ORDER — LORAZEPAM 1 MG/1
1 TABLET ORAL
Status: CANCELLED | OUTPATIENT
Start: 2023-08-03

## 2023-08-03 RX ORDER — HALOPERIDOL 5 MG/ML
5 INJECTION INTRAMUSCULAR
Status: CANCELLED | OUTPATIENT
Start: 2023-08-03

## 2023-08-03 RX ORDER — ACETAMINOPHEN 325 MG/1
975 TABLET ORAL EVERY 6 HOURS PRN
Status: CANCELLED | OUTPATIENT
Start: 2023-08-03

## 2023-08-03 RX ORDER — BUSPIRONE HYDROCHLORIDE 5 MG/1
5 TABLET ORAL 2 TIMES DAILY
Status: DISCONTINUED | OUTPATIENT
Start: 2023-08-03 | End: 2023-08-10 | Stop reason: HOSPADM

## 2023-08-03 RX ORDER — BUSPIRONE HYDROCHLORIDE 5 MG/1
5 TABLET ORAL 2 TIMES DAILY
Status: CANCELLED | OUTPATIENT
Start: 2023-08-03

## 2023-08-03 RX ORDER — LORAZEPAM 2 MG/ML
1 INJECTION INTRAMUSCULAR
Status: DISCONTINUED | OUTPATIENT
Start: 2023-08-03 | End: 2023-08-10 | Stop reason: HOSPADM

## 2023-08-03 RX ORDER — TRAZODONE HYDROCHLORIDE 50 MG/1
50 TABLET ORAL
Status: DISCONTINUED | OUTPATIENT
Start: 2023-08-03 | End: 2023-08-08

## 2023-08-03 RX ORDER — BUPROPION HYDROCHLORIDE 150 MG/1
300 TABLET ORAL EVERY MORNING
Status: CANCELLED | OUTPATIENT
Start: 2023-08-04

## 2023-08-03 RX ORDER — ESCITALOPRAM OXALATE 10 MG/1
10 TABLET ORAL DAILY
Status: DISCONTINUED | OUTPATIENT
Start: 2023-08-04 | End: 2023-08-04

## 2023-08-03 RX ADMIN — FLUTICASONE PROPIONATE 1 SPRAY: 50 SPRAY, METERED NASAL at 09:53

## 2023-08-03 RX ADMIN — Medication 250 MG: at 09:53

## 2023-08-03 RX ADMIN — SUCRALFATE 1 G: 1 TABLET ORAL at 11:56

## 2023-08-03 RX ADMIN — SUCRALFATE 1 G: 1 TABLET ORAL at 06:00

## 2023-08-03 RX ADMIN — NICOTINE 1 PATCH: 7 PATCH, EXTENDED RELEASE TRANSDERMAL at 09:55

## 2023-08-03 RX ADMIN — PANTOPRAZOLE SODIUM 40 MG: 40 TABLET, DELAYED RELEASE ORAL at 06:00

## 2023-08-03 RX ADMIN — ENOXAPARIN SODIUM 40 MG: 40 INJECTION SUBCUTANEOUS at 09:52

## 2023-08-03 RX ADMIN — ESCITALOPRAM OXALATE 10 MG: 10 TABLET ORAL at 09:53

## 2023-08-03 RX ADMIN — ACETAMINOPHEN 650 MG: 325 TABLET ORAL at 09:54

## 2023-08-03 RX ADMIN — AZITHROMYCIN MONOHYDRATE 250 MG: 250 TABLET ORAL at 09:52

## 2023-08-03 RX ADMIN — KETOTIFEN FUMARATE 1 DROP: 0.25 SOLUTION/ DROPS OPHTHALMIC at 09:54

## 2023-08-03 RX ADMIN — BUSPIRONE HYDROCHLORIDE 5 MG: 5 TABLET ORAL at 09:53

## 2023-08-03 RX ADMIN — ASPIRIN 81 MG: 81 TABLET, COATED ORAL at 09:53

## 2023-08-03 RX ADMIN — DONEPEZIL HYDROCHLORIDE 5 MG: 5 TABLET, FILM COATED ORAL at 21:36

## 2023-08-03 RX ADMIN — Medication 1 TABLET: at 09:53

## 2023-08-03 RX ADMIN — BUSPIRONE HYDROCHLORIDE 5 MG: 5 TABLET ORAL at 17:19

## 2023-08-03 RX ADMIN — FLUTICASONE FUROATE AND VILANTEROL TRIFENATATE 1 PUFF: 100; 25 POWDER RESPIRATORY (INHALATION) at 09:53

## 2023-08-03 RX ADMIN — ALLOPURINOL 100 MG: 100 TABLET ORAL at 09:54

## 2023-08-03 RX ADMIN — BUPROPION HYDROCHLORIDE 300 MG: 150 TABLET, EXTENDED RELEASE ORAL at 09:53

## 2023-08-03 NOTE — NURSING NOTE
Pt medically cleared to be d/c to [facility]. All paperwork signed. This nurse gave report to receiving facility RN, Tico Velasco. All questions answered. IV removed per policy and procedure. All pt belongings given to transport.

## 2023-08-03 NOTE — ASSESSMENT & PLAN NOTE
· Continue wellbutrin buspirone and alprazolam.  · Psychiatry consulted recommending inpatient psychiatric treatment and decreasing escitalopram to home dosing 10 mg  · Discharge to psychiatry today.

## 2023-08-03 NOTE — ASSESSMENT & PLAN NOTE
History of cognitive decline depression insomnia and COPD presented to the hospital after intentional overdose of zolpidem  · This was a suicide attempt. The patient was seen by psychiatry with recommendation for inpatient treatment. · Patient is agreeable to voluntary treatment. · Continue 1: 1 observation  · Weaned off supplemental oxygen. · Disposition:  To psychiatry today

## 2023-08-03 NOTE — ASSESSMENT & PLAN NOTE
· Replaced    Results from last 7 days   Lab Units 08/03/23  0538 08/02/23  0556 08/01/23  0524 07/30/23  0456   POTASSIUM mmol/L 4.1 3.4* 3.2* 3.5

## 2023-08-03 NOTE — PLAN OF CARE
Problem: DISCHARGE PLANNING  Goal: Discharge to home or other facility with appropriate resources  Description: INTERVENTIONS:  - Identify barriers to discharge w/patient and caregiver  - Arrange for needed discharge resources and transportation as appropriate  - Identify discharge learning needs (meds, wound care, etc.)  - Arrange for interpretive services to assist at discharge as needed  - Refer to Case Management Department for coordinating discharge planning if the patient needs post-hospital services based on physician/advanced practitioner order or complex needs related to functional status, cognitive ability, or social support system  8/2/2023 2012 by Jarrod Coates RN  Outcome: Progressing  8/2/2023 2011 by Jarrod Coates RN  Outcome: Progressing     Problem: Risk for Self Injury/Neglect  Goal: Treatment Goal: Remain safe during length of stay, learn and adopt new coping skills, and be free of self-injurious ideation, impulses and acts at the time of discharge  Outcome: Progressing  Goal: Verbalize thoughts and feelings  Description: Interventions:  - Assess and re-assess patient's lethality and potential for self-injury  - Engage patient in 1:1 interactions, daily, for a minimum of 15 minutes  - Encourage patient to express feelings, fears, frustrations, hopes  - Establish rapport/trust with patient   Outcome: Progressing  Goal: Refrain from harming self  Description: Interventions:  - Monitor patient closely, per order  - Develop a trusting relationship  - Supervise medication ingestion, monitor effects and side effects   Outcome: Progressing  Goal: Attend and participate in unit activities, including therapeutic, recreational, and educational groups  Description: Interventions:  - Provide therapeutic and educational activities daily, encourage attendance and participation, and document same in the medical record  - Obtain collateral information, encourage visitation and family involvement in care Outcome: Progressing  Goal: Recognize maladaptive responses and adopt new coping mechanisms  Outcome: Progressing  Goal: Complete daily ADLs, including personal hygiene independently, as able  Description: Interventions:  - Observe, teach, and assist patient with ADLS  - Monitor and promote a balance of rest/activity, with adequate nutrition and elimination  Outcome: Progressing Home

## 2023-08-03 NOTE — PLAN OF CARE
Problem: Alteration in Thoughts and Perception  Goal: Treatment Goal: Gain control of psychotic behaviors/thinking, reduce/eliminate presenting symptoms and demonstrate improved reality functioning upon discharge  Outcome: Progressing  Goal: Verbalize thoughts and feelings  Description: Interventions:  - Promote a nonjudgmental and trusting relationship with the patient through active listening and therapeutic communication  - Assess patient's level of functioning, behavior and potential for risk  - Engage patient in 1 on 1 interactions  - Encourage patient to express fears, feelings, frustrations, and discuss symptoms    - [place] patient to reality, help patient recognize reality-based thinking   - Administer medications as ordered and assess for potential side effects  - Provide the patient education related to the signs and symptoms of the illness and desired effects of prescribed medications  Outcome: Progressing  Goal: Refrain from acting on delusional thinking/internal stimuli  Description: Interventions:  - Monitor patient closely, per order   - Utilize least restrictive measures   - Set reasonable limits, give positive feedback for acceptable   - Administer medications as ordered and monitor of potential side effects  Outcome: Progressing  Goal: Agree to be compliant with medication regime, as prescribed and report medication side effects  Description: Interventions:  - Offer appropriate PRN medication and supervise ingestion; conduct AIMS, as needed   Outcome: Progressing  Goal: Attend and participate in unit activities, including therapeutic, recreational, and educational groups  Description: Interventions:  -Encourage Visitation and family involvement in care  Outcome: Progressing  Goal: Recognize dysfunctional thoughts, communicate reality-based thoughts at the time of discharge  Description: Interventions:  - Provide medication and psycho-education to assist patient in compliance and developing insight into his/her illness   Outcome: Progressing  Goal: Complete daily ADLs, including personal hygiene independently, as able  Description: Interventions:  - Observe, teach, and assist patient with ADLS  - Monitor and promote a balance of rest/activity, with adequate nutrition and elimination   Outcome: Progressing

## 2023-08-03 NOTE — DISCHARGE SUMMARY
[address]  Discharge- Colton Starr 1950, 74 y.o. female MRN: [id number]  Unit/Bed#: [address] [place] [address] Encounter: [id number]  Primary Care Provider: Tegan Ram MD   Date and time admitted to hospital: 7/28/2023  7:07 PM    Admitting Provider:  Damaris Ferrell MD  Discharge Provider:  Dora Torres DO  Admission Date: 7/28/2023       Discharge Date: 08/03/23   LOS: 5  Primary Care Physician at Discharge: Tegan Ram [phone]    DISCHARGE DIAGNOSES  * Intentional drug overdose [facility])  Assessment & Plan  History of cognitive decline depression insomnia and COPD presented to the hospital after intentional overdose of zolpidem  · This was a suicide attempt. The patient was seen by psychiatry with recommendation for inpatient treatment. · Patient is agreeable to voluntary treatment. · Continue 1: 1 observation  · Weaned off supplemental oxygen. · Disposition: To psychiatry today    Hypokalemia  Assessment & Plan  · Replaced    Results from last 7 days   Lab Units 08/03/23  0538 08/02/23  0556 08/01/23  0524 07/30/23  0456   POTASSIUM mmol/L 4.1 3.4* 3.2* 3.5       Suicide attempt [facility])  Assessment & Plan  · Appreciate psychiatry evaluation. Transferring to inpatient psychiatry today    Cognitive impairment  Assessment & Plan  · Continue donepezil  · Patient being worked up as an outpatient and PCP has ordered MRI of brain with/without contrast    Primary insomnia  Assessment & Plan  · Prior to admission on zolpidem CR. · Currently on hold due to overdose    Sleep apnea  Assessment & Plan  · Was scheduled to see sleep medicine on day of admission  · Needs to follow up as an outpatient    Chronic obstructive pulmonary disease ([address])  Assessment & Plan  · No exacerbation. Continue Breo  · Does have tracheobronchitis. Ordered azithromycin with much improvement    SVT (supraventricular tachycardia) (HCC)  Assessment & Plan  · History of SVT status post ablation.   Continue nebivolol. Esophageal reflux  Assessment & Plan  · Was symptomatic. Continue PPI    Moderate episode of recurrent major depressive disorder ([address])  Assessment & Plan  · Continue wellbutrin buspirone and alprazolam.  · Psychiatry consulted recommending inpatient psychiatric treatment and decreasing escitalopram to home dosing 10 mg  · Discharge to psychiatry today. Essential hypertension  Assessment & Plan  · Continue nebivolol    Smoking  Assessment & Plan  · Nicotine patch ordered. Advised cessation especially given GI ulcers    REASON FOR ADMISSION  Overdose - Intentional (Patient brought in by squad for intentional overdose.)    HOSPITAL COURSE:  Dionne Jorge is a 74 y.o. female with a history of SVT status post ablation COPD sleep apnea hypertension and depression who presented to the hospital for overdose. Patient had difficulty coping and eventually took an overdose of Ambien in a suicide attempt. In the ED she was admitted and placed on 1: 1 observation. She had complained of abdominal pain and sinusitis with sinus drainage. She was given azithromycin and GI cocktail with much improvement. She underwent EGD with anastomotic ulcer. Her mood has much improved but due to suicide attempt psychiatry recommended inpatient psychiatric treatment. She understands the need to stop smoking. The case was discussed with daughter on telephone on day of discharge. Please see problem list listed above. CONSULTING PROVIDERS   IP CONSULT TO TOXICOLOGY  IP CONSULT TO PSYCHIATRY  IP CONSULT TO GASTROENTEROLOGY  IP CONSULT TO ED CRISIS WORKER    PROCEDURES PERFORMED  EGD    Result Date: 8/2/2023  Impression: 3 ulcers with clean base (Michael III); performed cold forceps biopsy RECOMMENDATION:  Await pathology results  Continue PPI. Avoid aspirin and NSAIDs. Advance diet as tolerated.     Aretha Gamboa MD     RADIOLOGY RESULTS  CT abdomen pelvis with contrast    Addendum Date: 7/29/2023    ADDENDUM: Addendum is made to the initial report. The following fields should be as follows: REPRODUCTIVE ORGANS: Supracervical hysterectomy. IMPRESSION: No CT evidence of acute findings. Gastric bypass. Colonic diverticulosis. Result Date: 7/29/2023  Impression: No CT evidence of acute findings. Gastric bypass. Colonic diverticulosis. Enlarged prostate. Correlation with PSA levels is advised. Workstation performed: [id number]     CT head wo contrast    Result Date: 7/29/2023  Impression: No evidence of acute intracranial hemorrhage or mass effect. Mild to moderate microangiopathic change, progressed from remote prior study.  Workstation performed: [id number]       LABS  Results from last 7 days   Lab Units 08/03/23  0538 08/01/23  0524 07/30/23  0456 07/29/23  0626 07/28/23  1926   WBC Thousand/uL 7.91 9.20 7.24 7.70 8.69   HEMOGLOBIN g/dL 13.4 13.3 13.9 14.3 15.2   HEMATOCRIT % 41.8 40.3 42.9 43.8 46.4*   MCV fL 93 90 91 92 91   PLATELETS Thousands/uL 228 249 268 242 274     Results from last 7 days   Lab Units 08/03/23  0538 08/02/23  0556 08/01/23  0524 07/30/23  0456 07/29/23  0626 07/28/23  2201   SODIUM mmol/L 137 139 137 138 137 137   POTASSIUM mmol/L 4.1 3.4* 3.2* 3.5 4.1 5.6*   CHLORIDE mmol/L 106 106 104 108 108 109*   CO2 mmol/L 24 27 25 22 24 22   BUN mg/dL 14 13 11 9 10 13   CREATININE mg/dL 0.81 0.67 0.73 0.64 0.67 0.67   CALCIUM mg/dL 8.3* 8.4 8.8 8.7 8.5 8.1*   ALBUMIN g/dL 3.3*  --  3.6  --  3.6 3.5   TOTAL BILIRUBIN mg/dL 0.69  --  0.73  --  0.67 0.55   ALK PHOS U/L 56  --  57  --  67 62   ALT U/L 4*  --  5*  --  6* 6*   AST U/L 12*  --  13  --  12* 18   EGFR ml/min/1.73sq m 71 86 80 87 86 86   GLUCOSE RANDOM mg/dL 86 84 86 94 97 95         Results from last 7 days   Lab Units 07/28/23  1926   TSH 3RD GENERATON uIU/mL 4.327               Cultures:   Results from last 7 days   Lab Units 07/29/23  0634   COLOR UA  Yellow   CLARITY UA  Clear   SPEC GRAV UA  1.015   PH UA  5.0   LEUKOCYTES UA  Negative NITRITE UA  Positive*   GLUCOSE UA mg/dl Negative   KETONES UA mg/dl Negative   BILIRUBIN UA  Negative   BLOOD UA  Negative      Results from last 7 days   Lab Units 07/29/23  0634   RBC UA /hpf None Seen   WBC UA /hpf 0-1   EPITHELIAL CELLS WET PREP /hpf Occasional   BACTERIA UA /hpf Occasional          Results from last 7 days   Lab Units 08/02/23  1307   SARS-COV-2  Negative         DISCHARGE DAY VISIT AND PHYSICAL EXAM:  Subjective: Patient seen and examined. Feeling great no other complaints. Remains agreeable to go to inpatient psychiatry. Vitals:   Blood Pressure: 108/64 (08/03/23 0915)  Pulse: 63 (08/03/23 0915)  Temperature: 98.2 °F (36.8 °C) (08/02/23 2226)  Temp Source: Oral (08/02/23 2226)  Respirations: 16 (08/03/23 0915)  SpO2: 95 % (08/03/23 0915)    Physical Exam  Vitals reviewed. Constitutional:       General: She is not in acute distress. Appearance: Normal appearance. HENT:      Head: Atraumatic. Cardiovascular:      Rate and Rhythm: Regular rhythm. Pulmonary:      Breath sounds: Normal breath sounds. No wheezing. Abdominal:      General: Bowel sounds are normal.      Palpations: Abdomen is soft. Tenderness: There is no guarding or rebound. Musculoskeletal:         General: No swelling. Skin:     General: Skin is warm. Neurological:      Mental Status: She is alert and oriented to person, place, and time. Mental status is at baseline. Planned Re-admission: Yes  [address] [address] [facility]    Test Results Pending at Discharge:   Pending Labs     Order Current Status    Tissue Exam In process      Incidental findings: GI ulcers. Follow-up with GI for biopsy results. Medications   · Discharge Medication List: See after visit summary for reconciled discharge medications.      Diet restrictions: Non ulcer diet    Activity restrictions: No strenuous activity  Discharge Condition: stable    Outpatient Follow-Up and Discharge Instructions  See after visit summary section titled Discharge Instructions for information provided to patient and family. Code Status: Level 1 - Full Code  Discharge Statement   I spent 35 minutes discharging the patient. This time was spent on the day of discharge. Greater than 50% of total time was spent with the patient and / or family counseling and / or coordination of care. ** Please Note: This note has been constructed using a voice recognition system.  **

## 2023-08-03 NOTE — PLAN OF CARE
Problem: PAIN - ADULT  Goal: Verbalizes/displays adequate comfort level or baseline comfort level  Description: Interventions:  - Encourage patient to monitor pain and request assistance  - Assess pain using appropriate pain scale  - Administer analgesics based on type and severity of pain and evaluate response  - Implement non-pharmacological measures as appropriate and evaluate response  - Consider cultural and social influences on pain and pain management  - Notify physician/advanced practitioner if interventions unsuccessful or patient reports new pain  Outcome: Progressing     Problem: INFECTION - ADULT  Goal: Absence or prevention of progression during hospitalization  Description: INTERVENTIONS:  - Assess and monitor for signs and symptoms of infection  - Monitor lab/diagnostic results  - Monitor all insertion sites, i.e. indwelling lines, tubes, and drains  - Monitor endotracheal if appropriate and nasal secretions for changes in amount and color  - [place] appropriate cooling/warming therapies per order  - Administer medications as ordered  - Instruct and encourage patient and family to use good hand hygiene technique  - Identify and instruct in appropriate isolation precautions for identified infection/condition  Outcome: Progressing     Problem: Risk for Self Injury/Neglect  Goal: Treatment Goal: Remain safe during length of stay, learn and adopt new coping skills, and be free of self-injurious ideation, impulses and acts at the time of discharge  Outcome: Progressing     Problem: Risk for Self Injury/Neglect  Goal: Refrain from harming self  Description: Interventions:  - Monitor patient closely, per order  - Develop a trusting relationship  - Supervise medication ingestion, monitor effects and side effects   Outcome: Progressing

## 2023-08-03 NOTE — PROGRESS NOTES
Progress Note- Jerald Fuentes 74 y.o. female MRN: [id number]    Unit/Bed#: [address] [place] [address] Encounter: [id number]      Assessment and Plan:    52-year-old female with history of anxiety, depression,COPD, SVT, Debra-en-Y gastric bypass, GERD, and tobacco use admitted with intentional ambien overdose, GI consulted due to epigastric pain, N/V. Lipase 316, CTAP with no acute findings. #1 Marginal ulcers: Epigastric pain, nausea/vomiting likely secondary to 3 marginal ulcers seen on EGD yesterday, symptoms now resolved and patient is tolerating diet. Biopsy was taken to check for H. Pylori. -Await biopsy results  -Continue Protonix twice daily  -continue to avoid all NSAIDs  -Discussed the importance of smoking cessation ulcer healing  -Follow-up with GI Dr. Rubens Lee in 6-8 weeks    ______________________________________________________________________    Subjective:     Patient lying in bed with one-to-one sitter at bedside. She continues to tolerate diet with no further epigastric pain, nausea or vomiting. She is supposed to go to inpatient psych at some point today.     Medication Administration - last 24 hours from 08/02/2023 1458 to 08/03/2023 1458       Date/Time Order Dose Route Action Action by     08/03/2023 0954 EDT allopurinol (ZYLOPRIM) tablet 100 mg 100 mg Oral Given Isabella Matute RN     08/02/2023 1619 EDT allopurinol (ZYLOPRIM) tablet 100 mg 100 mg Oral Given Mich Saucer     08/02/2023 2132 EDT ALPRAZolam Rhonda Poke) tablet 0.25 mg 0.25 mg Oral Given Jarrod Coates RN     08/03/2023 1708 EDT aspirin (ECOTRIN LOW STRENGTH) EC tablet 81 mg 81 mg Oral Given Isabella Matute RN     08/02/2023 1620 EDT aspirin (ECOTRIN LOW STRENGTH) EC tablet 81 mg 81 mg Oral Given Madiha Stoll     08/03/2023 0953 EDT buPROPion (WELLBUTRIN XL) 24 hr tablet 300 mg 300 mg Oral Given Isabella Matute RN     08/02/2023 1620 EDT buPROPion (WELLBUTRIN XL) 24 hr tablet 300 mg 300 mg Oral Given Mich Arroyo     08/03/2023 0953 EDT busPIRone (BUSPAR) tablet 5 mg 5 mg Oral Given Theresa Watson RN     08/02/2023 1724 EDT busPIRone (BUSPAR) tablet 5 mg 5 mg Oral Not Given Laura Castellon     08/02/2023 1619 EDT busPIRone (BUSPAR) tablet 5 mg 5 mg Oral Given Madiha Stoll     08/02/2023 2128 EDT donepezil (ARICEPT) tablet 5 mg 5 mg Oral Given Vera Sharp RN     08/03/2023 8255 EDT multivitamin-minerals (CENTRUM) tablet 1 tablet 1 tablet Oral Given Theresa Watson RN     08/02/2023 1619 EDT multivitamin-minerals (CENTRUM) tablet 1 tablet 1 tablet Oral Given Madiha Stoll     08/03/2023 0949 EDT nebivolol (BYSTOLIC) tablet 5 mg 5 mg Oral Not Given Theresa Watson RN     08/03/2023 7126 EDT ketotifen (ZADITOR) 0.025 % ophthalmic solution 1 drop 1 drop Both Eyes Given Theresa Watson RN     08/02/2023 1750 EDT ketotifen (ZADITOR) 0.025 % ophthalmic solution 1 drop 1 drop Both Eyes Given Madiha Stoll     08/03/2023 0955 EDT nicotine (NICODERM CQ) 7 mg/24hr TD 24 hr patch 1 patch 1 patch Transdermal Medication Applied Theresa Watson RN     08/03/2023 0936 EDT nicotine (NICODERM CQ) 7 mg/24hr TD 24 hr patch 1 patch 1 patch Transdermal Patch Removed Theresa Watson RN     08/03/2023 4074 EDT enoxaparin (LOVENOX) subcutaneous injection 40 mg 40 mg Subcutaneous Given Theresa Watson RN     08/02/2023 1619 EDT enoxaparin (LOVENOX) subcutaneous injection 40 mg 40 mg Subcutaneous Given Madiha Stoll     08/03/2023 0954 EDT acetaminophen (TYLENOL) tablet 650 mg 650 mg Oral Given Theresa Watson RN     08/03/2023 0914 EDT Fluticasone Furoate-Vilanterol 100-25 mcg/actuation 1 puff 1 puff Inhalation Given Theresa Watson RN     08/03/2023 2022 EDT fluticasone (FLONASE) 50 mcg/act nasal spray 1 spray 1 spray Each Nare Given Theresa Watson RN     08/03/2023 5191 EDT azithromycin (ZITHROMAX) tablet 250 mg 250 mg Oral Given Theresa Watson RN     08/02/2023 1619 EDT azithromycin (ZITHROMAX) tablet 250 mg 250 mg Oral Given Laura Castellon     08/03/2023 0953 EDT escitalopram (LEXAPRO) tablet 10 mg 10 mg Oral Given Billi Olszewski, RN     08/02/2023 1619 EDT escitalopram (LEXAPRO) tablet 10 mg 10 mg Oral Given Madiha Stoll     08/03/2023 0600 EDT pantoprazole (PROTONIX) EC tablet 40 mg 40 mg Oral Given Candace Davis, CHAD     08/02/2023 1620 EDT pantoprazole (PROTONIX) EC tablet 40 mg 40 mg Oral Given Madiha Rosaharsh     08/03/2023 1156 EDT sucralfate (CARAFATE) tablet 1 g 1 g Oral Given Billi Olszewski, RN     08/03/2023 0600 EDT sucralfate (CARAFATE) tablet 1 g 1 g Oral Given Candace Davis, CHAD     08/02/2023 2128 EDT sucralfate (CARAFATE) tablet 1 g 1 g Oral Given Candace Davis, CHAD     08/02/2023 1620 EDT sucralfate (CARAFATE) tablet 1 g 1 g Oral Given Madiha Rosata     08/02/2023 1616 EDT sucralfate (CARAFATE) tablet 1 g 1 g Oral Not Given Madiha Rosaharsh     08/03/2023 4013 EDT saccharomyces boulardii (FLORASTOR) capsule 250 mg 250 mg Oral Given Billi Olszewski, RN     08/02/2023 1724 EDT saccharomyces boulardii (FLORASTOR) capsule 250 mg 250 mg Oral Not Given Madiha Rosaharsh     08/02/2023 1621 EDT saccharomyces boulardii (FLORASTOR) capsule 250 mg 250 mg Oral Given Madiha Rosaharsh     08/02/2023 1620 EDT potassium chloride (K-DUR,KLOR-CON) CR tablet 40 mEq 40 mEq Oral Given Madiha Rosaharsh          Objective:     Vitals: Blood pressure 108/64, pulse 63, temperature 98.2 °F (36.8 °C), temperature source Oral, resp. rate 16, SpO2 95 %, not currently breastfeeding. ,There is no height or weight on file to calculate BMI.       Intake/Output Summary (Last 24 hours) at 8/3/2023 1458  Last data filed at 8/3/2023 0900  Gross per 24 hour   Intake 120 ml   Output --   Net 120 ml       Physical Exam:   General Appearance: Awake and alert, in no acute distress  Chest: clear to auscultation, no rales or rhonchi  Cardiac: S1 & S2 normal, no murmur or gallop  Abdomen: Soft, non-tender, non-distended; bowel sounds normal; no masses or no organomegaly    Invasive Devices     Peripheral Intravenous Line  Duration           Peripheral IV 08/02/23 Left Antecubital 1 day                Lab Results:  No results displayed because visit has over 200 results. Imaging Studies: I have personally reviewed pertinent imaging studies.
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
## 2023-08-03 NOTE — ED NOTES
Patient is accepted at [facility]. Patient is accepted by Dr. Emmie Fraire per Rajan Alberto at Intake . Transportation is arranged with [employer].     Transportation is scheduled for 3:15pm.       Merceda Councilman MSW

## 2023-08-03 NOTE — ED NOTES
11:15 PES started Bed search   Intake - has available beds .   201 signed and faxed to intake for review       Ochoa KRISHNAN

## 2023-08-04 ENCOUNTER — TELEPHONE (OUTPATIENT)
Dept: PSYCHIATRY | Facility: CLINIC | Age: 73
End: 2023-08-04

## 2023-08-04 PROBLEM — Z00.8 MEDICAL CLEARANCE FOR PSYCHIATRIC ADMISSION: Status: ACTIVE | Noted: 2023-08-04

## 2023-08-04 PROBLEM — M1A.0720 CHRONIC IDIOPATHIC GOUT OF LEFT FOOT: Status: ACTIVE | Noted: 2023-08-04

## 2023-08-04 PROBLEM — J01.10 SUBACUTE FRONTAL SINUSITIS: Status: ACTIVE | Noted: 2023-08-04

## 2023-08-04 PROBLEM — Z98.84 HISTORY OF ROUX-EN-Y GASTRIC BYPASS: Status: ACTIVE | Noted: 2023-08-04

## 2023-08-04 PROBLEM — K28.9 ANASTOMOTIC ULCER: Status: ACTIVE | Noted: 2023-08-04

## 2023-08-04 PROBLEM — F33.2 SEVERE EPISODE OF RECURRENT MAJOR DEPRESSIVE DISORDER, WITHOUT PSYCHOTIC FEATURES (HCC): Status: ACTIVE | Noted: 2023-08-04

## 2023-08-04 LAB
25(OH)D3 SERPL-MCNC: 9.3 NG/ML (ref 30–100)
ALBUMIN SERPL BCP-MCNC: 3.5 G/DL (ref 3.5–5)
ALP SERPL-CCNC: 55 U/L (ref 34–104)
ALT SERPL W P-5'-P-CCNC: 7 U/L (ref 7–52)
ANION GAP SERPL CALCULATED.3IONS-SCNC: 10 MMOL/L
AST SERPL W P-5'-P-CCNC: 14 U/L (ref 13–39)
BASOPHILS # BLD AUTO: 0.05 THOUSANDS/ÂΜL (ref 0–0.1)
BASOPHILS NFR BLD AUTO: 1 % (ref 0–1)
BILIRUB SERPL-MCNC: 0.76 MG/DL (ref 0.2–1)
BUN SERPL-MCNC: 13 MG/DL (ref 5–25)
CALCIUM SERPL-MCNC: 8.5 MG/DL (ref 8.4–10.2)
CHLORIDE SERPL-SCNC: 105 MMOL/L (ref 96–108)
CO2 SERPL-SCNC: 22 MMOL/L (ref 21–32)
CREAT SERPL-MCNC: 0.7 MG/DL (ref 0.6–1.3)
EOSINOPHIL # BLD AUTO: 0.2 THOUSAND/ÂΜL (ref 0–0.61)
EOSINOPHIL NFR BLD AUTO: 3 % (ref 0–6)
ERYTHROCYTE [DISTWIDTH] IN BLOOD BY AUTOMATED COUNT: 14.8 % (ref 11.6–15.1)
FOLATE SERPL-MCNC: 10 NG/ML
GFR SERPL CREATININE-BSD FRML MDRD: 85 ML/MIN/1.73SQ M
GLUCOSE SERPL-MCNC: 95 MG/DL (ref 65–140)
HCT VFR BLD AUTO: 41.7 % (ref 34.8–46.1)
HGB BLD-MCNC: 13.6 G/DL (ref 11.5–15.4)
IMM GRANULOCYTES # BLD AUTO: 0.06 THOUSAND/UL (ref 0–0.2)
IMM GRANULOCYTES NFR BLD AUTO: 1 % (ref 0–2)
LYMPHOCYTES # BLD AUTO: 1.68 THOUSANDS/ÂΜL (ref 0.6–4.47)
LYMPHOCYTES NFR BLD AUTO: 21 % (ref 14–44)
MAGNESIUM SERPL-MCNC: 2.2 MG/DL (ref 1.9–2.7)
MCH RBC QN AUTO: 30 PG (ref 26.8–34.3)
MCHC RBC AUTO-ENTMCNC: 32.6 G/DL (ref 31.4–37.4)
MCV RBC AUTO: 92 FL (ref 82–98)
MONOCYTES # BLD AUTO: 0.7 THOUSAND/ÂΜL (ref 0.17–1.22)
MONOCYTES NFR BLD AUTO: 9 % (ref 4–12)
NEUTROPHILS # BLD AUTO: 5.31 THOUSANDS/ÂΜL (ref 1.85–7.62)
NEUTS SEG NFR BLD AUTO: 65 % (ref 43–75)
NRBC BLD AUTO-RTO: 0 /100 WBCS
PHOSPHATE SERPL-MCNC: 3.7 MG/DL (ref 2.3–4.1)
PLATELET # BLD AUTO: 240 THOUSANDS/UL (ref 149–390)
PMV BLD AUTO: 10.8 FL (ref 8.9–12.7)
POTASSIUM SERPL-SCNC: 3.8 MMOL/L (ref 3.5–5.3)
PROT SERPL-MCNC: 5.9 G/DL (ref 6.4–8.4)
RBC # BLD AUTO: 4.54 MILLION/UL (ref 3.81–5.12)
SODIUM SERPL-SCNC: 137 MMOL/L (ref 135–147)
T4 FREE SERPL-MCNC: 0.87 NG/DL (ref 0.61–1.12)
TSH SERPL DL<=0.05 MIU/L-ACNC: 4.72 UIU/ML (ref 0.45–4.5)
VIT B12 SERPL-MCNC: 187 PG/ML (ref 180–914)
WBC # BLD AUTO: 8 THOUSAND/UL (ref 4.31–10.16)

## 2023-08-04 PROCEDURE — 85025 COMPLETE CBC W/AUTO DIFF WBC: CPT

## 2023-08-04 PROCEDURE — 84439 ASSAY OF FREE THYROXINE: CPT

## 2023-08-04 PROCEDURE — 84100 ASSAY OF PHOSPHORUS: CPT

## 2023-08-04 PROCEDURE — 83735 ASSAY OF MAGNESIUM: CPT

## 2023-08-04 PROCEDURE — 82607 VITAMIN B-12: CPT

## 2023-08-04 PROCEDURE — 82306 VITAMIN D 25 HYDROXY: CPT

## 2023-08-04 PROCEDURE — 80053 COMPREHEN METABOLIC PANEL: CPT

## 2023-08-04 PROCEDURE — 82746 ASSAY OF FOLIC ACID SERUM: CPT

## 2023-08-04 PROCEDURE — 99223 1ST HOSP IP/OBS HIGH 75: CPT | Performed by: STUDENT IN AN ORGANIZED HEALTH CARE EDUCATION/TRAINING PROGRAM

## 2023-08-04 PROCEDURE — 99222 1ST HOSP IP/OBS MODERATE 55: CPT | Performed by: NURSE PRACTITIONER

## 2023-08-04 PROCEDURE — 84443 ASSAY THYROID STIM HORMONE: CPT

## 2023-08-04 RX ORDER — FLUTICASONE FUROATE AND VILANTEROL 100; 25 UG/1; UG/1
1 POWDER RESPIRATORY (INHALATION)
Status: DISCONTINUED | OUTPATIENT
Start: 2023-08-04 | End: 2023-08-10 | Stop reason: HOSPADM

## 2023-08-04 RX ORDER — GUAIFENESIN/DEXTROMETHORPHAN 100-10MG/5
10 SYRUP ORAL EVERY 4 HOURS PRN
Status: DISCONTINUED | OUTPATIENT
Start: 2023-08-04 | End: 2023-08-10 | Stop reason: HOSPADM

## 2023-08-04 RX ORDER — ALBUTEROL SULFATE 90 UG/1
2 AEROSOL, METERED RESPIRATORY (INHALATION) EVERY 4 HOURS PRN
Status: DISCONTINUED | OUTPATIENT
Start: 2023-08-04 | End: 2023-08-10 | Stop reason: HOSPADM

## 2023-08-04 RX ORDER — KETOTIFEN FUMARATE 0.35 MG/ML
1 SOLUTION/ DROPS OPHTHALMIC 2 TIMES DAILY
Status: DISCONTINUED | OUTPATIENT
Start: 2023-08-04 | End: 2023-08-10 | Stop reason: HOSPADM

## 2023-08-04 RX ORDER — NEBIVOLOL 5 MG/1
5 TABLET ORAL DAILY
Status: DISCONTINUED | OUTPATIENT
Start: 2023-08-04 | End: 2023-08-10 | Stop reason: HOSPADM

## 2023-08-04 RX ORDER — PANTOPRAZOLE SODIUM 40 MG/1
40 TABLET, DELAYED RELEASE ORAL
Status: DISCONTINUED | OUTPATIENT
Start: 2023-08-04 | End: 2023-08-10 | Stop reason: HOSPADM

## 2023-08-04 RX ORDER — ALLOPURINOL 100 MG/1
100 TABLET ORAL DAILY
Status: DISCONTINUED | OUTPATIENT
Start: 2023-08-04 | End: 2023-08-10 | Stop reason: HOSPADM

## 2023-08-04 RX ORDER — SUCRALFATE 1 G/1
1 TABLET ORAL
Status: DISCONTINUED | OUTPATIENT
Start: 2023-08-04 | End: 2023-08-10 | Stop reason: HOSPADM

## 2023-08-04 RX ORDER — ERGOCALCIFEROL 1.25 MG/1
50000 CAPSULE ORAL WEEKLY
Status: DISCONTINUED | OUTPATIENT
Start: 2023-08-04 | End: 2023-08-10 | Stop reason: HOSPADM

## 2023-08-04 RX ORDER — FLUTICASONE PROPIONATE 50 MCG
1 SPRAY, SUSPENSION (ML) NASAL DAILY
Status: DISCONTINUED | OUTPATIENT
Start: 2023-08-04 | End: 2023-08-10 | Stop reason: HOSPADM

## 2023-08-04 RX ORDER — CYANOCOBALAMIN 1000 UG/ML
1000 INJECTION, SOLUTION INTRAMUSCULAR; SUBCUTANEOUS
Status: DISCONTINUED | OUTPATIENT
Start: 2023-08-04 | End: 2023-08-10 | Stop reason: HOSPADM

## 2023-08-04 RX ADMIN — KETOTIFEN FUMARATE 1 DROP: 0.25 SOLUTION/ DROPS OPHTHALMIC at 17:34

## 2023-08-04 RX ADMIN — BUPROPION HYDROCHLORIDE 300 MG: 300 TABLET, FILM COATED, EXTENDED RELEASE ORAL at 08:43

## 2023-08-04 RX ADMIN — BUSPIRONE HYDROCHLORIDE 5 MG: 5 TABLET ORAL at 17:20

## 2023-08-04 RX ADMIN — FLUTICASONE FUROATE AND VILANTEROL TRIFENATATE 1 PUFF: 100; 25 POWDER RESPIRATORY (INHALATION) at 15:32

## 2023-08-04 RX ADMIN — MULTIPLE VITAMINS W/ MINERALS TAB 1 TABLET: TAB ORAL at 15:32

## 2023-08-04 RX ADMIN — ASPIRIN 81 MG: 81 TABLET, COATED ORAL at 15:32

## 2023-08-04 RX ADMIN — CYANOCOBALAMIN 1000 MCG: 1000 INJECTION, SOLUTION INTRAMUSCULAR; SUBCUTANEOUS at 15:32

## 2023-08-04 RX ADMIN — SUCRALFATE 1 G: 1 TABLET ORAL at 15:32

## 2023-08-04 RX ADMIN — PANTOPRAZOLE SODIUM 40 MG: 40 TABLET, DELAYED RELEASE ORAL at 15:31

## 2023-08-04 RX ADMIN — ERGOCALCIFEROL 50000 UNITS: 1.25 CAPSULE ORAL at 15:32

## 2023-08-04 RX ADMIN — NEBIVOLOL HYDROCHLORIDE 5 MG: 5 TABLET ORAL at 15:32

## 2023-08-04 RX ADMIN — SUCRALFATE 1 G: 1 TABLET ORAL at 21:22

## 2023-08-04 RX ADMIN — ALLOPURINOL 100 MG: 100 TABLET ORAL at 15:32

## 2023-08-04 RX ADMIN — ESCITALOPRAM OXALATE 10 MG: 10 TABLET ORAL at 08:43

## 2023-08-04 RX ADMIN — BUSPIRONE HYDROCHLORIDE 5 MG: 5 TABLET ORAL at 08:43

## 2023-08-04 RX ADMIN — CYANOCOBALAMIN TAB 1000 MCG 1000 MCG: 1000 TAB at 15:32

## 2023-08-04 RX ADMIN — FLUTICASONE PROPIONATE 1 SPRAY: 50 SPRAY, METERED NASAL at 15:32

## 2023-08-04 RX ADMIN — DONEPEZIL HYDROCHLORIDE 5 MG: 5 TABLET, FILM COATED ORAL at 21:22

## 2023-08-04 NOTE — ASSESSMENT & PLAN NOTE
· Patient's vitamin B level 187  · Patient will receive a vitamin B12 1000 mcg IM dose x1 today and then monthly  · Patient will start on vitamin vitamin B12 oral 1000 mcg p.o. daily  · Patient will follow-up with her PCP and have a vitamin B12 level and 8 to 12 weeks

## 2023-08-04 NOTE — ASSESSMENT & PLAN NOTE
· Patient had an episode of acute on chronic sinusitis while at [facility] when she was an inpatient  · Patient completed a 5-day course of azithromycin

## 2023-08-04 NOTE — ASSESSMENT & PLAN NOTE
· Vital signs stable afebrile patient seen and examined by myself all labs reviewed sodium 138 potassium 3.8 creatinine 0.7 vitamin B12 level 187 vitamin D level 9.3  · Patient medically cleared for admit  · SL IM will sign off please call with any questions or concerns

## 2023-08-04 NOTE — CONSULTS
[facility]  Consult  Name: Alex Perkins 74 y.o. female I MRN: [id number]  Unit/Bed#: Senait Cano 956-34 I Date of Admission: 8/3/2023   Date of Service: 8/4/2023 I Hospital Day: 1    Inpatient consult for Medical Clearance for [facility] patient  Consult performed by: KYLE Wilkinson  Consult ordered by:  KYLE Verde          Assessment/Plan   Medical clearance for psychiatric admission  Assessment & Plan  · Vital signs stable afebrile patient seen and examined by myself all labs reviewed sodium 138 potassium 3.8 creatinine 0.7 vitamin B12 level 187 vitamin D level 9.3  · Patient medically cleared for admit  · SL IM will sign off please call with any questions or concerns    Subacute frontal sinusitis  Assessment & Plan  · Patient had an episode of acute on chronic sinusitis while at [facility] when she was an inpatient  · Patient completed a 5-day course of azithromycin    Chronic idiopathic gout of left foot  Assessment & Plan  · Patient has a left old leg injury that required surgery and has had ongoing gout issues with that  · She uses gout as an outpatient her creatinine is stable at 0.7  · We will continue to use her outpatient allopurinol daily    History of Debra-en-Y gastric bypass  Assessment & Plan  · Patient has a longstanding history greater than 1 year of gastric bypass  · Patient now with an anastomotic ulcer  · Patient will continue to be on her PPI as well as her Carafate  · Smoking cessation education  · Small frequent meal  · No fluid 30 minutes before meal no fluids with a meal and no fluids 30 minutes after meal  · Follow gastric bypass post op instructions even though this is a longstanding prior surgery    Anastomotic ulcer  Assessment & Plan  · While patient was an inpatient at [facility] she underwent an EGD and was found to have an anastomotic ulcer  · Patient will be on PPI p.o. twice daily  · She will be on Carafate p.o. 4 times daily  · She needs to follow-up with Dr. Alexander Kramer in 6 to 8 weeks    COPD without exacerbation [facility])  Assessment & Plan  · Patient will have an albuterol inhaler to use every 4 as needed cough shortness of breath or wheeze  · Patient uses Advair at home we will substitute fluticasone while she is in inpatient    Vitamin D deficiency  Assessment & Plan  · Patient's vitamin D level 9.3  · We will start ergocalciferol 50,000 units p.o. weekly x8 weeks  · Patient needs to follow-up with her PCP and have a vitamin D level in 8 to 12 weeks    Vitamin B12 deficiency  Assessment & Plan  · Patient's vitamin B level 187  · Patient will receive a vitamin B12 1000 mcg IM dose x1 today and then monthly  · Patient will start on vitamin vitamin B12 oral 1000 mcg p.o. daily  · Patient will follow-up with her PCP and have a vitamin B12 level and 8 to 12 weeks    Allergic rhinitis  Assessment & Plan  · Patient will continue with her outpatient Flonase    Essential hypertension  Assessment & Plan  · Patient will continue with her Bystolic 5 mg p.o. daily  · We will monitor blood pressure and add or delete agents as needed    Tobacco abuse  Assessment & Plan  · Patient has a longstanding history greater than 1 year of tobacco abuse  · Patient will use a nicotine teen patch while she is in inpatient  · Smoking cessation education           Counseling / Coordination of Care Time: 1 hour. Greater than 50% of total time spent on patient counseling and coordination of care. Collaboration of Care: Were Recommendations Directly Discussed with Primary Treatment Team? - No     History of Present Illness:    Felicia Anaya is a 74 y.o. female who is originally admitted to the psychiatry service due to intentional overdose with Ambien. We are consulted for medical clearance for admission to [facility] and treatment of underlying psychiatric illness. Patient presents to [address] [place] [address] ED on 7/28/2023 with an intentional overdose of Ambien. Patient has a complex past medical history including hypertension SVT COPD without exacerbation GERD with esophagitis status post gastric bypass Debra-en-Y seasonal allergies chronic sinusitis a left lower extremity acute injury that has left her with chronic gout, active tobacco abuse. On evaluation patient denies any physical complaints such as headache, dizziness, chest pain, shortness of breath, nausea/vomiting/diarrhea at this time. Inpatient admission for psychiatric evaluation. Labs ECG were reviewed. Review of Systems:    Review of Systems   Constitutional: Positive for activity change and appetite change. HENT: Positive for congestion, rhinorrhea, sinus pressure and sinus pain. Eyes: Positive for itching. Respiratory: Positive for cough. Gastrointestinal: Positive for abdominal pain. Musculoskeletal: Positive for arthralgias. Psychiatric/Behavioral: Positive for behavioral problems and suicidal ideas. Past Medical and Surgical History:     Past Medical History:   Diagnosis Date   • Acid reflux    • Anxiety    • Arthritis    • Asthma    • Cardiac disease    • Chronic kidney disease     passed on own kidney stone   • Depression    • Diverticulitis    • GERD (gastroesophageal reflux disease)    • Hayfever    • [place] (hard of hearing)     bilateral hearing aids   • Hyperlipemia    • Hypertension    • Panic attack    • Tachycardia        Past Surgical History:   Procedure Laterality Date   • ABLATION OF DYSRHYTHMIC FOCUS     • APPENDECTOMY     • CHOLECYSTECTOMY      open   • FRACTURE SURGERY      ORIF fx (L) tibia, fibula 2009   • GASTRIC BYPASS OPEN     • HYSTERECTOMY     • DC XCAPSL CTRC RMVL INSJ IO LENS PROSTH W/O ECP Left 4/18/2016    Procedure: EXTRACTION EXTRACAPSULAR CATARACT PHACO INTRAOCULAR LENS (IOL);   Surgeon: Donn Zaragoza MD;  Location: [facility] MAIN OR;  Service: Ophthalmology   • DC XCAPSL CTRC RMVL INSJ IO LENS PROSTH W/O ECP Right 3/1/2021    Procedure: EXTRACTION EXTRACAPSULAR CATARACT PHACO INTRAOCULAR LENS (IOL); Surgeon: Roxi Avilez MD;  Location: [facility] MAIN OR;  Service: Ophthalmology   • TONSILECTOMY AND ADNOIDECTOMY         Meds/Allergies:    all medications and allergies reviewed    Allergies: Allergies   Allergen Reactions   • Augmentin [Amoxicillin-Pot Clavulanate] GI Intolerance, Other (See Comments) and Hives     VOMITING  VOMITING  Reaction Date: 07Dec2004;    • Sulfa Antibiotics Itching, Other (See Comments) and Hives     RASH, ITCHY  RASH, ITCHY   • Morphine Other (See Comments)     "DOESN'T WORK"   • Latex Rash     Unsure if this is an allergy       Social History:     Marital Status:     Substance Use History:   Social History     Substance and Sexual Activity   Alcohol Use Yes    Comment: rarely     Social History     Tobacco Use   Smoking Status Every Day   • Packs/day: 0.25   • Years: 25.00   • Total pack years: 6.25   • Types: Cigarettes   Smokeless Tobacco Never     Social History     Substance and Sexual Activity   Drug Use No       Family History:    non-contributory    Physical Exam:     Vitals:   Blood Pressure: 155/67 (08/04/23 0729)  Pulse: 60 (08/04/23 0729)  Temperature: (!) 97.4 °F (36.3 °C) (08/04/23 0729)  Temp Source: Temporal (08/04/23 0729)  Respirations: 18 (08/04/23 0729)  Height: 4' 11" (149.9 cm) (08/03/23 1620)  Weight - Scale: 73.5 kg (162 lb) (08/03/23 1620)  SpO2: 96 % (08/04/23 0729)    Physical Exam  Constitutional:       General: She is not in acute distress. Appearance: Normal appearance. She is not ill-appearing. HENT:      Head: Normocephalic and atraumatic. Nose: Nose normal.      Mouth/Throat:      Mouth: Mucous membranes are moist.   Eyes:      Extraocular Movements: Extraocular movements intact. Cardiovascular:      Rate and Rhythm: Normal rate and regular rhythm. Heart sounds: Normal heart sounds. Pulmonary:      Breath sounds: Normal breath sounds. No wheezing.    Abdominal:      General: Abdomen is flat. Bowel sounds are normal.      Palpations: Abdomen is soft. Skin:     General: Skin is warm and dry. Neurological:      Mental Status: She is alert. Additional Data:     Lab Results: I have personally reviewed pertinent reports. Results from last 7 days   Lab Units 08/04/23  0557   WBC Thousand/uL 8.00   HEMOGLOBIN g/dL 13.6   HEMATOCRIT % 41.7   PLATELETS Thousands/uL 240   NEUTROS PCT % 65   LYMPHS PCT % 21   MONOS PCT % 9   EOS PCT % 3     Results from last 7 days   Lab Units 08/04/23  0557   SODIUM mmol/L 137   POTASSIUM mmol/L 3.8   CHLORIDE mmol/L 105   CO2 mmol/L 22   BUN mg/dL 13   CREATININE mg/dL 0.70   ANION GAP mmol/L 10   CALCIUM mg/dL 8.5   ALBUMIN g/dL 3.5   TOTAL BILIRUBIN mg/dL 0.76   ALK PHOS U/L 55   ALT U/L 7   AST U/L 14   GLUCOSE RANDOM mg/dL 95             Lab Results   Component Value Date/Time    HGBA1C 6.0 (H) 02/10/2016 06:55 AM           EKG, Pathology, and Other Studies Reviewed on Admission:   · EKG 8/3/2023 twelve-lead EKG strip reviewed by myself in agreement with assessment ventricular rate 56 QTc 463 sinus bradycardia with a right bundle branch block there is an inferior infarct before February 10, 2016 when compared with an EKG of July 28, 2023 there are no new acute EKG changes other than the fact that her heart rate has decreased by 30 bpm    ** Please Note: This note has been constructed using a voice recognition system. **    I have spent a total time of 61 minutes on 08/04/23 in caring for this patient including Instructions for management, Patient and family education, Importance of tx compliance, Risk factor reductions, Impressions, Counseling / Coordination of care, Documenting in the medical record, Reviewing / ordering tests, medicine, procedures   and Obtaining or reviewing history  .
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
## 2023-08-04 NOTE — ASSESSMENT & PLAN NOTE
· Patient has a longstanding history greater than 1 year of tobacco abuse  · Patient will use a nicotine teen patch while she is in inpatient  · Smoking cessation education

## 2023-08-04 NOTE — ASSESSMENT & PLAN NOTE
· While patient was an inpatient at [facility] she underwent an EGD and was found to have an anastomotic ulcer  · Patient will be on PPI p.o. twice daily  · She will be on Carafate p.o. 4 times daily  · She needs to follow-up with Dr. Pallavi Smith in 6 to 8 weeks

## 2023-08-04 NOTE — PROGRESS NOTES
08/04/23 1015 08/04/23 1330   Activity/Group Checklist   Group [facility] Life Skills  (task on personal preferences.)   Attendance Attended Attended   Attendance Duration (min) 0-15 31-45   Interactions Interacted appropriately Interacted appropriately  (Pt. able to complete a task on "instructions for self care"Pt. stated multiple sensory prfeeferences)   Affect/Mood Normal range  (yet anxiety noted.) Appropriate;Bright;Normal range   Goals Achieved Able to listen to others Able to engage in interactions; Able to listen to others;Discussed coping strategies; Identified feelings

## 2023-08-04 NOTE — ASSESSMENT & PLAN NOTE
· Patient will have an albuterol inhaler to use every 4 as needed cough shortness of breath or wheeze  · Patient uses Advair at home we will substitute fluticasone while she is in inpatient
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
## 2023-08-04 NOTE — PROGRESS NOTES
08/04/23 1404   Team Meeting   Meeting Type Tx Team Meeting   Initial Conference Date 08/04/23   Next Conference Date 09/01/23   Team Members Present   Team Members Present Physician;Nurse;   Physician Team Member Dr. Dionne Irizarry Team Member [facility] Management Team Member Perfecto Davila   Patient/Family Present   Patient Present Yes   Patient's Family Present No     Tx plan was reviewed with the patient who was in agreement with all of her goals and signed the document. The patient had no questions or concerns regarding her treatment plan.

## 2023-08-04 NOTE — ASSESSMENT & PLAN NOTE
· Patient has a left old leg injury that required surgery and has had ongoing gout issues with that  · She uses gout as an outpatient her creatinine is stable at 0.7  · We will continue to use her outpatient allopurinol daily

## 2023-08-04 NOTE — ASSESSMENT & PLAN NOTE
· Patient has a longstanding history greater than 1 year of gastric bypass  · Patient now with an anastomotic ulcer  · Patient will continue to be on her PPI as well as her Carafate  · Smoking cessation education  · Small frequent meal  · No fluid 30 minutes before meal no fluids with a meal and no fluids 30 minutes after meal  · Follow gastric bypass post op instructions even though this is a longstanding prior surgery

## 2023-08-04 NOTE — CMS CERTIFICATION NOTE
Certification: Based upon physical, mental and social evaluations, I certify that inpatient psychiatric services are medically necessary for this patient for a duration of 12 midnights for the treatment of  MDD, severe without psychosis.

## 2023-08-04 NOTE — ASSESSMENT & PLAN NOTE
· Patient's vitamin D level 9.3  · We will start ergocalciferol 50,000 units p.o. weekly x8 weeks  · Patient needs to follow-up with her PCP and have a vitamin D level in 8 to 12 weeks

## 2023-08-04 NOTE — NURSING NOTE
Patient states she feels, "a lot better" today. She expresses that her anxiety is manageable. Her affect has been bright today with the exception of tearfulness when discussing grief and past trauma. She attributes he feeling "a lot better" to beginning to acknowledge and work on things from her past specifically referencing a productive conversation with a psychiatrist today.

## 2023-08-04 NOTE — TREATMENT TEAM
08/04/23 1100   Activity/Group Checklist   Group Other (Comment)  (MH Recovery and reflection)   Attendance Attended   Attendance Duration (min) 31-45   Interactions Other (Comment)  (needed redirection for focus)   Affect/Mood Other (Comment)  (anxious)   Goals Achieved Identified feelings; Identified relapse prevention strategies; Able to listen to others; Able to engage in interactions; Able to reflect/comment on own behavior;Discussed self-esteem issues; Discussed coping strategies

## 2023-08-04 NOTE — TREATMENT PLAN
TREATMENT PLAN REVIEW - [address] Davian 74 y.o. 1950 female MRN: [id number]    [address] [place] [address] [facility] / [facility] [place] [facility] Encounter: [id number]          Admit Date/Time:  8/3/2023  4:13 PM    Treatment Team: Attending Provider: Janes Acevedo MD; Patient Care Assistant: Gee Cortez; Patient Care Assistant: Darrick Gusman; Patient Care Technician: Shar Delgado; Patient Care Technician: Manuel Dupree; : Dannielle Cisse LM; Occupational Therapy Assistant: CRYS Recinos;  Registered Nurse: Phyllis Talbot RN; Nurse Manager: Tanna Hidalgo RN    Diagnosis: Principal Problem:    Severe episode of recurrent major depressive disorder, without psychotic features [facility])      Patient Strengths/Assets: ability for insight, cooperative, communication skills, compliant with medication, motivated, motivation for treatment/growth, negotiates basic needs, patient is on a voluntary commitment, patient is willing to work on problems    Patient Barriers/Limitations: difficulty adapting, lack of stable employment, marital/family conflict, poor reasoning ability, poor self-care, self-care deficit, substance abuse    Short Term Goals: decrease in depressive symptoms, decrease in anxiety symptoms, decrease in suicidal thoughts, ability to stay safe on the unit, improvement in reasoning ability, improvement in self care, increase in socialization with peers on the unit    Long Term Goals: improvement in depression, improvement in anxiety, resolution of depressive symptoms, free of suicidal thoughts, improvement in reasoning ability, improved insight, able to express basic needs, acceptance of need for psychiatric medications, adequate self care, adequate sleep, adequate appetite, adequate oral intake, appropriate interaction with peers, appropriate interaction with family    Progress Towards Goals: starting psychiatric medications as prescribed    Recommended Treatment: medication management, patient medication education, group therapy, milieu therapy, continued Behavioral Health psychiatric evaluation/assessment process    Treatment Frequency: daily medication monitoring, group and milieu therapy daily, monitoring through interdisciplinary rounds, monitoring through weekly patient care conferences    Expected Discharge Date:  T+7    Discharge Plan: discharge to home, referrals as indicated    Treatment Plan Created/Updated By: Mc Bronson MD
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
## 2023-08-04 NOTE — PLAN OF CARE
Problem: Alteration in Thoughts and Perception  Goal: Treatment Goal: Gain control of psychotic behaviors/thinking, reduce/eliminate presenting symptoms and demonstrate improved reality functioning upon discharge  Outcome: Progressing  Goal: Verbalize thoughts and feelings  Description: Interventions:  - Promote a nonjudgmental and trusting relationship with the patient through active listening and therapeutic communication  - Assess patient's level of functioning, behavior and potential for risk  - Engage patient in 1 on 1 interactions  - Encourage patient to express fears, feelings, frustrations, and discuss symptoms    - Assumption patient to reality, help patient recognize reality-based thinking   - Administer medications as ordered and assess for potential side effects  - Provide the patient education related to the signs and symptoms of the illness and desired effects of prescribed medications  Outcome: Progressing  Goal: Refrain from acting on delusional thinking/internal stimuli  Description: Interventions:  - Monitor patient closely, per order   - Utilize least restrictive measures   - Set reasonable limits, give positive feedback for acceptable   - Administer medications as ordered and monitor of potential side effects  Outcome: Progressing  Goal: Agree to be compliant with medication regime, as prescribed and report medication side effects  Description: Interventions:  - Offer appropriate PRN medication and supervise ingestion; conduct AIMS, as needed   Outcome: Progressing  Goal: Recognize dysfunctional thoughts, communicate reality-based thoughts at the time of discharge  Description: Interventions:  - Provide medication and psycho-education to assist patient in compliance and developing insight into his/her illness   Outcome: Progressing  Goal: Complete daily ADLs, including personal hygiene independently, as able  Description: Interventions:  - Observe, teach, and assist patient with ADLS  - Monitor and promote a balance of rest/activity, with adequate nutrition and elimination   Outcome: Progressing     Problem: Anxiety  Goal: Anxiety is at manageable level  Description: Interventions:  - Assess and monitor patient's anxiety level. - Monitor for signs and symptoms (heart palpitations, chest pain, shortness of breath, headaches, nausea, feeling jumpy, restlessness, irritable, apprehensive). - Collaborate with interdisciplinary team and initiate plan and interventions as ordered.   - [place] patient to unit/surroundings  - Explain treatment plan  - Encourage participation in care  - Encourage verbalization of concerns/fears  - Identify coping mechanisms  - Assist in developing anxiety-reducing skills  - Administer/offer alternative therapies  - Limit or eliminate stimulants  Outcome: Progressing     Problem: Depression  Goal: Treatment Goal: Demonstrate behavioral control of depressive symptoms, verbalize feelings of improved mood/affect, and adopt new coping skills prior to discharge  Outcome: Progressing  Goal: Verbalize thoughts and feelings  Description: Interventions:  - Assess and re-assess patient's level of risk   - Engage patient in 1:1 interactions, daily, for a minimum of 15 minutes   - Encourage patient to express feelings, fears, frustrations, hopes   Outcome: Progressing  Goal: Refrain from harming self  Description: Interventions:  - Monitor patient closely, per order   - Supervise medication ingestion, monitor effects and side effects   Outcome: Progressing  Goal: Refrain from isolation  Description: Interventions:  - Develop a trusting relationship   - Encourage socialization   Outcome: Progressing  Goal: Refrain from self-neglect  Outcome: Progressing

## 2023-08-04 NOTE — ASSESSMENT & PLAN NOTE
PULMONARY CONSULT    HPI: 86 y/o M with PMH sarcoidosis (on chronic steroids), HTN, HLD, BPH, spinal stenosis s/p multiple surgeries c/b paralysis of left hemidiaphragm. Presents from assisted living with cough and acutely SOB for the past day. O2 sat reportedly in 70s. Per son, pt was , presents for cough and shortness of breath for the past day. In ED pt placed on bipap, however became agitated and combative and forcibly removed bipap.       PAST MEDICAL & SURGICAL HISTORY:  Hypertension  GERD (Gastroesophageal Reflux Disease)  High Cholesterol  Arthritis  SS (Spinal Stenosis)  Torn Rotator Cuff left shoulder  BPH (Benign Prostatic Hyperplasia)  Tendon Rupture  left knee-s/p repair x 2  Sarcoid  Hypogonadism in male  History of Tonsillectomy  S/P Hernia Repair  S/P Laminectomy    Allergies  No Known Allergies    FAMILY HISTORY:  No pertinent family history in first degree relatives    Social history:     Review of Systems:  CONSTITUTIONAL: No fever, chills, or fatigue  EYES: No eye pain, visual disturbances, or discharge  ENMT:  No difficulty hearing, tinnitus, vertigo; No sinus or throat pain  NECK: No pain or stiffness  RESPIRATORY: Per above  CARDIOVASCULAR: No chest pain, palpitations, dizziness, or leg swelling  GASTROINTESTINAL: No abdominal or epigastric pain. No nausea, vomiting, or hematemesis; No diarrhea or constipation. No melena or hematochezia.  GENITOURINARY: No dysuria, frequency, hematuria, or incontinence  NEUROLOGICAL: No headaches, memory loss, loss of strength, numbness, or tremors  SKIN: No itching, burning, rashes, or lesions   MUSCULOSKELETAL: No joint pain or swelling; No muscle, back, or extremity pain  PSYCHIATRIC: No depression, anxiety, mood swings, or difficulty sleeping      Medications:  MEDICATIONS  (STANDING):  albuterol/ipratropium for Nebulization 3 milliLiter(s) Nebulizer every 6 hours  atorvastatin 20 milliGRAM(s) Oral at bedtime  azithromycin   Tablet 500 milliGRAM(s) Oral every 24 hours  buDESOnide    Inhalation Suspension 0.5 milliGRAM(s) Inhalation two times a day  DULoxetine 30 milliGRAM(s) Oral daily  furosemide    Tablet 40 milliGRAM(s) Oral two times a day  heparin   Injectable 5000 Unit(s) SubCutaneous every 12 hours  nortriptyline 50 milliGRAM(s) Oral two times a day  predniSONE   Tablet 40 milliGRAM(s) Oral daily  tamsulosin 0.4 milliGRAM(s) Oral at bedtime  testosterone 1% Gel 25 milliGRAM(s) Topical daily    MEDICATIONS  (PRN):  acetaminophen   Tablet .. 650 milliGRAM(s) Oral every 6 hours PRN Temp greater or equal to 38.5C (101.3F), Mild Pain (1 - 3)  haloperidol    Injectable 2.5 milliGRAM(s) IntraMuscular every 6 hours PRN Agitation  lactulose Syrup 10 Gram(s) Oral daily PRN constipation            Vital Signs Last 24 Hrs  T(C): 36.8 (16 Aug 2021 09:36), Max: 37.4 (15 Aug 2021 15:26)  T(F): 98.2 (16 Aug 2021 09:36), Max: 99.4 (15 Aug 2021 15:26)  HR: 90 (16 Aug 2021 09:36) (73 - 94)  BP: 138/79 (16 Aug 2021 09:36) (123/75 - 187/79)  BP(mean): 106 (15 Aug 2021 21:50) (106 - 123)  RR: 20 (16 Aug 2021 09:36) (20 - 48)  SpO2: 99% (16 Aug 2021 09:36) (96% - 100%)    ABG - ( 16 Aug 2021 02:57 )  pH, Arterial: 7.32  pH, Blood: x     /  pCO2: 58    /  pO2: 318   / HCO3: 29    / Base Excess: 2.0   /  SaO2: 100               VBG pH 7.30 08-15 @ 20:41  VBG pCO2 62 08-15 @ 20:41  VBG O2 sat 76 08-15 @ 20:41  VBG lactate 2.7 08-15 @ 20:41  VBG pH 7.21 08-15 @ 20:08  VBG pCO2 75 -15 @ 20:08  VBG O2 sat 47 08-15 @ 20:08  VBG lactate 7.2 08-15 @ 20:08  VBG pH 7.36 08-15 @ 16:38  VBG pCO2 57 08-15 @ 16:38  VBG O2 sat 84 08-15 @ 16:38  VBG lactate 1.8 08-15 @ 16:38  VBG pH 7.33 08-15 @ 15:17  VBG pCO2 63 08-15 @ 15:17  VBG O2 sat 66 08-15 @ 15:17  VBG lactate 2.6 08-15 @ 15:17        08-15 @ 07:01  -  0816 @ 07:00  --------------------------------------------------------  IN: 0 mL / OUT: 650 mL / NET: -650 mL          LABS:                        15.7   15.02 )-----------( 162      ( 16 Aug 2021 07:48 )             49.1         137  |  97  |  13  ----------------------------<  109<H>  4.0   |  30  |  0.91    Ca    8.8      16 Aug 2021 07:48  Phos  4.0       Mg     2.2         TPro  6.6  /  Alb  3.9  /  TBili  0.5  /  DBili  x   /  AST  32  /  ALT  53<H>  /  AlkPhos  76  16      CARDIAC MARKERS ( 16 Aug 2021 07:48 )  x     / x     / x     / x     / 8.5 ng/mL  CARDIAC MARKERS ( 15 Aug 2021 18:33 )  x     / x     / 82 U/L / x     / 6.9 ng/mL  CARDIAC MARKERS ( 15 Aug 2021 15:17 )  x     / x     / 101 U/L / x     / 8.2 ng/mL      CAPILLARY BLOOD GLUCOSE      POCT Blood Glucose.: 154 mg/dL (16 Aug 2021 02:10)    PT/INR - ( 15 Aug 2021 15:17 )   PT: 12.0 sec;   INR: 1.00 ratio         PTT - ( 15 Aug 2021 15:17 )  PTT:31.3 sec  Urinalysis Basic - ( 15 Aug 2021 17:58 )    Color: Yellow / Appearance: Clear / S.024 / pH: x  Gluc: x / Ketone: Negative  / Bili: Negative / Urobili: Negative   Blood: x / Protein: 30 mg/dL / Nitrite: Negative   Leuk Esterase: Negative / RBC: 2 /hpf / WBC 1 /HPF   Sq Epi: x / Non Sq Epi: 0 /hpf / Bacteria: Negative      Procalcitonin, Serum: 0.09 ng/mL (08-15-21 @ 18:33)  Procalcitonin, Serum: 0.09 ng/mL (08-15-21 @ 15:17)    Serum Pro-Brain Natriuretic Peptide: 1612 pg/mL (08-15-21 @ 18:33)  Serum Pro-Brain Natriuretic Peptide: 1597 pg/mL (08-15-21 @ 15:17)              Physical Examination:    General: No acute distress.      HEENT: Pupils equal, reactive to light.  Symmetric.    PULM:     CVS: S1, S2    ABD: Soft, nondistended, nontender, normoactive bowel sounds, no masses    EXT: No edema, nontender    SKIN: Warm and well perfused, no rashes noted.    NEURO: Alert, oriented, interactive, nonfocal    RADIOLOGY REVIEWED    CT chest: < from: CT Angio Chest PE Protocol w/ IV Cont (08.15.21 @ 16:55) >  FINDINGS:    LUNGS AND AIRWAYS: There is atelectasis of the majority of the left lower lobe with nonvisualization of the left lower lobe segmental airways distal to the superior segmental bronchus. Right basilar atelectasis. Redemonstrated cyst in the posterolateral right upper lobe is grossly unchanged when compared to prior study 2014. Right middle lobe 3 mm pulmonary nodule (5:58) is new. A nodular opacity in the peripheral right middle lobe measures 3 mm (5:52) grossly unchanged when compared to prior study 2014. New 7 mm nodular opacity in the right lower lobe (5:64).    Secretions within the trachea. Lunate-shaped trachea can be seen in the setting of tracheomalacia.  PLEURA: Small left pleural effusion.  MEDIASTINUM AND AIMEE: No lymphadenopathy.  VESSELS: No pulmonary embolism. Aortic and coronary artery calcifications.  HEART: Cardiomegaly. No pericardial effusion.  CHEST WALL AND LOWER NECK: Scattered prominent left retropectoral and axillary lymph nodes.  VISUALIZED UPPER ABDOMEN: Marked elevation of the left hemidiaphragm..  BONES: Degenerative changes.    IMPRESSION:    No pulmonary embolism.    Atelectasis of the majority of the left lower lobe with nonvisualization or opacification of the majority of the left lower lobe segmental and subsegmental airways.    Small left pleural effusion.    Prominent 7 mm right lower lobe nodular opacity new since normal second 2014. 6-month follow-up noncontrast chest CT is recommended to ensure stability.    Lunate-shaped trachea can be seen in the setting of tracheomalacia. Dynamic expiratory CT can be performed for further evaluation as clinically warranted.                --- End of Report ---    < end of copied text >   · Patient will continue with her Bystolic 5 mg p.o. daily  · We will monitor blood pressure and add or delete agents as needed PULMONARY CONSULT    HPI: 84 y/o M with PMH sarcoidosis (eye and prostate - on chronic steroids, no hx of sarcoidosis on lungs), HTN, HLD, BPH, spinal stenosis s/p multiple surgeries c/b paralysis of left hemidiaphragm. Presents from assisted living with cough and acutely SOB for the past day. O2 sat reportedly in 70s. Per son, pt was , presents for cough and shortness of breath for the past day. In ED pt placed on bipap, however became agitated and combative and forcibly removed bipap. Per pts outpatient pulm - pt has had outpatient PFTs with no evidence of obstruction but has been on bronchodilators PRN for intermittent bronchospasm. Seen in ED - alert, confused, Sats 100% 3LNC. Denies SOB, CP.       PAST MEDICAL & SURGICAL HISTORY:  Hypertension  GERD (Gastroesophageal Reflux Disease)  High Cholesterol  Arthritis  SS (Spinal Stenosis)  Torn Rotator Cuff left shoulder  BPH (Benign Prostatic Hyperplasia)  Tendon Rupture  left knee-s/p repair x 2  Sarcoid  Hypogonadism in male  History of Tonsillectomy  S/P Hernia Repair  S/P Laminectomy    Allergies  No Known Allergies    FAMILY HISTORY:  No pertinent family history in first degree relatives    Social history: remote smoking hx  +exposure to second hand smoke     Review of Systems: limited, pt poor historian   CONSTITUTIONAL: No fever, chills, or fatigue  EYES: No eye pain, visual disturbances, or discharge  ENMT:  No difficulty hearing, tinnitus, vertigo; No sinus or throat pain  NECK: No pain or stiffness  RESPIRATORY: Per above  CARDIOVASCULAR: No chest pain, palpitations, dizziness, or leg swelling  GASTROINTESTINAL: No abdominal or epigastric pain. No nausea, vomiting, or hematemesis; No diarrhea or constipation. No melena or hematochezia.  GENITOURINARY: No dysuria, frequency, hematuria, or incontinence  NEUROLOGICAL: No headaches, memory loss, loss of strength, numbness, or tremors  SKIN: No itching, burning, rashes, or lesions   MUSCULOSKELETAL: No joint pain or swelling; No muscle, back, or extremity pain  PSYCHIATRIC: No depression, anxiety, mood swings, or difficulty sleeping      Medications:  MEDICATIONS  (STANDING):  albuterol/ipratropium for Nebulization 3 milliLiter(s) Nebulizer every 6 hours  atorvastatin 20 milliGRAM(s) Oral at bedtime  azithromycin   Tablet 500 milliGRAM(s) Oral every 24 hours  buDESOnide    Inhalation Suspension 0.5 milliGRAM(s) Inhalation two times a day  DULoxetine 30 milliGRAM(s) Oral daily  furosemide    Tablet 40 milliGRAM(s) Oral two times a day  heparin   Injectable 5000 Unit(s) SubCutaneous every 12 hours  nortriptyline 50 milliGRAM(s) Oral two times a day  predniSONE   Tablet 40 milliGRAM(s) Oral daily  tamsulosin 0.4 milliGRAM(s) Oral at bedtime  testosterone 1% Gel 25 milliGRAM(s) Topical daily    MEDICATIONS  (PRN):  acetaminophen   Tablet .. 650 milliGRAM(s) Oral every 6 hours PRN Temp greater or equal to 38.5C (101.3F), Mild Pain (1 - 3)  haloperidol    Injectable 2.5 milliGRAM(s) IntraMuscular every 6 hours PRN Agitation  lactulose Syrup 10 Gram(s) Oral daily PRN constipation            Vital Signs Last 24 Hrs  T(C): 36.8 (16 Aug 2021 09:36), Max: 37.4 (15 Aug 2021 15:26)  T(F): 98.2 (16 Aug 2021 09:36), Max: 99.4 (15 Aug 2021 15:26)  HR: 90 (16 Aug 2021 09:36) (73 - 94)  BP: 138/79 (16 Aug 2021 09:36) (123/75 - 187/79)  BP(mean): 106 (15 Aug 2021 21:50) (106 - 123)  RR: 20 (16 Aug 2021 09:36) (20 - 48)  SpO2: 99% (16 Aug 2021 09:36) (96% - 100%)    ABG - ( 16 Aug 2021 02:57 )  pH, Arterial: 7.32  pH, Blood: x     /  pCO2: 58    /  pO2: 318   / HCO3: 29    / Base Excess: 2.0   /  SaO2: 100               VBG pH 7.30 08-15 @ 20:41  VBG pCO2 62 08-15 @ 20:41  VBG O2 sat 76 08-15 @ 20:41  VBG lactate 2.7 08-15 @ 20:41  VBG pH 7.21 08-15 @ 20:08  VBG pCO2 75 08-15 @ 20:08  VBG O2 sat 47 08-15 @ 20:08  VBG lactate 7.2 08-15 @ 20:08  VBG pH 7.36 08-15 @ 16:38  VBG pCO2 57 08-15 @ 16:38  VBG O2 sat 84 08-15 @ 16:38  VBG lactate 1.8 08-15 @ 16:38  VBG pH 7.33 08-15 @ 15:17  VBG pCO2 63 08-15 @ 15:17  VBG O2 sat 66 -15 @ 15:17  VBG lactate 2.6 08-15 @ 15:17        08-15 @ 07:01  -  08-16 @ 07:00  --------------------------------------------------------  IN: 0 mL / OUT: 650 mL / NET: -650 mL          LABS:                        15.7   15.02 )-----------( 162      ( 16 Aug 2021 07:48 )             49.1     08-16    137  |  97  |  13  ----------------------------<  109<H>  4.0   |  30  |  0.91    Ca    8.8      16 Aug 2021 07:48  Phos  4.0     08-16  Mg     2.2     08-16    TPro  6.6  /  Alb  3.9  /  TBili  0.5  /  DBili  x   /  AST  32  /  ALT  53<H>  /  AlkPhos  76  08-16      CARDIAC MARKERS ( 16 Aug 2021 07:48 )  x     / x     / x     / x     / 8.5 ng/mL  CARDIAC MARKERS ( 15 Aug 2021 18:33 )  x     / x     / 82 U/L / x     / 6.9 ng/mL  CARDIAC MARKERS ( 15 Aug 2021 15:17 )  x     / x     / 101 U/L / x     / 8.2 ng/mL      CAPILLARY BLOOD GLUCOSE      POCT Blood Glucose.: 154 mg/dL (16 Aug 2021 02:10)    PT/INR - ( 15 Aug 2021 15:17 )   PT: 12.0 sec;   INR: 1.00 ratio         PTT - ( 15 Aug 2021 15:17 )  PTT:31.3 sec  Urinalysis Basic - ( 15 Aug 2021 17:58 )    Color: Yellow / Appearance: Clear / S.024 / pH: x  Gluc: x / Ketone: Negative  / Bili: Negative / Urobili: Negative   Blood: x / Protein: 30 mg/dL / Nitrite: Negative   Leuk Esterase: Negative / RBC: 2 /hpf / WBC 1 /HPF   Sq Epi: x / Non Sq Epi: 0 /hpf / Bacteria: Negative      Procalcitonin, Serum: 0.09 ng/mL (08-15-21 @ 18:33)  Procalcitonin, Serum: 0.09 ng/mL (08-15-21 @ 15:17)    Serum Pro-Brain Natriuretic Peptide: 1612 pg/mL (08-15-21 @ 18:33)  Serum Pro-Brain Natriuretic Peptide: 1597 pg/mL (08-15-21 @ 15:17)              Physical Examination:    General: No acute distress.      HEENT: Pupils equal, reactive to light.  Symmetric.    PULM: decreased BS at bases, no wheezing     CVS: S1, S2    ABD: Soft, nondistended, nontender, normoactive bowel sounds, no masses    EXT: No edema, nontender    SKIN: Warm and well perfused, no rashes noted.    NEURO: Alert, oriented, interactive, nonfocal    RADIOLOGY REVIEWED    CT chest: < from: CT Angio Chest PE Protocol w/ IV Cont (08.15.21 @ 16:55) >  FINDINGS:    LUNGS AND AIRWAYS: There is atelectasis of the majority of the left lower lobe with nonvisualization of the left lower lobe segmental airways distal to the superior segmental bronchus. Right basilar atelectasis. Redemonstrated cyst in the posterolateral right upper lobe is grossly unchanged when compared to prior study 2014. Right middle lobe 3 mm pulmonary nodule (5:58) is new. A nodular opacity in the peripheral right middle lobe measures 3 mm (5:52) grossly unchanged when compared to prior study 2014. New 7 mm nodular opacity in the right lower lobe (5:64).    Secretions within the trachea. Lunate-shaped trachea can be seen in the setting of tracheomalacia.  PLEURA: Small left pleural effusion.  MEDIASTINUM AND AIMEE: No lymphadenopathy.  VESSELS: No pulmonary embolism. Aortic and coronary artery calcifications.  HEART: Cardiomegaly. No pericardial effusion.  CHEST WALL AND LOWER NECK: Scattered prominent left retropectoral and axillary lymph nodes.  VISUALIZED UPPER ABDOMEN: Marked elevation of the left hemidiaphragm..  BONES: Degenerative changes.    IMPRESSION:    No pulmonary embolism.    Atelectasis of the majority of the left lower lobe with nonvisualization or opacification of the majority of the left lower lobe segmental and subsegmental airways.    Small left pleural effusion.    Prominent 7 mm right lower lobe nodular opacity new since normal second . 6-month follow-up noncontrast chest CT is recommended to ensure stability.    Lunate-shaped trachea can be seen in the setting of tracheomalacia. Dynamic expiratory CT can be performed for further evaluation as clinically warranted.                --- End of Report ---    < end of copied text >

## 2023-08-05 PROCEDURE — 99232 SBSQ HOSP IP/OBS MODERATE 35: CPT | Performed by: STUDENT IN AN ORGANIZED HEALTH CARE EDUCATION/TRAINING PROGRAM

## 2023-08-05 RX ADMIN — SUCRALFATE 1 G: 1 TABLET ORAL at 12:29

## 2023-08-05 RX ADMIN — SUCRALFATE 1 G: 1 TABLET ORAL at 17:09

## 2023-08-05 RX ADMIN — KETOTIFEN FUMARATE 1 DROP: 0.25 SOLUTION/ DROPS OPHTHALMIC at 08:21

## 2023-08-05 RX ADMIN — TRAZODONE HYDROCHLORIDE 50 MG: 50 TABLET ORAL at 22:43

## 2023-08-05 RX ADMIN — BUPROPION HYDROCHLORIDE 300 MG: 300 TABLET, FILM COATED, EXTENDED RELEASE ORAL at 08:19

## 2023-08-05 RX ADMIN — ESCITSLOPRAM 15 MG: 5 TABLET ORAL at 08:19

## 2023-08-05 RX ADMIN — SUCRALFATE 1 G: 1 TABLET ORAL at 21:25

## 2023-08-05 RX ADMIN — PANTOPRAZOLE SODIUM 40 MG: 40 TABLET, DELAYED RELEASE ORAL at 17:09

## 2023-08-05 RX ADMIN — MULTIPLE VITAMINS W/ MINERALS TAB 1 TABLET: TAB ORAL at 08:19

## 2023-08-05 RX ADMIN — ALLOPURINOL 100 MG: 100 TABLET ORAL at 08:19

## 2023-08-05 RX ADMIN — FLUTICASONE PROPIONATE 1 SPRAY: 50 SPRAY, METERED NASAL at 08:21

## 2023-08-05 RX ADMIN — ACETAMINOPHEN 975 MG: 325 TABLET ORAL at 21:28

## 2023-08-05 RX ADMIN — NEBIVOLOL HYDROCHLORIDE 5 MG: 5 TABLET ORAL at 08:22

## 2023-08-05 RX ADMIN — BUSPIRONE HYDROCHLORIDE 5 MG: 5 TABLET ORAL at 17:09

## 2023-08-05 RX ADMIN — BUSPIRONE HYDROCHLORIDE 5 MG: 5 TABLET ORAL at 08:19

## 2023-08-05 RX ADMIN — ASPIRIN 81 MG: 81 TABLET, COATED ORAL at 08:19

## 2023-08-05 RX ADMIN — FLUTICASONE FUROATE AND VILANTEROL TRIFENATATE 1 PUFF: 100; 25 POWDER RESPIRATORY (INHALATION) at 08:21

## 2023-08-05 RX ADMIN — KETOTIFEN FUMARATE 1 DROP: 0.25 SOLUTION/ DROPS OPHTHALMIC at 17:10

## 2023-08-05 RX ADMIN — PANTOPRAZOLE SODIUM 40 MG: 40 TABLET, DELAYED RELEASE ORAL at 06:26

## 2023-08-05 RX ADMIN — CYANOCOBALAMIN TAB 1000 MCG 1000 MCG: 1000 TAB at 08:19

## 2023-08-05 RX ADMIN — SUCRALFATE 1 G: 1 TABLET ORAL at 06:26

## 2023-08-05 RX ADMIN — DONEPEZIL HYDROCHLORIDE 5 MG: 5 TABLET, FILM COATED ORAL at 21:25

## 2023-08-05 NOTE — PLAN OF CARE
Problem: Alteration in Thoughts and Perception  Goal: Treatment Goal: Gain control of psychotic behaviors/thinking, reduce/eliminate presenting symptoms and demonstrate improved reality functioning upon discharge  Outcome: Progressing  Goal: Verbalize thoughts and feelings  Description: Interventions:  - Promote a nonjudgmental and trusting relationship with the patient through active listening and therapeutic communication  - Assess patient's level of functioning, behavior and potential for risk  - Engage patient in 1 on 1 interactions  - Encourage patient to express fears, feelings, frustrations, and discuss symptoms    - [place] patient to reality, help patient recognize reality-based thinking   - Administer medications as ordered and assess for potential side effects  - Provide the patient education related to the signs and symptoms of the illness and desired effects of prescribed medications  Outcome: Progressing  Goal: Refrain from acting on delusional thinking/internal stimuli  Description: Interventions:  - Monitor patient closely, per order   - Utilize least restrictive measures   - Set reasonable limits, give positive feedback for acceptable   - Administer medications as ordered and monitor of potential side effects  Outcome: Progressing  Goal: Agree to be compliant with medication regime, as prescribed and report medication side effects  Description: Interventions:  - Offer appropriate PRN medication and supervise ingestion; conduct AIMS, as needed   Outcome: Progressing  Goal: Recognize dysfunctional thoughts, communicate reality-based thoughts at the time of discharge  Description: Interventions:  - Provide medication and psycho-education to assist patient in compliance and developing insight into his/her illness   Outcome: Progressing  Goal: Complete daily ADLs, including personal hygiene independently, as able  Description: Interventions:  - Observe, teach, and assist patient with ADLS  - Monitor and promote a balance of rest/activity, with adequate nutrition and elimination   Outcome: Progressing     Problem: Anxiety  Goal: Anxiety is at manageable level  Description: Interventions:  - Assess and monitor patient's anxiety level. - Monitor for signs and symptoms (heart palpitations, chest pain, shortness of breath, headaches, nausea, feeling jumpy, restlessness, irritable, apprehensive). - Collaborate with interdisciplinary team and initiate plan and interventions as ordered.   - [place] patient to unit/surroundings  - Explain treatment plan  - Encourage participation in care  - Encourage verbalization of concerns/fears  - Identify coping mechanisms  - Assist in developing anxiety-reducing skills  - Administer/offer alternative therapies  - Limit or eliminate stimulants  Outcome: Progressing     Problem: Depression  Goal: Treatment Goal: Demonstrate behavioral control of depressive symptoms, verbalize feelings of improved mood/affect, and adopt new coping skills prior to discharge  Outcome: Progressing  Goal: Verbalize thoughts and feelings  Description: Interventions:  - Assess and re-assess patient's level of risk   - Engage patient in 1:1 interactions, daily, for a minimum of 15 minutes   - Encourage patient to express feelings, fears, frustrations, hopes   Outcome: Progressing  Goal: Refrain from harming self  Description: Interventions:  - Monitor patient closely, per order   - Supervise medication ingestion, monitor effects and side effects   Outcome: Progressing  Goal: Refrain from isolation  Description: Interventions:  - Develop a trusting relationship   - Encourage socialization   Outcome: Progressing  Goal: Refrain from self-neglect  Outcome: Progressing

## 2023-08-05 NOTE — PROGRESS NOTES
Progress Note - Behavioral Health   Addy Wagner 74 y.o. female MRN: [id number]  Unit/Bed#: [place] 135-92 Encounter: [id number]    The patient was seen for continuing care and reviewed with treatment team. Patient is brighter but remains tearful when discussing her stressors and the admission. He had a talk with her 3 children and reports they are beginning to communicate better. She hopes for family therapy at discharge. Appetite is good but sleep is still disturbed. /62 (BP Location: Left arm)   Pulse 58   Temp 97.7 °F (36.5 °C) (Temporal)   Resp 16   Ht 4' 11" (1.499 m)   Wt 73.5 kg (162 lb)   LMP  (LMP Unknown)   SpO2 93%   BMI 32.72 kg/m²     Current Mental Status Evaluation:  Appearance:  Adequate hygiene and grooming   Behavior:  Calm, Cooperative and Pleasant   Mood:  Anxious   Affect: constricted and Tearful at times     Speech: Normal volume and Normal rate   Thought Process:  Goal directed and coherent, concerns for relationship with her children   Thought Content:  Does not verbalize delusional material   Perceptual Disturbances: Denies hallucinations and does not appear to be responding to internal stimuli   Risk Potential: No suicidal or homicidal ideation   Orientation:   Patient is alert, awake and oriented x 3    insight, Judgment and impulse control are improving.      Recent Results (from the past 168 hour(s))   CBC and differential    Collection Time: 07/30/23  4:56 AM   Result Value Ref Range    WBC 7.24 4.31 - 10.16 Thousand/uL    RBC 4.71 3.81 - 5.12 Million/uL    Hemoglobin 13.9 11.5 - 15.4 g/dL    Hematocrit 42.9 34.8 - 46.1 %    MCV 91 82 - 98 fL    MCH 29.5 26.8 - 34.3 pg    MCHC 32.4 31.4 - 37.4 g/dL    RDW 14.6 11.6 - 15.1 %    MPV 10.9 8.9 - 12.7 fL    Platelets 490 538 - 376 Thousands/uL    nRBC 0 /100 WBCs    Neutrophils Relative 68 43 - 75 %    Immat GRANS % 0 0 - 2 %    Lymphocytes Relative 20 14 - 44 %    Monocytes Relative 9 4 - 12 %    Eosinophils Relative 3 0 - 6 %    Basophils Relative 0 0 - 1 %    Neutrophils Absolute 4.92 1.85 - 7.62 Thousands/µL    Immature Grans Absolute 0.03 0.00 - 0.20 Thousand/uL    Lymphocytes Absolute 1.44 0.60 - 4.47 Thousands/µL    Monocytes Absolute 0.64 0.17 - 1.22 Thousand/µL    Eosinophils Absolute 0.18 0.00 - 0.61 Thousand/µL    Basophils Absolute 0.03 0.00 - 0.10 Thousands/µL   Basic metabolic panel    Collection Time: 07/30/23  4:56 AM   Result Value Ref Range    Sodium 138 135 - 147 mmol/L    Potassium 3.5 3.5 - 5.3 mmol/L    Chloride 108 96 - 108 mmol/L    CO2 22 21 - 32 mmol/L    ANION GAP 8 mmol/L    BUN 9 5 - 25 mg/dL    Creatinine 0.64 0.60 - 1.30 mg/dL    Glucose 94 65 - 140 mg/dL    Calcium 8.7 8.4 - 10.2 mg/dL    eGFR 87 ml/min/1.73sq m   Magnesium    Collection Time: 07/30/23  4:56 AM   Result Value Ref Range    Magnesium 1.9 1.9 - 2.7 mg/dL   Lipase    Collection Time: 07/30/23  4:56 AM   Result Value Ref Range    Lipase 316 (H) 11 - 82 u/L   Stool Enteric Bacterial Panel by PCR    Collection Time: 07/31/23 11:42 AM    Specimen: Rectum; Stool   Result Value Ref Range    Salmonella sp PCR None Detected None Detected    Shigella sp/Enteroinvasive E. coli (EIEC) PCR None Detected None Detected    Campylobacter sp (jejuni and coli) PCR None Detected None Detected    Shiga toxin 1/Shiga toxin 2 genes PCR None Detected None Detected   Giardia antigen    Collection Time: 07/31/23 11:42 AM    Specimen: Rectum; Stool   Result Value Ref Range    Giardia Ag, Stl Negative Negative   Fecal leukocytes    Collection Time: 07/31/23 11:42 AM    Specimen: Rectum; Stool   Result Value Ref Range    White Blood Cells, Stool No white blood cells seen.  None Seen   Comprehensive metabolic panel    Collection Time: 08/01/23  5:24 AM   Result Value Ref Range    Sodium 137 135 - 147 mmol/L    Potassium 3.2 (L) 3.5 - 5.3 mmol/L    Chloride 104 96 - 108 mmol/L    CO2 25 21 - 32 mmol/L    ANION GAP 8 mmol/L    BUN 11 5 - 25 mg/dL    Creatinine 0.73 0.60 - 1.30 mg/dL    Glucose 86 65 - 140 mg/dL    Calcium 8.8 8.4 - 10.2 mg/dL    AST 13 13 - 39 U/L    ALT 5 (L) 7 - 52 U/L    Alkaline Phosphatase 57 34 - 104 U/L    Total Protein 6.2 (L) 6.4 - 8.4 g/dL    Albumin 3.6 3.5 - 5.0 g/dL    Total Bilirubin 0.73 0.20 - 1.00 mg/dL    eGFR 80 ml/min/1.73sq m   CBC    Collection Time: 08/01/23  5:24 AM   Result Value Ref Range    WBC 9.20 4. 31 - 10.16 Thousand/uL    RBC 4.47 3.81 - 5.12 Million/uL    Hemoglobin 13.3 11.5 - 15.4 g/dL    Hematocrit 40.3 34.8 - 46.1 %    MCV 90 82 - 98 fL    MCH 29.8 26.8 - 34.3 pg    MCHC 33.0 31.4 - 37.4 g/dL    RDW 14.6 11.6 - 15.1 %    Platelets 112 989 - 583 Thousands/uL    MPV 10.8 8.9 - 12.7 fL   Basic metabolic panel    Collection Time: 08/02/23  5:56 AM   Result Value Ref Range    Sodium 139 135 - 147 mmol/L    Potassium 3.4 (L) 3.5 - 5.3 mmol/L    Chloride 106 96 - 108 mmol/L    CO2 27 21 - 32 mmol/L    ANION GAP 6 mmol/L    BUN 13 5 - 25 mg/dL    Creatinine 0.67 0.60 - 1.30 mg/dL    Glucose 84 65 - 140 mg/dL    Calcium 8.4 8.4 - 10.2 mg/dL    eGFR 86 ml/min/1.73sq m   COVID only    Collection Time: 08/02/23  1:07 PM    Specimen: Nose; Nares   Result Value Ref Range    SARS-CoV-2 Negative Negative   Comprehensive metabolic panel    Collection Time: 08/03/23  5:38 AM   Result Value Ref Range    Sodium 137 135 - 147 mmol/L    Potassium 4.1 3.5 - 5.3 mmol/L    Chloride 106 96 - 108 mmol/L    CO2 24 21 - 32 mmol/L    ANION GAP 7 mmol/L    BUN 14 5 - 25 mg/dL    Creatinine 0.81 0.60 - 1.30 mg/dL    Glucose 86 65 - 140 mg/dL    Calcium 8.3 (L) 8.4 - 10.2 mg/dL    Corrected Calcium 8.9 8.3 - 10.1 mg/dL    AST 12 (L) 13 - 39 U/L    ALT 4 (L) 7 - 52 U/L    Alkaline Phosphatase 56 34 - 104 U/L    Total Protein 5.9 (L) 6.4 - 8.4 g/dL    Albumin 3.3 (L) 3.5 - 5.0 g/dL    Total Bilirubin 0.69 0.20 - 1.00 mg/dL    eGFR 71 ml/min/1.73sq m   CBC    Collection Time: 08/03/23  5:38 AM   Result Value Ref Range    WBC 7.91 4.31 - 10.16 Thousand/uL RBC 4.49 3.81 - 5.12 Million/uL    Hemoglobin 13.4 11.5 - 15.4 g/dL    Hematocrit 41.8 34.8 - 46.1 %    MCV 93 82 - 98 fL    MCH 29.8 26.8 - 34.3 pg    MCHC 32.1 31.4 - 37.4 g/dL    RDW 14.8 11.6 - 15.1 %    Platelets 574 834 - 982 Thousands/uL    MPV 11.0 8.9 - 12.7 fL   ECG 12 lead    Collection Time: 08/03/23  8:18 PM   Result Value Ref Range    Ventricular Rate 56 BPM    Atrial Rate 56 BPM    RI Interval 182 ms    QRSD Interval 130 ms    QT Interval 480 ms    QTC Interval 463 ms    P Axis 41 degrees    QRS Axis 27 degrees    T Wave Axis 25 degrees   Comprehensive metabolic panel    Collection Time: 08/04/23  5:57 AM   Result Value Ref Range    Sodium 137 135 - 147 mmol/L    Potassium 3.8 3.5 - 5.3 mmol/L    Chloride 105 96 - 108 mmol/L    CO2 22 21 - 32 mmol/L    ANION GAP 10 mmol/L    BUN 13 5 - 25 mg/dL    Creatinine 0.70 0.60 - 1.30 mg/dL    Glucose 95 65 - 140 mg/dL    Calcium 8.5 8.4 - 10.2 mg/dL    AST 14 13 - 39 U/L    ALT 7 7 - 52 U/L    Alkaline Phosphatase 55 34 - 104 U/L    Total Protein 5.9 (L) 6.4 - 8.4 g/dL    Albumin 3.5 3.5 - 5.0 g/dL    Total Bilirubin 0.76 0.20 - 1.00 mg/dL    eGFR 85 ml/min/1.73sq m   Magnesium    Collection Time: 08/04/23  5:57 AM   Result Value Ref Range    Magnesium 2.2 1.9 - 2.7 mg/dL   Phosphorus    Collection Time: 08/04/23  5:57 AM   Result Value Ref Range    Phosphorus 3.7 2.3 - 4.1 mg/dL   CBC and differential    Collection Time: 08/04/23  5:57 AM   Result Value Ref Range    WBC 8.00 4.31 - 10.16 Thousand/uL    RBC 4.54 3.81 - 5.12 Million/uL    Hemoglobin 13.6 11.5 - 15.4 g/dL    Hematocrit 41.7 34.8 - 46.1 %    MCV 92 82 - 98 fL    MCH 30.0 26.8 - 34.3 pg    MCHC 32.6 31.4 - 37.4 g/dL    RDW 14.8 11.6 - 15.1 %    MPV 10.8 8.9 - 12.7 fL    Platelets 212 503 - 437 Thousands/uL    nRBC 0 /100 WBCs    Neutrophils Relative 65 43 - 75 %    Immat GRANS % 1 0 - 2 %    Lymphocytes Relative 21 14 - 44 %    Monocytes Relative 9 4 - 12 %    Eosinophils Relative 3 0 - 6 % Basophils Relative 1 0 - 1 %    Neutrophils Absolute 5.31 1.85 - 7.62 Thousands/µL    Immature Grans Absolute 0.06 0.00 - 0.20 Thousand/uL    Lymphocytes Absolute 1.68 0.60 - 4.47 Thousands/µL    Monocytes Absolute 0.70 0.17 - 1.22 Thousand/µL    Eosinophils Absolute 0.20 0.00 - 0.61 Thousand/µL    Basophils Absolute 0.05 0.00 - 0.10 Thousands/µL   TSH, 3rd generation with Free T4 reflex    Collection Time: 08/04/23  5:57 AM   Result Value Ref Range    TSH 3RD GENERATON 4.721 (H) 0.450 - 4.500 uIU/mL   Vitamin B12    Collection Time: 08/04/23  5:57 AM   Result Value Ref Range    Vitamin B-12 187 180 - 914 pg/mL   Folate    Collection Time: 08/04/23  5:57 AM   Result Value Ref Range    Folate 10.0 >5.9 ng/mL   Vitamin D 25 hydroxy    Collection Time: 08/04/23  5:57 AM   Result Value Ref Range    Vit D, 25-Hydroxy 9.3 (L) 30.0 - 100.0 ng/mL   T4, free    Collection Time: 08/04/23  5:57 AM   Result Value Ref Range    Free T4 0.87 0.61 - 1.12 ng/dL       Progress Toward Goals: No significant events in the past 24 hours, Slowly improving, Tolerating medications, Improved insight. No medication adjustment needed today     Principal Problem:    Severe episode of recurrent major depressive disorder, without psychotic features ([address])  Active Problems:    Tobacco abuse    Essential hypertension    Allergic rhinitis    Vitamin B12 deficiency    Vitamin D deficiency    COPD without exacerbation ([facility])    Medical clearance for psychiatric admission    Anastomotic ulcer    History of Debra-en-Y gastric bypass    Chronic idiopathic gout of left foot    Subacute frontal sinusitis    Discharge planning update: The patient will return to previous living arrangement    Recommended Treatment: Continue with pharmacotherapy, group therapy, milieu therapy and occupational therapy.   The patient will be maintained on the following medications:  Current Facility-Administered Medications   Medication Dose Route Frequency Provider Last Rate   • acetaminophen  650 mg Oral Q4H PRN KYLE Dumont     • acetaminophen  650 mg Oral Q4H PRN KYLE Dumont     • acetaminophen  975 mg Oral Q6H PRN KYLE Dumont     • albuterol  2 puff Inhalation Q4H PRN KYLE Subramanian     • allopurinol  100 mg Oral Daily KYLE Subramanian     • aspirin  81 mg Oral Daily KYLE Subramanian     • buPROPion  300 mg Oral QAM KYLE Dumont     • busPIRone  5 mg Oral BID KYLE Dumont     • cyanocobalamin  1,000 mcg Oral Daily KYLE Subramanian     • cyanocobalamin  1,000 mcg Intramuscular Q30 Days KYLE Subramanian     • dextromethorphan-guaiFENesin  10 mL Oral Q4H PRN KLYE Subramanian     • donepezil  5 mg Oral HS KYLE Dumont     • ergocalciferol  50,000 Units Oral Weekly KYLE Subramanian     • escitalopram  15 mg Oral Daily Jacqueline Madera MD     • fluticasone  1 spray Each Nare Daily KYLE Subramanian     • Fluticasone Furoate-Vilanterol  1 puff Inhalation Daily KYLE Subramanian     • haloperidol lactate  5 mg Intramuscular Q4H PRN Max 4/day KYLE Dumont     • hydrOXYzine HCL  25 mg Oral Q6H PRN Max 4/day KYLE Dumont     • hydrOXYzine HCL  50 mg Oral Q6H PRN Max 4/day KYLE Dumont     • ketotifen  1 drop Both Eyes BID KYLE Subramanian     • LORazepam  1 mg Intramuscular Q6H PRN Max 3/day KYLE Dumont     • LORazepam  1 mg Oral Q6H PRN Max 3/day KYLE Dumont     • multivitamin-minerals  1 tablet Oral Daily KYLE Subramanian     • nebivolol  5 mg Oral Daily KYLE Subramanian     • pantoprazole  40 mg Oral BID AC KYLE Subramanian     • risperiDONE  0.25 mg Oral Q4H PRN Max 6/day KYLE Dumont     • risperiDONE  0.5 mg Oral Q4H PRN Max 3/day KYLE Dumont     • risperiDONE  1 mg Oral Q2H PRN Max 3/day KYLE Dumont     • sucralfate  1 g Oral 4x Daily (AC & HS) KYLE Subramanian     • traZODone  50 mg Oral HS PRN KYLE Quinteros

## 2023-08-06 PROCEDURE — 99232 SBSQ HOSP IP/OBS MODERATE 35: CPT | Performed by: STUDENT IN AN ORGANIZED HEALTH CARE EDUCATION/TRAINING PROGRAM

## 2023-08-06 RX ADMIN — BUPROPION HYDROCHLORIDE 300 MG: 300 TABLET, FILM COATED, EXTENDED RELEASE ORAL at 08:21

## 2023-08-06 RX ADMIN — BUSPIRONE HYDROCHLORIDE 5 MG: 5 TABLET ORAL at 08:21

## 2023-08-06 RX ADMIN — SUCRALFATE 1 G: 1 TABLET ORAL at 12:29

## 2023-08-06 RX ADMIN — ESCITSLOPRAM 15 MG: 5 TABLET ORAL at 08:21

## 2023-08-06 RX ADMIN — SUCRALFATE 1 G: 1 TABLET ORAL at 06:21

## 2023-08-06 RX ADMIN — PANTOPRAZOLE SODIUM 40 MG: 40 TABLET, DELAYED RELEASE ORAL at 17:16

## 2023-08-06 RX ADMIN — KETOTIFEN FUMARATE 1 DROP: 0.25 SOLUTION/ DROPS OPHTHALMIC at 08:22

## 2023-08-06 RX ADMIN — FLUTICASONE PROPIONATE 1 SPRAY: 50 SPRAY, METERED NASAL at 08:26

## 2023-08-06 RX ADMIN — DONEPEZIL HYDROCHLORIDE 5 MG: 5 TABLET, FILM COATED ORAL at 21:34

## 2023-08-06 RX ADMIN — PANTOPRAZOLE SODIUM 40 MG: 40 TABLET, DELAYED RELEASE ORAL at 06:21

## 2023-08-06 RX ADMIN — FLUTICASONE FUROATE AND VILANTEROL TRIFENATATE 1 PUFF: 100; 25 POWDER RESPIRATORY (INHALATION) at 08:27

## 2023-08-06 RX ADMIN — ASPIRIN 81 MG: 81 TABLET, COATED ORAL at 08:21

## 2023-08-06 RX ADMIN — CYANOCOBALAMIN TAB 1000 MCG 1000 MCG: 1000 TAB at 08:21

## 2023-08-06 RX ADMIN — NEBIVOLOL HYDROCHLORIDE 5 MG: 5 TABLET ORAL at 08:25

## 2023-08-06 RX ADMIN — SUCRALFATE 1 G: 1 TABLET ORAL at 17:15

## 2023-08-06 RX ADMIN — SUCRALFATE 1 G: 1 TABLET ORAL at 21:34

## 2023-08-06 RX ADMIN — KETOTIFEN FUMARATE 1 DROP: 0.25 SOLUTION/ DROPS OPHTHALMIC at 17:16

## 2023-08-06 RX ADMIN — MULTIPLE VITAMINS W/ MINERALS TAB 1 TABLET: TAB ORAL at 08:21

## 2023-08-06 RX ADMIN — BUSPIRONE HYDROCHLORIDE 5 MG: 5 TABLET ORAL at 17:16

## 2023-08-06 RX ADMIN — TRAZODONE HYDROCHLORIDE 50 MG: 50 TABLET ORAL at 21:51

## 2023-08-06 RX ADMIN — ALLOPURINOL 100 MG: 100 TABLET ORAL at 08:21

## 2023-08-06 NOTE — NURSING NOTE
Patient is calm and cooperative. Bright in affect. Social in milieu. She makes her needs known appropriately.

## 2023-08-06 NOTE — PLAN OF CARE
Problem: Alteration in Thoughts and Perception  Goal: Treatment Goal: Gain control of psychotic behaviors/thinking, reduce/eliminate presenting symptoms and demonstrate improved reality functioning upon discharge  Outcome: Progressing  Goal: Verbalize thoughts and feelings  Description: Interventions:  - Promote a nonjudgmental and trusting relationship with the patient through active listening and therapeutic communication  - Assess patient's level of functioning, behavior and potential for risk  - Engage patient in 1 on 1 interactions  - Encourage patient to express fears, feelings, frustrations, and discuss symptoms    - [place] patient to reality, help patient recognize reality-based thinking   - Administer medications as ordered and assess for potential side effects  - Provide the patient education related to the signs and symptoms of the illness and desired effects of prescribed medications  Outcome: Progressing  Goal: Refrain from acting on delusional thinking/internal stimuli  Description: Interventions:  - Monitor patient closely, per order   - Utilize least restrictive measures   - Set reasonable limits, give positive feedback for acceptable   - Administer medications as ordered and monitor of potential side effects  Outcome: Progressing  Goal: Agree to be compliant with medication regime, as prescribed and report medication side effects  Description: Interventions:  - Offer appropriate PRN medication and supervise ingestion; conduct AIMS, as needed   Outcome: Progressing  Goal: Recognize dysfunctional thoughts, communicate reality-based thoughts at the time of discharge  Description: Interventions:  - Provide medication and psycho-education to assist patient in compliance and developing insight into his/her illness   Outcome: Progressing  Goal: Complete daily ADLs, including personal hygiene independently, as able  Description: Interventions:  - Observe, teach, and assist patient with ADLS  - Monitor and promote a balance of rest/activity, with adequate nutrition and elimination   Outcome: Progressing     Problem: Anxiety  Goal: Anxiety is at manageable level  Description: Interventions:  - Assess and monitor patient's anxiety level. - Monitor for signs and symptoms (heart palpitations, chest pain, shortness of breath, headaches, nausea, feeling jumpy, restlessness, irritable, apprehensive). - Collaborate with interdisciplinary team and initiate plan and interventions as ordered.   - Foster patient to unit/surroundings  - Explain treatment plan  - Encourage participation in care  - Encourage verbalization of concerns/fears  - Identify coping mechanisms  - Assist in developing anxiety-reducing skills  - Administer/offer alternative therapies  - Limit or eliminate stimulants  Outcome: Progressing     Problem: Depression  Goal: Treatment Goal: Demonstrate behavioral control of depressive symptoms, verbalize feelings of improved mood/affect, and adopt new coping skills prior to discharge  Outcome: Progressing  Goal: Verbalize thoughts and feelings  Description: Interventions:  - Assess and re-assess patient's level of risk   - Engage patient in 1:1 interactions, daily, for a minimum of 15 minutes   - Encourage patient to express feelings, fears, frustrations, hopes   Outcome: Progressing  Goal: Refrain from harming self  Description: Interventions:  - Monitor patient closely, per order   - Supervise medication ingestion, monitor effects and side effects   Outcome: Progressing  Goal: Refrain from isolation  Description: Interventions:  - Develop a trusting relationship   - Encourage socialization   Outcome: Progressing  Goal: Refrain from self-neglect  Outcome: Progressing

## 2023-08-06 NOTE — NURSING NOTE
PRN Trazodone seemingly effective. Patient resting comfortably in bed with no outward signs of distress.

## 2023-08-07 PROCEDURE — 88313 SPECIAL STAINS GROUP 2: CPT | Performed by: PATHOLOGY

## 2023-08-07 PROCEDURE — 99232 SBSQ HOSP IP/OBS MODERATE 35: CPT | Performed by: STUDENT IN AN ORGANIZED HEALTH CARE EDUCATION/TRAINING PROGRAM

## 2023-08-07 PROCEDURE — 88305 TISSUE EXAM BY PATHOLOGIST: CPT | Performed by: PATHOLOGY

## 2023-08-07 PROCEDURE — 88342 IMHCHEM/IMCYTCHM 1ST ANTB: CPT | Performed by: PATHOLOGY

## 2023-08-07 RX ADMIN — BUSPIRONE HYDROCHLORIDE 5 MG: 5 TABLET ORAL at 17:01

## 2023-08-07 RX ADMIN — SUCRALFATE 1 G: 1 TABLET ORAL at 17:01

## 2023-08-07 RX ADMIN — CYANOCOBALAMIN TAB 1000 MCG 1000 MCG: 1000 TAB at 08:04

## 2023-08-07 RX ADMIN — KETOTIFEN FUMARATE 1 DROP: 0.25 SOLUTION/ DROPS OPHTHALMIC at 08:06

## 2023-08-07 RX ADMIN — BUPROPION HYDROCHLORIDE 300 MG: 300 TABLET, FILM COATED, EXTENDED RELEASE ORAL at 08:02

## 2023-08-07 RX ADMIN — ESCITSLOPRAM 15 MG: 5 TABLET ORAL at 08:02

## 2023-08-07 RX ADMIN — PANTOPRAZOLE SODIUM 40 MG: 40 TABLET, DELAYED RELEASE ORAL at 08:01

## 2023-08-07 RX ADMIN — SUCRALFATE 1 G: 1 TABLET ORAL at 08:01

## 2023-08-07 RX ADMIN — SUCRALFATE 1 G: 1 TABLET ORAL at 11:47

## 2023-08-07 RX ADMIN — BUSPIRONE HYDROCHLORIDE 5 MG: 5 TABLET ORAL at 08:02

## 2023-08-07 RX ADMIN — SUCRALFATE 1 G: 1 TABLET ORAL at 21:15

## 2023-08-07 RX ADMIN — FLUTICASONE FUROATE AND VILANTEROL TRIFENATATE 1 PUFF: 100; 25 POWDER RESPIRATORY (INHALATION) at 08:04

## 2023-08-07 RX ADMIN — ALLOPURINOL 100 MG: 100 TABLET ORAL at 08:01

## 2023-08-07 RX ADMIN — DONEPEZIL HYDROCHLORIDE 5 MG: 5 TABLET, FILM COATED ORAL at 21:15

## 2023-08-07 RX ADMIN — PANTOPRAZOLE SODIUM 40 MG: 40 TABLET, DELAYED RELEASE ORAL at 17:01

## 2023-08-07 RX ADMIN — MULTIPLE VITAMINS W/ MINERALS TAB 1 TABLET: TAB ORAL at 08:02

## 2023-08-07 RX ADMIN — NEBIVOLOL HYDROCHLORIDE 5 MG: 5 TABLET ORAL at 08:05

## 2023-08-07 RX ADMIN — ASPIRIN 81 MG: 81 TABLET, COATED ORAL at 08:01

## 2023-08-07 RX ADMIN — FLUTICASONE PROPIONATE 1 SPRAY: 50 SPRAY, METERED NASAL at 08:04

## 2023-08-07 RX ADMIN — KETOTIFEN FUMARATE 1 DROP: 0.25 SOLUTION/ DROPS OPHTHALMIC at 17:20

## 2023-08-07 RX ADMIN — TRAZODONE HYDROCHLORIDE 50 MG: 50 TABLET ORAL at 22:05

## 2023-08-07 NOTE — PROGRESS NOTES
08/07/23 1100   Activity/Group Checklist   Group Other (Comment)  ([facility]: my self care plan group.)   Attendance Attended   Attendance Duration (min) 31-45   Interactions Interacted appropriately   Affect/Mood Appropriate   Goals Achieved Identified feelings; Identified relapse prevention strategies; Increased hopefulness; Discussed self-esteem issues; Discussed coping strategies; Able to listen to others; Able to engage in interactions; Able to reflect/comment on own behavior;Able to manage/cope with feelings;Verbalized increased hopefulness; Able to self-disclose

## 2023-08-07 NOTE — PROGRESS NOTES
Progress Note - [address] Vanessa Marcelo 74 y.o. female MRN: [id number]  Unit/Bed#: [facility] 691-96 Encounter: [id number]    The patient was seen for continuing care and reviewed with treatment team.  Pt reports improvement in mood but still appears constricted but now more reactive. She is declining PHP, but reports she has been working with her children on obtaining therapy. She found a grief therapy session  she plans to attend at discharge. She is is tolerating her medications well, does not report side effects. Sleep was poor, But improved with Trazodone 50mg PRN  ROS: negative    /84 (BP Location: Left arm)   Pulse 62   Temp (!) 97.2 °F (36.2 °C) (Temporal)   Resp 16   Ht 4' 11" (1.499 m)   Wt 73.5 kg (162 lb)   LMP  (LMP Unknown)   SpO2 94%   BMI 32.72 kg/m²     Current Mental Status Evaluation:  Appearance:  Adequate hygiene and grooming   Behavior:  Calm, Cooperative and Pleasant, less guarded, More engaged   Mood:  Anxious   Affect: Reactive, Brighter     Speech: Normal volume and Normal rate   Thought Process:  Goal directed and coherent,    Thought Content:  Does not verbalize delusional material   Perceptual Disturbances: Denies hallucinations and does not appear to be responding to internal stimuli   Risk Potential: No suicidal or homicidal ideation   Orientation:   Patient is alert, awake and oriented x 3    insight, Judgment and impulse control are improving.      Recent Results (from the past 96 hour(s))   ECG 12 lead    Collection Time: 08/03/23  8:18 PM   Result Value Ref Range    Ventricular Rate 56 BPM    Atrial Rate 56 BPM    TX Interval 182 ms    QRSD Interval 130 ms    QT Interval 480 ms    QTC Interval 463 ms    P Axis 41 degrees    QRS Axis 27 degrees    T Wave Axis 25 degrees   Comprehensive metabolic panel    Collection Time: 08/04/23  5:57 AM   Result Value Ref Range    Sodium 137 135 - 147 mmol/L    Potassium 3.8 3.5 - 5.3 mmol/L    Chloride 105 96 - 108 mmol/L CO2 22 21 - 32 mmol/L    ANION GAP 10 mmol/L    BUN 13 5 - 25 mg/dL    Creatinine 0.70 0.60 - 1.30 mg/dL    Glucose 95 65 - 140 mg/dL    Calcium 8.5 8.4 - 10.2 mg/dL    AST 14 13 - 39 U/L    ALT 7 7 - 52 U/L    Alkaline Phosphatase 55 34 - 104 U/L    Total Protein 5.9 (L) 6.4 - 8.4 g/dL    Albumin 3.5 3.5 - 5.0 g/dL    Total Bilirubin 0.76 0.20 - 1.00 mg/dL    eGFR 85 ml/min/1.73sq m   Magnesium    Collection Time: 08/04/23  5:57 AM   Result Value Ref Range    Magnesium 2.2 1.9 - 2.7 mg/dL   Phosphorus    Collection Time: 08/04/23  5:57 AM   Result Value Ref Range    Phosphorus 3.7 2.3 - 4.1 mg/dL   CBC and differential    Collection Time: 08/04/23  5:57 AM   Result Value Ref Range    WBC 8.00 4.31 - 10.16 Thousand/uL    RBC 4.54 3.81 - 5.12 Million/uL    Hemoglobin 13.6 11.5 - 15.4 g/dL    Hematocrit 41.7 34.8 - 46.1 %    MCV 92 82 - 98 fL    MCH 30.0 26.8 - 34.3 pg    MCHC 32.6 31.4 - 37.4 g/dL    RDW 14.8 11.6 - 15.1 %    MPV 10.8 8.9 - 12.7 fL    Platelets 537 486 - 736 Thousands/uL    nRBC 0 /100 WBCs    Neutrophils Relative 65 43 - 75 %    Immat GRANS % 1 0 - 2 %    Lymphocytes Relative 21 14 - 44 %    Monocytes Relative 9 4 - 12 %    Eosinophils Relative 3 0 - 6 %    Basophils Relative 1 0 - 1 %    Neutrophils Absolute 5.31 1.85 - 7.62 Thousands/µL    Immature Grans Absolute 0.06 0.00 - 0.20 Thousand/uL    Lymphocytes Absolute 1.68 0.60 - 4.47 Thousands/µL    Monocytes Absolute 0.70 0.17 - 1.22 Thousand/µL    Eosinophils Absolute 0.20 0.00 - 0.61 Thousand/µL    Basophils Absolute 0.05 0.00 - 0.10 Thousands/µL   TSH, 3rd generation with Free T4 reflex    Collection Time: 08/04/23  5:57 AM   Result Value Ref Range    TSH 3RD GENERATON 4.721 (H) 0.450 - 4.500 uIU/mL   Vitamin B12    Collection Time: 08/04/23  5:57 AM   Result Value Ref Range    Vitamin B-12 187 180 - 914 pg/mL   Folate    Collection Time: 08/04/23  5:57 AM   Result Value Ref Range    Folate 10.0 >5.9 ng/mL   Vitamin D 25 hydroxy    Collection Time: 08/04/23  5:57 AM   Result Value Ref Range    Vit D, 25-Hydroxy 9.3 (L) 30.0 - 100.0 ng/mL   T4, free    Collection Time: 08/04/23  5:57 AM   Result Value Ref Range    Free T4 0.87 0.61 - 1.12 ng/dL     Progress Toward Goals: No significant events in the past 24 hours, Slowly improving, Tolerating medications, Improved insight. Declining PHP. Tentative discharge end of this week  Principal Problem:    Severe episode of recurrent major depressive disorder, without psychotic features ([address])  Active Problems:    Tobacco abuse    Essential hypertension    Allergic rhinitis    Vitamin B12 deficiency    Vitamin D deficiency    COPD without exacerbation (H[facility])    Medical clearance for psychiatric admission    Anastomotic ulcer    History of Debra-en-Y gastric bypass    Chronic idiopathic gout of left foot    Subacute frontal sinusitis    Discharge planning update: The patient will return to previous living arrangement    Recommended Treatment: Continue with pharmacotherapy, group therapy, milieu therapy and occupational therapy.   The patient will be maintained on the following medications:  Current Facility-Administered Medications   Medication Dose Route Frequency Provider Last Rate   • acetaminophen  650 mg Oral Q4H PRN KYLE Vanegas     • acetaminophen  650 mg Oral Q4H PRN Kristyn KYLE Pritchett     • acetaminophen  975 mg Oral Q6H PRN Nojagjitn KYLE Pritchett     • albuterol  2 puff Inhalation Q4H PRN KYLE Subramanian     • allopurinol  100 mg Oral Daily KYLE Subramanian     • aspirin  81 mg Oral Daily KYLE Subramanian     • buPROPion  300 mg Oral QAM KYLE Vanegas     • busPIRone  5 mg Oral BID KYLE Vanegas     • cyanocobalamin  1,000 mcg Oral Daily KYLE Subramanian     • cyanocobalamin  1,000 mcg Intramuscular Q30 Days KYLE Subramanian     • dextromethorphan-guaiFENesin  10 mL Oral Q4H PRN KYLE Subramanian     • donepezil  5 mg Oral HS Twsri Helamar, KYLE     • ergocalciferol  50,000 Units Oral Weekly KYLE Subramanian     • escitalopram  15 mg Oral Daily Evens Fox MD     • fluticasone  1 spray Each Nare Daily KYLE Subramanian     • Fluticasone Furoate-Vilanterol  1 puff Inhalation Daily KYLE Subramanian     • haloperidol lactate  5 mg Intramuscular Q4H PRN Max 4/day Twilla Hews, CRDARBY     • hydrOXYzine HCL  25 mg Oral Q6H PRN Max 4/day Twilla Hews, CRNP     • hydrOXYzine HCL  50 mg Oral Q6H PRN Max 4/day Twilla Hews, CRDARBY     • ketotifen  1 drop Both Eyes BID KYLE Subramanian     • LORazepam  1 mg Intramuscular Q6H PRN Max 3/day Twilla Hews, CRDARBY     • LORazepam  1 mg Oral Q6H PRN Max 3/day Twilla Hews, KYLE     • multivitamin-minerals  1 tablet Oral Daily KYLE Subramanian     • nebivolol  5 mg Oral Daily KYLE Subramanian     • pantoprazole  40 mg Oral BID AC KYLE Subramanian     • risperiDONE  0.25 mg Oral Q4H PRN Max 6/day Twilla Hews, KYLE     • risperiDONE  0.5 mg Oral Q4H PRN Max 3/day Twilla Hews, CRDARBY     • risperiDONE  1 mg Oral Q2H PRN Max 3/day Twsri Hews, CRDARBY     • sucralfate  1 g Oral 4x Daily (AC & HS) KYLE Subramanian     • traZODone  50 mg Oral HS PRN Twilla Hews, CRDARBY

## 2023-08-07 NOTE — CASE MANAGEMENT
CM spoke to the patient regarding services upon d/c. The patient reported she is declining PHP at this time due to the in person one being too far and not being comfortable with the virtual option. The patient was in agreement with and happy for an initial therapy session on Friday 8/11 through [address] [place] [address] in [place]. The patient was made aware that she is on their wait list for medication management for now, but that her PCP will continue to prescribe her medications. The patient reported she will also be going to a grief support group through [facility] upon d/c. The patient reported feeling much better overall and looking forward to going home once the doctor believes she is ready.

## 2023-08-07 NOTE — PLAN OF CARE
Problem: Alteration in Thoughts and Perception  Goal: Treatment Goal: Gain control of psychotic behaviors/thinking, reduce/eliminate presenting symptoms and demonstrate improved reality functioning upon discharge  Outcome: Progressing  Goal: Verbalize thoughts and feelings  Description: Interventions:  - Promote a nonjudgmental and trusting relationship with the patient through active listening and therapeutic communication  - Assess patient's level of functioning, behavior and potential for risk  - Engage patient in 1 on 1 interactions  - Encourage patient to express fears, feelings, frustrations, and discuss symptoms    - [place] patient to reality, help patient recognize reality-based thinking   - Administer medications as ordered and assess for potential side effects  - Provide the patient education related to the signs and symptoms of the illness and desired effects of prescribed medications  Outcome: Progressing  Goal: Refrain from acting on delusional thinking/internal stimuli  Description: Interventions:  - Monitor patient closely, per order   - Utilize least restrictive measures   - Set reasonable limits, give positive feedback for acceptable   - Administer medications as ordered and monitor of potential side effects  Outcome: Progressing  Goal: Agree to be compliant with medication regime, as prescribed and report medication side effects  Description: Interventions:  - Offer appropriate PRN medication and supervise ingestion; conduct AIMS, as needed   Outcome: Progressing  Goal: Recognize dysfunctional thoughts, communicate reality-based thoughts at the time of discharge  Description: Interventions:  - Provide medication and psycho-education to assist patient in compliance and developing insight into his/her illness   Outcome: Progressing  Goal: Complete daily ADLs, including personal hygiene independently, as able  Description: Interventions:  - Observe, teach, and assist patient with ADLS  - Monitor and promote a balance of rest/activity, with adequate nutrition and elimination   Outcome: Progressing     Problem: Anxiety  Goal: Anxiety is at manageable level  Description: Interventions:  - Assess and monitor patient's anxiety level. - Monitor for signs and symptoms (heart palpitations, chest pain, shortness of breath, headaches, nausea, feeling jumpy, restlessness, irritable, apprehensive). - Collaborate with interdisciplinary team and initiate plan and interventions as ordered.   - [place] patient to unit/surroundings  - Explain treatment plan  - Encourage participation in care  - Encourage verbalization of concerns/fears  - Identify coping mechanisms  - Assist in developing anxiety-reducing skills  - Administer/offer alternative therapies  - Limit or eliminate stimulants  Outcome: Progressing     Problem: Depression  Goal: Treatment Goal: Demonstrate behavioral control of depressive symptoms, verbalize feelings of improved mood/affect, and adopt new coping skills prior to discharge  Outcome: Progressing  Goal: Verbalize thoughts and feelings  Description: Interventions:  - Assess and re-assess patient's level of risk   - Engage patient in 1:1 interactions, daily, for a minimum of 15 minutes   - Encourage patient to express feelings, fears, frustrations, hopes   Outcome: Progressing  Goal: Refrain from harming self  Description: Interventions:  - Monitor patient closely, per order   - Supervise medication ingestion, monitor effects and side effects   Outcome: Progressing  Goal: Refrain from isolation  Description: Interventions:  - Develop a trusting relationship   - Encourage socialization   Outcome: Progressing  Goal: Refrain from self-neglect  Outcome: Progressing

## 2023-08-07 NOTE — PROGRESS NOTES
Progress Note - 16 Kideahammad Marcelo 74 y.o. female MRN: [id number]  Unit/Bed#: [facility] 808-68 Encounter: [id number]    The patient was seen for continuing care and reviewed with treatment team. She reports she is doing well. " I feel much better". He had "good talks" with her children and they are planning to attend support groups to improve their relationships. ROS: negative    /63 (BP Location: Left arm)   Pulse (!) 54   Temp (!) 97.1 °F (36.2 °C) (Temporal)   Resp 16   Ht 4' 11" (1.499 m)   Wt 73.5 kg (162 lb)   LMP  (LMP Unknown)   SpO2 95%   BMI 32.72 kg/m²     Current Mental Status Evaluation:  Appearance:  Adequate hygiene and grooming   Behavior:  Calm, Cooperative and Pleasant   Mood:  Anxious   Affect: reactive     Speech: Normal volume and Normal rate   Thought Process:  Goal directed and coherent,more hopeful   Thought Content:  Does not verbalize delusional material   Perceptual Disturbances: Denies hallucinations and does not appear to be responding to internal stimuli   Risk Potential: No suicidal or homicidal ideation   Orientation:   Patient is alert, awake and oriented x 3    insight, Judgment and impulse control are improving. Recent Results (from the past 168 hour(s))   Stool Enteric Bacterial Panel by PCR    Collection Time: 07/31/23 11:42 AM    Specimen: Rectum; Stool   Result Value Ref Range    Salmonella sp PCR None Detected None Detected    Shigella sp/Enteroinvasive E. coli (EIEC) PCR None Detected None Detected    Campylobacter sp (jejuni and coli) PCR None Detected None Detected    Shiga toxin 1/Shiga toxin 2 genes PCR None Detected None Detected   Giardia antigen    Collection Time: 07/31/23 11:42 AM    Specimen: Rectum; Stool   Result Value Ref Range    Giardia Ag, Stl Negative Negative   Fecal leukocytes    Collection Time: 07/31/23 11:42 AM    Specimen: Rectum; Stool   Result Value Ref Range    White Blood Cells, Stool No white blood cells seen.  None Seen   Comprehensive metabolic panel    Collection Time: 08/01/23  5:24 AM   Result Value Ref Range    Sodium 137 135 - 147 mmol/L    Potassium 3.2 (L) 3.5 - 5.3 mmol/L    Chloride 104 96 - 108 mmol/L    CO2 25 21 - 32 mmol/L    ANION GAP 8 mmol/L    BUN 11 5 - 25 mg/dL    Creatinine 0.73 0.60 - 1.30 mg/dL    Glucose 86 65 - 140 mg/dL    Calcium 8.8 8.4 - 10.2 mg/dL    AST 13 13 - 39 U/L    ALT 5 (L) 7 - 52 U/L    Alkaline Phosphatase 57 34 - 104 U/L    Total Protein 6.2 (L) 6.4 - 8.4 g/dL    Albumin 3.6 3.5 - 5.0 g/dL    Total Bilirubin 0.73 0.20 - 1.00 mg/dL    eGFR 80 ml/min/1.73sq m   CBC    Collection Time: 08/01/23  5:24 AM   Result Value Ref Range    WBC 9.20 4. 31 - 10.16 Thousand/uL    RBC 4.47 3.81 - 5.12 Million/uL    Hemoglobin 13.3 11.5 - 15.4 g/dL    Hematocrit 40.3 34.8 - 46.1 %    MCV 90 82 - 98 fL    MCH 29.8 26.8 - 34.3 pg    MCHC 33.0 31.4 - 37.4 g/dL    RDW 14.6 11.6 - 15.1 %    Platelets 443 418 - 527 Thousands/uL    MPV 10.8 8.9 - 12.7 fL   Basic metabolic panel    Collection Time: 08/02/23  5:56 AM   Result Value Ref Range    Sodium 139 135 - 147 mmol/L    Potassium 3.4 (L) 3.5 - 5.3 mmol/L    Chloride 106 96 - 108 mmol/L    CO2 27 21 - 32 mmol/L    ANION GAP 6 mmol/L    BUN 13 5 - 25 mg/dL    Creatinine 0.67 0.60 - 1.30 mg/dL    Glucose 84 65 - 140 mg/dL    Calcium 8.4 8.4 - 10.2 mg/dL    eGFR 86 ml/min/1.73sq m   COVID only    Collection Time: 08/02/23  1:07 PM    Specimen: Nose; Nares   Result Value Ref Range    SARS-CoV-2 Negative Negative   Comprehensive metabolic panel    Collection Time: 08/03/23  5:38 AM   Result Value Ref Range    Sodium 137 135 - 147 mmol/L    Potassium 4.1 3.5 - 5.3 mmol/L    Chloride 106 96 - 108 mmol/L    CO2 24 21 - 32 mmol/L    ANION GAP 7 mmol/L    BUN 14 5 - 25 mg/dL    Creatinine 0.81 0.60 - 1.30 mg/dL    Glucose 86 65 - 140 mg/dL    Calcium 8.3 (L) 8.4 - 10.2 mg/dL    Corrected Calcium 8.9 8.3 - 10.1 mg/dL    AST 12 (L) 13 - 39 U/L    ALT 4 (L) 7 - 52 U/L Alkaline Phosphatase 56 34 - 104 U/L    Total Protein 5.9 (L) 6.4 - 8.4 g/dL    Albumin 3.3 (L) 3.5 - 5.0 g/dL    Total Bilirubin 0.69 0.20 - 1.00 mg/dL    eGFR 71 ml/min/1.73sq m   CBC    Collection Time: 08/03/23  5:38 AM   Result Value Ref Range    WBC 7.91 4.31 - 10.16 Thousand/uL    RBC 4.49 3.81 - 5.12 Million/uL    Hemoglobin 13.4 11.5 - 15.4 g/dL    Hematocrit 41.8 34.8 - 46.1 %    MCV 93 82 - 98 fL    MCH 29.8 26.8 - 34.3 pg    MCHC 32.1 31.4 - 37.4 g/dL    RDW 14.8 11.6 - 15.1 %    Platelets 374 789 - 364 Thousands/uL    MPV 11.0 8.9 - 12.7 fL   ECG 12 lead    Collection Time: 08/03/23  8:18 PM   Result Value Ref Range    Ventricular Rate 56 BPM    Atrial Rate 56 BPM    PA Interval 182 ms    QRSD Interval 130 ms    QT Interval 480 ms    QTC Interval 463 ms    P Axis 41 degrees    QRS Axis 27 degrees    T Wave Axis 25 degrees   Comprehensive metabolic panel    Collection Time: 08/04/23  5:57 AM   Result Value Ref Range    Sodium 137 135 - 147 mmol/L    Potassium 3.8 3.5 - 5.3 mmol/L    Chloride 105 96 - 108 mmol/L    CO2 22 21 - 32 mmol/L    ANION GAP 10 mmol/L    BUN 13 5 - 25 mg/dL    Creatinine 0.70 0.60 - 1.30 mg/dL    Glucose 95 65 - 140 mg/dL    Calcium 8.5 8.4 - 10.2 mg/dL    AST 14 13 - 39 U/L    ALT 7 7 - 52 U/L    Alkaline Phosphatase 55 34 - 104 U/L    Total Protein 5.9 (L) 6.4 - 8.4 g/dL    Albumin 3.5 3.5 - 5.0 g/dL    Total Bilirubin 0.76 0.20 - 1.00 mg/dL    eGFR 85 ml/min/1.73sq m   Magnesium    Collection Time: 08/04/23  5:57 AM   Result Value Ref Range    Magnesium 2.2 1.9 - 2.7 mg/dL   Phosphorus    Collection Time: 08/04/23  5:57 AM   Result Value Ref Range    Phosphorus 3.7 2.3 - 4.1 mg/dL   CBC and differential    Collection Time: 08/04/23  5:57 AM   Result Value Ref Range    WBC 8.00 4.31 - 10.16 Thousand/uL    RBC 4.54 3.81 - 5.12 Million/uL    Hemoglobin 13.6 11.5 - 15.4 g/dL    Hematocrit 41.7 34.8 - 46.1 %    MCV 92 82 - 98 fL    MCH 30.0 26.8 - 34.3 pg    MCHC 32.6 31.4 - 37.4 g/dL    RDW 14.8 11.6 - 15.1 %    MPV 10.8 8.9 - 12.7 fL    Platelets 086 660 - 052 Thousands/uL    nRBC 0 /100 WBCs    Neutrophils Relative 65 43 - 75 %    Immat GRANS % 1 0 - 2 %    Lymphocytes Relative 21 14 - 44 %    Monocytes Relative 9 4 - 12 %    Eosinophils Relative 3 0 - 6 %    Basophils Relative 1 0 - 1 %    Neutrophils Absolute 5.31 1.85 - 7.62 Thousands/µL    Immature Grans Absolute 0.06 0.00 - 0.20 Thousand/uL    Lymphocytes Absolute 1.68 0.60 - 4.47 Thousands/µL    Monocytes Absolute 0.70 0.17 - 1.22 Thousand/µL    Eosinophils Absolute 0.20 0.00 - 0.61 Thousand/µL    Basophils Absolute 0.05 0.00 - 0.10 Thousands/µL   TSH, 3rd generation with Free T4 reflex    Collection Time: 08/04/23  5:57 AM   Result Value Ref Range    TSH 3RD GENERATON 4.721 (H) 0.450 - 4.500 uIU/mL   Vitamin B12    Collection Time: 08/04/23  5:57 AM   Result Value Ref Range    Vitamin B-12 187 180 - 914 pg/mL   Folate    Collection Time: 08/04/23  5:57 AM   Result Value Ref Range    Folate 10.0 >5.9 ng/mL   Vitamin D 25 hydroxy    Collection Time: 08/04/23  5:57 AM   Result Value Ref Range    Vit D, 25-Hydroxy 9.3 (L) 30.0 - 100.0 ng/mL   T4, free    Collection Time: 08/04/23  5:57 AM   Result Value Ref Range    Free T4 0.87 0.61 - 1.12 ng/dL       Progress Toward Goals: No significant events in the past 24 hours, Slowly improving, Tolerating medications, Improved insight. No medication adjustment needed today. Ongoing discharge planning , possible PHP.  No medication increase needed    Principal Problem:    Severe episode of recurrent major depressive disorder, without psychotic features ([address])  Active Problems:    Tobacco abuse    Essential hypertension    Allergic rhinitis    Vitamin B12 deficiency    Vitamin D deficiency    COPD without exacerbation (HCC)    Medical clearance for psychiatric admission    Anastomotic ulcer    History of Debra-en-Y gastric bypass    Chronic idiopathic gout of left foot    Subacute frontal sinusitis    Discharge planning update: The patient will return to previous living arrangement    Recommended Treatment: Continue with pharmacotherapy, group therapy, milieu therapy and occupational therapy.   The patient will be maintained on the following medications:  Current Facility-Administered Medications   Medication Dose Route Frequency Provider Last Rate   • acetaminophen  650 mg Oral Q4H PRN KYLE Saenz     • acetaminophen  650 mg Oral Q4H PRN KYLE Saenz     • acetaminophen  975 mg Oral Q6H PRN KYLE Saenz     • albuterol  2 puff Inhalation Q4H PRN KYLE Subramanian     • allopurinol  100 mg Oral Daily KYLE Subramanian     • aspirin  81 mg Oral Daily KYLE Subramanian     • buPROPion  300 mg Oral QAM KYLE Saenz     • busPIRone  5 mg Oral BID KYLE Saenz     • cyanocobalamin  1,000 mcg Oral Daily KYLE Subramanian     • cyanocobalamin  1,000 mcg Intramuscular Q30 Days KYLE Subramanian     • dextromethorphan-guaiFENesin  10 mL Oral Q4H PRN KYLE Subramanian     • donepezil  5 mg Oral HS KYLE Saenz     • ergocalciferol  50,000 Units Oral Weekly KYLE Subramanian     • escitalopram  15 mg Oral Daily Ceasar Uribe MD     • fluticasone  1 spray Each Nare Daily KYLE Subramanian     • Fluticasone Furoate-Vilanterol  1 puff Inhalation Daily KYLE Subramanian     • haloperidol lactate  5 mg Intramuscular Q4H PRN Max 4/day KYLE Saenz     • hydrOXYzine HCL  25 mg Oral Q6H PRN Max 4/day KYLE Saenz     • hydrOXYzine HCL  50 mg Oral Q6H PRN Max 4/day KYLE Saenz     • ketotifen  1 drop Both Eyes BID KYLE Subramanian     • LORazepam  1 mg Intramuscular Q6H PRN Max 3/day KYLE Saenz     • LORazepam  1 mg Oral Q6H PRN Max 3/day KYLE Saenz     • multivitamin-minerals  1 tablet Oral Daily KYLE Subramanian     • nebivolol 5 mg Oral Daily KYLE Subramanian     • pantoprazole  40 mg Oral BID AC KYLE Subramanian     • risperiDONE  0.25 mg Oral Q4H PRN Max 6/day Laine Horse, CRNP     • risperiDONE  0.5 mg Oral Q4H PRN Max 3/day Laine Horse, CRNP     • risperiDONE  1 mg Oral Q2H PRN Max 3/day Laine Horse, CRNP     • sucralfate  1 g Oral 4x Daily (AC & HS) KYLE Subramanian     • traZODone  50 mg Oral HS PRN Laine Horse, MAURONP

## 2023-08-07 NOTE — NURSING NOTE
Patient is present in dinning room socializing with peers during the evening. Patient reports depression. Patient states, "I felt useless for long time. I lost my job after 25 years and some health issues. My sons live with me and they argue a lot. I feel better now. There is life after 73 years. I want to learn how to use a computer and an I phone. I have to put myself first". Patient is currently denying anxiety, SI/HI/AVH at this time. Patient is medications and meal complaint. Patient appears positive when sharing her future plans. Patient is friendly and social to staff and peers. Able to make needs known.

## 2023-08-07 NOTE — DISCHARGE INSTR - APPOINTMENTS
Dafne Burciaga or Yecenia, our Edwin and Jemal, will be calling you after your discharge, on the phone number that you provided. They will be available as an additional support, if needed. If you wish to speak with one of them, you may contact Dafne Burciaga at [phone] or Quincy Godoy at [phone].

## 2023-08-07 NOTE — NURSING NOTE
Patient reported anxiety and depression better because her kids are getting help. Patient is medication compliant, and cooperative. Patient is visible, social, and bright approach. Patient offers no c/o at the moment.

## 2023-08-07 NOTE — TREATMENT TEAM
08/07/23 0722   Team Meeting   Meeting Type Daily Rounds   Initial Conference Date 08/07/23   Team Members Present   Team Members Present Physician;Nurse;;   Physician Team Member Dr. Gilma Mendez Team Member [facility] [address] [place] Management Team Member [address] [place] [address] Work Team Member Joellen Waldron   Patient/Family Present   Patient Present No   Patient's Family Present No     Pleasant, cooperative. PRN trazodone last night was effective, slept well. Potential PHP after d/c. No potential d/c date at this time due to medication adjustments.

## 2023-08-07 NOTE — DISCHARGE INSTR - OTHER ORDERS
You are being discharged to Home. [facility] Crisis Number: [phone]   Call 911 if you are having an emergency.

## 2023-08-07 NOTE — CASE MANAGEMENT
CM called  central scheduling and rescheduled the patient's MRI for 9/13 @ 11:15 and rescheduled the patient's sleep study for 9/22/23.

## 2023-08-08 PROCEDURE — 99232 SBSQ HOSP IP/OBS MODERATE 35: CPT | Performed by: STUDENT IN AN ORGANIZED HEALTH CARE EDUCATION/TRAINING PROGRAM

## 2023-08-08 RX ORDER — TRAZODONE HYDROCHLORIDE 50 MG/1
75 TABLET ORAL
Status: DISCONTINUED | OUTPATIENT
Start: 2023-08-08 | End: 2023-08-10 | Stop reason: HOSPADM

## 2023-08-08 RX ADMIN — ESCITSLOPRAM 15 MG: 5 TABLET ORAL at 08:21

## 2023-08-08 RX ADMIN — FLUTICASONE FUROATE AND VILANTEROL TRIFENATATE 1 PUFF: 100; 25 POWDER RESPIRATORY (INHALATION) at 08:24

## 2023-08-08 RX ADMIN — SUCRALFATE 1 G: 1 TABLET ORAL at 16:57

## 2023-08-08 RX ADMIN — PANTOPRAZOLE SODIUM 40 MG: 40 TABLET, DELAYED RELEASE ORAL at 16:57

## 2023-08-08 RX ADMIN — SUCRALFATE 1 G: 1 TABLET ORAL at 11:49

## 2023-08-08 RX ADMIN — KETOTIFEN FUMARATE 1 DROP: 0.25 SOLUTION/ DROPS OPHTHALMIC at 08:23

## 2023-08-08 RX ADMIN — SUCRALFATE 1 G: 1 TABLET ORAL at 06:24

## 2023-08-08 RX ADMIN — CYANOCOBALAMIN TAB 1000 MCG 1000 MCG: 1000 TAB at 08:22

## 2023-08-08 RX ADMIN — TRAZODONE HYDROCHLORIDE 75 MG: 50 TABLET ORAL at 21:42

## 2023-08-08 RX ADMIN — NEBIVOLOL HYDROCHLORIDE 5 MG: 5 TABLET ORAL at 08:24

## 2023-08-08 RX ADMIN — BUSPIRONE HYDROCHLORIDE 5 MG: 5 TABLET ORAL at 17:00

## 2023-08-08 RX ADMIN — ASPIRIN 81 MG: 81 TABLET, COATED ORAL at 08:21

## 2023-08-08 RX ADMIN — SUCRALFATE 1 G: 1 TABLET ORAL at 21:34

## 2023-08-08 RX ADMIN — BUSPIRONE HYDROCHLORIDE 5 MG: 5 TABLET ORAL at 08:21

## 2023-08-08 RX ADMIN — KETOTIFEN FUMARATE 1 DROP: 0.25 SOLUTION/ DROPS OPHTHALMIC at 17:00

## 2023-08-08 RX ADMIN — HYDROXYZINE HYDROCHLORIDE 50 MG: 50 TABLET, FILM COATED ORAL at 02:18

## 2023-08-08 RX ADMIN — ALLOPURINOL 100 MG: 100 TABLET ORAL at 08:21

## 2023-08-08 RX ADMIN — PANTOPRAZOLE SODIUM 40 MG: 40 TABLET, DELAYED RELEASE ORAL at 06:24

## 2023-08-08 RX ADMIN — MULTIPLE VITAMINS W/ MINERALS TAB 1 TABLET: TAB ORAL at 08:21

## 2023-08-08 RX ADMIN — DONEPEZIL HYDROCHLORIDE 5 MG: 5 TABLET, FILM COATED ORAL at 21:34

## 2023-08-08 RX ADMIN — BUPROPION HYDROCHLORIDE 300 MG: 300 TABLET, FILM COATED, EXTENDED RELEASE ORAL at 08:21

## 2023-08-08 RX ADMIN — FLUTICASONE PROPIONATE 1 SPRAY: 50 SPRAY, METERED NASAL at 08:23

## 2023-08-08 NOTE — NURSING NOTE
Patient is bright affect, calm, visible in the milieu, socializing  With peers. Patient is medication compliant, and cooperative with assessment questions. Patient currently denies SI, AH, VH. Patient reports no anxiety and no depression. Able to make needs known. No further distress noted.

## 2023-08-08 NOTE — CONSULTS
Consultation - Neuropsychology/Psychology Department  Femi Callahan 74 y.o. female MRN: [id number]  Unit/Bed#: [facility] 353-96 Encounter: [id number]        Reason for Consultation:  Femi Callahan is a 74y.o. year old female who was referred for a Neuropsychological EXam to assess cognitive functioning and comment on capacity to make informed medical decisions. History of Present Illness  Intentional OD attempt;  Ambien    Physician Requesting Consult: Monisha Jin MD    PROBLEM LIST:  Patient Active Problem List   Diagnosis   • Tobacco abuse   • Essential hypertension   • Allergic rhinitis   • Moderate episode of recurrent major depressive disorder (HCC)   • Arteriosclerotic cardiovascular disease   • Arthropathy   • Simple chronic bronchitis ([address])   • Esophageal reflux   • Mixed hyperlipidemia   • Postgastrectomy malabsorption   • Primary osteoarthritis   • Solitary pulmonary nodule   • Vitamin B12 deficiency   • Vitamin D deficiency   • Thiamin deficiency   • Chronic gout   • Mixed stress and urge urinary incontinence   • Bilateral hearing loss   • Asthma   • Aortic valve stenosis   • Diverticulosis   • Ascending aorta dilatation (HCC)   • Abnormal CT of the abdomen   • SVT (supraventricular tachycardia) ([facility])   • COPD without exacerbation ([facility])   • Sleep apnea   • Primary insomnia   • Cognitive impairment   • Intentional drug overdose ([address])   • Suicide attempt ([address])   • Hypokalemia   • Severe episode of recurrent major depressive disorder, without psychotic features ([address])   • Medical clearance for psychiatric admission   • Anastomotic ulcer   • History of Debra-en-Y gastric bypass   • Chronic idiopathic gout of left foot   • Subacute frontal sinusitis         Historical Information   Past Medical History:   Diagnosis Date   • Acid reflux    • Anxiety    • Arthritis    • Asthma    • Cardiac disease    • Chronic kidney disease     passed on own kidney stone   • Depression    • Diverticulitis    • GERD (gastroesophageal reflux disease)    • Hayfever    • [place] (hard of hearing)     bilateral hearing aids   • Hyperlipemia    • Hypertension    • Panic attack    • Tachycardia      Past Surgical History:   Procedure Laterality Date   • ABLATION OF DYSRHYTHMIC FOCUS     • APPENDECTOMY     • CHOLECYSTECTOMY      open   • FRACTURE SURGERY      ORIF fx (L) tibia, fibula 2009   • GASTRIC BYPASS OPEN     • HYSTERECTOMY     • CA XCAPSL CTRC RMVL INSJ IO LENS PROSTH W/O ECP Left 4/18/2016    Procedure: EXTRACTION EXTRACAPSULAR CATARACT PHACO INTRAOCULAR LENS (IOL); Surgeon: Eyal Devi MD;  Location: [facility] MAIN OR;  Service: Ophthalmology   • CA XCAPSL CTRC RMVL INSJ IO LENS PROSTH W/O ECP Right 3/1/2021    Procedure: EXTRACTION EXTRACAPSULAR CATARACT PHACO INTRAOCULAR LENS (IOL);   Surgeon: Eyal Devi MD;  Location: [facility] MAIN OR;  Service: Ophthalmology   • TONSILECTOMY AND ADNOIDECTOMY       Social History   Social History     Substance and Sexual Activity   Alcohol Use Yes    Comment: rarely     Social History     Substance and Sexual Activity   Drug Use No     Social History     Tobacco Use   Smoking Status Every Day   • Packs/day: 0.25   • Years: 25.00   • Total pack years: 6.25   • Types: Cigarettes   Smokeless Tobacco Never     Family History:   Family History   Problem Relation Age of Onset   • Heart disease Mother    • Stroke Son    • Stroke Maternal Grandfather    • Breast cancer Daughter 36       Meds/Allergies   current meds:   Current Facility-Administered Medications   Medication Dose Route Frequency   • acetaminophen (TYLENOL) tablet 650 mg  650 mg Oral Q4H PRN   • acetaminophen (TYLENOL) tablet 650 mg  650 mg Oral Q4H PRN   • acetaminophen (TYLENOL) tablet 975 mg  975 mg Oral Q6H PRN   • albuterol (PROVENTIL HFA,VENTOLIN HFA) inhaler 2 puff  2 puff Inhalation Q4H PRN   • allopurinol (ZYLOPRIM) tablet 100 mg  100 mg Oral Daily   • aspirin (ECOTRIN LOW STRENGTH) EC tablet 81 mg  81 mg Oral Daily   • buPROPion (WELLBUTRIN XL) 24 hr tablet 300 mg  300 mg Oral QAM   • busPIRone (BUSPAR) tablet 5 mg  5 mg Oral BID   • cyanocobalamin (VITAMIN B-12) tablet 1,000 mcg  1,000 mcg Oral Daily   • cyanocobalamin injection 1,000 mcg  1,000 mcg Intramuscular Q30 Days   • dextromethorphan-guaiFENesin (ROBITUSSIN DM) oral syrup 10 mL  10 mL Oral Q4H PRN   • donepezil (ARICEPT) tablet 5 mg  5 mg Oral HS   • ergocalciferol (VITAMIN D2) capsule 50,000 Units  50,000 Units Oral Weekly   • escitalopram (LEXAPRO) tablet 15 mg  15 mg Oral Daily   • fluticasone (FLONASE) 50 mcg/act nasal spray 1 spray  1 spray Each Nare Daily   • Fluticasone Furoate-Vilanterol 100-25 mcg/actuation 1 puff  1 puff Inhalation Daily   • haloperidol lactate (HALDOL) injection 5 mg  5 mg Intramuscular Q4H PRN Max 4/day   • hydrOXYzine HCL (ATARAX) tablet 25 mg  25 mg Oral Q6H PRN Max 4/day   • hydrOXYzine HCL (ATARAX) tablet 50 mg  50 mg Oral Q6H PRN Max 4/day   • ketotifen (ZADITOR) 0.025 % ophthalmic solution 1 drop  1 drop Both Eyes BID   • LORazepam (ATIVAN) injection 1 mg  1 mg Intramuscular Q6H PRN Max 3/day   • LORazepam (ATIVAN) tablet 1 mg  1 mg Oral Q6H PRN Max 3/day   • multivitamin-minerals (CENTRUM) tablet 1 tablet  1 tablet Oral Daily   • nebivolol (BYSTOLIC) tablet 5 mg  5 mg Oral Daily   • pantoprazole (PROTONIX) EC tablet 40 mg  40 mg Oral BID AC   • risperiDONE (RisperDAL) tablet 0.25 mg  0.25 mg Oral Q4H PRN Max 6/day   • risperiDONE (RisperDAL) tablet 0.5 mg  0.5 mg Oral Q4H PRN Max 3/day   • risperiDONE (RisperDAL) tablet 1 mg  1 mg Oral Q2H PRN Max 3/day   • sucralfate (CARAFATE) tablet 1 g  1 g Oral 4x Daily (AC & HS)   • traZODone (DESYREL) tablet 75 mg  75 mg Oral HS PRN       Allergies   Allergen Reactions   • Augmentin [Amoxicillin-Pot Clavulanate] GI Intolerance, Other (See Comments) and Hives     VOMITING  VOMITING  Reaction Date: 07Dec2004;    • Sulfa Antibiotics Itching, Other (See Comments) and Hives     RASH, ITCHY  RASH, ITCHY   • Morphine Other (See Comments)     "DOESN'T WORK"   • Latex Rash     Unsure if this is an allergy         Family and Social Support:   No data recorded    Behavioral Observations: Alert, oriented x 3, cooperative; affect appeared pleasant and patient denied depressed mood and anxiety; no overt evidence of psychotic process    Cognitive Examination    General Cognitive Functioning MMSE = Average 27/28; General Fund of Information = Average    Attention/Concentration Auditory Selective Attention = Average; Auditory Vigilance = Average; Information Processing Speed = Average    Frontal Systems/Executive Functioning Mental Flexibility/Cognitive Control = Average; Working Memory = Average Abstract Reasoning = Average; Generative Ability = Average,     Language Functioning Confrontation naming = Within Normal Limits, Phonemic Fluency = Within Normal Limits; Semantic Retrieval = Within Normal Limits; Comprehension of Complex Ideational Material = Within Normal Limits; Vocabulary Knowledge = Within Normal Limits; Praxis = Within Normal Limits; Repetition = Within Normal Limits; Basic Reading = Within Normal Limits; Following Commands = Within Normal Limits    Memory Functioning Narrative Recall - Short Delay = Average; Long Delay Narrative Recall = Superior;     Visuo-Spatial Abilities Not Assessed    Summary/Impression:  Results of Neuropsychological Exam revealed adequate cognitive functioning (no deficits were noted in tested areas). In fact, patient demonstrated superior declarative recall after a long delay.

## 2023-08-08 NOTE — TREATMENT TEAM
08/08/23 0739   Team Meeting   Meeting Type Daily Rounds   Initial Conference Date 08/08/23   Team Members Present   Team Members Present Physician;Nurse;;   Physician Team Member Dr. Nevin Roberto Team Member [facility] Management Team Member [address] [place] [address] Work Team Member Gayathri Colon   Patient/Family Present   Patient Present No     PRN Trazodone last night 22h05, PRN Atarax [id number] last night. Planned d/c for Thursday 8/10/23 to home. Patient has outpatient therapy scheduled for Friday 8/11 through [facility] in [place]. Patient on wait list for medication management and will continue to follow with PCP.

## 2023-08-08 NOTE — NURSING NOTE
Patient c/o insomnia and requesting an additional trazodone for sleep. When reminded that she had trazodone at 2205 she stated "I was promised I could get two!" Education provided on as needed medications to which the patient responded "then I'm anxious that I can't sleep!" PRN Atarax 50 mg administered at 0218. Che score 18.

## 2023-08-08 NOTE — PROGRESS NOTES
Pt attended Self esteem and strengths group. Pt cooperative and pleasant. Pt displayed positive thinking patterns and able to self express. Pt noted d/c.     08/08/23 1100   Activity/Group Checklist   Group Other (Comment)  (Self esteem and strengths group)   Attendance Attended   Attendance Duration (min) 31-45   Interactions Interacted appropriately   Affect/Mood Appropriate   Goals Achieved Identified feelings; Identified relapse prevention strategies; Able to engage in interactions; Able to listen to others; Able to reflect/comment on own behavior;Verbalized increased hopefulness; Able to manage/cope with feelings; Able to self-disclose; Able to recieve feedback

## 2023-08-08 NOTE — PROGRESS NOTES
Progress Note - [address] Geena Marcelo 74 y.o. female MRN: [id number]  Unit/Bed#: Lizeth Hanna 373-01 Encounter: [id number]    The patient was seen for continuing care and reviewed with treatment team. Pt is excited about a plan for discharge this week. She reports most of her problems have been addressed, she anticipates a better relationship with her children and amongst them. Sleep was suboptimal even after trazodone 50mg. Plan to increase this to 75mg HS  ROS: negative    /67 (BP Location: Right arm)   Pulse 55   Temp 97.5 °F (36.4 °C) (Temporal)   Resp 18   Ht 4' 11" (1.499 m)   Wt 73.5 kg (162 lb)   LMP  (LMP Unknown)   SpO2 97%   BMI 32.72 kg/m²     Current Mental Status Evaluation:  Appearance:  Adequate hygiene and grooming   Behavior:  Calm, Cooperative and Pleasant, less guarded, More engaged   Mood:  Anxious   Affect: Reactive, Brighter     Speech: Normal volume and Normal rate   Thought Process:  Goal directed and coherent,    Thought Content:  Does not verbalize delusional material   Perceptual Disturbances: Denies hallucinations and does not appear to be responding to internal stimuli   Risk Potential: No suicidal or homicidal ideation   Orientation:   Patient is alert, awake and oriented x 3    insight, Judgment and impulse control are improving. No results found for this or any previous visit (from the past 96 hour(s)). Progress Toward Goals: No significant events in the past 24 hours, Slowly improving, Tolerating medications, Improved insight. Declining PHP. Tentative discharge end this Thursday. Major neuropsychology completed given pt I on Donepezil from her outpatient provider.      Principal Problem:    Severe episode of recurrent major depressive disorder, without psychotic features ([address])  Active Problems:    Tobacco abuse    Essential hypertension    Allergic rhinitis    Vitamin B12 deficiency    Vitamin D deficiency    COPD without exacerbation [facility])    Medical clearance for psychiatric admission    Anastomotic ulcer    History of Debra-en-Y gastric bypass    Chronic idiopathic gout of left foot    Subacute frontal sinusitis    Discharge planning update: The patient will return to previous living arrangement    Recommended Treatment: Continue with pharmacotherapy, group therapy, milieu therapy and occupational therapy.   The patient will be maintained on the following medications:  Current Facility-Administered Medications   Medication Dose Route Frequency Provider Last Rate   • acetaminophen  650 mg Oral Q4H PRN Michaeleen Kehr, CRNP     • acetaminophen  650 mg Oral Q4H PRN Michaeleen Kehr, CRNP     • acetaminophen  975 mg Oral Q6H PRN Michaeleen Kehr, CRNP     • albuterol  2 puff Inhalation Q4H PRN KYLE Subramanian     • allopurinol  100 mg Oral Daily KYLE Subramanian     • aspirin  81 mg Oral Daily KYLE Subramanian     • buPROPion  300 mg Oral QAM Michaeleen Kehr, CRNP     • busPIRone  5 mg Oral BID Michaeleen Kehr, CRNP     • cyanocobalamin  1,000 mcg Oral Daily KYLE Subramanian     • cyanocobalamin  1,000 mcg Intramuscular Q30 Days KYLE Subramanian     • dextromethorphan-guaiFENesin  10 mL Oral Q4H PRN KYLE Subramanian     • donepezil  5 mg Oral HS Michaeleen Kehr, CRNP     • ergocalciferol  50,000 Units Oral Weekly KYLE Subramanian     • escitalopram  15 mg Oral Daily Nesha Garza MD     • fluticasone  1 spray Each Nare Daily KYLE Subramanian     • Fluticasone Furoate-Vilanterol  1 puff Inhalation Daily KYLE Subramanian     • haloperidol lactate  5 mg Intramuscular Q4H PRN Max 4/day Michaeleen Kehr, CRNP     • hydrOXYzine HCL  25 mg Oral Q6H PRN Max 4/day Michaeleen Kehr, CRNP     • hydrOXYzine HCL  50 mg Oral Q6H PRN Max 4/day Michaeleen Kehr, CRNP     • ketotifen  1 drop Both Eyes BID KYLE Subramanian     • LORazepam  1 mg Intramuscular Q6H PRN Max 3/day Michaeleen Kehr, CRNP • LORazepam  1 mg Oral Q6H PRN Max 3/day KYLE Osorio     • multivitamin-minerals  1 tablet Oral Daily KYLE Subramanian     • nebivolol  5 mg Oral Daily KYLE Subramanian     • pantoprazole  40 mg Oral BID AC KYLE Subramanian     • risperiDONE  0.25 mg Oral Q4H PRN Max 6/day KYLE Osorio     • risperiDONE  0.5 mg Oral Q4H PRN Max 3/day KYLE Osorio     • risperiDONE  1 mg Oral Q2H PRN Max 3/day KYLE Osorio     • sucralfate  1 g Oral 4x Daily (AC & HS) KYLE Subramanian     • traZODone  75 mg Oral HS PRN Glee Sandhoff, MD

## 2023-08-09 RX ADMIN — PANTOPRAZOLE SODIUM 40 MG: 40 TABLET, DELAYED RELEASE ORAL at 08:32

## 2023-08-09 RX ADMIN — SUCRALFATE 1 G: 1 TABLET ORAL at 21:25

## 2023-08-09 RX ADMIN — SUCRALFATE 1 G: 1 TABLET ORAL at 16:56

## 2023-08-09 RX ADMIN — TRAZODONE HYDROCHLORIDE 75 MG: 50 TABLET ORAL at 21:34

## 2023-08-09 RX ADMIN — NEBIVOLOL HYDROCHLORIDE 5 MG: 5 TABLET ORAL at 08:33

## 2023-08-09 RX ADMIN — ESCITSLOPRAM 15 MG: 5 TABLET ORAL at 08:31

## 2023-08-09 RX ADMIN — BUPROPION HYDROCHLORIDE 300 MG: 300 TABLET, FILM COATED, EXTENDED RELEASE ORAL at 08:31

## 2023-08-09 RX ADMIN — KETOTIFEN FUMARATE 1 DROP: 0.25 SOLUTION/ DROPS OPHTHALMIC at 17:00

## 2023-08-09 RX ADMIN — KETOTIFEN FUMARATE 1 DROP: 0.25 SOLUTION/ DROPS OPHTHALMIC at 08:31

## 2023-08-09 RX ADMIN — PANTOPRAZOLE SODIUM 40 MG: 40 TABLET, DELAYED RELEASE ORAL at 16:56

## 2023-08-09 RX ADMIN — SUCRALFATE 1 G: 1 TABLET ORAL at 08:32

## 2023-08-09 RX ADMIN — ASPIRIN 81 MG: 81 TABLET, COATED ORAL at 08:32

## 2023-08-09 RX ADMIN — DONEPEZIL HYDROCHLORIDE 5 MG: 5 TABLET, FILM COATED ORAL at 21:25

## 2023-08-09 RX ADMIN — MULTIPLE VITAMINS W/ MINERALS TAB 1 TABLET: TAB ORAL at 08:31

## 2023-08-09 RX ADMIN — CYANOCOBALAMIN TAB 1000 MCG 1000 MCG: 1000 TAB at 08:32

## 2023-08-09 RX ADMIN — BUSPIRONE HYDROCHLORIDE 5 MG: 5 TABLET ORAL at 17:00

## 2023-08-09 RX ADMIN — BUSPIRONE HYDROCHLORIDE 5 MG: 5 TABLET ORAL at 08:31

## 2023-08-09 RX ADMIN — FLUTICASONE FUROATE AND VILANTEROL TRIFENATATE 1 PUFF: 100; 25 POWDER RESPIRATORY (INHALATION) at 08:33

## 2023-08-09 RX ADMIN — ALLOPURINOL 100 MG: 100 TABLET ORAL at 08:31

## 2023-08-09 RX ADMIN — SUCRALFATE 1 G: 1 TABLET ORAL at 12:00

## 2023-08-09 RX ADMIN — FLUTICASONE PROPIONATE 1 SPRAY: 50 SPRAY, METERED NASAL at 08:33

## 2023-08-09 NOTE — NURSING NOTE
No behavioral issues noted, patient is calm, visible, and pleasant. Patient is medication compliant, and cooperative with assessment questions. Patient denies all psych s/s.

## 2023-08-09 NOTE — PLAN OF CARE
Problem: Alteration in Thoughts and Perception  Goal: Treatment Goal: Gain control of psychotic behaviors/thinking, reduce/eliminate presenting symptoms and demonstrate improved reality functioning upon discharge  Outcome: Progressing  Goal: Verbalize thoughts and feelings  Description: Interventions:  - Promote a nonjudgmental and trusting relationship with the patient through active listening and therapeutic communication  - Assess patient's level of functioning, behavior and potential for risk  - Engage patient in 1 on 1 interactions  - Encourage patient to express fears, feelings, frustrations, and discuss symptoms    - [place] patient to reality, help patient recognize reality-based thinking   - Administer medications as ordered and assess for potential side effects  - Provide the patient education related to the signs and symptoms of the illness and desired effects of prescribed medications  Outcome: Progressing  Goal: Refrain from acting on delusional thinking/internal stimuli  Description: Interventions:  - Monitor patient closely, per order   - Utilize least restrictive measures   - Set reasonable limits, give positive feedback for acceptable   - Administer medications as ordered and monitor of potential side effects  Outcome: Progressing  Goal: Agree to be compliant with medication regime, as prescribed and report medication side effects  Description: Interventions:  - Offer appropriate PRN medication and supervise ingestion; conduct AIMS, as needed   Outcome: Progressing  Goal: Recognize dysfunctional thoughts, communicate reality-based thoughts at the time of discharge  Description: Interventions:  - Provide medication and psycho-education to assist patient in compliance and developing insight into his/her illness   Outcome: Progressing  Goal: Complete daily ADLs, including personal hygiene independently, as able  Description: Interventions:  - Observe, teach, and assist patient with ADLS  - Monitor and promote a balance of rest/activity, with adequate nutrition and elimination   Outcome: Progressing     Problem: Anxiety  Goal: Anxiety is at manageable level  Description: Interventions:  - Assess and monitor patient's anxiety level. - Monitor for signs and symptoms (heart palpitations, chest pain, shortness of breath, headaches, nausea, feeling jumpy, restlessness, irritable, apprehensive). - Collaborate with interdisciplinary team and initiate plan and interventions as ordered.   - [place] patient to unit/surroundings  - Explain treatment plan  - Encourage participation in care  - Encourage verbalization of concerns/fears  - Identify coping mechanisms  - Assist in developing anxiety-reducing skills  - Administer/offer alternative therapies  - Limit or eliminate stimulants  Outcome: Progressing     Problem: Depression  Goal: Verbalize thoughts and feelings  Description: Interventions:  - Assess and re-assess patient's level of risk   - Engage patient in 1:1 interactions, daily, for a minimum of 15 minutes   - Encourage patient to express feelings, fears, frustrations, hopes   Outcome: Progressing  Goal: Refrain from harming self  Description: Interventions:  - Monitor patient closely, per order   - Supervise medication ingestion, monitor effects and side effects   Outcome: Progressing  Goal: Refrain from isolation  Description: Interventions:  - Develop a trusting relationship   - Encourage socialization   Outcome: Progressing  Goal: Refrain from self-neglect  Outcome: Progressing

## 2023-08-09 NOTE — TREATMENT TEAM
Pt completed  relapse prevention plan. Pt noted signs and symptoms upon admission as "depression, anxiety (see relapse plan)"  Pt signed and copy in chart. Crisis and wamline phone numbers provided. Pt attends groups. 08/09/23 1150   Activity/Group Checklist   Group Admission/Discharge   Attendance Attended   Attendance Duration (min) 16-30   Interactions Interacted appropriately   Affect/Mood Appropriate   Goals Achieved Identified feelings; Identified relapse prevention strategies; Able to listen to others; Able to engage in interactions; Able to reflect/comment on own behavior;Verbalized increased hopefulness; Able to manage/cope with feelings; Able to self-disclose

## 2023-08-09 NOTE — TREATMENT TEAM
08/09/23 0752   Team Meeting   Meeting Type Daily Rounds   Initial Conference Date 08/09/23   Team Members Present   Team Members Present Physician;Nurse;;   Physician Team Member Dr. Christa Cox Team Member [facility], [address] [place] [address] Drive Management Team Member [address] [facility] Work Team Member Wally Barrientos   Patient/Family Present   Patient Present No   Patient's Family Present No     Calm, cooperative, medication compliant. Neuro-psych evaluation came back, patient has full capacity. Trazodone for sleep last night, effective. D/C tomorrow to home at 11h.

## 2023-08-09 NOTE — PROGRESS NOTES
Progress Note - [address] Geena Marcelo 74 y.o. female MRN: [id number]  Unit/Bed#: Lizeth Hanna 061-36 Encounter: [id number]    The patient was seen for continuing care and reviewed with treatment team.  She is well groomed, bright, positive, reports looking forward to attending therapy  Friday. She is bright, happy about the help she is receiving here. Sleep is good with increased  Trazodone to 75mg HS. Appetite, energy are improved. She is interacting well with peers and her children. ROS: negative   Per Note by Dr. Phyllistine Babinski  Results of Neuropsychological Exam revealed adequate cognitive functioning (no deficits were noted in tested areas). In fact, patient demonstrated superior declarative recall after a long delay. /70 (BP Location: Left arm)   Pulse 59   Temp 97.9 °F (36.6 °C) (Temporal)   Resp 18   Ht 4' 11" (1.499 m)   Wt 73.5 kg (162 lb)   LMP  (LMP Unknown)   SpO2 93%   BMI 32.72 kg/m²     Current Mental Status Evaluation:  Appearance:  Adequate hygiene and grooming, well dressed in street clothes   Behavior:  Calm, Cooperative and Pleasant, less guarded, more engaged   Mood:  Euthymic   Affect: Reactive, Brighter     Speech: Normal volume and Normal rate   Thought Process:  Goal directed and coherent,    Thought Content:  Does not verbalize delusional material   Perceptual Disturbances: Denies hallucinations and does not appear to be responding to internal stimuli   Risk Potential: No suicidal or homicidal ideation   Orientation:   Patient is alert, awake and oriented x 3    insight, Judgment and impulse control are improving. No results found for this or any previous visit (from the past 96 hour(s)). Progress Toward Goals: No significant events in the past 24 hours, Slowly improving, Tolerating medications, Improved insight.   Discharge tomorrow  Principal Problem:    Severe episode of recurrent major depressive disorder, without psychotic features ([address])  Active Problems: Tobacco abuse    Essential hypertension    Allergic rhinitis    Vitamin B12 deficiency    Vitamin D deficiency    COPD without exacerbation (HCC)    Medical clearance for psychiatric admission    Anastomotic ulcer    History of Debra-en-Y gastric bypass    Chronic idiopathic gout of left foot    Subacute frontal sinusitis    Discharge planning update: The patient will return to previous living arrangement    Recommended Treatment: Continue with pharmacotherapy, group therapy, milieu therapy and occupational therapy.   The patient will be maintained on the following medications:  Current Facility-Administered Medications   Medication Dose Route Frequency Provider Last Rate   • acetaminophen  650 mg Oral Q4H PRN Ney Miu, KYLE     • acetaminophen  650 mg Oral Q4H PRN Ney Miu, CRNP     • acetaminophen  975 mg Oral Q6H PRN Ney Miu, CRDARBY     • albuterol  2 puff Inhalation Q4H PRN KYLE Subramanian     • allopurinol  100 mg Oral Daily KYLE Subramanian     • aspirin  81 mg Oral Daily KYLE Subramanian     • buPROPion  300 mg Oral QAM NeyKYLE Talley     • busPIRone  5 mg Oral BID Ney Ashu, KYLE     • cyanocobalamin  1,000 mcg Oral Daily KYLE Subramanian     • cyanocobalamin  1,000 mcg Intramuscular Q30 Days KYLE Subramanian     • dextromethorphan-guaiFENesin  10 mL Oral Q4H PRN KYLE Subramanian     • donepezil  5 mg Oral HS Ney Miu, KYLE     • ergocalciferol  50,000 Units Oral Weekly KYLE Subramanian     • escitalopram  15 mg Oral Daily Hua Elizabeth MD     • fluticasone  1 spray Each Nare Daily KYLE Subramanian     • Fluticasone Furoate-Vilanterol  1 puff Inhalation Daily KYLE Subramanian     • haloperidol lactate  5 mg Intramuscular Q4H PRN Max 4/day Ney Mirichard, KYLE     • hydrOXYzine HCL  25 mg Oral Q6H PRN Max 4/day Ney Miu, KYLE     • hydrOXYzine HCL  50 mg Oral Q6H PRN Max 4/day KYLE Mahoney     • ketotifen  1 drop Both Eyes BID KYLE Subramanian     • LORazepam  1 mg Intramuscular Q6H PRN Max 3/day KYLE Mahoney     • LORazepam  1 mg Oral Q6H PRN Max 3/day KYLE Mahoney     • multivitamin-minerals  1 tablet Oral Daily KYLE Subramanian     • nebivolol  5 mg Oral Daily KYLE Subramanian     • pantoprazole  40 mg Oral BID AC KYLE Subramanian     • risperiDONE  0.25 mg Oral Q4H PRN Max 6/day KYLE Mahoney     • risperiDONE  0.5 mg Oral Q4H PRN Max 3/day KYLE Mahoney     • risperiDONE  1 mg Oral Q2H PRN Max 3/day KYLE Mahoney     • sucralfate  1 g Oral 4x Daily (AC & HS) KYLE Subramanian     • traZODone  75 mg Oral HS PRN Monique Rawls MD

## 2023-08-09 NOTE — NURSING NOTE
Patient is visible in dinning room and milieu throughout the shift. Patient is currently denying anxiety, depression, SI/HI/AVH at this time. Patient appears motivated for her plans. Patient reports she did not sleep last night. Patient is medications and meal complaint. Patient is social and friendly to staff and peers. Able to make needs known.

## 2023-08-09 NOTE — PROGRESS NOTES
Pt attended [facility] Recovery: routine group. Pt calm and cooperative. Pt noted her fears as she recognized anxiety/control issues when laundry does not get done on a specific day. 08/09/23 1100   Activity/Group Checklist   Group Other (Comment)  ( Recovery: routine)   Attendance Attended   Attendance Duration (min) 31-45   Interactions Interacted appropriately   Affect/Mood Appropriate   Goals Achieved Identified feelings; Identified relapse prevention strategies; Discussed self-esteem issues; Discussed coping strategies; Able to engage in interactions; Identified resources and support systems; Able to listen to others; Displayed empathy;Identified distorted thoughts/beliefs; Able to reflect/comment on own behavior;Able to manage/cope with feelings;Verbalized increased hopefulness; Able to self-disclose

## 2023-08-10 VITALS
OXYGEN SATURATION: 94 % | TEMPERATURE: 97.5 F | HEIGHT: 59 IN | DIASTOLIC BLOOD PRESSURE: 67 MMHG | HEART RATE: 58 BPM | SYSTOLIC BLOOD PRESSURE: 143 MMHG | WEIGHT: 162 LBS | BODY MASS INDEX: 32.66 KG/M2 | RESPIRATION RATE: 18 BRPM

## 2023-08-10 RX ORDER — CYANOCOBALAMIN 1000 UG/ML
1000 INJECTION, SOLUTION INTRAMUSCULAR; SUBCUTANEOUS
Qty: 1 ML | Refills: 0
Start: 2023-09-03

## 2023-08-10 RX ORDER — CYANOCOBALAMIN 1000 UG/ML
1000 INJECTION, SOLUTION INTRAMUSCULAR; SUBCUTANEOUS
Qty: 1 ML | Refills: 0
Start: 2023-09-03 | End: 2023-08-10 | Stop reason: SDUPTHER

## 2023-08-10 RX ORDER — SUCRALFATE 1 G/1
1 TABLET ORAL
Qty: 40 TABLET | Refills: 0 | Status: SHIPPED | OUTPATIENT
Start: 2023-08-10

## 2023-08-10 RX ORDER — ESCITALOPRAM OXALATE 10 MG/1
15 TABLET ORAL DAILY
Qty: 45 TABLET | Refills: 0 | Status: SHIPPED | OUTPATIENT
Start: 2023-08-10

## 2023-08-10 RX ORDER — SUCRALFATE 1 G/1
1 TABLET ORAL
Qty: 40 TABLET | Refills: 0 | Status: SHIPPED | OUTPATIENT
Start: 2023-08-10 | End: 2023-08-10 | Stop reason: SDUPTHER

## 2023-08-10 RX ORDER — FLUTICASONE PROPIONATE 50 MCG
1 SPRAY, SUSPENSION (ML) NASAL DAILY
Qty: 48 G | Refills: 0
Start: 2023-08-10

## 2023-08-10 RX ORDER — TRAZODONE HYDROCHLORIDE 150 MG/1
75 TABLET ORAL
Qty: 14 TABLET | Refills: 0 | Status: SHIPPED | OUTPATIENT
Start: 2023-08-10

## 2023-08-10 RX ORDER — ERGOCALCIFEROL 1.25 MG/1
50000 CAPSULE ORAL WEEKLY
Qty: 7 CAPSULE | Refills: 0 | Status: SHIPPED | OUTPATIENT
Start: 2023-08-11 | End: 2023-09-23

## 2023-08-10 RX ORDER — ESCITALOPRAM OXALATE 10 MG/1
15 TABLET ORAL DAILY
Qty: 45 TABLET | Refills: 0 | Status: SHIPPED | OUTPATIENT
Start: 2023-08-10 | End: 2023-08-10 | Stop reason: SDUPTHER

## 2023-08-10 RX ORDER — ERGOCALCIFEROL 1.25 MG/1
50000 CAPSULE ORAL WEEKLY
Qty: 4 CAPSULE | Refills: 0 | Status: SHIPPED | OUTPATIENT
Start: 2023-08-11 | End: 2023-08-10 | Stop reason: SDUPTHER

## 2023-08-10 RX ORDER — TRAZODONE HYDROCHLORIDE 150 MG/1
75 TABLET ORAL
Qty: 14 TABLET | Refills: 0 | Status: SHIPPED | OUTPATIENT
Start: 2023-08-10 | End: 2023-08-10 | Stop reason: SDUPTHER

## 2023-08-10 RX ADMIN — PANTOPRAZOLE SODIUM 40 MG: 40 TABLET, DELAYED RELEASE ORAL at 06:28

## 2023-08-10 RX ADMIN — KETOTIFEN FUMARATE 1 DROP: 0.25 SOLUTION/ DROPS OPHTHALMIC at 08:36

## 2023-08-10 RX ADMIN — FLUTICASONE FUROATE AND VILANTEROL TRIFENATATE 1 PUFF: 100; 25 POWDER RESPIRATORY (INHALATION) at 08:57

## 2023-08-10 RX ADMIN — SUCRALFATE 1 G: 1 TABLET ORAL at 06:28

## 2023-08-10 RX ADMIN — BUSPIRONE HYDROCHLORIDE 5 MG: 5 TABLET ORAL at 08:35

## 2023-08-10 RX ADMIN — CYANOCOBALAMIN TAB 1000 MCG 1000 MCG: 1000 TAB at 08:36

## 2023-08-10 RX ADMIN — FLUTICASONE PROPIONATE 1 SPRAY: 50 SPRAY, METERED NASAL at 08:36

## 2023-08-10 RX ADMIN — BUPROPION HYDROCHLORIDE 300 MG: 300 TABLET, FILM COATED, EXTENDED RELEASE ORAL at 08:36

## 2023-08-10 RX ADMIN — ALLOPURINOL 100 MG: 100 TABLET ORAL at 08:35

## 2023-08-10 RX ADMIN — NEBIVOLOL HYDROCHLORIDE 5 MG: 5 TABLET ORAL at 08:36

## 2023-08-10 RX ADMIN — MULTIPLE VITAMINS W/ MINERALS TAB 1 TABLET: TAB ORAL at 08:36

## 2023-08-10 RX ADMIN — ESCITSLOPRAM 15 MG: 5 TABLET ORAL at 08:35

## 2023-08-10 RX ADMIN — ASPIRIN 81 MG: 81 TABLET, COATED ORAL at 08:36

## 2023-08-10 NOTE — PLAN OF CARE
Problem: DISCHARGE PLANNING - CARE MANAGEMENT  Goal: Discharge to post-acute care or home with appropriate resources  Description: INTERVENTIONS:  - Conduct assessment to determine patient/family and health care team treatment goals, and need for post-acute services based on payer coverage, community resources, and patient preferences, and barriers to discharge  - Address psychosocial, clinical, and financial barriers to discharge as identified in assessment in conjunction with the patient/family and health care team  - Arrange appropriate level of post-acute services according to patient’s   needs and preference and payer coverage in collaboration with the physician and health care team  - Communicate with and update the patient/family, physician, and health care team regarding progress on the discharge plan  - Arrange appropriate transportation to post-acute venues  Outcome: Adequate for Discharge      The patient is cleared for d/c today back home. CM arranged transportation for the patient via [address] [place] [address] Road at 11h. The patient is in agreement with her d/c plan and has no safety concerns or questions. The following appts have been scheduled for the patient:    Therapy: [facility] [place]: Friday 8/11/23 @ 9:00am  PCP: Dr. Kurtz Guard: Wednesday 8/23/23 @ 1:30pm    The patient is on a wait list for psychiatry/medication management through [address] [place] [address]. Medications to be sent to [facility] in [place].

## 2023-08-10 NOTE — PLAN OF CARE
Problem: Alteration in Thoughts and Perception  Goal: Treatment Goal: Gain control of psychotic behaviors/thinking, reduce/eliminate presenting symptoms and demonstrate improved reality functioning upon discharge  Outcome: Adequate for Discharge  Goal: Verbalize thoughts and feelings  Description: Interventions:  - Promote a nonjudgmental and trusting relationship with the patient through active listening and therapeutic communication  - Assess patient's level of functioning, behavior and potential for risk  - Engage patient in 1 on 1 interactions  - Encourage patient to express fears, feelings, frustrations, and discuss symptoms    - [place] patient to reality, help patient recognize reality-based thinking   - Administer medications as ordered and assess for potential side effects  - Provide the patient education related to the signs and symptoms of the illness and desired effects of prescribed medications  Outcome: Adequate for Discharge  Goal: Refrain from acting on delusional thinking/internal stimuli  Description: Interventions:  - Monitor patient closely, per order   - Utilize least restrictive measures   - Set reasonable limits, give positive feedback for acceptable   - Administer medications as ordered and monitor of potential side effects  Outcome: Adequate for Discharge  Goal: Agree to be compliant with medication regime, as prescribed and report medication side effects  Description: Interventions:  - Offer appropriate PRN medication and supervise ingestion; conduct AIMS, as needed   Outcome: Adequate for Discharge  Goal: Attend and participate in unit activities, including therapeutic, recreational, and educational groups  Description: Interventions:  -Encourage Visitation and family involvement in care  Outcome: Adequate for Discharge  Goal: Recognize dysfunctional thoughts, communicate reality-based thoughts at the time of discharge  Description: Interventions:  - Provide medication and psycho-education to assist patient in compliance and developing insight into his/her illness   Outcome: Adequate for Discharge  Goal: Complete daily ADLs, including personal hygiene independently, as able  Description: Interventions:  - Observe, teach, and assist patient with ADLS  - Monitor and promote a balance of rest/activity, with adequate nutrition and elimination   Outcome: Adequate for Discharge     Problem: Anxiety  Goal: Anxiety is at manageable level  Description: Interventions:  - Assess and monitor patient's anxiety level. - Monitor for signs and symptoms (heart palpitations, chest pain, shortness of breath, headaches, nausea, feeling jumpy, restlessness, irritable, apprehensive). - Collaborate with interdisciplinary team and initiate plan and interventions as ordered.   - [place] patient to unit/surroundings  - Explain treatment plan  - Encourage participation in care  - Encourage verbalization of concerns/fears  - Identify coping mechanisms  - Assist in developing anxiety-reducing skills  - Administer/offer alternative therapies  - Limit or eliminate stimulants  Outcome: Adequate for Discharge     Problem: Depression  Goal: Treatment Goal: Demonstrate behavioral control of depressive symptoms, verbalize feelings of improved mood/affect, and adopt new coping skills prior to discharge  Outcome: Adequate for Discharge  Goal: Verbalize thoughts and feelings  Description: Interventions:  - Assess and re-assess patient's level of risk   - Engage patient in 1:1 interactions, daily, for a minimum of 15 minutes   - Encourage patient to express feelings, fears, frustrations, hopes   Outcome: Adequate for Discharge  Goal: Refrain from harming self  Description: Interventions:  - Monitor patient closely, per order   - Supervise medication ingestion, monitor effects and side effects   Outcome: Adequate for Discharge  Goal: Refrain from isolation  Description: Interventions:  - Develop a trusting relationship   - Encourage socialization   Outcome: Adequate for Discharge  Goal: Refrain from self-neglect  Outcome: Adequate for Discharge     Problem: DISCHARGE PLANNING - CARE MANAGEMENT  Goal: Discharge to post-acute care or home with appropriate resources  Description: INTERVENTIONS:  - Conduct assessment to determine patient/family and health care team treatment goals, and need for post-acute services based on payer coverage, community resources, and patient preferences, and barriers to discharge  - Address psychosocial, clinical, and financial barriers to discharge as identified in assessment in conjunction with the patient/family and health care team  - Arrange appropriate level of post-acute services according to patient’s   needs and preference and payer coverage in collaboration with the physician and health care team  - Communicate with and update the patient/family, physician, and health care team regarding progress on the discharge plan  - Arrange appropriate transportation to post-acute venues  Outcome: Adequate for Discharge

## 2023-08-10 NOTE — BH TRANSITION RECORD
Contact Information: If you have any questions, concerns, pended studies, tests and/or procedures, or emergencies regarding your inpatient behavioral health visit. Please contact [address] [facility] 6T [phone] and ask to speak to a , nurse or physician. A contact is available 24 hours/ 7 days a week at this number. Summary of Procedures Performed During your Stay:  Below is a list of major procedures performed during your hospital stay and a summary of results:  - Cardiac Procedures/Studies: EKG. Sinus bradycardia  Right bundle branch block  Inferior infarct (cited on or before 10-FEB-2016)  Abnormal ECG  When compared with ECG of 28-JUL-2023 19:07,    Pending Studies (From admission, onward)    None        Please follow up on the above pending studies with your PCP and/or referring provider.

## 2023-08-10 NOTE — TREATMENT TEAM
08/10/23 0719   Team Meeting   Meeting Type Daily Rounds   Initial Conference Date 08/10/23   Team Members Present   Team Members Present Physician;Nurse;;   Physician Team Member Dr. Sofia Delaney Team Member [address], [address] Management Team Member [address] [place] [address] Work Team Member Devendra Berkowitz   Patient/Family Present   Patient Present No   Patient's Family Present No     D/C today at 11h. Bright, reported feeling well, looking forward to therapy appt tomorrow. All aftercare set up, including rescheduled MRI and sleep study.

## 2023-08-10 NOTE — PLAN OF CARE
Problem: Alteration in Thoughts and Perception  Goal: Treatment Goal: Gain control of psychotic behaviors/thinking, reduce/eliminate presenting symptoms and demonstrate improved reality functioning upon discharge  Outcome: Progressing  Goal: Verbalize thoughts and feelings  Description: Interventions:  - Promote a nonjudgmental and trusting relationship with the patient through active listening and therapeutic communication  - Assess patient's level of functioning, behavior and potential for risk  - Engage patient in 1 on 1 interactions  - Encourage patient to express fears, feelings, frustrations, and discuss symptoms    - [place] patient to reality, help patient recognize reality-based thinking   - Administer medications as ordered and assess for potential side effects  - Provide the patient education related to the signs and symptoms of the illness and desired effects of prescribed medications  Outcome: Progressing  Goal: Refrain from acting on delusional thinking/internal stimuli  Description: Interventions:  - Monitor patient closely, per order   - Utilize least restrictive measures   - Set reasonable limits, give positive feedback for acceptable   - Administer medications as ordered and monitor of potential side effects  Outcome: Progressing  Goal: Agree to be compliant with medication regime, as prescribed and report medication side effects  Description: Interventions:  - Offer appropriate PRN medication and supervise ingestion; conduct AIMS, as needed   Outcome: Progressing  Goal: Recognize dysfunctional thoughts, communicate reality-based thoughts at the time of discharge  Description: Interventions:  - Provide medication and psycho-education to assist patient in compliance and developing insight into his/her illness   Outcome: Progressing  Goal: Complete daily ADLs, including personal hygiene independently, as able  Description: Interventions:  - Observe, teach, and assist patient with ADLS  - Monitor and promote a balance of rest/activity, with adequate nutrition and elimination   Outcome: Progressing     Problem: Anxiety  Goal: Anxiety is at manageable level  Description: Interventions:  - Assess and monitor patient's anxiety level. - Monitor for signs and symptoms (heart palpitations, chest pain, shortness of breath, headaches, nausea, feeling jumpy, restlessness, irritable, apprehensive). - Collaborate with interdisciplinary team and initiate plan and interventions as ordered.   - [place] patient to unit/surroundings  - Explain treatment plan  - Encourage participation in care  - Encourage verbalization of concerns/fears  - Identify coping mechanisms  - Assist in developing anxiety-reducing skills  - Administer/offer alternative therapies  - Limit or eliminate stimulants  Outcome: Progressing     Problem: Depression  Goal: Treatment Goal: Demonstrate behavioral control of depressive symptoms, verbalize feelings of improved mood/affect, and adopt new coping skills prior to discharge  Outcome: Progressing  Goal: Verbalize thoughts and feelings  Description: Interventions:  - Assess and re-assess patient's level of risk   - Engage patient in 1:1 interactions, daily, for a minimum of 15 minutes   - Encourage patient to express feelings, fears, frustrations, hopes   Outcome: Progressing  Goal: Refrain from harming self  Description: Interventions:  - Monitor patient closely, per order   - Supervise medication ingestion, monitor effects and side effects   Outcome: Progressing  Goal: Refrain from isolation  Description: Interventions:  - Develop a trusting relationship   - Encourage socialization   Outcome: Progressing  Goal: Refrain from self-neglect  Outcome: Progressing

## 2023-08-10 NOTE — NURSING NOTE
Patient is visible in dinning room watching TV and socializing with peers. Patient is medications and meal complaint. Patient is currently denying denying anxiety, depression, SI/HI/AVH at this time. Patient appears motivated and bright on approach. Patient states, "my plan is to put myself first". Patient is social and friendly to staff and peers. Able to make needs known.

## 2023-08-10 NOTE — NURSING NOTE
Patient is d/c home today, calm, visible, and social. Patient is pleasant on approach, medication compliant, and cooperative with assessment questions. Patient denies SI, AH, VH. Patient reports no anxiety and no depression. Medication and F/U appt reviewed with patient. Patient verbalizes understanding. Patient left unit with all belongings to lobby.
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
## 2023-08-11 ENCOUNTER — SOCIAL WORK (OUTPATIENT)
Dept: BEHAVIORAL/MENTAL HEALTH CLINIC | Facility: CLINIC | Age: 73
End: 2023-08-11
Payer: MEDICARE

## 2023-08-11 ENCOUNTER — TELEPHONE (OUTPATIENT)
Dept: PSYCHIATRY | Facility: CLINIC | Age: 73
End: 2023-08-11

## 2023-08-11 DIAGNOSIS — Z91.51 HISTORY OF SUICIDE ATTEMPT: ICD-10-CM

## 2023-08-11 DIAGNOSIS — Z62.811 PERSONAL HISTORY OF PSYCHOLOGICAL ABUSE IN CHILDHOOD: ICD-10-CM

## 2023-08-11 DIAGNOSIS — F33.1 MODERATE EPISODE OF RECURRENT MAJOR DEPRESSIVE DISORDER (HCC): Primary | ICD-10-CM

## 2023-08-11 PROCEDURE — 90791 PSYCH DIAGNOSTIC EVALUATION: CPT | Performed by: SOCIAL WORKER

## 2023-08-11 NOTE — TELEPHONE ENCOUNTER
Received a signed SEPIDEH from patient for communication for Dilcia Zapata while patient was in office for new patient appointment with Gary Lazcano.
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
## 2023-08-16 ENCOUNTER — RA CDI HCC (OUTPATIENT)
Dept: OTHER | Facility: HOSPITAL | Age: 73
End: 2023-08-16

## 2023-08-23 ENCOUNTER — OFFICE VISIT (OUTPATIENT)
Dept: FAMILY MEDICINE CLINIC | Facility: CLINIC | Age: 73
End: 2023-08-23
Payer: MEDICARE

## 2023-08-23 VITALS
WEIGHT: 163 LBS | HEIGHT: 60 IN | RESPIRATION RATE: 16 BRPM | DIASTOLIC BLOOD PRESSURE: 80 MMHG | TEMPERATURE: 97.7 F | BODY MASS INDEX: 32 KG/M2 | OXYGEN SATURATION: 92 % | SYSTOLIC BLOOD PRESSURE: 130 MMHG | HEART RATE: 59 BPM

## 2023-08-23 DIAGNOSIS — Z12.31 ENCOUNTER FOR SCREENING MAMMOGRAM FOR MALIGNANT NEOPLASM OF BREAST: ICD-10-CM

## 2023-08-23 DIAGNOSIS — E55.9 VITAMIN D DEFICIENCY: ICD-10-CM

## 2023-08-23 DIAGNOSIS — F17.211 NICOTINE DEPENDENCE, CIGARETTES, IN REMISSION: ICD-10-CM

## 2023-08-23 DIAGNOSIS — T50.902S INTENTIONAL DRUG OVERDOSE, SEQUELA (HCC): ICD-10-CM

## 2023-08-23 DIAGNOSIS — Z72.0 TOBACCO USE: ICD-10-CM

## 2023-08-23 DIAGNOSIS — Z59.9 FINANCIAL DIFFICULTIES: ICD-10-CM

## 2023-08-23 DIAGNOSIS — R41.89 COGNITIVE IMPAIRMENT: ICD-10-CM

## 2023-08-23 DIAGNOSIS — J44.9 CHRONIC OBSTRUCTIVE PULMONARY DISEASE, UNSPECIFIED COPD TYPE (HCC): ICD-10-CM

## 2023-08-23 DIAGNOSIS — Z59.82 INABILITY TO ACQUIRE TRANSPORTATION: ICD-10-CM

## 2023-08-23 DIAGNOSIS — Z00.00 MEDICARE ANNUAL WELLNESS VISIT, SUBSEQUENT: Primary | ICD-10-CM

## 2023-08-23 DIAGNOSIS — J44.9 COPD WITHOUT EXACERBATION (HCC): ICD-10-CM

## 2023-08-23 DIAGNOSIS — Z12.39 SCREENING BREAST EXAMINATION: ICD-10-CM

## 2023-08-23 DIAGNOSIS — Z78.0 ASYMPTOMATIC MENOPAUSAL STATE: ICD-10-CM

## 2023-08-23 PROBLEM — Z00.8 MEDICAL CLEARANCE FOR PSYCHIATRIC ADMISSION: Status: RESOLVED | Noted: 2023-08-04 | Resolved: 2023-08-23

## 2023-08-23 PROCEDURE — 99214 OFFICE O/P EST MOD 30 MIN: CPT | Performed by: INTERNAL MEDICINE

## 2023-08-23 PROCEDURE — G0439 PPPS, SUBSEQ VISIT: HCPCS | Performed by: INTERNAL MEDICINE

## 2023-08-23 RX ORDER — DONEPEZIL HYDROCHLORIDE 10 MG/1
10 TABLET, FILM COATED ORAL
Qty: 90 TABLET | Refills: 3 | Status: SHIPPED | OUTPATIENT
Start: 2023-08-23

## 2023-08-23 RX ORDER — ALBUTEROL SULFATE 90 UG/1
2 AEROSOL, METERED RESPIRATORY (INHALATION) EVERY 6 HOURS PRN
Qty: 54 G | Refills: 3 | Status: SHIPPED | OUTPATIENT
Start: 2023-08-23

## 2023-08-23 RX ORDER — FLUTICASONE PROPIONATE AND SALMETEROL XINAFOATE 115; 21 UG/1; UG/1
1 AEROSOL, METERED RESPIRATORY (INHALATION) DAILY
Qty: 48 G | Refills: 3 | Status: SHIPPED | OUTPATIENT
Start: 2023-08-23

## 2023-08-23 SDOH — ECONOMIC STABILITY - TRANSPORTATION SECURITY: TRANSPORTATION INSECURITY: Z59.82

## 2023-08-23 SDOH — ECONOMIC STABILITY - INCOME SECURITY: PROBLEM RELATED TO HOUSING AND ECONOMIC CIRCUMSTANCES, UNSPECIFIED: Z59.9
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
## 2023-08-23 NOTE — ASSESSMENT & PLAN NOTE
She states she is feeling better on current medications, is following with psychiatry once a week. Now off ambien and will no longer have controlled substances.

## 2023-08-23 NOTE — PATIENT INSTRUCTIONS
Medicare Preventive Visit Patient Instructions  Thank you for completing your Welcome to Medicare Visit or Medicare Annual Wellness Visit today. Your next wellness visit will be due in one year (8/23/2024). The screening/preventive services that you may require over the next 5-10 years are detailed below. Some tests may not apply to you based off risk factors and/or age. Screening tests ordered at today's visit but not completed yet may show as past due. Also, please note that scanned in results may not display below. Preventive Screenings:  Service Recommendations Previous Testing/Comments   Colorectal Cancer Screening  * Colonoscopy    * Fecal Occult Blood Test (FOBT)/Fecal Immunochemical Test (FIT)  * Fecal DNA/Cologuard Test  * Flexible Sigmoidoscopy Age: 43-73 years old   Colonoscopy: every 10 years (may be performed more frequently if at higher risk)  OR  FOBT/FIT: every 1 year  OR  Cologuard: every 3 years  OR  Sigmoidoscopy: every 5 years  Screening may be recommended earlier than age 39 if at higher risk for colorectal cancer. Also, an individualized decision between you and your healthcare provider will decide whether screening between the ages of 77-80 would be appropriate. Colonoscopy: 05/25/2022  FOBT/FIT: Not on file  Cologuard: Not on file  Sigmoidoscopy: Not on file    Screening Current     Breast Cancer Screening Age: 36 years old  Frequency: every 1-2 years  Not required if history of left and right mastectomy Mammogram: 05/21/2021        Cervical Cancer Screening Between the ages of 21-29, pap smear recommended once every 3 years. Between the ages of 32-69, can perform pap smear with HPV co-testing every 5 years.    Recommendations may differ for women with a history of total hysterectomy, cervical cancer, or abnormal pap smears in past. Pap Smear: Not on file    Screening Not Indicated   Hepatitis C Screening Once for adults born between 1945 and 1965  More frequently in patients at high risk for Hepatitis C Hep C Antibody: 05/21/2021    Screening Current   Diabetes Screening 1-2 times per year if you're at risk for diabetes or have pre-diabetes Fasting glucose: 99 mg/dL (5/21/2021)  A1C: No results in last 5 years (No results in last 5 years)  Screening Current   Cholesterol Screening Once every 5 years if you don't have a lipid disorder. May order more often based on risk factors. Lipid panel: 05/21/2021    Screening Not Indicated  History Lipid Disorder     Other Preventive Screenings Covered by Medicare:  1. Abdominal Aortic Aneurysm (AAA) Screening: covered once if your at risk. You're considered to be at risk if you have a family history of AAA. 2. Lung Cancer Screening: covers low dose CT scan once per year if you meet all of the following conditions: (1) Age 48-67; (2) No signs or symptoms of lung cancer; (3) Current smoker or have quit smoking within the last 15 years; (4) You have a tobacco smoking history of at least 20 pack years (packs per day multiplied by number of years you smoked); (5) You get a written order from a healthcare provider. 3. Glaucoma Screening: covered annually if you're considered high risk: (1) You have diabetes OR (2) Family history of glaucoma OR (3)  aged 48 and older OR (3)  American aged 72 and older  3. Osteoporosis Screening: covered every 2 years if you meet one of the following conditions: (1) You're estrogen deficient and at risk for osteoporosis based off medical history and other findings; (2) Have a vertebral abnormality; (3) On glucocorticoid therapy for more than 3 months; (4) Have primary hyperparathyroidism; (5) On osteoporosis medications and need to assess response to drug therapy. · Last bone density test (DXA Scan): Not on file. 5. HIV Screening: covered annually if you're between the age of 14-79. Also covered annually if you are younger than 13 and older than 72 with risk factors for HIV infection.  For pregnant patients, it is covered up to 3 times per pregnancy. Immunizations:  Immunization Recommendations   Influenza Vaccine Annual influenza vaccination during flu season is recommended for all persons aged >= 6 months who do not have contraindications   Pneumococcal Vaccine   * Pneumococcal conjugate vaccine = PCV13 (Prevnar 13), PCV15 (Vaxneuvance), PCV20 (Prevnar 20)  * Pneumococcal polysaccharide vaccine = PPSV23 (Pneumovax) Adults 20-63 years old: 1-3 doses may be recommended based on certain risk factors  Adults 72 years old: 1-2 doses may be recommended based off what pneumonia vaccine you previously received   Hepatitis B Vaccine 3 dose series if at intermediate or high risk (ex: diabetes, end stage renal disease, liver disease)   Tetanus (Td) Vaccine - COST NOT COVERED BY MEDICARE PART B Following completion of primary series, a booster dose should be given every 10 years to maintain immunity against tetanus. Td may also be given as tetanus wound prophylaxis. Tdap Vaccine - COST NOT COVERED BY MEDICARE PART B Recommended at least once for all adults. For pregnant patients, recommended with each pregnancy. Shingles Vaccine (Shingrix) - COST NOT COVERED BY MEDICARE PART B  2 shot series recommended in those aged 48 and above     Health Maintenance Due:      Topic Date Due   • Breast Cancer Screening: Mammogram  05/21/2022   • Colorectal Cancer Screening  05/24/2025   • Hepatitis C Screening  Completed   • Lung Cancer Screening  Discontinued     Immunizations Due:      Topic Date Due   • COVID-19 Vaccine (4 - Pfizer series) 02/05/2022   • Influenza Vaccine (1) 09/01/2023     Advance Directives   What are advance directives? Advance directives are legal documents that state your wishes and plans for medical care. These plans are made ahead of time in case you lose your ability to make decisions for yourself.  Advance directives can apply to any medical decision, such as the treatments you want, and if you want to donate organs. What are the types of advance directives? There are many types of advance directives, and each state has rules about how to use them. You may choose a combination of any of the following:  · Living will: This is a written record of the treatment you want. You can also choose which treatments you do not want, which to limit, and which to stop at a certain time. This includes surgery, medicine, IV fluid, and tube feedings. · Durable power of  for healthcare [facility]): This is a written record that states who you want to make healthcare choices for you when you are unable to make them for yourself. This person, called a proxy, is usually a family member or a friend. You may choose more than 1 proxy. · Do not resuscitate (DNR) order:  A DNR order is used in case your heart stops beating or you stop breathing. It is a request not to have certain forms of treatment, such as CPR. A DNR order may be included in other types of advance directives. · Medical directive: This covers the care that you want if you are in a coma, near death, or unable to make decisions for yourself. You can list the treatments you want for each condition. Treatment may include pain medicine, surgery, blood transfusions, dialysis, IV or tube feedings, and a ventilator (breathing machine). · Values history: This document has questions about your views, beliefs, and how you feel and think about life. This information can help others choose the care that you would choose. Why are advance directives important? An advance directive helps you control your care. Although spoken wishes may be used, it is better to have your wishes written down. Spoken wishes can be misunderstood, or not followed. Treatments may be given even if you do not want them. An advance directive may make it easier for your family to make difficult choices about your care.    Urinary Incontinence   Urinary incontinence (UI)  is when you lose control of your bladder. UI develops because your bladder cannot store or empty urine properly. The 3 most common types of UI are stress incontinence, urge incontinence, or both. Medicines:   · May be given to help strengthen your bladder control. Report any side effects of medication to your healthcare provider. Do pelvic muscle exercises often:  Your pelvic muscles help you stop urinating. Squeeze these muscles tight for 5 seconds, then relax for 5 seconds. Gradually work up to squeezing for 10 seconds. Do 3 sets of 15 repetitions a day, or as directed. This will help strengthen your pelvic muscles and improve bladder control. Train your bladder:  Go to the bathroom at set times, such as every 2 hours, even if you do not feel the urge to go. You can also try to hold your urine when you feel the urge to go. For example, hold your urine for 5 minutes when you feel the urge to go. As that becomes easier, hold your urine for 10 minutes. Self-care:   · Keep a UI record. Write down how often you leak urine and how much you leak. Make a note of what you were doing when you leaked urine. · Drink liquids as directed. You may need to limit the amount of liquid you drink to help control your urine leakage. Do not drink any liquid right before you go to bed. Limit or do not have drinks that contain caffeine or alcohol. · Prevent constipation. Eat a variety of high-fiber foods. Good examples are high-fiber cereals, beans, vegetables, and whole-grain breads. Walking is the best way to trigger your intestines to have a bowel movement. · Exercise regularly and maintain a healthy weight. Weight loss and exercise will decrease pressure on your bladder and help you control your leakage. · Use a catheter as directed  to help empty your bladder. A catheter is a tiny, plastic tube that is put into your bladder to drain your urine. · Go to behavior therapy as directed.   Behavior therapy may be used to help you learn to control your urge to urinate. Cigarette Smoking and Your Health   Risks to your health if you smoke:  Nicotine and other chemicals found in tobacco damage every cell in your body. Even if you are a light smoker, you have an increased risk for cancer, heart disease, and lung disease. If you are pregnant or have diabetes, smoking increases your risk for complications. Benefits to your health if you stop smoking:   · You decrease respiratory symptoms such as coughing, wheezing, and shortness of breath. · You reduce your risk for cancers of the lung, mouth, throat, kidney, bladder, pancreas, stomach, and cervix. If you already have cancer, you increase the benefits of chemotherapy. You also reduce your risk for cancer returning or a second cancer from developing. · You reduce your risk for heart disease, blood clots, heart attack, and stroke. · You reduce your risk for lung infections, and diseases such as pneumonia, asthma, chronic bronchitis, and emphysema. · Your circulation improves. More oxygen can be delivered to your body. If you have diabetes, you lower your risk for complications, such as kidney, artery, and eye diseases. You also lower your risk for nerve damage. Nerve damage can lead to amputations, poor vision, and blindness. · You improve your body's ability to heal and to fight infections. For more information and support to stop smoking:   · [employer]. gov  Phone: 3- 459 - 583-0896  Web Address: www.[employer]  Weight Management   Why it is important to manage your weight:  Being overweight increases your risk of health conditions such as heart disease, high blood pressure, type 2 diabetes, and certain types of cancer. It can also increase your risk for osteoarthritis, sleep apnea, and other respiratory problems. Aim for a slow, steady weight loss. Even a small amount of weight loss can lower your risk of health problems.   How to lose weight safely:  A safe and healthy way to lose weight is to eat fewer calories and get regular exercise. You can lose up about 1 pound a week by decreasing the number of calories you eat by 500 calories each day. Healthy meal plan for weight management:  A healthy meal plan includes a variety of foods, contains fewer calories, and helps you stay healthy. A healthy meal plan includes the following:  · Eat whole-grain foods more often. A healthy meal plan should contain fiber. Fiber is the part of grains, fruits, and vegetables that is not broken down by your body. Whole-grain foods are healthy and provide extra fiber in your diet. Some examples of whole-grain foods are whole-wheat breads and pastas, oatmeal, brown rice, and bulgur. · Eat a variety of vegetables every day. Include dark, leafy greens such as spinach, kale, david greens, and mustard greens. Eat yellow and orange vegetables such as carrots, sweet potatoes, and winter squash. · Eat a variety of fruits every day. Choose fresh or canned fruit (canned in its own juice or light syrup) instead of juice. Fruit juice has very little or no fiber. · Eat low-fat dairy foods. Drink fat-free (skim) milk or 1% milk. Eat fat-free yogurt and low-fat cottage cheese. Try low-fat cheeses such as mozzarella and other reduced-fat cheeses. · Choose meat and other protein foods that are low in fat. Choose beans or other legumes such as split peas or lentils. Choose fish, skinless poultry (chicken or turkey), or lean cuts of red meat (beef or pork). Before you cook meat or poultry, cut off any visible fat. · Use less fat and oil. Try baking foods instead of frying them. Add less fat, such as margarine, sour cream, regular salad dressing and mayonnaise to foods. Eat fewer high-fat foods. Some examples of high-fat foods include french fries, doughnuts, ice cream, and cakes. · Eat fewer sweets. Limit foods and drinks that are high in sugar.  This includes candy, cookies, regular soda, and sweetened drinks. Exercise:  Exercise at least 30 minutes per day on most days of the week. Some examples of exercise include walking, biking, dancing, and swimming. You can also fit in more physical activity by taking the stairs instead of the elevator or parking farther away from stores. Ask your healthcare provider about the best exercise plan for you. © Copyright [employer] 2018 Information is for End User's use only and may not be sold, redistributed or otherwise used for commercial purposes.  All illustrations and images included in CareNotes® are the copyrighted property of A.D.A.M., Inc. or  [address]

## 2023-08-23 NOTE — ASSESSMENT & PLAN NOTE
Continue inhalers. Add mucinex for extra mucus with just having quit smoking. Referred for pulmonary rehab.

## 2023-08-25 ENCOUNTER — PATIENT OUTREACH (OUTPATIENT)
Dept: FAMILY MEDICINE CLINIC | Facility: CLINIC | Age: 73
End: 2023-08-25

## 2023-08-25 ENCOUNTER — SOCIAL WORK (OUTPATIENT)
Dept: BEHAVIORAL/MENTAL HEALTH CLINIC | Facility: CLINIC | Age: 73
End: 2023-08-25
Payer: MEDICARE

## 2023-08-25 DIAGNOSIS — Z62.811 PERSONAL HISTORY OF PSYCHOLOGICAL ABUSE IN CHILDHOOD: ICD-10-CM

## 2023-08-25 DIAGNOSIS — Z91.51 HISTORY OF SUICIDE ATTEMPT: ICD-10-CM

## 2023-08-25 DIAGNOSIS — F33.1 MODERATE EPISODE OF RECURRENT MAJOR DEPRESSIVE DISORDER (HCC): Primary | ICD-10-CM

## 2023-08-25 PROCEDURE — 90837 PSYTX W PT 60 MINUTES: CPT | Performed by: SOCIAL WORKER

## 2023-08-25 NOTE — PROGRESS NOTES
STEFF KHAN received [facility] for financial and transportation concerns. STEFF KHAN reviewed patient's chart. She had a recent suicide attempt and had a hospital stay following such. She is currently trying to obtain more benefits through the [place], as she is interested in Assisted Living opportunities. STEFF KHAN reached out to Kelly per request and left a voicemail.     Plan to f/u within 1 week
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
## 2023-08-29 ENCOUNTER — PATIENT OUTREACH (OUTPATIENT)
Dept: FAMILY MEDICINE CLINIC | Facility: CLINIC | Age: 73
End: 2023-08-29

## 2023-08-29 DIAGNOSIS — Z74.8 ASSISTANCE NEEDED WITH TRANSPORTATION: Primary | ICD-10-CM

## 2023-08-29 NOTE — PROGRESS NOTES
STEFF KHAN attempted second outreach to daughter regarding concerns with transportation and care at home. Per daughter, patient is a war time  and does received some benefits from the [place]. Daughter is currently trying to find out what other benefits her mother has. STEFF KHAN encouraged her to reach out to the [place] to inquire if she has a benefit for [facility]. Patient cares for her son that lives with her. He is disabled as a result of morbid obesity. Daughter has tried to have her mom move in with her, but patient does not want to leave her son. She could benefit from transportation resources as well. STEFF KHAN e-mailed daughter a list of private aides, as well as a link for [employer] per request.      STEFF KHAN encouraged her to look in to see if she would qualify for MLTSS if approved for Medicaid as well. Referral placed for [facility] assistance with transportation.     Plan to f/u within a few weeks

## 2023-08-31 ENCOUNTER — PATIENT OUTREACH (OUTPATIENT)
Dept: FAMILY MEDICINE CLINIC | Facility: CLINIC | Age: 73
End: 2023-08-31

## 2023-08-31 NOTE — PROGRESS NOTES
Chart Review and Outgoing Call:  08/31/2023    [facility] received patient referral from Biju Vazquez. [facility] completed a chart review prior to calling patient. Per chart review, patient is in need of transportation services. [facility] called patient, left voice message explaining the purpose of my call. [facility] requested a return call. If no return call is received [facility] will outreach patient in one weeks time. Next scheduled outreach is 09/08/2023.

## 2023-09-01 ENCOUNTER — HOSPITAL ENCOUNTER (OUTPATIENT)
Dept: RADIOLOGY | Facility: HOSPITAL | Age: 73
Discharge: HOME | End: 2023-09-01
Attending: INTERNAL MEDICINE
Payer: MEDICARE

## 2023-09-01 DIAGNOSIS — Z72.0 TOBACCO USE: ICD-10-CM

## 2023-09-01 DIAGNOSIS — F17.211 NICOTINE DEPENDENCE, CIGARETTES, IN REMISSION: ICD-10-CM

## 2023-09-01 PROCEDURE — 71271 CT THORAX LUNG CANCER SCR C-: CPT

## 2023-09-03 DIAGNOSIS — F41.9 ANXIETY AND DEPRESSION: ICD-10-CM

## 2023-09-03 DIAGNOSIS — F32.A ANXIETY AND DEPRESSION: ICD-10-CM

## 2023-09-06 ENCOUNTER — TELEPHONE (OUTPATIENT)
Dept: FAMILY MEDICINE CLINIC | Facility: CLINIC | Age: 73
End: 2023-09-06

## 2023-09-06 ENCOUNTER — CLINICAL SUPPORT (OUTPATIENT)
Dept: FAMILY MEDICINE CLINIC | Facility: CLINIC | Age: 73
End: 2023-09-06
Payer: MEDICARE

## 2023-09-06 DIAGNOSIS — K21.9 ESOPHAGEAL REFLUX: ICD-10-CM

## 2023-09-06 DIAGNOSIS — E53.8 VITAMIN B 12 DEFICIENCY: Primary | ICD-10-CM

## 2023-09-06 PROCEDURE — 96372 THER/PROPH/DIAG INJ SC/IM: CPT

## 2023-09-06 RX ORDER — BUPROPION HYDROCHLORIDE 300 MG/1
300 TABLET ORAL EVERY MORNING
Qty: 90 TABLET | Refills: 1 | Status: SHIPPED | OUTPATIENT
Start: 2023-09-06

## 2023-09-06 RX ORDER — CYANOCOBALAMIN 1000 UG/ML
1000 INJECTION, SOLUTION INTRAMUSCULAR; SUBCUTANEOUS
Status: SHIPPED | OUTPATIENT
Start: 2023-09-06

## 2023-09-06 RX ADMIN — CYANOCOBALAMIN 1000 MCG: 1000 INJECTION, SOLUTION INTRAMUSCULAR; SUBCUTANEOUS at 10:20

## 2023-09-06 NOTE — TELEPHONE ENCOUNTER
Pt dropped off letter she wanted Dr. Kamilah Edmonds to have, in nurse pending one.  Is from the [facility]

## 2023-09-07 ENCOUNTER — PATIENT OUTREACH (OUTPATIENT)
Dept: FAMILY MEDICINE CLINIC | Facility: CLINIC | Age: 73
End: 2023-09-07

## 2023-09-07 RX ORDER — DIPHENHYDRAMINE HYDROCHLORIDE AND LIDOCAINE HYDROCHLORIDE AND ALUMINUM HYDROXIDE AND MAGNESIUM HYDRO
10 KIT EVERY 8 HOURS PRN
Qty: 120 ML | Refills: 0 | Status: SHIPPED | OUTPATIENT
Start: 2023-09-07

## 2023-09-07 NOTE — TELEPHONE ENCOUNTER
It was a letter stating [facility] could not fill her prescription and we should send it to a local pharmacy.   I escribed this to [facility].

## 2023-09-07 NOTE — PROGRESS NOTES
Outgoing Call:  09/07/2023    [facility] called patient for scheduled outreach. Patient is in need of transportation services. [facility] called patient, left voice message explaining the purpose of my call. [facility] requested a return call. If no return call is received [facility] will outreach patient in one weeks time. Next scheduled outreach is 09/14/2023.

## 2023-09-08 NOTE — TELEPHONE ENCOUNTER
Left voice message on patient's phone that the Rx was sent to [facility].  No further action.

## 2023-09-12 ENCOUNTER — SOCIAL WORK (OUTPATIENT)
Dept: BEHAVIORAL/MENTAL HEALTH CLINIC | Facility: CLINIC | Age: 73
End: 2023-09-12
Payer: MEDICARE

## 2023-09-12 DIAGNOSIS — F33.1 MODERATE EPISODE OF RECURRENT MAJOR DEPRESSIVE DISORDER (HCC): Primary | ICD-10-CM

## 2023-09-12 DIAGNOSIS — Z62.811 PERSONAL HISTORY OF PSYCHOLOGICAL ABUSE IN CHILDHOOD: ICD-10-CM

## 2023-09-12 DIAGNOSIS — Z91.51 HISTORY OF SUICIDE ATTEMPT: ICD-10-CM

## 2023-09-12 PROCEDURE — 90837 PSYTX W PT 60 MINUTES: CPT | Performed by: SOCIAL WORKER

## 2023-09-12 NOTE — BH TREATMENT PLAN
Outpatient Behavioral Health Psychotherapy Treatment Plan    Renzo Mckeon  1950     Date of Initial Psychotherapy Assessment: 8/11/2023  Date of Current Treatment Plan: 09/12/23  Treatment Plan Target Date: 8/11/2023  Treatment Plan Expiration Date: 8/11/2023    Diagnosis:   No diagnosis found. Area(s) of Need: Moodiness, obssessive thoughts, irritability - 'my sons don't react the way I expect them to, feel disappointed, angry, frustrated can't do it myself and have to rely on others, I'm frequently aggravated with others and myself. Have insomnia, can't get to sleep, leads to being grouchy. Long Term Goal 1 (in the client's own words): 'I need to change a lot of my behavior patterns, be more conscious of what I'm doing, accept my lifestyle now and its limits rather than base my expectations on what I could do back when I was able to do more. I want to feel more at peace and not anxious when I can't get everything I want done. I also like to stay busy, and to feel a sense of accomplishment.'  Examples:  I like to vacuum every day or get antsy. Can't fall asleep until finished tassles for bedspread, had to be done right then. Stage of Change: Action    Target Date for completion:      Anticipated therapeutic modalities: CBT, Person-centered, Supportive, Mindfulness, DBT, Expressive Arts Therapy     People identified to complete this goal: Zulma       Objective 1: (identify the means of measuring success in meeting the objective): Sha Lema will process trauma and beliefs stemming from trauma in sessions to increase awareness of her beliefs, patterns of behaving, thought processes. Objective 2: (identify the means of measuring success in meeting the objective): Sha Lema will learn and practice ways to challenge old thinking patterns and replace with more helpful ones.           Objective 3: (identify the means of measuring success in meeting the objective): Sha Lema will learn and practice emotional distress and mindfulness strategies to manage frustration and increase feelings of relaxation, peace and improve sleep. Accomplishments:  I've kept myself in a more positive attitude. I'm conscious when I get up in the morning that I'm not in a good mood and I look for things that I'm grateful for and I work at trying to get out of my bad mood. Sometimes it works. My [facility]'s psychiatric provider is:  Gabby Knowles MD    I am currently taking psychiatric medications: Yes, as prescribed    I feel that I will be ready for discharge from mental health care when I reach the following (measurable goal/objective): When I feel I have accomplished all I want to for the day on most days and I know when enough is enough. For children and adults who have a legal guardian:   Has there been any change to custody orders and/or guardianship status? NA. If yes, attach updated documentation. I have not yet creative my Crisis Plan and have been offered a copy of this plan    [address] [facility]: Diagnosis and Treatment Plan explained to Matthew acknowledges an understanding of their diagnosis. Harlen Dandy agrees to this treatment plan. I have been offered a copy of this Treatment Plan.  Yes

## 2023-09-12 NOTE — PSYCH
Behavioral Health Psychotherapy Progress Note    Psychotherapy Provided: Individual Psychotherapy     No diagnosis found. Goals addressed in session: Goal 1     DATA: Patient engages in colloborative treatment planning this day. She relates 'always feeling like can't rest, can never stop, has to get things done, 'can't get off the proverbial treadmill'. She relates 'I always have to be at my best, and I get frustrated with myself and others when I am not accomplishing my never ending list. She gave example of working hard to finish a room and needing her son to help her change something in lighting fixture because she physically can't do it anymore and becoming frustrated with him that he wasn't getting to it. She relates concluding that he doesn't care about her needs. She shares rhyme that was engraved into her as a child 'Good, Better, Best, never let it rest until your good is better and your better is best.'  She notes I have to feel I have accomplished something to be ok with myself. Discussed how best will change based on many factors, including physical and mental health, internal, expernal resources, unique capabilities, trauma, etc.  Gently challenges some cognitive distortions, offers alternative explanation. She reports having obsessional thoughts at times, giving example of not being able to not sleep or rest until she could finish some purple tassels for a bedspread. Offers different definition of being one's best that is less tied to achievement and more tied to mindful living, self acceptance, being fully present in the moment and connected to life and herself. She relates seeing that her thoughts are irrational at times and not being feeling able to control them. She relates her obsessive thinking is negatively impacting her sleep and she wakes up grouchy and it effects her mood all day. She feels an urgency that she is running out of time in her life.       During this session, this clinician used the following therapeutic modalities: Client-Centered, Supportive, CBT, Mindfulness, Existential Therapy. Substance Abuse was not addressed during this session. If the client is diagnosed with a co-occurring substance use disorder, please indicate any changes in the frequency or amount of use: NA. Stage of change for addressing substance use diagnoses: No substance use/Not applicable    ASSESSMENT:  Yoanna Grimes presents with an anxious  mood. her affect is Normal range and intensity, which is congruent, with her mood and the content of the session. The client has made progress on their goals. Yoanna Grimes presents with a low risk of suicide, low risk of self-harm, and low risk of harm to others. For any risk assessment that surpasses a "low" rating, a safety plan must be developed. A safety plan was indicated: NO  If yes, describe in detail NA    PLAN: Between sessions, Yoanna Grimes will focus on creating some morning and evening self reflective routines that keep her conscious of  'being' before 'doing' She will use mantra, affirmations and focus on living her life by her values, principles of what is important to her at this time in her life. She will work to identify faulty thinking, assumptions and remind herself she has the power to choose where she focuses and uses her time. At the next session, the therapist will use mindfulness and CBT to address linking thoughts, feelings, behavior patterns. Behavioral Health Treatment Plan and Discharge Planning: Yoanna Grimes is aware of and agrees to continue to work on their treatment plan. They have identified and are working toward their discharge goals.  yes    Visit start and stop times:    09/12/23

## 2023-09-13 ENCOUNTER — HOSPITAL ENCOUNTER (OUTPATIENT)
Dept: RADIOLOGY | Facility: HOSPITAL | Age: 73
Discharge: HOME | End: 2023-09-13
Attending: INTERNAL MEDICINE
Payer: MEDICARE

## 2023-09-13 DIAGNOSIS — H53.9 VISUAL CHANGES: ICD-10-CM

## 2023-09-13 DIAGNOSIS — R41.89 COGNITIVE IMPAIRMENT: ICD-10-CM

## 2023-09-13 DIAGNOSIS — R51.9 NONINTRACTABLE EPISODIC HEADACHE, UNSPECIFIED HEADACHE TYPE: ICD-10-CM

## 2023-09-13 PROCEDURE — 70553 MRI BRAIN STEM W/O & W/DYE: CPT

## 2023-09-13 PROCEDURE — A9585 GADOBUTROL INJECTION: HCPCS | Performed by: INTERNAL MEDICINE

## 2023-09-13 PROCEDURE — G1004 CDSM NDSC: HCPCS

## 2023-09-13 RX ORDER — GADOBUTROL 604.72 MG/ML
7 INJECTION INTRAVENOUS
Status: COMPLETED | OUTPATIENT
Start: 2023-09-13 | End: 2023-09-13

## 2023-09-13 RX ADMIN — GADOBUTROL 7 ML: 604.72 INJECTION INTRAVENOUS at 11:16

## 2023-09-15 ENCOUNTER — PATIENT OUTREACH (OUTPATIENT)
Dept: FAMILY MEDICINE CLINIC | Facility: CLINIC | Age: 73
End: 2023-09-15

## 2023-09-15 NOTE — PROGRESS NOTES
Outgoing Call:  09/15/2023    [facility] called patient for scheduled outreach. Patient is in need of transportation. [facility] called patient, left voice message explaining the purpose of my call. [facility] requested a return call. If no return call is received [facility] will outreach patient in one weeks time. Next scheduled outreach is 09/22/2023.

## 2023-09-22 ENCOUNTER — SOCIAL WORK (OUTPATIENT)
Dept: BEHAVIORAL/MENTAL HEALTH CLINIC | Facility: CLINIC | Age: 73
End: 2023-09-22
Payer: MEDICARE

## 2023-09-22 ENCOUNTER — PATIENT OUTREACH (OUTPATIENT)
Dept: FAMILY MEDICINE CLINIC | Facility: CLINIC | Age: 73
End: 2023-09-22

## 2023-09-22 DIAGNOSIS — Z91.51 HISTORY OF SUICIDE ATTEMPT: ICD-10-CM

## 2023-09-22 DIAGNOSIS — F51.01 PRIMARY INSOMNIA: ICD-10-CM

## 2023-09-22 DIAGNOSIS — F33.1 MODERATE EPISODE OF RECURRENT MAJOR DEPRESSIVE DISORDER (HCC): Primary | ICD-10-CM

## 2023-09-22 DIAGNOSIS — Z62.811 PERSONAL HISTORY OF PSYCHOLOGICAL ABUSE IN CHILDHOOD: ICD-10-CM

## 2023-09-22 PROCEDURE — 90837 PSYTX W PT 60 MINUTES: CPT | Performed by: SOCIAL WORKER

## 2023-09-22 NOTE — PROGRESS NOTES
Outgoing Call:  09/22/2023    [facility] called patient for scheduled outreach. [facility] is trying to sign patient up with [facility]. Patient is in need of transportation. [facility] called patient, left voice message explaining the purpose of my call. [facility] requested a return call. This was [facility]'s fourth attempt in outreaching patient. [facility] will wait for a return call into the next week. However, if no return call from patient, [facility] will send an UTR letter be the end of next week. Next outreach is 09/29/2023.

## 2023-09-22 NOTE — PSYCH
Behavioral Health Psychotherapy Progress Note    Psychotherapy Provided: Individual Psychotherapy     No diagnosis found. Goals addressed in session: Goal 1     DATA: Quinten Dorsey has sleep apnea test tonight, is still struggling with insomnia. She is making progress in reducing anxiety and depression through being consciously aware of her priorities, values. She denies any SI, HI, self harm and feels she is making progress. She appears less urgent about time and reports she is enjoying her life more in the moment. She is enjoying making twice weekly meals with son (Roro Wagner), is enjoying coloring and 'being' more than doing, bought recommended book (The 4 Agreements) on audiotape and has hired a maid 1x weekly that her sons pay for. She is learning to savor the good and minimize the negative and seeing she has choice in what she chooses to focus on and latch on to. She notices her worry parts still come up some of the time, most recently worrying about her son and granddaughter's upcoming birthdays in October. She relates she is the kind of person who normally would have all their [employer] shopping done by now. She talks about importance to her of giving thoughtful, meaningful gifts to her loved ones. Discusses purpose of anxiety to prepare and how her self manager can notice her anxious thoughts and feelings and make choices as to how to lower the volume and set up a system to live by and honor her values. Brainstormed ways to do so. During this session, this clinician used the following therapeutic modalities: Client-centered Therapy, Cognitive Behavioral Therapy and Mindfulness-based Strategies, IFS    Substance Abuse was not addressed during this session.  If the client is diagnosed with a co-occurring substance use disorder, please indicate any changes in the frequency or amount of use: NA. Stage of change for addressing substance use diagnoses: No substance use/Not applicable    ASSESSMENT:  Surya Hooks presents with a dysthymic mood. her affect is Normal range and intensity, which is congruent, with her mood and the content of the session. The client has made progress on their goals. Vanessa Wild presents with a low risk of suicide, low risk of self-harm, and low risk of harm to others. For any risk assessment that surpasses a "low" rating, a safety plan must be developed. A safety plan was indicated: No  If yes, describe in detail NA    PLAN: Between sessions, Vanessa Wild will practice mindfulness strategies, when notices her frenzied, worried parts she will practice . At the next session, the therapist will use Client-centered Therapy, Cognitive Behavioral Therapy and Supportive Psychotherapy and Mindfulness strategies to address depressive symptoms and faulty thinking, beliefs. Behavioral Health Treatment Plan and Discharge Planning: Vanessa Wild is aware of and agrees to continue to work on their treatment plan. They have identified and are working toward their discharge goals.  Yes      Visit start and stop times:    09/22/23

## 2023-09-26 ENCOUNTER — PATIENT OUTREACH (OUTPATIENT)
Dept: FAMILY MEDICINE CLINIC | Facility: CLINIC | Age: 73
End: 2023-09-26

## 2023-09-26 NOTE — PROGRESS NOTES
Outgoing Mail:  09/26/2023    [facility] sent patient an UTR letter through [address] [place] [address]. [facility] has attempted to outreach patient four times with no contact from patient. [facility] will close referral from work queue. There is no [facility] episode to close due to [facility] not opening one. [facility] will give patient one more week to callback. If there is no return call from patient, [facility] will take name off care team at this time. Next scheduled outreach is 10/03/2023.

## 2023-09-26 NOTE — LETTER
09/26/23    Dear Evan Shoemaker,    I am a [facility] with [facility]. I have made several attempts to call you by phone. It is important that you contact me back at [phone], so that I can assist with you with your transportation needs.      Sincerely,         Alyssa Christensen yes

## 2023-10-03 PROBLEM — J01.10 SUBACUTE FRONTAL SINUSITIS: Status: RESOLVED | Noted: 2023-08-04 | Resolved: 2023-10-03

## 2023-10-04 ENCOUNTER — HOSPITAL ENCOUNTER (OUTPATIENT)
Dept: RADIOLOGY | Facility: HOSPITAL | Age: 73
Discharge: HOME | End: 2023-10-04
Attending: INTERNAL MEDICINE
Payer: MEDICARE

## 2023-10-04 ENCOUNTER — TELEPHONE (OUTPATIENT)
Dept: FAMILY MEDICINE CLINIC | Facility: CLINIC | Age: 73
End: 2023-10-04

## 2023-10-04 DIAGNOSIS — E55.9 VITAMIN D DEFICIENCY: ICD-10-CM

## 2023-10-04 DIAGNOSIS — Z78.0 ASYMPTOMATIC MENOPAUSAL STATE: ICD-10-CM

## 2023-10-04 PROCEDURE — 77080 DXA BONE DENSITY AXIAL: CPT

## 2023-10-06 ENCOUNTER — CLINICAL SUPPORT (OUTPATIENT)
Dept: FAMILY MEDICINE CLINIC | Facility: CLINIC | Age: 73
End: 2023-10-06
Payer: MEDICARE

## 2023-10-06 ENCOUNTER — SOCIAL WORK (OUTPATIENT)
Dept: BEHAVIORAL/MENTAL HEALTH CLINIC | Facility: CLINIC | Age: 73
End: 2023-10-06
Payer: MEDICARE

## 2023-10-06 DIAGNOSIS — Z62.811 PERSONAL HISTORY OF PSYCHOLOGICAL ABUSE IN CHILDHOOD: ICD-10-CM

## 2023-10-06 DIAGNOSIS — Z91.51 HISTORY OF SUICIDE ATTEMPT: ICD-10-CM

## 2023-10-06 DIAGNOSIS — F33.1 MODERATE EPISODE OF RECURRENT MAJOR DEPRESSIVE DISORDER (HCC): Primary | ICD-10-CM

## 2023-10-06 DIAGNOSIS — E53.8 B12 DEFICIENCY: Primary | ICD-10-CM

## 2023-10-06 PROCEDURE — 96372 THER/PROPH/DIAG INJ SC/IM: CPT

## 2023-10-06 PROCEDURE — 90837 PSYTX W PT 60 MINUTES: CPT | Performed by: SOCIAL WORKER

## 2023-10-06 RX ORDER — CYANOCOBALAMIN 1000 UG/ML
1000 INJECTION, SOLUTION INTRAMUSCULAR; SUBCUTANEOUS
Status: SHIPPED | OUTPATIENT
Start: 2023-10-06

## 2023-10-06 RX ADMIN — CYANOCOBALAMIN 1000 MCG: 1000 INJECTION, SOLUTION INTRAMUSCULAR; SUBCUTANEOUS at 10:35

## 2023-10-06 NOTE — PSYCH
Behavioral Health Psychotherapy Progress Note    Psychotherapy Provided: Individual Psychotherapy     No diagnosis found. Goals addressed in session: Goal 1     DATA:  Quinten Dorsey is noticing some improvement in her mood, reduction in obssessive thoughts and frustration levels while also relating it is 'a lot work'. She is practicing living more mindfully, intentionally and stopping when she is feeling upset or anxious and asking if she is seeing clearly, asking about other perspectives. She feels she is much more aware of how automatic her reactions were in the past and how much expectations play in to her feelings of frustration, anger. She relates as she becomes more aware of her own reactions and what's driving them, she is seeing how her sons  are often unconscious of their own habits, routines and how they impact her. 'I realize they expect me to  the pieces whether it's the house, the conversation, etc.'  She relates wanting to raise their awareness. She has found out she will be a great grandmother next May. Her granddaughter (28) who lives in [place] will give birth in May. She processes her thoughts/feelings about her granddaughter and her relationship and and shares her insights and feelings about some of [facility] family members who she describes as self centered and scrooge-like. We discussed writer's planned departure from agency, reason for and client shared some wisdom on what helped her through her 's cancer. Positively reinforced her strengths, capabilities, wisdom. Quinten Dorsey is wanting to continue in therapy and finds therapy helps her hold herself accountable. She worries sometimes that she will become mindless, 'blind' again and wants to remain aware. Discusses benefits of developing mindfulness practices.     During this session, this clinician used the following therapeutic modalities: Client-centered Therapy, Cognitive Behavioral Therapy and Mindfulness-based Strategies, Strength-Based Strategies. Substance Abuse was not addressed during this session. If the client is diagnosed with a co-occurring substance use disorder, please indicate any changes in the frequency or amount of use: NA. Stage of change for addressing substance use diagnoses: NA    ASSESSMENT:  Petty Mcgovern presents with a euthymic mood. Her affect is normal range and intensity, which is congruent, with her mood and the content of the session. The client has made progress on their goals. Petty Mcgovern presents with a minimal risk of suicide, minimal risk of self-harm, and minimal risk of harm to others. For any risk assessment that surpasses a "low" rating, a safety plan must be developed. A safety plan was indicated: No  If yes, describe in detail: NA    PLAN: Between sessions, Petty Mcgovern will practice stopping and thinking, reflecting on whether she is in wise mind, taking right action before taking action. At the next session, the therapist will use Mindfulness-based Strategies to address anxiety, frustration. Behavioral Health Treatment Plan and Discharge Planning: Petty Mcgovern is aware of and agrees to continue to work on their treatment plan. They have identified and are working toward their discharge goals.  Yes    Visit start and stop times:    10/06/23

## 2023-10-18 ENCOUNTER — PATIENT OUTREACH (OUTPATIENT)
Dept: FAMILY MEDICINE CLINIC | Facility: CLINIC | Age: 73
End: 2023-10-18

## 2023-10-18 NOTE — PROGRESS NOTES
OPCM SW reviewed patient's chart. No return call had been received by [facility]W. O[facility] SW removed self from care team and closed episode. UTR letter has been sent.

## 2023-10-20 ENCOUNTER — SOCIAL WORK (OUTPATIENT)
Dept: BEHAVIORAL/MENTAL HEALTH CLINIC | Facility: CLINIC | Age: 73
End: 2023-10-20

## 2023-10-20 DIAGNOSIS — F41.1 GENERALIZED ANXIETY DISORDER: ICD-10-CM

## 2023-10-20 DIAGNOSIS — F33.41 RECURRENT MAJOR DEPRESSIVE DISORDER, IN PARTIAL REMISSION (HCC): Primary | ICD-10-CM

## 2023-10-20 NOTE — PSYCH
Behavioral Health Psychotherapy Progress Note    Psychotherapy Provided: Individual Psychotherapy     No diagnosis found. Goals addressed in session: Goal 1     DATA: [facility] is a 74year old female with a history of depression and anxiety. History of suicide attempt July 2023. She relates her mindset is night and day different and better than it was in July this past year and she is focused enjoying more of the small pleasures, spending time more wisely rather than in frustration or worry. She relates she is almost finished her first coloring book which helps her mind unwind. She is upset to see her granddaughter who lives in [place] struggling so hard to get out of student debt and wishes she could live out of survival mode relating she sees how much she spent her life in that mode. She relates while she worries about financial security a lot for her and the younger generation. She recently learned her home has increased a lot in value and takes comfort knowing that if she had to go into an assisted living home she might be able to do so. She is upset this day about the state of the world's conflicts and economics and shared memories of what it was like to lose her  in [place] and [facility] and ways she managed to get by financially. She found herself spending 3.5 hours in the drug store the other day reading the boxes of infant diapers. Her granddaughter is expecting her first baby and she became enthralled reading about all the diseases the new born could catch. She relates her granddaughter is coming next week and she is excited they'll get to spend quality time together. Processes her thoughts/feelings about transfer, identified gains in therapy, pointed out strengths and capabilities. During this session, this clinician used the following therapeutic modalities: Client-centered Therapy, Mindfulness-based Strategies, and Supportive Psychotherapy    Substance Abuse was not addressed during this session.  If the client is diagnosed with a co-occurring substance use disorder, please indicate any changes in the frequency or amount of use: NA Stage of change for addressing substance use diagnoses: No substance use/Not applicable    ASSESSMENT:  Tracy Isaac presents with a euthymic and anxious mood. her affect is Normal range and intensity, which is congruent, with her mood and the content of the session. The client has made progress on their goals. Zofia Gramajo is oriented x4, thought processes logical, memory in tact, speech coherent, good insight and fair judgment. Tracy Isaac presents with a low risk of suicide, low risk of self-harm, and minimal risk of harm to others. For any risk assessment that surpasses a "low" rating, a safety plan must be developed. A safety plan was indicated: No   If yes, describe in detail: NA    PLAN: Between sessions, Tracy Isaac will continue to practice mindfulness and safe coping strategies. Behavioral Health Treatment Plan and Discharge Planning: Tracy Isaac is aware of and agrees to continue to work on their treatment plan. They have identified and are working toward their discharge goals. yes    Visit start and stop times:    10/20/23       . Luisa Fraser [address] [place] [address]    Patient Name Tracy Isaac     Date of Birth: 74 y.o. 1950      MRN: [id number]    Admission Date:  8/11/2023    Date of Transfer: October 20, 2023    Admission Diagnosis:     Major Depressive Disorder, Recurrent, Moderate    Current Diagnosis:     Major Depressive Disorder, Recurrent, In partial remission  Generalized Anxiety Disorder    Reason for Admission: Zofia Gramajo presented for treatment due to depressive symptoms, anxiety symptoms, and suicide attempt. Primary complaints included feelings of guilt, overwhelm, overthinking, agitation, hopelessness. Progress in Treatment: Zofia Gramajo was seen for Medication Management with Psychotherapy.  During the course of treatment she improved mood stability, learned and practiced mindfulness and relaxation strategies. Episodes of Higher Level of Care: Yes, one inpatient hospital admission      Transfer request Initiated by: Psychiatrist: HETAL  Therapist: Ami Kendrick    Reason for Transfer Request: clinician leaving practice    Does this individual need a clinician with specialized training/expertise?: No    [facility]  Dr. Breezy Grijalva  None Therapist: Ami Kendrick    Other pertinent issues: Anxiety Disorder    Are there any specific individuals who would be a “best fit” or who have already agreed to accept this transfer request?      Psychiatrist: Barbara   Therapist:  Shankar Gaines  Rationale: Not Applicable    Attempts to maintain the current therapeutic relationship: No    Transfer request routed to Clinical Supervisor for input and/or approval.     Comments from other involved providers and/or clinical coordinator : None    Ami Kendrick, [facility]23

## 2023-10-30 DIAGNOSIS — I35.0 MILD AORTIC STENOSIS: ICD-10-CM

## 2023-10-30 DIAGNOSIS — I05.0 MILD MITRAL STENOSIS: ICD-10-CM

## 2023-10-30 DIAGNOSIS — R06.09 EXERTIONAL DYSPNEA: ICD-10-CM

## 2023-10-30 RX ORDER — NEBIVOLOL 5 MG/1
5 TABLET ORAL EVERY MORNING
Qty: 30 TABLET | Refills: 1 | Status: SHIPPED | OUTPATIENT
Start: 2023-10-30

## 2023-11-03 ENCOUNTER — TELEPHONE (OUTPATIENT)
Dept: FAMILY MEDICINE CLINIC | Facility: CLINIC | Age: 73
End: 2023-11-03

## 2023-11-06 ENCOUNTER — SOCIAL WORK (OUTPATIENT)
Dept: BEHAVIORAL/MENTAL HEALTH CLINIC | Facility: CLINIC | Age: 73
End: 2023-11-06
Payer: MEDICARE

## 2023-11-06 DIAGNOSIS — F33.41 RECURRENT MAJOR DEPRESSIVE DISORDER, IN PARTIAL REMISSION (HCC): Primary | ICD-10-CM

## 2023-11-06 DIAGNOSIS — F41.1 GENERALIZED ANXIETY DISORDER: ICD-10-CM

## 2023-11-06 PROCEDURE — 90834 PSYTX W PT 45 MINUTES: CPT | Performed by: SOCIAL WORKER

## 2023-11-06 NOTE — PSYCH
Behavioral Health Psychotherapy Progress Note    Psychotherapy Provided: Individual Psychotherapy     1. Recurrent major depressive disorder, in partial remission ([address])        2. Generalized anxiety disorder            Goals addressed in session: Goal 1     DATA: This was Zulma's first session with this writer since last meeting with her previous therapist, Johnson Brown. She reported feeling open to this new relationship. She reported feeling well stating that she has been mindful of her mental health and making conscious efforts to take better care of herself. She colors daily and feels calmer when doing this. She shared her background and was able to relate childhood events to adult patterns and cognitions (putting others before herself, and reinforcing defective schema). We acknowledged the impact of her typically feeling hypervigilant and how developing slow down tools can better assist her in decision making and relationships. She was receptive to suggestions, such as adopting "I'm going to have to get back to you" before making a commitment to others. During this session, this clinician used the following therapeutic modalities: Engagement Strategies, Cognitive Behavioral Therapy, Cognitive Processing Therapy, and Supportive Psychotherapy    Substance Abuse was not addressed during this session. If the client is diagnosed with a co-occurring substance use disorder, please indicate any changes in the frequency or amount of use: N/A. Stage of change for addressing substance use diagnoses: No substance use/Not applicable    ASSESSMENT:  Burak Han presents with a Euthymic/ normal mood. her affect is Normal range and intensity, which is congruent, with her mood and the content of the session. The client has made progress on their goals. During this session the client was oriented to person, place, and time. The client was engaged in the session.  The client was able to sustain direct eye contact and was without psychomotor agitation. The client's thought processes were linear and clear. Yoanna Grimes presents with a none risk of suicide, none risk of self-harm, and none risk of harm to others. For any risk assessment that surpasses a "low" rating, a safety plan must be developed. A safety plan was indicated: no  If yes, describe in detail N/A    PLAN: Between sessions, Yoanna Grimes will practice self care and CBT skills as needed. At the next session, the therapist will use Cognitive Behavioral Therapy, Cognitive Processing Therapy, and Supportive Psychotherapy to address anxiety within relationships. Behavioral Health Treatment Plan and Discharge Planning: Yoanna Grimes is aware of and agrees to continue to work on their treatment plan. They have identified and are working toward their discharge goals.  yes    Visit start and stop times:    11/06/23  Start Time: 1030  Stop Time: 1120  Total Visit Time: 50 minutes

## 2023-11-08 ENCOUNTER — CLINICAL SUPPORT (OUTPATIENT)
Dept: FAMILY MEDICINE CLINIC | Facility: CLINIC | Age: 73
End: 2023-11-08
Payer: MEDICARE

## 2023-11-08 DIAGNOSIS — E53.8 B12 DEFICIENCY: Primary | ICD-10-CM

## 2023-11-08 PROCEDURE — 96372 THER/PROPH/DIAG INJ SC/IM: CPT

## 2023-11-08 RX ORDER — CYANOCOBALAMIN 1000 UG/ML
1000 INJECTION, SOLUTION INTRAMUSCULAR; SUBCUTANEOUS
Status: SHIPPED | OUTPATIENT
Start: 2023-11-08

## 2023-11-08 RX ADMIN — CYANOCOBALAMIN 1000 MCG: 1000 INJECTION, SOLUTION INTRAMUSCULAR; SUBCUTANEOUS at 09:14

## 2023-11-15 ENCOUNTER — RA CDI HCC (OUTPATIENT)
Dept: OTHER | Facility: HOSPITAL | Age: 73
End: 2023-11-15

## 2023-11-20 ENCOUNTER — SOCIAL WORK (OUTPATIENT)
Dept: BEHAVIORAL/MENTAL HEALTH CLINIC | Facility: CLINIC | Age: 73
End: 2023-11-20
Payer: MEDICARE

## 2023-11-20 DIAGNOSIS — F33.1 MODERATE EPISODE OF RECURRENT MAJOR DEPRESSIVE DISORDER (HCC): Primary | ICD-10-CM

## 2023-11-20 PROCEDURE — 90834 PSYTX W PT 45 MINUTES: CPT | Performed by: SOCIAL WORKER

## 2023-11-20 NOTE — PSYCH
Behavioral Health Psychotherapy Progress Note    Psychotherapy Provided: Individual Psychotherapy     1. Moderate episode of recurrent major depressive disorder ([address])            Goals addressed in session: Goal 1     DATA: Carlos Purvis presented to this appointment anxious stating that she has been struggling with paperwork and finances. We sifted through some of it together and writer offered organizational tips (keeping one journal for note taking, using her phone's camera to save additional notes), which she was receptive to. She expressed concern with being "too harsh" on her one son who she has asked to move out, but writer reassured her that she is only responsible for her life. Writer will refer her for medication management with Jessie Canseco PA-C, seeing as how she is having chronic sleep issues. She is aware that this is impacting her mood and ability to think clearly. We also talked about transportation services that this office can provide seeing as how she is concerned about her memory, orientation, and thus ability to drive. During this session, this clinician used the following therapeutic modalities: Cognitive Processing Therapy, Solution-Focused Therapy, and Supportive Psychotherapy    Substance Abuse was not addressed during this session. If the client is diagnosed with a co-occurring substance use disorder, please indicate any changes in the frequency or amount of use: N/A. Stage of change for addressing substance use diagnoses: No substance use/Not applicable    ASSESSMENT:  Zee Qiu presents with a Anxious mood. her affect is Normal range and intensity, which is congruent, with her mood and the content of the session. The client has made progress on their goals. During this session the client was oriented to person, place, and time. The client was engaged in the session. The client was able to sustain direct eye contact and was without psychomotor agitation.  The client's thought processes were linear and clear. Lyndsay Agrawal presents with a none risk of suicide, none risk of self-harm, and none risk of harm to others. For any risk assessment that surpasses a "low" rating, a safety plan must be developed. A safety plan was indicated: no  If yes, describe in detail N/A    PLAN: Between sessions, Lyndsay Agrawal will take medication as prescribed and practice positive coping strategies as needed. At the next session, the therapist will use Cognitive Processing Therapy, Solution-Focused Therapy, and Supportive Psychotherapy to address acute stressors. Behavioral Health Treatment Plan and Discharge Planning: Lyndsay Agrawal is aware of and agrees to continue to work on their treatment plan. They have identified and are working toward their discharge goals.  yes    Visit start and stop times:    11/20/23  Start Time: 1030  Stop Time: 1120  Total Visit Time: 50 minutes

## 2023-11-21 ENCOUNTER — TELEPHONE (OUTPATIENT)
Dept: PSYCHIATRY | Facility: CLINIC | Age: 73
End: 2023-11-21

## 2023-11-22 ENCOUNTER — OFFICE VISIT (OUTPATIENT)
Dept: FAMILY MEDICINE CLINIC | Facility: CLINIC | Age: 73
End: 2023-11-22
Payer: MEDICARE

## 2023-11-22 VITALS
TEMPERATURE: 98.6 F | SYSTOLIC BLOOD PRESSURE: 138 MMHG | HEART RATE: 66 BPM | HEIGHT: 60 IN | DIASTOLIC BLOOD PRESSURE: 66 MMHG | WEIGHT: 150.2 LBS | BODY MASS INDEX: 29.49 KG/M2 | RESPIRATION RATE: 18 BRPM

## 2023-11-22 DIAGNOSIS — I10 ESSENTIAL HYPERTENSION: Primary | ICD-10-CM

## 2023-11-22 DIAGNOSIS — Z23 ENCOUNTER FOR IMMUNIZATION: ICD-10-CM

## 2023-11-22 DIAGNOSIS — J44.9 COPD WITHOUT EXACERBATION (HCC): ICD-10-CM

## 2023-11-22 DIAGNOSIS — F41.1 GAD (GENERALIZED ANXIETY DISORDER): ICD-10-CM

## 2023-11-22 DIAGNOSIS — F33.1 MODERATE EPISODE OF RECURRENT MAJOR DEPRESSIVE DISORDER (HCC): ICD-10-CM

## 2023-11-22 DIAGNOSIS — R29.6 FREQUENT FALLS: ICD-10-CM

## 2023-11-22 DIAGNOSIS — R41.89 COGNITIVE IMPAIRMENT: ICD-10-CM

## 2023-11-22 DIAGNOSIS — Z72.0 TOBACCO ABUSE: ICD-10-CM

## 2023-11-22 DIAGNOSIS — F41.9 ANXIETY AND DEPRESSION: ICD-10-CM

## 2023-11-22 DIAGNOSIS — F41.1 GENERALIZED ANXIETY DISORDER: ICD-10-CM

## 2023-11-22 DIAGNOSIS — F32.A ANXIETY AND DEPRESSION: ICD-10-CM

## 2023-11-22 PROCEDURE — 99214 OFFICE O/P EST MOD 30 MIN: CPT | Performed by: INTERNAL MEDICINE

## 2023-11-22 PROCEDURE — 90662 IIV NO PRSV INCREASED AG IM: CPT

## 2023-11-22 PROCEDURE — G0008 ADMIN INFLUENZA VIRUS VAC: HCPCS

## 2023-11-22 RX ORDER — BUSPIRONE HYDROCHLORIDE 5 MG/1
5 TABLET ORAL 2 TIMES DAILY
Qty: 180 TABLET | Refills: 0 | Status: ON HOLD | OUTPATIENT
Start: 2023-11-22

## 2023-11-22 RX ORDER — BUPROPION HYDROCHLORIDE 300 MG/1
300 TABLET ORAL EVERY MORNING
Qty: 90 TABLET | Refills: 0 | Status: ON HOLD | OUTPATIENT
Start: 2023-11-22

## 2023-11-22 NOTE — ASSESSMENT & PLAN NOTE
Stable on donepezil. I filled out forms for patient from her [employer] benefits to help her get more help in the home.

## 2023-11-22 NOTE — PROGRESS NOTES
Name: Pipo Velasco      : 1950      MRN: [id number]  Encounter Provider: Bong Hutson MD  Encounter Date: 2023   Encounter department: [facility]     1. Essential hypertension  Assessment & Plan:  Well controlled on current medications and will continue as ordered. 2. Generalized anxiety disorder  Assessment & Plan:  She is inbetween psychiatrists and is requesting refill on her medications. Feels her symptoms are well controlled. Asked patient to call sooner than next scheduled appointment for any complaints or issues. 3. Moderate episode of recurrent major depressive disorder [facility])  Assessment & Plan:  Seeing therapist and psychiatrist and feels this is well controlled. Does not feel she needs to make any changes at this time. Asked patient to call sooner than next scheduled appointment for any complaints or issues. 4. Tobacco abuse  Assessment & Plan:  She quit smoking. Is up to date with CT screening lung exam.      5. Frequent falls  -     Ambulatory Referral to Physical Therapy; Future    6. Encounter for immunization  -     influenza vaccine, high-dose, PF 0.7 mL (FLUZONE HIGH-DOSE)    7. Anxiety and depression  -     buPROPion (WELLBUTRIN XL) 300 mg 24 hr tablet; Take 1 tablet (300 mg total) by mouth every morning    8. DANIELLA (generalized anxiety disorder)  -     busPIRone (BUSPAR) 5 mg tablet; Take 1 tablet (5 mg total) by mouth 2 (two) times a day    9. COPD without exacerbation ([address])  Assessment & Plan:  Well controlled on current inhalers and she will continue. She quit smoking and is doing well without the cigarettes. 10. Cognitive impairment  Assessment & Plan:  Stable on donepezil. I filled out forms for patient from her [employer] benefits to help her get more help in the home. Subjective     Here with her daughter for a follow up visit. She fell in the shower about a week ago. She got unsteady and went down. Feels her L leg is weak due to old fracture. Had bruising but this is resolved. Would be willing to see PT. Has forms from the [place] to try to get more benefits for care in the home and help with altering her bathroom. Denies chest pain or dyspnea. Feels her COPD is well controlled and is compliant with inhalers. Quit smoking right after she was in the hospital.  Feels her mood is well controlled. Is in between psychiatrists and needs short term refills of her anxiety medications. Denies SI/HI. Review of Systems   Constitutional: Negative. Respiratory: Negative. Negative for cough and shortness of breath. Cardiovascular: Negative. Psychiatric/Behavioral: Negative. Negative for dysphoric mood, hallucinations and suicidal ideas. The patient is not nervous/anxious. Past Medical History:   Diagnosis Date   • Acid reflux    • Anxiety    • Arthritis    • Asthma    • Cardiac disease    • Chronic kidney disease     passed on own kidney stone   • Depression    • Diverticulitis    • GERD (gastroesophageal reflux disease)    • Hayfever    • [place] (hard of hearing)     bilateral hearing aids   • Hyperlipemia    • Hypertension    • Panic attack    • Tachycardia      Past Surgical History:   Procedure Laterality Date   • ABLATION OF DYSRHYTHMIC FOCUS     • APPENDECTOMY     • CHOLECYSTECTOMY      open   • FRACTURE SURGERY      ORIF fx (L) tibia, fibula 2009   • GASTRIC BYPASS OPEN     • HYSTERECTOMY     • MT XCAPSL CTRC RMVL INSJ IO LENS PROSTH W/O ECP Left 4/18/2016    Procedure: EXTRACTION EXTRACAPSULAR CATARACT PHACO INTRAOCULAR LENS (IOL); Surgeon: Brandon Peralta MD;  Location: [facility] MAIN OR;  Service: Ophthalmology   • MT XCAPSL CTRC RMVL INSJ IO LENS PROSTH W/O ECP Right 3/1/2021    Procedure: EXTRACTION EXTRACAPSULAR CATARACT PHACO INTRAOCULAR LENS (IOL);   Surgeon: Brandon Peralta MD;  Location: [facility] MAIN OR;  Service: Ophthalmology   • TONSILECTOMY AND ADNOIDECTOMY       Family History   Problem Relation Age of Onset   • Heart disease Mother    • Stroke Son    • Stroke Maternal Grandfather    • Breast cancer Daughter 36     Social History     Socioeconomic History   • Marital status:      Spouse name: None   • Number of children: None   • Years of education: None   • Highest education level: None   Occupational History   • None   Tobacco Use   • Smoking status: Former     Packs/day: 0.25     Years: 25.00     Total pack years: 6.25     Types: Cigarettes     Quit date: 2023     Years since quittin.3     Passive exposure: Past   • Smokeless tobacco: Never   Vaping Use   • Vaping Use: Never used   Substance and Sexual Activity   • Alcohol use: Yes     Comment: rarely   • Drug use: No   • Sexual activity: Never   Other Topics Concern   • None   Social History Narrative   • None     Social Determinants of Health     Financial Resource Strain: High Risk (2023)    Overall Financial Resource Strain (CARDIA)    • Difficulty of Paying Living Expenses: Hard   Food Insecurity: No Food Insecurity (2023)    Hunger Vital Sign    • Worried About Running Out of Food in the Last Year: Never true    • Ran Out of Food in the Last Year: Never true   Transportation Needs: Unmet Transportation Needs (2023)    PRAPARE - Transportation    • Lack of Transportation (Medical):  Yes    • Lack of Transportation (Non-Medical): Yes   Physical Activity: Not on file   Stress: Not on file   Social Connections: Not on file   Intimate Partner Violence: Not on file   Housing Stability: Low Risk  (2023)    Housing Stability Vital Sign    • Unable to Pay for Housing in the Last Year: No    • Number of Places Lived in the Last Year: 1    • Unstable Housing in the Last Year: No     Current Outpatient Medications on File Prior to Visit   Medication Sig   • albuterol (2.5 mg/3 mL) 0.083 % nebulizer solution INHALE CONTENTS OF 1 VIAL (3 ML) IN NEBULIZER BY MOUTH AND INTO THE LUNGS EVERY 6 HOURS IF NEEDED   • albuterol (PROVENTIL HFA,VENTOLIN HFA) 90 mcg/act inhaler Inhale 2 puffs every 6 (six) hours as needed for wheezing   • allopurinol (ZYLOPRIM) 100 mg tablet Take 1 tablet (100 mg total) by mouth daily   • Apple Cider Vinegar 188 MG CAPS Take by mouth daily As needed   • aspirin (ECOTRIN LOW STRENGTH) 81 mg EC tablet Take 81 mg by mouth daily   • cyanocobalamin 1,000 mcg/mL Inject 1 mL (1,000 mcg total) into a muscle every 30 (thirty) days Do not start before September 3, 2023. • Diclofenac Sodium (VOLTAREN) 1 % Apply 2 g topically 4 (four) times a day   • diphenhydramine, lidocaine, Al/Mg hydroxide, simethicone (Magic Mouthwash) SUSP Take 10 mL by mouth every 8 (eight) hours as needed (severe GERD)   • donepezil (ARICEPT) 10 mg tablet Take 1 tablet (10 mg total) by mouth daily at bedtime   • ergocalciferol (VITAMIN D2) 50,000 units Take 1 capsule (50,000 Units total) by mouth once a week for 7 doses Do not start before August 11, 2023. • escitalopram (LEXAPRO) 10 mg tablet Take 1.5 tablets (15 mg total) by mouth daily   • esomeprazole (NexIUM) 20 mg capsule Take 1 capsule (20 mg total) by mouth daily in the early morning   • fluticasone (FLONASE) 50 mcg/act nasal spray 1 spray into each nostril daily   • fluticasone-salmeterol (ADVAIR HFA) 115-21 MCG/ACT inhaler Inhale 1 puff daily   • Multiple Vitamin (MULTIVITAMIN) tablet Take 1 tablet by mouth daily. • nebivolol (BYSTOLIC) 5 mg tablet TAKE 1 TABLET BY MOUTH EVERY DAY IN THE MORNING   • olopatadine (Patanol) 0.1 % ophthalmic solution Apply 1 drop to eye 2 (two) times a day   • sucralfate (CARAFATE) 1 g tablet Take 1 tablet (1 g total) by mouth 4 (four) times a day (before meals and at bedtime)   • traZODone (DESYREL) 150 mg tablet Take 0.5 tablets (75 mg total) by mouth daily at bedtime as needed for sleep   • [DISCONTINUED] buPROPion (WELLBUTRIN XL) 300 mg 24 hr tablet TAKE 1 TABLET (300 MG TOTAL) BY MOUTH EVERY MORNING.    • [DISCONTINUED] busPIRone (BUSPAR) 5 mg tablet TAKE 1 TABLET BY MOUTH TWICE A DAY     Allergies   Allergen Reactions   • Augmentin [Amoxicillin-Pot Clavulanate] GI Intolerance, Other (See Comments) and Hives     VOMITING  VOMITING  Reaction Date: [id number];    • Sulfa Antibiotics Itching, Other (See Comments) and Hives     RASH, ITCHY  RASH, ITCHY   • Morphine Other (See Comments)     "DOESN'T WORK"   • Latex Rash     Unsure if this is an allergy     Immunization History   Administered Date(s) Administered   • COVID-19 PFIZER VACCINE 0.3 ML IM 04/23/2021, 05/14/2021, 12/11/2021   • Influenza Split High Dose Preservative Free IM 10/30/2013, 10/08/2014, 02/18/2016, 11/29/2017   • Influenza, high dose seasonal 0.7 mL 09/20/2018, 10/10/2019, 10/27/2021, 12/21/2022, 11/22/2023   • Influenza, seasonal, injectable 11/20/2007, 10/10/2008   • Pneumococcal Conjugate 13-Valent 02/18/2016   • Pneumococcal Polysaccharide PPV23 07/09/2007, 11/07/2013   • TD (adult) Preservative Free 12/16/2021   • Tdap 07/09/2007   • Zoster 11/07/2013, 10/01/2014       Objective     /66   Pulse 66   Temp 98.6 °F (37 °C)   Resp 18   Ht 5' (1.524 m)   Wt 68.1 kg (150 lb 3.2 oz)   LMP  (LMP Unknown)   BMI 29.33 kg/m²     Physical Exam  Constitutional:       General: She is not in acute distress. Appearance: She is well-developed. She is not diaphoretic. HENT:      Head: Normocephalic and atraumatic. Right Ear: External ear normal.      Left Ear: External ear normal.   Neck:      Thyroid: No thyromegaly. Cardiovascular:      Rate and Rhythm: Normal rate and regular rhythm. Heart sounds: Normal heart sounds. No murmur heard. No friction rub. No gallop. Pulmonary:      Effort: Pulmonary effort is normal.      Breath sounds: Normal breath sounds. Decreased air movement present. No wheezing or rales. Abdominal:      General: Bowel sounds are normal.      Palpations: Abdomen is soft. There is no mass. Tenderness: There is no abdominal tenderness. There is no rebound. Musculoskeletal:         General: No deformity. Normal range of motion. Cervical back: Normal range of motion and neck supple. Lymphadenopathy:      Cervical: No cervical adenopathy. Skin:     General: Skin is warm and dry. Findings: No rash. Neurological:      Mental Status: She is alert and oriented to person, place, and time. Cranial Nerves: No cranial nerve deficit. Motor: No abnormal muscle tone. Coordination: Coordination normal.      Deep Tendon Reflexes: Reflexes are normal and symmetric. Reflexes normal.   Psychiatric:         Mood and Affect: Mood normal.         Speech: Speech normal.         Behavior: Behavior normal.         Thought Content: Thought content normal.         Cognition and Memory: Memory is impaired.          Judgment: Judgment normal.       Flori Steele MD

## 2023-11-22 NOTE — ASSESSMENT & PLAN NOTE
She is inbetween psychiatrists and is requesting refill on her medications. Feels her symptoms are well controlled. Asked patient to call sooner than next scheduled appointment for any complaints or issues.

## 2023-11-22 NOTE — ASSESSMENT & PLAN NOTE
Well controlled on current inhalers and she will continue. She quit smoking and is doing well without the cigarettes.

## 2023-11-22 NOTE — ASSESSMENT & PLAN NOTE
Seeing therapist and psychiatrist and feels this is well controlled. Does not feel she needs to make any changes at this time. Asked patient to call sooner than next scheduled appointment for any complaints or issues.

## 2023-11-25 DIAGNOSIS — K21.9 GASTROESOPHAGEAL REFLUX DISEASE WITHOUT ESOPHAGITIS: ICD-10-CM

## 2023-11-27 ENCOUNTER — HOSPITAL ENCOUNTER (EMERGENCY)
Facility: HOSPITAL | Age: 73
End: 2023-11-28
Attending: EMERGENCY MEDICINE
Payer: MEDICARE

## 2023-11-27 ENCOUNTER — TELEPHONE (OUTPATIENT)
Dept: PSYCHIATRY | Facility: CLINIC | Age: 73
End: 2023-11-27

## 2023-11-27 ENCOUNTER — TELEPHONE (OUTPATIENT)
Dept: BEHAVIORAL/MENTAL HEALTH CLINIC | Facility: CLINIC | Age: 73
End: 2023-11-27

## 2023-11-27 DIAGNOSIS — R45.851 SUICIDAL IDEATIONS: ICD-10-CM

## 2023-11-27 DIAGNOSIS — Z00.8 MEDICAL CLEARANCE FOR PSYCHIATRIC ADMISSION: ICD-10-CM

## 2023-11-27 DIAGNOSIS — F32.A DEPRESSION: Primary | ICD-10-CM

## 2023-11-27 LAB
ALBUMIN SERPL BCP-MCNC: 3.9 G/DL (ref 3.5–5)
ALP SERPL-CCNC: 64 U/L (ref 34–104)
ALT SERPL W P-5'-P-CCNC: 7 U/L (ref 7–52)
AMPHETAMINES SERPL QL SCN: NEGATIVE
ANION GAP SERPL CALCULATED.3IONS-SCNC: 7 MMOL/L
AST SERPL W P-5'-P-CCNC: 14 U/L (ref 13–39)
BACTERIA UR QL AUTO: ABNORMAL /HPF
BARBITURATES UR QL: NEGATIVE
BASOPHILS # BLD AUTO: 0.05 THOUSANDS/ÂΜL (ref 0–0.1)
BASOPHILS NFR BLD AUTO: 1 % (ref 0–1)
BENZODIAZ UR QL: NEGATIVE
BILIRUB SERPL-MCNC: 0.57 MG/DL (ref 0.2–1)
BILIRUB UR QL STRIP: NEGATIVE
BUN SERPL-MCNC: 15 MG/DL (ref 5–25)
CALCIUM SERPL-MCNC: 9.1 MG/DL (ref 8.4–10.2)
CHLORIDE SERPL-SCNC: 106 MMOL/L (ref 96–108)
CLARITY UR: ABNORMAL
CO2 SERPL-SCNC: 23 MMOL/L (ref 21–32)
COCAINE UR QL: NEGATIVE
COLOR UR: ABNORMAL
CREAT SERPL-MCNC: 0.89 MG/DL (ref 0.6–1.3)
EOSINOPHIL # BLD AUTO: 0.18 THOUSAND/ÂΜL (ref 0–0.61)
EOSINOPHIL NFR BLD AUTO: 2 % (ref 0–6)
ERYTHROCYTE [DISTWIDTH] IN BLOOD BY AUTOMATED COUNT: 15 % (ref 11.6–15.1)
ETHANOL SERPL-MCNC: <10 MG/DL
GFR SERPL CREATININE-BSD FRML MDRD: 63 ML/MIN/1.73SQ M
GLUCOSE SERPL-MCNC: 95 MG/DL (ref 65–140)
GLUCOSE UR STRIP-MCNC: NEGATIVE MG/DL
HCT VFR BLD AUTO: 42.1 % (ref 34.8–46.1)
HGB BLD-MCNC: 14 G/DL (ref 11.5–15.4)
HGB UR QL STRIP.AUTO: NEGATIVE
IMM GRANULOCYTES # BLD AUTO: 0.04 THOUSAND/UL (ref 0–0.2)
IMM GRANULOCYTES NFR BLD AUTO: 1 % (ref 0–2)
KETONES UR STRIP-MCNC: NEGATIVE MG/DL
LEUKOCYTE ESTERASE UR QL STRIP: NEGATIVE
LYMPHOCYTES # BLD AUTO: 2.01 THOUSANDS/ÂΜL (ref 0.6–4.47)
LYMPHOCYTES NFR BLD AUTO: 25 % (ref 14–44)
MAGNESIUM SERPL-MCNC: 1.9 MG/DL (ref 1.9–2.7)
MCH RBC QN AUTO: 30 PG (ref 26.8–34.3)
MCHC RBC AUTO-ENTMCNC: 33.3 G/DL (ref 31.4–37.4)
MCV RBC AUTO: 90 FL (ref 82–98)
METHADONE UR QL: NEGATIVE
MONOCYTES # BLD AUTO: 0.63 THOUSAND/ÂΜL (ref 0.17–1.22)
MONOCYTES NFR BLD AUTO: 8 % (ref 4–12)
NEUTROPHILS # BLD AUTO: 5.06 THOUSANDS/ÂΜL (ref 1.85–7.62)
NEUTS SEG NFR BLD AUTO: 63 % (ref 43–75)
NITRITE UR QL STRIP: POSITIVE
NON-SQ EPI CELLS URNS QL MICRO: ABNORMAL /HPF
NRBC BLD AUTO-RTO: 0 /100 WBCS
OPIATES UR QL SCN: NEGATIVE
OXYCODONE+OXYMORPHONE UR QL SCN: NEGATIVE
PCP UR QL: NEGATIVE
PH UR STRIP.AUTO: 5.5 [PH]
PLATELET # BLD AUTO: 230 THOUSANDS/UL (ref 149–390)
PMV BLD AUTO: 10 FL (ref 8.9–12.7)
POTASSIUM SERPL-SCNC: 3.8 MMOL/L (ref 3.5–5.3)
PROT SERPL-MCNC: 7.1 G/DL (ref 6.4–8.4)
PROT UR STRIP-MCNC: NEGATIVE MG/DL
RBC # BLD AUTO: 4.66 MILLION/UL (ref 3.81–5.12)
RBC #/AREA URNS AUTO: ABNORMAL /HPF
SODIUM SERPL-SCNC: 136 MMOL/L (ref 135–147)
SP GR UR STRIP.AUTO: >=1.03 (ref 1–1.03)
THC UR QL: POSITIVE
UROBILINOGEN UR QL STRIP.AUTO: 0.2 E.U./DL
WBC # BLD AUTO: 7.97 THOUSAND/UL (ref 4.31–10.16)
WBC #/AREA URNS AUTO: ABNORMAL /HPF

## 2023-11-27 PROCEDURE — 80053 COMPREHEN METABOLIC PANEL: CPT | Performed by: EMERGENCY MEDICINE

## 2023-11-27 PROCEDURE — 99285 EMERGENCY DEPT VISIT HI MDM: CPT | Performed by: EMERGENCY MEDICINE

## 2023-11-27 PROCEDURE — 82746 ASSAY OF FOLIC ACID SERUM: CPT | Performed by: PSYCHIATRY & NEUROLOGY

## 2023-11-27 PROCEDURE — 83735 ASSAY OF MAGNESIUM: CPT | Performed by: EMERGENCY MEDICINE

## 2023-11-27 PROCEDURE — 81001 URINALYSIS AUTO W/SCOPE: CPT | Performed by: EMERGENCY MEDICINE

## 2023-11-27 PROCEDURE — 84443 ASSAY THYROID STIM HORMONE: CPT | Performed by: PSYCHIATRY & NEUROLOGY

## 2023-11-27 PROCEDURE — 85025 COMPLETE CBC W/AUTO DIFF WBC: CPT | Performed by: EMERGENCY MEDICINE

## 2023-11-27 PROCEDURE — 99285 EMERGENCY DEPT VISIT HI MDM: CPT

## 2023-11-27 PROCEDURE — 36415 COLL VENOUS BLD VENIPUNCTURE: CPT | Performed by: EMERGENCY MEDICINE

## 2023-11-27 PROCEDURE — 82607 VITAMIN B-12: CPT | Performed by: PSYCHIATRY & NEUROLOGY

## 2023-11-27 PROCEDURE — 82077 ASSAY SPEC XCP UR&BREATH IA: CPT | Performed by: EMERGENCY MEDICINE

## 2023-11-27 PROCEDURE — 80307 DRUG TEST PRSMV CHEM ANLYZR: CPT | Performed by: EMERGENCY MEDICINE

## 2023-11-27 RX ORDER — TRAZODONE HYDROCHLORIDE 50 MG/1
75 TABLET ORAL ONCE
Status: COMPLETED | OUTPATIENT
Start: 2023-11-28 | End: 2023-11-28

## 2023-11-27 RX ORDER — CIPROFLOXACIN 500 MG/1
500 TABLET, FILM COATED ORAL ONCE
Status: COMPLETED | OUTPATIENT
Start: 2023-11-28 | End: 2023-11-28

## 2023-11-27 NOTE — TELEPHONE ENCOUNTER
Crisis called and said Zach Desai was waiting for her Doctor to call in a script for her,I informed them she see's . no one here for Medication. Christina Gurrola is an LCSW. She can not prescribe medication.

## 2023-11-27 NOTE — TELEPHONE ENCOUNTER
Received a call from patient's son, Larue Saint at [phone], stating mom is having an episode. She is yelling constantly, screaming at the top of her lungs, won't calm down, not going to harm herself or others as of now,  not acting right. Told son I would pass the message along. Gave son crisis # and suggested to go to emergency room or call 911 if he feels that it is warranted. Please advise for further instructions.

## 2023-11-27 NOTE — TELEPHONE ENCOUNTER
Received call from patient stating that she sees Chinedu Joshi LCSW in our office and would like to know if there is someone that can write her a script for her psych medication. She states that she has been off of it for 1 month. Patient states that she isn't doing well. I gave her the telephone number to the crisis center and suggested to go to the ER. Patient informed that there isn't wasn't anyone that can write her a script for any medication without seeing her as a patient. I suggested she call the doctor who wrote her last script and to call her PCP. Patient aware of options and understands.

## 2023-11-27 NOTE — TELEPHONE ENCOUNTER
Pt son called back and said his mother Henna Nguyen will not got to the ER or speak to anyone. I told him to call 911 and let them know what is going on and they will come out. If not, then to call crisis to come out.  He has both numbers

## 2023-11-28 ENCOUNTER — HOSPITAL ENCOUNTER (INPATIENT)
Facility: HOSPITAL | Age: 73
LOS: 9 days | Discharge: HOME | DRG: 885 | End: 2023-12-07
Attending: STUDENT IN AN ORGANIZED HEALTH CARE EDUCATION/TRAINING PROGRAM | Admitting: EMERGENCY MEDICINE
Payer: MEDICARE

## 2023-11-28 VITALS
RESPIRATION RATE: 16 BRPM | OXYGEN SATURATION: 98 % | DIASTOLIC BLOOD PRESSURE: 68 MMHG | WEIGHT: 150 LBS | HEART RATE: 79 BPM | TEMPERATURE: 98 F | BODY MASS INDEX: 29.29 KG/M2 | SYSTOLIC BLOOD PRESSURE: 150 MMHG

## 2023-11-28 DIAGNOSIS — I10 ESSENTIAL HYPERTENSION: ICD-10-CM

## 2023-11-28 DIAGNOSIS — F33.1 MODERATE EPISODE OF RECURRENT MAJOR DEPRESSIVE DISORDER (HCC): Primary | ICD-10-CM

## 2023-11-28 DIAGNOSIS — Z00.8 MEDICAL CLEARANCE FOR PSYCHIATRIC ADMISSION: ICD-10-CM

## 2023-11-28 DIAGNOSIS — K21.9 GASTROESOPHAGEAL REFLUX DISEASE WITHOUT ESOPHAGITIS: ICD-10-CM

## 2023-11-28 DIAGNOSIS — M10.071 ACUTE IDIOPATHIC GOUT OF RIGHT FOOT: ICD-10-CM

## 2023-11-28 DIAGNOSIS — E55.9 VITAMIN D DEFICIENCY: ICD-10-CM

## 2023-11-28 DIAGNOSIS — R41.89 COGNITIVE IMPAIRMENT: ICD-10-CM

## 2023-11-28 DIAGNOSIS — L60.0 INGROWN NAIL OF GREAT TOE OF RIGHT FOOT: ICD-10-CM

## 2023-11-28 DIAGNOSIS — J44.9 COPD WITHOUT EXACERBATION (HCC): ICD-10-CM

## 2023-11-28 DIAGNOSIS — F51.01 PRIMARY INSOMNIA: ICD-10-CM

## 2023-11-28 PROBLEM — M79.672 PAIN OF LEFT HEEL: Status: ACTIVE | Noted: 2023-11-28

## 2023-11-28 LAB
FOLATE SERPL-MCNC: 7.9 NG/ML
TSH SERPL DL<=0.05 MIU/L-ACNC: 4.98 UIU/ML (ref 0.45–4.5)
VIT B12 SERPL-MCNC: 930 PG/ML (ref 180–914)

## 2023-11-28 PROCEDURE — 84439 ASSAY OF FREE THYROXINE: CPT | Performed by: PSYCHIATRY & NEUROLOGY

## 2023-11-28 PROCEDURE — 99222 1ST HOSP IP/OBS MODERATE 55: CPT | Performed by: NURSE PRACTITIONER

## 2023-11-28 RX ORDER — HYDROXYZINE HYDROCHLORIDE 25 MG/1
25 TABLET, FILM COATED ORAL
Status: DISCONTINUED | OUTPATIENT
Start: 2023-11-28 | End: 2023-12-07 | Stop reason: HOSPADM

## 2023-11-28 RX ORDER — HYDROXYZINE HYDROCHLORIDE 25 MG/1
50 TABLET, FILM COATED ORAL
Status: CANCELLED | OUTPATIENT
Start: 2023-11-28

## 2023-11-28 RX ORDER — ALLOPURINOL 100 MG/1
100 TABLET ORAL DAILY
Status: DISCONTINUED | OUTPATIENT
Start: 2023-11-29 | End: 2023-12-07 | Stop reason: HOSPADM

## 2023-11-28 RX ORDER — MAGNESIUM HYDROXIDE/ALUMINUM HYDROXICE/SIMETHICONE 120; 1200; 1200 MG/30ML; MG/30ML; MG/30ML
30 SUSPENSION ORAL EVERY 4 HOURS PRN
Status: DISCONTINUED | OUTPATIENT
Start: 2023-11-28 | End: 2023-12-07 | Stop reason: HOSPADM

## 2023-11-28 RX ORDER — DONEPEZIL HYDROCHLORIDE 5 MG/1
10 TABLET, FILM COATED ORAL
Status: CANCELLED | OUTPATIENT
Start: 2023-11-28

## 2023-11-28 RX ORDER — TRAZODONE HYDROCHLORIDE 50 MG/1
75 TABLET ORAL
Status: DISCONTINUED | OUTPATIENT
Start: 2023-11-28 | End: 2023-11-28 | Stop reason: HOSPADM

## 2023-11-28 RX ORDER — DONEPEZIL HYDROCHLORIDE 5 MG/1
10 TABLET, FILM COATED ORAL
Status: DISCONTINUED | OUTPATIENT
Start: 2023-11-28 | End: 2023-11-28 | Stop reason: HOSPADM

## 2023-11-28 RX ORDER — ALLOPURINOL 100 MG/1
100 TABLET ORAL DAILY
Status: DISCONTINUED | OUTPATIENT
Start: 2023-11-28 | End: 2023-11-28 | Stop reason: HOSPADM

## 2023-11-28 RX ORDER — PANTOPRAZOLE SODIUM 20 MG/1
20 TABLET, DELAYED RELEASE ORAL
Status: DISCONTINUED | OUTPATIENT
Start: 2023-11-29 | End: 2023-12-07 | Stop reason: HOSPADM

## 2023-11-28 RX ORDER — ESCITALOPRAM OXALATE 10 MG/1
15 TABLET ORAL DAILY
Status: CANCELLED | OUTPATIENT
Start: 2023-11-29

## 2023-11-28 RX ORDER — FLUTICASONE PROPIONATE 50 MCG
1 SPRAY, SUSPENSION (ML) NASAL DAILY
Status: CANCELLED | OUTPATIENT
Start: 2023-11-29

## 2023-11-28 RX ORDER — HYDROXYZINE 50 MG/1
50 TABLET, FILM COATED ORAL
Status: DISCONTINUED | OUTPATIENT
Start: 2023-11-28 | End: 2023-12-07 | Stop reason: HOSPADM

## 2023-11-28 RX ORDER — BISACODYL 10 MG
10 SUPPOSITORY, RECTAL RECTAL DAILY PRN
Status: DISCONTINUED | OUTPATIENT
Start: 2023-11-28 | End: 2023-12-07 | Stop reason: HOSPADM

## 2023-11-28 RX ORDER — ACETAMINOPHEN 325 MG/1
650 TABLET ORAL EVERY 4 HOURS PRN
Status: CANCELLED | OUTPATIENT
Start: 2023-11-28

## 2023-11-28 RX ORDER — KETOTIFEN FUMARATE 0.35 MG/ML
1 SOLUTION/ DROPS OPHTHALMIC 2 TIMES DAILY
Status: CANCELLED | OUTPATIENT
Start: 2023-11-28

## 2023-11-28 RX ORDER — TRAZODONE HYDROCHLORIDE 50 MG/1
75 TABLET ORAL
Status: CANCELLED | OUTPATIENT
Start: 2023-11-28

## 2023-11-28 RX ORDER — ALBUTEROL SULFATE 90 UG/1
2 AEROSOL, METERED RESPIRATORY (INHALATION) EVERY 6 HOURS PRN
Status: CANCELLED | OUTPATIENT
Start: 2023-11-28

## 2023-11-28 RX ORDER — HALOPERIDOL 5 MG/ML
5 INJECTION INTRAMUSCULAR
Status: CANCELLED | OUTPATIENT
Start: 2023-11-28

## 2023-11-28 RX ORDER — RISPERIDONE 0.25 MG/1
0.25 TABLET ORAL
Status: DISCONTINUED | OUTPATIENT
Start: 2023-11-28 | End: 2023-12-07 | Stop reason: HOSPADM

## 2023-11-28 RX ORDER — BUPROPION HYDROCHLORIDE 150 MG/1
300 TABLET ORAL DAILY
Status: DISCONTINUED | OUTPATIENT
Start: 2023-11-28 | End: 2023-11-28 | Stop reason: HOSPADM

## 2023-11-28 RX ORDER — ESCITALOPRAM OXALATE 10 MG/1
15 TABLET ORAL DAILY
Status: DISCONTINUED | OUTPATIENT
Start: 2023-11-28 | End: 2023-11-28 | Stop reason: HOSPADM

## 2023-11-28 RX ORDER — FLUTICASONE FUROATE AND VILANTEROL 100; 25 UG/1; UG/1
1 POWDER RESPIRATORY (INHALATION)
Status: CANCELLED | OUTPATIENT
Start: 2023-11-29

## 2023-11-28 RX ORDER — BENZTROPINE MESYLATE 0.5 MG/1
0.5 TABLET ORAL
Status: DISCONTINUED | OUTPATIENT
Start: 2023-11-28 | End: 2023-12-07 | Stop reason: HOSPADM

## 2023-11-28 RX ORDER — PROPRANOLOL HYDROCHLORIDE 10 MG/1
5 TABLET ORAL EVERY 8 HOURS PRN
Status: DISCONTINUED | OUTPATIENT
Start: 2023-11-28 | End: 2023-12-07 | Stop reason: HOSPADM

## 2023-11-28 RX ORDER — BUSPIRONE HYDROCHLORIDE 5 MG/1
5 TABLET ORAL 2 TIMES DAILY
Status: DISCONTINUED | OUTPATIENT
Start: 2023-11-28 | End: 2023-11-28 | Stop reason: HOSPADM

## 2023-11-28 RX ORDER — ASPIRIN 81 MG/1
81 TABLET, CHEWABLE ORAL DAILY
Status: CANCELLED | OUTPATIENT
Start: 2023-11-29

## 2023-11-28 RX ORDER — FLUTICASONE PROPIONATE 50 MCG
1 SPRAY, SUSPENSION (ML) NASAL DAILY
Status: DISCONTINUED | OUTPATIENT
Start: 2023-11-28 | End: 2023-11-28 | Stop reason: HOSPADM

## 2023-11-28 RX ORDER — LORAZEPAM 2 MG/ML
1 INJECTION INTRAMUSCULAR
Status: DISCONTINUED | OUTPATIENT
Start: 2023-11-28 | End: 2023-12-07 | Stop reason: HOSPADM

## 2023-11-28 RX ORDER — ALLOPURINOL 100 MG/1
100 TABLET ORAL DAILY
Status: CANCELLED | OUTPATIENT
Start: 2023-11-29

## 2023-11-28 RX ORDER — ACETAMINOPHEN 325 MG/1
650 TABLET ORAL EVERY 4 HOURS PRN
Status: DISCONTINUED | OUTPATIENT
Start: 2023-11-28 | End: 2023-12-07 | Stop reason: HOSPADM

## 2023-11-28 RX ORDER — NEBIVOLOL 5 MG/1
5 TABLET ORAL DAILY
Status: DISCONTINUED | OUTPATIENT
Start: 2023-11-28 | End: 2023-11-28 | Stop reason: HOSPADM

## 2023-11-28 RX ORDER — NEBIVOLOL 5 MG/1
5 TABLET ORAL DAILY
Status: CANCELLED | OUTPATIENT
Start: 2023-11-29

## 2023-11-28 RX ORDER — PROPRANOLOL HYDROCHLORIDE 10 MG/1
5 TABLET ORAL EVERY 8 HOURS PRN
Status: CANCELLED | OUTPATIENT
Start: 2023-11-28

## 2023-11-28 RX ORDER — RISPERIDONE 1 MG/1
1 TABLET ORAL
Status: CANCELLED | OUTPATIENT
Start: 2023-11-28

## 2023-11-28 RX ORDER — ALBUTEROL SULFATE 90 UG/1
2 AEROSOL, METERED RESPIRATORY (INHALATION) EVERY 6 HOURS PRN
Status: DISCONTINUED | OUTPATIENT
Start: 2023-11-28 | End: 2023-11-28 | Stop reason: HOSPADM

## 2023-11-28 RX ORDER — TRAZODONE HYDROCHLORIDE 50 MG/1
50 TABLET ORAL
Status: DISCONTINUED | OUTPATIENT
Start: 2023-11-28 | End: 2023-12-07 | Stop reason: HOSPADM

## 2023-11-28 RX ORDER — KETOTIFEN FUMARATE 0.35 MG/ML
1 SOLUTION/ DROPS OPHTHALMIC 2 TIMES DAILY
Status: DISCONTINUED | OUTPATIENT
Start: 2023-11-28 | End: 2023-12-07 | Stop reason: HOSPADM

## 2023-11-28 RX ORDER — BUSPIRONE HYDROCHLORIDE 5 MG/1
5 TABLET ORAL 2 TIMES DAILY
Status: CANCELLED | OUTPATIENT
Start: 2023-11-28

## 2023-11-28 RX ORDER — SUCRALFATE 1 G/1
1 TABLET ORAL
Status: DISCONTINUED | OUTPATIENT
Start: 2023-11-28 | End: 2023-11-28 | Stop reason: HOSPADM

## 2023-11-28 RX ORDER — MAGNESIUM HYDROXIDE/ALUMINUM HYDROXICE/SIMETHICONE 120; 1200; 1200 MG/30ML; MG/30ML; MG/30ML
30 SUSPENSION ORAL EVERY 4 HOURS PRN
Status: CANCELLED | OUTPATIENT
Start: 2023-11-28

## 2023-11-28 RX ORDER — FLUTICASONE FUROATE AND VILANTEROL 100; 25 UG/1; UG/1
1 POWDER RESPIRATORY (INHALATION)
Status: DISCONTINUED | OUTPATIENT
Start: 2023-11-29 | End: 2023-12-07 | Stop reason: HOSPADM

## 2023-11-28 RX ORDER — DONEPEZIL HYDROCHLORIDE 10 MG/1
10 TABLET, FILM COATED ORAL
Status: DISCONTINUED | OUTPATIENT
Start: 2023-11-28 | End: 2023-12-07 | Stop reason: HOSPADM

## 2023-11-28 RX ORDER — BUPROPION HYDROCHLORIDE 300 MG/1
300 TABLET ORAL DAILY
Status: DISCONTINUED | OUTPATIENT
Start: 2023-11-29 | End: 2023-11-29

## 2023-11-28 RX ORDER — TRAZODONE HYDROCHLORIDE 50 MG/1
75 TABLET ORAL
Status: DISCONTINUED | OUTPATIENT
Start: 2023-11-28 | End: 2023-12-07 | Stop reason: HOSPADM

## 2023-11-28 RX ORDER — RISPERIDONE 0.5 MG/1
0.5 TABLET ORAL
Status: DISCONTINUED | OUTPATIENT
Start: 2023-11-28 | End: 2023-12-07 | Stop reason: HOSPADM

## 2023-11-28 RX ORDER — ASPIRIN 81 MG/1
81 TABLET, CHEWABLE ORAL DAILY
Status: DISCONTINUED | OUTPATIENT
Start: 2023-11-29 | End: 2023-12-07 | Stop reason: HOSPADM

## 2023-11-28 RX ORDER — KETOTIFEN FUMARATE 0.35 MG/ML
1 SOLUTION/ DROPS OPHTHALMIC 2 TIMES DAILY
Status: DISCONTINUED | OUTPATIENT
Start: 2023-11-28 | End: 2023-11-28 | Stop reason: HOSPADM

## 2023-11-28 RX ORDER — ALBUTEROL SULFATE 90 UG/1
2 AEROSOL, METERED RESPIRATORY (INHALATION) EVERY 6 HOURS PRN
Status: DISCONTINUED | OUTPATIENT
Start: 2023-11-28 | End: 2023-12-07 | Stop reason: HOSPADM

## 2023-11-28 RX ORDER — ACETAMINOPHEN 325 MG/1
975 TABLET ORAL EVERY 6 HOURS PRN
Status: CANCELLED | OUTPATIENT
Start: 2023-11-28

## 2023-11-28 RX ORDER — IBUPROFEN 600 MG/1
600 TABLET ORAL ONCE
Status: COMPLETED | OUTPATIENT
Start: 2023-11-28 | End: 2023-11-28

## 2023-11-28 RX ORDER — TRAZODONE HYDROCHLORIDE 50 MG/1
50 TABLET ORAL
Status: CANCELLED | OUTPATIENT
Start: 2023-11-28

## 2023-11-28 RX ORDER — BENZTROPINE MESYLATE 1 MG/ML
1 INJECTION INTRAMUSCULAR; INTRAVENOUS
Status: DISCONTINUED | OUTPATIENT
Start: 2023-11-28 | End: 2023-12-07 | Stop reason: HOSPADM

## 2023-11-28 RX ORDER — AMOXICILLIN 250 MG
1 CAPSULE ORAL DAILY PRN
Status: CANCELLED | OUTPATIENT
Start: 2023-11-28

## 2023-11-28 RX ORDER — RISPERIDONE 0.25 MG/1
0.25 TABLET ORAL
Status: CANCELLED | OUTPATIENT
Start: 2023-11-28

## 2023-11-28 RX ORDER — BENZTROPINE MESYLATE 1 MG/ML
1 INJECTION INTRAMUSCULAR; INTRAVENOUS
Status: CANCELLED | OUTPATIENT
Start: 2023-11-28

## 2023-11-28 RX ORDER — AMOXICILLIN 250 MG
1 CAPSULE ORAL DAILY PRN
Status: DISCONTINUED | OUTPATIENT
Start: 2023-11-28 | End: 2023-12-07 | Stop reason: HOSPADM

## 2023-11-28 RX ORDER — POLYETHYLENE GLYCOL 3350 17 G/17G
17 POWDER, FOR SOLUTION ORAL DAILY PRN
Status: CANCELLED | OUTPATIENT
Start: 2023-11-28

## 2023-11-28 RX ORDER — BUPROPION HYDROCHLORIDE 150 MG/1
300 TABLET ORAL DAILY
Status: CANCELLED | OUTPATIENT
Start: 2023-11-29

## 2023-11-28 RX ORDER — PANTOPRAZOLE SODIUM 20 MG/1
20 TABLET, DELAYED RELEASE ORAL
Status: DISCONTINUED | OUTPATIENT
Start: 2023-11-28 | End: 2023-11-28 | Stop reason: HOSPADM

## 2023-11-28 RX ORDER — POLYETHYLENE GLYCOL 3350 17 G/17G
17 POWDER, FOR SOLUTION ORAL DAILY PRN
Status: DISCONTINUED | OUTPATIENT
Start: 2023-11-28 | End: 2023-12-07 | Stop reason: HOSPADM

## 2023-11-28 RX ORDER — PANTOPRAZOLE SODIUM 20 MG/1
20 TABLET, DELAYED RELEASE ORAL
Status: CANCELLED | OUTPATIENT
Start: 2023-11-29

## 2023-11-28 RX ORDER — BENZTROPINE MESYLATE 0.5 MG/1
0.5 TABLET ORAL
Status: CANCELLED | OUTPATIENT
Start: 2023-11-28

## 2023-11-28 RX ORDER — RISPERIDONE 1 MG/1
0.5 TABLET ORAL
Status: CANCELLED | OUTPATIENT
Start: 2023-11-28

## 2023-11-28 RX ORDER — IBUPROFEN 400 MG/1
400 TABLET ORAL ONCE
Status: COMPLETED | OUTPATIENT
Start: 2023-11-28 | End: 2023-11-28

## 2023-11-28 RX ORDER — LORAZEPAM 2 MG/ML
1 INJECTION INTRAMUSCULAR
Status: CANCELLED | OUTPATIENT
Start: 2023-11-28

## 2023-11-28 RX ORDER — BUSPIRONE HYDROCHLORIDE 5 MG/1
5 TABLET ORAL 2 TIMES DAILY
Status: DISCONTINUED | OUTPATIENT
Start: 2023-11-28 | End: 2023-11-30

## 2023-11-28 RX ORDER — SUCRALFATE 1 G/1
1 TABLET ORAL
Status: CANCELLED | OUTPATIENT
Start: 2023-11-28

## 2023-11-28 RX ORDER — HYDROXYZINE HYDROCHLORIDE 25 MG/1
25 TABLET, FILM COATED ORAL
Status: CANCELLED | OUTPATIENT
Start: 2023-11-28

## 2023-11-28 RX ORDER — FLUTICASONE FUROATE AND VILANTEROL 100; 25 UG/1; UG/1
1 POWDER RESPIRATORY (INHALATION)
Status: DISCONTINUED | OUTPATIENT
Start: 2023-11-28 | End: 2023-11-28 | Stop reason: HOSPADM

## 2023-11-28 RX ORDER — ACETAMINOPHEN 325 MG/1
975 TABLET ORAL EVERY 6 HOURS PRN
Status: DISCONTINUED | OUTPATIENT
Start: 2023-11-28 | End: 2023-11-30

## 2023-11-28 RX ORDER — HALOPERIDOL 5 MG/ML
5 INJECTION INTRAMUSCULAR
Status: DISCONTINUED | OUTPATIENT
Start: 2023-11-28 | End: 2023-12-07 | Stop reason: HOSPADM

## 2023-11-28 RX ORDER — FLUTICASONE PROPIONATE 50 MCG
1 SPRAY, SUSPENSION (ML) NASAL DAILY
Status: DISCONTINUED | OUTPATIENT
Start: 2023-11-29 | End: 2023-11-30

## 2023-11-28 RX ORDER — RISPERIDONE 1 MG/1
1 TABLET ORAL
Status: DISCONTINUED | OUTPATIENT
Start: 2023-11-28 | End: 2023-12-07 | Stop reason: HOSPADM

## 2023-11-28 RX ORDER — BISACODYL 10 MG
10 SUPPOSITORY, RECTAL RECTAL DAILY PRN
Status: CANCELLED | OUTPATIENT
Start: 2023-11-28

## 2023-11-28 RX ORDER — SUCRALFATE 1 G/1
1 TABLET ORAL
Status: DISCONTINUED | OUTPATIENT
Start: 2023-11-28 | End: 2023-12-07 | Stop reason: HOSPADM

## 2023-11-28 RX ORDER — ASPIRIN 81 MG/1
81 TABLET, CHEWABLE ORAL DAILY
Status: DISCONTINUED | OUTPATIENT
Start: 2023-11-28 | End: 2023-11-28 | Stop reason: HOSPADM

## 2023-11-28 RX ORDER — NEBIVOLOL 5 MG/1
5 TABLET ORAL DAILY
Status: DISCONTINUED | OUTPATIENT
Start: 2023-11-29 | End: 2023-12-07 | Stop reason: HOSPADM

## 2023-11-28 RX ADMIN — PANTOPRAZOLE SODIUM 20 MG: 20 TABLET, DELAYED RELEASE ORAL at 10:23

## 2023-11-28 RX ADMIN — TRAZODONE HYDROCHLORIDE 75 MG: 50 TABLET ORAL at 21:18

## 2023-11-28 RX ADMIN — BUSPIRONE HYDROCHLORIDE 5 MG: 5 TABLET ORAL at 17:12

## 2023-11-28 RX ADMIN — IBUPROFEN 600 MG: 600 TABLET, FILM COATED ORAL at 05:03

## 2023-11-28 RX ADMIN — SUCRALFATE 1 G: 1 TABLET ORAL at 13:04

## 2023-11-28 RX ADMIN — KETOTIFEN FUMARATE 1 DROP: 0.25 SOLUTION/ DROPS OPHTHALMIC at 21:18

## 2023-11-28 RX ADMIN — NEBIVOLOL 5 MG: 5 TABLET ORAL at 10:24

## 2023-11-28 RX ADMIN — FLUTICASONE FUROATE AND VILANTEROL TRIFENATATE 1 PUFF: 100; 25 POWDER RESPIRATORY (INHALATION) at 10:27

## 2023-11-28 RX ADMIN — ESCITALOPRAM OXALATE 15 MG: 10 TABLET ORAL at 10:23

## 2023-11-28 RX ADMIN — IBUPROFEN 400 MG: 400 TABLET ORAL at 10:31

## 2023-11-28 RX ADMIN — TRAZODONE HYDROCHLORIDE 75 MG: 50 TABLET ORAL at 00:14

## 2023-11-28 RX ADMIN — CIPROFLOXACIN HYDROCHLORIDE 500 MG: 500 TABLET, FILM COATED ORAL at 00:14

## 2023-11-28 RX ADMIN — ALLOPURINOL 100 MG: 100 TABLET ORAL at 10:24

## 2023-11-28 RX ADMIN — ASPIRIN 81 MG CHEWABLE TABLET 81 MG: 81 TABLET CHEWABLE at 10:23

## 2023-11-28 RX ADMIN — KETOTIFEN FUMARATE 1 DROP: 0.35 SOLUTION/ DROPS OPHTHALMIC at 13:04

## 2023-11-28 RX ADMIN — SUCRALFATE 1 G: 1 TABLET ORAL at 21:17

## 2023-11-28 RX ADMIN — BUPROPION HYDROCHLORIDE 300 MG: 150 TABLET, EXTENDED RELEASE ORAL at 10:23

## 2023-11-28 RX ADMIN — BUSPIRONE HYDROCHLORIDE 5 MG: 5 TABLET ORAL at 10:23

## 2023-11-28 RX ADMIN — DONEPEZIL HYDROCHLORIDE 10 MG: 10 TABLET, FILM COATED ORAL at 21:17

## 2023-11-28 RX ADMIN — FLUTICASONE PROPIONATE 1 SPRAY: 50 SPRAY, METERED NASAL at 10:24

## 2023-11-28 NOTE — ASSESSMENT & PLAN NOTE
When patient was here prior and August 2023 her B12 level was not therapeutic and she had B12 supplementation  She has a B12 level pending for 11/29/2023  After we have that B12 level we will order supplementation as appropriately needed

## 2023-11-28 NOTE — ASSESSMENT & PLAN NOTE
Patient has a longstanding history greater than 1 year of gastric bypass  When patient was previously here back in August she had an a not anastomotic ulcer  Patient will continue to be on her PPI as well as her Carafate  Smoking cessation education  Small frequent meals  No fluid 30 minutes before meals with meals and no fluids 30 minutes after meals  Follow gastric bypass postop instructions even though this is a longstanding prior surgery

## 2023-11-28 NOTE — PLAN OF CARE
Problem: Ineffective Coping  Goal: Cooperates with admission process  Description: Interventions:   - Complete admission process  Outcome: Not Progressing  Goal: Identifies ineffective coping skills  Outcome: Not Progressing  Goal: Identifies healthy coping skills  Outcome: Not Progressing  Goal: Demonstrates healthy coping skills  Outcome: Not Progressing  Goal: Participates in unit activities  Description: Interventions:  - Provide therapeutic environment   - Provide required programming   - Redirect inappropriate behaviors   Outcome: Not Progressing  Goal: Patient/Family participate in treatment and DC plans  Description: Interventions:  - Provide therapeutic environment  Outcome: Not Progressing  Goal: Patient/Family verbalizes awareness of resources  Outcome: Not Progressing  Goal: Understands least restrictive measures  Description: Interventions:  - Utilize least restrictive behavior  Outcome: Not Progressing  Goal: Free from restraint events  Description: - Utilize least restrictive measures   - Provide behavioral interventions   - Redirect inappropriate behaviors   Outcome: Not Progressing     Problem: Risk for Self Injury/Neglect  Goal: Treatment Goal: Remain safe during length of stay, learn and adopt new coping skills, and be free of self-injurious ideation, impulses and acts at the time of discharge  Outcome: Not Progressing  Goal: Verbalize thoughts and feelings  Description: Interventions:  - Assess and re-assess patient's lethality and potential for self-injury  - Engage patient in 1:1 interactions, daily, for a minimum of 15 minutes  - Encourage patient to express feelings, fears, frustrations, hopes  - Establish rapport/trust with patient   Outcome: Not Progressing  Goal: Refrain from harming self  Description: Interventions:  - Monitor patient closely, per order  - Develop a trusting relationship  - Supervise medication ingestion, monitor effects and side effects   Outcome: Not Progressing  Goal: Attend and participate in unit activities, including therapeutic, recreational, and educational groups  Description: Interventions:  - Provide therapeutic and educational activities daily, encourage attendance and participation, and document same in the medical record  - Obtain collateral information, encourage visitation and family involvement in care   Outcome: Not Progressing  Goal: Recognize maladaptive responses and adopt new coping mechanisms  Outcome: Not Progressing  Goal: Complete daily ADLs, including personal hygiene independently, as able  Description: Interventions:  - Observe, teach, and assist patient with ADLS  - Monitor and promote a balance of rest/activity, with adequate nutrition and elimination  Outcome: Not Progressing     Problem: Depression  Goal: Treatment Goal: Demonstrate behavioral control of depressive symptoms, verbalize feelings of improved mood/affect, and adopt new coping skills prior to discharge  Outcome: Not Progressing  Goal: Verbalize thoughts and feelings  Description: Interventions:  - Assess and re-assess patient's level of risk   - Engage patient in 1:1 interactions, daily, for a minimum of 15 minutes   - Encourage patient to express feelings, fears, frustrations, hopes   Outcome: Not Progressing  Goal: Refrain from harming self  Description: Interventions:  - Monitor patient closely, per order   - Supervise medication ingestion, monitor effects and side effects   Outcome: Not Progressing  Goal: Refrain from isolation  Description: Interventions:  - Develop a trusting relationship   - Encourage socialization   Outcome: Not Progressing  Goal: Refrain from self-neglect  Outcome: Not Progressing  Goal: Attend and participate in unit activities, including therapeutic, recreational, and educational groups  Description: Interventions:  - Provide therapeutic and educational activities daily, encourage attendance and participation, and document same in the medical record   Outcome: Not Progressing  Goal: Complete daily ADLs, including personal hygiene independently, as able  Description: Interventions:  - Observe, teach, and assist patient with ADLS  -  Monitor and promote a balance of rest/activity, with adequate nutrition and elimination   Outcome: Not Progressing     Problem: Anxiety  Goal: Anxiety is at manageable level  Description: Interventions:  - Assess and monitor patient's anxiety level. - Monitor for signs and symptoms (heart palpitations, chest pain, shortness of breath, headaches, nausea, feeling jumpy, restlessness, irritable, apprehensive). - Collaborate with interdisciplinary team and initiate plan and interventions as ordered.   - [place] patient to unit/surroundings  - Explain treatment plan  - Encourage participation in care  - Encourage verbalization of concerns/fears  - Identify coping mechanisms  - Assist in developing anxiety-reducing skills  - Administer/offer alternative therapies  - Limit or eliminate stimulants  Outcome: Not Progressing     Problem: DISCHARGE PLANNING - CARE MANAGEMENT  Goal: Discharge to post-acute care or home with appropriate resources  Description: INTERVENTIONS:  - Conduct assessment to determine patient/family and health care team treatment goals, and need for post-acute services based on payer coverage, community resources, and patient preferences, and barriers to discharge  - Address psychosocial, clinical, and financial barriers to discharge as identified in assessment in conjunction with the patient/family and health care team  - Arrange appropriate level of post-acute services according to patient’s   needs and preference and payer coverage in collaboration with the physician and health care team  - Communicate with and update the patient/family, physician, and health care team regarding progress on the discharge plan  - Arrange appropriate transportation to post-acute venues  Outcome: Not Progressing

## 2023-11-28 NOTE — CONSULTS
[facility]  Consult  Name: Francisco Zimmer 74 y.o. female I MRN: [id number]  Unit/Bed#: Nazario Coates 688-29 I Date of Admission: 11/28/2023   Date of Service: 11/28/2023 I Hospital Day: 0    Inpatient consult for Medical Clearance for [facility] patient  Consult performed by: KYLE Fatima  Consult ordered by: Mendel Paris PA-C          Assessment/Plan   Medical clearance for psychiatric admission  Assessment & Plan  Vital signs stable afebrile patient seen and examined by myself Labs from morning ER 11/27/2023 late p.m. evaluated sodium 136 potassium 3.8 creatinine 0.89 urinalysis was positive it is being reflexed to a urine culture her drug screen was positive for THC and her alcohol screen was negative.   Patient medically cleared for admit  SL IM will sign off please call with any questions or concerns    Pain of left heel  Assessment & Plan  Left foot x-rays to evaluate her left heel pain  Once she is cleared of not having a fracture she will be able to continue with physical therapy  Podiatry consult already for her right great ingrown toenail as well as to evaluate her left heel pain    Ingrown right greater toenail  Assessment & Plan  Podiatry consult for ingrown toenail of right great toe    Chronic idiopathic gout of left foot  Assessment & Plan  Patient has a left old leg injury that required surgery and has had ongoing gout issues with that  She will continue to use her allopurinol 100 mg p.o. daily her creatinine is stable    History of Debra-en-Y gastric bypass  Assessment & Plan  Patient has a longstanding history greater than 1 year of gastric bypass  When patient was previously here back in August she had an a not anastomotic ulcer  Patient will continue to be on her PPI as well as her Carafate  Smoking cessation education  Small frequent meals  No fluid 30 minutes before meals with meals and no fluids 30 minutes after meals  Follow gastric bypass postop instructions even though this is a longstanding prior surgery    COPD without exacerbation [facility])  Assessment & Plan  Patient will have an albuterol metered-dose inhaler every 4 hours as needed cough shortness of breath or wheeze  Patient will continue on her outpatient Advair    Vitamin D deficiency  Assessment & Plan  When patient was here back in August 2023 her vitamin D level was significantly low  She had been on vitamin D supplementation  She is having a vitamin D level drawn on 11/29/2023  After that vitamin D level has resulted we will order the appropriate supplementation based off of that level    Vitamin B12 deficiency  Assessment & Plan  When patient was here prior and August 2023 her B12 level was not therapeutic and she had B12 supplementation  She has a B12 level pending for 11/29/2023  After we have that B12 level we will order supplementation as appropriately needed    Esophageal reflux  Assessment & Plan  Patient will continue on her outpatient PPI  Patient will also continue on her outpatient Carafate    Allergic rhinitis  Assessment & Plan  Patient will continue to use her outpatient Flonase as needed    Essential hypertension  Assessment & Plan  Patient will continue to use her outpatient Bystolic daily  We will monitor her blood pressure and add agents at or delete agents as needed according to blood pressure readings    Tobacco abuse  Assessment & Plan  Patient has quit smoking since her last inpatient admission in August 2023  Continue with the importance of continued abstinence from tobacco abuse         Counseling / Coordination of Care Time: 45 minutes. Greater than 50% of total time spent on patient counseling and coordination of care. Collaboration of Care:  Were Recommendations Directly Discussed with Primary Treatment Team? - No     History of Present Illness:    Leno Bedoya is a 74 y.o. female who is originally admitted to the psychiatry service due to suicidal ideation as well as HI without a plan. We are consulted for medical clearance for admission to [facility] Unit and treatment of underlying psychiatric illness. According to patient's children patient has been off her psych meds for approximately 3 to 4 weeks she was seen by the outpatient family provider on 11/22/2020 for as the next few days when on the patient was screaming and yelling and wishing to hurt herself as well as her children. Her son tried to get the appropriate help and call crisis intervention ended up that 911 was called and patient was then taken to the [place] ER on the evening of 11/27/2023. Patient has past medical history of depression anxiety cognitive impairment hypertension gastric bypass surgery COPD THC abuse frequent falls, B12 deficiency, being vitamin D deficiency, hypertension, tobacco abuse allergic rhinitis gout. Patient did have a flu shot on 11/22/2023. Patient has a recent inpatient psych stay she had an intentional overdose of Ambien on 7/28/2023 and then was admitted to [address] [place] [address] on 8/4/2023 and eventually discharged home with her family. After speaking with the patient she was unable to get her psych meds refilled because the psychiatrist that had originally filled them at [facility] and had left the [facility] practice and she has been without her psych meds for the last 3 weeks prior to that she was stable since discharge from the [facility] in August on her psych meds. However she does care for her fully disabled adult son at home and is having financial difficulties. Review of Systems:    Review of Systems   Constitutional:  Negative for chills and fever. HENT:  Negative for ear pain and sore throat. Eyes:  Negative for pain and visual disturbance. Respiratory:  Negative for cough and shortness of breath. Cardiovascular:  Negative for chest pain and palpitations. Gastrointestinal:  Negative for abdominal pain and vomiting.    Genitourinary: Negative for dysuria and hematuria. Musculoskeletal:  Negative for arthralgias and back pain. Skin:  Negative for color change and rash. Neurological:  Negative for seizures and syncope. All other systems reviewed and are negative. Past Medical and Surgical History:     Past Medical History:   Diagnosis Date    Acid reflux     Anxiety     Arthritis     Asthma     Cardiac disease     Chronic kidney disease     passed on own kidney stone    Depression     Diverticulitis     GERD (gastroesophageal reflux disease)     Hayfever     [place] (hard of hearing)     bilateral hearing aids    Hyperlipemia     Hypertension     Panic attack     Tachycardia        Past Surgical History:   Procedure Laterality Date    ABLATION OF DYSRHYTHMIC FOCUS      APPENDECTOMY      CHOLECYSTECTOMY      open    FRACTURE SURGERY      ORIF fx (L) tibia, fibula 2009    GASTRIC BYPASS OPEN      HYSTERECTOMY      HI XCAPSL CTRC RMVL INSJ IO LENS PROSTH W/O ECP Left 4/18/2016    Procedure: EXTRACTION EXTRACAPSULAR CATARACT PHACO INTRAOCULAR LENS (IOL); Surgeon: Brandon Peralta MD;  Location: [facility] MAIN OR;  Service: Ophthalmology    HI XCAPSL CTRC RMVL INSJ IO LENS PROSTH W/O ECP Right 3/1/2021    Procedure: EXTRACTION EXTRACAPSULAR CATARACT PHACO INTRAOCULAR LENS (IOL); Surgeon: Brandon Peralta MD;  Location: [facility] MAIN OR;  Service: Ophthalmology    TONSILECTOMY AND ADNOIDECTOMY         Meds/Allergies:    all medications and allergies reviewed    Allergies: Allergies   Allergen Reactions    Augmentin [Amoxicillin-Pot Clavulanate] GI Intolerance, Other (See Comments) and Hives     VOMITING  VOMITING  Reaction Date: 07Dec2004;     Sulfa Antibiotics Itching, Other (See Comments) and Hives     RASH, ITCHY  RASH, ITCHY    Morphine Other (See Comments)     "DOESN'T WORK"    Latex Rash     Unsure if this is an allergy       Social History:     Marital Status:      Substance Use History:   Social History     Substance and Sexual Activity   Alcohol Use Yes    Comment: rarely     Social History     Tobacco Use   Smoking Status Former    Packs/day: 0.25    Years: 25.00    Total pack years: 6.25    Types: Cigarettes    Quit date: 2023    Years since quittin.3    Passive exposure: Past   Smokeless Tobacco Never     Social History     Substance and Sexual Activity   Drug Use No       Family History:    non-contributory    Physical Exam:     Vitals:   Blood Pressure: 147/75 (23 1543)  Pulse: 56 (23 1543)  Temperature: 98.2 °F (36.8 °C) (23 1543)  Temp Source: Temporal (23 1543)  Respirations: 18 (23 1543)  SpO2: 95 % (23)    Physical Exam  Constitutional:       General: She is not in acute distress. Appearance: Normal appearance. She is not ill-appearing. HENT:      Head: Normocephalic and atraumatic. Nose: Nose normal.      Mouth/Throat:      Mouth: Mucous membranes are moist.   Eyes:      Extraocular Movements: Extraocular movements intact. Cardiovascular:      Rate and Rhythm: Normal rate and regular rhythm. Heart sounds: Normal heart sounds. Pulmonary:      Breath sounds: Normal breath sounds. No wheezing. Abdominal:      General: Abdomen is flat. Bowel sounds are normal.      Palpations: Abdomen is soft. Skin:     General: Skin is warm and dry. Neurological:      Mental Status: She is alert. Additional Data:     Lab Results: I have personally reviewed pertinent reports.       Results from last 7 days   Lab Units 23  2209   WBC Thousand/uL 7.97   HEMOGLOBIN g/dL 14.0   HEMATOCRIT % 42.1   PLATELETS Thousands/uL 230   NEUTROS PCT % 63   LYMPHS PCT % 25   MONOS PCT % 8   EOS PCT % 2     Results from last 7 days   Lab Units 23  2209   SODIUM mmol/L 136   POTASSIUM mmol/L 3.8   CHLORIDE mmol/L 106   CO2 mmol/L 23   BUN mg/dL 15   CREATININE mg/dL 0.89   ANION GAP mmol/L 7   CALCIUM mg/dL 9.1   ALBUMIN g/dL 3.9   TOTAL BILIRUBIN mg/dL 0.57   ALK PHOS U/L 64   ALT U/L 7   AST U/L 14   GLUCOSE RANDOM mg/dL 95             Lab Results   Component Value Date/Time    HGBA1C 6.0 (H) 02/10/2016 06:55 AM           EKG, Pathology, and Other Studies Reviewed on Admission:   EKG pending    ** Please Note: This note has been constructed using a voice recognition system. **    I have spent a total time of 45 minutes on 11/28/23 in caring for this patient including Diagnostic results, Prognosis, Risks and benefits of tx options, Instructions for management, Patient and family education, Importance of tx compliance, Risk factor reductions, Impressions, Counseling / Coordination of care, Documenting in the medical record, Reviewing / ordering tests, medicine, procedures  , Obtaining or reviewing history  , and Communicating with other healthcare professionals .

## 2023-11-28 NOTE — NURSING NOTE
Pt is a 10 from [employer]. Pt brought into the ED with her son after they had an argument. Per ED, pt reported SI due to life stressors. Upon admission, pt denies this but does report feeling anxious and depressed. Pt claims that the only reason that she's here is because of her son. Pt states "I'm 74years old, I'm not going to be screamed at." Pt seems to have poor insight. Pt has a hx of one past suicide attempt in July by OD. Pt is pleasant and cooperative. Pt oriented to unit and is currently denying any unmet needs.

## 2023-11-28 NOTE — ASSESSMENT & PLAN NOTE
Patient has a left old leg injury that required surgery and has had ongoing gout issues with that  She will continue to use her allopurinol 100 mg p.o. daily her creatinine is stable

## 2023-11-28 NOTE — ED NOTES
PTA med list verified with patient. Patient home medications ordered per Dr. Lalita Souza. MD currently looking for opthalmologic drop alternative to home medication if possible.      Darren Oseguera RN  11/28/23 9910

## 2023-11-28 NOTE — ASSESSMENT & PLAN NOTE
Patient will have an albuterol metered-dose inhaler every 4 hours as needed cough shortness of breath or wheeze  Patient will continue on her outpatient Advair

## 2023-11-28 NOTE — EMTALA/ACUTE CARE TRANSFER
[address] [address] [address]  [address] [address]  Dept: [phone]      EMTALA TRANSFER CONSENT    NAME Leno Bedoya                                         1950                              MRN [id number]    I have been informed of my rights regarding examination, treatment, and transfer   by Dr. Dema Bamberger, MD    Benefits: Continuity of care    Risks: Potential for delay in receiving treatment      Consent for Transfer:  I acknowledge that my medical condition has been evaluated and explained to me by the emergency department physician or other qualified medical person and/or my attending physician, who has recommended that I be transferred to the service of  Accepting Physician: Dr Long Cancer at [address] Name, [facility] : [address] [address] [address] [place] [address]. The above potential benefits of such transfer, the potential risks associated with such transfer, and the probable risks of not being transferred have been explained to me, and I fully understand them. The doctor has explained that, in my case, the benefits of transfer outweigh the risks. I agree to be transferred. I authorize the performance of emergency medical procedures and treatments upon me in both transit and upon arrival at the receiving facility. Additionally, I authorize the release of any and all medical records to the receiving facility and request they be transported with me, if possible. I understand that the safest mode of transportation during a medical emergency is an ambulance and that the Hospital advocates the use of this mode of transport. Risks of traveling to the receiving facility by car, including absence of medical control, life sustaining equipment, such as oxygen, and medical personnel has been explained to me and I fully understand them.     (CHICA CORRECT BOX BELOW)  [  ]  I consent to the stated transfer and to be transported by ambulance/helicopter. [  ]  I consent to the stated transfer, but refuse transportation by ambulance and accept full responsibility for my transportation by car. I understand the risks of non-ambulance transfers and I exonerate the Hospital and its staff from any deterioration in my condition that results from this refusal.    X___________________________________________    DATE  23  TIME________  Signature of patient or legally responsible individual signing on patient behalf           RELATIONSHIP TO PATIENT_________________________          Provider Certification    NAME Pipo Velasco                                         1950                              MRN [id number]    A medical screening exam was performed on the above named patient. Based on the examination:    Condition Necessitating Transfer The primary encounter diagnosis was Depression. Diagnoses of Suicidal ideations and Medical clearance for psychiatric admission were also pertinent to this visit. Patient Condition: The patient has been stabilized such that within reasonable medical probability, no material deterioration of the patient condition or the condition of the unborn child(heber) is likely to result from the transfer    Reason for Transfer: Level of Care needed not available at this facility    Transfer Requirements: Facility [facility] [address] [id number] [facility] [place] available and qualified personnel available for treatment as acknowledged by Elba Favre  Agreed to accept transfer and to provide appropriate medical treatment as acknowledged by       Dr Army Pérez  Appropriate medical records of the examination and treatment of the patient are provided at the time of transfer   [address] [facility] [address] _______  Transfer will be performed by qualified personnel from [facility]  and appropriate transfer equipment as required, including the use of necessary and appropriate life support measures.     Provider Certification: I have examined the patient and explained the following risks and benefits of being transferred/refusing transfer to the patient/family:  General risk, such as traffic hazards, adverse weather conditions, rough terrain or turbulence, possible failure of equipment (including vehicle or aircraft), or consequences of actions of persons outside the control of the transport personnel      Based on these reasonable risks and benefits to the patient and/or the unborn child(heber), and based upon the information available at the time of the patient’s examination, I certify that the medical benefits reasonably to be expected from the provision of appropriate medical treatments at another medical facility outweigh the increasing risks, if any, to the individual’s medical condition, and in the case of labor to the unborn child, from effecting the transfer.     X____________________________________________ DATE 11/28/23        TIME_______      ORIGINAL - SEND TO MEDICAL RECORDS   COPY - SEND WITH PATIENT DURING TRANSFER

## 2023-11-28 NOTE — ASSESSMENT & PLAN NOTE
When patient was here back in August 2023 her vitamin D level was significantly low  She had been on vitamin D supplementation  She is having a vitamin D level drawn on 11/29/2023  After that vitamin D level has resulted we will order the appropriate supplementation based off of that level
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
## 2023-11-28 NOTE — ASSESSMENT & PLAN NOTE
Patient will continue to use her outpatient Bystolic daily  We will monitor her blood pressure and add agents at or delete agents as needed according to blood pressure readings

## 2023-11-28 NOTE — ED PROVIDER NOTES
History  Chief Complaint   Patient presents with    Psychiatric Evaluation     Pt states she been off her psych meds for 3wks and wants to harm herself and her children. States no method to do that currently. 40-year-old female with a history of depression prior inpatient psych hospitalizations presents with depression suicidal ideation and no intent. A lot of social stressors at home. Family feels she is unsafe. Denies any homicidal ideation no medical complaints at this time. No alcohol      History provided by:  Patient   used: No        Prior to Admission Medications   Prescriptions Last Dose Informant Patient Reported? Taking? Apple Cider Vinegar 188 MG CAPS  Self Yes No   Sig: Take by mouth daily As needed   Diclofenac Sodium (VOLTAREN) 1 %   No No   Sig: Apply 2 g topically 4 (four) times a day   Multiple Vitamin (MULTIVITAMIN) tablet  Self Yes No   Sig: Take 1 tablet by mouth daily. albuterol (2.5 mg/3 mL) 0.083 % nebulizer solution  Self No No   Sig: INHALE CONTENTS OF 1 VIAL (3 ML) IN NEBULIZER BY MOUTH AND INTO THE LUNGS EVERY 6 HOURS IF NEEDED   albuterol (PROVENTIL HFA,VENTOLIN HFA) 90 mcg/act inhaler   No No   Sig: Inhale 2 puffs every 6 (six) hours as needed for wheezing   allopurinol (ZYLOPRIM) 100 mg tablet  Self No No   Sig: Take 1 tablet (100 mg total) by mouth daily   aspirin (ECOTRIN LOW STRENGTH) 81 mg EC tablet  Self Yes No   Sig: Take 81 mg by mouth daily   buPROPion (WELLBUTRIN XL) 300 mg 24 hr tablet   No No   Sig: Take 1 tablet (300 mg total) by mouth every morning   busPIRone (BUSPAR) 5 mg tablet   No No   Sig: Take 1 tablet (5 mg total) by mouth 2 (two) times a day   cyanocobalamin 1,000 mcg/mL   No No   Sig: Inject 1 mL (1,000 mcg total) into a muscle every 30 (thirty) days Do not start before September 3, 2023.    diphenhydramine, lidocaine, Al/Mg hydroxide, simethicone (Magic Mouthwash) SUSP   No No   Sig: Take 10 mL by mouth every 8 (eight) hours as needed (severe GERD)   donepezil (ARICEPT) 10 mg tablet   No No   Sig: Take 1 tablet (10 mg total) by mouth daily at bedtime   ergocalciferol (VITAMIN D2) 50,000 units   No No   Sig: Take 1 capsule (50,000 Units total) by mouth once a week for 7 doses Do not start before 2023.    escitalopram (LEXAPRO) 10 mg tablet   No No   Sig: Take 1.5 tablets (15 mg total) by mouth daily   esomeprazole (NexIUM) 20 mg capsule   No No   Sig: TAKE 1 CAPSULE BY MOUTH EVERY DAY IN THE EARLY MORNING   fluticasone (FLONASE) 50 mcg/act nasal spray   No No   Si spray into each nostril daily   fluticasone-salmeterol (ADVAIR HFA) 115-21 MCG/ACT inhaler   No No   Sig: Inhale 1 puff daily   nebivolol (BYSTOLIC) 5 mg tablet   No No   Sig: TAKE 1 TABLET BY MOUTH EVERY DAY IN THE MORNING   olopatadine (Patanol) 0.1 % ophthalmic solution  Self No No   Sig: Apply 1 drop to eye 2 (two) times a day   sucralfate (CARAFATE) 1 g tablet   No No   Sig: Take 1 tablet (1 g total) by mouth 4 (four) times a day (before meals and at bedtime)   traZODone (DESYREL) 150 mg tablet   No No   Sig: Take 0.5 tablets (75 mg total) by mouth daily at bedtime as needed for sleep      Facility-Administered Medications Last Administration Doses Remaining   cyanocobalamin injection 1,000 mcg 10/6/2023 10:35 AM    cyanocobalamin injection 1,000 mcg 2023  9:14 AM           Past Medical History:   Diagnosis Date    Acid reflux     Anxiety     Arthritis     Asthma     Cardiac disease     Chronic kidney disease     passed on own kidney stone    Depression     Diverticulitis     GERD (gastroesophageal reflux disease)     Hayfever     [place] (hard of hearing)     bilateral hearing aids    Hyperlipemia     Hypertension     Panic attack     Tachycardia        Past Surgical History:   Procedure Laterality Date    ABLATION OF DYSRHYTHMIC FOCUS      APPENDECTOMY      CHOLECYSTECTOMY      open    FRACTURE SURGERY      ORIF fx (L) tibia, fibula     GASTRIC BYPASS OPEN      HYSTERECTOMY      OK XCAPSL CTRC RMVL INSJ IO LENS PROSTH W/O ECP Left 2016    Procedure: EXTRACTION EXTRACAPSULAR CATARACT PHACO INTRAOCULAR LENS (IOL); Surgeon: Sharyle Bowen, MD;  Location: [facility] MAIN OR;  Service: Ophthalmology    OK XCAPSL CTRC RMVL INSJ IO LENS PROSTH W/O ECP Right 3/1/2021    Procedure: EXTRACTION EXTRACAPSULAR CATARACT PHACO INTRAOCULAR LENS (IOL); Surgeon: Sharyle Bowen, MD;  Location: [facility] MAIN OR;  Service: Ophthalmology    TONSILECTOMY AND ADNOIDECTOMY         Family History   Problem Relation Age of Onset    Heart disease Mother     Stroke Son     Stroke Maternal Grandfather     Breast cancer Daughter 36     I have reviewed and agree with the history as documented. E-Cigarette/Vaping    E-Cigarette Use Never User      E-Cigarette/Vaping Substances    Nicotine No     THC No     CBD No     Flavoring No     Other No     Unknown No      Social History     Tobacco Use    Smoking status: Former     Packs/day: 0.25     Years: 25.00     Total pack years: 6.25     Types: Cigarettes     Quit date: 2023     Years since quittin.3     Passive exposure: Past    Smokeless tobacco: Never   Vaping Use    Vaping Use: Never used   Substance Use Topics    Alcohol use: Yes     Comment: rarely    Drug use: No       Review of Systems   Constitutional: Negative. HENT: Negative. Eyes: Negative. Respiratory: Negative. Cardiovascular: Negative. Gastrointestinal: Negative. Endocrine: Negative. Genitourinary: Negative. Musculoskeletal: Negative. Skin: Negative. Allergic/Immunologic: Negative. Neurological: Negative. Hematological: Negative. Psychiatric/Behavioral:  Positive for dysphoric mood. All other systems reviewed and are negative. Physical Exam  Physical Exam  Vitals and nursing note reviewed. Constitutional:       Appearance: Normal appearance. HENT:      Head: Normocephalic and atraumatic.       Nose: Nose normal. Mouth/Throat:      Mouth: Mucous membranes are moist.   Eyes:      Extraocular Movements: Extraocular movements intact. Pupils: Pupils are equal, round, and reactive to light. Cardiovascular:      Rate and Rhythm: Normal rate and regular rhythm. Pulmonary:      Effort: Pulmonary effort is normal.      Breath sounds: Normal breath sounds. Abdominal:      General: Abdomen is flat. Bowel sounds are normal.      Palpations: Abdomen is soft. Musculoskeletal:         General: Normal range of motion. Cervical back: Normal range of motion and neck supple. Skin:     General: Skin is warm. Capillary Refill: Capillary refill takes less than 2 seconds. Neurological:      General: No focal deficit present. Mental Status: She is alert and oriented to person, place, and time. Mental status is at baseline. Psychiatric:         Mood and Affect: Mood normal.         Thought Content:  Thought content normal.         Vital Signs  ED Triage Vitals [11/27/23 2136]   Temperature Pulse Respirations Blood Pressure SpO2   97.5 °F (36.4 °C) 63 22 139/98 97 %      Temp Source Heart Rate Source Patient Position - Orthostatic VS BP Location FiO2 (%)   Tympanic Monitor -- Left arm --      Pain Score       --           Vitals:    11/27/23 2136   BP: 139/98   Pulse: 63         Visual Acuity      ED Medications  Medications   ciprofloxacin (CIPRO) tablet 500 mg (500 mg Oral Given 11/28/23 0014)   traZODone (DESYREL) tablet 75 mg (75 mg Oral Given 11/28/23 0014)       Diagnostic Studies  Results Reviewed       Procedure Component Value Units Date/Time    Rapid drug screen, urine [[id number]]  (Abnormal) Collected: 11/27/23 2244    Lab Status: Final result Specimen: Urine, Clean Catch Updated: 11/27/23 2310     Amph/Meth UR Negative     Barbiturate Ur Negative     Benzodiazepine Urine Negative     Cocaine Urine Negative     Methadone Urine Negative     Opiate Urine Negative     PCP Ur Negative     THC Urine Positive Oxycodone Urine Negative    Narrative:      Presumptive report. If requested, specimen will be sent to reference lab for confirmation. FOR MEDICAL PURPOSES ONLY. IF CONFIRMATION NEEDED PLEASE CONTACT THE LAB WITHIN 5 DAYS.     Drug Screen Cutoff Levels:  AMPHETAMINE/METHAMPHETAMINES  1000 ng/mL  BARBITURATES     200 ng/mL  BENZODIAZEPINES     200 ng/mL  COCAINE      300 ng/mL  METHADONE      300 ng/mL  OPIATES      300 ng/mL  PHENCYCLIDINE     25 ng/mL  THC       50 ng/mL  OXYCODONE      100 ng/mL    Urine Microscopic [[id number]]  (Abnormal) Collected: 11/27/23 2244    Lab Status: Final result Specimen: Urine, Clean Catch Updated: 11/27/23 2300     RBC, UA None Seen /hpf      WBC, UA 0-1 /hpf      Epithelial Cells Moderate /hpf      Bacteria, UA Innumerable /hpf     UA (URINE) with reflex to Scope [[id number]]  (Abnormal) Collected: 11/27/23 2244    Lab Status: Final result Specimen: Urine, Clean Catch Updated: 11/27/23 2251     Color, UA Lily     Clarity, UA Slightly Cloudy     Specific Gravity, UA >=1.030     pH, UA 5.5     Leukocytes, UA Negative     Nitrite, UA Positive     Protein, UA Negative mg/dl      Glucose, UA Negative mg/dl      Ketones, UA Negative mg/dl      Urobilinogen, UA 0.2 E.U./dl      Bilirubin, UA Negative     Occult Blood, UA Negative    Comprehensive metabolic panel [[id number]] Collected: 11/27/23 2209    Lab Status: Final result Specimen: Blood from Arm, Left Updated: 11/27/23 2235     Sodium 136 mmol/L      Potassium 3.8 mmol/L      Chloride 106 mmol/L      CO2 23 mmol/L      ANION GAP 7 mmol/L      BUN 15 mg/dL      Creatinine 0.89 mg/dL      Glucose 95 mg/dL      Calcium 9.1 mg/dL      AST 14 U/L      ALT 7 U/L      Alkaline Phosphatase 64 U/L      Total Protein 7.1 g/dL      Albumin 3.9 g/dL      Total Bilirubin 0.57 mg/dL      eGFR 63 ml/min/1.73sq m     Narrative:      [place] guidelines for Chronic Kidney Disease (CKD):     Stage 1 with normal or high GFR (GFR > 90 mL/min/1.73 square meters)    Stage 2 Mild CKD (GFR = 60-89 mL/min/1.73 square meters)    Stage 3A Moderate CKD (GFR = 45-59 mL/min/1.73 square meters)    Stage 3B Moderate CKD (GFR = 30-44 mL/min/1.73 square meters)    Stage 4 Severe CKD (GFR = 15-29 mL/min/1.73 square meters)    Stage 5 End Stage CKD (GFR <15 mL/min/1.73 square meters)  Note: GFR calculation is accurate only with a steady state creatinine    Magnesium [[id number]]  (Normal) Collected: 11/27/23 2209    Lab Status: Final result Specimen: Blood from Arm, Left Updated: 11/27/23 2235     Magnesium 1.9 mg/dL     Ethanol [[id number]]  (Normal) Collected: 11/27/23 2209    Lab Status: Final result Specimen: Blood from Arm, Left Updated: 11/27/23 2235     Ethanol Lvl <10 mg/dL     CBC and differential [[id number]] Collected: 11/27/23 2209    Lab Status: Final result Specimen: Blood from Arm, Left Updated: 11/27/23 2215     WBC 7.97 Thousand/uL      RBC 4.66 Million/uL      Hemoglobin 14.0 g/dL      Hematocrit 42.1 %      MCV 90 fL      MCH 30.0 pg      MCHC 33.3 g/dL      RDW 15.0 %      MPV 10.0 fL      Platelets 667 Thousands/uL      nRBC 0 /100 WBCs      Neutrophils Relative 63 %      Immat GRANS % 1 %      Lymphocytes Relative 25 %      Monocytes Relative 8 %      Eosinophils Relative 2 %      Basophils Relative 1 %      Neutrophils Absolute 5.06 Thousands/µL      Immature Grans Absolute 0.04 Thousand/uL      Lymphocytes Absolute 2.01 Thousands/µL      Monocytes Absolute 0.63 Thousand/µL      Eosinophils Absolute 0.18 Thousand/µL      Basophils Absolute 0.05 Thousands/µL                    No orders to display              Procedures  Procedures         ED Course                                             Medical Decision Making  Patient medically evaluated and cleared.  Crisis consulted, pending inpatient psych referral     Problems Addressed:  Depression: acute illness or injury  Suicidal ideations: acute illness or injury    Amount and/or Complexity of Data Reviewed  External Data Reviewed: notes. Labs: ordered. Decision-making details documented in ED Course. Risk  Prescription drug management. Disposition  Final diagnoses:   Depression   Suicidal ideations     Time reflects when diagnosis was documented in both MDM as applicable and the Disposition within this note       Time User Action Codes Description Comment    11/28/2023 12:27 AM Alana Barry.Tunde. A] Depression     11/28/2023 12:27 AM Mayda Barry [R45.851] Suicidal ideations           ED Disposition       ED Disposition   Transfer to [address] [place] [address]   --    Date/Time   Tue Nov 28, 2023 12:27 AM    Comment                   Follow-up Information    None         Patient's Medications   Discharge Prescriptions    No medications on file       No discharge procedures on file.     PDMP Review         Value Time User    PDMP Reviewed  Yes 8/10/2023  9:04 AM Yrn Bullock MD            ED Provider  Electronically Signed by             Eric Doyle DO  11/28/23 8485

## 2023-11-28 NOTE — ASSESSMENT & PLAN NOTE
Patient has quit smoking since her last inpatient admission in August 2023  Continue with the importance of continued abstinence from tobacco abuse

## 2023-11-28 NOTE — ASSESSMENT & PLAN NOTE
Left foot x-rays to evaluate her left heel pain  Once she is cleared of not having a fracture she will be able to continue with physical therapy  Podiatry consult already for her right great ingrown toenail as well as to evaluate her left heel pain

## 2023-11-28 NOTE — ASSESSMENT & PLAN NOTE
Vital signs stable afebrile patient seen and examined by myself Labs from morning ER 11/27/2023 late p.m. evaluated sodium 136 potassium 3.8 creatinine 0.89 urinalysis was positive it is being reflexed to a urine culture her drug screen was positive for THC and her alcohol screen was negative.   Patient medically cleared for admit   IM will sign off please call with any questions or concerns

## 2023-11-28 NOTE — ED NOTES
Patient is accepted at [facility]  Patient is accepted by Dr. Rapp Handler per Aida Corrigan @ Intake     Transportation is arranged with [employer]. Transportation is scheduled for 2pm with [facility]   Patient may go to the floor at 2pm           Nurse report is to be called to [phone] prior to patient transfer.       Angeline KRISHNAN

## 2023-11-29 ENCOUNTER — APPOINTMENT (INPATIENT)
Dept: RADIOLOGY | Facility: HOSPITAL | Age: 73
DRG: 885 | End: 2023-11-29
Payer: MEDICARE

## 2023-11-29 LAB
25(OH)D3 SERPL-MCNC: 16.7 NG/ML (ref 30–100)
EST. AVERAGE GLUCOSE BLD GHB EST-MCNC: 117 MG/DL
FOLATE SERPL-MCNC: 7.9 NG/ML
HBA1C MFR BLD: 5.7 %
T4 FREE SERPL-MCNC: 0.75 NG/DL (ref 0.61–1.12)
VIT B12 SERPL-MCNC: 510 PG/ML (ref 180–914)

## 2023-11-29 PROCEDURE — 99223 1ST HOSP IP/OBS HIGH 75: CPT | Performed by: STUDENT IN AN ORGANIZED HEALTH CARE EDUCATION/TRAINING PROGRAM

## 2023-11-29 PROCEDURE — 73630 X-RAY EXAM OF FOOT: CPT

## 2023-11-29 PROCEDURE — 83036 HEMOGLOBIN GLYCOSYLATED A1C: CPT | Performed by: NURSE PRACTITIONER

## 2023-11-29 PROCEDURE — 82607 VITAMIN B-12: CPT | Performed by: NURSE PRACTITIONER

## 2023-11-29 PROCEDURE — 82746 ASSAY OF FOLIC ACID SERUM: CPT | Performed by: NURSE PRACTITIONER

## 2023-11-29 PROCEDURE — 82306 VITAMIN D 25 HYDROXY: CPT | Performed by: NURSE PRACTITIONER

## 2023-11-29 PROCEDURE — 87086 URINE CULTURE/COLONY COUNT: CPT | Performed by: NURSE PRACTITIONER

## 2023-11-29 RX ORDER — ERGOCALCIFEROL 1.25 MG/1
50000 CAPSULE ORAL WEEKLY
Status: DISCONTINUED | OUTPATIENT
Start: 2023-11-29 | End: 2023-12-07 | Stop reason: HOSPADM

## 2023-11-29 RX ORDER — BUPROPION HYDROCHLORIDE 150 MG/1
150 TABLET ORAL DAILY
Status: DISCONTINUED | OUTPATIENT
Start: 2023-11-30 | End: 2023-12-07 | Stop reason: HOSPADM

## 2023-11-29 RX ADMIN — DONEPEZIL HYDROCHLORIDE 10 MG: 10 TABLET, FILM COATED ORAL at 21:31

## 2023-11-29 RX ADMIN — KETOTIFEN FUMARATE 1 DROP: 0.25 SOLUTION/ DROPS OPHTHALMIC at 21:31

## 2023-11-29 RX ADMIN — BUPROPION HYDROCHLORIDE 300 MG: 300 TABLET, EXTENDED RELEASE ORAL at 08:15

## 2023-11-29 RX ADMIN — SUCRALFATE 1 G: 1 TABLET ORAL at 21:31

## 2023-11-29 RX ADMIN — SUCRALFATE 1 G: 1 TABLET ORAL at 06:20

## 2023-11-29 RX ADMIN — NEBIVOLOL 5 MG: 5 TABLET ORAL at 08:15

## 2023-11-29 RX ADMIN — SUCRALFATE 1 G: 1 TABLET ORAL at 16:11

## 2023-11-29 RX ADMIN — BUSPIRONE HYDROCHLORIDE 5 MG: 5 TABLET ORAL at 17:11

## 2023-11-29 RX ADMIN — ESCITALOPRAM OXALATE 15 MG: 5 TABLET, FILM COATED ORAL at 08:15

## 2023-11-29 RX ADMIN — ACETAMINOPHEN 975 MG: 325 TABLET ORAL at 16:11

## 2023-11-29 RX ADMIN — FLUTICASONE PROPIONATE 1 SPRAY: 50 SPRAY, METERED NASAL at 08:39

## 2023-11-29 RX ADMIN — ERGOCALCIFEROL 50000 UNITS: 1.25 CAPSULE ORAL at 16:12

## 2023-11-29 RX ADMIN — TRAZODONE HYDROCHLORIDE 75 MG: 50 TABLET ORAL at 21:31

## 2023-11-29 RX ADMIN — SUCRALFATE 1 G: 1 TABLET ORAL at 12:39

## 2023-11-29 RX ADMIN — PANTOPRAZOLE SODIUM 20 MG: 20 TABLET, DELAYED RELEASE ORAL at 06:20

## 2023-11-29 RX ADMIN — FLUTICASONE FUROATE AND VILANTEROL TRIFENATATE 1 PUFF: 100; 25 POWDER RESPIRATORY (INHALATION) at 08:39

## 2023-11-29 RX ADMIN — KETOTIFEN FUMARATE 1 DROP: 0.25 SOLUTION/ DROPS OPHTHALMIC at 08:39

## 2023-11-29 RX ADMIN — BUSPIRONE HYDROCHLORIDE 5 MG: 5 TABLET ORAL at 08:15

## 2023-11-29 RX ADMIN — ASPIRIN 81 MG CHEWABLE TABLET 81 MG: 81 TABLET CHEWABLE at 08:15

## 2023-11-29 RX ADMIN — ALLOPURINOL 100 MG: 100 TABLET ORAL at 08:15

## 2023-11-29 NOTE — TREATMENT TEAM
11/29/23 0743   Team Meeting   Meeting Type Daily Rounds   Initial Conference Date 11/29/23   Team Members Present   Team Members Present Physician;Nurse;;   Physician Team Member Dr. Viktor Yañez, Oniel Lind   Nursing Team Member Rajani Sanders, [facility] Management Team Member [address] [place] [address] Work Team Member Shelia Jose   Patient/Family Present   Patient Present No   Patient's Family Present No   Pharmacy: [facility]     admit from yesterday. Previous admission 08/23. Patient endorses anxiety and depression, has psychosocial stressors at home. Patient is pleasant and cooperative with care. Medication compliant. No d/c date pending at this time.
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
## 2023-11-29 NOTE — TREATMENT PLAN
TREATMENT PLAN REVIEW - [address] Davian 74 y.o. 1950 female MRN: [id number]    [address] [place] [address] [place], [place] [address]U [id number] Encounter: [id number]          Admit Date/Time:  11/28/2023  2:35 PM    Treatment Team: Attending Provider: Nakul Shane MD; Consulting Physician: KYLE Robertson; Patient Care Assistant: Asif Villalobos; Patient Care Assistant: Praful Dupree; Patient Care Assistant: Yari Parham; Nurse Manager: Shameka Malhotra RN; Nurse Practitioner: Amarjit Farnsworth, [address] [place] [address];  Resident: Ha Espinoza DO; Patient Care Assistant: Maxwell Lundy    Diagnosis: Principal Problem:    Severe episode of recurrent major depressive disorder, without psychotic features ([address] )  Active Problems:    Tobacco abuse    Essential hypertension    Allergic rhinitis    Esophageal reflux    Vitamin B12 deficiency    Vitamin D deficiency    COPD without exacerbation ([address] [facility])    Medical clearance for psychiatric admission    History of Debra-en-Y gastric bypass    Chronic idiopathic gout of left foot    Generalized anxiety disorder    Ingrown right greater toenail    Pain of left heel      Patient Strengths/Assets: ability for insight, communication skills, motivation for treatment/growth, negotiates basic needs, patient is on a voluntary commitment, patient is willing to work on problems    Patient Barriers/Limitations: difficulty adapting, financial instability, lack of financial means, marital/family conflict, poor physical health, poor support system    Short Term Goals: decrease in depressive symptoms, decrease in anxiety symptoms, decrease in suicidal thoughts, ability to stay safe on the unit, ability to stay free of restraints, improvement in ability to express basic needs, improvement in insight, improvement in self care, sleep improvement, improvement in appetite, mood stabilization, increase in group attendance    Long Term Goals: improvement in depression, improvement in anxiety, stabilization of mood, free of suicidal thoughts, able to express basic needs, acceptance of psychiatric diagnosis, adequate self care, adequate sleep, adequate appetite, appropriate interaction with peers, appropriate interaction with family, stable living arrangements upon discharge, establishment of family support upon discharge    Progress Towards Goals: still has suicidal thoughts    Recommended Treatment: medication management, patient medication education, group therapy, milieu therapy, continued Behavioral Health psychiatric evaluation/assessment process    Treatment Frequency: daily medication monitoring, group and milieu therapy daily, monitoring through interdisciplinary rounds, monitoring through weekly patient care conferences    Expected Discharge Date:  Estimated LOS 7 days    Discharge Plan:  TBD    Treatment Plan Created/Updated By: Quirino Landrum DO

## 2023-11-29 NOTE — CONSULTS
[facility] - Consultation    Patient Information:   Bartolo Nascimento 74 y.o. female MRN: [id number]  Unit/Bed#: [facility] 611-08 Encounter: [id number]  PCP: Haven Mendoza MD  Date of Admission:  11/28/2023  Date of Consultation: 11/29/23  Requesting Physician: Levar Garcia MD      ASSESSMENT:    Bartolo Nascimento is a 74 y.o. 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 Performed slant back procedure of right hallux lateral nail border. Patient tolerated the procedure well. She had immediate relief postprocedure. Recommend physical therapy to address acute tendinitis, plantar fasciitis, and equinus. Patient to follow-up in office after discharge to discuss prefabricated versus custom orthotics. Discussed daily stretching routine for bilateral feet. Discussed the importance of supportive shoe gear and to avoid walking barefoot and with flip-flops. Will avoid oral steroids and NSAIDs at this time due to medication interactions and patient comorbidities. When patient follows up in office, can consider a steroid injection. Rest of care per primary team.    Weight Bearing Status: WBAT    SUBJECTIVE    History of Present Illness:    Bartolo Nascimento is a 74 y.o. female with past medical history of hypertension, GERD, COPD, major depressive disorder, history of gastric bypass, anxiety, and lower extremity gout who presents with left heel pain and right hallux pain. Patient states that the left heel pain was gradual in onset and is worse during ambulation. She notes a history of a left ankle fracture many years ago and was unsure if this was related. She rates the pain a 5/10, worst at the posterior heel.   Patient also has right hallux pain secondary to ingrown toenail. She states that she tried to remove it herself but was unsuccessful. Most the pain to the right hallux a 3/10. Denies any purulence or ascending cellulitis from the hallux. Denies any other lower extremity complaints at this time. Review of Systems:    12 point review of systems is negative unless otherwise specified in the above HPI. Past Medical and Surgical History:     Past Medical History:   Diagnosis Date    Acid reflux     Anxiety     Arthritis     Asthma     Cardiac disease     Chronic kidney disease     passed on own kidney stone    Depression     Diverticulitis     GERD (gastroesophageal reflux disease)     Hayfever     [place] (hard of hearing)     bilateral hearing aids    Hyperlipemia     Hypertension     Panic attack     Tachycardia        Past Surgical History:   Procedure Laterality Date    ABLATION OF DYSRHYTHMIC FOCUS      APPENDECTOMY      CHOLECYSTECTOMY      open    FRACTURE SURGERY      ORIF fx (L) tibia, fibula 2009    GASTRIC BYPASS OPEN      HYSTERECTOMY      NJ XCAPSL CTRC RMVL INSJ IO LENS PROSTH W/O ECP Left 4/18/2016    Procedure: EXTRACTION EXTRACAPSULAR CATARACT PHACO INTRAOCULAR LENS (IOL); Surgeon: Laura Mayo MD;  Location: [facility] MAIN OR;  Service: Ophthalmology    N[place] XCAPSL CTRC RMVL INSJ IO LENS PROSTH W/O ECP Right 3/1/2021    Procedure: EXTRACTION EXTRACAPSULAR CATARACT PHACO INTRAOCULAR LENS (IOL);   Surgeon: Laura Mayo MD;  Location: [facility] MAIN OR;  Service: Ophthalmology    TONSILECTOMY AND ADNOIDECTOMY         Meds/Allergies:    Facility-Administered Medications Prior to Admission   Medication    cyanocobalamin injection 1,000 mcg    cyanocobalamin injection 1,000 mcg     Medications Prior to Admission   Medication    albuterol (2.5 mg/3 mL) 0.083 % nebulizer solution    albuterol (PROVENTIL HFA,VENTOLIN HFA) 90 mcg/act inhaler    Apple Cider Vinegar 188 MG CAPS    aspirin (ECOTRIN LOW STRENGTH) 81 mg EC tablet Diclofenac Sodium (VOLTAREN) 1 %    nebivolol (BYSTOLIC) 5 mg tablet    allopurinol (ZYLOPRIM) 100 mg tablet    buPROPion (WELLBUTRIN XL) 300 mg 24 hr tablet    busPIRone (BUSPAR) 5 mg tablet    cyanocobalamin 1,000 mcg/mL    diphenhydramine, lidocaine, Al/Mg hydroxide, simethicone (Magic Mouthwash) SUSP    donepezil (ARICEPT) 10 mg tablet    ergocalciferol (VITAMIN D2) 50,000 units    escitalopram (LEXAPRO) 10 mg tablet    esomeprazole (NexIUM) 20 mg capsule    fluticasone (FLONASE) 50 mcg/act nasal spray    fluticasone-salmeterol (ADVAIR HFA) 115-21 MCG/ACT inhaler    Multiple Vitamin (MULTIVITAMIN) tablet    olopatadine (Patanol) 0.1 % ophthalmic solution    sucralfate (CARAFATE) 1 g tablet    traZODone (DESYREL) 150 mg tablet       Allergies   Allergen Reactions    Augmentin [Amoxicillin-Pot Clavulanate] GI Intolerance, Other (See Comments) and Hives     VOMITING  VOMITING  Reaction Date: 2004;     Sulfa Antibiotics Itching, Other (See Comments) and Hives     RASH, ITCHY  RASH, ITCHY    Morphine Other (See Comments)     "DOESN'T WORK"    Latex Rash     Unsure if this is an allergy       Social History:     Marital Status:      Substance Use History:   Social History     Substance and Sexual Activity   Alcohol Use Yes    Comment: rarely     Social History     Tobacco Use   Smoking Status Former    Packs/day: 0.25    Years: 25.00    Total pack years: 6.25    Types: Cigarettes    Quit date: 2023    Years since quittin.3    Passive exposure: Past   Smokeless Tobacco Never     Social History     Substance and Sexual Activity   Drug Use No       Family History:    Family History   Problem Relation Age of Onset    Heart disease Mother     Stroke Son     Stroke Maternal Grandfather     Breast cancer Daughter 36         OBJECTIVE:    Vitals:   Blood Pressure: 145/63 (23 0751)  Pulse: 57 (23 075)  Temperature: 98.5 °F (36.9 °C) (23 0751)  Temp Source: Temporal (23 6522)  Respirations: 16 (11/29/23 0751)  SpO2: 96 % (11/29/23 0751)    Physical Exam:     General Appearance: Alert, cooperative, no distress. HEENT: Head normocephalic, atraumatic, without obvious abnormality. Heart: Normal rate and rhythm. Lungs: Non-labored breathing. No respiratory distress. Abdomen: Without distension. Psychiatric: AAOx3  Lower Extremity:  Vascular:   DP/PT pedal pulses on the left are present. DP/PT pedal pulses on the right are present. CRT brisk at the digits. Pedal hair is absent. negative edema noted at bilateral lower extremities. Skin temperature is within normal limits bilaterally. Musculoskeletal:  MMT is 4/5 in all muscle compartments bilaterally. Patient had pain during resisted left foot plantarflexion. No pain during resisted dorsiflexion, inversion, or eversion. Pain on palpation of the left plantar fascia, plantar medial tubercle, posterior calcaneus, and insertion of the Achilles tendon. No pain to the remainder of the left foot. There is pain on palpation of the right hallux lateral nail border secondary to ingrown toenail. There is gastrosoleus equinus noted to bilateral lower extremities. Dermatological:  No ulcerations noted on examination. There is no ecchymosis or edema noted to the left lower extremity. There is a right hallux lateral nail border ingrown toenail, no erythema, no purulence. No fluctuance. No crepitation. No active drainage. No acute signs of infection. Neurological:  Gross sensation is intact. Protective sensation is intact. Patient Denies numbness and/or paresthesias.       Additional Data:     Lab Results: I have personally reviewed pertinent labs including:    Results from last 7 days   Lab Units 11/27/23 2209   WBC Thousand/uL 7.97   HEMOGLOBIN g/dL 14.0   HEMATOCRIT % 42.1   PLATELETS Thousands/uL 230   NEUTROS PCT % 63   LYMPHS PCT % 25   MONOS PCT % 8   EOS PCT % 2     Results from last 7 days   Lab Units 11/27/23 2209 POTASSIUM mmol/L 3.8   CHLORIDE mmol/L 106   CO2 mmol/L 23   BUN mg/dL 15   CREATININE mg/dL 0.89   CALCIUM mg/dL 9.1   ALK PHOS U/L 64   ALT U/L 7   AST U/L 14           Cultures: I have personally reviewed pertinent cultures including:              Imaging: I have personally reviewed pertinent films in PACS. EKG, Pathology, and Other Studies: I have personally reviewed pertinent reports.

## 2023-11-29 NOTE — NURSING NOTE
Pt constricted and withdrawn but pleasant on approach and med-compliant. Good appetite. Using rw with standby. VSS. Denied SI. Monitored for safety and support.

## 2023-11-29 NOTE — TREATMENT TEAM
Pt attended afternoon group. Pt anxious, hopeless and easily triggered (with other peers). Pt did indicate due to her past she gets anxious because she had to be a perfectionist growing up. Pt also mentioned past trauma and feels anxious. Pt overwhelmed with no longer at the same functioning level and ability to do things she was once able to (like cleaning/organizing)  Pt indicated she gets anxious when others do it but not at her standards. Pt did recognize therapist immediately from past admission. Provided a journal and stress ball by request.  Pt expressed ball helps her arthritis. Pt was concerned about her pain management needs. 11/29/23 1300   Activity/Group Checklist   Group Other (Comment)  (Boundaries and communication)   Attendance Attended   Attendance Duration (min) 46-60   Interactions Other (Comment)  (anxious)   Affect/Mood Wide   Goals Achieved Identified feelings; Identified relapse prevention strategies; Able to listen to others;Discussed coping strategies; Able to self-disclose; Able to engage in interactions

## 2023-11-29 NOTE — NURSING NOTE
The patient has been visible all evening. She is calm and cooperative with staff/peers. Patient endorses depression, with some anxiety. Denies SI/HI/AVH. The patient explained that she wasn't able to get her pills refilled, when she was out of the hospital and admits to not taking them for a couple days. Patient was med compliant this evening. No acting out behaviors. Safety checks ongoing.
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
## 2023-11-30 LAB — BACTERIA UR CULT: NORMAL

## 2023-11-30 PROCEDURE — 99232 SBSQ HOSP IP/OBS MODERATE 35: CPT | Performed by: STUDENT IN AN ORGANIZED HEALTH CARE EDUCATION/TRAINING PROGRAM

## 2023-11-30 RX ORDER — FLUTICASONE PROPIONATE 50 MCG
1 SPRAY, SUSPENSION (ML) NASAL 2 TIMES DAILY
Status: DISCONTINUED | OUTPATIENT
Start: 2023-11-30 | End: 2023-12-07 | Stop reason: HOSPADM

## 2023-11-30 RX ORDER — FLUTICASONE PROPIONATE 50 MCG
1 SPRAY, SUSPENSION (ML) NASAL DAILY
Status: DISCONTINUED | OUTPATIENT
Start: 2023-11-30 | End: 2023-11-30 | Stop reason: SDUPTHER

## 2023-11-30 RX ORDER — ONDANSETRON 4 MG/1
4 TABLET, ORALLY DISINTEGRATING ORAL EVERY 8 HOURS PRN
Status: DISCONTINUED | OUTPATIENT
Start: 2023-11-30 | End: 2023-12-07 | Stop reason: HOSPADM

## 2023-11-30 RX ORDER — BUSPIRONE HYDROCHLORIDE 5 MG/1
5 TABLET ORAL 3 TIMES DAILY
Status: DISCONTINUED | OUTPATIENT
Start: 2023-11-30 | End: 2023-12-07 | Stop reason: HOSPADM

## 2023-11-30 RX ORDER — IBUPROFEN 600 MG/1
600 TABLET ORAL EVERY 8 HOURS PRN
Status: DISCONTINUED | OUTPATIENT
Start: 2023-11-30 | End: 2023-12-07 | Stop reason: HOSPADM

## 2023-11-30 RX ADMIN — ESCITALOPRAM OXALATE 15 MG: 5 TABLET, FILM COATED ORAL at 08:11

## 2023-11-30 RX ADMIN — ALLOPURINOL 100 MG: 100 TABLET ORAL at 08:11

## 2023-11-30 RX ADMIN — SUCRALFATE 1 G: 1 TABLET ORAL at 12:31

## 2023-11-30 RX ADMIN — DONEPEZIL HYDROCHLORIDE 10 MG: 10 TABLET, FILM COATED ORAL at 21:22

## 2023-11-30 RX ADMIN — KETOTIFEN FUMARATE 1 DROP: 0.25 SOLUTION/ DROPS OPHTHALMIC at 08:27

## 2023-11-30 RX ADMIN — TRAZODONE HYDROCHLORIDE 75 MG: 50 TABLET ORAL at 21:22

## 2023-11-30 RX ADMIN — SUCRALFATE 1 G: 1 TABLET ORAL at 17:16

## 2023-11-30 RX ADMIN — PANTOPRAZOLE SODIUM 20 MG: 20 TABLET, DELAYED RELEASE ORAL at 06:17

## 2023-11-30 RX ADMIN — NEBIVOLOL 5 MG: 5 TABLET ORAL at 08:11

## 2023-11-30 RX ADMIN — SUCRALFATE 1 G: 1 TABLET ORAL at 06:17

## 2023-11-30 RX ADMIN — BUPROPION HYDROCHLORIDE 150 MG: 150 TABLET, EXTENDED RELEASE ORAL at 08:11

## 2023-11-30 RX ADMIN — BUSPIRONE HYDROCHLORIDE 5 MG: 5 TABLET ORAL at 21:27

## 2023-11-30 RX ADMIN — FLUTICASONE FUROATE AND VILANTEROL TRIFENATATE 1 PUFF: 100; 25 POWDER RESPIRATORY (INHALATION) at 08:27

## 2023-11-30 RX ADMIN — POLYETHYLENE GLYCOL 3350 17 G: 17 POWDER, FOR SOLUTION ORAL at 14:37

## 2023-11-30 RX ADMIN — IBUPROFEN 600 MG: 600 TABLET ORAL at 14:43

## 2023-11-30 RX ADMIN — ASPIRIN 81 MG CHEWABLE TABLET 81 MG: 81 TABLET CHEWABLE at 08:11

## 2023-11-30 RX ADMIN — BUSPIRONE HYDROCHLORIDE 5 MG: 5 TABLET ORAL at 08:11

## 2023-11-30 RX ADMIN — FLUTICASONE PROPIONATE 1 SPRAY: 50 SPRAY, METERED NASAL at 08:27

## 2023-11-30 RX ADMIN — BUSPIRONE HYDROCHLORIDE 5 MG: 5 TABLET ORAL at 17:16

## 2023-11-30 RX ADMIN — KETOTIFEN FUMARATE 1 DROP: 0.25 SOLUTION/ DROPS OPHTHALMIC at 21:56

## 2023-11-30 RX ADMIN — Medication 1 SPRAY: at 17:16

## 2023-11-30 RX ADMIN — SUCRALFATE 1 G: 1 TABLET ORAL at 21:23

## 2023-11-30 NOTE — PLAN OF CARE
Problem: Ineffective Coping  Goal: Cooperates with admission process  Description: Interventions:   - Complete admission process  Outcome: Progressing  Goal: Identifies ineffective coping skills  Outcome: Progressing  Goal: Identifies healthy coping skills  Outcome: Progressing  Goal: Demonstrates healthy coping skills  Outcome: Progressing  Goal: Participates in unit activities  Description: Interventions:  - Provide therapeutic environment   - Provide required programming   - Redirect inappropriate behaviors   Outcome: Progressing  Goal: Patient/Family participate in treatment and DC plans  Description: Interventions:  - Provide therapeutic environment  Outcome: Progressing  Goal: Patient/Family verbalizes awareness of resources  Outcome: Progressing  Goal: Understands least restrictive measures  Description: Interventions:  - Utilize least restrictive behavior  Outcome: Progressing  Goal: Free from restraint events  Description: - Utilize least restrictive measures   - Provide behavioral interventions   - Redirect inappropriate behaviors   Outcome: Progressing     Problem: Risk for Self Injury/Neglect  Goal: Treatment Goal: Remain safe during length of stay, learn and adopt new coping skills, and be free of self-injurious ideation, impulses and acts at the time of discharge  Outcome: Progressing  Goal: Verbalize thoughts and feelings  Description: Interventions:  - Assess and re-assess patient's lethality and potential for self-injury  - Engage patient in 1:1 interactions, daily, for a minimum of 15 minutes  - Encourage patient to express feelings, fears, frustrations, hopes  - Establish rapport/trust with patient   Outcome: Progressing  Goal: Refrain from harming self  Description: Interventions:  - Monitor patient closely, per order  - Develop a trusting relationship  - Supervise medication ingestion, monitor effects and side effects   Outcome: Progressing  Goal: Attend and participate in unit activities, including therapeutic, recreational, and educational groups  Description: Interventions:  - Provide therapeutic and educational activities daily, encourage attendance and participation, and document same in the medical record  - Obtain collateral information, encourage visitation and family involvement in care   Outcome: Progressing  Goal: Recognize maladaptive responses and adopt new coping mechanisms  Outcome: Progressing  Goal: Complete daily ADLs, including personal hygiene independently, as able  Description: Interventions:  - Observe, teach, and assist patient with ADLS  - Monitor and promote a balance of rest/activity, with adequate nutrition and elimination  Outcome: Progressing

## 2023-11-30 NOTE — PROGRESS NOTES
11/30/23 1416   Team Meeting   Meeting Type Tx Team Meeting   Initial Conference Date 11/30/23   Next Conference Date 12/29/23   Team Members Present   Team Members Present Physician;Nurse;   Physician Team Member Dr Sariah Teixeira Team Member [facility] [place] Management Team Member [facility] TRANSITIONAL CARE & REH[facility]ILITATION   Patient/Family Present   Patient Present Yes   Patient's Family Present No     Met with the patient to go over their treatment plan. Goals discussed were to have a decrease in depressive symptoms, decrease in anxiety symptoms, decrease in suicidal thoughts, ability to stay safe on the unit, ability to stay free of restraints, improvement in ability to express basic needs, improvement in insight, improvement in self care, sleep improvement, improvement in appetite, mood stabilization, increase in group attendance. Goals can be attained through medication management and group/milieu therapy. Patient agrees with their treatment plan.

## 2023-11-30 NOTE — NURSING NOTE
Pt is pleasant on approach. Bright with peers and staff. Pt c/o anxiety. She is visible in the milieu intermittently. Med/meal compliant. Currently denying any unmet needs.

## 2023-11-30 NOTE — PROGRESS NOTES
Progress Note - Behavioral Health   Sergio Philippe 74 y.o. female MRN: [id number]  Unit/Bed#: Diana Greco 098-70 Encounter: [id number]    Assessment/Plan   Principal Problem:    Severe episode of recurrent major depressive disorder, without psychotic features ([address])  Active Problems:    Tobacco abuse    Essential hypertension    Allergic rhinitis    Esophageal reflux    Vitamin B12 deficiency    Vitamin D deficiency    COPD without exacerbation ([facility])    Medical clearance for psychiatric admission    History of Debra-en-Y gastric bypass    Chronic idiopathic gout of left foot    Generalized anxiety disorder    Ingrown right greater toenail    Pain of left heel      Recommended Treatment:   Increase buspar to 5 mg tid for anxiety  Continue all other psychiatric medications as prescribed. Patient hypertensive 140s-150s, can consider adjusting antihypertensives per [facility]  OT eval    Continue with group therapy, milieu therapy and occupational therapy. Continue frequent safety checks and vitals per unit protocol. Case discussed with treatment team.  Risks, benefits and possible side effects of Medications: Risks, benefits, and possible side effects of medications have been explained to the patient, who verbalizes understanding    Current Legal Status: 201  Disposition: TBD  ------------------------------------------------------------    Subjective: Per nursing report, Mireya Gomez has been cooperative on the unit and compliant with scheduled medications. Appears anxious and at times depressed. Focused on speaking with SW. PRNs: Tylenol     Today, Mireya Gomez was seen and evaluated for continuity of care. On evaluation, patient is constricted in affect, brighter than previous and more reactive. She reports feeling "hopeless" at times but was happy to wake up today and is feeling hopeful to speak with CM, though is a "nervous wreck" regarding her financial stressors.  Patient states she slept overnight without any issues but is still feeling tired and wants to catch up on sleep as she had not been sleeping well for the past 4 days. She reports an adequate appetite for breakfast this morning and reports finishing most of her food. She denies medication side effects at this time. She denies active/passive SI/HI/AVH, though reports hostile feelings towards her sons without active HI. She reports attending relapse prevention group and felt it was "helpful" and her takeaway was that it is okay to color if she finds it to be relaxing and should not feel it is a useless activity/have guilt for coloring. Patient reports she has not had a BM in 3 days, agreeable to [employer]. Relayed to RN. This writer attempted a few times to contact patient's son Ann Lakhani, who requested call for updates per RN. Left VM. Progress Toward Goals: slow improvement    Psychiatric Review of Systems:  Behavior over the last 24 hours:  improving slowly  Sleep: improving  Appetite: adequate  Medication side effects: none verbalized  ROS: Complete review of systems is negative except as noted above.     Vital signs in last 24 hours:   Temp:  [97.7 °F (36.5 °C)-99.3 °F (37.4 °C)] 99.3 °F (37.4 °C)  HR:  [57-81] 57  Resp:  [14-18] 16  BP: (125-155)/(63-68) 155/68    Mental Status Exam:  Appearance:  alert, good eye contact, appears stated age, and appropriate grooming and hygiene   Behavior:  calm, cooperative, and laying in bed   Motor: no abnormal movements and stable gait with assistive device   Speech:  spontaneous, clear, normal rate, normal volume, coherent, and talkative   Mood:  "hopeless" at times   Affect:  constricted, brighter than previous, and more reactive   Thought Process:  Less perseverative, less circumstantial   Thought Content: ruminating thoughts, negative thoughts but improving   Perceptual disturbances: no reported hallucinations and does not appear to be responding to internal stimuli at this time   Risk Potential: No active or passive suicidal or homicidal ideation was verbalized during interview   Cognition: oriented to self and situation, appears to be of average intelligence, and cognition not formally tested   Insight:  Limited   Judgment: Limited     Current Medications:  Current Facility-Administered Medications   Medication Dose Route Frequency Provider Last Rate    acetaminophen  650 mg Oral Q4H PRN Amanda Maier, YEYO      acetaminophen  650 mg Oral Q4H PRN Amanda Maier PA-C      albuterol  2 puff Inhalation Q6H PRN [facility], YEYO      allopurinol  100 mg Oral Daily [facility], YEYO      aluminum-magnesium hydroxide-simethicone  30 mL Oral Q4H PRN [facility], YEYO      aspirin  81 mg Oral Daily [facility], YEYO      benztropine  1 mg Intramuscular Q4H PRN Max 6/day [facility], YEYO      benztropine  0.5 mg Oral Q4H PRN Max 6/day [facility], YEYO      bisacodyl  10 mg Rectal Daily PRN [facility], YEYO      buPROPion  150 mg Oral Daily Claudia Richards MD      busPIRone  5 mg Oral TID [facility], DO      donepezil  10 mg Oral HS Amanda Maier PA-C      ergocalciferol  50,000 Units Oral Weekly Barnetta Salts, CRNP      escitalopram  15 mg Oral Daily [facility]      fluticasone  1 spray Each Nare BID Barnetta Salts, CRNP      Fluticasone Furoate-Vilanterol  1 puff Inhalation Daily [place], [place]      glycerin-hypromellose-  1 drop Both Eyes Q3H PRN [facility], YEYO      haloperidol lactate  5 mg Intramuscular Q4H PRN Max 4/day [facility], YEYO      hydrOXYzine HCL  25 mg Oral Q6H PRN Max 4/day [facility], YEYO      hydrOXYzine HCL  50 mg Oral Q6H PRN Max 4/day [facility], YEYO      ibuprofen  600 mg Oral Q8H PRN Barnetta Salts, CRNP      Ketotifen Fumarate  1 drop Both Eyes BID [facility], PA-C      LORazepam  1 mg Intramuscular Q6H PRN Max 3/day Amanda Maier PA-C nebivolol  5 mg Oral Daily Munira [facility], YEYO      ondansetron  4 mg Oral Q8H PRN KYLE Murray      pantoprazole  20 mg Oral Early Morning Munira [facility], YEYO      polyethylene glycol  17 g Oral Daily PRN [facility], YEYO      propranolol  5 mg Oral Q8H PRN [facility], PA-C      risperiDONE  0.25 mg Oral Q4H PRN Max 6/day Munira [facility], PA-C      risperiDONE  0.5 mg Oral Q4H PRN Max 3/day Dairl Kenyatta, PA-C      risperiDONE  1 mg Oral Q2H PRN Max 3/day Dairl Kenyatta, YEYO      senna-docusate sodium  1 tablet Oral Daily PRN [facility], YEYO      sucralfate  1 g Oral 4x Daily (AC & HS) Dairl Kenyatta, PA-C      traZODone  50 mg Oral HS PRN Dairl Kenyatta, PA-C      traZODone  50 mg Oral Q6H PRN Max 3/day Dairl Kenyatta, PA-C      traZODone  75 mg Oral HS [facility], PA-C         Behavioral Health Medications: all current active meds have been reviewed. Changes as in plan section above. Laboratory results:  I have personally reviewed all pertinent laboratory/tests results. Recent Results (from the past 48 hour(s))   Vitamin B12    Collection Time: 11/29/23  4:45 AM   Result Value Ref Range    Vitamin B-12 510 180 - 914 pg/mL   Vitamin D 25 hydroxy    Collection Time: 11/29/23  4:45 AM   Result Value Ref Range    Vit D, 25-Hydroxy 16.7 (L) 30.0 - 100.0 ng/mL   Folate    Collection Time: 11/29/23  4:45 AM   Result Value Ref Range    Folate 7.9 >5.9 ng/mL   Hemoglobin A1C    Collection Time: 11/29/23  4:45 AM   Result Value Ref Range    Hemoglobin A1C 5.7 (H) Normal 4.0-5.6%; PreDiabetic 5.7-6.4%;  Diabetic >=6.5%; Glycemic control for adults with diabetes <7.0% %     mg/dl        Wilma Cameron DO
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
## 2023-11-30 NOTE — NURSING NOTE
The patient was visible all evening in the day room coloring and having conversation with peers. Patient was calm and cooperative. The patient denies depression, but endorses some anxiety. Patient is very focused on talking with  tomorrow. Denies SI/HI/AVH. Patient was med compliant. Safety checks ongoing.

## 2023-11-30 NOTE — TREATMENT TEAM
Pt attended all groups. Pt anxious and able ot self express. Pt expressed concerns about her pain, trauma and functioning. 11/30/23 1000   Activity/Group Checklist   Group Community meeting   Attendance Attended   Attendance Duration (min) 31-45   Interactions Interacted appropriately   Affect/Mood Other (Comment)  (anxious)   Goals Achieved Identified feelings; Identified relapse prevention strategies; Identified distorted thoughts/beliefs; Able to engage in interactions; Able to listen to others

## 2023-12-01 ENCOUNTER — TELEPHONE (OUTPATIENT)
Dept: PSYCHIATRY | Facility: CLINIC | Age: 73
End: 2023-12-01

## 2023-12-01 PROCEDURE — 97167 OT EVAL HIGH COMPLEX 60 MIN: CPT

## 2023-12-01 PROCEDURE — 99232 SBSQ HOSP IP/OBS MODERATE 35: CPT | Performed by: STUDENT IN AN ORGANIZED HEALTH CARE EDUCATION/TRAINING PROGRAM

## 2023-12-01 RX ADMIN — NEBIVOLOL 5 MG: 5 TABLET ORAL at 08:39

## 2023-12-01 RX ADMIN — SUCRALFATE 1 G: 1 TABLET ORAL at 11:47

## 2023-12-01 RX ADMIN — TRAZODONE HYDROCHLORIDE 75 MG: 50 TABLET ORAL at 21:23

## 2023-12-01 RX ADMIN — BUSPIRONE HYDROCHLORIDE 5 MG: 5 TABLET ORAL at 17:06

## 2023-12-01 RX ADMIN — TRAZODONE HYDROCHLORIDE 50 MG: 50 TABLET ORAL at 23:14

## 2023-12-01 RX ADMIN — Medication 1 SPRAY: at 08:34

## 2023-12-01 RX ADMIN — DONEPEZIL HYDROCHLORIDE 10 MG: 10 TABLET, FILM COATED ORAL at 21:23

## 2023-12-01 RX ADMIN — Medication 1 SPRAY: at 17:06

## 2023-12-01 RX ADMIN — SUCRALFATE 1 G: 1 TABLET ORAL at 06:16

## 2023-12-01 RX ADMIN — SUCRALFATE 1 G: 1 TABLET ORAL at 17:06

## 2023-12-01 RX ADMIN — SUCRALFATE 1 G: 1 TABLET ORAL at 21:22

## 2023-12-01 RX ADMIN — FLUTICASONE FUROATE AND VILANTEROL TRIFENATATE 1 PUFF: 100; 25 POWDER RESPIRATORY (INHALATION) at 08:33

## 2023-12-01 RX ADMIN — ASPIRIN 81 MG CHEWABLE TABLET 81 MG: 81 TABLET CHEWABLE at 08:10

## 2023-12-01 RX ADMIN — KETOTIFEN FUMARATE 1 DROP: 0.25 SOLUTION/ DROPS OPHTHALMIC at 21:24

## 2023-12-01 RX ADMIN — BUPROPION HYDROCHLORIDE 150 MG: 150 TABLET, EXTENDED RELEASE ORAL at 08:10

## 2023-12-01 RX ADMIN — KETOTIFEN FUMARATE 1 DROP: 0.25 SOLUTION/ DROPS OPHTHALMIC at 08:34

## 2023-12-01 RX ADMIN — PANTOPRAZOLE SODIUM 20 MG: 20 TABLET, DELAYED RELEASE ORAL at 06:16

## 2023-12-01 RX ADMIN — ALLOPURINOL 100 MG: 100 TABLET ORAL at 08:10

## 2023-12-01 RX ADMIN — BUSPIRONE HYDROCHLORIDE 5 MG: 5 TABLET ORAL at 08:10

## 2023-12-01 RX ADMIN — BUSPIRONE HYDROCHLORIDE 5 MG: 5 TABLET ORAL at 21:24

## 2023-12-01 RX ADMIN — ESCITALOPRAM OXALATE 15 MG: 5 TABLET, FILM COATED ORAL at 08:10

## 2023-12-01 RX ADMIN — IBUPROFEN 600 MG: 600 TABLET ORAL at 15:34

## 2023-12-01 NOTE — TREATMENT TEAM
12/01/23 1534   Pain Assessment   Pain Assessment Tool 0-10   Pain Score 7   Pain Location/Orientation Orientation: Left; Other (Comment)  (ankle)   Pain Radiating Towards leg   Pain Onset/Description Onset: Ongoing   Effect of Pain on Daily Activities limits mobility   Patient's Stated Pain Goal No pain   Hospital Pain Intervention(s) Medication (See MAR)   Multiple Pain Sites No     Patient was offered and accepted Ibuprofen  MG PO for severe left ankle pain. Will reassess.

## 2023-12-01 NOTE — PROGRESS NOTES
12/01/23 0811   Team Meeting   Meeting Type Daily Rounds   Initial Conference Date 12/01/23   Next Conference Date 12/04/23   Team Members Present   Team Members Present Physician;Nurse;;   Physician Team Member Dr Nelly Serra, Dr Amaya Bauman, Dr Fletcher Wright Team Member [facility] Management Team Member [facility]   Social Work Team Member Rene   Patient/Family Present   Patient Present No   Patient's Family Present No     Deny s/s, slept, overwhelmed with psychosocial stressors.

## 2023-12-01 NOTE — PROGRESS NOTES
12/01/23 1100   Activity/Group Checklist   Group Exercise  (Vitality group)   Attendance Attended   Attendance Duration (min) 31-45   Interactions Interacted appropriately   Affect/Mood Appropriate   Goals Achieved Identified feelings; Able to engage in interactions; Able to listen to others; Able to reflect/comment on own behavior;Able to self-disclose; Able to recieve feedback; Able to give feedback to another

## 2023-12-01 NOTE — CASE MANAGEMENT
Called the patient's son Dante Saha, [phone], regarding the mortgage company's contact information, left voicemail to call this writer back.

## 2023-12-01 NOTE — TREATMENT TEAM
12/01/23 1634   Pain Assessment Post Intervention   Pain Assessment Tool Used: 0-10   Post Intervention Pain Score 3   Post Intervention Pain Location/Orientation Orientation: Left; Other (Comment)  (ankle)   Post Intervention POSS 1   Response to Interventions effective     Patient reports a decrease in pain after taking Tylenol 650 MG PO. Medication effective.

## 2023-12-01 NOTE — TREATMENT TEAM
Pt attended am group. Pt expressed improved moods however anxious about needs outside of hospital.  Pt did indicate she needs "to ask for help"  and being comfortable making needs known and accepting help. Pt expressed changes in function as intimidating. 12/01/23 1000   Activity/Group Checklist   Group Community meeting   Attendance Attended   Attendance Duration (min) 31-45   Interactions Interacted appropriately   Affect/Mood Appropriate   Goals Achieved Identified feelings; Discussed coping strategies; Identified relapse prevention strategies; Able to listen to others; Able to engage in interactions; Able to reflect/comment on own behavior;Able to manage/cope with feelings; Able to self-disclose;Verbalized increased hopefulness; Able to recieve feedback

## 2023-12-01 NOTE — NURSING NOTE
Pt social with peers and attended group. Good appetite and steady gait. VSS. Attended group. Denied SI. Monitored for safety and support.

## 2023-12-01 NOTE — PROGRESS NOTES
Progress Note - 16 Vanessa Marcelo 74 y.o. female MRN: [id number]  Unit/Bed#: Michelet Brooks 578-77 Encounter: [id number]    Patient was seen today for continuation of care, records reviewed and patient was discussed with the morning case review team.    Arpan Dotson was seen today for psychiatric follow-up. On assessment today, Arpan Dotson was pleasant and noted to be smiling. Stating that her anxiety has greatly improved since speaking with case management, patient reports that she is feeling "much, much better". No changes in sleep or appetite reported. BuSpar recently increased to 5 mg 3 times daily as well as Wellbutrin down titrated to 150 mg daily for depressive symptoms. Patient continues to tolerate 15 Lexapro daily as well as trazodone 75 mg nightly for insomnia. No medication changes to be made at this time. Discharge disposition ongoing as patient will need additional resources due to elevated psychosocial stressors. Arpan Dotson denies acute suicidal/self-harm ideation/intent/plan upon direct inquiry at this time. Arpan Dotson remains behaviorally appropriate, no agitation or aggression noted on exam or in report. Arpan Dotson also denies HI/AH/VH, and does not appear overtly manic. No overt delusions or paranoia are verbalized. Impulse control is intact. Arpan Dotson remains adherent to her current psychotropic medication regimen and denies any side effects from medications, as well as none noted on exam.    Vitals:  Vitals:    12/01/23 0749   BP: 135/60   Pulse: 55   Resp: 17   Temp: 99.5 °F (37.5 °C)   SpO2: 95%       Laboratory Results:  I have personally reviewed all pertinent laboratory/tests results.   Most Recent Labs:   Lab Results   Component Value Date    WBC 7.97 11/27/2023    RBC 4.66 11/27/2023    HGB 14.0 11/27/2023    HCT 42.1 11/27/2023     11/27/2023    RDW 15.0 11/27/2023    NEUTROABS 5.06 11/27/2023    SODIUM 136 11/27/2023    K 3.8 11/27/2023     11/27/2023    CO2 23 11/27/2023    BUN 15 11/27/2023    CREATININE 0.89 11/27/2023    GLUC 95 11/27/2023    GLUF 99 05/21/2021    CALCIUM 9.1 11/27/2023    AST 14 11/27/2023    ALT 7 11/27/2023    ALKPHOS 64 11/27/2023    TP 7.1 11/27/2023    ALB 3.9 11/27/2023    TBILI 0.57 11/27/2023    CHOLESTEROL 154 05/21/2021    HDL 38 (L) 05/21/2021    TRIG 73 05/21/2021    LDLCALC 101 (H) 05/21/2021    NONHDLC 116 05/21/2021    [id number] 4.978 (H) 11/27/2023    FREET4 0.75 11/27/2023    HGBA1C 5.7 (H) 11/29/2023     11/29/2023       Psychiatric Review of Systems:  Behavior over the last 24 hours:  unchanged. Sleep: good  Appetite: good  Medication side effects: none  ROS: no complaints, denies shortness of breath or chest pain and all other systems are negative for acute changes    Mental Status Evaluation:  Appearance:  dressed appropriately, adequate grooming   Behavior:  cooperative, calm   Speech:  normal rate and volume   Mood:  less anxious, less depressed   Affect:  slightly brighter   Thought Process:  linear   Thought Content:  no overt delusions   Perceptual Disturbances: denies auditory hallucinations when asked, does not appear responding to internal stimuli   Risk Potential: Suicidal ideation - None  Homicidal ideation - None  Potential for aggression - No   Memory:  recent and remote memory grossly intact   Sensorium  person, place, and time/date      Consciousness:  alert and awake   Attention: attention span and concentration are age appropriate   Insight:  improving   Judgment: improving   Gait/Station: normal gait/station   Motor Activity: no abnormal movements   Progress Toward Goals:   Jose Lua is progressing towards goals of inpatient psychiatric treatment by continued medication compliance and is attending therapeutic modalities on the milieu.  However, the patient continues to require inpatient psychiatric hospitalization for continued medication management and titration to optimize symptom reduction, improve sleep hygiene, and demonstrate adequate self-care. Assessment/Plan   Principal Problem:    Severe episode of recurrent major depressive disorder, without psychotic features ([address])  Active Problems:    Tobacco abuse    Essential hypertension    Allergic rhinitis    Esophageal reflux    Vitamin B12 deficiency    Vitamin D deficiency    COPD without exacerbation ([facility])    Medical clearance for psychiatric admission    History of Debra-en-Y gastric bypass    Chronic idiopathic gout of left foot    Generalized anxiety disorder    Ingrown right greater toenail    Pain of left heel      Recommended Treatment: Treatment plan and medication changes discussed and per the attending physician the plan is: 1. Continue with group therapy, milieu therapy and occupational therapy  2. Behavioral Health checks every 7 minutes  3. Continue frequent safety checks and vitals per unit protocol  4. Continue with [facility] medical management as indicated  5. Continue with current medication regimen  6. Will review labs in the a.m. 7.Disposition Planning: Discharge planning and efforts remain ongoing    Behavioral Health Medications: all current active meds have been reviewed and continue current psychiatric medications.   Current Facility-Administered Medications   Medication Dose Route Frequency Provider Last Rate    acetaminophen  650 mg Oral Q4H PRN [facility] YEYO Farias      acetaminophen  650 mg Oral Q4H PRN Pily YEYO      albuterol  2 puff Inhalation Q6H PRN [facility] [place], PA-SAMIA      allopurinol  100 mg Oral Daily [facility], PA-SAMIA      aluminum-magnesium hydroxide-simethicone  30 mL Oral Q4H PRN [facility]      aspirin  81 mg Oral Daily [facility], PA-SAMIA      benztropine  1 mg Intramuscular Q4H PRN Max 6/day [facility], PA-SAMIA      benztropine  0.5 mg Oral Q4H PRN Max 6/day [facility], YEYO      bisacodyl  10 mg Rectal Daily PRN [facility] StiYEYO oreilly      buPROPion  150 mg Oral Daily Shady Galvan MD      busPIRone  5 mg Oral TID Wilma Cameron DO      donepezil  10 mg Oral HS Dairl Kenyatta, YEYO      ergocalciferol  50,000 Units Oral Weekly KYLE Murray      escitalopram  15 mg Oral Daily [facility], PA-C      fluticasone  1 spray Each Nare BID Elizabet Greer, KYLE      Fluticasone Furoate-Vilanterol  1 puff Inhalation Daily [facility]      glycerin-hypromellose-  1 drop Both Eyes Q3H PRN [facility], PA-C      haloperidol lactate  5 mg Intramuscular Q4H PRN Max 4/day [facility], PA-C      hydrOXYzine HCL  25 mg Oral Q6H PRN Max 4/day [facility], PA-C      hydrOXYzine HCL  50 mg Oral Q6H PRN Max 4/day [facility], PA-C      ibuprofen  600 mg Oral Q8H PRN [facility] maldonado      Ketotifen Fumarate  1 drop Both Eyes BID [facility], PA-C      LORazepam  1 mg Intramuscular Q6H PRN Max 3/day [facility], PAMARYLIN      nebivolol  5 mg Oral Daily [facility], PA-C      ondansetron  4 mg Oral Q8H PRN [facility], KYLE      pantoprazole  20 mg Oral Early Morning [facility], PA-C      polyethylene glycol  17 g Oral Daily PRN [facility], PA-C      propranolol  5 mg Oral Q8H PRN [facility], PA-C      risperiDONE  0.25 mg Oral Q4H PRN Max 6/day [facility], PA-C      risperiDONE  0.5 mg Oral Q4H PRN Max 3/day Dairl Kenyatta, PA-C      risperiDONE  1 mg Oral Q2H PRN Max 3/day Dairl Kenyatta, PA-C      senna-docusate sodium  1 tablet Oral Daily [facility], PA-C      sucralfate  1 g Oral 4x Daily (AC & HS) Dairl Kenyatta, PA-C      traZODone  50 mg Oral HS PRN Dairl Kenyatta, PA-C      traZODone  50 mg Oral Q6H PRN Max 3/day Dairl YEYO You      traZODone  75 mg Oral HS Munira Hernandez PA-C         Risks / Benefits of Treatment:  Risks, benefits, and possible side effects of medications explained to patient and patient verbalizes understanding and agreement for treatment. Counseling / Coordination of Care:  Patient's progress reviewed with nursing staff. Medications, treatment progress and treatment plan reviewed with patient. Supportive counseling provided to the patient. Total floor/unit time spent today 25 minutes. Greater than 50% of total time was spent with the patient and / or family counseling and / or coordination of care. A description of the counseling / coordination of care: medication education, treatment plan, supportive therapy. This note was completed in part utilizing Dragon dictation Software. Grammatical, translation, syntax errors, random word insertions, spelling mistakes, and incomplete sentences may be an occasional consequence of this system secondary to software limitations with voice recognition, ambient noise, and hardware issues.  If you have any questions or concerns about the content, text, or information contained within the body of this dictation, please contact the provider for clarification

## 2023-12-01 NOTE — PLAN OF CARE
Problem: Ineffective Coping  Goal: Cooperates with admission process  Description: Interventions:   - Complete admission process  Outcome: Progressing  Goal: Identifies ineffective coping skills  Outcome: Progressing  Goal: Identifies healthy coping skills  Outcome: Progressing  Goal: Demonstrates healthy coping skills  Outcome: Progressing  Goal: Participates in unit activities  Description: Interventions:  - Provide therapeutic environment   - Provide required programming   - Redirect inappropriate behaviors   Outcome: Progressing  Goal: Patient/Family participate in treatment and DC plans  Description: Interventions:  - Provide therapeutic environment  Outcome: Progressing  Goal: Patient/Family verbalizes awareness of resources  Outcome: Progressing  Goal: Understands least restrictive measures  Description: Interventions:  - Utilize least restrictive behavior  Outcome: Progressing  Goal: Free from restraint events  Description: - Utilize least restrictive measures   - Provide behavioral interventions   - Redirect inappropriate behaviors   Outcome: Progressing     Problem: Depression  Goal: Treatment Goal: Demonstrate behavioral control of depressive symptoms, verbalize feelings of improved mood/affect, and adopt new coping skills prior to discharge  Outcome: Progressing  Goal: Verbalize thoughts and feelings  Description: Interventions:  - Assess and re-assess patient's level of risk   - Engage patient in 1:1 interactions, daily, for a minimum of 15 minutes   - Encourage patient to express feelings, fears, frustrations, hopes   Outcome: Progressing  Goal: Refrain from harming self  Description: Interventions:  - Monitor patient closely, per order   - Supervise medication ingestion, monitor effects and side effects   Outcome: Progressing  Goal: Refrain from isolation  Description: Interventions:  - Develop a trusting relationship   - Encourage socialization   Outcome: Progressing  Goal: Refrain from self-neglect  Outcome: Progressing  Goal: Attend and participate in unit activities, including therapeutic, recreational, and educational groups  Description: Interventions:  - Provide therapeutic and educational activities daily, encourage attendance and participation, and document same in the medical record   Outcome: Progressing  Goal: Complete daily ADLs, including personal hygiene independently, as able  Description: Interventions:  - Observe, teach, and assist patient with ADLS  -  Monitor and promote a balance of rest/activity, with adequate nutrition and elimination   Outcome: Progressing     Problem: Anxiety  Goal: Anxiety is at manageable level  Description: Interventions:  - Assess and monitor patient's anxiety level. - Monitor for signs and symptoms (heart palpitations, chest pain, shortness of breath, headaches, nausea, feeling jumpy, restlessness, irritable, apprehensive). - Collaborate with interdisciplinary team and initiate plan and interventions as ordered.   - [place] patient to unit/surroundings  - Explain treatment plan  - Encourage participation in care  - Encourage verbalization of concerns/fears  - Identify coping mechanisms  - Assist in developing anxiety-reducing skills  - Administer/offer alternative therapies  - Limit or eliminate stimulants  Outcome: Progressing

## 2023-12-01 NOTE — TELEPHONE ENCOUNTER
Patient is calling regarding cancelling an appointment. Date/Time: 12/04/23 @ 10:30 AM    Reason:  In patient at [facility]    Patient was rescheduled: YES [] NO [x]  If yes, when was Patient reschedule for: next appt 12/18/23 @ 10:30 am    Patient requesting call back to reschedule: YES [] NO [x]

## 2023-12-02 PROCEDURE — 99232 SBSQ HOSP IP/OBS MODERATE 35: CPT | Performed by: STUDENT IN AN ORGANIZED HEALTH CARE EDUCATION/TRAINING PROGRAM

## 2023-12-02 RX ADMIN — ESCITALOPRAM OXALATE 15 MG: 5 TABLET, FILM COATED ORAL at 08:47

## 2023-12-02 RX ADMIN — ASPIRIN 81 MG CHEWABLE TABLET 81 MG: 81 TABLET CHEWABLE at 08:48

## 2023-12-02 RX ADMIN — SUCRALFATE 1 G: 1 TABLET ORAL at 07:11

## 2023-12-02 RX ADMIN — IBUPROFEN 600 MG: 600 TABLET ORAL at 10:07

## 2023-12-02 RX ADMIN — Medication 1 SPRAY: at 17:15

## 2023-12-02 RX ADMIN — TRAZODONE HYDROCHLORIDE 75 MG: 50 TABLET ORAL at 21:30

## 2023-12-02 RX ADMIN — DONEPEZIL HYDROCHLORIDE 10 MG: 10 TABLET, FILM COATED ORAL at 21:31

## 2023-12-02 RX ADMIN — BUSPIRONE HYDROCHLORIDE 5 MG: 5 TABLET ORAL at 15:03

## 2023-12-02 RX ADMIN — BUPROPION HYDROCHLORIDE 150 MG: 150 TABLET, EXTENDED RELEASE ORAL at 08:48

## 2023-12-02 RX ADMIN — KETOTIFEN FUMARATE 1 DROP: 0.25 SOLUTION/ DROPS OPHTHALMIC at 08:49

## 2023-12-02 RX ADMIN — NEBIVOLOL 5 MG: 5 TABLET ORAL at 08:40

## 2023-12-02 RX ADMIN — SUCRALFATE 1 G: 1 TABLET ORAL at 15:03

## 2023-12-02 RX ADMIN — Medication 1 SPRAY: at 08:49

## 2023-12-02 RX ADMIN — SUCRALFATE 1 G: 1 TABLET ORAL at 10:54

## 2023-12-02 RX ADMIN — SUCRALFATE 1 G: 1 TABLET ORAL at 21:31

## 2023-12-02 RX ADMIN — ALLOPURINOL 100 MG: 100 TABLET ORAL at 08:48

## 2023-12-02 RX ADMIN — BUSPIRONE HYDROCHLORIDE 5 MG: 5 TABLET ORAL at 08:48

## 2023-12-02 RX ADMIN — BUSPIRONE HYDROCHLORIDE 5 MG: 5 TABLET ORAL at 21:31

## 2023-12-02 RX ADMIN — FLUTICASONE FUROATE AND VILANTEROL TRIFENATATE 1 PUFF: 100; 25 POWDER RESPIRATORY (INHALATION) at 07:44

## 2023-12-02 RX ADMIN — KETOTIFEN FUMARATE 1 DROP: 0.25 SOLUTION/ DROPS OPHTHALMIC at 21:33

## 2023-12-02 RX ADMIN — PANTOPRAZOLE SODIUM 20 MG: 20 TABLET, DELAYED RELEASE ORAL at 05:49

## 2023-12-02 NOTE — PROGRESS NOTES
Progress Note - 100[address] [facility] 74 y.o. female MRN: [id number]   Unit/Bed#: [facility] 658-01 Encounter: [id number]    Behavior over the last 24 hours: slowly improving. Serg Blum is seen today for followup. States she is feeling "pretty good". States she is eating and sleeping well, and has a good appetite. States she slept well with trazodone last night, and feels her medications are "helping". Denies thoughts to hurt herself or anyone else, denies any hallucinations. States she is overall feeling pretty good and has not complaints today, other than some left knee pain for which she is taking tylenol. Per nursing staff: Doing well, required tylenol for pain today, took PRN trazodone last night to good effect. Participates appropriately in group therapy. Attends group therapy. Socializes with peers.     Sleep: normal, improved  Appetite: normal  Medication side effects: No   ROS: no complaints, all other systems are negative    Mental Status Evaluation:    Appearance:  casually dressed, dressed appropriately   Behavior:  pleasant, cooperative, calm   Speech:  normal rate and volume   Mood:  improved, euthymic   Affect:  normal range and intensity   Thought Process:  organized, logical, coherent, goal directed   Associations: intact associations   Thought Content:  no overt delusions   Perceptual Disturbances: no auditory hallucinations, no visual hallucinations   Risk Potential: Suicidal ideation - None at present  Homicidal ideation - None  Potential for aggression - No   Sensorium:  oriented to person, place, and time/date   Memory:  recent and remote memory grossly intact   Consciousness:  alert and awake   Attention/Concentration: attention span and concentration are age appropriate   Insight:  good and improved   Judgment: good and improved   Gait/Station: normal balance, uses walker   Motor Activity: no abnormal movements     Vital signs in last 24 hours:    Temp:  [98.1 °F (36.7 °C)-98.9 °F (37.2 °C)] 98.1 °F (36.7 °C)  HR:  [56-59] 56  Resp:  [16-17] 16  BP: (149-158)/(56-86) 149/67    Laboratory results: I have personally reviewed all pertinent laboratory/tests results    Results from the past 24 hours: No results found for this or any previous visit (from the past 24 hour(s)). Suicide/Homicide Risk Assessment:    Risk of Harm to Self:   Nursing Suicide Risk Assessment Last 24 hours: C-SSRS Risk (Since Last Contact)  Calculated C-SSRS Risk Score (Since Last Contact): No Risk Indicated  Current Specific Risk Factors include: diagnosis of depression  Protective Factors: Protective Factors: The patient does not want to die, no current suicidal ideation, improved depressive symptoms  Based on today's assessment, Arpan Dotson presents the following risk of harm to self: low    Risk of Harm to Others:  Nursing Homicide Risk Assessment: Violence Risk to Others: Denies within past 6 months  Based on today's assessment, Arpan Dotson presents the following risk of harm to others: none    The following interventions are recommended: behavioral checks every 7 minutes, continued hospitalization on locked unit    Progress Toward Goals: improving, depression is improving    Assessment/Plan   Principal Problem:    Severe episode of recurrent major depressive disorder, without psychotic features ([address])  Active Problems:    Tobacco abuse    Essential hypertension    Allergic rhinitis    Esophageal reflux    Vitamin B12 deficiency    Vitamin D deficiency    COPD without exacerbation ([address])    Medical clearance for psychiatric admission    History of Debra-en-Y gastric bypass    Chronic idiopathic gout of left foot    Generalized anxiety disorder    Ingrown right greater toenail    Pain of left heel      Recommended Treatment:     Planned medication and treatment changes:     All current active medications have been reviewed  Encourage group therapy, milieu therapy and occupational therapy  Behavioral Health checks every 7 minutes  Observe progress over the weekend  Continue current medications:    Current Facility-Administered Medications   Medication Dose Route Frequency Provider Last Rate    acetaminophen  650 mg Oral Q4H PRN [facility], YEYO      acetaminophen  650 mg Oral Q4H PRN Amanda Maier PA-C      albuterol  2 puff Inhalation Q6H PRN [facility], YEYO      allopurinol  100 mg Oral Daily [facility], YEYO      aluminum-magnesium hydroxide-simethicone  30 mL Oral Q4H PRN [facility], YEYO      aspirin  81 mg Oral Daily [facility], YEYO      benztropine  1 mg Intramuscular Q4H PRN Max 6/day [facility], YEYO      benztropine  0.5 mg Oral Q4H PRN Max 6/day [facility], YEYO      bisacodyl  10 mg Rectal Daily PRN [facility], YEYO      buPROPion  150 mg Oral Daily Claudia Richards MD      busPIRone  5 mg Oral TID [facility], DO      donepezil  10 mg Oral HS Amnada Maier PA-C      ergocalciferol  50,000 Units Oral Weekly Barnetta Salts, CRNP      escitalopram  15 mg Oral Daily [facility], YEYO      fluticasone  1 spray Each Nare BID Barnetta Salts, CRNP      Fluticasone Furoate-Vilanterol  1 puff [facility]halation Daily [facility]      glycerin-hypromellose-  1 drop Both Eyes Q3H PRN [facility], YEYO      haloperidol lactate  5 mg Intramuscular Q4H PRN Max 4/day [facility], YEYO      hydrOXYzine HCL  25 mg Oral Q6H PRN Max 4/day [facility], YEYO      hydrOXYzine HCL  50 mg Oral Q6H PRN Max 4/day [facility], YEYO      ibuprofen  600 mg Oral Q8H PRN Barnetta Salts, CRNP      Ketotifen Fumarate  1 drop Both Eyes BID [facility], YEYO      LORazepam  1 mg Intramuscular Q6H PRN Max 3/day Melchor Farias PA-C      nebivolol  5 mg Oral Daily Melchor Farias PA-C      ondansetron  4 mg Oral Q8H PRN Barnetta Salts, CRNP      pantoprazole  20 mg Oral [facility]      polyethylene glycol  17 g Oral Daily PRN [facility] Jarrett, YEYO      propranolol  5 mg Oral Q8H PRN [facility], PA-C      risperiDONE  0.25 mg Oral Q4H PRN Max 6/day [facility]      risperiDONE  0.5 mg Oral Q4H PRN Max 3/day [facility], PA-SAMIA      risperiDONE  1 mg Oral Q2H PRN Max 3/day [facility], PA-C      senna-docusate sodium  1 tablet Oral Daily PRN [facility], PA-C      sucralfate  1 g Oral 4x Daily (AC & HS) Munir Aubrey, PA-SAMIA      traZODone  50 mg Oral HS PRN [facility], PA-C      traZODone  50 mg Oral Q6H PRN Max 3/day [facility], PA-C      traZODone  75 mg Oral HS [facility], PA-SAMIA       Risks / Benefits of Treatment:    Risks, benefits, and possible side effects of medications explained to patient and patient verbalizes understanding and agreement for treatment. Counseling / Coordination of Care: Total floor / unit time spent today 35 minutes. Greater than 50% of total time was spent with the patient and / or family counseling and / or coordination of care. A description of counseling / coordination of care:  Patient's progress discussed with staff in treatment team meeting. Medications, treatment progress and treatment plan reviewed with patient. Patient's diagnosis and treatment indicated reviewed with patient. Importance of medication and treatment compliance reviewed with patient. Supportive therapy provided to patient. Group attendance encouraged.     Rajat Watts DO 12/02/23

## 2023-12-02 NOTE — NURSING NOTE
Patient complained of left leg pain and requested medication for same. PRN Motrin given at 1007 with effectiveness noted and decrease in pain reported by patient. Will continue to monitor patient status.

## 2023-12-02 NOTE — PLAN OF CARE
Problem: Ineffective Coping  Goal: Cooperates with admission process  Description: Interventions:   - Complete admission process  Outcome: Completed  Goal: Identifies ineffective coping skills  Outcome: Progressing  Goal: Identifies healthy coping skills  Outcome: Progressing  Goal: Demonstrates healthy coping skills  Outcome: Progressing  Goal: Participates in unit activities  Description: Interventions:  - Provide therapeutic environment   - Provide required programming   - Redirect inappropriate behaviors   Outcome: Progressing  Goal: Patient/Family participate in treatment and DC plans  Description: Interventions:  - Provide therapeutic environment  Outcome: Progressing  Goal: Patient/Family verbalizes awareness of resources  Outcome: Progressing  Goal: Understands least restrictive measures  Description: Interventions:  - Utilize least restrictive behavior  Outcome: Progressing  Goal: Free from restraint events  Description: - Utilize least restrictive measures   - Provide behavioral interventions   - Redirect inappropriate behaviors   Outcome: Progressing     Problem: Depression  Goal: Treatment Goal: Demonstrate behavioral control of depressive symptoms, verbalize feelings of improved mood/affect, and adopt new coping skills prior to discharge  Outcome: Progressing  Goal: Verbalize thoughts and feelings  Description: Interventions:  - Assess and re-assess patient's level of risk   - Engage patient in 1:1 interactions, daily, for a minimum of 15 minutes   - Encourage patient to express feelings, fears, frustrations, hopes   Outcome: Progressing  Goal: Refrain from harming self  Description: Interventions:  - Monitor patient closely, per order   - Supervise medication ingestion, monitor effects and side effects   Outcome: Progressing  Goal: Refrain from isolation  Description: Interventions:  - Develop a trusting relationship   - Encourage socialization   Outcome: Progressing  Goal: Refrain from self-neglect  Outcome: Progressing  Goal: Attend and participate in unit activities, including therapeutic, recreational, and educational groups  Description: Interventions:  - Provide therapeutic and educational activities daily, encourage attendance and participation, and document same in the medical record   Outcome: Progressing  Goal: Complete daily ADLs, including personal hygiene independently, as able  Description: Interventions:  - Observe, teach, and assist patient with ADLS  -  Monitor and promote a balance of rest/activity, with adequate nutrition and elimination   Outcome: Progressing     Problem: Anxiety  Goal: Anxiety is at manageable level  Description: Interventions:  - Assess and monitor patient's anxiety level. - Monitor for signs and symptoms (heart palpitations, chest pain, shortness of breath, headaches, nausea, feeling jumpy, restlessness, irritable, apprehensive). - Collaborate with interdisciplinary team and initiate plan and interventions as ordered.   - [place] patient to unit/surroundings  - Explain treatment plan  - Encourage participation in care  - Encourage verbalization of concerns/fears  - Identify coping mechanisms  - Assist in developing anxiety-reducing skills  - Administer/offer alternative therapies  - Limit or eliminate stimulants  Outcome: Progressing

## 2023-12-02 NOTE — NURSING NOTE
Patient is medication and meal compliant. No complaints of pain or discomfort. Patient endorses anxiety related to her life stressors regarding her two spouses who were in [place] and are now  and her  [place] benefits. Patient reports once this is straightened out she will feel better. Patient denies current depression, SI, AH or VH. Will continue to monitor patient for changes in mood or behavior.

## 2023-12-02 NOTE — NURSING NOTE
Patient is visible on the unit and social with peers and staff. Patient denies all psych S/S. Patient is compliant with medication and cooperative with treatment. Appetite is good. Attended groups.

## 2023-12-03 PROCEDURE — 99232 SBSQ HOSP IP/OBS MODERATE 35: CPT | Performed by: STUDENT IN AN ORGANIZED HEALTH CARE EDUCATION/TRAINING PROGRAM

## 2023-12-03 RX ADMIN — BUPROPION HYDROCHLORIDE 150 MG: 150 TABLET, EXTENDED RELEASE ORAL at 08:17

## 2023-12-03 RX ADMIN — PANTOPRAZOLE SODIUM 20 MG: 20 TABLET, DELAYED RELEASE ORAL at 05:54

## 2023-12-03 RX ADMIN — BUSPIRONE HYDROCHLORIDE 5 MG: 5 TABLET ORAL at 21:08

## 2023-12-03 RX ADMIN — KETOTIFEN FUMARATE 1 DROP: 0.25 SOLUTION/ DROPS OPHTHALMIC at 08:18

## 2023-12-03 RX ADMIN — KETOTIFEN FUMARATE 1 DROP: 0.25 SOLUTION/ DROPS OPHTHALMIC at 21:25

## 2023-12-03 RX ADMIN — SUCRALFATE 1 G: 1 TABLET ORAL at 06:03

## 2023-12-03 RX ADMIN — Medication 1 SPRAY: at 17:08

## 2023-12-03 RX ADMIN — ALLOPURINOL 100 MG: 100 TABLET ORAL at 08:17

## 2023-12-03 RX ADMIN — IBUPROFEN 600 MG: 600 TABLET ORAL at 09:47

## 2023-12-03 RX ADMIN — TRAZODONE HYDROCHLORIDE 75 MG: 50 TABLET ORAL at 21:08

## 2023-12-03 RX ADMIN — FLUTICASONE FUROATE AND VILANTEROL TRIFENATATE 1 PUFF: 100; 25 POWDER RESPIRATORY (INHALATION) at 07:32

## 2023-12-03 RX ADMIN — DONEPEZIL HYDROCHLORIDE 10 MG: 10 TABLET, FILM COATED ORAL at 21:08

## 2023-12-03 RX ADMIN — ASPIRIN 81 MG CHEWABLE TABLET 81 MG: 81 TABLET CHEWABLE at 08:17

## 2023-12-03 RX ADMIN — BUSPIRONE HYDROCHLORIDE 5 MG: 5 TABLET ORAL at 08:17

## 2023-12-03 RX ADMIN — SUCRALFATE 1 G: 1 TABLET ORAL at 15:23

## 2023-12-03 RX ADMIN — NEBIVOLOL 5 MG: 5 TABLET ORAL at 08:17

## 2023-12-03 RX ADMIN — SUCRALFATE 1 G: 1 TABLET ORAL at 21:07

## 2023-12-03 RX ADMIN — BUSPIRONE HYDROCHLORIDE 5 MG: 5 TABLET ORAL at 15:23

## 2023-12-03 RX ADMIN — ESCITALOPRAM OXALATE 15 MG: 5 TABLET, FILM COATED ORAL at 08:17

## 2023-12-03 RX ADMIN — Medication 1 SPRAY: at 08:18

## 2023-12-03 RX ADMIN — SUCRALFATE 1 G: 1 TABLET ORAL at 10:49

## 2023-12-03 NOTE — PLAN OF CARE
Problem: Ineffective Coping  Goal: Identifies ineffective coping skills  Outcome: Progressing  Goal: Identifies healthy coping skills  Outcome: Progressing  Goal: Demonstrates healthy coping skills  Outcome: Progressing  Goal: Participates in unit activities  Description: Interventions:  - Provide therapeutic environment   - Provide required programming   - Redirect inappropriate behaviors   Outcome: Progressing  Goal: Patient/Family participate in treatment and DC plans  Description: Interventions:  - Provide therapeutic environment  Outcome: Progressing  Goal: Patient/Family verbalizes awareness of resources  Outcome: Progressing  Goal: Understands least restrictive measures  Description: Interventions:  - Utilize least restrictive behavior  Outcome: Progressing  Goal: Free from restraint events  Description: - Utilize least restrictive measures   - Provide behavioral interventions   - Redirect inappropriate behaviors   Outcome: Progressing     Problem: Risk for Self Injury/Neglect  Goal: Treatment Goal: Remain safe during length of stay, learn and adopt new coping skills, and be free of self-injurious ideation, impulses and acts at the time of discharge  Outcome: Progressing  Goal: Verbalize thoughts and feelings  Description: Interventions:  - Assess and re-assess patient's lethality and potential for self-injury  - Engage patient in 1:1 interactions, daily, for a minimum of 15 minutes  - Encourage patient to express feelings, fears, frustrations, hopes  - Establish rapport/trust with patient   Outcome: Progressing  Goal: Refrain from harming self  Description: Interventions:  - Monitor patient closely, per order  - Develop a trusting relationship  - Supervise medication ingestion, monitor effects and side effects   Outcome: Progressing  Goal: Attend and participate in unit activities, including therapeutic, recreational, and educational groups  Description: Interventions:  - Provide therapeutic and educational activities daily, encourage attendance and participation, and document same in the medical record  - Obtain collateral information, encourage visitation and family involvement in care   Outcome: Progressing  Goal: Recognize maladaptive responses and adopt new coping mechanisms  Outcome: Progressing  Goal: Complete daily ADLs, including personal hygiene independently, as able  Description: Interventions:  - Observe, teach, and assist patient with ADLS  - Monitor and promote a balance of rest/activity, with adequate nutrition and elimination  Outcome: Progressing     Problem: Depression  Goal: Treatment Goal: Demonstrate behavioral control of depressive symptoms, verbalize feelings of improved mood/affect, and adopt new coping skills prior to discharge  Outcome: Progressing  Goal: Verbalize thoughts and feelings  Description: Interventions:  - Assess and re-assess patient's level of risk   - Engage patient in 1:1 interactions, daily, for a minimum of 15 minutes   - Encourage patient to express feelings, fears, frustrations, hopes   Outcome: Progressing  Goal: Refrain from harming self  Description: Interventions:  - Monitor patient closely, per order   - Supervise medication ingestion, monitor effects and side effects   Outcome: Progressing  Goal: Refrain from isolation  Description: Interventions:  - Develop a trusting relationship   - Encourage socialization   Outcome: Progressing  Goal: Refrain from self-neglect  Outcome: Progressing  Goal: Attend and participate in unit activities, including therapeutic, recreational, and educational groups  Description: Interventions:  - Provide therapeutic and educational activities daily, encourage attendance and participation, and document same in the medical record   Outcome: Progressing  Goal: Complete daily ADLs, including personal hygiene independently, as able  Description: Interventions:  - Observe, teach, and assist patient with ADLS  -  Monitor and promote a balance of rest/activity, with adequate nutrition and elimination   Outcome: Progressing     Problem: Anxiety  Goal: Anxiety is at manageable level  Description: Interventions:  - Assess and monitor patient's anxiety level. - Monitor for signs and symptoms (heart palpitations, chest pain, shortness of breath, headaches, nausea, feeling jumpy, restlessness, irritable, apprehensive). - Collaborate with interdisciplinary team and initiate plan and interventions as ordered.   - [place] patient to unit/surroundings  - Explain treatment plan  - Encourage participation in care  - Encourage verbalization of concerns/fears  - Identify coping mechanisms  - Assist in developing anxiety-reducing skills  - Administer/offer alternative therapies  - Limit or eliminate stimulants  Outcome: Progressing     Problem: DISCHARGE PLANNING - CARE MANAGEMENT  Goal: Discharge to post-acute care or home with appropriate resources  Description: INTERVENTIONS:  - Conduct assessment to determine patient/family and health care team treatment goals, and need for post-acute services based on payer coverage, community resources, and patient preferences, and barriers to discharge  - Address psychosocial, clinical, and financial barriers to discharge as identified in assessment in conjunction with the patient/family and health care team  - Arrange appropriate level of post-acute services according to patient’s   needs and preference and payer coverage in collaboration with the physician and health care team  - Communicate with and update the patient/family, physician, and health care team regarding progress on the discharge plan  - Arrange appropriate transportation to post-acute venues  Outcome: Progressing

## 2023-12-03 NOTE — PROGRESS NOTES
Progress Note - [address] [facility] 74 y.o. female MRN: [id number]   Unit/Bed#: [facility] 658-01 Encounter: [id number]    Behavior over the last 24 hours: improved. Pallavi Olivarez is seen today for followup. States she is doing very well, she has no acute concerns. States that she talked with her roommate and had a good discussion, which she feels helped her anxiety. She denies thoughts to herself or anyone else, denies any hallucinations. States that she feels that since she spoke with her roommate about her anxiety, "it feels like something clicked in my brain ". He states that she is having a very good day, feels her medications are helping, and has no acute concerns. Per nursing staff: Doing well, anxious about home situation and benefits. Compliant with medications, and sociable with peers.      Sleep: normal, improved  Appetite: normal  Medication side effects: No   ROS: no complaints, all other systems are negative    Mental Status Evaluation:    Appearance:  casually dressed, dressed appropriately   Behavior:  pleasant, cooperative, calm   Speech:  normal rate and volume   Mood:  improved, euthymic   Affect:  normal range and intensity   Thought Process:  organized, logical, coherent, goal directed   Associations: intact associations   Thought Content:  no overt delusions   Perceptual Disturbances: no auditory hallucinations, no visual hallucinations   Risk Potential: Suicidal ideation - None at present  Homicidal ideation - None  Potential for aggression - No   Sensorium:  oriented to person, place, and time/date   Memory:  recent and remote memory grossly intact   Consciousness:  alert and awake   Attention/Concentration: attention span and concentration are age appropriate   Insight:  good and improved   Judgment: good and improved   Gait/Station: normal balance, uses walker   Motor Activity: no abnormal movements     Vital signs in last 24 hours:    Temp:  [98.5 °F (36.9 °C)-99.5 °F (37.5 °C)] 98.5 °F (36.9 °C)  HR:  [55-58] 55  Resp:  [16] 16  BP: (139-152)/(60-62) 152/60    Laboratory results: I have personally reviewed all pertinent laboratory/tests results    Results from the past 24 hours: No results found for this or any previous visit (from the past 24 hour(s)). Suicide/Homicide Risk Assessment:    Risk of Harm to Self:   Nursing Suicide Risk Assessment Last 24 hours: C-SSRS Risk (Since Last Contact)  Calculated C-SSRS Risk Score (Since Last Contact): No Risk Indicated  Current Specific Risk Factors include: diagnosis of depression  Protective Factors: Protective Factors: The patient does not want to die, no current suicidal ideation, improved depressive symptoms  Based on today's assessment, Serg Blum presents the following risk of harm to self: low    Risk of Harm to Others:  Nursing Homicide Risk Assessment: Violence Risk to Others: Denies within past 6 months  Based on today's assessment, Serg Blum presents the following risk of harm to others: none    The following interventions are recommended: behavioral checks every 7 minutes, continued hospitalization on locked unit    Progress Toward Goals: improving, depression is improving    Assessment/Plan   Principal Problem:    Severe episode of recurrent major depressive disorder, without psychotic features ([address])  Active Problems:    Tobacco abuse    Essential hypertension    Allergic rhinitis    Esophageal reflux    Vitamin B12 deficiency    Vitamin D deficiency    COPD without exacerbation ([address])    Medical clearance for psychiatric admission    History of Debra-en-Y gastric bypass    Chronic idiopathic gout of left foot    Generalized anxiety disorder    Ingrown right greater toenail    Pain of left heel      Recommended Treatment:     Planned medication and treatment changes:     All current active medications have been reviewed  Encourage group therapy, milieu therapy and occupational therapy  Behavioral Health checks every 7 minutes  Observe progress over the weekend  No changes made over the weekend.   Continue current medications:    Current Facility-Administered Medications   Medication Dose Route Frequency Provider Last Rate    acetaminophen  650 mg Oral Q4H PRN Vinh Farias, YEYO      acetaminophen  650 mg Oral Q4H PRN Steven Ragland PA-C      albuterol  2 puff Inhalation Q6H PRN Vinh Farias, PA-SAMIA      allopurinol  100 mg Oral Daily Vinh Farias, PA-SAMIA      aluminum-magnesium hydroxide-simethicone  30 mL Oral Q4H PRN Vinh Farias, PA-SAMIA      aspirin  81 mg Oral Daily [facility], YEYO      benztropine  1 mg Intramuscular Q4H PRN Max 6/day Vinh [facility], PA-SAMIA      benztropine  0.5 mg Oral Q4H PRN Max 6/day [facility], YEYO      bisacodyl  10 mg Rectal Daily PRN Vinh Farias, YEYO      buPROPion  150 mg Oral Daily Irina Daniel MD      busPIRone  5 mg Oral TID Elizabeth Lee DO      donepezil  10 mg Oral HS Steven Ragland PA-C      ergocalciferol  50,000 Units Oral Weekly KYLE Gaviria      escitalopram  15 mg Oral Daily Vinh [facility]      fluticasone  1 spray Each Nare BID KYLE Gaviria      Fluticasone Furoate-Vilanterol  1 puff Inhalation Daily [facility], [place]      glycerin-hypromellose-  1 drop Both Eyes Q3H PRN Vinh Farias, YEYO      haloperidol lactate  5 mg Intramuscular Q4H PRN Max 4/day Vinh [facility], YEYO      hydrOXYzine HCL  25 mg Oral Q6H PRN Max 4/day Stephanierna [facility], YEYO      hydrOXYzine HCL  50 mg Oral Q6H PRN Max 4/day Stephanierna [facility], YEYO      ibuprofen  600 mg Oral Q8H PRN KYLE Gaviria      Ketotifen Fumarate  1 drop Both Eyes BID Vinh [facility], YEYO      LORazepam  1 mg Intramuscular Q6H PRN Max 3/day Vinh Farias PA-C      nebivolol  5 mg Oral Daily Vinh Farias PA-C      ondansetron  4 mg Oral Q8H PRN KYLE Gaviria      pantoprazole  20 mg Oral Early Morning [facility], PA-C      polyethylene glycol  17 g Oral Daily PRN Omi Farias, PA-C      propranolol  5 mg Oral Q8H PRN Omi [place] [facility], PA-C      risperiDONE  0.25 mg Oral Q4H PRN Max 6/day [facility], PA-C      risperiDONE  0.5 mg Oral Q4H PRN Max 3/day Henny Loots, PA-C      risperiDONE  1 mg Oral Q2H PRN Max 3/day Henny Loots, PA-C      senna-docusate sodium  1 tablet Oral Daily PRN Omi Farias, PA-C      sucralfate  1 g Oral 4x Daily (AC & HS) Henny Loots, PA-C      traZODone  50 mg Oral HS PRN Henny Loots, PA-C      traZODone  50 mg Oral Q6H PRN Max 3/day Omi [facility] [place] [facility], PA-C      traZODone  75 mg Oral HS Omi [place] [facility], PA-C       Risks / Benefits of Treatment:    Risks, benefits, and possible side effects of medications explained to patient and patient verbalizes understanding and agreement for treatment. Counseling / Coordination of Care: Total floor / unit time spent today 35 minutes. Greater than 50% of total time was spent with the patient and / or family counseling and / or coordination of care. A description of counseling / coordination of care:  Patient's progress discussed with staff in treatment team meeting. Medications, treatment progress and treatment plan reviewed with patient. Patient's diagnosis and treatment indicated reviewed with patient. Importance of medication and treatment compliance reviewed with patient. Supportive therapy provided to patient. Group attendance encouraged.     Ana Luisa Méndez DO 12/03/23

## 2023-12-03 NOTE — NURSING NOTE
Patient is medication and meal compliant. Patient denies depression but endorses anxiety related to her situation at home. Patient also denies current SI, AH or VH. Patient reports she just needs case management to help with her spouse's  benefits and this will relieve her anxiety. Patient is visible and social with peers on the unit. Will continue to monitor patient for changes in mood or behavior.

## 2023-12-03 NOTE — NURSING NOTE
Patient complaint of severe left leg pain and requested medication for same. PRN Motrin given at [phone] with effectiveness noted and decrease in pain reported by patient. Will continue to monitor patient status.

## 2023-12-04 PROCEDURE — 97530 THERAPEUTIC ACTIVITIES: CPT

## 2023-12-04 PROCEDURE — 99232 SBSQ HOSP IP/OBS MODERATE 35: CPT | Performed by: STUDENT IN AN ORGANIZED HEALTH CARE EDUCATION/TRAINING PROGRAM

## 2023-12-04 RX ADMIN — NEBIVOLOL 5 MG: 5 TABLET ORAL at 08:08

## 2023-12-04 RX ADMIN — BUSPIRONE HYDROCHLORIDE 5 MG: 5 TABLET ORAL at 21:25

## 2023-12-04 RX ADMIN — FLUTICASONE FUROATE AND VILANTEROL TRIFENATATE 1 PUFF: 100; 25 POWDER RESPIRATORY (INHALATION) at 07:38

## 2023-12-04 RX ADMIN — Medication 1 SPRAY: at 17:03

## 2023-12-04 RX ADMIN — Medication 1 SPRAY: at 08:14

## 2023-12-04 RX ADMIN — SUCRALFATE 1 G: 1 TABLET ORAL at 21:26

## 2023-12-04 RX ADMIN — KETOTIFEN FUMARATE 1 DROP: 0.25 SOLUTION/ DROPS OPHTHALMIC at 08:11

## 2023-12-04 RX ADMIN — PANTOPRAZOLE SODIUM 20 MG: 20 TABLET, DELAYED RELEASE ORAL at 06:15

## 2023-12-04 RX ADMIN — BUSPIRONE HYDROCHLORIDE 5 MG: 5 TABLET ORAL at 08:08

## 2023-12-04 RX ADMIN — SUCRALFATE 1 G: 1 TABLET ORAL at 15:14

## 2023-12-04 RX ADMIN — HYDROXYZINE HYDROCHLORIDE 50 MG: 50 TABLET, FILM COATED ORAL at 22:07

## 2023-12-04 RX ADMIN — KETOTIFEN FUMARATE 1 DROP: 0.25 SOLUTION/ DROPS OPHTHALMIC at 21:26

## 2023-12-04 RX ADMIN — ASPIRIN 81 MG CHEWABLE TABLET 81 MG: 81 TABLET CHEWABLE at 08:08

## 2023-12-04 RX ADMIN — TRAZODONE HYDROCHLORIDE 75 MG: 50 TABLET ORAL at 21:25

## 2023-12-04 RX ADMIN — BUSPIRONE HYDROCHLORIDE 5 MG: 5 TABLET ORAL at 15:14

## 2023-12-04 RX ADMIN — SUCRALFATE 1 G: 1 TABLET ORAL at 06:15

## 2023-12-04 RX ADMIN — SUCRALFATE 1 G: 1 TABLET ORAL at 10:51

## 2023-12-04 RX ADMIN — DONEPEZIL HYDROCHLORIDE 10 MG: 10 TABLET, FILM COATED ORAL at 21:25

## 2023-12-04 RX ADMIN — IBUPROFEN 600 MG: 600 TABLET ORAL at 06:44

## 2023-12-04 RX ADMIN — BUPROPION HYDROCHLORIDE 150 MG: 150 TABLET, EXTENDED RELEASE ORAL at 08:08

## 2023-12-04 RX ADMIN — ESCITALOPRAM OXALATE 15 MG: 5 TABLET, FILM COATED ORAL at 08:08

## 2023-12-04 RX ADMIN — ALLOPURINOL 100 MG: 100 TABLET ORAL at 08:08

## 2023-12-04 NOTE — PROGRESS NOTES
12/04/23 0749   Team Meeting   Meeting Type Daily Rounds   Initial Conference Date 12/04/23   Next Conference Date 12/05/23   Team Members Present   Team Members Present Physician;Nurse;;; Occupational Therapist   Physician Team Member Dr Denisse Smith, Dr Nba Brink, Dr Renetta Ocasio Team Member [facility] Management Team Member [address] [place] [address] Work Team Member Rene   OT Team Member Fanta Milan   Patient/Family Present   Patient Present No   Patient's Family Present No     Wants  benefits fixed, possible discharge Thursday.

## 2023-12-04 NOTE — PROGRESS NOTES
Progress Note - [address] Van[address] Marcelo 74 y.o. female MRN: [id number]  Unit/Bed#: [facility] 872-64 Encounter: [id number]    Patient was seen today for continuation of care, records reviewed and patient was discussed with the morning case review team.    Talita Cuello was seen today for psychiatric follow-up. On assessment today, Talita Cuello was brighter on approach. Stating that she is continuously getting better, patient reports excitement about working with ICM post discharge. No anxiety currently verbalized, Talita Cuello expresses that she is using coping mechanisms to maintain her emotions  and that she feels she is moving her right direction. No changes in sleep or appetite reported. No changes in energy. Discharge disposition ongoing with case management securing resources post discharge, we will anticipate discharge later this week. Talita Cuello denies acute suicidal/self-harm ideation/intent/plan upon direct inquiry at this time. Talita Cuello remains behaviorally appropriate, no agitation or aggression noted on exam or in report. Talita Cuello also denies HI/AH/VH, and does not appear overtly manic. No overt delusions or paranoia are verbalized. Impulse control is intact. Talita Cuello remains adherent to her current psychotropic medication regimen and denies any side effects from medications, as well as none noted on exam.    Vitals:  Vitals:    12/04/23 0734   BP: 126/58   Pulse: (!) 51   Resp: 17   Temp: 98 °F (36.7 °C)   SpO2: 97%       Laboratory Results:  I have personally reviewed all pertinent laboratory/tests results.   Most Recent Labs:   Lab Results   Component Value Date    WBC 7.97 11/27/2023    RBC 4.66 11/27/2023    HGB 14.0 11/27/2023    HCT 42.1 11/27/2023     11/27/2023    RDW 15.0 11/27/2023    NEUTROABS 5.06 11/27/2023    SODIUM 136 11/27/2023    K 3.8 11/27/2023     11/27/2023    CO2 23 11/27/2023    BUN 15 11/27/2023    CREATININE 0.89 11/27/2023    GLUC 95 11/27/2023    GLUF 99 05/21/2021    CALCIUM 9.1 11/27/2023    AST 14 11/27/2023    ALT 7 11/27/2023    ALKPHOS 64 11/27/2023    TP 7.1 11/27/2023    ALB 3.9 11/27/2023    TBILI 0.57 11/27/2023    CHOLESTEROL 154 05/21/2021    HDL 38 (L) 05/21/2021    TRIG 73 05/21/2021    LDLCALC 101 (H) 05/21/2021    NONHDLC 116 05/21/2021    [id number] 4.978 (H) 11/27/2023    FREET4 0.75 11/27/2023    HGBA1C 5.7 (H) 11/29/2023     11/29/2023       Psychiatric Review of Systems:  Behavior over the last 24 hours:  unchanged. Sleep: good  Appetite: good  Medication side effects: none  ROS: no complaints, denies shortness of breath or chest pain and all other systems are negative for acute changes    Mental Status Evaluation:  Appearance:  dressed appropriately, adequate grooming   Behavior:  cooperative, calm   Speech:  normal rate and volume   Mood:  less depressed   Affect:  slightly brighter   Thought Process:  goal directed, linear   Thought Content:  no overt delusions   Perceptual Disturbances: denies auditory hallucinations when asked, does not appear responding to internal stimuli   Risk Potential: Suicidal ideation - None  Homicidal ideation - None  Potential for aggression - No   Memory:  recent and remote memory grossly intact   Sensorium  person, place, and time/date      Consciousness:  alert and awake   Attention: attention span and concentration are age appropriate   Insight:  improving   Judgment: improving   Gait/Station: normal gait/station   Motor Activity: no abnormal movements   Progress Toward Goals:   Get Knight is progressing towards goals of inpatient psychiatric treatment by continued medication compliance and is attending therapeutic modalities on the milieu. However, the patient continues to require inpatient psychiatric hospitalization for continued medication management and titration to optimize symptom reduction, improve sleep hygiene, and demonstrate adequate self-care.     Assessment/Plan   Principal Problem:    Severe episode of recurrent major depressive disorder, without psychotic features ([address] [facility])  Active Problems:    Tobacco abuse    Essential hypertension    Allergic rhinitis    Esophageal reflux    Vitamin B12 deficiency    Vitamin D deficiency    COPD without exacerbation ([facility])    Medical clearance for psychiatric admission    History of Debra-en-Y gastric bypass    Chronic idiopathic gout of left foot    Generalized anxiety disorder    Ingrown right greater toenail    Pain of left heel      Recommended Treatment: Treatment plan and medication changes discussed and per the attending physician the plan is: 1. Continue with group therapy, milieu therapy and occupational therapy  2. Behavioral Health checks every 7 minutes  3. Continue frequent safety checks and vitals per unit protocol  4. Continue with [facility] medical management as indicated  5. Continue with current medication regimen  6. Will review labs in the a.m. 7.Disposition Planning: Discharge planning and efforts remain ongoing    Behavioral Health Medications: all current active meds have been reviewed and continue current psychiatric medications.   Current Facility-Administered Medications   Medication Dose Route Frequency Provider Last Rate    acetaminophen  650 mg Oral Q4H PRN Nay Farias PA-C      acetaminophen  650 mg Oral Q4H PRN Paris Newell PA-C      albuterol  2 puff Inhalation Q6H PRN Nay Farias PA-C      allopurinol  100 mg Oral Daily Nay Oates PA-C      aluminum-magnesium hydroxide-simethicone  30 mL Oral Q4H PRN Nay Farias PA-C      aspirin  81 mg Oral Daily Nay Oates PA-C      benztropine  1 mg Intramuscular Q4H PRN Max 6/day Nay Oates PA-C      benztropine  0.5 mg Oral Q4H PRN Max 6/day Jagruti Lawton      bisacodyl  10 mg Rectal Daily PRN Nay Farias PA-C      buPROPion  150 mg Oral Daily Michelle Navarrete MD      busPIRone  5 mg Oral TID Megan Del Valle DO      donepezil  10 mg Oral HS Skylar Lucas Rosa Dukes, PA-C      ergocalciferol  50,000 Units Oral Weekly [place], CRDARBY      escitalopram  15 mg Oral Daily [place], [place]      fluticasone  1 spray Each Nare BID Rebekah Eth, CRNP      Fluticasone Furoate-Vilanterol  1 puff Inhalation Daily [place], [place]      glycerin-hypromellose-  1 drop Both Eyes Q3H PRN Thompson Farias, PA-C      haloperidol lactate  5 mg Intramuscular Q4H PRN Max 4/day [facility], PA-C      hydrOXYzine HCL  25 mg Oral Q6H PRN Max 4/day [facility], PA-C      hydrOXYzine HCL  50 mg Oral Q6H PRN Max 4/day [facility], [place]      ibuprofen  600 mg Oral Q8H PRN [place], KYLE      Ketotifen Fumarate  1 drop Both Eyes BID [facility], PA-C      LORazepam  1 mg Intramuscular Q6H PRN Max 3/day Thompson Farias, PA-C      nebivolol  5 mg Oral Daily [facility], PA-C      ondansetron  4 mg Oral Q8H PRN [facility], KYLE      pantoprazole  20 mg Oral Early Morning [facility], PA-C      polyethylene glycol  17 g Oral Daily PRN Thompson Farias, PA-C      propranolol  5 mg Oral Q8H PRN Thompson Farias, PA-C      risperiDONE  0.25 mg Oral Q4H PRN Max 6/day [facility], PA-C      risperiDONE  0.5 mg Oral Q4H PRN Max 3/day Thompson Farias, PA-C      risperiDONE  1 mg Oral Q2H PRN Max 3/day Zahida Oneal, PA-C      senna-docusate sodium  1 tablet Oral Daily PRN Thompson Farias, PA-C      sucralfate  1 g Oral 4x Daily (AC & HS) Lowella Kimmy, PA-C      traZODone  50 mg Oral HS PRN Lowella Kimmy, PA-C      traZODone  50 mg Oral Q6H PRN Max 3/day Lowella Kimmy, PA-C      traZODone  75 mg Oral HS Thompson Hernandez PA-C         Risks / Benefits of Treatment:  Risks, benefits, and possible side effects of medications explained to patient and patient verbalizes understanding and agreement for treatment.     Counseling / Coordination of Care:  Patient's progress reviewed with nursing staff. Medications, treatment progress and treatment plan reviewed with patient. Supportive counseling provided to the patient. Total floor/unit time spent today 25 minutes. Greater than 50% of total time was spent with the patient and / or family counseling and / or coordination of care. A description of the counseling / coordination of care: medication education, treatment plan, supportive therapy. This note was completed in part utilizing Dragon dictation Software. Grammatical, translation, syntax errors, random word insertions, spelling mistakes, and incomplete sentences may be an occasional consequence of this system secondary to software limitations with voice recognition, ambient noise, and hardware issues.  If you have any questions or concerns about the content, text, or information contained within the body of this dictation, please contact the provider for clarification

## 2023-12-04 NOTE — PLAN OF CARE
Problem: Ineffective Coping  Goal: Identifies ineffective coping skills  Outcome: Progressing  Goal: Identifies healthy coping skills  Outcome: Progressing  Goal: Demonstrates healthy coping skills  Outcome: Progressing  Goal: Participates in unit activities  Description: Interventions:  - Provide therapeutic environment   - Provide required programming   - Redirect inappropriate behaviors   Outcome: Progressing  Goal: Patient/Family participate in treatment and DC plans  Description: Interventions:  - Provide therapeutic environment  Outcome: Progressing  Goal: Patient/Family verbalizes awareness of resources  Outcome: Progressing  Goal: Understands least restrictive measures  Description: Interventions:  - Utilize least restrictive behavior  Outcome: Progressing  Goal: Free from restraint events  Description: - Utilize least restrictive measures   - Provide behavioral interventions   - Redirect inappropriate behaviors   Outcome: Progressing     Problem: Risk for Self Injury/Neglect  Goal: Treatment Goal: Remain safe during length of stay, learn and adopt new coping skills, and be free of self-injurious ideation, impulses and acts at the time of discharge  Outcome: Progressing  Goal: Verbalize thoughts and feelings  Description: Interventions:  - Assess and re-assess patient's lethality and potential for self-injury  - Engage patient in 1:1 interactions, daily, for a minimum of 15 minutes  - Encourage patient to express feelings, fears, frustrations, hopes  - Establish rapport/trust with patient   Outcome: Progressing  Goal: Refrain from harming self  Description: Interventions:  - Monitor patient closely, per order  - Develop a trusting relationship  - Supervise medication ingestion, monitor effects and side effects   Outcome: Progressing  Goal: Attend and participate in unit activities, including therapeutic, recreational, and educational groups  Description: Interventions:  - Provide therapeutic and educational activities daily, encourage attendance and participation, and document same in the medical record  - Obtain collateral information, encourage visitation and family involvement in care   Outcome: Progressing  Goal: Recognize maladaptive responses and adopt new coping mechanisms  Outcome: Progressing  Goal: Complete daily ADLs, including personal hygiene independently, as able  Description: Interventions:  - Observe, teach, and assist patient with ADLS  - Monitor and promote a balance of rest/activity, with adequate nutrition and elimination  Outcome: Progressing     Problem: Depression  Goal: Treatment Goal: Demonstrate behavioral control of depressive symptoms, verbalize feelings of improved mood/affect, and adopt new coping skills prior to discharge  Outcome: Progressing  Goal: Verbalize thoughts and feelings  Description: Interventions:  - Assess and re-assess patient's level of risk   - Engage patient in 1:1 interactions, daily, for a minimum of 15 minutes   - Encourage patient to express feelings, fears, frustrations, hopes   Outcome: Progressing  Goal: Refrain from harming self  Description: Interventions:  - Monitor patient closely, per order   - Supervise medication ingestion, monitor effects and side effects   Outcome: Progressing  Goal: Refrain from isolation  Description: Interventions:  - Develop a trusting relationship   - Encourage socialization   Outcome: Progressing  Goal: Refrain from self-neglect  Outcome: Progressing  Goal: Attend and participate in unit activities, including therapeutic, recreational, and educational groups  Description: Interventions:  - Provide therapeutic and educational activities daily, encourage attendance and participation, and document same in the medical record   Outcome: Progressing  Goal: Complete daily ADLs, including personal hygiene independently, as able  Description: Interventions:  - Observe, teach, and assist patient with ADLS  -  Monitor and promote a balance of rest/activity, with adequate nutrition and elimination   Outcome: Progressing     Problem: Anxiety  Goal: Anxiety is at manageable level  Description: Interventions:  - Assess and monitor patient's anxiety level. - Monitor for signs and symptoms (heart palpitations, chest pain, shortness of breath, headaches, nausea, feeling jumpy, restlessness, irritable, apprehensive). - Collaborate with interdisciplinary team and initiate plan and interventions as ordered.   - [place] patient to unit/surroundings  - Explain treatment plan  - Encourage participation in care  - Encourage verbalization of concerns/fears  - Identify coping mechanisms  - Assist in developing anxiety-reducing skills  - Administer/offer alternative therapies  - Limit or eliminate stimulants  Outcome: Progressing     Problem: DISCHARGE PLANNING - CARE MANAGEMENT  Goal: Discharge to post-acute care or home with appropriate resources  Description: INTERVENTIONS:  - Conduct assessment to determine patient/family and health care team treatment goals, and need for post-acute services based on payer coverage, community resources, and patient preferences, and barriers to discharge  - Address psychosocial, clinical, and financial barriers to discharge as identified in assessment in conjunction with the patient/family and health care team  - Arrange appropriate level of post-acute services according to patient’s   needs and preference and payer coverage in collaboration with the physician and health care team  - Communicate with and update the patient/family, physician, and health care team regarding progress on the discharge plan  - Arrange appropriate transportation to post-acute venues  Outcome: Progressing

## 2023-12-04 NOTE — TREATMENT TEAM
12/04/23 0644   Pain Assessment   Pain Assessment Tool 0-10   Pain Score 7   Pain Location/Orientation Orientation: Left; Location: Leg   Pain Radiating Towards legs   Pain Onset/Description Onset: Ongoing   Effect of Pain on Daily Activities limiting activities   Patient's Stated Pain Goal No pain   Hospital Pain Intervention(s) Medication (See MAR)   Multiple Pain Sites No     Motrin 600mg given for c/o severe left leg pain. Will f/u for effectiveness .

## 2023-12-04 NOTE — OCCUPATIONAL THERAPY NOTE
Occupational Therapy Evaluation & Treatment Note      Patient Name: Ronaldo العراقيYMELVIN Date: 12/4/2023  Problem List  Principal Problem:    Severe episode of recurrent major depressive disorder, without psychotic features ([address])  Active Problems:    Tobacco abuse    Essential hypertension    Allergic rhinitis    Esophageal reflux    Vitamin B12 deficiency    Vitamin D deficiency    COPD without exacerbation (HCC)    Medical clearance for psychiatric admission    History of Debra-en-Y gastric bypass    Chronic idiopathic gout of left foot    Generalized anxiety disorder    Ingrown right greater toenail    Pain of left heel    Past Medical History  Past Medical History:   Diagnosis Date    Acid reflux     Anxiety     Arthritis     Asthma     Cardiac disease     Chronic kidney disease     passed on own kidney stone    Depression     Diverticulitis     GERD (gastroesophageal reflux disease)     Hayfever     [place] (hard of hearing)     bilateral hearing aids    Hyperlipemia     Hypertension     Panic attack     Tachycardia      Past Surgical History  Past Surgical History:   Procedure Laterality Date    ABLATION OF DYSRHYTHMIC FOCUS      APPENDECTOMY      CHOLECYSTECTOMY      open    FRACTURE SURGERY      ORIF fx (L) tibia, fibula 2009    GASTRIC BYPASS OPEN      HYSTERECTOMY      TN XCAPSL CTRC RMVL INSJ IO LENS PROSTH W/O ECP Left 4/18/2016    Procedure: EXTRACTION EXTRACAPSULAR CATARACT PHACO INTRAOCULAR LENS (IOL); Surgeon: Danette Luke MD;  Location: [facility] MAIN OR;  Service: Ophthalmology    TN XCAPSL CTRC RMVL INSJ IO LENS PROSTH W/O ECP Right 3/1/2021    Procedure: EXTRACTION EXTRACAPSULAR CATARACT PHACO INTRAOCULAR LENS (IOL);   Surgeon: Danette Luke MD;  Location: [facility] OR;  Service: Ophthalmology    TONSILECTOMY AND ADNOIDECTOMY               12/04/23 1018   OT Last Visit   OT Visit Date 12/04/23   Note Type   Note type Evaluation   Pain Assessment   Pain Assessment Tool 0-10   Pain Score 2 Pain Location/Orientation Orientation: Left; Location: Leg   Pain Onset/Description Onset: Ongoing   Home Living   Type of Home Apartment   Home Layout One level   Bathroom Shower/Tub Tub/shower unit   [place]   Prior Function   Level of [place] Independent with ADLs; Needs assistance with IADLS   Lives With Son   Receives Help From [facility] in the last 6 months 1 to 4   ADL   Grooming Assistance 7  Independent   UB Bathing Assistance 7  Independent   LB Bathing Assistance 6  Modified Independent   UB Dressing Assistance 7  [address] 6  Modified independent   [address]  6  Modified independent   Transfers   Sit to Stand 6  Modified independent   Stand to Sit 6  Modified independent   Toilet transfer 6  Modified independent   Functional Mobility   Functional Mobility 6  Modified independent   Additional items Rolling walker   Balance   Static Sitting Good   Dynamic Sitting Fair +   Static Standing Fair +   Dynamic Standing Fair +   Ambulatory Fair +   Activity Tolerance   Activity Tolerance Patient tolerated treatment well   RUE Assessment   RUE Assessment WFL   LUE Assessment   LUE Assessment WFL   Hand Function   Gross Motor Coordination Functional   Sensation   Light Touch No apparent deficits   Proprioception   Proprioception No apparent deficits   Cognition   Arousal/Participation Alert; Cooperative   Attention Within functional limits   Orientation Level Oriented X4   Memory Within functional limits   Following Commands Follows all commands and directions without difficulty   Assessment   Limitation Decreased high-level ADLs   Prognosis Good   Assessment   Pt is a 74 y.o. female seen for OT evaluation s/p admit to [facility] on 11/28/2023 w/ Severe episode of recurrent major depressive disorder, without psychotic features ([address]). See medical history above for extensive list of comorbidities affecting pt's functional performance at time of assessment. Personal factors affecting Pt at time of IE include:difficulty performing IADLS  and health management . Upon evaluation: Pt [place] for functional ambulation including bathroom mobility and negotiation of small spaces, [place] for ADL transfers. Pt [place] for ADLs in standing and LB ADLs. Pt reports well-managed and much improved leg pain, no noted limitations to completing ADLs at this time. Pt's primary barrier(s) at this time: decreased standing tolerance, does report falls at home in the bathroom/tub. The following deficits impact occupational performance: decreased tolerance and decreased safety awareness. Pt to benefit from continued skilled OT services while in the hospital to address deficits as defined above and maximize level of functional independence w ADL's and functional mobility. Occupational performance areas to address include: bathing/shower, toilet hygiene, health maintenance, functional mobility, and community mobility. From OT standpoint, recommendation at time of d/c would be home with social support and resumed assist from son. Goals   STG Time Frame   (1-2 more sessions)      Pt will complete IADL [place]. Pt will complete ~ 10 minutes of standing activity without a seated rest break. Plan   Treatment Interventions ADL retraining;Continued evaluation; Activityengagement   Goal Expiration Date 12/15/23   OT Frequency   (1-2 more sessions)   Discharge Recommendation   Rehab Resource Intensity Level, OT   (see note)   Additional Treatment Session   Start Time 1030   End Time 1100   Treatment Assessment Pt seen for OT txt. Pt demonstrated good dynamic balance with path navigation including facilitating turns. Min verbal cues for hand placement with transfers to standing with transfer surfaces and walker.

## 2023-12-04 NOTE — NURSING NOTE
Patient is medication and meal compliant. No complaints of pain or discomfort. Patient is visible on the unit and social with peers. Patient endorses anxiety but denies depression. Patient also denies current SI, AH or VH. Will continue to monitor patient for changes in mood or behavior.

## 2023-12-04 NOTE — PLAN OF CARE
Problem: OCCUPATIONAL THERAPY ADULT  Goal: Performs self-care activities at highest level of function for planned discharge setting. See evaluation for individualized goals. Description: Treatment Interventions: ADL retraining, Continued evaluation, Activityengagement          See flowsheet documentation for full assessment, interventions and recommendations. Outcome: Progressing  Note: Limitation: Decreased high-level ADLs  Prognosis: Good  Assessment:    Pt is a 74 y.o. female seen for OT evaluation s/p admit to [facility] on 11/28/2023 w/ Severe episode of recurrent major depressive disorder, without psychotic features ([address] See medical history above for extensive list of comorbidities affecting pt's functional performance at time of assessment. Personal factors affecting Pt at time of IE include:difficulty performing IADLS  and health management . Upon evaluation: Pt [place] for functional ambulation including bathroom mobility and negotiation of small spaces, [place] for ADL transfers. Pt [place] for ADLs in standing and LB ADLs. Pt reports well-managed and much improved leg pain, no noted limitations to completing ADLs at this time. Pt's primary barrier(s) at this time: decreased standing tolerance, does report falls at home in the bathroom/tub. The following deficits impact occupational performance: decreased tolerance and decreased safety awareness. Pt to benefit from continued skilled OT services while in the hospital to address deficits as defined above and maximize level of functional independence w ADL's and functional mobility. Occupational performance areas to address include: bathing/shower, toilet hygiene, health maintenance, functional mobility, and community mobility. From OT standpoint, recommendation at time of d/c would be home with social support and resumed assist from son.     Rehab Resource Intensity Level, OT:  (see note)

## 2023-12-04 NOTE — CASE MANAGEMENT
Spoke to the patient's son, Aura Epley ([phone]), who states he sent the paperwork in for the patient to [address] [place] [address] to get assistance in her home. Gave the patient the number from [facility] ([phone]) so she was able to call to pay her mortgage under loan # [id number]. Assisted the patient with this call, payment was made. Also called [employer] at [phone] to inquire about the citation that the patient got regarding the trash in her yard, the phones were down and they could not access the information. Called [place] for the patient to see how we can notify them of patients admission since she missed the deadline for documents needed for assistance since she was inpatient. Called LOUIE ([phone]) [facility] to find out about sending them a referral, left voicemail.

## 2023-12-04 NOTE — TREATMENT TEAM
Pt completed  relapse prevention plan. Pt signed and copy in chart. Crisis, warmline and 988 numbers provided. Pt noted signs and symptoms upon admission as "can't walk, need physical therapy, getting too excitable, angry, frustrated and needed help"  Pt attends groups. 12/04/23 1100   Activity/Group Checklist   Group Admission/Discharge   Attendance Attended   Attendance Duration (min) 16-30   Interactions Interacted appropriately   Affect/Mood Appropriate   Goals Achieved Discussed coping strategies; Able to engage in interactions; Able to listen to others; Able to reflect/comment on own behavior;Able to manage/cope with feelings;Verbalized increased hopefulness

## 2023-12-04 NOTE — PROGRESS NOTES
Pt attended all groups. Pt able to self express and cooperative. Pt noted she needs to ask for help when needed, make needs known and adapt her needs to change. Less overwhelmed. 12/04/23 1330   Activity/Group Checklist   Group Other (Comment)  (coping skills and self care)   Attendance Attended   Attendance Duration (min) 46-60   Interactions Interacted appropriately   Affect/Mood Appropriate   Goals Achieved Identified feelings; Discussed coping strategies; Increased hopefulness; Able to listen to others; Able to engage in interactions; Able to reflect/comment on own behavior;Able to manage/cope with feelings;Verbalized increased hopefulness; Able to self-disclose

## 2023-12-05 PROCEDURE — 99232 SBSQ HOSP IP/OBS MODERATE 35: CPT | Performed by: STUDENT IN AN ORGANIZED HEALTH CARE EDUCATION/TRAINING PROGRAM

## 2023-12-05 RX ORDER — ESCITALOPRAM OXALATE 10 MG/1
20 TABLET ORAL DAILY
Status: DISCONTINUED | OUTPATIENT
Start: 2023-12-06 | End: 2023-12-07 | Stop reason: HOSPADM

## 2023-12-05 RX ADMIN — Medication 1 SPRAY: at 17:18

## 2023-12-05 RX ADMIN — SUCRALFATE 1 G: 1 TABLET ORAL at 11:35

## 2023-12-05 RX ADMIN — ALLOPURINOL 100 MG: 100 TABLET ORAL at 08:04

## 2023-12-05 RX ADMIN — BUPROPION HYDROCHLORIDE 150 MG: 150 TABLET, EXTENDED RELEASE ORAL at 08:04

## 2023-12-05 RX ADMIN — FLUTICASONE FUROATE AND VILANTEROL TRIFENATATE 1 PUFF: 100; 25 POWDER RESPIRATORY (INHALATION) at 08:04

## 2023-12-05 RX ADMIN — NEBIVOLOL 5 MG: 5 TABLET ORAL at 08:03

## 2023-12-05 RX ADMIN — BUSPIRONE HYDROCHLORIDE 5 MG: 5 TABLET ORAL at 08:04

## 2023-12-05 RX ADMIN — TRAZODONE HYDROCHLORIDE 75 MG: 50 TABLET ORAL at 21:45

## 2023-12-05 RX ADMIN — KETOTIFEN FUMARATE 1 DROP: 0.25 SOLUTION/ DROPS OPHTHALMIC at 21:46

## 2023-12-05 RX ADMIN — SUCRALFATE 1 G: 1 TABLET ORAL at 21:45

## 2023-12-05 RX ADMIN — PANTOPRAZOLE SODIUM 20 MG: 20 TABLET, DELAYED RELEASE ORAL at 06:04

## 2023-12-05 RX ADMIN — IBUPROFEN 600 MG: 600 TABLET ORAL at 14:52

## 2023-12-05 RX ADMIN — DONEPEZIL HYDROCHLORIDE 10 MG: 10 TABLET, FILM COATED ORAL at 21:45

## 2023-12-05 RX ADMIN — BUSPIRONE HYDROCHLORIDE 5 MG: 5 TABLET ORAL at 17:17

## 2023-12-05 RX ADMIN — KETOTIFEN FUMARATE 1 DROP: 0.25 SOLUTION/ DROPS OPHTHALMIC at 08:04

## 2023-12-05 RX ADMIN — ASPIRIN 81 MG CHEWABLE TABLET 81 MG: 81 TABLET CHEWABLE at 08:03

## 2023-12-05 RX ADMIN — Medication 1 SPRAY: at 08:04

## 2023-12-05 RX ADMIN — SUCRALFATE 1 G: 1 TABLET ORAL at 17:17

## 2023-12-05 RX ADMIN — SUCRALFATE 1 G: 1 TABLET ORAL at 06:04

## 2023-12-05 RX ADMIN — ESCITALOPRAM OXALATE 15 MG: 5 TABLET, FILM COATED ORAL at 08:04

## 2023-12-05 RX ADMIN — BUSPIRONE HYDROCHLORIDE 5 MG: 5 TABLET ORAL at 21:45

## 2023-12-05 NOTE — NURSING NOTE
Pt is bright and pleasant. Denies psych symptoms. Attends groups. Social in the milieu with peers. Pt med/meal compliant. Offers no complaints at this time.

## 2023-12-05 NOTE — PROGRESS NOTES
12/05/23 0806   Team Meeting   Meeting Type Daily Rounds   Initial Conference Date 12/05/23   Next Conference Date 12/06/23   Team Members Present   Team Members Present Physician;Nurse;;; Occupational Therapist   Physician Team Member WEI Crystal   Nursing Team Member [facility] Management Team Member Tiffani   Social Work Team Member Rene   OT Team Member Dante Alejandro   Patient/Family Present   Patient Present No   Patient's Family Present No     Discharge on Thursday, left a message for [facility] for a referral, paid her mortgage, called the court regarding property citations, papers sent to [place] for housing assistance, email sent to [facility] for housing assistance to extend her deadline for required docs since she is IP. Lexapro increased.

## 2023-12-05 NOTE — PROGRESS NOTES
12/05/23 1330   Activity/Group Checklist   Group Pet therapy   Attendance Attended   Attendance Duration (min) 0-15   Interactions Other (Comment)  (pt reminisced about her cat with 27 toes.)   Affect/Mood Calm   Goals Achieved Able to engage in interactions; Able to listen to others; Identified feelings

## 2023-12-05 NOTE — PLAN OF CARE
Problem: Ineffective Coping  Goal: Identifies ineffective coping skills  Outcome: Progressing  Goal: Identifies healthy coping skills  Outcome: Progressing  Goal: Demonstrates healthy coping skills  Outcome: Progressing  Goal: Participates in unit activities  Description: Interventions:  - Provide therapeutic environment   - Provide required programming   - Redirect inappropriate behaviors   Outcome: Progressing  Goal: Patient/Family participate in treatment and DC plans  Description: Interventions:  - Provide therapeutic environment  Outcome: Progressing  Goal: Patient/Family verbalizes awareness of resources  Outcome: Progressing  Goal: Understands least restrictive measures  Description: Interventions:  - Utilize least restrictive behavior  Outcome: Progressing  Goal: Free from restraint events  Description: - Utilize least restrictive measures   - Provide behavioral interventions   - Redirect inappropriate behaviors   Outcome: Progressing     Problem: Risk for Self Injury/Neglect  Goal: Treatment Goal: Remain safe during length of stay, learn and adopt new coping skills, and be free of self-injurious ideation, impulses and acts at the time of discharge  Outcome: Progressing  Goal: Verbalize thoughts and feelings  Description: Interventions:  - Assess and re-assess patient's lethality and potential for self-injury  - Engage patient in 1:1 interactions, daily, for a minimum of 15 minutes  - Encourage patient to express feelings, fears, frustrations, hopes  - Establish rapport/trust with patient   Outcome: Progressing  Goal: Refrain from harming self  Description: Interventions:  - Monitor patient closely, per order  - Develop a trusting relationship  - Supervise medication ingestion, monitor effects and side effects   Outcome: Progressing  Goal: Attend and participate in unit activities, including therapeutic, recreational, and educational groups  Description: Interventions:  - Provide therapeutic and educational activities daily, encourage attendance and participation, and document same in the medical record  - Obtain collateral information, encourage visitation and family involvement in care   Outcome: Progressing  Goal: Recognize maladaptive responses and adopt new coping mechanisms  Outcome: Progressing  Goal: Complete daily ADLs, including personal hygiene independently, as able  Description: Interventions:  - Observe, teach, and assist patient with ADLS  - Monitor and promote a balance of rest/activity, with adequate nutrition and elimination  Outcome: Progressing     Problem: Depression  Goal: Treatment Goal: Demonstrate behavioral control of depressive symptoms, verbalize feelings of improved mood/affect, and adopt new coping skills prior to discharge  Outcome: Progressing  Goal: Verbalize thoughts and feelings  Description: Interventions:  - Assess and re-assess patient's level of risk   - Engage patient in 1:1 interactions, daily, for a minimum of 15 minutes   - Encourage patient to express feelings, fears, frustrations, hopes   Outcome: Progressing  Goal: Refrain from harming self  Description: Interventions:  - Monitor patient closely, per order   - Supervise medication ingestion, monitor effects and side effects   Outcome: Progressing  Goal: Refrain from isolation  Description: Interventions:  - Develop a trusting relationship   - Encourage socialization   Outcome: Progressing  Goal: Refrain from self-neglect  Outcome: Progressing  Goal: Attend and participate in unit activities, including therapeutic, recreational, and educational groups  Description: Interventions:  - Provide therapeutic and educational activities daily, encourage attendance and participation, and document same in the medical record   Outcome: Progressing  Goal: Complete daily ADLs, including personal hygiene independently, as able  Description: Interventions:  - Observe, teach, and assist patient with ADLS  -  Monitor and promote a balance of rest/activity, with adequate nutrition and elimination   Outcome: Progressing     Problem: Anxiety  Goal: Anxiety is at manageable level  Description: Interventions:  - Assess and monitor patient's anxiety level. - Monitor for signs and symptoms (heart palpitations, chest pain, shortness of breath, headaches, nausea, feeling jumpy, restlessness, irritable, apprehensive). - Collaborate with interdisciplinary team and initiate plan and interventions as ordered.   - [place] patient to unit/surroundings  - Explain treatment plan  - Encourage participation in care  - Encourage verbalization of concerns/fears  - Identify coping mechanisms  - Assist in developing anxiety-reducing skills  - Administer/offer alternative therapies  - Limit or eliminate stimulants  Outcome: Progressing     Problem: DISCHARGE PLANNING - CARE MANAGEMENT  Goal: Discharge to post-acute care or home with appropriate resources  Description: INTERVENTIONS:  - Conduct assessment to determine patient/family and health care team treatment goals, and need for post-acute services based on payer coverage, community resources, and patient preferences, and barriers to discharge  - Address psychosocial, clinical, and financial barriers to discharge as identified in assessment in conjunction with the patient/family and health care team  - Arrange appropriate level of post-acute services according to patient’s   needs and preference and payer coverage in collaboration with the physician and health care team  - Communicate with and update the patient/family, physician, and health care team regarding progress on the discharge plan  - Arrange appropriate transportation to post-acute venues  Outcome: Progressing

## 2023-12-05 NOTE — PROGRESS NOTES
Progress Note - Behavioral Health   Lyndsay Agrawal 74 y.o. female MRN: [id number]  Unit/Bed#: Tuyet Peña 255-48 Encounter: [id number]    Assessment/Plan   Principal Problem:    Severe episode of recurrent major depressive disorder, without psychotic features ([address])  Active Problems:    Tobacco abuse    Essential hypertension    Allergic rhinitis    Esophageal reflux    Vitamin B12 deficiency    Vitamin D deficiency    COPD without exacerbation (HCC)    Medical clearance for psychiatric admission    History of Debra-en-Y gastric bypass    Chronic idiopathic gout of left foot    Generalized anxiety disorder    Ingrown right greater toenail    Pain of left heel      Recommended Treatment:   Increase Lexapro to 20 mg daily to assist with anxiety and mood    Continue with group therapy, milieu therapy and occupational therapy. Continue frequent safety checks and vitals per unit protocol. Case discussed with treatment team.  Risks, benefits and possible side effects of Medications: Risks, benefits, and possible side effects of medications have been explained to the patient, who verbalizes understanding    ------------------------------------------------------------    Subjective: Per nursing report, Get Knight has been cooperative on the unit and compliant with medications. Today, Get Knight is consenting for safety on the unit. She reports still feeling anxiety about her current financial situation but that she is learning to manage well. In agreement to increase Lexapro to 20 mg daily to assist with breakthrough anxiety as well as overall mood associated with psychosocial stressors, patient also expresses desire to keep contact with ICM case management as referred by the team.  No behavioral issues on the unit, will as needed medications given overnight. Discharge disposition ongoing with patient to be discharged later this week with follow-up resources.     Progress Toward Goals: slow improvement    Psychiatric Review of Systems:  Behavior over the last 24 hours: unchanged  Sleep: improving  Appetite: adequate  Medication side effects: none verbalized  ROS: Complete review of systems is negative except as noted above.     Vital signs in last 24 hours:  Temp:  [97.9 °F (36.6 °C)-99.1 °F (37.3 °C)] 97.9 °F (36.6 °C)  HR:  [51-55] 51  Resp:  [16-18] 16  BP: (146-155)/(58-67) 146/65    Mental Status Exam:  Appearance:  alert and appropriate grooming and hygiene   Behavior:  calm and cooperative   Motor: no abnormal movements and normal gait and balance   Speech:  spontaneous and coherent   Mood:  anxious   Affect:  mood-congruent   Thought Process:  goal directed   Thought Content: no verbalized delusions or overt paranoia   Perceptual disturbances: no reported hallucinations and does not appear to be responding to internal stimuli at this time   Risk Potential: No active or passive suicidal or homicidal ideation was verbalized during interview   Cognition: oriented to self and situation and cognition not formally tested   Insight:  Improving   Judgment: Improving     Current Medications:  Current Facility-Administered Medications   Medication Dose Route Frequency Provider Last Rate    acetaminophen  650 mg Oral Q4H PRN Buzzy Peasant, PA-SAMIA      acetaminophen  650 mg Oral Q4H PRN Buzzy Peasant, PA-SAMIA      albuterol  2 puff Inhalation Q6H PRN [facility] YEYO Farias      allopurinol  100 mg Oral Daily [facility] Jarrett, PA-SAMIA      aluminum-magnesium hydroxide-simethicone  30 mL Oral Q4H PRN [facility] YEYO Farias      aspirin  81 mg Oral Daily [facility], PA-SAMIA      benztropine  1 mg Intramuscular Q4H PRN Max 6/day [facility], PA-SAMIA      benztropine  0.5 mg Oral Q4H PRN Max 6/day [facility]      bisacodyl  10 mg Rectal Daily PRN [facility] YEYO Farias      buPROPion  150 mg Oral Daily Mali Pleitez MD      busPIRone  5 mg Oral TID Jayleen Dover DO      donepezil  10 mg Oral HS Rubi Guerin Anahi Armstrong PA-C      ergocalciferol  50,000 Units Oral Weekly [place]      [START ON 12/6/2023] escitalopram  20 mg Oral Daily Michelle Navarrete MD      fluticasone  1 spray Each Nare BID Briana KYLE Peralta      Fluticasone Furoate-Vilanterol  1 puff Inhalation Daily [facility] [place]      glycerin-hypromellose-  1 drop Both Eyes Q3H PRN Nay Farias PA-C      haloperidol lactate  5 mg Intramuscular Q4H PRN Max 4/day [facility], YEYO      hydrOXYzine HCL  25 mg Oral Q6H PRN Max 4/day [facility], YEYO      hydrOXYzine HCL  50 mg Oral Q6H PRN Max 4/day [facility], YEYO      ibuprofen  600 mg Oral Q8H PRN Briana KYLE Peralta      Ketotifen Fumarate  1 drop Both Eyes BID [facility], YEYO      LORazepam  1 mg Intramuscular Q6H PRN Max 3/day Nay Farias PA-C      nebivolol  5 mg Oral Daily [facility], YEYO      ondansetron  4 mg Oral Q8H PRN Jay KYLE Peralta      pantoprazole  20 mg Oral Early Morning [facility], YEYO      polyethylene glycol  17 g Oral Daily PRN Nay Farias PA-C      propranolol  5 mg Oral Q8H PRN Nay Farias PA-C      risperiDONE  0.25 mg Oral Q4H PRN Max 6/day [facility], YEYO      risperiDONE  0.5 mg Oral Q4H PRN Max 3/day Paris Newell, YEYO      risperiDONE  1 mg Oral Q2H PRN Max 3/day [place] Newell, YEYO      senna-docusate sodium  1 tablet Oral Daily PRN Nay Farias PA-C      sucralfate  1 g Oral 4x Daily (AC & HS) Paris Newell, YEYO      traZODone  50 mg Oral HS PRN Paris Newell, YEYO      traZODone  50 mg Oral Q6H PRN Max 3/day Billdona Love, YEYO      traZODone  75 mg Oral HS Naysheila Hernandez PA-C         Behavioral Health Medications: all current active meds have been reviewed. Changes as in plan section above.     Laboratory results:  I have personally reviewed all pertinent laboratory/tests results. No results found for this or any previous visit (from the past 48 hour(s)). Kaitlyn Perez    This note was completed in part utilizing Dragon dictation Software. Grammatical, translation, syntax errors, random word insertions, spelling mistakes, and incomplete sentences may be an occasional consequence of this system secondary to software limitations with voice recognition, ambient noise, and hardware issues.  If you have any questions or concerns about the content, text, or information contained within the body of this dictation, please contact the provider for clarification

## 2023-12-05 NOTE — TREATMENT TEAM
12/04/23 2155   Che Anxiety Scale   Anxious Mood 3   Tension 2   Fears 1   Insomnia 3   Intellectual 0   Depressed Mood 2   Somatic Complaints: Muscular 0   Somatic Complaints: Sensory 0   Cardiovascular Symptoms 0   Respiratory Symptoms 0   Gastrointestinal Symptoms 0   Genitourinary Symptoms 0   Autonomic Symptoms 3   Behavior at Interview 4   Che Anxiety Score 18     Atarax 50mg given for increased anxiety.  Will f/u for effectiveness

## 2023-12-05 NOTE — BH TRANSITION RECORD
Contact Information: If you have any questions, concerns, pended studies, tests and/or procedures, or emergencies regarding your inpatient behavioral health visit. Please contact [facility] older adult behavioral health unit 6B [phone] and ask to speak to a , nurse or physician. A contact is available 24 hours/ 7 days a week at this number. Summary of Procedures Performed During your Stay:  Below is a list of major procedures performed during your hospital stay and a summary of results:  - Major Imaging Studies: XR Foot 3+ VW Left: No acute osseous abnormality . Pending Studies (From admission, onward)      None          Please follow up on the above pending studies with your PCP and/or referring provider.

## 2023-12-06 ENCOUNTER — TELEPHONE (OUTPATIENT)
Dept: PSYCHIATRY | Facility: CLINIC | Age: 73
End: 2023-12-06

## 2023-12-06 PROCEDURE — 99232 SBSQ HOSP IP/OBS MODERATE 35: CPT | Performed by: STUDENT IN AN ORGANIZED HEALTH CARE EDUCATION/TRAINING PROGRAM

## 2023-12-06 PROCEDURE — 97163 PT EVAL HIGH COMPLEX 45 MIN: CPT

## 2023-12-06 RX ORDER — ALLOPURINOL 100 MG/1
100 TABLET ORAL DAILY
Qty: 30 TABLET | Refills: 1 | Status: SHIPPED | OUTPATIENT
Start: 2023-12-06 | End: 2024-02-04

## 2023-12-06 RX ORDER — SUCRALFATE 1 G/1
1 TABLET ORAL
Qty: 120 TABLET | Refills: 0 | Status: SHIPPED | OUTPATIENT
Start: 2023-12-06 | End: 2024-01-05

## 2023-12-06 RX ORDER — ERGOCALCIFEROL 1.25 MG/1
50000 CAPSULE ORAL WEEKLY
Qty: 7 CAPSULE | Refills: 0 | Status: SHIPPED | OUTPATIENT
Start: 2023-12-06 | End: 2024-01-18

## 2023-12-06 RX ORDER — ALBUTEROL SULFATE 90 UG/1
2 AEROSOL, METERED RESPIRATORY (INHALATION) EVERY 6 HOURS PRN
Qty: 6.7 G | Refills: 1 | Status: SHIPPED | OUTPATIENT
Start: 2023-12-06

## 2023-12-06 RX ORDER — BUPROPION HYDROCHLORIDE 150 MG/1
150 TABLET ORAL DAILY
Qty: 30 TABLET | Refills: 1 | Status: SHIPPED | OUTPATIENT
Start: 2023-12-07 | End: 2024-02-05

## 2023-12-06 RX ORDER — KETOTIFEN FUMARATE 0.35 MG/ML
1 SOLUTION/ DROPS OPHTHALMIC 2 TIMES DAILY
Qty: 3 ML | Refills: 1 | Status: SHIPPED | OUTPATIENT
Start: 2023-12-06 | End: 2024-02-04

## 2023-12-06 RX ORDER — NEBIVOLOL 5 MG/1
5 TABLET ORAL DAILY
Qty: 30 TABLET | Refills: 1 | Status: SHIPPED | OUTPATIENT
Start: 2023-12-07 | End: 2024-02-05

## 2023-12-06 RX ORDER — ESCITALOPRAM OXALATE 20 MG/1
20 TABLET ORAL DAILY
Qty: 30 TABLET | Refills: 1 | Status: SHIPPED | OUTPATIENT
Start: 2023-12-07 | End: 2024-02-05

## 2023-12-06 RX ORDER — BUSPIRONE HYDROCHLORIDE 5 MG/1
5 TABLET ORAL 3 TIMES DAILY
Qty: 90 TABLET | Refills: 1 | Status: SHIPPED | OUTPATIENT
Start: 2023-12-06 | End: 2024-02-04

## 2023-12-06 RX ORDER — ASPIRIN 81 MG/1
81 TABLET, CHEWABLE ORAL DAILY
Qty: 30 TABLET | Refills: 1 | Status: SHIPPED | OUTPATIENT
Start: 2023-12-07 | End: 2024-02-05

## 2023-12-06 RX ORDER — DONEPEZIL HYDROCHLORIDE 10 MG/1
10 TABLET, FILM COATED ORAL
Qty: 30 TABLET | Refills: 1 | Status: SHIPPED | OUTPATIENT
Start: 2023-12-06 | End: 2024-02-04

## 2023-12-06 RX ORDER — FLUTICASONE PROPIONATE 50 MCG
1 SPRAY, SUSPENSION (ML) NASAL 2 TIMES DAILY
Qty: 9.9 ML | Refills: 1 | Status: SHIPPED | OUTPATIENT
Start: 2023-12-06

## 2023-12-06 RX ORDER — TRAZODONE HYDROCHLORIDE 150 MG/1
75 TABLET ORAL
Qty: 15 TABLET | Refills: 1 | Status: SHIPPED | OUTPATIENT
Start: 2023-12-06 | End: 2024-02-04

## 2023-12-06 RX ORDER — PANTOPRAZOLE SODIUM 20 MG/1
20 TABLET, DELAYED RELEASE ORAL
Qty: 30 TABLET | Refills: 1 | Status: SHIPPED | OUTPATIENT
Start: 2023-12-07 | End: 2024-02-05

## 2023-12-06 RX ADMIN — SUCRALFATE 1 G: 1 TABLET ORAL at 17:06

## 2023-12-06 RX ADMIN — Medication 1 SPRAY: at 08:06

## 2023-12-06 RX ADMIN — BUSPIRONE HYDROCHLORIDE 5 MG: 5 TABLET ORAL at 21:17

## 2023-12-06 RX ADMIN — BUPROPION HYDROCHLORIDE 150 MG: 150 TABLET, EXTENDED RELEASE ORAL at 08:05

## 2023-12-06 RX ADMIN — ERGOCALCIFEROL 50000 UNITS: 1.25 CAPSULE ORAL at 08:06

## 2023-12-06 RX ADMIN — IBUPROFEN 600 MG: 600 TABLET ORAL at 22:18

## 2023-12-06 RX ADMIN — DONEPEZIL HYDROCHLORIDE 10 MG: 10 TABLET, FILM COATED ORAL at 21:17

## 2023-12-06 RX ADMIN — FLUTICASONE FUROATE AND VILANTEROL TRIFENATATE 1 PUFF: 100; 25 POWDER RESPIRATORY (INHALATION) at 07:57

## 2023-12-06 RX ADMIN — KETOTIFEN FUMARATE 1 DROP: 0.25 SOLUTION/ DROPS OPHTHALMIC at 21:18

## 2023-12-06 RX ADMIN — IBUPROFEN 600 MG: 600 TABLET ORAL at 09:57

## 2023-12-06 RX ADMIN — TRAZODONE HYDROCHLORIDE 50 MG: 50 TABLET ORAL at 22:18

## 2023-12-06 RX ADMIN — ESCITALOPRAM OXALATE 20 MG: 10 TABLET ORAL at 08:05

## 2023-12-06 RX ADMIN — PANTOPRAZOLE SODIUM 20 MG: 20 TABLET, DELAYED RELEASE ORAL at 06:13

## 2023-12-06 RX ADMIN — SUCRALFATE 1 G: 1 TABLET ORAL at 21:17

## 2023-12-06 RX ADMIN — KETOTIFEN FUMARATE 1 DROP: 0.25 SOLUTION/ DROPS OPHTHALMIC at 08:06

## 2023-12-06 RX ADMIN — NEBIVOLOL 5 MG: 5 TABLET ORAL at 08:06

## 2023-12-06 RX ADMIN — BUSPIRONE HYDROCHLORIDE 5 MG: 5 TABLET ORAL at 08:05

## 2023-12-06 RX ADMIN — BUSPIRONE HYDROCHLORIDE 5 MG: 5 TABLET ORAL at 17:06

## 2023-12-06 RX ADMIN — Medication 1 SPRAY: at 17:20

## 2023-12-06 RX ADMIN — SUCRALFATE 1 G: 1 TABLET ORAL at 11:06

## 2023-12-06 RX ADMIN — ALLOPURINOL 100 MG: 100 TABLET ORAL at 08:05

## 2023-12-06 RX ADMIN — ASPIRIN 81 MG CHEWABLE TABLET 81 MG: 81 TABLET CHEWABLE at 08:05

## 2023-12-06 RX ADMIN — SUCRALFATE 1 G: 1 TABLET ORAL at 06:13

## 2023-12-06 RX ADMIN — TRAZODONE HYDROCHLORIDE 75 MG: 50 TABLET ORAL at 21:17

## 2023-12-06 NOTE — TELEPHONE ENCOUNTER
Radha Augustine from [address] called about a patient on the wait list who is will be discharging tomorrow, Radha Augustine would like to know if this patient can be scheduled at [facility]

## 2023-12-06 NOTE — NURSING NOTE
Pt pleasant, social and attended group. Good appetite and steady gait with walker. VSS. Denied SI. Monitored for safety and support.

## 2023-12-06 NOTE — PLAN OF CARE
Problem: Ineffective Coping  Goal: Identifies ineffective coping skills  Outcome: Progressing  Goal: Identifies healthy coping skills  Outcome: Progressing  Goal: Demonstrates healthy coping skills  Outcome: Progressing  Goal: Participates in unit activities  Description: Interventions:  - Provide therapeutic environment   - Provide required programming   - Redirect inappropriate behaviors   Outcome: Progressing  Goal: Patient/Family participate in treatment and DC plans  Description: Interventions:  - Provide therapeutic environment  Outcome: Progressing  Goal: Patient/Family verbalizes awareness of resources  Outcome: Progressing  Goal: Understands least restrictive measures  Description: Interventions:  - Utilize least restrictive behavior  Outcome: Progressing  Goal: Free from restraint events  Description: - Utilize least restrictive measures   - Provide behavioral interventions   - Redirect inappropriate behaviors   Outcome: Progressing     Problem: Risk for Self Injury/Neglect  Goal: Treatment Goal: Remain safe during length of stay, learn and adopt new coping skills, and be free of self-injurious ideation, impulses and acts at the time of discharge  Outcome: Progressing  Goal: Verbalize thoughts and feelings  Description: Interventions:  - Assess and re-assess patient's lethality and potential for self-injury  - Engage patient in 1:1 interactions, daily, for a minimum of 15 minutes  - Encourage patient to express feelings, fears, frustrations, hopes  - Establish rapport/trust with patient   Outcome: Progressing  Goal: Refrain from harming self  Description: Interventions:  - Monitor patient closely, per order  - Develop a trusting relationship  - Supervise medication ingestion, monitor effects and side effects   Outcome: Progressing  Goal: Attend and participate in unit activities, including therapeutic, recreational, and educational groups  Description: Interventions:  - Provide therapeutic and educational activities daily, encourage attendance and participation, and document same in the medical record  - Obtain collateral information, encourage visitation and family involvement in care   Outcome: Progressing  Goal: Recognize maladaptive responses and adopt new coping mechanisms  Outcome: Progressing  Goal: Complete daily ADLs, including personal hygiene independently, as able  Description: Interventions:  - Observe, teach, and assist patient with ADLS  - Monitor and promote a balance of rest/activity, with adequate nutrition and elimination  Outcome: Progressing     Problem: Depression  Goal: Treatment Goal: Demonstrate behavioral control of depressive symptoms, verbalize feelings of improved mood/affect, and adopt new coping skills prior to discharge  Outcome: Progressing  Goal: Verbalize thoughts and feelings  Description: Interventions:  - Assess and re-assess patient's level of risk   - Engage patient in 1:1 interactions, daily, for a minimum of 15 minutes   - Encourage patient to express feelings, fears, frustrations, hopes   Outcome: Progressing  Goal: Refrain from harming self  Description: Interventions:  - Monitor patient closely, per order   - Supervise medication ingestion, monitor effects and side effects   Outcome: Progressing  Goal: Refrain from isolation  Description: Interventions:  - Develop a trusting relationship   - Encourage socialization   Outcome: Progressing  Goal: Refrain from self-neglect  Outcome: Progressing  Goal: Attend and participate in unit activities, including therapeutic, recreational, and educational groups  Description: Interventions:  - Provide therapeutic and educational activities daily, encourage attendance and participation, and document same in the medical record   Outcome: Progressing  Goal: Complete daily ADLs, including personal hygiene independently, as able  Description: Interventions:  - Observe, teach, and assist patient with ADLS  -  Monitor and promote a balance of rest/activity, with adequate nutrition and elimination   Outcome: Progressing

## 2023-12-06 NOTE — NURSING NOTE
Patient visible on the unit sitting with peers in the dayroom for the majority of the shift. Patient brightens on approach, cooperative with assessment questions. Patient currently denies depression, anxiety, SI/HI/AVH. Patient compliant with medications and meals, appetite good. No further signs of distress noted.

## 2023-12-06 NOTE — PROGRESS NOTES
Progress Note - 16 Vanessa Marcelo 74 y.o. female MRN: [id number]  Unit/Bed#: Nettie Romero 211-60 Encounter: [id number]    Patient was seen today for continuation of care, records reviewed and patient was discussed with the morning case review team.    Anjel Newell was seen today for psychiatric follow-up. On assessment today, Anjel Newell was pleasant and cooperative. Stating that she feels ready to go home, patient is able to discuss safety plan including reaching out to her sons, 46 or crisis if feeling unsafe. Depression and anxiety overall stated as improved, patient states that she is happy to be smiling again. Medication compliant, no adverse effects reported. We will continue with current plan of care including discharge to home with family tomorrow. Anjel Newell denies acute suicidal/self-harm ideation/intent/plan upon direct inquiry at this time. Anjel Newell remains behaviorally appropriate, no agitation or aggression noted on exam or in report. Anjel Neewll also denies HI/AH/VH, and does not appear overtly manic. No overt delusions or paranoia are verbalized. Impulse control is intact. Anjel Newell remains adherent to her current psychotropic medication regimen and denies any side effects from medications, as well as none noted on exam.    Vitals:  Vitals:    12/06/23 0748   BP: 117/56   Pulse: (!) 52   Resp: 16   Temp: 98.1 °F (36.7 °C)   SpO2: 94%       Laboratory Results:  I have personally reviewed all pertinent laboratory/tests results.   Most Recent Labs:   Lab Results   Component Value Date    WBC 7.97 11/27/2023    RBC 4.66 11/27/2023    HGB 14.0 11/27/2023    HCT 42.1 11/27/2023     11/27/2023    RDW 15.0 11/27/2023    NEUTROABS 5.06 11/27/2023    SODIUM 136 11/27/2023    K 3.8 11/27/2023     11/27/2023    CO2 23 11/27/2023    BUN 15 11/27/2023    CREATININE 0.89 11/27/2023    GLUC 95 11/27/2023    GLUF 99 05/21/2021    CALCIUM 9.1 11/27/2023    AST 14 11/27/2023    ALT 7 11/27/2023    ALKPHOS 64 11/27/2023 TP 7.1 11/27/2023    ALB 3.9 11/27/2023    TBILI 0.57 11/27/2023    CHOLESTEROL 154 05/21/2021    HDL 38 (L) 05/21/2021    TRIG 73 05/21/2021    LDLCALC 101 (H) 05/21/2021    NONHDLC 116 05/21/2021    SYW9IFNEIGUV 4.978 (H) 11/27/2023    FREET4 0.75 11/27/2023    HGBA1C 5.7 (H) 11/29/2023     11/29/2023       Psychiatric Review of Systems:  Behavior over the last 24 hours:  unchanged. Sleep: better  Appetite: better  Medication side effects: none  ROS: no complaints, denies shortness of breath or chest pain and all other systems are negative for acute changes    Mental Status Evaluation:  Appearance:  adequate grooming   Behavior:  cooperative, calm   Speech:  normal rate and volume   Mood:  improved   Affect:  slightly brighter   Thought Process:  goal directed, linear   Thought Content:  no overt delusions   Perceptual Disturbances: denies auditory hallucinations when asked, does not appear responding to internal stimuli   Risk Potential: Suicidal ideation - None  Homicidal ideation - None  Potential for aggression - No   Memory:  recent and remote memory grossly intact   Sensorium  person, place, and time/date      Consciousness:  alert and awake   Attention: attention span and concentration are age appropriate   Insight:  improving   Judgment: improving   Gait/Station: normal gait/station   Motor Activity: no abnormal movements   Progress Toward Goals:   Mireya Gomez is progressing towards goals of inpatient psychiatric treatment by continued medication compliance and is attending therapeutic modalities on the milieu. However, the patient continues to require inpatient psychiatric hospitalization for continued medication management and titration to optimize symptom reduction, improve sleep hygiene, and demonstrate adequate self-care.     Assessment/Plan   Principal Problem:    Severe episode of recurrent major depressive disorder, without psychotic features ([address])  Active Problems:    Tobacco abuse    Essential hypertension    Allergic rhinitis    Esophageal reflux    Vitamin B12 deficiency    Vitamin D deficiency    COPD without exacerbation (HCC)    Medical clearance for psychiatric admission    History of Debra-en-Y gastric bypass    Chronic idiopathic gout of left foot    Generalized anxiety disorder    Ingrown right greater toenail    Pain of left heel      Recommended Treatment: Treatment plan and medication changes discussed and per the attending physician the plan is: 1. Continue with group therapy, milieu therapy and occupational therapy  2. Behavioral Health checks every 7 minutes  3. Continue frequent safety checks and vitals per unit protocol  4. Continue with SLIM medical management as indicated  5. Continue with current medication regimen  6. Will review labs in the a.m. 7.Disposition Planning: Discharge planning and efforts remain ongoing    Behavioral Health Medications: all current active meds have been reviewed and continue current psychiatric medications.   Current Facility-Administered Medications   Medication Dose Route Frequency Provider Last Rate    acetaminophen  650 mg Oral Q4H PRN Anahy Aus StivesYEYO      acetaminophen  650 mg Oral Q4H PRN Erica Boateng PA-C      albuterol  2 puff Inhalation Q6H PRN Anahy Aus StivesYEYO      allopurinol  100 mg Oral Daily Anahy Aus [facility], PA-SAMIA      aluminum-magnesium hydroxide-simethicone  30 mL Oral Q4H PRN Anahy Aus StiYEYO oreilly      aspirin  81 mg Oral Daily [facility]      benztropine  1 mg Intramuscular Q4H PRN Max 6/day Anahy Aus [facility], PA-SAMIA      benztropine  0.5 mg Oral Q4H PRN Max 6/day [facility]y [facility]      bisacodyl  10 mg Rectal Daily PRN Anahy Aus StivesYEYO      buPROPion  150 mg Oral Daily Juan Landis MD      busPIRone  5 mg Oral TID Brandon Mayen DO      donepezil  10 mg Oral HS Erica Boateng PA-C      ergocalciferol  50,000 Units Oral Weekly KYLE Sanchez escitalopram  20 mg Oral Daily Tiffanie Roca MD      fluticasone  1 spray Each Nare BID KYLE Burnham      Fluticasone Furoate-Vilanterol  1 puff Inhalation Daily Mauri [place], [place]      glycerin-hypromellose-  1 drop Both Eyes Q3H PRN Mauri Lass Jarrett, PA-C      haloperidol lactate  5 mg Intramuscular Q4H PRN Max 4/day Mauri [facility], PA-C      hydrOXYzine HCL  25 mg Oral Q6H PRN Max 4/day Mauri [facility], PA-C      hydrOXYzine HCL  50 mg Oral Q6H PRN Max 4/day Mauri [facility], PA-C      ibuprofen  600 mg Oral Q8H PRN KYLE Burnham      Ketotifen Fumarate  1 drop Both Eyes BID [facility], [facility]-C      LORazepam  1 mg Intramuscular Q6H PRN Max 3/day [facility], [facility]-C      nebivolol  5 mg Oral Daily Mauri [facility], PA-C      ondansetron  4 mg Oral Q8H PRN KYLE Burnham      pantoprazole  20 mg Oral Early Morning Mauri [facility], PA-C      polyethylene glycol  17 g Oral Daily PRN Mauri Lass Stives, PA-C      propranolol  5 mg Oral Q8H PRN Mauri Lass Stives, PA-C      risperiDONE  0.25 mg Oral Q4H PRN Max 6/day Mauri [facility], PA-C      risperiDONE  0.5 mg Oral Q4H PRN Max 3/day Mauri Lass Stives, PA-C      risperiDONE  1 mg Oral Q2H PRN Max 3/day Leeta Lather, PA-C      senna-docusate sodium  1 tablet Oral Daily PRN Amuri Lass Stives, PA-C      sucralfate  1 g Oral 4x Daily (AC & HS) Leeta Lather, PA-C      traZODone  50 mg Oral HS PRN Leeta Lather, PA-C      traZODone  50 mg Oral Q6H PRN Max 3/day Leeta Lather, PA-C      traZODone  75 mg Oral HS Mauri Lass Alpesh Hernandez PA-C         Risks / Benefits of Treatment:  Risks, benefits, and possible side effects of medications explained to patient and patient verbalizes understanding and agreement for treatment. Counseling / Coordination of Care:  Patient's progress reviewed with nursing staff.   Medications, treatment progress and treatment plan reviewed with patient. Supportive counseling provided to the patient. Total floor/unit time spent today 25 minutes. Greater than 50% of total time was spent with the patient and / or family counseling and / or coordination of care. A description of the counseling / coordination of care: medication education, treatment plan, supportive therapy. This note was completed in part utilizing Dragon dictation Software. Grammatical, translation, syntax errors, random word insertions, spelling mistakes, and incomplete sentences may be an occasional consequence of this system secondary to software limitations with voice recognition, ambient noise, and hardware issues.  If you have any questions or concerns about the content, text, or information contained within the body of this dictation, please contact the provider for clarification

## 2023-12-06 NOTE — DISCHARGE INSTR - OTHER ORDERS
You are being discharged to [address] [address] [address]    Triggers you have identified during your hospitalization that led to your admission suicidal ideation, and distressed mood. Coping skills you have identified during your hospitalization include deep breathing and medication when needed. If you are unable to deal with your distressed mood alone please contact your psychiatrist at [facility]. If that is not effective and you continue to have suicidal ideation, a distressed mood, feeling overwhelmed, and/or in crisis please contact [facility] at [phone], dial 911 or go to the nearest emergency center. [facility] Line: [phone] - [facility] thru Fri from 9 am to 5 pm    A warm line is a phone service for people who are looking for support, engagement, and hope during non-emergency mental health struggles or distress. A warm line is staffed by peers who have personal or lived experience with mental health disorders and who can listen, share, and offer a sense of recovery. A warm line is different from a crisis line or hotline, which are intended for emergency situations    National Suicide Hotline: [address] [facility]    Crisis Text Line: 678909      Mag Jacob, our [address], will be calling you after your discharge, on the phone number that you provided. They will be available as an additional support, if needed. If you wish to speak with one of them, you may contact Aliyah Wild at [phone] or Coretta Park at [phone].
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
## 2023-12-06 NOTE — CASE MANAGEMENT
Sent an ICM referral to [facility] to email [email or web address], they advised they will contact the patient.

## 2023-12-06 NOTE — PROGRESS NOTES
12/06/23 0818   Team Meeting   Meeting Type Daily Rounds   Initial Conference Date 12/06/23   Next Conference Date 12/07/23   Team Members Present   Team Members Present Physician;Nurse;;; Occupational Therapist   Physician Team Member Dr Per Ríos, Dr Bernardino Calderón Team Member [facility] [place] Management Team Member [address] [place], [place] [address] Work Team Member Rene   OT Team Member Sean Dockery   Patient/Family Present   Patient Present No   Patient's Family Present No     Discharge Thursday to home. ICM referral will be sent in today. Lexapro increased to 20.

## 2023-12-06 NOTE — PROGRESS NOTES
12/06/23 1100   Activity/Group Checklist   Group Wellness  (giuided progressive muscle relaxation.)   Attendance Attended   Attendance Duration (min) 31-45   Interactions Interacted appropriately  (pt. reported appreciation and increased sense of relaxation post session.)   Affect/Mood Normal range; Appropriate;Calm   Goals Achieved Able to engage in interactions; Able to listen to others; Identified feelings; Discussed coping strategies

## 2023-12-06 NOTE — TELEPHONE ENCOUNTER
Received an in-basket message from patient's  to schedule patient for med mgmt services upon discharge. Patient has been scheduled with Dr. Naldo Dawson.

## 2023-12-06 NOTE — TREATMENT TEAM
Pt attended all groups. Pt able to self express. Pt did not that she gets angry at herself and punishes herself for her thanking patterns. Pt has guilt/denial form the past but feels ready to approach it therapeutically when she is d/c with a therapist. Pt indicated she was not ready before and was in denial.      12/06/23 1330   Activity/Group Checklist   Group Other (Comment)  (conflict resolution and communication)   Attendance Attended   Attendance Duration (min) 31-45   Interactions Interacted appropriately   Affect/Mood Appropriate   Goals Achieved Identified feelings; Discussed coping strategies; Identified relapse prevention strategies; Able to listen to others; Able to reflect/comment on own behavior;Able to engage in interactions; Able to manage/cope with feelings;Verbalized increased hopefulness; Able to self-disclose; Able to recieve feedback

## 2023-12-06 NOTE — NURSING NOTE
Patient visible on the unit coloring with peers in the dining room for the majority of the shift. Patient brightens on approach, currently denies depression, anxiety, SI/HI/AVH. Patient compliant with medications and meals, appetite good. No further signs of distress noted.
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
## 2023-12-06 NOTE — PROGRESS NOTES
12/06/23 1721   Discharge Planning   Living Arrangements Lives w/ [address] Self;Psychiatrist;Therapist;/   Type of Current Residence Private residence   [address] No   Other Referral/Resources/Interventions Provided:   Referrals Provided: Crisis Hotline;Psychiatrist;ICM   Discharge Communications   Discharge planning discussed with: Patient and son Cherie Figueroa of Choice Yes   IMM Given (Date): 12/06/23   IMM Given to: Patient   Transportation at Discharge? Yes   Transport at Discharge  Automobile  21  Yes   Number/Name of Dispatcher Roundtrip   ETA of Transport 0100   Transport Service Arrived No   Contacts   Patient Contacts Rhett Marcelo   Relationship to Patient: Family   Contact Method Phone   Phone Number [phone]   Reason/Outcome Emergency Contact; Continuity of Care;Discharge Planning   [facility] Medication Program   Would you like to participate in our [employer] service program?   No - Declined

## 2023-12-06 NOTE — CASE MANAGEMENT
Called and left a message for Jae Winter ([phone]), at [facility] [place] to get the patient an ICM, awaiting a call back    *update, Jae Winter called back, she is sending over the referral to this writer via email. Called [facility] intake at [phone] to see where the patient is on the wait list. She was just added to the waitlist for medication management on 11/21/23 and there is a lengthy wait at this time. They will have the person in charge of the intakes for Elissa give this writer a call to see if they can get her in since she is coming from inpatient. Awaiting call.

## 2023-12-07 VITALS
TEMPERATURE: 98.2 F | SYSTOLIC BLOOD PRESSURE: 145 MMHG | RESPIRATION RATE: 16 BRPM | DIASTOLIC BLOOD PRESSURE: 64 MMHG | HEIGHT: 60 IN | OXYGEN SATURATION: 96 % | BODY MASS INDEX: 30.12 KG/M2 | HEART RATE: 62 BPM | WEIGHT: 153.4 LBS

## 2023-12-07 PROCEDURE — 99238 HOSP IP/OBS DSCHRG MGMT 30/<: CPT | Performed by: STUDENT IN AN ORGANIZED HEALTH CARE EDUCATION/TRAINING PROGRAM

## 2023-12-07 RX ADMIN — BUPROPION HYDROCHLORIDE 150 MG: 150 TABLET, EXTENDED RELEASE ORAL at 08:11

## 2023-12-07 RX ADMIN — FLUTICASONE FUROATE AND VILANTEROL TRIFENATATE 1 PUFF: 100; 25 POWDER RESPIRATORY (INHALATION) at 07:54

## 2023-12-07 RX ADMIN — PANTOPRAZOLE SODIUM 20 MG: 20 TABLET, DELAYED RELEASE ORAL at 06:20

## 2023-12-07 RX ADMIN — NEBIVOLOL 5 MG: 5 TABLET ORAL at 08:11

## 2023-12-07 RX ADMIN — ASPIRIN 81 MG CHEWABLE TABLET 81 MG: 81 TABLET CHEWABLE at 08:11

## 2023-12-07 RX ADMIN — BUSPIRONE HYDROCHLORIDE 5 MG: 5 TABLET ORAL at 08:11

## 2023-12-07 RX ADMIN — SUCRALFATE 1 G: 1 TABLET ORAL at 11:50

## 2023-12-07 RX ADMIN — Medication 1 SPRAY: at 08:11

## 2023-12-07 RX ADMIN — ALLOPURINOL 100 MG: 100 TABLET ORAL at 08:11

## 2023-12-07 RX ADMIN — SUCRALFATE 1 G: 1 TABLET ORAL at 06:20

## 2023-12-07 RX ADMIN — ESCITALOPRAM OXALATE 20 MG: 10 TABLET ORAL at 08:11

## 2023-12-07 RX ADMIN — KETOTIFEN FUMARATE 1 DROP: 0.25 SOLUTION/ DROPS OPHTHALMIC at 08:11

## 2023-12-07 NOTE — NURSING NOTE
Pt pleasant and med-compliant with steady gait using walker. VSS. Pt expressed understanding of d/c meds and f/u instructions. Pt left with all her belongings and accompanied by staff to lobby to meet her family for transport home at . Monitored for safety and support.

## 2023-12-07 NOTE — PLAN OF CARE
Problem: Ineffective Coping  Goal: Identifies ineffective coping skills  Outcome: Progressing  Goal: Identifies healthy coping skills  Outcome: Progressing  Goal: Demonstrates healthy coping skills  Outcome: Progressing  Goal: Participates in unit activities  Description: Interventions:  - Provide therapeutic environment   - Provide required programming   - Redirect inappropriate behaviors   Outcome: Progressing  Goal: Patient/Family participate in treatment and DC plans  Description: Interventions:  - Provide therapeutic environment  Outcome: Progressing  Goal: Patient/Family verbalizes awareness of resources  Outcome: Progressing  Goal: Understands least restrictive measures  Description: Interventions:  - Utilize least restrictive behavior  Outcome: Progressing  Goal: Free from restraint events  Description: - Utilize least restrictive measures   - Provide behavioral interventions   - Redirect inappropriate behaviors   Outcome: Progressing     Problem: Risk for Self Injury/Neglect  Goal: Treatment Goal: Remain safe during length of stay, learn and adopt new coping skills, and be free of self-injurious ideation, impulses and acts at the time of discharge  Outcome: Progressing  Goal: Verbalize thoughts and feelings  Description: Interventions:  - Assess and re-assess patient's lethality and potential for self-injury  - Engage patient in 1:1 interactions, daily, for a minimum of 15 minutes  - Encourage patient to express feelings, fears, frustrations, hopes  - Establish rapport/trust with patient   Outcome: Progressing  Goal: Refrain from harming self  Description: Interventions:  - Monitor patient closely, per order  - Develop a trusting relationship  - Supervise medication ingestion, monitor effects and side effects   Outcome: Progressing  Goal: Attend and participate in unit activities, including therapeutic, recreational, and educational groups  Description: Interventions:  - Provide therapeutic and educational activities daily, encourage attendance and participation, and document same in the medical record  - Obtain collateral information, encourage visitation and family involvement in care   Outcome: Progressing  Goal: Recognize maladaptive responses and adopt new coping mechanisms  Outcome: Progressing  Goal: Complete daily ADLs, including personal hygiene independently, as able  Description: Interventions:  - Observe, teach, and assist patient with ADLS  - Monitor and promote a balance of rest/activity, with adequate nutrition and elimination  Outcome: Progressing     Problem: Depression  Goal: Treatment Goal: Demonstrate behavioral control of depressive symptoms, verbalize feelings of improved mood/affect, and adopt new coping skills prior to discharge  Outcome: Progressing  Goal: Verbalize thoughts and feelings  Description: Interventions:  - Assess and re-assess patient's level of risk   - Engage patient in 1:1 interactions, daily, for a minimum of 15 minutes   - Encourage patient to express feelings, fears, frustrations, hopes   Outcome: Progressing  Goal: Refrain from harming self  Description: Interventions:  - Monitor patient closely, per order   - Supervise medication ingestion, monitor effects and side effects   Outcome: Progressing  Goal: Refrain from isolation  Description: Interventions:  - Develop a trusting relationship   - Encourage socialization   Outcome: Progressing  Goal: Refrain from self-neglect  Outcome: Progressing  Goal: Attend and participate in unit activities, including therapeutic, recreational, and educational groups  Description: Interventions:  - Provide therapeutic and educational activities daily, encourage attendance and participation, and document same in the medical record   Outcome: Progressing  Goal: Complete daily ADLs, including personal hygiene independently, as able  Description: Interventions:  - Observe, teach, and assist patient with ADLS  -  Monitor and promote a balance of rest/activity, with adequate nutrition and elimination   Outcome: Progressing     Problem: Anxiety  Goal: Anxiety is at manageable level  Description: Interventions:  - Assess and monitor patient's anxiety level. - Monitor for signs and symptoms (heart palpitations, chest pain, shortness of breath, headaches, nausea, feeling jumpy, restlessness, irritable, apprehensive). - Collaborate with interdisciplinary team and initiate plan and interventions as ordered.   - [place] patient to unit/surroundings  - Explain treatment plan  - Encourage participation in care  - Encourage verbalization of concerns/fears  - Identify coping mechanisms  - Assist in developing anxiety-reducing skills  - Administer/offer alternative therapies  - Limit or eliminate stimulants  Outcome: Progressing

## 2023-12-07 NOTE — PROGRESS NOTES
12/07/23 0735   Team Meeting   Meeting Type Daily Rounds   Initial Conference Date 12/07/23   Next Conference Date 12/08/23   Team Members Present   Team Members Present Physician;Nurse;;; Occupational Therapist   Physician Team Member Dr Milton Subramanian, [facility]. [place], [address] Team Member [facility] Management Team Member Tiffani   Social Work Team Member Rene   OT Team Member Joey Hopkins   Patient/Family Present   Patient Present No   Patient's Family Present No     Discharge today at 1 pm. [facility] referral sent, slept.
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
## 2023-12-08 ENCOUNTER — TELEPHONE (OUTPATIENT)
Dept: PSYCHIATRY | Facility: CLINIC | Age: 73
End: 2023-12-08

## 2023-12-08 NOTE — TELEPHONE ENCOUNTER
Patient's son, Zeinab Subramanian, called stating that his mother will need to have us arrange transportation for all appointments with Dr Mariia Stockton and Carla Lay, BRYANW.

## 2023-12-11 ENCOUNTER — OFFICE VISIT (OUTPATIENT)
Dept: FAMILY MEDICINE CLINIC | Facility: CLINIC | Age: 73
End: 2023-12-11
Payer: MEDICARE

## 2023-12-11 ENCOUNTER — TELEPHONE (OUTPATIENT)
Dept: FAMILY MEDICINE CLINIC | Facility: CLINIC | Age: 73
End: 2023-12-11

## 2023-12-11 VITALS
BODY MASS INDEX: 31.18 KG/M2 | RESPIRATION RATE: 16 BRPM | HEART RATE: 54 BPM | DIASTOLIC BLOOD PRESSURE: 70 MMHG | WEIGHT: 157 LBS | TEMPERATURE: 98.8 F | SYSTOLIC BLOOD PRESSURE: 136 MMHG

## 2023-12-11 DIAGNOSIS — F51.01 PRIMARY INSOMNIA: ICD-10-CM

## 2023-12-11 DIAGNOSIS — K21.9 CHRONIC GERD: ICD-10-CM

## 2023-12-11 DIAGNOSIS — F33.2 SEVERE EPISODE OF RECURRENT MAJOR DEPRESSIVE DISORDER, WITHOUT PSYCHOTIC FEATURES (HCC): Primary | ICD-10-CM

## 2023-12-11 DIAGNOSIS — F41.1 GENERALIZED ANXIETY DISORDER: ICD-10-CM

## 2023-12-11 DIAGNOSIS — E53.8 VITAMIN B12 DEFICIENCY: ICD-10-CM

## 2023-12-11 DIAGNOSIS — I10 ESSENTIAL HYPERTENSION: ICD-10-CM

## 2023-12-11 DIAGNOSIS — R41.89 COGNITIVE IMPAIRMENT: ICD-10-CM

## 2023-12-11 PROBLEM — F33.41 RECURRENT MAJOR DEPRESSIVE DISORDER, IN PARTIAL REMISSION (HCC): Chronic | Status: ACTIVE | Noted: 2023-10-20

## 2023-12-11 PROCEDURE — 96372 THER/PROPH/DIAG INJ SC/IM: CPT

## 2023-12-11 PROCEDURE — 99214 OFFICE O/P EST MOD 30 MIN: CPT | Performed by: NURSE PRACTITIONER

## 2023-12-11 RX ORDER — CYANOCOBALAMIN 1000 UG/ML
1000 INJECTION, SOLUTION INTRAMUSCULAR; SUBCUTANEOUS ONCE
Status: COMPLETED | OUTPATIENT
Start: 2023-12-11 | End: 2023-12-11

## 2023-12-11 RX ADMIN — CYANOCOBALAMIN 1000 MCG: 1000 INJECTION, SOLUTION INTRAMUSCULAR; SUBCUTANEOUS at 10:33

## 2023-12-11 NOTE — PROGRESS NOTES
Assessment/Plan:    1. Severe episode of recurrent major depressive disorder, without psychotic features ([address])  Assessment & Plan:  Stable and will be following with psychatrist      2. Generalized anxiety disorder    3. Cognitive impairment  Assessment & Plan:  Complaint with aricept      4. Essential hypertension  Assessment & Plan:  Stable with current regimen      5. Primary insomnia  Assessment & Plan:  Sleeping well with trazadone      6. Vitamin B12 deficiency  -     cyanocobalamin injection 1,000 mcg    7. Chronic GERD  Assessment & Plan:  Stable with current regimen            BMI Counseling: Body mass index is 31.18 kg/m². Discussed the patient's BMI with her. The BMI is above normal. Nutrition recommendations include reducing portion sizes, decreasing overall calorie intake, 3-5 servings of fruits/vegetables daily, reducing fast food intake, consuming healthier snacks, decreasing soda and/or juice intake, moderation in carbohydrate intake, increasing intake of lean protein, reducing intake of saturated fat and trans fat, and reducing intake of cholesterol. Patient Instructions:  Supportive care discussed and advised. Advised to RTO for any worsening and no improvement. Follow up for no improvement and worsening of conditions. Patient advised and educated when to see immediate medical care. Return if symptoms worsen or fail to improve.       Future Appointments   Date Time Provider [address]   12/14/2023 10:00 AM Laura Cortez, PhD Joelle Thomason [facility]   12/18/2023 10:30 AM Che Dial, LCSW [facility]   1/15/2024 10:30 AM Che Dial, LCSW [facility]   1/29/2024 10:30 AM Che Dial, LCSW [facility]   2/12/2024 10:30 AM Che Dial, LCSW [facility]   2/26/2024 10:30 AM Che Dial, LCSW [facility]   3/11/2024 10:30 AM Che Dial, LCSW [facility]   3/25/2024 10:30 AM Che Dial, LCSW [facility]   3/27/2024  1:30 PM Devon Baum MD Nancy [facility]   2024 10:30 AM Kendrick Mass, LCSW [facility]   2024 10:30 AM Kendrick Mass, LCSW [facility]   2024 10:30 AM Kendrick Mass, LCSW [facility]   2024 10:30 AM Kendrick Mass, LCSW [facility]           Subjective:      Patient ID: Bartolo Nascimento is a 74 y.o. female. Chief Complaint   Patient presents with   • Follow-up   • behavorial health     Sas/cma         Vitals:  /70   Pulse (!) 54   Temp 98.8 °F (37.1 °C)   Resp 16   Wt 71.2 kg (157 lb)   LMP  (LMP Unknown)   BMI 31.18 kg/m²     HPI  Patient was recently hospitalized secondary to depression as patient ran out of her medications and did not take for 3 weeks. Stated that feeling much better and back on her medications and complaint with them. Will be following with psychiatrist on 2023. Complaint with medications and tolerating it well. The following portions of the patient's history were reviewed and updated as appropriate: allergies, current medications, past family history, past medical history, past social history, past surgical history and problem list.      Review of Systems   HENT: Negative. Respiratory: Negative. Cardiovascular: Negative. Gastrointestinal: Negative. Neurological: Negative. Psychiatric/Behavioral:          As noted in HPI             Objective:    Social History     Tobacco Use   Smoking Status Former   • Packs/day: 1.00   • Years: 67.00   • Total pack years: 67.00   • Types: Cigarettes   • Start date: [birth year]/1956   • Quit date: 2023   • Years since quittin.3   • Passive exposure: Past   Smokeless Tobacco Never       Allergies:    Allergies   Allergen Reactions   • Augmentin [Amoxicillin-Pot Clavulanate] GI Intolerance, Other (See Comments) and Hives     VOMITING  VOMITING  Reaction Date: 2004;    • Sulfa Antibiotics Itching, Other (See Comments) and Hives     RASH, ITCHY  RASH, ITCHY   • Morphine Other (See Comments)     "DOESN'T WORK"   • Latex Rash     Unsure if this is an allergy         Current Outpatient Medications   Medication Sig Dispense Refill   • albuterol (PROVENTIL HFA,VENTOLIN HFA) 90 mcg/act inhaler Inhale 2 puffs every 6 (six) hours as needed for wheezing 6.7 g 1   • allopurinol (ZYLOPRIM) 100 mg tablet Take 1 tablet (100 mg total) by mouth daily 30 tablet 1   • aspirin 81 mg chewable tablet Chew 1 tablet (81 mg total) daily Do not start before December 7, 2023. 30 tablet 1   • buPROPion (WELLBUTRIN XL) 150 mg 24 hr tablet Take 1 tablet (150 mg total) by mouth daily Do not start before December 7, 2023. 30 tablet 1   • busPIRone (BUSPAR) 5 mg tablet Take 1 tablet (5 mg total) by mouth 3 (three) times a day 90 tablet 1   • donepezil (ARICEPT) 10 mg tablet Take 1 tablet (10 mg total) by mouth daily at bedtime 30 tablet 1   • ergocalciferol (VITAMIN D2) 50,000 units Take 1 capsule (50,000 Units total) by mouth once a week for 7 doses 7 capsule 0   • escitalopram (LEXAPRO) 20 mg tablet Take 1 tablet (20 mg total) by mouth daily Do not start before December 7, 2023. 30 tablet 1   • fluticasone (FLONASE) 50 mcg/act nasal spray 1 spray into each nostril 2 (two) times a day 9.9 mL 1   • Ketotifen Fumarate (ZADITOR) 0.035 % ophthalmic solution Administer 1 drop to both eyes 2 (two) times a day 3 mL 1   • nebivolol (BYSTOLIC) 5 mg tablet Take 1 tablet (5 mg total) by mouth daily Do not start before December 7, 2023. 30 tablet 1   • pantoprazole (PROTONIX) 20 mg tablet Take 1 tablet (20 mg total) by mouth daily in the early morning Do not start before December 7, 2023. 30 tablet 1   • sucralfate (CARAFATE) 1 g tablet Take 1 tablet (1 g total) by mouth 4 (four) times a day (before meals and at bedtime) 120 tablet 0   • traZODone (DESYREL) 150 mg tablet Take 0.5 tablets (75 mg total) by mouth daily at bedtime 15 tablet 1     No current facility-administered medications for this visit.           Physical Exam  Constitutional:       Appearance: Normal appearance. HENT:      Head: Normocephalic and atraumatic. Nose: Nose normal.   Eyes:      Conjunctiva/sclera: Conjunctivae normal.   Cardiovascular:      Rate and Rhythm: Normal rate and regular rhythm. Pulses: Normal pulses. Heart sounds: Normal heart sounds. Pulmonary:      Effort: Pulmonary effort is normal.      Breath sounds: Normal breath sounds. Skin:     General: Skin is warm and dry. Findings: No rash. Neurological:      Mental Status: She is alert and oriented to person, place, and time. Psychiatric:         Mood and Affect: Mood normal.         Behavior: Behavior normal.         Thought Content:  Thought content normal.         Judgment: Judgment normal.                     KYLE Hartmann

## 2023-12-11 NOTE — PATIENT INSTRUCTIONS
Depression   AMBULATORY CARE:   Depression  is a mood disorder that causes feelings of sadness or hopelessness that do not go away. Depression may cause you to lose interest in things you used to enjoy. These feelings may interfere with your daily life. Common signs and symptoms:   Appetite changes, or weight gain or loss    Trouble falling or staying asleep, or sleeping too much    Fatigue (being mentally and physically tired) or lack of energy    Feeling restless, irritable, or withdrawn    Feeling worthless, hopeless, discouraged, or guilty    Trouble concentrating, remembering things, doing daily tasks, or making decisions    Thoughts about hurting or killing yourself    Call your local emergency number (911 in the [address]) if:   You think about hurting yourself or someone else. You have done something on purpose to hurt yourself. Call your therapist or doctor if:   Your symptoms get worse or do not get better with treatment. Your depression keeps you from doing your regular daily activities. You have new symptoms since your last visit. You have questions or concerns about your condition or care. The following resources are available at any time to help you, if needed:   Contact a suicide prevention organization: For the [place] Suicide and Crisis Lifeline:     Call or text 79396 41 00 73 a chat on https://[facility].org/chat     Call 5-942.362.6469 (8-947-537-TALK)    For the Suicide Hotline, call 8-491.600.7317 (8-154-QOBWQBT)    For a list of international numbers: https://save.org/find-help/international-resources/  Treatment for depression  depends on how severe your symptoms are. You may need any of the following:  Cognitive behavioral therapy (CBT)  teaches you how to identify and change negative thought patterns. Antidepressant medicine  may be given to decrease or manage symptoms. You may need to take this medicine for several weeks before they start working.     Self-care:   Talk to someone about your depression. Your healthcare provider may suggest counseling. You might feel more comfortable talking with a friend or family member about your depression. Choose someone you know will be supportive and encouraging. Get regular physical activity. Physical activity can lower your stress, improve your mood, and help you sleep better. Work with your healthcare provider to develop a plan that you enjoy. Create a regular sleep schedule. A routine can help you relax before bed. Listen to music, read, or do yoga. Try to go to bed and wake up at the same time every day. Sleep is important for emotional health. Eat a variety of healthy foods. Healthy foods include fruits, vegetables, whole-grain breads, low-fat dairy products, lean meats, fish, and cooked beans. A healthy meal plan is low in fat, salt, and added sugar. Do not use alcohol, drugs, or nicotine products. These can worsen depression or make it hard to manage. Talk to your therapist or healthcare provider if you use any of these products and need help to quit. Follow up with your therapist or doctor as directed: Your healthcare provider will monitor your progress at follow-up visits. Your provider will also monitor your medicine if you take antidepressants and ask if the medicine is helping. Tell your provider about any side effects or problems you have with your medicine. The type or amount of medicine may need to be changed. Write down your questions so you remember to ask them during your visits. For more information or support:   [employer] on [facility]ness  06[facility] ALLEN [facility] , [address]  Phone: [phone]  Phone: [phone]- [phone]  Web Address: http://www.[employer]/. org  [address] [facility] Suicide and [address] [place] [phone]  Phone: 8- 210 - 103  Web Address: [employer] video.[employer]. org [place] https://[employer].org/chat/    © Copyright [employer] 2023 Information is for End User's use only and may not be sold, redistributed or otherwise used for commercial purposes. The above information is an  only. It is not intended as medical advice for individual conditions or treatments. Talk to your doctor, nurse or pharmacist before following any medical regimen to see if it is safe and effective for you.

## 2023-12-18 ENCOUNTER — SOCIAL WORK (OUTPATIENT)
Dept: BEHAVIORAL/MENTAL HEALTH CLINIC | Facility: CLINIC | Age: 73
End: 2023-12-18
Payer: MEDICARE

## 2023-12-18 DIAGNOSIS — F33.41 RECURRENT MAJOR DEPRESSIVE DISORDER, IN PARTIAL REMISSION (HCC): Primary | ICD-10-CM

## 2023-12-18 PROCEDURE — 90834 PSYTX W PT 45 MINUTES: CPT | Performed by: SOCIAL WORKER

## 2023-12-18 NOTE — PSYCH
"Behavioral Health Psychotherapy Progress Note    Psychotherapy Provided: Individual Psychotherapy     1. Recurrent major depressive disorder, in partial remission (HCC)            Goals addressed in session: Goal 1     DATA: Zulma reports feeling well stating that she is back on her medication which has helped alleviate intense bouts of depression and anxiety. She confirmed that she also has an appointment with Alis Kearns PA-C, this upcoming Wednesday. We discussed positive lifestyle changes to include: eating three meals a day, winding down each evening with a cup of decaffeinated tea, \"breaking things down as small as they can go,\" and leaning into community supports. She is awaiting further assistance from Lidia Sinclair and [facility] As such she did report a significant decrease just in the amount of overwhelm that she was experiencing. She confirmed that she is sleeping well too. During this session, this clinician used the following therapeutic modalities: Supportive Psychotherapy    Substance Abuse was not addressed during this session. If the client is diagnosed with a co-occurring substance use disorder, please indicate any changes in the frequency or amount of use: NA. Stage of change for addressing substance use diagnoses: No substance use/Not applicable    ASSESSMENT:  Zulma Marcelo presents with a Euthymic/ normal mood.     her affect is Normal range and intensity, which is congruent, with her mood and the content of the session. The client has made progress on their goals.    During this session the client was oriented to person, place, and time. The client was engaged in the session. The client was able to sustain direct eye contact and was without psychomotor agitation. The client's thought processes were linear and clear.   Zulma Marcelo presents with a none risk of suicide, none risk of self-harm, and none risk of harm to others.    For any risk assessment that " "surpasses a \"low\" rating, a safety plan must be developed.    A safety plan was indicated: no  If yes, describe in detail NA    PLAN: Between sessions, Zulma Marcelo will take medication as prescribed and practice positive coping strategies as needed . At the next session, the therapist will use Cognitive Behavioral Therapy, Cognitive Processing Therapy, and Supportive Psychotherapy to address depression and anxiety.    Behavioral Health Treatment Plan and Discharge Planning: Zulma Marcelo is aware of and agrees to continue to work on their treatment plan. They have identified and are working toward their discharge goals. yes    Visit start and stop times:    12/18/23  Start Time: 1030  Stop Time: 1120  Total Visit Time: 50 minutes  "

## 2023-12-21 ENCOUNTER — OFFICE VISIT (OUTPATIENT)
Dept: PSYCHIATRY | Facility: CLINIC | Age: 73
End: 2023-12-21

## 2023-12-21 DIAGNOSIS — F41.1 GENERALIZED ANXIETY DISORDER: ICD-10-CM

## 2023-12-21 DIAGNOSIS — F51.01 PRIMARY INSOMNIA: ICD-10-CM

## 2023-12-21 DIAGNOSIS — F33.2 SEVERE EPISODE OF RECURRENT MAJOR DEPRESSIVE DISORDER, WITHOUT PSYCHOTIC FEATURES (HCC): Primary | Chronic | ICD-10-CM

## 2023-12-21 RX ORDER — TRAZODONE HYDROCHLORIDE 150 MG/1
75 TABLET ORAL
Qty: 15 TABLET | Refills: 1 | Status: SHIPPED | OUTPATIENT
Start: 2023-12-21 | End: 2024-02-19
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
## 2024-01-12 NOTE — PSYCH
This note was not shared with the patient due to reasonable likelihood of causing patient harm    PROGRESS NOTE        [facility]      Name and Date of Birth:  Zulma Marcelo 73 y.o. 1950    Reason for visit:   Chief Complaint   Patient presents with    Follow-up    Medication Management       Date of Visit: 01/16/24    SUBJECTIVE:    Zulma Marcelo is a joni 73 y.o. female with a history of Major Depressive Disorder, Generalized Anxiety Disorder, and insomnia who presents today for follow-up and medication management. Since her last visit she reports she has been well with little change in the mood or anxiety. She notes she felt a little more depressed after the holidays but has been able to pull herself out of it. She feels the medications are still working well and she is happy with them. She does note that her mail order said they never received her prescriptions and she would like me to resend them.     She denies any suicidal ideation, intent or plan at present, denies any homicidal ideation, intent or plan at present.  She denies any auditory hallucinations, denies any visual hallucinations, denies any delusions.  She denies any side effects from current psychiatric medications.    HPI ROS Appetite Changes and Sleep: normal appetite, normal sleep    Review Of Systems:    Mood Normal   Behavior Normal    Thought Content Normal   General Normal    Personality Normal   Other Psych Symptoms Normal   Constitutional as noted in HPI   ENT as noted in HPI   Cardiovascular as noted in HPI   Respiratory as noted in HPI   Gastrointestinal as noted in HPI   Genitourinary as noted in HPI   Musculoskeletal as noted in HPI   Integumentary as noted in HPI   Neurological as noted in HPI   Endocrine negative   Other Symptoms none, all other systems are negative       Laboratory Results: No results found for this or any previous visit.    Substance Abuse History:    Social  History     Substance and Sexual Activity   Drug Use No       Family Psychiatric History:     Family History   Problem Relation Age of Onset    Heart disease Mother     Stroke Son     Stroke Maternal Grandfather     Breast cancer Daughter 40       The following portions of the patient's history were reviewed and updated as appropriate: past family history, past medical history, past social history, past surgical history and problem list.    Social History     Socioeconomic History    Marital status:      Spouse name: Not on file    Number of children: Not on file    Years of education: Not on file    Highest education level: Not on file   Occupational History    Not on file   Tobacco Use    Smoking status: Former     Current packs/day: 0.00     Average packs/day: 1 pack/day for 67.1 years (67.1 ttl pk-yrs)     Types: Cigarettes     Start date: 6/15/1956     Quit date: 2023     Years since quittin.4     Passive exposure: Past    Smokeless tobacco: Never   Vaping Use    Vaping status: Never Used   Substance and Sexual Activity    Alcohol use: Yes     Comment: rarely    Drug use: No    Sexual activity: Never   Other Topics Concern    Not on file   Social History Narrative    Not on file     Social Determinants of Health     Financial Resource Strain: High Risk (2023)    Overall Financial Resource Strain (CARDIA)     Difficulty of Paying Living Expenses: Hard   Food Insecurity: No Food Insecurity (2023)    Hunger Vital Sign     Worried About Running Out of Food in the Last Year: Never true     Ran Out of Food in the Last Year: Never true   Transportation Needs: Unmet Transportation Needs (2023)    PRAPARE - Transportation     Lack of Transportation (Medical): Yes     Lack of Transportation (Non-Medical): Yes   Physical Activity: Not on file   Stress: Not on file   Social Connections: Not on file   Intimate Partner Violence: Not on file   Housing Stability: Low Risk  (2023)    Housing  Stability Vital Sign     Unable to Pay for Housing in the Last Year: No     Number of Places Lived in the Last Year: 1     Unstable Housing in the Last Year: No     Social History     Social History Narrative    Not on file        Social History       Tobacco History       Smoking Status  Former Smoking Start Date  6/15/1956 Quit Date  7/28/2023 Average Packs/Day  1 pack/day for 67.1 years (67.1 ttl pk-yrs) Smoking Tobacco Type  Cigarettes from 6/15/1956 to 7/28/2023   Pack Year History     Packs/Day From To Years    0 7/28/2023  0.5    1 [birth year]/15/1958 7/28/2023 67.1      Passive Exposure  Past      Smokeless Tobacco Use  Never              Alcohol History       Alcohol Use Status  Yes Comment  rarely              Drug Use       Drug Use Status  No              Sexual Activity       Sexually Active  Never              Activities of Daily Living    Not Asked                 Additional Substance Use Detail       Questions Responses    Problems Due to Past Use of Alcohol? No    Problems Due to Past Use of Substances? No    Substance Use Assessment Denies substance use within the past 12 months    Alcohol Use Frequency Denies use in past 12 months    Cannabis frequency Never used    Comment:  Never used on 8/3/2023     Heroin Frequency Denies use in past 12 months    Cocaine frequency Never used    Comment:  Never used on 8/3/2023     Crack Cocaine Frequency Denies use in past 12 months    Methamphetamine Frequency Denies use in past 12 months    Narcotic Frequency Denies use in past 12 months    Benzodiazepine Frequency Denies use in past 12 months    Amphetamine frequency Denies use in past 12 months    Barbituate Frequency Denies use use in past 12 months    Inhalant frequency Never used    Comment:  Never used on 8/3/2023 Never used on 8/3/2023     Hallucinogen frequency Never used    Comment:  Never used on 8/3/2023     Ecstasy frequency Never used    Comment:  Never used on 8/3/2023     Other drug frequency Never used     Comment:  Never used on 8/3/2023     Opiate frequency Denies use in past 12 months    Not reviewed.              OBJECTIVE:     Mental Status Evaluation:    Appearance age appropriate, casually dressed   Behavior pleasant, cooperative   Speech normal volume, normal pitch   Mood euthymic   Affect congruent   Thought Processes logical   Associations intact associations   Thought Content normal   Perceptual Disturbances: none   Abnormal Thoughts  Risk Potential Suicidal ideation - None  Homicidal ideation - None  Potential for aggression - No   Orientation oriented to person, place, time/date and situation   Memory recent and remote memory grossly intact   Cosciousness alert and awake   Attention Span attention span and concentration are age appropriate   Intellect Appears to be of Average Intelligence   Insight age appropriate    Judgement good    Muscle Strength and  Gait muscle strength and tone were normal   Language no difficulty naming common objects   Fund of Knowledge displays adequate knowledge of current events   Pain as noted in HPI   Pain Scale as noted in HPI     No abnormal movements noted throughout visit.     Assessment/Plan:     We discussed their current treatment plan in detail today. Since her last visit she reports she has been well with stable mood, anxiety, and sleep. I have advised no medication changes, though I would still like to keep a close follow up with her to ensure this remains the case. She will also continue working with Vannesa Stone LCSW, for therapy. We have discussed their safety plan and pt agrees that if they experience unsafe thoughts that they will reach out to their supports including this office, the suicide hotline, and emergency services if necessary. Patient is aware of non-emergent and emergent mental health resources. She is able to contract for their own safety at this time.    Will follow up in 1 months. Patient is aware to call the office if questions or  concerns arise sooner.       Diagnoses and all orders for this visit:    Severe episode of recurrent major depressive disorder, without psychotic features (HCC)    Generalized anxiety disorder    Primary insomnia  -     Discontinue: traZODone (DESYREL) 150 mg tablet; Take 0.5 tablets (75 mg total) by mouth daily at bedtime  -     traZODone (DESYREL) 150 mg tablet; Take 0.5 tablets (75 mg total) by mouth daily at bedtime    Moderate episode of recurrent major depressive disorder (HCC)  -     Discontinue: buPROPion (WELLBUTRIN XL) 150 mg 24 hr tablet; Take 1 tablet (150 mg total) by mouth daily  -     Discontinue: busPIRone (BUSPAR) 5 mg tablet; Take 1 tablet (5 mg total) by mouth 3 (three) times a day  -     Discontinue: escitalopram (LEXAPRO) 20 mg tablet; Take 1 tablet (20 mg total) by mouth daily  -     buPROPion (WELLBUTRIN XL) 150 mg 24 hr tablet; Take 1 tablet (150 mg total) by mouth daily  -     busPIRone (BUSPAR) 5 mg tablet; Take 1 tablet (5 mg total) by mouth 3 (three) times a day  -     escitalopram (LEXAPRO) 20 mg tablet; Take 1 tablet (20 mg total) by mouth daily          Treatment Recommendations/Precautions:    Continue current medications (from hospital d/c):     Wellbutrin 150 mg daily for depressive symptoms  BuSpar 5 mg 3 times daily for anxiety  Lexapro 20 mg daily for depressive symptoms  Trazodone 75 mg nightly for insomnia    Risks/Benefits      Risks, Benefits And Possible Side Effects Of Medications:    Risks, benefits, and possible side effects of medications explained to patient and patient verbalizes understanding and agreement for treatment.    Controlled Medication Discussion:     Not applicable    Psychotherapy Provided:     Individual psychotherapy provided: No    Visit Start Time:  1:00 PM  Visit End Time:  1:25 PM  Total Visit Duration: 25 minutes    Alis Kearns PA-C

## 2024-01-15 ENCOUNTER — SOCIAL WORK (OUTPATIENT)
Dept: BEHAVIORAL/MENTAL HEALTH CLINIC | Facility: CLINIC | Age: 74
End: 2024-01-15
Payer: MEDICARE

## 2024-01-15 DIAGNOSIS — F33.41 RECURRENT MAJOR DEPRESSIVE DISORDER, IN PARTIAL REMISSION (HCC): Primary | ICD-10-CM

## 2024-01-15 PROCEDURE — 90834 PSYTX W PT 45 MINUTES: CPT | Performed by: SOCIAL WORKER

## 2024-01-15 NOTE — PSYCH
"Behavioral Health Psychotherapy Progress Note    Psychotherapy Provided: Individual Psychotherapy     1. Recurrent major depressive disorder, in partial remission (HCC)            Goals addressed in session: Goal 1     DATA: Zulma reports feeling stable stating that her mood has been consistently positive without any major dips. She has however been feeling a little sluggish which we discussed can be associated with the weather. She is redoing parts of her kitchen and feeling out of sorts due to the space being in a disaray. She was observed poking fun of herself for having collected so many things over the years but then over time, started to call herself names such as \"stupid\" and \"dummy\". Writer stopped her in this moment and helped her understand that she is now meeting her own internalized feelings of shame and overwhelm with ridicule and judgement, which she understood. We worked on replacing critical thoughts with more accepting and patient ones. She confirmed her appointment with Alis Kearns PA-C, tomorrow.   During this session, this clinician used the following therapeutic modalities: Cognitive Behavioral Therapy, Cognitive Processing Therapy, and Supportive Psychotherapy    Substance Abuse was not addressed during this session. If the client is diagnosed with a co-occurring substance use disorder, please indicate any changes in the frequency or amount of use: NA. Stage of change for addressing substance use diagnoses: No substance use/Not applicable    ASSESSMENT:  Zulma Marcelo presents with a Euthymic/ normal mood.     her affect is Normal range and intensity, which is congruent, with her mood and the content of the session. The client has made progress on their goals.    During this session the client was oriented to person, place, and time. The client was engaged in the session. The client was able to sustain direct eye contact and was without psychomotor agitation. The client's thought " "processes were linear and clear.   Zulma Marcelo presents with a none risk of suicide, none risk of self-harm, and none risk of harm to others.    For any risk assessment that surpasses a \"low\" rating, a safety plan must be developed.    A safety plan was indicated: no  If yes, describe in detail NA    PLAN: Between sessions, Zulma Marcelo will take medication as prescribed and practice positive coping strategies as needed . At the next session, the therapist will use Cognitive Behavioral Therapy, Cognitive Processing Therapy, and Supportive Psychotherapy to address stress management.    Behavioral Health Treatment Plan and Discharge Planning: Zulma Marcelo is aware of and agrees to continue to work on their treatment plan. They have identified and are working toward their discharge goals. yes    Visit start and stop times:    01/15/24  Start Time: 1030  Stop Time: 1120  Total Visit Time: 50 minutes  "

## 2024-01-16 ENCOUNTER — OFFICE VISIT (OUTPATIENT)
Dept: PSYCHIATRY | Facility: CLINIC | Age: 74
End: 2024-01-16
Payer: MEDICARE

## 2024-01-16 DIAGNOSIS — F51.01 PRIMARY INSOMNIA: ICD-10-CM

## 2024-01-16 DIAGNOSIS — F33.2 SEVERE EPISODE OF RECURRENT MAJOR DEPRESSIVE DISORDER, WITHOUT PSYCHOTIC FEATURES (HCC): Primary | ICD-10-CM

## 2024-01-16 DIAGNOSIS — F41.1 GENERALIZED ANXIETY DISORDER: ICD-10-CM

## 2024-01-16 DIAGNOSIS — F33.1 MODERATE EPISODE OF RECURRENT MAJOR DEPRESSIVE DISORDER (HCC): ICD-10-CM

## 2024-01-16 PROCEDURE — 99214 OFFICE O/P EST MOD 30 MIN: CPT | Performed by: PHYSICIAN ASSISTANT

## 2024-01-16 RX ORDER — BUPROPION HYDROCHLORIDE 150 MG/1
150 TABLET ORAL DAILY
Qty: 30 TABLET | Refills: 1 | Status: SHIPPED | OUTPATIENT
Start: 2024-01-16 | End: 2024-01-19 | Stop reason: SDUPTHER

## 2024-01-16 RX ORDER — TRAZODONE HYDROCHLORIDE 150 MG/1
75 TABLET ORAL
Qty: 15 TABLET | Refills: 1 | Status: SHIPPED | OUTPATIENT
Start: 2024-01-16 | End: 2024-01-19 | Stop reason: SDUPTHER

## 2024-01-16 RX ORDER — BUSPIRONE HYDROCHLORIDE 5 MG/1
5 TABLET ORAL 3 TIMES DAILY
Qty: 90 TABLET | Refills: 1 | Status: SHIPPED | OUTPATIENT
Start: 2024-01-16 | End: 2024-01-16 | Stop reason: SDUPTHER

## 2024-01-16 RX ORDER — ESCITALOPRAM OXALATE 20 MG/1
20 TABLET ORAL DAILY
Qty: 30 TABLET | Refills: 1 | Status: SHIPPED | OUTPATIENT
Start: 2024-01-16 | End: 2024-01-19 | Stop reason: SDUPTHER

## 2024-01-16 RX ORDER — ESCITALOPRAM OXALATE 20 MG/1
20 TABLET ORAL DAILY
Qty: 90 TABLET | Refills: 1 | Status: SHIPPED | OUTPATIENT
Start: 2024-01-16 | End: 2024-01-16 | Stop reason: SDUPTHER

## 2024-01-16 RX ORDER — BUPROPION HYDROCHLORIDE 150 MG/1
150 TABLET ORAL DAILY
Qty: 90 TABLET | Refills: 1 | Status: SHIPPED | OUTPATIENT
Start: 2024-01-16 | End: 2024-01-16 | Stop reason: SDUPTHER

## 2024-01-16 RX ORDER — BUSPIRONE HYDROCHLORIDE 5 MG/1
5 TABLET ORAL 3 TIMES DAILY
Qty: 90 TABLET | Refills: 1 | Status: SHIPPED | OUTPATIENT
Start: 2024-01-16 | End: 2024-01-19 | Stop reason: SDUPTHER

## 2024-01-16 RX ORDER — TRAZODONE HYDROCHLORIDE 150 MG/1
75 TABLET ORAL
Qty: 45 TABLET | Refills: 1 | Status: SHIPPED | OUTPATIENT
Start: 2024-01-16 | End: 2024-01-16 | Stop reason: SDUPTHER

## 2024-01-19 DIAGNOSIS — F51.01 PRIMARY INSOMNIA: ICD-10-CM

## 2024-01-19 DIAGNOSIS — F33.1 MODERATE EPISODE OF RECURRENT MAJOR DEPRESSIVE DISORDER (HCC): ICD-10-CM

## 2024-01-19 RX ORDER — BUPROPION HYDROCHLORIDE 150 MG/1
150 TABLET ORAL DAILY
Qty: 30 TABLET | Refills: 1 | Status: SHIPPED | OUTPATIENT
Start: 2024-01-19 | End: 2024-03-19

## 2024-01-19 RX ORDER — TRAZODONE HYDROCHLORIDE 150 MG/1
75 TABLET ORAL
Qty: 15 TABLET | Refills: 1 | Status: SHIPPED | OUTPATIENT
Start: 2024-01-19 | End: 2024-03-19

## 2024-01-19 RX ORDER — BUSPIRONE HYDROCHLORIDE 5 MG/1
5 TABLET ORAL 3 TIMES DAILY
Qty: 90 TABLET | Refills: 1 | Status: SHIPPED | OUTPATIENT
Start: 2024-01-19 | End: 2024-03-19

## 2024-01-19 RX ORDER — ESCITALOPRAM OXALATE 20 MG/1
20 TABLET ORAL DAILY
Qty: 30 TABLET | Refills: 1 | Status: SHIPPED | OUTPATIENT
Start: 2024-01-19 | End: 2024-03-19

## 2024-01-19 NOTE — TELEPHONE ENCOUNTER
Per patient these scripts were picked up at [facility] due to [facility] didn't receive the scripts yet.  The scripts take 14 days to get to patient and she pays nothing if she picks up at [facility] she pays about $100.  Patient needs a 90 days supply for all scripts to [facility].  Patient needs scripts sent ahead of time due to time of mail.     Wellbutrin  mg  Buspar 5 mg  Lexapro 20 mg  Trazodone 150 mg    90 days supply to [facility]    Next visit Alis Kearns PA-C 2/22/2024

## 2024-01-22 DIAGNOSIS — K21.9 GASTROESOPHAGEAL REFLUX DISEASE WITHOUT ESOPHAGITIS: ICD-10-CM

## 2024-01-22 DIAGNOSIS — I10 ESSENTIAL HYPERTENSION: ICD-10-CM

## 2024-01-22 DIAGNOSIS — Z00.8 MEDICAL CLEARANCE FOR PSYCHIATRIC ADMISSION: ICD-10-CM

## 2024-01-22 DIAGNOSIS — E55.9 VITAMIN D DEFICIENCY: ICD-10-CM

## 2024-01-22 DIAGNOSIS — F33.1 MODERATE EPISODE OF RECURRENT MAJOR DEPRESSIVE DISORDER (HCC): ICD-10-CM

## 2024-01-22 DIAGNOSIS — J44.9 COPD WITHOUT EXACERBATION (HCC): ICD-10-CM

## 2024-01-22 DIAGNOSIS — M10.071 ACUTE IDIOPATHIC GOUT OF RIGHT FOOT: ICD-10-CM

## 2024-01-22 DIAGNOSIS — R41.89 COGNITIVE IMPAIRMENT: ICD-10-CM

## 2024-01-22 RX ORDER — DONEPEZIL HYDROCHLORIDE 10 MG/1
10 TABLET, FILM COATED ORAL
Qty: 90 TABLET | Refills: 1 | Status: SHIPPED | OUTPATIENT
Start: 2024-01-22 | End: 2024-07-20

## 2024-01-22 RX ORDER — ASPIRIN 81 MG/1
81 TABLET, CHEWABLE ORAL DAILY
Qty: 90 TABLET | Refills: 1 | Status: SHIPPED | OUTPATIENT
Start: 2024-01-22 | End: 2024-07-20

## 2024-01-22 RX ORDER — SUCRALFATE 1 G/1
1 TABLET ORAL
Qty: 120 TABLET | Refills: 3 | Status: SHIPPED | OUTPATIENT
Start: 2024-01-22 | End: 2024-05-21

## 2024-01-22 RX ORDER — FLUTICASONE PROPIONATE 50 MCG
1 SPRAY, SUSPENSION (ML) NASAL 2 TIMES DAILY
Qty: 9.9 ML | Refills: 1 | Status: SHIPPED | OUTPATIENT
Start: 2024-01-22

## 2024-01-22 RX ORDER — ESCITALOPRAM OXALATE 20 MG/1
20 TABLET ORAL DAILY
Qty: 30 TABLET | Refills: 1 | OUTPATIENT
Start: 2024-01-22 | End: 2024-03-22

## 2024-01-22 RX ORDER — ERGOCALCIFEROL 1.25 MG/1
50000 CAPSULE ORAL WEEKLY
Qty: 7 CAPSULE | Refills: 0 | Status: SHIPPED | OUTPATIENT
Start: 2024-01-22 | End: 2024-03-27

## 2024-01-22 RX ORDER — BUPROPION HYDROCHLORIDE 150 MG/1
150 TABLET ORAL DAILY
Qty: 30 TABLET | Refills: 1 | OUTPATIENT
Start: 2024-01-22 | End: 2024-03-22

## 2024-01-22 RX ORDER — PANTOPRAZOLE SODIUM 20 MG/1
20 TABLET, DELAYED RELEASE ORAL
Qty: 30 TABLET | Refills: 1 | Status: SHIPPED | OUTPATIENT
Start: 2024-01-22 | End: 2024-03-27 | Stop reason: SDUPTHER

## 2024-01-22 RX ORDER — NEBIVOLOL 5 MG/1
5 TABLET ORAL DAILY
Qty: 90 TABLET | Refills: 1 | Status: SHIPPED | OUTPATIENT
Start: 2024-01-22 | End: 2024-07-20

## 2024-01-22 RX ORDER — ALBUTEROL SULFATE 90 UG/1
2 AEROSOL, METERED RESPIRATORY (INHALATION) EVERY 6 HOURS PRN
Qty: 20.4 G | Refills: 3 | Status: SHIPPED | OUTPATIENT
Start: 2024-01-22

## 2024-01-22 RX ORDER — BUSPIRONE HYDROCHLORIDE 5 MG/1
5 TABLET ORAL 3 TIMES DAILY
Qty: 90 TABLET | Refills: 1 | OUTPATIENT
Start: 2024-01-22 | End: 2024-03-22

## 2024-01-22 RX ORDER — ALLOPURINOL 100 MG/1
100 TABLET ORAL DAILY
Qty: 90 TABLET | Refills: 1 | Status: SHIPPED | OUTPATIENT
Start: 2024-01-22 | End: 2024-07-20

## 2024-01-22 RX ORDER — KETOTIFEN FUMARATE 0.35 MG/ML
1 SOLUTION/ DROPS OPHTHALMIC 2 TIMES DAILY
Qty: 3 ML | Refills: 1 | Status: SHIPPED | OUTPATIENT
Start: 2024-01-22 | End: 2024-03-27

## 2024-01-22 NOTE — TELEPHONE ENCOUNTER
Patient needs refills on  bupropion buspirone , lexapro  and trazodone [facility]

## 2024-01-29 ENCOUNTER — SOCIAL WORK (OUTPATIENT)
Dept: BEHAVIORAL/MENTAL HEALTH CLINIC | Facility: CLINIC | Age: 74
End: 2024-01-29
Payer: MEDICARE

## 2024-01-29 DIAGNOSIS — F33.41 RECURRENT MAJOR DEPRESSIVE DISORDER, IN PARTIAL REMISSION (HCC): Primary | ICD-10-CM

## 2024-01-29 PROCEDURE — 90834 PSYTX W PT 45 MINUTES: CPT | Performed by: SOCIAL WORKER

## 2024-01-29 NOTE — PSYCH
Behavioral Health Psychotherapy Progress Note    Psychotherapy Provided: Individual Psychotherapy     1. Recurrent major depressive disorder, in partial remission (HCC)            Goals addressed in session: Goal 1     DATA: Zulma reports feeling ok stating that she has been focused on addressing her financial plans along with the help of Lidia Sinclair and [facility]. 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 Zulma did share having intentions to call the police on him had he not returned the car. We processed the pain and fear of having to cut him off financially, as well as, the determination to care for herself the best that she can. In keeping with this sentiment she agreed to move the step stool and small ladder from her immediate access so as to limit her ability to try and complete home repair projects. To help with this she said that she would post kind reminders to herself to focus on what she can control and to try and release energy through smaller projects like coloring, breathing exercises, and calming imagery. She denied significant shifts in mood or in sleep. She is taking her medication as prescribed.   During this session, this clinician used the following therapeutic modalities: Cognitive Behavioral Therapy, Cognitive Processing Therapy, and Supportive Psychotherapy    Substance Abuse was not addressed during this session. If the client is diagnosed with a co-occurring substance use disorder, please indicate any changes in the frequency or amount of use: NA. Stage of change for addressing substance use diagnoses: No substance use/Not applicable    ASSESSMENT:  Zulma Marcelo presents with a Euthymic/ normal mood.     her affect is Normal range and intensity, which is congruent, with her mood and the content of the session. The  "client has made progress on their goals.    During this session the client was oriented to person, place, and time. The client was engaged in the session. The client was able to sustain direct eye contact and was without psychomotor agitation. The client's thought processes were linear and clear.   Zulma Marcelo presents with a minimal risk of suicide, minimal risk of self-harm, and minimal risk of harm to others.    For any risk assessment that surpasses a \"low\" rating, a safety plan must be developed.    A safety plan was indicated: no  If yes, describe in detail NA    PLAN: Between sessions, Zulma Marcelo will take medication as prescribed and practice positive coping strategies as needed such as relaxation and CBT skills. At the next session, the therapist will use Cognitive Behavioral Therapy, Cognitive Processing Therapy, Mindfulness-based Strategies, Motivational Interviewing, and Supportive Psychotherapy to address stress and depression.    Behavioral Health Treatment Plan and Discharge Planning: Zulma Marcelo is aware of and agrees to continue to work on their treatment plan. They have identified and are working toward their discharge goals. yes    Visit start and stop times:    01/29/24  Start Time: 1030  Stop Time: 1120  Total Visit Time: 50 minutes  "

## 2024-02-12 ENCOUNTER — SOCIAL WORK (OUTPATIENT)
Dept: BEHAVIORAL/MENTAL HEALTH CLINIC | Facility: CLINIC | Age: 74
End: 2024-02-12

## 2024-02-12 DIAGNOSIS — F33.41 RECURRENT MAJOR DEPRESSIVE DISORDER, IN PARTIAL REMISSION (HCC): Primary | ICD-10-CM

## 2024-02-12 NOTE — BH TREATMENT PLAN
"Outpatient Behavioral Health Psychotherapy Treatment Plan    Zulma Marcelo  1950     Date of Initial Psychotherapy Assessment: 2/12/2024   Date of Current Treatment Plan: 02/12/24  Treatment Plan Target Date: 8/12/2024  Treatment Plan Expiration Date: 8/12/2024    Diagnosis:   1. Recurrent major depressive disorder, in partial remission (HCC)            Area(s) of Need: Depression    Long Term Goal 1 (in the client's own words): \"Learn ways to cope\".    Stage of Change: Action    Target Date for completion: 8/12/2024     Anticipated therapeutic modalities: Psychoeducation, motivational interviewing, CBT, ACT, DBT, somatic exercises, mindfulness skills training, and supportive psychotherapy.      People identified to complete this goal: Zulma Marcelo (client) and Vannesa Stone (clinician)      Objective 1: (identify the means of measuring success in meeting the objective): Use session time to identify and explore positive supports to reduce daily overwhelm.       Objective 2: (identify the means of measuring success in meeting the objective): Use session time to acknowledge and work through automatic negative thoughts which perpetuate feelings of worthlessness and hopelessness, and reframe/correct these thoughts with more positive and truthful depictions.        I am currently under the care of a [facility] psychiatric provider: yes    My [facility] psychiatric provider is: Alis Kearns PA-C    I am currently taking psychiatric medications: Yes, as prescribed    I feel that I will be ready for discharge from mental health care when I reach the following (measurable goal/objective): \"I don't think I'll ever be. It's part of hat I need to do\".    For children and adults who have a legal guardian:   Has there been any change to custody orders and/or guardianship status? NA. If yes, attach updated documentation.    I have created my Crisis Plan and have been offered a copy of this plan    Behavioral Health " Treatment Plan [facility]: Diagnosis and Treatment Plan explained to Zulma Marcelo acknowledges an understanding of their diagnosis. Zulma Marcelo agrees to this treatment plan.    I have been offered a copy of this Treatment Plan. yes

## 2024-02-12 NOTE — PSYCH
"Behavioral Health Psychotherapy Progress Note    Psychotherapy Provided: Individual Psychotherapy     1. Recurrent major depressive disorder, in partial remission (HCC)            Goals addressed in session: Goal 1     DATA: Zulma reports feeling ok overall. She has been working with Lidia Sinclair at exploring her financial options and expanding on her social supports. She is going to the [employer] now which she is enjoying. We discussed her treatment plan and crisis plan and acknowledged that includes focusing on things that she can control and allowing herself support and to develop more effective strategies to manage everyday stress. She recognizes that her \"stubbornness\"/hyperindependence gets in the way sometimes of her making safe decisions for herself but that she is learning to recognize these sooner and apply pros/cons-type decision making. During this session, this clinician used the following therapeutic modalities: Cognitive Behavioral Therapy, Cognitive Processing Therapy, and Supportive Psychotherapy    Substance Abuse was not addressed during this session. If the client is diagnosed with a co-occurring substance use disorder, please indicate any changes in the frequency or amount of use: NA. Stage of change for addressing substance use diagnoses: Action    ASSESSMENT:  Zulma Marcelo presents with a Euthymic/ normal mood.     her affect is Normal range and intensity, which is congruent, with her mood and the content of the session. The client has made progress on their goals.    During this session the client was oriented to person, place, and time. The client was engaged in the session. The client was able to sustain direct eye contact and was without psychomotor agitation. The client's thought processes were linear and clear.   Zulma Marcelo presents with a minimal risk of suicide, minimal risk of self-harm, and minimal risk of harm to others.    For any risk assessment that surpasses a \"low\" " rating, a safety plan must be developed.    A safety plan was indicated: no  If yes, describe in detail NA    PLAN: Between sessions, Zulma Marcelo will take medication as prescribed and practice positive coping strategies as needed such as relaxation and CBT skills. At the next session, the therapist will use Cognitive Behavioral Therapy, Cognitive Processing Therapy, Mindfulness-based Strategies, Motivational Interviewing, and Supportive Psychotherapy to address working through depression.    Behavioral Health Treatment Plan and Discharge Planning: Zulma Marcelo is aware of and agrees to continue to work on their treatment plan. They have identified and are working toward their discharge goals. yes    Visit start and stop times:    02/12/24  Start Time: 1030  Stop Time: 1120  Total Visit Time: 50 minutes

## 2024-02-12 NOTE — BH CRISIS PLAN
"Client Name: Zulma Marcelo       Client YOB: 1950    [facility] Safety Plan      Creation Date: 2/12/24 Update Date: 2/12/24   Created By: Vannesa Stone LCSW       Step 1: Warning Signs:   Warning Signs   \"Focused on disappointments, not sleeping, feeling so depressd all of the time\"            Step 2: Internal Coping Strategies:   Internal Coping Strategies   Doing house chores, coloring            Step 3: People and social settings that provide distraction:   Name Contact Information   My kids In cell phone    Places   No where           Step 4: People whom I can ask for help during a crisis:      Name Contact Information    My son, Rhett In cell phone      Step 5: Professionals or agencies I can contact during a crisis:      Clinican/Agency Name Phone Emergency Contact    [facility] [phone]       [facility] Emergency Department Emergency Department Phone Emergency Department Address    [facility] [phone]         Crisis Phone Numbers:   Suicide Prevention Lifeline: Call or Text  641 Crisis Text Line: Text HOME to 565-424   Please note: Some [facility] do not have a separate number for Child/Adolescent specific crisis. If your county is not listed under Child/Adolescent, please call the adult number for your county      Adult Crisis Numbers: Child/Adolescent Crisis Numbers   [facility]: [phone] [facility]: [phone]   [facility]: [phone] [facility]: [phone]   [facility]: [phone] [place]: [phone]   [facility]: [phone] [place]: [phone]   [place]: [phone]   [facility]: [phone]   [facility]: [phone]   [facility]: [phone] (daytime) [phone] (after hours, weekends, holidays)      Step 6: Making the environment safer (plan for lethal means safety):   Patient did not identify any lethal methods: Yes     Optional: " "What is most important to me and worth living for?   \"My kids\"     [facility] Safety Plan. Manuela Begum and Manuel Jennings. Used with permission of the authors.           "

## 2024-02-20 NOTE — PSYCH
"This note was not shared with the patient due to reasonable likelihood of causing patient harm    PROGRESS NOTE        [facility]      Name and Date of Birth:  Zulma Marcelo 73 y.o. 1950    Reason for visit:   Chief Complaint   Patient presents with    Follow-up    Medication Management       Date of Visit: 02/22/24    SUBJECTIVE:    Zulma Marcelo is a joni 73 y.o. female with a history of Major Depressive Disorder, Generalized Anxiety Disorder, and insomnia who presents today for follow-up and medication management. Since her last visit she has been overall well and feels the medications are working for her. She has had continued stressors but now has an ICM with Lidia Gross) who she states is a \"godsend\" and is helping through a lot of these. She also has continued working with Vannesa Stone LCSW, which she is finding helpful.     She denies any suicidal ideation, intent or plan at present, denies any homicidal ideation, intent or plan at present.  She denies any auditory hallucinations, denies any visual hallucinations, denies any delusions.  She denies any side effects from current psychiatric medications.    HPI ROS Appetite Changes and Sleep: normal appetite, normal sleep    Review Of Systems:    Mood Anxiety   Behavior Normal    Thought Content Normal   General Normal    Personality Normal   Other Psych Symptoms Normal   Constitutional as noted in HPI   ENT as noted in HPI   Cardiovascular as noted in HPI   Respiratory as noted in HPI   Gastrointestinal as noted in HPI   Genitourinary as noted in HPI   Musculoskeletal as noted in HPI   Integumentary as noted in HPI   Neurological as noted in HPI   Endocrine negative   Other Symptoms none, all other systems are negative       Laboratory Results: No results found for this or any previous visit.    Substance Abuse History:    Social History     Substance and Sexual Activity   Drug Use No "       Family Psychiatric History:     Family History   Problem Relation Age of Onset    Heart disease Mother     Stroke Son     Stroke Maternal Grandfather     Breast cancer Daughter 40       The following portions of the patient's history were reviewed and updated as appropriate: past family history, past medical history, past social history, past surgical history and problem list.    Social History     Socioeconomic History    Marital status:      Spouse name: Not on file    Number of children: Not on file    Years of education: Not on file    Highest education level: Not on file   Occupational History    Not on file   Tobacco Use    Smoking status: Former     Current packs/day: 0.00     Average packs/day: 1 pack/day for 67.1 years (67.1 ttl pk-yrs)     Types: Cigarettes     Start date: 6/15/1956     Quit date: 2023     Years since quittin.5     Passive exposure: Past    Smokeless tobacco: Never   Vaping Use    Vaping status: Never Used   Substance and Sexual Activity    Alcohol use: Yes     Comment: rarely    Drug use: No    Sexual activity: Never   Other Topics Concern    Not on file   Social History Narrative    Not on file     Social Determinants of Health     Financial Resource Strain: High Risk (2023)    Overall Financial Resource Strain (CARDIA)     Difficulty of Paying Living Expenses: Hard   Food Insecurity: No Food Insecurity (2023)    Hunger Vital Sign     Worried About Running Out of Food in the Last Year: Never true     Ran Out of Food in the Last Year: Never true   Transportation Needs: Unmet Transportation Needs (2023)    PRAPARE - Transportation     Lack of Transportation (Medical): Yes     Lack of Transportation (Non-Medical): Yes   Physical Activity: Not on file   Stress: Not on file   Social Connections: Not on file   Intimate Partner Violence: Not on file   Housing Stability: Low Risk  (2023)    Housing Stability Vital Sign     Unable to Pay for Housing in  the Last Year: No     Number of Places Lived in the Last Year: 1     Unstable Housing in the Last Year: No     Social History     Social History Narrative    Not on file        Social History       Tobacco History       Smoking Status  Former Smoking Start Date  6/15/1956 Quit Date  7/28/2023 Average Packs/Day  1 pack/day for 67.1 years (67.1 ttl pk-yrs) Smoking Tobacco Type  Cigarettes from 6/15/1956 to 7/28/2023   Pack Year History     Packs/Day From To Years    0 7/28/2023  0.6    1 [birth year]/15/1958 7/28/2023 67.1      Passive Exposure  Past      Smokeless Tobacco Use  Never              Alcohol History       Alcohol Use Status  Yes Comment  rarely              Drug Use       Drug Use Status  No              Sexual Activity       Sexually Active  Never              Activities of Daily Living    Not Asked                 Additional Substance Use Detail       Questions Responses    Problems Due to Past Use of Alcohol? No    Problems Due to Past Use of Substances? No    Substance Use Assessment Denies substance use within the past 12 months    Alcohol Use Frequency Denies use in past 12 months    Cannabis frequency Never used    Comment:  Never used on 8/3/2023     Heroin Frequency Denies use in past 12 months    Cocaine frequency Never used    Comment:  Never used on 8/3/2023     Crack Cocaine Frequency Denies use in past 12 months    Methamphetamine Frequency Denies use in past 12 months    Narcotic Frequency Denies use in past 12 months    Benzodiazepine Frequency Denies use in past 12 months    Amphetamine frequency Denies use in past 12 months    Barbituate Frequency Denies use use in past 12 months    Inhalant frequency Never used    Comment:  Never used on 8/3/2023 Never used on 8/3/2023     Hallucinogen frequency Never used    Comment:  Never used on 8/3/2023     Ecstasy frequency Never used    Comment:  Never used on 8/3/2023     Other drug frequency Never used    Comment:  Never used on 8/3/2023     Opiate  frequency Denies use in past 12 months    Not reviewed.              OBJECTIVE:     Mental Status Evaluation:  Appearance:  age appropriate, dressed appropriately, adequate grooming, looks stated age   Behavior:  pleasant, cooperative, interacts appropriately with this writer   Speech:  normal rate, normal volume, normal pitch   Mood:  anxious   Affect:  mood-congruent   Thought Process:  organized, logical, coherent   Associations: intact associations   Thought Content:  no overt delusions, no paranoia noted on exam   Perceptual Disturbances: no auditory hallucinations, no visual hallucinations   Risk Potential: Suicidal ideation - None  Homicidal ideation - None  Potential for aggression - No   Sensorium:  oriented to person, place, and time/date   Memory:  recent and remote memory grossly intact   Consciousness:  alert and awake   Attention/Concentration: attention span and concentration are age appropriate   Insight:  age appropriate   Judgment: age appropriate   Gait/Station: normal gait/station   Motor Activity: no abnormal movements     Assessment/Plan:     We discussed their current treatment plan in detail today. She reports she is doing well on her current regimen in terms of mood and anxiety. She does have some outside stressors but reports she is better able to deal with them than previously. I have advised no medication changes. She also has an ICM with Lidia Sinclair that she reports is very helpful. She will continue to work with Vannesa Stone LCSW, as well. We have discussed their safety plan and pt agrees that if they experience unsafe thoughts that they will reach out to their supports including this office, the suicide hotline, and emergency services if necessary. Patient is aware of non-emergent and emergent mental health resources. She is able to contract for their own safety at this time.    Will follow up in 1 months. Patient is aware to call the office if questions or concerns arise sooner.        Diagnoses and all orders for this visit:    Moderate episode of recurrent major depressive disorder (HCC)  -     escitalopram (LEXAPRO) 20 mg tablet; Take 1 tablet (20 mg total) by mouth daily  -     busPIRone (BUSPAR) 5 mg tablet; Take 1 tablet (5 mg total) by mouth 3 (three) times a day  -     buPROPion (WELLBUTRIN XL) 150 mg 24 hr tablet; Take 1 tablet (150 mg total) by mouth daily    Generalized anxiety disorder    Primary insomnia  -     traZODone (DESYREL) 150 mg tablet; Take 0.5 tablets (75 mg total) by mouth daily at bedtime          Treatment Recommendations/Precautions:    Continue current medications:     Wellbutrin 150 mg daily for depressive symptoms  BuSpar 5 mg 3 times daily for anxiety  Lexapro 20 mg daily for depressive symptoms  Trazodone 75 mg nightly for insomnia    Risks/Benefits      Risks, Benefits And Possible Side Effects Of Medications:    Risks, benefits, and possible side effects of medications explained to patient and patient verbalizes understanding and agreement for treatment.    Controlled Medication Discussion:     Not applicable    Psychotherapy Provided:     Individual psychotherapy provided: No    Visit Start Time:  8:50 AM  Visit End Time:  9:20 AM  Total Visit Duration: 30 minutes    Alis Kearns PA-C

## 2024-02-22 ENCOUNTER — OFFICE VISIT (OUTPATIENT)
Dept: PSYCHIATRY | Facility: CLINIC | Age: 74
End: 2024-02-22
Payer: MEDICARE

## 2024-02-22 DIAGNOSIS — F51.01 PRIMARY INSOMNIA: ICD-10-CM

## 2024-02-22 DIAGNOSIS — F41.1 GENERALIZED ANXIETY DISORDER: ICD-10-CM

## 2024-02-22 DIAGNOSIS — F33.1 MODERATE EPISODE OF RECURRENT MAJOR DEPRESSIVE DISORDER (HCC): Primary | ICD-10-CM

## 2024-02-22 PROCEDURE — 99214 OFFICE O/P EST MOD 30 MIN: CPT | Performed by: PHYSICIAN ASSISTANT

## 2024-02-22 RX ORDER — TRAZODONE HYDROCHLORIDE 150 MG/1
75 TABLET ORAL
Qty: 45 TABLET | Refills: 1 | Status: SHIPPED | OUTPATIENT
Start: 2024-02-22 | End: 2024-02-23 | Stop reason: SDUPTHER

## 2024-02-22 RX ORDER — BUPROPION HYDROCHLORIDE 150 MG/1
150 TABLET ORAL DAILY
Qty: 90 TABLET | Refills: 1 | Status: SHIPPED | OUTPATIENT
Start: 2024-02-22 | End: 2024-02-23 | Stop reason: SDUPTHER

## 2024-02-22 RX ORDER — BUSPIRONE HYDROCHLORIDE 5 MG/1
5 TABLET ORAL 3 TIMES DAILY
Qty: 270 TABLET | Refills: 1 | Status: SHIPPED | OUTPATIENT
Start: 2024-02-22 | End: 2024-02-23 | Stop reason: SDUPTHER

## 2024-02-22 RX ORDER — ESCITALOPRAM OXALATE 20 MG/1
20 TABLET ORAL DAILY
Qty: 90 TABLET | Refills: 1 | Status: SHIPPED | OUTPATIENT
Start: 2024-02-22 | End: 2024-02-23 | Stop reason: SDUPTHER

## 2024-02-23 DIAGNOSIS — F33.1 MODERATE EPISODE OF RECURRENT MAJOR DEPRESSIVE DISORDER (HCC): ICD-10-CM

## 2024-02-23 DIAGNOSIS — F51.01 PRIMARY INSOMNIA: ICD-10-CM

## 2024-02-23 RX ORDER — ESCITALOPRAM OXALATE 20 MG/1
20 TABLET ORAL DAILY
Qty: 90 TABLET | Refills: 1 | Status: SHIPPED | OUTPATIENT
Start: 2024-02-23 | End: 2024-08-21

## 2024-02-23 RX ORDER — TRAZODONE HYDROCHLORIDE 150 MG/1
75 TABLET ORAL
Qty: 45 TABLET | Refills: 1 | Status: SHIPPED | OUTPATIENT
Start: 2024-02-23 | End: 2024-08-21

## 2024-02-23 RX ORDER — BUPROPION HYDROCHLORIDE 150 MG/1
150 TABLET ORAL DAILY
Qty: 90 TABLET | Refills: 1 | Status: SHIPPED | OUTPATIENT
Start: 2024-02-23 | End: 2024-08-21

## 2024-02-23 RX ORDER — BUSPIRONE HYDROCHLORIDE 5 MG/1
5 TABLET ORAL 3 TIMES DAILY
Qty: 270 TABLET | Refills: 1 | Status: SHIPPED | OUTPATIENT
Start: 2024-02-23 | End: 2024-08-21

## 2024-02-26 ENCOUNTER — TELEPHONE (OUTPATIENT)
Dept: PSYCHIATRY | Facility: CLINIC | Age: 74
End: 2024-02-26

## 2024-02-26 NOTE — TELEPHONE ENCOUNTER
Left voicemail for patient to inform them that their appointment for 02/26/24 at 10:30 am was cancelled due to the provider being out of office.     Patient was rescheduled: YES [] NO [x]  If yes, when was patient rescheduled for:   If no, when is the patient's next appointment: LVM for pt to R/S    Patient requesting call back to reschedule: YES [] NO [x]

## 2024-03-06 ENCOUNTER — TELEPHONE (OUTPATIENT)
Age: 74
End: 2024-03-06

## 2024-03-06 NOTE — TELEPHONE ENCOUNTER
Patient has an apt March 27 with Dr. Wolfe at 1:30  Patient needs transportation to and from apt.    Please schedule and confirm with patient.  Thank you

## 2024-03-11 ENCOUNTER — SOCIAL WORK (OUTPATIENT)
Dept: BEHAVIORAL/MENTAL HEALTH CLINIC | Facility: CLINIC | Age: 74
End: 2024-03-11
Payer: MEDICARE

## 2024-03-11 DIAGNOSIS — F33.41 RECURRENT MAJOR DEPRESSIVE DISORDER, IN PARTIAL REMISSION (HCC): Primary | ICD-10-CM

## 2024-03-11 PROCEDURE — 90834 PSYTX W PT 45 MINUTES: CPT | Performed by: SOCIAL WORKER

## 2024-03-12 NOTE — PSYCH
Behavioral Health Psychotherapy Progress Note    Psychotherapy Provided: Individual Psychotherapy     1. Recurrent major depressive disorder, in partial remission (HCC)            Goals addressed in session: Goal 1     DATA: Zulma reports feeling well. She continues to work through her financnes and house related issues with [facility]. She feels supported by her  and is allowing herself time to socialize and bond with others at the [facility] as well. She has been setting boundaries with her children and affirming their ability to take care of themselves, which is making the boundary setting easier. She continues to struggle with negative self talk but reports an easier time to correcting herself in the moment. We reviewed mindfulness and grounding skills as well.   During this session, this clinician used the following therapeutic modalities: Cognitive Behavioral Therapy, Cognitive Processing Therapy, Mindfulness-based Strategies, and Supportive Psychotherapy    Substance Abuse was not addressed during this session. If the client is diagnosed with a co-occurring substance use disorder, please indicate any changes in the frequency or amount of use: NA. Stage of change for addressing substance use diagnoses: No substance use/Not applicable    ASSESSMENT:  Zulma Marcelo presents with a Euthymic/ normal mood.     her affect is Normal range and intensity, which is congruent, with her mood and the content of the session. The client has made progress on their goals.    During this session the client was oriented to person, place, and time. The client was engaged in the session. The client was able to sustain direct eye contact and was without psychomotor agitation. The client's thought processes were linear and clear.   Zulma Marcelo presents with a minimal risk of suicide, minimal risk of self-harm, and minimal risk of harm to others.    For any risk assessment that surpasses a  "\"low\" rating, a safety plan must be developed.    A safety plan was indicated: no  If yes, describe in detail NA    PLAN: Between sessions, Zulma Marcelo will take medication as prescribed and practice positive coping strategies as needed . At the next session, the therapist will use Cognitive Behavioral Therapy, Cognitive Processing Therapy, and Supportive Psychotherapy to address stress and depression management.    Behavioral Health Treatment Plan and Discharge Planning: Zulma Marcelo is aware of and agrees to continue to work on their treatment plan. They have identified and are working toward their discharge goals. yes    Visit start and stop times:    03/12/24  Start Time: 1030  Stop Time: 1120  Total Visit Time: 50 minutes  "

## 2024-03-20 ENCOUNTER — RA CDI HCC (OUTPATIENT)
Dept: OTHER | Facility: HOSPITAL | Age: 74
End: 2024-03-20

## 2024-03-21 ENCOUNTER — TELEMEDICINE (OUTPATIENT)
Dept: PSYCHIATRY | Facility: CLINIC | Age: 74
End: 2024-03-21
Payer: MEDICARE

## 2024-03-21 DIAGNOSIS — F33.1 MODERATE EPISODE OF RECURRENT MAJOR DEPRESSIVE DISORDER (HCC): Primary | ICD-10-CM

## 2024-03-21 DIAGNOSIS — F41.1 GENERALIZED ANXIETY DISORDER: ICD-10-CM

## 2024-03-21 DIAGNOSIS — F51.01 PRIMARY INSOMNIA: ICD-10-CM

## 2024-03-21 PROCEDURE — G2211 COMPLEX E/M VISIT ADD ON: HCPCS | Performed by: PHYSICIAN ASSISTANT

## 2024-03-21 PROCEDURE — 99214 OFFICE O/P EST MOD 30 MIN: CPT | Performed by: PHYSICIAN ASSISTANT

## 2024-03-21 NOTE — PSYCH
This note was not shared with the patient due to reasonable likelihood of causing patient harm    Virtual Regular Visit    Visit Date: 03/21/24     Verification of patient location:    Patient is located at Home in the following state in which I hold an active license [place]    Problem List Items Addressed This Visit       Primary insomnia    Generalized anxiety disorder     Other Visit Diagnoses       Moderate episode of recurrent major depressive disorder (HCC)    -  Primary          Reason for visit is   Chief Complaint   Patient presents with    Follow-up    Medication Management     Encounter provider Alis Kearns PA-C    Provider located at [address]  [facility] [id number] [phone]    Recent Visits  No visits were found meeting these conditions.  Showing recent visits within past 7 days and meeting all other requirements  Today's Visits  Date Type Provider Dept   03/21/24 Telemedicine Alis Kearns PA-C  [facility]iatric [facility]   Showing today's visits and meeting all other requirements  Future Appointments  No visits were found meeting these conditions.  Showing future appointments within next 150 days and meeting all other requirements       The patient was identified by name and date of birth. Zulma Marcelo was informed that this is a telemedicine visit and that the visit is being conducted throughthe [employer] platform. She agrees to proceed.  My office door was closed. No one else was in the room. She acknowledged consent and understanding of privacy and security of the video platform. The patient has agreed to participate and understands they can discontinue the visit at any time.    Patient is aware this is a billable service.       SUBJECTIVE:    Zulma Marcelo is a joni 73 y.o. female with a history of Major Depressive Disorder, Generalized Anxiety Disorder, and insomnia who presents  today for follow-up and medication management. Since her last visit she reports things have generally been going well. She feels her ICM is very helpful which she is thankful for and she feels things are going well with Vannesa Stone LCSW. She is feeling slightly more down recently due to a bout of diverticulitis and the time/weather changes.     She denies any suicidal ideation, intent or plan at present, denies any homicidal ideation, intent or plan at present.  She denies any auditory hallucinations, denies any visual hallucinations, denies any delusions.  She denies any side effects from current psychiatric medications.    HPI ROS Appetite Changes and Sleep: normal appetite, normal energy level, and normal number of sleep hours    Review Of Systems:     Mood Normal   Behavior Normal    Thought Content Normal   General Normal    Personality Normal   Other Psych Symptoms Normal   Constitutional As Noted in HPI   ENT As Noted in HPI   Cardiovascular As Noted in HPI   Respiratory As Noted in HPI   Gastrointestinal As Noted in HPI   Genitourinary As Noted in HPI   Musculoskeletal As Noted in HPI   Integumentary As Noted in HPI   Neurological As Noted in HPI   Endocrine Normal    Other Symptoms Normal        Substance Abuse History:    Social History     Substance and Sexual Activity   Drug Use No       Family Psychiatric History:     Family History   Problem Relation Age of Onset    Heart disease Mother     Stroke Son     Stroke Maternal Grandfather     Breast cancer Daughter 40       Social History     Socioeconomic History    Marital status:      Spouse name: Not on file    Number of children: Not on file    Years of education: Not on file    Highest education level: Not on file   Occupational History    Not on file   Tobacco Use    Smoking status: Former     Current packs/day: 0.00     Average packs/day: 1 pack/day for 67.1 years (67.1 ttl pk-yrs)     Types: Cigarettes     Start date: 6/15/1956     Quit  date: 2023     Years since quittin.6     Passive exposure: Past    Smokeless tobacco: Never   Vaping Use    Vaping status: Never Used   Substance and Sexual Activity    Alcohol use: Yes     Comment: rarely    Drug use: No    Sexual activity: Never   Other Topics Concern    Not on file   Social History Narrative    Not on file     Social Determinants of Health     Financial Resource Strain: High Risk (2023)    Overall Financial Resource Strain (CARDIA)     Difficulty of Paying Living Expenses: Hard   Food Insecurity: No Food Insecurity (2023)    Hunger Vital Sign     Worried About Running Out of Food in the Last Year: Never true     Ran Out of Food in the Last Year: Never true   Transportation Needs: Unmet Transportation Needs (2023)    PRAPARE - Transportation     Lack of Transportation (Medical): Yes     Lack of Transportation (Non-Medical): Yes   Physical Activity: Not on file   Stress: Not on file   Social Connections: Not on file   Intimate Partner Violence: Not on file   Housing Stability: Low Risk  (2023)    Housing Stability Vital Sign     Unable to Pay for Housing in the Last Year: No     Number of Places Lived in the Last Year: 1     Unstable Housing in the Last Year: No       Past Medical History:   Diagnosis Date    Acid reflux     Anxiety     Arthritis     Asthma     Cardiac disease     Chronic kidney disease     passed on own kidney stone    Depression     Diverticulitis     GERD (gastroesophageal reflux disease)     Hayfever     Hughes (hard of hearing)     bilateral hearing aids    Hyperlipemia     Hypertension     Panic attack     Tachycardia        Past Surgical History:   Procedure Laterality Date    ABLATION OF DYSRHYTHMIC FOCUS      APPENDECTOMY      CHOLECYSTECTOMY      open    FRACTURE SURGERY      ORIF fx (L) tibia, fibula     GASTRIC BYPASS OPEN      HYSTERECTOMY      WI XCAPSL CTRC RMVL INSJ IO LENS PROSTH W/O ECP Left 2016    Procedure: EXTRACTION  EXTRACAPSULAR CATARACT PHACO INTRAOCULAR LENS (IOL);  Surgeon: Dane Wells MD;  Location: [facility] MAIN OR;  Service: Ophthalmology    OK XCAPSL CTRC RMVL INSJ IO LENS PROSTH W/O ECP Right 3/1/2021    Procedure: EXTRACTION EXTRACAPSULAR CATARACT PHACO INTRAOCULAR LENS (IOL);  Surgeon: Dane Wells MD;  Location: [facility] MAIN OR;  Service: Ophthalmology    TONSILECTOMY AND ADNOIDECTOMY         Current Outpatient Medications   Medication Sig Dispense Refill    albuterol (PROVENTIL HFA,VENTOLIN HFA) 90 mcg/act inhaler Inhale 2 puffs every 6 (six) hours as needed for wheezing 20.4 g 3    allopurinol (ZYLOPRIM) 100 mg tablet Take 1 tablet (100 mg total) by mouth daily 90 tablet 1    aspirin 81 mg chewable tablet Chew 1 tablet (81 mg total) daily 90 tablet 1    buPROPion (WELLBUTRIN XL) 150 mg 24 hr tablet Take 1 tablet (150 mg total) by mouth daily 90 tablet 1    busPIRone (BUSPAR) 5 mg tablet Take 1 tablet (5 mg total) by mouth 3 (three) times a day 270 tablet 1    donepezil (ARICEPT) 10 mg tablet Take 1 tablet (10 mg total) by mouth daily at bedtime 90 tablet 1    escitalopram (LEXAPRO) 20 mg tablet Take 1 tablet (20 mg total) by mouth daily 90 tablet 1    fluticasone (FLONASE) 50 mcg/act nasal spray 1 spray into each nostril 2 (two) times a day 9.9 mL 1    Ketotifen Fumarate (ZADITOR) 0.035 % ophthalmic solution Administer 1 drop to both eyes 2 (two) times a day 3 mL 1    nebivolol (BYSTOLIC) 5 mg tablet Take 1 tablet (5 mg total) by mouth daily 90 tablet 1    pantoprazole (PROTONIX) 20 mg tablet Take 1 tablet (20 mg total) by mouth daily in the early morning 30 tablet 1    sucralfate (CARAFATE) 1 g tablet Take 1 tablet (1 g total) by mouth 4 (four) times a day (before meals and at bedtime) 120 tablet 3    traZODone (DESYREL) 150 mg tablet Take 0.5 tablets (75 mg total) by mouth daily at bedtime 45 tablet 1    ergocalciferol (VITAMIN D2) 50,000 units Take 1 capsule (50,000 Units total) by mouth once a week for 7  "doses 7 capsule 0     No current facility-administered medications for this visit.        Allergies   Allergen Reactions    Augmentin [Amoxicillin-Pot Clavulanate] GI Intolerance, Other (See Comments) and Hives     VOMITING  VOMITING  Reaction Date: [id number];     Sulfa Antibiotics Itching, Other (See Comments) and Hives     RASH, ITCHY  RASH, ITCHY    Morphine Other (See Comments)     \"DOESN'T WORK\"    Latex Rash     Unsure if this is an allergy       The following portions of the patient's history were reviewed and updated as appropriate: allergies, current medications, past family history, past medical history, past social history, past surgical history, and problem list.    OBJECTIVE:     Mental Status Evaluation:  Appearance:  casually dressed, dressed appropriately, adequate grooming, looks stated age   Behavior:  pleasant, cooperative, interacts appropriately with this writer   Speech:  normal rate, normal volume, normal pitch   Mood:  improved   Affect:  brighter   Thought Process:  organized, logical, coherent   Associations: intact associations   Thought Content:  no overt delusions, no paranoia noted on exam   Perceptual Disturbances: no auditory hallucinations, no visual hallucinations   Risk Potential: Suicidal ideation - None  Homicidal ideation - None  Potential for aggression - No   Sensorium:  oriented to person, place, and time/date   Memory:  recent and remote memory grossly intact   Consciousness:  alert and awake   Attention/Concentration: attention span and concentration are age appropriate   Insight:  age appropriate   Judgment: age appropriate   Gait/Station: unable to assess today due to virtual visit   Motor Activity: unable to assess today due to virtual visit     Laboratory Results: No results found for this or any previous visit.    Assessment/Plan:     Thankfully she has continued to do well on her current regimen. I have advised no changes in medications. She also has an ICM with " Lidia Sinclair that she reports is very helpful. She will continue to work with Vannesa Stone LCSW, as well. We have discussed their safety plan and pt agrees that if they experience unsafe thoughts that they will reach out to their supports including this office, the suicide hotline, and emergency services if necessary. Patient is aware of non-emergent and emergent mental health resources. She is able to contract for their own safety at this time.    Will follow up in 6 weeks. Patient is aware to call the office if questions or concerns arise sooner.       Diagnoses and all orders for this visit:    Moderate episode of recurrent major depressive disorder (HCC)    Generalized anxiety disorder    Primary insomnia          Treatment Recommendations/Precautions:    Continue current medications:     Wellbutrin 150 mg daily for depressive symptoms  BuSpar 5 mg 3 times daily for anxiety  Lexapro 20 mg daily for depressive symptoms  Trazodone 75 mg nightly for insomnia    Risks/Benefits      Risks, Benefits And Possible Side Effects Of Medications:  Risks, benefits, and possible side effects of medications explained to patient and patient verbalizes understanding    Controlled Medication Discussion: No records found for controlled prescriptions according to [place] Prescription Drug Monitoring Program.      Psychotherapy Provided:     Individual psychotherapy provided: No    Visit Start Time:  10:40 AM  Visit End Time:  10:55 AM  Total Visit Duration: 15 minutes     Alis Kearns PA-C 03/21/24      VIRTUAL VISIT DISCLAIMER    Zulma Marcelo verbally agrees to participate in Virtual Care Services. Pt is aware that Virtual Care Services could be limited without vital signs or the ability to perform a full hands-on physical exam. Zulma S Davian understands she or the provider may request at any time to terminate the video visit and request the patient to seek care or treatment in person.

## 2024-03-25 ENCOUNTER — SOCIAL WORK (OUTPATIENT)
Dept: BEHAVIORAL/MENTAL HEALTH CLINIC | Facility: CLINIC | Age: 74
End: 2024-03-25
Payer: MEDICARE

## 2024-03-25 DIAGNOSIS — F33.41 RECURRENT MAJOR DEPRESSIVE DISORDER, IN PARTIAL REMISSION (HCC): Primary | ICD-10-CM

## 2024-03-25 PROCEDURE — 90834 PSYTX W PT 45 MINUTES: CPT | Performed by: SOCIAL WORKER

## 2024-03-25 NOTE — PSYCH
"Behavioral Health Psychotherapy Progress Note    Psychotherapy Provided: Individual Psychotherapy     1. Recurrent major depressive disorder, in partial remission (HCC)            Goals addressed in session: Goal 1     DATA: .Zulma reports feeling consistently well since our last appointment. She is engaging in meaningful activities like cooking alongside her son, coloring, and going to the community center. She is balancing this out with keeping up to date with paper work and her bills. She continues to benefit from [employer]. She reports far less overwhelm and anxiety about her life and relationships. She is maintaining medication compliance. No new issues related to mood or sleep. She is conversational and future oriented.   During this session, this clinician used the following therapeutic modalities: Cognitive Behavioral Therapy, Cognitive Processing Therapy, and Supportive Psychotherapy    Substance Abuse was not addressed during this session. If the client is diagnosed with a co-occurring substance use disorder, please indicate any changes in the frequency or amount of use: NA. Stage of change for addressing substance use diagnoses: No substance use/Not applicable    ASSESSMENT:  Zulma Marcelo presents with a Euthymic/ normal mood.     her affect is Normal range and intensity, which is congruent, with her mood and the content of the session. The client has made progress on their goals.    During this session the client was oriented to person, place, and time. The client was engaged in the session. The client was able to sustain direct eye contact and was without psychomotor agitation. The client's thought processes were linear and clear.   Zulma Marcelo presents with a minimal risk of suicide, none risk of self-harm, and none risk of harm to others.    For any risk assessment that surpasses a \"low\" rating, a safety plan must be developed.    A safety plan was indicated: no  If yes, describe in detail " NA    PLAN: Between sessions, Zulma Marcelo will take medication as prescribed and practice positive coping strategies as needed . At the next session, the therapist will use Cognitive Behavioral Therapy, Cognitive Processing Therapy, and Supportive Psychotherapy to address stressors.    Behavioral Health Treatment Plan and Discharge Planning: Zulma Marcelo is aware of and agrees to continue to work on their treatment plan. They have identified and are working toward their discharge goals. yes    Visit start and stop times:    03/25/24  Start Time: 1030  Stop Time: 1120  Total Visit Time: 50 minutes

## 2024-03-27 ENCOUNTER — OFFICE VISIT (OUTPATIENT)
Dept: FAMILY MEDICINE CLINIC | Facility: CLINIC | Age: 74
End: 2024-03-27
Payer: MEDICARE

## 2024-03-27 VITALS
HEART RATE: 54 BPM | WEIGHT: 143.2 LBS | DIASTOLIC BLOOD PRESSURE: 70 MMHG | HEIGHT: 60 IN | TEMPERATURE: 97.6 F | SYSTOLIC BLOOD PRESSURE: 130 MMHG | BODY MASS INDEX: 28.11 KG/M2 | RESPIRATION RATE: 18 BRPM

## 2024-03-27 DIAGNOSIS — I47.10 SVT (SUPRAVENTRICULAR TACHYCARDIA): ICD-10-CM

## 2024-03-27 DIAGNOSIS — R73.09 ELEVATED GLUCOSE: ICD-10-CM

## 2024-03-27 DIAGNOSIS — R41.89 COGNITIVE IMPAIRMENT: ICD-10-CM

## 2024-03-27 DIAGNOSIS — E53.8 VITAMIN B12 DEFICIENCY: ICD-10-CM

## 2024-03-27 DIAGNOSIS — I77.810 ASCENDING AORTA DILATATION (HCC): ICD-10-CM

## 2024-03-27 DIAGNOSIS — F33.2 SEVERE EPISODE OF RECURRENT MAJOR DEPRESSIVE DISORDER, WITHOUT PSYCHOTIC FEATURES (HCC): Chronic | ICD-10-CM

## 2024-03-27 DIAGNOSIS — E55.9 VITAMIN D DEFICIENCY: ICD-10-CM

## 2024-03-27 DIAGNOSIS — J44.9 COPD WITHOUT EXACERBATION (HCC): ICD-10-CM

## 2024-03-27 DIAGNOSIS — E78.2 MIXED HYPERLIPIDEMIA: ICD-10-CM

## 2024-03-27 DIAGNOSIS — K21.9 GASTROESOPHAGEAL REFLUX DISEASE WITHOUT ESOPHAGITIS: ICD-10-CM

## 2024-03-27 DIAGNOSIS — I10 ESSENTIAL HYPERTENSION: Primary | ICD-10-CM

## 2024-03-27 PROBLEM — L60.0 INGROWN RIGHT GREATER TOENAIL: Status: RESOLVED | Noted: 2023-11-28 | Resolved: 2024-03-27

## 2024-03-27 PROCEDURE — 99214 OFFICE O/P EST MOD 30 MIN: CPT | Performed by: INTERNAL MEDICINE

## 2024-03-27 PROCEDURE — G2211 COMPLEX E/M VISIT ADD ON: HCPCS | Performed by: INTERNAL MEDICINE

## 2024-03-27 RX ORDER — PANTOPRAZOLE SODIUM 20 MG/1
20 TABLET, DELAYED RELEASE ORAL
Qty: 30 TABLET | Refills: 1 | Status: SHIPPED | OUTPATIENT
Start: 2024-03-27 | End: 2024-05-26

## 2024-03-27 RX ORDER — ERGOCALCIFEROL (VITAMIN D2) 50 MCG
1 CAPSULE ORAL DAILY
Qty: 100 CAPSULE | Refills: 3 | Status: SHIPPED | OUTPATIENT
Start: 2024-03-27

## 2024-03-27 NOTE — ASSESSMENT & PLAN NOTE
Well controlled on current medications and will continue as ordered.  Encouraged exercise and smoking cessation, she is not willing to stop at this time.

## 2024-03-27 NOTE — PROGRESS NOTES
Name: Zulma Marcelo      : 1950      MRN: [id number]  Encounter Provider: Lydia Cruz MD  Encounter Date: 3/27/2024   Encounter department: [facility]    Assessment & Plan     1. Essential hypertension  Assessment & Plan:  Well controlled on current medications and will continue as ordered.  Encouraged exercise and smoking cessation, she is not willing to stop at this time.    Orders:  -     CBC; Future  -     Comprehensive metabolic panel; Future  -     Lipid panel; Future    2. COPD without exacerbation (HCC)  Assessment & Plan:  Managed by pulmonology.  She denies current symptoms and continue with PRN albuterol.      3. Ascending aorta dilatation (HCC)  Assessment & Plan:  Followed by cardiology.      4. SVT (supraventricular tachycardia)  Assessment & Plan:  Managed by cardiology.      5. Severe episode of recurrent major depressive disorder, without psychotic features (HCC)  Assessment & Plan:  She feels this is well controlled. She continues to see psychiatry.  She will continue buspar, escitalopram, bupropion with close follow up. Asked patient to call sooner than next scheduled appointment for any complaints or issues.        6. Cognitive impairment  Assessment & Plan:  She will continue donepezil, appears stable.       7. Mixed hyperlipidemia  -     CBC; Future  -     Comprehensive metabolic panel; Future  -     Lipid panel; Future    8. Gastroesophageal reflux disease without esophagitis  -     pantoprazole (PROTONIX) 20 mg tablet; Take 1 tablet (20 mg total) by mouth daily in the early morning    9. Vitamin D deficiency  -     Vitamin D 25 hydroxy; Future  -     Vitamin D, Ergocalciferol, 50 MCG ( [place]) CAPS; Take 1 capsule by mouth in the morning    10. Vitamin B12 deficiency  -     Vitamin B12; Future    11. Elevated glucose  -     Hemoglobin A1C; Future; Expected date: 06/10/2024           Subjective     Here for follow up of multiple issues. She states she is taking all  of her medications as prescribed.  Denies recurrent depressive symptoms, SI/HI.  Denies side effects. Continues to follow with psychiatry, pulmonology, cardiology.  States eating and drinking well, getting around okay without falls.       Review of Systems   Constitutional: Negative.    Respiratory: Negative.     Cardiovascular: Negative.    Endocrine: Negative.    Psychiatric/Behavioral: Negative.         Past Medical History:   Diagnosis Date   • Acid reflux    • Anxiety    • Arthritis    • Asthma    • Cardiac disease    • Chronic kidney disease     passed on own kidney stone   • Depression    • Diverticulitis    • GERD (gastroesophageal reflux disease)    • Hayfever    • [place] (hard of hearing)     bilateral hearing aids   • Hyperlipemia    • Hypertension    • Panic attack    • Tachycardia      Past Surgical History:   Procedure Laterality Date   • ABLATION OF DYSRHYTHMIC FOCUS     • APPENDECTOMY     • CHOLECYSTECTOMY      open   • FRACTURE SURGERY      ORIF fx (L) tibia, fibula 2009   • GASTRIC BYPASS OPEN     • HYSTERECTOMY     • [facility] XCAPSL CTRC RMVL INSJ IO LENS PROSTH W/O ECP Left 4/18/2016    Procedure: EXTRACTION EXTRACAPSULAR CATARACT PHACO INTRAOCULAR LENS (IOL);  Surgeon: Dane Wells MD;  Location: [facility] MAIN OR;  Service: Ophthalmology   • VA XCAPSL CTRC RMVL INSJ IO LENS PROSTH W/O ECP Right 3/1/2021    Procedure: EXTRACTION EXTRACAPSULAR CATARACT PHACO INTRAOCULAR LENS (IOL);  Surgeon: Dane Wells MD;  Location: [facility] MAIN OR;  Service: Ophthalmology   • TONSILECTOMY AND ADNOIDECTOMY       Family History   Problem Relation Age of Onset   • Heart disease Mother    • Stroke Son    • Stroke Maternal Grandfather    • Breast cancer Daughter 40     Social History     Socioeconomic History   • Marital status:      Spouse name: None   • Number of children: None   • Years of education: None   • Highest education level: None   Occupational History   • None   Tobacco Use   • Smoking status: Former      Current packs/day: 0.00     Average packs/day: 1 pack/day for 67.1 years (67.1 ttl pk-yrs)     Types: Cigarettes     Start date: 6/15/1956     Quit date: 2023     Years since quittin.6     Passive exposure: Past   • Smokeless tobacco: Never   Vaping Use   • Vaping status: Never Used   Substance and Sexual Activity   • Alcohol use: Yes     Comment: rarely   • Drug use: No   • Sexual activity: Never   Other Topics Concern   • None   Social History Narrative   • None     Social Determinants of Health     Financial Resource Strain: High Risk (2023)    Overall Financial Resource Strain (CARDIA)    • Difficulty of Paying Living Expenses: Hard   Food Insecurity: No Food Insecurity (2023)    Hunger Vital Sign    • Worried About Running Out of Food in the Last Year: Never true    • Ran Out of Food in the Last Year: Never true   Transportation Needs: Unmet Transportation Needs (2023)    PRAPARE - Transportation    • Lack of Transportation (Medical): Yes    • Lack of Transportation (Non-Medical): Yes   Physical Activity: Not on file   Stress: Not on file   Social Connections: Not on file   Intimate Partner Violence: Not on file   Housing Stability: Low Risk  (2023)    Housing Stability Vital Sign    • Unable to Pay for Housing in the Last Year: No    • Number of Places Lived in the Last Year: 1    • Unstable Housing in the Last Year: No     Current Outpatient Medications on File Prior to Visit   Medication Sig   • albuterol (PROVENTIL HFA,VENTOLIN HFA) 90 mcg/act inhaler Inhale 2 puffs every 6 (six) hours as needed for wheezing   • allopurinol (ZYLOPRIM) 100 mg tablet Take 1 tablet (100 mg total) by mouth daily   • aspirin 81 mg chewable tablet Chew 1 tablet (81 mg total) daily   • buPROPion (WELLBUTRIN XL) 150 mg 24 hr tablet Take 1 tablet (150 mg total) by mouth daily   • busPIRone (BUSPAR) 5 mg tablet Take 1 tablet (5 mg total) by mouth 3 (three) times a day   • donepezil (ARICEPT) 10 mg tablet  "Take 1 tablet (10 mg total) by mouth daily at bedtime   • escitalopram (LEXAPRO) 20 mg tablet Take 1 tablet (20 mg total) by mouth daily   • fluticasone (FLONASE) 50 mcg/act nasal spray 1 spray into each nostril 2 (two) times a day   • Ketotifen Fumarate (ZADITOR) 0.035 % ophthalmic solution Administer 1 drop to both eyes 2 (two) times a day   • nebivolol (BYSTOLIC) 5 mg tablet Take 1 tablet (5 mg total) by mouth daily   • sucralfate (CARAFATE) 1 g tablet Take 1 tablet (1 g total) by mouth 4 (four) times a day (before meals and at bedtime)   • traZODone (DESYREL) 150 mg tablet Take 0.5 tablets (75 mg total) by mouth daily at bedtime   • [DISCONTINUED] ergocalciferol (VITAMIN D2) 50,000 units Take 1 capsule (50,000 Units total) by mouth once a week for 7 doses   • [DISCONTINUED] pantoprazole (PROTONIX) 20 mg tablet Take 1 tablet (20 mg total) by mouth daily in the early morning     Allergies   Allergen Reactions   • Augmentin [Amoxicillin-Pot Clavulanate] GI Intolerance, Other (See Comments) and Hives     VOMITING  VOMITING  Reaction Date: [id number];    • Sulfa Antibiotics Itching, Other (See Comments) and Hives     RASH, ITCHY  RASH, ITCHY   • Morphine Other (See Comments)     \"DOESN'T WORK\"   • Latex Rash     Unsure if this is an allergy     Immunization History   Administered Date(s) Administered   • COVID-19 PFIZER VACCINE 0.3 ML IM 04/23/2021, 05/14/2021, 12/11/2021   • Influenza Split High Dose Preservative Free IM 10/30/2013, 10/08/2014, 02/18/2016, 11/29/2017   • Influenza, high dose seasonal 0.7 mL 09/20/2018, 10/10/2019, 10/27/2021, 12/21/2022, 11/22/2023   • Influenza, seasonal, injectable 11/20/2007, 10/10/2008   • Pneumococcal Conjugate 13-Valent 02/18/2016   • Pneumococcal Polysaccharide PPV23 07/09/2007, 11/07/2013   • TD (adult) Preservative Free 12/16/2021   • Tdap 07/09/2007   • Zoster 11/07/2013, 10/01/2014       Objective     /70   Pulse (!) 54   Temp 97.6 °F (36.4 °C)   Resp 18   Ht 5' " (1.524 m)   Wt 65 kg (143 lb 3.2 oz)   LMP  (LMP Unknown)   BMI 27.97 kg/m²     Physical Exam  Constitutional:       Appearance: She is well-developed.   Eyes:      Conjunctiva/sclera: Conjunctivae normal.   Neck:      Thyroid: No thyromegaly.      Vascular: No JVD.   Cardiovascular:      Rate and Rhythm: Normal rate and regular rhythm.      Heart sounds: Normal heart sounds. No murmur heard.     No friction rub. No gallop.   Pulmonary:      Effort: Pulmonary effort is normal.      Breath sounds: Normal breath sounds. No wheezing or rales.   Abdominal:      General: Bowel sounds are normal. There is no distension.      Palpations: Abdomen is soft.      Tenderness: There is no abdominal tenderness.   Musculoskeletal:      Cervical back: Neck supple.   Psychiatric:         Mood and Affect: Mood normal.         Behavior: Behavior normal.         Thought Content: Thought content normal.         Judgment: Judgment normal.       Lydia Cruz MD

## 2024-03-27 NOTE — ASSESSMENT & PLAN NOTE
She feels this is well controlled. She continues to see psychiatry.  She will continue buspar, escitalopram, bupropion with close follow up. Asked patient to call sooner than next scheduled appointment for any complaints or issues.

## 2024-04-08 ENCOUNTER — SOCIAL WORK (OUTPATIENT)
Dept: BEHAVIORAL/MENTAL HEALTH CLINIC | Facility: CLINIC | Age: 74
End: 2024-04-08

## 2024-04-08 DIAGNOSIS — F33.41 RECURRENT MAJOR DEPRESSIVE DISORDER, IN PARTIAL REMISSION (HCC): Primary | ICD-10-CM

## 2024-04-08 NOTE — PSYCH
"Behavioral Health Psychotherapy Progress Note    Psychotherapy Provided: Individual Psychotherapy     1. Recurrent major depressive disorder, in partial remission (HCC)            Goals addressed in session: Goal 1     DATA: Zulma is doing well today She is spending a lot of time coloring and assisting her son with cooking, which she is enjoying. She is trying to find different ways to occupy their time such as doing puzzles and playing games but does admit to having a lack of ideas. She does go to the [facility] when possible. She continues to be assisted with Lidia Sinclair with respect to completing paperwork. She is feeling much more on top of things and confident as a result. No new depressive symptoms. Sleep and mood are stable.   During this session, this clinician used the following therapeutic modalities: Cognitive Processing Therapy and Supportive Psychotherapy    Substance Abuse was not addressed during this session. If the client is diagnosed with a co-occurring substance use disorder, please indicate any changes in the frequency or amount of use: NA. Stage of change for addressing substance use diagnoses: No substance use/Not applicable    ASSESSMENT:  Zulma Marcelo presents with a Euthymic/ normal mood.     her affect is Normal range and intensity, which is congruent, with her mood and the content of the session. The client has made progress on their goals.    During this session the client was oriented to person, place, and time. The client was engaged in the session. The client was able to sustain direct eye contact and was without psychomotor agitation. The client's thought processes were linear and clear.   Zulma Marcelo presents with a none risk of suicide, none risk of self-harm, and none risk of harm to others.    For any risk assessment that surpasses a \"low\" rating, a safety plan must be developed.    A safety plan was indicated: no  If yes, describe in detail NA    PLAN: Between sessions, " Zulmalito Mercert will take medication as prescribed and practice positive coping strategies as needed . At the next session, the therapist will use Cognitive Processing Therapy and Supportive Psychotherapy to address stressors.    Behavioral Health Treatment Plan and Discharge Planning: Zulma Marcelo is aware of and agrees to continue to work on their treatment plan. They have identified and are working toward their discharge goals. yes    Visit start and stop times:    04/08/24  Start Time: 1030  Stop Time: 1120  Total Visit Time: 50 minutes

## 2024-04-22 ENCOUNTER — SOCIAL WORK (OUTPATIENT)
Dept: BEHAVIORAL/MENTAL HEALTH CLINIC | Facility: CLINIC | Age: 74
End: 2024-04-22
Payer: MEDICARE

## 2024-04-22 DIAGNOSIS — F33.41 RECURRENT MAJOR DEPRESSIVE DISORDER, IN PARTIAL REMISSION (HCC): Primary | ICD-10-CM

## 2024-04-22 PROCEDURE — 90834 PSYTX W PT 45 MINUTES: CPT | Performed by: SOCIAL WORKER

## 2024-04-22 NOTE — PSYCH
"Behavioral Health Psychotherapy Progress Note    Psychotherapy Provided: Individual Psychotherapy     1. Recurrent major depressive disorder, in partial remission (HCC)            Goals addressed in session: Goal 1     DATA: Zulma continues to report and present feeling well. She is keeping up with her paperwork and as a result, found out that she is eligible for additional relief from the state with respect to utility and electric. She continues to honor physical limitations for herself and lean into other endeavors which support meaning and connections, such as coloring and attending the community center. Her mood is consistently grounded and her outlook is positive and hopeful.   During this session, this clinician used the following therapeutic modalities: Cognitive Processing Therapy    Substance Abuse was not addressed during this session. If the client is diagnosed with a co-occurring substance use disorder, please indicate any changes in the frequency or amount of use: NA. Stage of change for addressing substance use diagnoses: No substance use/Not applicable    ASSESSMENT:  Zulma Marcelo presents with a Euthymic/ normal mood.     her affect is Normal range and intensity, which is congruent, with her mood and the content of the session. The client has made progress on their goals.    During this session the client was oriented to person, place, and time. The client was engaged in the session. The client was able to sustain direct eye contact and was without psychomotor agitation. The client's thought processes were linear and clear.   Zulma Marcelo presents with a none risk of suicide, none risk of self-harm, and none risk of harm to others.    For any risk assessment that surpasses a \"low\" rating, a safety plan must be developed.    A safety plan was indicated: no  If yes, describe in detail NA    PLAN: Between sessions, Zulma Marcelo will take medication as prescribed and practice positive coping strategies " as needed . At the next session, the therapist will use Cognitive Processing Therapy to address stressors.    Behavioral Health Treatment Plan and Discharge Planning: Zulma Marcelo is aware of and agrees to continue to work on their treatment plan. They have identified and are working toward their discharge goals. yes    Visit start and stop times:    04/22/24  Start Time: 1030  Stop Time: 1120  Total Visit Time: 50 minutes

## 2024-05-02 ENCOUNTER — TELEPHONE (OUTPATIENT)
Age: 74
End: 2024-05-02

## 2024-05-02 NOTE — TELEPHONE ENCOUNTER
PT said she just learned she is qualified for a wheelchair. She said she uses an office chair with wheels right now to get around her kitchen. PT inquiring if she would need to come in again to get an order and necessary documentation for this wheelchair.    Please advise    Thank you

## 2024-05-06 ENCOUNTER — SOCIAL WORK (OUTPATIENT)
Dept: BEHAVIORAL/MENTAL HEALTH CLINIC | Facility: CLINIC | Age: 74
End: 2024-05-06
Payer: MEDICARE

## 2024-05-06 DIAGNOSIS — F33.41 RECURRENT MAJOR DEPRESSIVE DISORDER, IN PARTIAL REMISSION (HCC): Primary | ICD-10-CM

## 2024-05-06 PROCEDURE — 90834 PSYTX W PT 45 MINUTES: CPT | Performed by: SOCIAL WORKER

## 2024-05-07 ENCOUNTER — TELEPHONE (OUTPATIENT)
Dept: FAMILY MEDICINE CLINIC | Facility: CLINIC | Age: 74
End: 2024-05-07

## 2024-05-07 NOTE — PSYCH
This note was not shared with the patient due to reasonable likelihood of causing patient harm    PROGRESS NOTE        [facility]      Name and Date of Birth:  Zulma Marcelo 73 y.o. 1950 MRN: [id number]    Insurance: Payor: MEDICARE / Plan: MEDICARE A AND B / Product Type: Medicare A & B Fee for Service /     Date of Visit: May 9, 2024    Reason for Visit:   Chief Complaint   Patient presents with    Follow-up    Medication Management       SUBJECTIVE:    Zulma Marcelo is a joni 73 y.o. female with a history of Major Depressive Disorder, Generalized Anxiety Disorder, and insomnia who presents today for follow-up and medication management. She reports that since her last visit she has been feeling more depressed. However, she relates this to the situation with her daughter. She states that she was told medication isn't the answer to situational stressors so she is on board with no changes. She plans to continue to work through the issue with Vannesa Stone LCSW, during therapy. She is feeling under the weather due to allergies and a URI for which she is being treated with abx currently. She feels this is why she feels lower energy than usual.     She denies any suicidal ideation, intent or plan at present; denies any homicidal ideation, intent or plan at present.    She denies any auditory hallucinations, denies any visual hallucinations, denies any delusions.    She denies any side effects from current psychiatric medications.    HPI ROS Appetite Changes and Sleep:     She reports adequate number of sleep hours, adequate appetite, decreased energy    Current Rating Scores:     None completed today.    Review Of Systems:    Mood depression   Behavior appropriate and cooperative   Thought Content negative thoughts   General relationship problems   Personality normal   Other Psych Symptoms normal   Constitutional as noted in HPI   ENT as noted in HPI    Cardiovascular as noted in HPI   Respiratory as noted in HPI   Gastrointestinal as noted in HPI   Genitourinary as noted in HPI   Musculoskeletal as noted in HPI   Integumentary as noted in HPI   Neurological as noted in HPI   Endocrine negative   Other Symptoms none, all other systems are negative     Family Psychiatric History:     Family History   Problem Relation Age of Onset    Heart disease Mother     Stroke Son     Stroke Maternal Grandfather     Breast cancer Daughter 40       Social/Substance Abuse History:    Social History     Socioeconomic History    Marital status:      Spouse name: Not on file    Number of children: Not on file    Years of education: Not on file    Highest education level: Not on file   Occupational History    Not on file   Tobacco Use    Smoking status: Former     Current packs/day: 0.00     Average packs/day: 1 pack/day for 67.1 years (67.1 ttl pk-yrs)     Types: Cigarettes     Start date: 6/15/1956     Quit date: 2023     Years since quittin.7     Passive exposure: Past    Smokeless tobacco: Never   Vaping Use    Vaping status: Never Used   Substance and Sexual Activity    Alcohol use: Yes     Comment: rarely    Drug use: No    Sexual activity: Never   Other Topics Concern    Not on file   Social History Narrative    Not on file     Social Determinants of Health     Financial Resource Strain: High Risk (2023)    Overall Financial Resource Strain (CARDIA)     Difficulty of Paying Living Expenses: Hard   Food Insecurity: No Food Insecurity (2023)    Hunger Vital Sign     Worried About Running Out of Food in the Last Year: Never true     Ran Out of Food in the Last Year: Never true   Transportation Needs: Unmet Transportation Needs (2023)    PRAPARE - Transportation     Lack of Transportation (Medical): Yes     Lack of Transportation (Non-Medical): Yes   Physical Activity: Not on file   Stress: Not on file   Social Connections: Not on file   Intimate  Partner Violence: Not on file   Housing Stability: Low Risk  (7/31/2023)    Housing Stability Vital Sign     Unable to Pay for Housing in the Last Year: No     Number of Places Lived in the Last Year: 1     Unstable Housing in the Last Year: No       The following portions of the patient's history were reviewed and updated as appropriate: past family history, past medical history, past social history, past surgical history and problem list.    OBJECTIVE:     Mental Status Evaluation:  Appearance:  casually dressed, adequate grooming, looks stated age   Behavior:  pleasant, cooperative, interacts appropriately with this writer   Speech:  normal rate, normal volume, normal pitch   Mood:  depressed   Affect:  constricted   Thought Process:  linear   Associations: intact associations   Thought Content:  no overt delusions, no paranoia noted on exam   Perceptual Disturbances: no auditory hallucinations, no visual hallucinations   Risk Potential: Suicidal ideation - None  Homicidal ideation - None  Potential for aggression - No   Sensorium:  oriented to person, place, and time/date   Memory:  recent and remote memory grossly intact   Consciousness:  alert and awake   Attention/Concentration: attention span and concentration are age appropriate   Insight:  age appropriate   Judgment: age appropriate   Gait/Station: unstable gait, uses cane   Motor Activity: no abnormal movements     Laboratory Results: I have personally reviewed all pertinent laboratory/tests results    Suicide/Homicide Risk Assessment:    Risk of Harm to Self:  The following ratings are based on assessment at the time of the interview  Based on today's assessment, Zulma presents the following risk of harm to self: none    Risk of Harm to Others:  The following ratings are based on assessment at the time of the interview  Based on today's assessment, Zulma presents the following risk of harm to others: none    The following interventions are recommended: no  intervention changes needed, not applicable    Assessment/Plan:      She reports she is feeling a bit more depressed but that it is situational and doesn't feel medication is the answer. I have encouraged her to continue working through this stressor with Vannesa Stone LCSW, during therapy. Her medications will remain unchanged. We have discussed their safety plan and pt agrees that if they experience unsafe thoughts that they will reach out to their supports including this office, the suicide hotline, and emergency services if necessary. Patient is aware of non-emergent and emergent mental health resources. They are able to contract for their own safety at this time.    Will follow up in 2 months. Patient is aware to call the office if questions or concerns arise sooner.      Diagnoses and all orders for this visit:    Moderate episode of recurrent major depressive disorder (HCC)    Generalized anxiety disorder    Primary insomnia          Treatment Recommendations/Precautions:    Continue current medications:     Wellbutrin 150 mg daily for depressive symptoms  BuSpar 5 mg 3 times daily for anxiety  Lexapro 20 mg daily for depressive symptoms  Trazodone 75 mg nightly for insomnia    Medication management every 2 months  Aware of 24 hour and weekend coverage for urgent situations accessed by calling [facility] main practice number  I am scheduling this patient out for greater than 3 months: No    Medications Risks/Benefits      Risks, Benefits And Possible Side Effects Of Medications:    Risks, benefits, and possible side effects of medications explained to Zulma and she verbalizes understanding and agreement for treatment.    Controlled Medication Discussion:     Not applicable    Psychotherapy Provided:     Individual psychotherapy provided: No    Treatment Plan:    Completed and signed during the session: Not applicable - Treatment Plan to be completed by [facility] therapist    Visit Time    Visit Start Time:  10:00 AM  Visit End Time:  10:20 AM  Total Visit Duration:  20 minutes    Alis Kearns PA-C 05/09/24

## 2024-05-07 NOTE — TELEPHONE ENCOUNTER
Pt called and needed an appt, she thinks she has COVID.  Appt made with Dr. Cruz/ [facility] called for pt.  Ioana Rebollar

## 2024-05-08 ENCOUNTER — OFFICE VISIT (OUTPATIENT)
Dept: FAMILY MEDICINE CLINIC | Facility: CLINIC | Age: 74
End: 2024-05-08
Payer: MEDICARE

## 2024-05-08 VITALS
DIASTOLIC BLOOD PRESSURE: 74 MMHG | HEART RATE: 57 BPM | BODY MASS INDEX: 28.39 KG/M2 | SYSTOLIC BLOOD PRESSURE: 148 MMHG | TEMPERATURE: 97.4 F | WEIGHT: 144.6 LBS | HEIGHT: 60 IN | RESPIRATION RATE: 17 BRPM

## 2024-05-08 DIAGNOSIS — R26.2 AMBULATORY DYSFUNCTION: ICD-10-CM

## 2024-05-08 DIAGNOSIS — M79.672 LEFT FOOT PAIN: ICD-10-CM

## 2024-05-08 DIAGNOSIS — J44.1 CHRONIC OBSTRUCTIVE PULMONARY DISEASE WITH ACUTE EXACERBATION (HCC): Primary | ICD-10-CM

## 2024-05-08 DIAGNOSIS — G47.30 SLEEP APNEA, UNSPECIFIED TYPE: ICD-10-CM

## 2024-05-08 LAB

## 2024-05-08 PROCEDURE — 99214 OFFICE O/P EST MOD 30 MIN: CPT | Performed by: INTERNAL MEDICINE

## 2024-05-08 PROCEDURE — G2211 COMPLEX E/M VISIT ADD ON: HCPCS | Performed by: INTERNAL MEDICINE

## 2024-05-08 RX ORDER — AZITHROMYCIN 250 MG/1
TABLET, FILM COATED ORAL
Qty: 6 TABLET | Refills: 0 | Status: SHIPPED | OUTPATIENT
Start: 2024-05-08 | End: 2024-05-12

## 2024-05-08 NOTE — ASSESSMENT & PLAN NOTE
Will treat with zithromax, t/c steroids if symptoms do not improve.  Continue Fluticasone as needed, has albuterol for acute symptoms.

## 2024-05-08 NOTE — PSYCH
"Behavioral Health Psychotherapy Progress Note    Psychotherapy Provided: Individual Psychotherapy     1. Recurrent major depressive disorder, in partial remission (HCC)            Goals addressed in session: Goal 1     DATA: Zulma reports feeling down stating that she was told by another family member that her daughter is saying mean things about her. This is very hurtful to her because she feels her daughter is being cruel to her because she is upset that Zulma will not financially support her. She reinforced needing to set reasonable limits with others and reviewed ways to acknowledge and release normal feelings of disappointments, anger, and sadness. She was receptive to feedback.   During this session, this clinician used the following therapeutic modalities: Cognitive Processing Therapy    Substance Abuse was not addressed during this session. If the client is diagnosed with a co-occurring substance use disorder, please indicate any changes in the frequency or amount of use: NA. Stage of change for addressing substance use diagnoses: No substance use/Not applicable    ASSESSMENT:  Zulma Marcelo presents with a Euthymic/ normal mood.     her affect is Normal range and intensity, which is congruent, with her mood and the content of the session. The client has made progress on their goals.    During this session the client was oriented to person, place, and time. The client was engaged in the session. The client was able to sustain direct eye contact and was without psychomotor agitation. The client's thought processes were linear and clear.   Zulma Marcelo presents with a none risk of suicide, none risk of self-harm, and none risk of harm to others.    For any risk assessment that surpasses a \"low\" rating, a safety plan must be developed.    A safety plan was indicated: no  If yes, describe in detail NA    PLAN: Between sessions, Zulma Marcelo will take medication as prescribed and practice positive coping " strategies as needed . At the next session, the therapist will use Cognitive Behavioral Therapy and Cognitive Processing Therapy to address stressors.    Behavioral Health Treatment Plan and Discharge Planning: Zulma Marcelo is aware of and agrees to continue to work on their treatment plan. They have identified and are working toward their discharge goals. yes    Visit start and stop times:    05/08/24  Start Time: 1030  Stop Time: 1120  Total Visit Time: 50 minutes

## 2024-05-08 NOTE — PROGRESS NOTES
Name: Zulma Marcelo      : 1950      MRN: [id number]  Encounter Provider: Lydia Cruz MD  Encounter Date: 2024   Encounter department: [facility]    Assessment & Plan     1. Chronic obstructive pulmonary disease with acute exacerbation (HCC)  Assessment & Plan:  Will treat with zithromax, t/c steroids if symptoms do not improve.  Continue Fluticasone as needed, has albuterol for acute symptoms.    Orders:  -     azithromycin (ZITHROMAX) 250 mg tablet; Take 2 tablets today then 1 tablet daily x 4 days  -     dextromethorphan-guaifenesin (MUCINEX DM)  MG per 12 hr tablet; Take 1 tablet by mouth every 12 (twelve) hours as needed for cough    2. Ambulatory dysfunction  Comments:  Wheelchair ordered through [employer].  Unsafe with ambulation and needs help with ADL's and should not be using an office chair as its unsafe.    3. Left foot pain  -     Ambulatory Referral to Podiatry; Future    4. Sleep apnea, unspecified type  -     Ambulatory Referral to Sleep Medicine; Future         Subjective      Started 4 days ago with feeling feverish, night sweats, nausea, vomitting of mucus x 3 daily, soft bowel movements.  Nose runs constantly. She has a lot of mucus.  Has some dyspnea but this is not new.  Is very fatigued. She has not vomitted today.  Taking ibuprofen with some relief.  No other meds tried. Nausea is a bit better today.  There is no abdominal pain.   She also feels she needs a wheelchair. She is using a cane today but most of the time at home she is using a wheeled office chair to get around because she just can't get around.  She is unsteady on her feet.  Has a hard time dressing and bathing due to unsteadiness.  She also has received a mustapha to help her remodel her bathroom, she can't get in the tub and has no other shower.  Feels unsteady on her feet.  Has not had her CPAP machine; this was recalled a couple of years ago.  Missed 2 appointments for sleep study due to  hospitalization and needs a new order.       Review of Systems   Constitutional:  Positive for fatigue and fever.   HENT:  Positive for congestion, postnasal drip and rhinorrhea.    Respiratory:  Positive for shortness of breath. Negative for cough, chest tightness and wheezing.    Cardiovascular: Negative.    Gastrointestinal:  Positive for diarrhea, nausea and vomiting. Negative for abdominal distention and abdominal pain.       Current Outpatient Medications on File Prior to Visit   Medication Sig   • albuterol (PROVENTIL HFA,VENTOLIN HFA) 90 mcg/act inhaler Inhale 2 puffs every 6 (six) hours as needed for wheezing   • allopurinol (ZYLOPRIM) 100 mg tablet Take 1 tablet (100 mg total) by mouth daily   • aspirin 81 mg chewable tablet Chew 1 tablet (81 mg total) daily   • buPROPion (WELLBUTRIN XL) 150 mg 24 hr tablet Take 1 tablet (150 mg total) by mouth daily   • busPIRone (BUSPAR) 5 mg tablet Take 1 tablet (5 mg total) by mouth 3 (three) times a day   • donepezil (ARICEPT) 10 mg tablet Take 1 tablet (10 mg total) by mouth daily at bedtime   • escitalopram (LEXAPRO) 20 mg tablet Take 1 tablet (20 mg total) by mouth daily   • fluticasone (FLONASE) 50 mcg/act nasal spray 1 spray into each nostril 2 (two) times a day   • Ketotifen Fumarate (ZADITOR) 0.035 % ophthalmic solution Administer 1 drop to both eyes 2 (two) times a day   • nebivolol (BYSTOLIC) 5 mg tablet Take 1 tablet (5 mg total) by mouth daily   • pantoprazole (PROTONIX) 20 mg tablet Take 1 tablet (20 mg total) by mouth daily in the early morning   • sucralfate (CARAFATE) 1 g tablet Take 1 tablet (1 g total) by mouth 4 (four) times a day (before meals and at bedtime)   • traZODone (DESYREL) 150 mg tablet Take 0.5 tablets (75 mg total) by mouth daily at bedtime   • Vitamin D, Ergocalciferol, 50 MCG (2000 UT) CAPS Take 1 capsule by mouth in the morning       Objective     /74   Pulse 57   Temp (!) 97.4 °F (36.3 °C)   Resp 17   Ht 5' (1.524 m)   Wt  65.6 kg (144 lb 9.6 oz)   LMP  (LMP Unknown)   BMI 28.24 kg/m²     Physical Exam  Constitutional:       Appearance: She is well-developed.   HENT:      Right Ear: Tympanic membrane is retracted.      Left Ear: Tympanic membrane is retracted.      Nose: Mucosal edema and rhinorrhea present.   Cardiovascular:      Rate and Rhythm: Normal rate and regular rhythm.      Heart sounds: Normal heart sounds.   Pulmonary:      Effort: Pulmonary effort is normal.      Breath sounds: Decreased breath sounds present. No wheezing or rales.   Abdominal:      General: Bowel sounds are normal.      Palpations: Abdomen is soft.      Tenderness: There is no abdominal tenderness.   Musculoskeletal:      Cervical back: Neck supple.      Comments: Gait unsteady with cane.     Lymphadenopathy:      Cervical: No cervical adenopathy.   Neurological:      General: No focal deficit present.      Mental Status: She is alert.   Psychiatric:         Mood and Affect: Mood normal.       Lydia Cruz MD

## 2024-05-09 ENCOUNTER — OFFICE VISIT (OUTPATIENT)
Dept: PSYCHIATRY | Facility: CLINIC | Age: 74
End: 2024-05-09

## 2024-05-09 DIAGNOSIS — F41.1 GENERALIZED ANXIETY DISORDER: ICD-10-CM

## 2024-05-09 DIAGNOSIS — F33.1 MODERATE EPISODE OF RECURRENT MAJOR DEPRESSIVE DISORDER (HCC): Primary | ICD-10-CM

## 2024-05-09 DIAGNOSIS — F51.01 PRIMARY INSOMNIA: ICD-10-CM

## 2024-05-13 ENCOUNTER — TELEPHONE (OUTPATIENT)
Dept: SLEEP CENTER | Facility: CLINIC | Age: 74
End: 2024-05-13

## 2024-05-13 DIAGNOSIS — G47.30 SLEEP APNEA, UNSPECIFIED TYPE: Primary | ICD-10-CM

## 2024-05-13 NOTE — TELEPHONE ENCOUNTER
Referral placed for a CPAP sleep study.  Patient does not have a recent diagnostic sleep study.Since patient is not currently using a CPAP machine, she would need a diagnostic sleep study within a year to qualify for a new machine.     Please change order to in-lab diagnostic sleep study or a split night sleep study.     Ordering and co-signing providers notified.

## 2024-05-15 NOTE — TELEPHONE ENCOUNTER
Patient would like order for repeat sleep study. The order that is currently is for consultation appointment only.

## 2024-05-15 NOTE — TELEPHONE ENCOUNTER
Per patient's phone call, she called the sleep Dr to schedule and was informed that the wrong sleep study test was ordered. Patient is also enquiring if wheelchair was ordered and when would she be receiving it.

## 2024-05-15 NOTE — TELEPHONE ENCOUNTER
Patient returned call and was made aware diagnostic sleep study was ordered by Dr. Cruz. NFA needed at this time.

## 2024-05-15 NOTE — TELEPHONE ENCOUNTER
We order the consult so the doctor orders the study he feels is appropriate, either at the hospital or at home.

## 2024-05-17 ENCOUNTER — TELEPHONE (OUTPATIENT)
Dept: FAMILY MEDICINE CLINIC | Facility: CLINIC | Age: 74
End: 2024-05-17

## 2024-05-17 ENCOUNTER — TELEPHONE (OUTPATIENT)
Age: 74
End: 2024-05-17

## 2024-05-17 NOTE — TELEPHONE ENCOUNTER
"Patient's  came into the office to drop off a form for Dr. Cruz to fill out. Form is \"Examination for Housebound Status\" from [facility] [employer]. Form is in an envelope (patient is requesting we do not throw out the envelope). Placed form is Nurse Pending 1. Please call patient when form is ready for   " Please assist pt with refills can not refill due to protocol.

## 2024-05-17 NOTE — TELEPHONE ENCOUNTER
Patient's  dropped off form 5/17. There is a separate telephone message put in from when  dropped off the form. Closing this task so there is only 1 telephone message chain

## 2024-05-17 NOTE — TELEPHONE ENCOUNTER
Pt was calling in stating her that her  is going to be stopping by within the next hour to drop of some paperwork in regards to [facility]. Pt needs this filled out by Dr. Rangel, and would like a call back when they have been filled out.     Pt was calling in stating that [facility] was informing her that we need to resent a order for the pt to do a sleep study either at home or at the actual location. Pt stated that the wrong order was put in and was advise to reach out to the office to get this corrected.    Please advise with the pt if needed thank you.

## 2024-05-20 ENCOUNTER — SOCIAL WORK (OUTPATIENT)
Dept: BEHAVIORAL/MENTAL HEALTH CLINIC | Facility: CLINIC | Age: 74
End: 2024-05-20
Payer: MEDICARE

## 2024-05-20 DIAGNOSIS — F33.41 RECURRENT MAJOR DEPRESSIVE DISORDER, IN PARTIAL REMISSION (HCC): Primary | ICD-10-CM

## 2024-05-20 PROBLEM — M79.672 PAIN OF LEFT HEEL: Status: RESOLVED | Noted: 2023-11-28 | Resolved: 2024-05-20

## 2024-05-20 PROCEDURE — 90834 PSYTX W PT 45 MINUTES: CPT | Performed by: SOCIAL WORKER

## 2024-05-20 NOTE — TELEPHONE ENCOUNTER
Patient called back after missing the call and was advised the form is complete and ready for  as stated below.

## 2024-05-21 NOTE — TELEPHONE ENCOUNTER
Pt returned call and stated that her , Yin, will be stopping by to  the completed paperwork. Verified with Linda office clerical OK to do.

## 2024-05-21 NOTE — PSYCH
Behavioral Health Psychotherapy Progress Note    Psychotherapy Provided: Individual Psychotherapy     1. Recurrent major depressive disorder, in partial remission (HCC)            Goals addressed in session: Goal 1     DATA: Zulma presented to this session feeling well overall. She reported having an honest conversation with her daughter Yin wherein she expressed a concern for their bond and desire to reconnect. She offered her the chance to attend therapy with this writer to which Yin agreed. Writer commended both parties for expressing a willingness to dive deeper into their relationship with the hope of improving communication, understanding, and letting go of past resentments and fear for the future. She was receptive to this. No additional concerns impacting mood and sleep. [facility] Confirmed next appointment.   During this session, this clinician used the following therapeutic modalities: Cognitive Behavioral Therapy and Cognitive Processing Therapy    Substance Abuse was not addressed during this session. If the client is diagnosed with a co-occurring substance use disorder, please indicate any changes in the frequency or amount of use: NA. Stage of change for addressing substance use diagnoses: No substance use/Not applicable    ASSESSMENT:  Zulma Marcelo presents with a Euthymic/ normal mood.     her affect is Normal range and intensity, which is congruent, with her mood and the content of the session. The client has made progress on their goals.    During this session the client was oriented to person, place, and time. The client was engaged in the session. The client was able to sustain direct eye contact and was without psychomotor agitation. The client's thought processes were linear and clear.   Zulma Marcelo presents with a none risk of suicide, none risk of self-harm, and none risk of harm to others.    For any risk assessment  "that surpasses a \"low\" rating, a safety plan must be developed.    A safety plan was indicated: no  If yes, describe in detail NA    PLAN: Between sessions, Zulma Marcelo will take medication as prescribed and practice positive coping strategies as needed . At the next session, the therapist will use Cognitive Behavioral Therapy and Cognitive Processing Therapy to address stressors.    Behavioral Health Treatment Plan and Discharge Planning: Zulma Marcelo is aware of and agrees to continue to work on their treatment plan. They have identified and are working toward their discharge goals. yes    Visit start and stop times:    05/21/24  Start Time: 1030  Stop Time: 1120  Total Visit Time: 50 minutes  "

## 2024-05-31 LAB

## 2024-06-03 ENCOUNTER — SOCIAL WORK (OUTPATIENT)
Dept: BEHAVIORAL/MENTAL HEALTH CLINIC | Facility: CLINIC | Age: 74
End: 2024-06-03
Payer: MEDICARE

## 2024-06-03 DIAGNOSIS — F33.41 RECURRENT MAJOR DEPRESSIVE DISORDER, IN PARTIAL REMISSION (HCC): Primary | ICD-10-CM

## 2024-06-03 PROCEDURE — 90834 PSYTX W PT 45 MINUTES: CPT | Performed by: SOCIAL WORKER

## 2024-06-03 NOTE — PSYCH
"Behavioral Health Psychotherapy Progress Note    Psychotherapy Provided: Individual Psychotherapy     1. Recurrent major depressive disorder, in partial remission (HCC)            Goals addressed in session: Goal 1     DATA: Zulma reported feeling well stating that she spoke to her daughter Yin about attending a session in a couple of weeks, which writer encouraged. We talked about ways in which Zulma would like to improve upon their relationship and how she sees areas of improvement needed with respect to communication and building trust. She was receptive to feedback. We acknowledged patterns of all or nothing thinking and how that has caused rifts in both relationships as well as ability to enhance self esteem. Worked on acknowledging exceptions. Otherwise stable mood and sleep.   During this session, this clinician used the following therapeutic modalities: Cognitive Behavioral Therapy and Cognitive Processing Therapy    Substance Abuse was not addressed during this session. If the client is diagnosed with a co-occurring substance use disorder, please indicate any changes in the frequency or amount of use: NA. Stage of change for addressing substance use diagnoses: No substance use/Not applicable    ASSESSMENT:  Zulma Marcelo presents with a Euthymic/ normal mood.     her affect is Normal range and intensity, which is congruent, with her mood and the content of the session. The client has made progress on their goals.    During this session the client was oriented to person, place, and time. The client was engaged in the session. The client was able to sustain direct eye contact and was without psychomotor agitation. The client's thought processes were linear and clear.   Zulma Marcelo presents with a none risk of suicide, none risk of self-harm, and none risk of harm to others.    For any risk assessment that surpasses a \"low\" rating, a safety plan must be developed.    A safety plan was indicated: no  If " yes, describe in detail NA    PLAN: Between sessions, Zulma Marcelo will take medication as prescribed and practice positive coping strategies as needed . At the next session, the therapist will use Cognitive Behavioral Therapy and Cognitive Processing Therapy to address stressors.    Behavioral Health Treatment Plan and Discharge Planning: Zulma Marcelo is aware of and agrees to continue to work on their treatment plan. They have identified and are working toward their discharge goals. yes    Visit start and stop times:    06/03/24  Start Time: 1030  Stop Time: 1120  Total Visit Time: 50 minutes

## 2024-06-17 ENCOUNTER — SOCIAL WORK (OUTPATIENT)
Dept: BEHAVIORAL/MENTAL HEALTH CLINIC | Facility: CLINIC | Age: 74
End: 2024-06-17
Payer: MEDICARE

## 2024-06-17 DIAGNOSIS — F33.41 RECURRENT MAJOR DEPRESSIVE DISORDER, IN PARTIAL REMISSION (HCC): Primary | ICD-10-CM

## 2024-06-17 PROCEDURE — 90834 PSYTX W PT 45 MINUTES: CPT | Performed by: SOCIAL WORKER

## 2024-06-17 NOTE — PSYCH
"Behavioral Health Psychotherapy Progress Note    Psychotherapy Provided: Individual Psychotherapy     1. Recurrent major depressive disorder, in partial remission (HCC)            Goals addressed in session: Goal 1     DATA: Zulma reports feeling well stating that she has been much more mindful of her automatic thoughts and willing to adjust them as needed. She reports coloring to help pass time and to feel a sense of accomplishment and creativity. Her son Rhett was hospitalized last week due to complications of congestive heart failure. She is working on supporting both he and her daughter, Yin, with what she describes as symptoms of low self worth. Will continue to work on finding a time for family sessions.   During this session, this clinician used the following therapeutic modalities: Cognitive Behavioral Therapy and Cognitive Processing Therapy    Substance Abuse was not addressed during this session. If the client is diagnosed with a co-occurring substance use disorder, please indicate any changes in the frequency or amount of use: NA. Stage of change for addressing substance use diagnoses: No substance use/Not applicable    ASSESSMENT:  Zulma Marcelo presents with a Euthymic/ normal mood.     her affect is Normal range and intensity, which is congruent, with her mood and the content of the session. The client has made progress on their goals.    During this session the client was oriented to person, place, and time. The client was engaged in the session. The client was able to sustain direct eye contact and was without psychomotor agitation. The client's thought processes were linear and clear.   Zulma Marcelo presents with a none risk of suicide, none risk of self-harm, and none risk of harm to others.    For any risk assessment that surpasses a \"low\" rating, a safety plan must be developed.    A safety plan was indicated: no  If yes, describe in detail NA    PLAN: Between sessions, Zulma Marcelo will " take medication as prescribed and practice positive coping strategies as needed . At the next session, the therapist will use Cognitive Behavioral Therapy and Cognitive Processing Therapy to address stressors.    Behavioral Health Treatment Plan and Discharge Planning: Zulma Marcelo is aware of and agrees to continue to work on their treatment plan. They have identified and are working toward their discharge goals. yes    Visit start and stop times:    06/17/24  Start Time: 1030  Stop Time: 1120  Total Visit Time: 50 minutes

## 2024-07-01 ENCOUNTER — HOSPITAL ENCOUNTER (EMERGENCY)
Facility: HOSPITAL | Age: 74
Discharge: HOME | End: 2024-07-01
Attending: EMERGENCY MEDICINE
Payer: MEDICARE

## 2024-07-01 ENCOUNTER — APPOINTMENT (EMERGENCY)
Dept: RADIOLOGY | Facility: HOSPITAL | Age: 74
End: 2024-07-01
Payer: MEDICARE

## 2024-07-01 VITALS
HEART RATE: 52 BPM | TEMPERATURE: 98.2 F | SYSTOLIC BLOOD PRESSURE: 137 MMHG | DIASTOLIC BLOOD PRESSURE: 63 MMHG | RESPIRATION RATE: 27 BRPM | OXYGEN SATURATION: 94 %

## 2024-07-01 DIAGNOSIS — K29.70 GASTRITIS: ICD-10-CM

## 2024-07-01 DIAGNOSIS — R10.9 ABDOMINAL PAIN: Primary | ICD-10-CM

## 2024-07-01 LAB
2HR DELTA HS TROPONIN: -1 NG/L
ALBUMIN SERPL BCG-MCNC: 4.2 G/DL (ref 3.5–5)
ALP SERPL-CCNC: 84 U/L (ref 34–104)
ALT SERPL W P-5'-P-CCNC: 4 U/L (ref 7–52)
ANION GAP SERPL CALCULATED.3IONS-SCNC: -1 MMOL/L (ref 4–13)
AST SERPL W P-5'-P-CCNC: 10 U/L (ref 13–39)
ATRIAL RATE: 59 BPM
BASOPHILS # BLD AUTO: 0.02 THOUSANDS/ÂΜL (ref 0–0.1)
BASOPHILS NFR BLD AUTO: 0 % (ref 0–1)
BILIRUB SERPL-MCNC: 0.77 MG/DL (ref 0.2–1)
BUN SERPL-MCNC: 7 MG/DL (ref 5–25)
CALCIUM SERPL-MCNC: 9.4 MG/DL (ref 8.4–10.2)
CARDIAC TROPONIN I PNL SERPL HS: 12 NG/L
CARDIAC TROPONIN I PNL SERPL HS: 13 NG/L
CHLORIDE SERPL-SCNC: 105 MMOL/L (ref 96–108)
CO2 SERPL-SCNC: 26 MMOL/L (ref 21–32)
CREAT SERPL-MCNC: 0.61 MG/DL (ref 0.6–1.3)
EOSINOPHIL # BLD AUTO: 0.01 THOUSAND/ÂΜL (ref 0–0.61)
EOSINOPHIL NFR BLD AUTO: 0 % (ref 0–6)
ERYTHROCYTE [DISTWIDTH] IN BLOOD BY AUTOMATED COUNT: 15.1 % (ref 11.6–15.1)
GFR SERPL CREATININE-BSD FRML MDRD: 88 ML/MIN/1.73SQ M
GLUCOSE SERPL-MCNC: 106 MG/DL (ref 65–140)
GLUCOSE SERPL-MCNC: 116 MG/DL (ref 65–140)
HCT VFR BLD AUTO: 41.8 % (ref 34.8–46.1)
HGB BLD-MCNC: 13.7 G/DL (ref 11.5–15.4)
IMM GRANULOCYTES # BLD AUTO: 0.05 THOUSAND/UL (ref 0–0.2)
IMM GRANULOCYTES NFR BLD AUTO: 1 % (ref 0–2)
LACTATE SERPL-SCNC: 1.4 MMOL/L (ref 0.5–2)
LIPASE SERPL-CCNC: 19 U/L (ref 11–82)
LYMPHOCYTES # BLD AUTO: 1.11 THOUSANDS/ÂΜL (ref 0.6–4.47)
LYMPHOCYTES NFR BLD AUTO: 11 % (ref 14–44)
MAGNESIUM SERPL-MCNC: 1.9 MG/DL (ref 1.9–2.7)
MCH RBC QN AUTO: 28.1 PG (ref 26.8–34.3)
MCHC RBC AUTO-ENTMCNC: 32.8 G/DL (ref 31.4–37.4)
MCV RBC AUTO: 86 FL (ref 82–98)
MONOCYTES # BLD AUTO: 0.77 THOUSAND/ÂΜL (ref 0.17–1.22)
MONOCYTES NFR BLD AUTO: 8 % (ref 4–12)
NEUTROPHILS # BLD AUTO: 7.86 THOUSANDS/ÂΜL (ref 1.85–7.62)
NEUTS SEG NFR BLD AUTO: 80 % (ref 43–75)
NRBC BLD AUTO-RTO: 0 /100 WBCS
P AXIS: 72 DEGREES
PLATELET # BLD AUTO: 309 THOUSANDS/UL (ref 149–390)
PMV BLD AUTO: 9.6 FL (ref 8.9–12.7)
POTASSIUM SERPL-SCNC: 3.3 MMOL/L (ref 3.5–5.3)
PR INTERVAL: 180 MS
PROT SERPL-MCNC: 7.1 G/DL (ref 6.4–8.4)
QRS AXIS: 66 DEGREES
QRSD INTERVAL: 126 MS
QT INTERVAL: 502 MS
QTC INTERVAL: 496 MS
RBC # BLD AUTO: 4.88 MILLION/UL (ref 3.81–5.12)
SODIUM SERPL-SCNC: 130 MMOL/L (ref 135–147)
T WAVE AXIS: 39 DEGREES
VENTRICULAR RATE: 59 BPM
WBC # BLD AUTO: 9.82 THOUSAND/UL (ref 4.31–10.16)

## 2024-07-01 PROCEDURE — 36415 COLL VENOUS BLD VENIPUNCTURE: CPT | Performed by: EMERGENCY MEDICINE

## 2024-07-01 PROCEDURE — 96375 TX/PRO/DX INJ NEW DRUG ADDON: CPT

## 2024-07-01 PROCEDURE — 99284 EMERGENCY DEPT VISIT MOD MDM: CPT

## 2024-07-01 PROCEDURE — 80053 COMPREHEN METABOLIC PANEL: CPT | Performed by: EMERGENCY MEDICINE

## 2024-07-01 PROCEDURE — 82948 REAGENT STRIP/BLOOD GLUCOSE: CPT

## 2024-07-01 PROCEDURE — 99285 EMERGENCY DEPT VISIT HI MDM: CPT | Performed by: EMERGENCY MEDICINE

## 2024-07-01 PROCEDURE — 93010 ELECTROCARDIOGRAM REPORT: CPT | Performed by: INTERNAL MEDICINE

## 2024-07-01 PROCEDURE — 84484 ASSAY OF TROPONIN QUANT: CPT | Performed by: EMERGENCY MEDICINE

## 2024-07-01 PROCEDURE — 83690 ASSAY OF LIPASE: CPT | Performed by: EMERGENCY MEDICINE

## 2024-07-01 PROCEDURE — 83735 ASSAY OF MAGNESIUM: CPT | Performed by: EMERGENCY MEDICINE

## 2024-07-01 PROCEDURE — 83605 ASSAY OF LACTIC ACID: CPT | Performed by: EMERGENCY MEDICINE

## 2024-07-01 PROCEDURE — 74177 CT ABD & PELVIS W/CONTRAST: CPT

## 2024-07-01 PROCEDURE — 96374 THER/PROPH/DIAG INJ IV PUSH: CPT

## 2024-07-01 PROCEDURE — 93005 ELECTROCARDIOGRAM TRACING: CPT

## 2024-07-01 PROCEDURE — 85025 COMPLETE CBC W/AUTO DIFF WBC: CPT | Performed by: EMERGENCY MEDICINE

## 2024-07-01 RX ORDER — HYDROMORPHONE HCL/PF 1 MG/ML
1 SYRINGE (ML) INJECTION ONCE
Status: COMPLETED | OUTPATIENT
Start: 2024-07-01 | End: 2024-07-01

## 2024-07-01 RX ORDER — ALUMINUM HYDROXIDE, MAGNESIUM HYDROXIDE, SIMETHICONE 400; 400; 40 MG/10ML; MG/10ML; MG/10ML
30 SUSPENSION ORAL
Qty: 355 ML | Refills: 0 | Status: SHIPPED | OUTPATIENT
Start: 2024-07-01 | End: 2024-07-02 | Stop reason: SDUPTHER

## 2024-07-01 RX ORDER — PANTOPRAZOLE SODIUM 20 MG/1
20 TABLET, DELAYED RELEASE ORAL DAILY
Qty: 20 TABLET | Refills: 0 | Status: SHIPPED | OUTPATIENT
Start: 2024-07-01 | End: 2024-07-02 | Stop reason: SDUPTHER

## 2024-07-01 RX ORDER — PANTOPRAZOLE SODIUM 40 MG/10ML
40 INJECTION, POWDER, LYOPHILIZED, FOR SOLUTION INTRAVENOUS ONCE
Status: COMPLETED | OUTPATIENT
Start: 2024-07-01 | End: 2024-07-01

## 2024-07-01 RX ORDER — ONDANSETRON 2 MG/ML
4 INJECTION INTRAMUSCULAR; INTRAVENOUS ONCE
Status: COMPLETED | OUTPATIENT
Start: 2024-07-01 | End: 2024-07-01

## 2024-07-01 RX ORDER — ONDANSETRON 4 MG/1
4 TABLET, ORALLY DISINTEGRATING ORAL EVERY 6 HOURS PRN
Qty: 9 TABLET | Refills: 0 | Status: SHIPPED | OUTPATIENT
Start: 2024-07-01 | End: 2024-07-02 | Stop reason: SDUPTHER

## 2024-07-01 RX ADMIN — HYDROMORPHONE HYDROCHLORIDE 1 MG: 1 INJECTION, SOLUTION INTRAMUSCULAR; INTRAVENOUS; SUBCUTANEOUS at 09:04

## 2024-07-01 RX ADMIN — IOHEXOL 100 ML: 350 INJECTION, SOLUTION INTRAVENOUS at 10:07

## 2024-07-01 RX ADMIN — PANTOPRAZOLE SODIUM 40 MG: 40 INJECTION, POWDER, FOR SOLUTION INTRAVENOUS at 09:06

## 2024-07-01 RX ADMIN — ONDANSETRON 4 MG: 2 INJECTION INTRAMUSCULAR; INTRAVENOUS at 09:04

## 2024-07-01 NOTE — ED PROVIDER NOTES
History  Chief Complaint   Patient presents with    Abdominal Pain     Epigastric pain and vomiting for 3-4 days      74-year-old female presents with epigastric pain nonradiating sharp continuous currently 8 out of 10 nothing makes it better or worse associated with nausea denies any vomiting fevers chills chest pain shortness of breath dysuria frequency constipation diarrhea or any other symptoms history of gastric bypass noted.      History provided by:  Patient   used: No        Prior to Admission Medications   Prescriptions Last Dose Informant Patient Reported? Taking?   Ketotifen Fumarate (ZADITOR) 0.035 % ophthalmic solution   No No   Sig: Administer 1 drop to both eyes 2 (two) times a day   Vitamin D, Ergocalciferol, 50 MCG (2000 UT) CAPS   No No   Sig: Take 1 capsule by mouth in the morning   albuterol (PROVENTIL HFA,VENTOLIN HFA) 90 mcg/act inhaler   No No   Sig: Inhale 2 puffs every 6 (six) hours as needed for wheezing   allopurinol (ZYLOPRIM) 100 mg tablet   No No   Sig: Take 1 tablet (100 mg total) by mouth daily   aspirin 81 mg chewable tablet   No No   Sig: Chew 1 tablet (81 mg total) daily   buPROPion (WELLBUTRIN XL) 150 mg 24 hr tablet   No No   Sig: Take 1 tablet (150 mg total) by mouth daily   busPIRone (BUSPAR) 5 mg tablet   No No   Sig: Take 1 tablet (5 mg total) by mouth 3 (three) times a day   dextromethorphan-guaifenesin (MUCINEX DM)  MG per 12 hr tablet   No No   Sig: Take 1 tablet by mouth every 12 (twelve) hours as needed for cough   donepezil (ARICEPT) 10 mg tablet   No No   Sig: Take 1 tablet (10 mg total) by mouth daily at bedtime   escitalopram (LEXAPRO) 20 mg tablet   No No   Sig: Take 1 tablet (20 mg total) by mouth daily   fluticasone (FLONASE) 50 mcg/act nasal spray   No No   Si spray into each nostril 2 (two) times a day   nebivolol (BYSTOLIC) 5 mg tablet   No No   Sig: Take 1 tablet (5 mg total) by mouth daily   pantoprazole (PROTONIX) 20 mg tablet    No No   Sig: Take 1 tablet (20 mg total) by mouth daily in the early morning   sucralfate (CARAFATE) 1 g tablet   No No   Sig: Take 1 tablet (1 g total) by mouth 4 (four) times a day (before meals and at bedtime)   traZODone (DESYREL) 150 mg tablet   No No   Sig: Take 0.5 tablets (75 mg total) by mouth daily at bedtime      Facility-Administered Medications: None       Past Medical History:   Diagnosis Date    Acid reflux     Anxiety     Arthritis     Asthma     Cardiac disease     Chronic kidney disease     passed on own kidney stone    Depression     Diverticulitis     GERD (gastroesophageal reflux disease)     Hayfever     [place] (hard of hearing)     bilateral hearing aids    Hyperlipemia     Hypertension     Panic attack     Tachycardia        Past Surgical History:   Procedure Laterality Date    ABLATION OF DYSRHYTHMIC FOCUS      APPENDECTOMY      CHOLECYSTECTOMY      open    FRACTURE SURGERY      ORIF fx (L) tibia, fibula 2009    GASTRIC BYPASS OPEN      HYSTERECTOMY      RI XCAPSL CTRC RMVL INSJ IO LENS PROSTH W/O ECP Left 4/18/2016    Procedure: EXTRACTION EXTRACAPSULAR CATARACT PHACO INTRAOCULAR LENS (IOL);  Surgeon: Dane Wells MD;  Location: [facility] MAIN OR;  Service: Ophthalmology    RI XCAPSL CTRC RMVL INSJ IO LENS PROSTH W/O ECP Right 3/1/2021    Procedure: EXTRACTION EXTRACAPSULAR CATARACT PHACO INTRAOCULAR LENS (IOL);  Surgeon: Dane Wells MD;  Location: [facility] MAIN OR;  Service: Ophthalmology    TONSILECTOMY AND ADNOIDECTOMY         Family History   Problem Relation Age of Onset    Heart disease Mother     Stroke Son     Stroke Maternal Grandfather     Breast cancer Daughter 40     I have reviewed and agree with the history as documented.    E-Cigarette/Vaping    E-Cigarette Use Never User      E-Cigarette/Vaping Substances    Nicotine No     THC No     CBD No     Flavoring No     Other No     Unknown No      Social History     Tobacco Use    Smoking status: Former     Current packs/day: 0.00      Average packs/day: 1 pack/day for 67.1 years (67.1 ttl pk-yrs)     Types: Cigarettes     Start date: 6/15/1956     Quit date: 2023     Years since quittin.9     Passive exposure: Past    Smokeless tobacco: Never   Vaping Use    Vaping status: Never Used   Substance Use Topics    Alcohol use: Yes     Comment: rarely    Drug use: No       Review of Systems   Constitutional: Negative.    HENT: Negative.     Eyes: Negative.    Respiratory: Negative.     Cardiovascular: Negative.    Gastrointestinal:  Positive for abdominal pain and nausea.   Endocrine: Negative.    Genitourinary: Negative.    Musculoskeletal: Negative.    Skin: Negative.    Allergic/Immunologic: Negative.    Neurological: Negative.    Hematological: Negative.    Psychiatric/Behavioral: Negative.     All other systems reviewed and are negative.      Physical Exam  Physical Exam  Vitals and nursing note reviewed.   Constitutional:       Appearance: Normal appearance.   HENT:      Head: Normocephalic and atraumatic.      Nose: Nose normal.      Mouth/Throat:      Mouth: Mucous membranes are moist.   Eyes:      Extraocular Movements: Extraocular movements intact.      Pupils: Pupils are equal, round, and reactive to light.   Cardiovascular:      Rate and Rhythm: Normal rate and regular rhythm.   Pulmonary:      Effort: Pulmonary effort is normal.      Breath sounds: Normal breath sounds.   Abdominal:      General: Abdomen is flat. Bowel sounds are normal.      Palpations: Abdomen is soft.      Tenderness: There is abdominal tenderness in the epigastric area. There is no right CVA tenderness, left CVA tenderness, guarding or rebound. Negative signs include Perry's sign, Rovsing's sign, McBurney's sign, psoas sign and obturator sign.   Musculoskeletal:         General: Normal range of motion.      Cervical back: Normal range of motion and neck supple.   Skin:     General: Skin is warm.      Capillary Refill: Capillary refill takes less than 2  seconds.   Neurological:      General: No focal deficit present.      Mental Status: She is alert and oriented to person, place, and time. Mental status is at baseline.   Psychiatric:         Mood and Affect: Mood normal.         Thought Content: Thought content normal.         Vital Signs  ED Triage Vitals   Temperature Pulse Respirations Blood Pressure SpO2   07/01/24 0827 07/01/24 0827 07/01/24 0827 07/01/24 0827 07/01/24 0827   98.2 °F (36.8 °C) 60 (!) 24 (!) 197/80 97 %      Temp Source Heart Rate Source Patient Position - Orthostatic VS BP Location FiO2 (%)   07/01/24 0827 07/01/24 0827 07/01/24 0827 07/01/24 0827 --   Oral Monitor Lying Right arm       Pain Score       07/01/24 0904       9           Vitals:    07/01/24 0830 07/01/24 0900 07/01/24 1000 07/01/24 1100   BP: (!) 186/78 169/72 127/60 137/63   Pulse: 56 57 (!) 53 (!) 52   Patient Position - Orthostatic VS:             Visual Acuity      ED Medications  Medications   ondansetron (ZOFRAN) injection 4 mg (4 mg Intravenous Given 7/1/24 0904)   pantoprazole (PROTONIX) injection 40 mg (40 mg Intravenous Given 7/1/24 0906)   HYDROmorphone (DILAUDID) injection 1 mg (1 mg Intravenous Given 7/1/24 0904)   iohexol (OMNIPAQUE) 350 MG/ML injection (MULTI-DOSE) 100 mL (100 mL Intravenous Given 7/1/24 1007)       Diagnostic Studies  Results Reviewed       Procedure Component Value Units Date/Time    HS Troponin I 2hr [[id number]]  (Normal) Collected: 07/01/24 1043    Lab Status: Final result Specimen: Blood from Arm, Right Updated: 07/01/24 1129     hs TnI 2hr 12 ng/L      Delta 2hr hsTnI -1 ng/L     Comprehensive metabolic panel [[id number]]  (Abnormal) Collected: 07/01/24 0849    Lab Status: Final result Specimen: Blood from Arm, Left Updated: 07/01/24 0943     Sodium 130 mmol/L      Potassium 3.3 mmol/L      Chloride 105 mmol/L      CO2 26 mmol/L      ANION GAP -1 mmol/L      BUN 7 mg/dL      Creatinine 0.61 mg/dL      Glucose 106 mg/dL      Calcium 9.4 mg/dL       AST 10 U/L      ALT 4 U/L      Alkaline Phosphatase 84 U/L      Total Protein 7.1 g/dL      Albumin 4.2 g/dL      Total Bilirubin 0.77 mg/dL      eGFR 88 ml/min/1.73sq m     Narrative:      National Kidney Disease Foundation guidelines for Chronic Kidney Disease (CKD):     Stage 1 with normal or high GFR (GFR > 90 mL/min/1.73 square meters)    Stage 2 Mild CKD (GFR = 60-89 mL/min/1.73 square meters)    Stage 3A Moderate CKD (GFR = 45-59 mL/min/1.73 square meters)    Stage 3B Moderate CKD (GFR = 30-44 mL/min/1.73 square meters)    Stage 4 Severe CKD (GFR = 15-29 mL/min/1.73 square meters)    Stage 5 End Stage CKD (GFR <15 mL/min/1.73 square meters)  Note: GFR calculation is accurate only with a steady state creatinine    Lipase [[id number]]  (Normal) Collected: 07/01/24 0849    Lab Status: Final result Specimen: Blood from Arm, Left Updated: 07/01/24 0943     Lipase 19 u/L     Lactic acid, plasma (w/reflex if result > 2.0) [[id number]]  (Normal) Collected: 07/01/24 0907    Lab Status: Final result Specimen: Blood from Arm, Right Updated: 07/01/24 0943     LACTIC ACID 1.4 mmol/L     Narrative:      Result may be elevated if tourniquet was used during collection.    Magnesium [[id number]]  (Normal) Collected: 07/01/24 0849    Lab Status: Final result Specimen: Blood from Arm, Left Updated: 07/01/24 0943     Magnesium 1.9 mg/dL     HS Troponin 0hr (reflex protocol) [[id number]]  (Normal) Collected: 07/01/24 0849    Lab Status: Final result Specimen: Blood from Arm, Left Updated: 07/01/24 0915     hs TnI 0hr 13 ng/L     CBC and differential [[id number]]  (Abnormal) Collected: 07/01/24 0849    Lab Status: Final result Specimen: Blood from Arm, Left Updated: 07/01/24 0858     WBC 9.82 Thousand/uL      RBC 4.88 Million/uL      Hemoglobin 13.7 g/dL      Hematocrit 41.8 %      MCV 86 fL      MCH 28.1 pg      MCHC 32.8 g/dL      RDW 15.1 %      MPV 9.6 fL      Platelets 309 Thousands/uL      nRBC 0 /100 WBCs      Segmented % 80  %      Immature Grans % 1 %      Lymphocytes % 11 %      Monocytes % 8 %      Eosinophils Relative 0 %      Basophils Relative 0 %      Absolute Neutrophils 7.86 Thousands/µL      Absolute Immature Grans 0.05 Thousand/uL      Absolute Lymphocytes 1.11 Thousands/µL      Absolute Monocytes 0.77 Thousand/µL      Eosinophils Absolute 0.01 Thousand/µL      Basophils Absolute 0.02 Thousands/µL     Fingerstick Glucose (POCT) [[id number]]  (Normal) Collected: 07/01/24 0846    Lab Status: Final result Specimen: Blood Updated: 07/01/24 0847     POC Glucose 116 mg/dl                    CT abdomen pelvis with contrast   Final Result by Lucius Saenz MD (07/01 1116)      No acute abnormality in abdomen or pelvis.      Postsurgical changes of gastric bypass. No small bowel obstruction.      Unchanged 0.3 cm nonobstructing calculus in upper pole of left kidney.      Additional chronic/incidental findings as detailed above.               Workstation performed: [id number]                    Procedures  ECG 12 Lead Documentation Only    Date/Time: 7/1/2024 2:59 PM    Performed by: Mayda Barry DO  Authorized by: Mayda Barry DO    ECG reviewed by me, the ED Provider: yes    Patient location:  ED  Previous ECG:     Previous ECG:  Unavailable    Comparison to cardiac monitor: Yes    Interpretation:     Interpretation: non-specific    Rate:     ECG rate assessment: normal    Rhythm:     Rhythm: sinus rhythm    Ectopy:     Ectopy: none    QRS:     QRS axis:  Normal  Conduction:     Conduction: normal    ST segments:     ST segments:  Non-specific  T waves:     T waves: non-specific             ED Course                                             Medical Decision Making  Patient evaluated with labs EKG imaging.  I reviewed the results and discussed them with the patient.  Patient discharged with appropriate instructions medications and follow-up.  Patient verbalized understanding had no further questions at the time of  discharge.  Patient had stable vital signs and well-appearing at the time of discharge.    Problems Addressed:  Abdominal pain: acute illness or injury  Gastritis: acute illness or injury    Amount and/or Complexity of Data Reviewed  External Data Reviewed: notes.  Labs: ordered. Decision-making details documented in ED Course.  Radiology: ordered. Decision-making details documented in ED Course.  ECG/medicine tests: ordered and independent interpretation performed. Decision-making details documented in ED Course.    Risk  OTC drugs.  Prescription drug management.             Disposition  Final diagnoses:   Abdominal pain   Gastritis     Time reflects when diagnosis was documented in both MDM as applicable and the Disposition within this note       Time User Action Codes Description Comment    7/1/2024 11:32 AM Mayda Barry Add [R10.9] Abdominal pain     7/1/2024 11:32 AM Mayda Barry [K29.70] Gastritis           ED Disposition       ED Disposition   Discharge    Condition   Stable    Date/Time   Mon Jul 1, 2024 11:32 AM    Comment   Zulma Marcelo discharge to home/self care.                   Follow-up Information       Follow up With Specialties Details Why Contact Info Additional Information    Lydia Cruz MD Internal Medicine, Family Medicine Schedule an appointment as soon as possible for a visit   [address] [facility]  [address]  [facility] [address]  [phone]       Sukumar Tidwell MD Gastroenterology   [address] [facility]  [address] [place] [address]  [phone]       [facility] Emergency Medicine  If symptoms worsen [address] [facility] [address]  [phone] [facility] Emergency Department, [address] [place] [address], [phone]            Discharge Medication List as of 7/1/2024 11:33 AM        START taking these medications    Details   aluminum-magnesium hydroxide-simethicone (MAALOX) 200-200-20 MG/5ML SUSP  Take 30 mL by mouth 4 (four) times a day (before meals and at bedtime) for 7 days, Starting Mon 7/1/2024, Until Mon 7/8/2024, Normal      ondansetron (ZOFRAN-ODT) 4 mg disintegrating tablet Take 1 tablet (4 mg total) by mouth every 6 (six) hours as needed for nausea for up to 3 days, Starting Mon 7/1/2024, Until u 7/4/2024 at 2359, Normal           CONTINUE these medications which have CHANGED    Details   pantoprazole (PROTONIX) 20 mg tablet Take 1 tablet (20 mg total) by mouth daily, Starting Mon 7/1/2024, Normal           CONTINUE these medications which have NOT CHANGED    Details   albuterol (PROVENTIL HFA,VENTOLIN HFA) 90 mcg/act inhaler Inhale 2 puffs every 6 (six) hours as needed for wheezing, Starting Mon 1/22/2024, Normal      allopurinol (ZYLOPRIM) 100 mg tablet Take 1 tablet (100 mg total) by mouth daily, Starting Mon 1/22/2024, Until Sat 7/20/2024, Normal      aspirin 81 mg chewable tablet Chew 1 tablet (81 mg total) daily, Starting Mon 1/22/2024, Until Sat 7/20/2024, Normal      buPROPion (WELLBUTRIN XL) 150 mg 24 hr tablet Take 1 tablet (150 mg total) by mouth daily, Starting Fri 2/23/2024, Until Wed 8/21/2024, Normal      busPIRone (BUSPAR) 5 mg tablet Take 1 tablet (5 mg total) by mouth 3 (three) times a day, Starting Fri 2/23/2024, Until Wed 8/21/2024, Normal      dextromethorphan-guaifenesin (MUCINEX DM)  MG per 12 hr tablet Take 1 tablet by mouth every 12 (twelve) hours as needed for cough, Starting Wed 5/8/2024, Normal      donepezil (ARICEPT) 10 mg tablet Take 1 tablet (10 mg total) by mouth daily at bedtime, Starting Mon 1/22/2024, Until Sat 7/20/2024, Normal      escitalopram (LEXAPRO) 20 mg tablet Take 1 tablet (20 mg total) by mouth daily, Starting Fri 2/23/2024, Until Wed 8/21/2024, Normal      fluticasone (FLONASE) 50 mcg/act nasal spray 1 spray into each nostril 2 (two) times a day, Starting Mon 1/22/2024, Normal      Ketotifen Fumarate (ZADITOR) 0.035 % ophthalmic solution  Administer 1 drop to both eyes 2 (two) times a day, Starting Mon 1/22/2024, Until Wed 5/8/2024, Normal      nebivolol (BYSTOLIC) 5 mg tablet Take 1 tablet (5 mg total) by mouth daily, Starting Mon 1/22/2024, Until Sat 7/20/2024, Normal      sucralfate (CARAFATE) 1 g tablet Take 1 tablet (1 g total) by mouth 4 (four) times a day (before meals and at bedtime), Starting Mon 1/22/2024, Until Tue 5/21/2024, Normal      traZODone (DESYREL) 150 mg tablet Take 0.5 tablets (75 mg total) by mouth daily at bedtime, Starting Fri 2/23/2024, Until Wed 8/21/2024, Normal      Vitamin D, Ergocalciferol, 50 MCG (2000 UT) CAPS Take 1 capsule by mouth in the morning, Starting Wed 3/27/2024, Normal             No discharge procedures on file.    PDMP Review         Value Time User    PDMP Reviewed  Yes 8/10/2023  9:04 AM Suha Gong MD            ED Provider  Electronically Signed by             Mayda Barry DO  07/01/24 [id number]

## 2024-07-02 ENCOUNTER — VBI (OUTPATIENT)
Dept: FAMILY MEDICINE CLINIC | Facility: CLINIC | Age: 74
End: 2024-07-02

## 2024-07-02 ENCOUNTER — TELEPHONE (OUTPATIENT)
Dept: GASTROENTEROLOGY | Facility: CLINIC | Age: 74
End: 2024-07-02

## 2024-07-02 DIAGNOSIS — K29.70 GASTRITIS: ICD-10-CM

## 2024-07-02 RX ORDER — ALUMINUM HYDROXIDE, MAGNESIUM HYDROXIDE, SIMETHICONE 400; 400; 40 MG/10ML; MG/10ML; MG/10ML
30 SUSPENSION ORAL
Qty: 355 ML | Refills: 0 | Status: SHIPPED | OUTPATIENT
Start: 2024-07-02 | End: 2024-07-09

## 2024-07-02 RX ORDER — ONDANSETRON 4 MG/1
4 TABLET, ORALLY DISINTEGRATING ORAL EVERY 6 HOURS PRN
Qty: 9 TABLET | Refills: 0 | Status: SHIPPED | OUTPATIENT
Start: 2024-07-02 | End: 2024-07-03 | Stop reason: SDUPTHER

## 2024-07-02 RX ORDER — PANTOPRAZOLE SODIUM 20 MG/1
20 TABLET, DELAYED RELEASE ORAL DAILY
Qty: 20 TABLET | Refills: 0 | Status: SHIPPED | OUTPATIENT
Start: 2024-07-02 | End: 2024-07-03

## 2024-07-02 NOTE — TELEPHONE ENCOUNTER
Pt requested Ly[employer] for transportation to office appts.  Lyft was scheduled for 7/3/24 and 7/9/24 appts.  Unable to scheduled 8/7/24 as it needs to be scheduled whenit gets closer to the time for appt.  Spoke to [facility], said they will call the pt about the  times.

## 2024-07-02 NOTE — TELEPHONE ENCOUNTER
07/02/24 10:06 AM    Patient contacted post ED visit, first outreach attempt made. Message was left for patient to return a call to the [facility] Department at [facility]: Phone [phone].    Thank you.  [facility] Corrigan  [facility]

## 2024-07-02 NOTE — TELEPHONE ENCOUNTER
Pt called to schedule ED f/u appt.  She was seen for gastritis.  Unfortunately, they sent her Rxs to her mail order and that will take 10-14 days to get to her.  She is requesting a small supply of those meds to hold her over until her mail order supply arrives:    Requested Prescriptions     Pending Prescriptions Disp Refills    aluminum-magnesium hydroxide-simethicone (MAALOX) 200-200-20 MG/5ML SUSP 355 mL 0     Sig: Take 30 mL by mouth 4 (four) times a day (before meals and at bedtime) for 7 days    ondansetron (ZOFRAN-ODT) 4 mg disintegrating tablet 9 tablet 0     Sig: Take 1 tablet (4 mg total) by mouth every 6 (six) hours as needed for nausea for up to 3 days    pantoprazole (PROTONIX) 20 mg tablet 20 tablet 0     Sig: Take 1 tablet (20 mg total) by mouth daily     Please send the Rxs to [facility], and call pt to let her know.      Also, for Clerical, she scheduled her ED f/u for 7/9/24 with Dr Lombardi as Dr Cruz had no availability.  She will need FLORY for that appt.

## 2024-07-03 ENCOUNTER — OFFICE VISIT (OUTPATIENT)
Dept: GASTROENTEROLOGY | Facility: CLINIC | Age: 74
End: 2024-07-03
Payer: MEDICARE

## 2024-07-03 VITALS
BODY MASS INDEX: 28.54 KG/M2 | DIASTOLIC BLOOD PRESSURE: 75 MMHG | HEART RATE: 54 BPM | HEIGHT: 60 IN | WEIGHT: 145.4 LBS | SYSTOLIC BLOOD PRESSURE: 146 MMHG

## 2024-07-03 DIAGNOSIS — K28.9 MARGINAL ULCER: Primary | ICD-10-CM

## 2024-07-03 DIAGNOSIS — R10.13 EPIGASTRIC PAIN: ICD-10-CM

## 2024-07-03 DIAGNOSIS — K29.70 GASTRITIS: ICD-10-CM

## 2024-07-03 DIAGNOSIS — K21.9 GASTROESOPHAGEAL REFLUX DISEASE WITHOUT ESOPHAGITIS: ICD-10-CM

## 2024-07-03 DIAGNOSIS — R11.2 NAUSEA AND VOMITING, UNSPECIFIED VOMITING TYPE: ICD-10-CM

## 2024-07-03 PROCEDURE — 99214 OFFICE O/P EST MOD 30 MIN: CPT | Performed by: PHYSICIAN ASSISTANT

## 2024-07-03 RX ORDER — PANTOPRAZOLE SODIUM 40 MG/1
40 TABLET, DELAYED RELEASE ORAL 2 TIMES DAILY
Qty: 180 TABLET | Refills: 2 | Status: SHIPPED | OUTPATIENT
Start: 2024-07-03

## 2024-07-03 RX ORDER — PANTOPRAZOLE SODIUM 40 MG/1
40 TABLET, DELAYED RELEASE ORAL 2 TIMES DAILY
Qty: 60 TABLET | Refills: 0 | Status: SHIPPED | OUTPATIENT
Start: 2024-07-03

## 2024-07-03 RX ORDER — SUCRALFATE 1 G/1
1 TABLET ORAL
Qty: 120 TABLET | Refills: 3 | Status: SHIPPED | OUTPATIENT
Start: 2024-07-03 | End: 2024-10-31

## 2024-07-03 RX ORDER — ONDANSETRON 4 MG/1
4 TABLET, ORALLY DISINTEGRATING ORAL EVERY 6 HOURS PRN
Qty: 9 TABLET | Refills: 0 | Status: SHIPPED | OUTPATIENT
Start: 2024-07-03 | End: 2024-07-06

## 2024-07-03 NOTE — PATIENT INSTRUCTIONS
Scheduled date of EGD(as of today): 8/6/24  Physician performing EGD: Lan  Location of EGD: [facility]  Instructions reviewed with patient by: Nena CARVAJAL   Clearances: n/a  
verbalization

## 2024-07-03 NOTE — PROGRESS NOTES
[facility] - Outpatient Follow-up Note  Zulma Marcelo 74 y.o. female MRN: [id number]  Encounter: [id number]          ASSESSMENT AND PLAN:      74-year-old female with history of gastric bypass complicated by marginal ulcers, arctic stenosis, COPD, osteoarthritis who presents the office for follow-up after ER visit 7/1 for abdominal pain with normal CT scan.    Epigastric pain with nausea and vomiting  Reports new symptoms x 1 week.  Reports NSAID use for 10 days prior.  She has been off of PPI medication for months.  She has a history of marginal ulcers last seen in 2023.  Concern for recurrence of peptic ulcer disease due to NSAID use.    -Recommend repeat EGD to assess for healing of previous ulcers and assess for further.  -I obtained informed consent from the patient. The risks/benefits/alternatives of the procedure were discussed with the patient. Risks included, but not limited to, infection, bleeding, perforation, injury to organs in the abdomen, missed lesion and incomplete procedure were discussed. Patient was agreeable and electronic signature was obtained.     -Start Protonix 40 mg twice daily.  -Recommend patient continue PPI indefinitely  -Can use Carafate 3 times daily   -Zofran as needed.  - Continue bland diet.  - Avoid all NSAIDs moving forward.  - Continue smoking cessation.  She has stopped x 1 year.    -If EGD is unremarkable, consider upper GI series.    -Discussed alarm symptoms when to go back to the emergency room including any signs of GI blood loss via emesis or stool, worsening abdominal pain, signs of dehydration, etc.      Patient was recommended to follow up after procedure. Patient advised to reach out via [employer] with any questions or concerns in the meantime.   ______________________________________________________________________    SUBJECTIVE:      Zulma Marcelo is a 74 y.o. female with gastric bypass complicated by marginal ulcers, hypertension, aortic  stenosis, COPD, osteoarthritis who presents the office for follow-up after recent ER visit 7/1.    Patient went to the emergency room 7/1 for epigastric pain and vomiting.  Liver enzymes were normal.  She was found to have mild hypokalemia and hypokalemia.  Lipase normal.  CBC unremarkable.  She had CT scan with contrast performed which was relatively unremarkable.    Patient reports new symptoms of epigastric pain with nausea and vomiting x 1 week which prompted her to ER visit.  She reports constant pain especially worsened with eating.  Last episode of vomiting was last night.  This was nonbloody.  She was able to tolerate noodle soup this morning.  She is using electrolyte drink to keep up with hydration.  She does report dark stool after taking Pepto however this resolved.  Bowel movement this morning was brown.  She reports she has been off of PPI medication for likely months.  She does report taking NSAIDs for about 10 days due to headache.    She did quit smoking 1 year ago.    Patient has history of marginal ulcers.  These were last noted 8/2023 with 3 deep, ulcers.  EGD 2022 without ulcers noted however ulcers were also previously seen on EGD in 2015.    REVIEW OF SYSTEMS IS OTHERWISE NEGATIVE.      Historical Information   Past Medical History:   Diagnosis Date    Acid reflux     Anxiety     Arthritis     Asthma     Cardiac disease     Chronic kidney disease     passed on own kidney stone    Depression     Diverticulitis     GERD (gastroesophageal reflux disease)     Hayfever     [place] (hard of hearing)     bilateral hearing aids    Hyperlipemia     Hypertension     Panic attack     Tachycardia      Past Surgical History:   Procedure Laterality Date    ABLATION OF DYSRHYTHMIC FOCUS      APPENDECTOMY      CHOLECYSTECTOMY      open    FRACTURE SURGERY      ORIF fx (L) tibia, fibula 2009    GASTRIC BYPASS OPEN      HYSTERECTOMY      ME XCAPSL CTRC RMVL INSJ IO LENS PROSTH W/O ECP Left 4/18/2016    Procedure:  EXTRACTION EXTRACAPSULAR CATARACT PHACO INTRAOCULAR LENS (IOL);  Surgeon: Dane Wells MD;  Location: [facility] MAIN OR;  Service: Ophthalmology    NV XCAPSL CTRC RMVL INSJ IO LENS PROSTH W/O ECP Right 3/1/2021    Procedure: EXTRACTION EXTRACAPSULAR CATARACT PHACO INTRAOCULAR LENS (IOL);  Surgeon: Dane Wells MD;  Location: [facility] MAIN OR;  Service: Ophthalmology    TONSILECTOMY AND ADNOIDECTOMY       Social History   Social History     Substance and Sexual Activity   Alcohol Use Yes    Comment: rarely     Social History     Substance and Sexual Activity   Drug Use No     Social History     Tobacco Use   Smoking Status Former    Current packs/day: 0.00    Average packs/day: 1 pack/day for 67.1 years (67.1 ttl pk-yrs)    Types: Cigarettes    Start date: 6/15/1956    Quit date: 2023    Years since quittin.9    Passive exposure: Past   Smokeless Tobacco Never     Family History   Problem Relation Age of Onset    Heart disease Mother     Stroke Son     Stroke Maternal Grandfather     Breast cancer Daughter 40       Meds/Allergies       Current Outpatient Medications:     albuterol (PROVENTIL HFA,VENTOLIN HFA) 90 mcg/act inhaler    allopurinol (ZYLOPRIM) 100 mg tablet    aluminum-magnesium hydroxide-simethicone (MAALOX) 200-200-20 MG/5ML SUSP    aspirin 81 mg chewable tablet    buPROPion (WELLBUTRIN XL) 150 mg 24 hr tablet    busPIRone (BUSPAR) 5 mg tablet    dextromethorphan-guaifenesin (MUCINEX DM)  MG per 12 hr tablet    donepezil (ARICEPT) 10 mg tablet    escitalopram (LEXAPRO) 20 mg tablet    fluticasone (FLONASE) 50 mcg/act nasal spray    nebivolol (BYSTOLIC) 5 mg tablet    ondansetron (ZOFRAN-ODT) 4 mg disintegrating tablet    pantoprazole (PROTONIX) 40 mg tablet    pantoprazole (PROTONIX) 40 mg tablet    sucralfate (CARAFATE) 1 g tablet    traZODone (DESYREL) 150 mg tablet    Vitamin D, Ergocalciferol, 50 MCG ( UT) CAPS    Ketotifen Fumarate (ZADITOR) 0.035 % ophthalmic solution    Allergies  "  Allergen Reactions    Augmentin [Amoxicillin-Pot Clavulanate] GI Intolerance, Other (See Comments) and Hives     VOMITING  VOMITING  Reaction Date: 07Dec2004;     Sulfa Antibiotics Itching, Other (See Comments) and Hives     RASH, ITCHY  RASH, ITCHY    Morphine Other (See Comments)     \"DOESN'T WORK\"    Latex Rash     Unsure if this is an allergy           Objective     Blood pressure 146/75, pulse (!) 54, height 5' (1.524 m), weight 66 kg (145 lb 6.4 oz), not currently breastfeeding. Body mass index is 28.4 kg/m².      PHYSICAL EXAM:      General Appearance:   Alert, cooperative, no distress   HEENT:   Normocephalic, atraumatic, anicteric.     Neck:  Supple, symmetrical, trachea midline   Lungs:   Clear to auscultation bilaterally; no rales, rhonchi or wheezing; respirations unlabored    Heart::   Regular rate and rhythm; no murmur, rub, or gallop.   Abdomen:   + Reports some tenderness with palpation of the epigastric area.  Abdomen otherwise is soft and flat.  Bowel sounds are present.  No rigidity or guarding.   Genitalia:   Deferred    Rectal:   Deferred    Extremities:  No cyanosis, clubbing or edema    Pulses:  2+ and symmetric    Skin:  No jaundice, rashes, or lesions    Lymph nodes:  No palpable cervical lymphadenopathy        Lab Results:   No visits with results within 1 Day(s) from this visit.   Latest known visit with results is:   Admission on 07/01/2024, Discharged on 07/01/2024   Component Date Value    WBC 07/01/2024 9.82     RBC 07/01/2024 4.88     Hemoglobin 07/01/2024 13.7     Hematocrit 07/01/2024 41.8     MCV 07/01/2024 86     MCH 07/01/2024 28.1     MCHC 07/01/2024 32.8     RDW 07/01/2024 15.1     MPV 07/01/2024 9.6     Platelets 07/01/2024 309     nRBC 07/01/2024 0     Segmented % 07/01/2024 80 (H)     Immature Grans % 07/01/2024 1     Lymphocytes % 07/01/2024 11 (L)     Monocytes % 07/01/2024 8     Eosinophils Relative 07/01/2024 0     Basophils Relative 07/01/2024 0     Absolute " Neutrophils 07/01/2024 7.86 (H)     Absolute Immature Grans 07/01/2024 0.05     Absolute Lymphocytes 07/01/2024 1.11     Absolute Monocytes 07/01/2024 0.77     Eosinophils Absolute 07/01/2024 0.01     Basophils Absolute 07/01/2024 0.02     Sodium 07/01/2024 130 (L)     Potassium 07/01/2024 3.3 (L)     Chloride 07/01/2024 105     CO2 07/01/2024 26     ANION GAP 07/01/2024 -1 (L)     BUN 07/01/2024 7     Creatinine 07/01/2024 0.61     Glucose 07/01/2024 106     Calcium 07/01/2024 9.4     AST 07/01/2024 10 (L)     ALT 07/01/2024 4 (L)     Alkaline Phosphatase 07/01/2024 84     Total Protein 07/01/2024 7.1     Albumin 07/01/2024 4.2     Total Bilirubin 07/01/2024 0.77     eGFR 07/01/2024 88     Magnesium 07/01/2024 1.9     hs TnI 0hr 07/01/2024 13     Lipase 07/01/2024 19     POC Glucose 07/01/2024 116     LACTIC ACID 07/01/2024 1.4     hs TnI 2hr 07/01/2024 12     Delta 2hr hsTnI 07/01/2024 -1     Ventricular Rate 07/01/2024 59     Atrial Rate 07/01/2024 59     MI Interval 07/01/2024 180     QRSD Interval 07/01/2024 126     QT Interval 07/01/2024 502     QTC Interval 07/01/2024 496     P Axis 07/01/2024 72     QRS Axis 07/01/2024 66     T Wave Norden 07/01/2024 39          Radiology Results:   CT abdomen pelvis with contrast    Result Date: 7/1/2024  Narrative: CT ABDOMEN AND PELVIS WITH IV CONTRAST INDICATION: epigastric abdominal pain. COMPARISON: CT abdomen pelvis with contrast 7/28/2023. CT abdomen pelvis without contrast 12/27/2022. TECHNIQUE: CT examination of the abdomen and pelvis was performed. Multiplanar 2D reformatted images were created from the source data. This examination, like all CT scans performed in the [facility], was performed utilizing techniques to minimize radiation dose exposure, including the use of iterative reconstruction and automated exposure control. Radiation dose length product (DLP) for this visit: 407.35 mGy-cm IV Contrast: 100 mL of iohexol (OMNIPAQUE) Enteric  Contrast: Not administered. FINDINGS: ABDOMEN LOWER CHEST: Aortic valve, dense mitral annular valve, and coronary artery calcifications. Lipomatous hypertrophy of interatrial septum. Subsegmental atelectasis in lingula and bilateral lower lobes. LIVER/BILIARY TREE: Similar prominence of common bile duct and central intrahepatic biliary tree, most likely sequela of prior cholecystectomy. Liver is otherwise unremarkable. GALLBLADDER: Surgically absent. SPLEEN: Unremarkable. PANCREAS: Unchanged 0.3 cm main pancreatic duct dilation. No pancreatic mass. No peripancreatic fashioning or fluid collection. ADRENAL GLANDS: Unchanged 2.7 cm left adrenal gland previously characterized as an adenoma (2:38). Its prior imaging features are diagnostic of benign adrenal adenoma, and is not worrisome for malignancy. However, please note that for adenomas > 1 cm, biochemical evaluation is suggested to rule out functioning adenoma, if not performed already. Adrenal recommendation based on institutional consensus and Journal of American College of Radiology 2017;14:4630-1125. Mild nonspecific thickening of right adrenal gland.. KIDNEYS/URETERS: Symmetric renal enhancement. 0.3 cm nonobstructing calculus in upper pole of left kidney (2:39). No solid renal mass. No hydronephrosis. Simple left renal cyst. Subcentimeter hypoattenuating renal lesion(s), too small to characterize but  statistically likely benign, which do not warrant follow-up (Radiology June 2019). STOMACH AND BOWEL: Postsurgical changes of gastric bypass. No small bowel obstruction. Colonic diverticulosis. No acute inflammatory bowel change. Some contrast material is noted throughout the colon and rectum. APPENDIX: Surgically absent. ABDOMINOPELVIC CAVITY: No ascites. No pneumoperitoneum. No lymphadenopathy. VESSELS: Moderate scattered calcified atherosclerotic disease. No abdominal aortic aneurysm. IVC is normal in caliber. Contrast mixing artifact in the IVC and main  portal vein. Portal vein, SMV, and splenic vein are patent. PELVIS REPRODUCTIVE ORGANS: Hysterectomy. No suspicious adnexal mass. URINARY BLADDER: Unremarkable. ABDOMINAL WALL/INGUINAL REGIONS: Small fat-containing umbilical hernia. BONES: No acute fracture or suspicious osseous lesion. Mild levoscoliosis of thoracolumbar spine. Transitional lumbosacral anatomy (lumbarized S1 vertebral body). Multilevel spinal degenerative changes, worse visualized lower thoracic spine. Grade 1 retrolisthesis of L1-L2, unchanged. Grade 1 anterolisthesis  L4-L5 and L5-S1, unchanged.     Impression: No acute abnormality in abdomen or pelvis. Postsurgical changes of gastric bypass. No small bowel obstruction. Unchanged 0.3 cm nonobstructing calculus in upper pole of left kidney. Additional chronic/incidental findings as detailed above. Workstation performed: [id number]

## 2024-07-03 NOTE — TELEPHONE ENCOUNTER
07/03/24 9:09 AM    Patient contacted post ED visit, second outreach attempt made. Message was left for patient to return a call to the [facility] Department at [facility]: Phone [phone].    Thank you.  [facility] Corrigan  [facility]

## 2024-07-05 NOTE — TELEPHONE ENCOUNTER
07/05/24 10:52 AM    Patient contacted post ED visit, [facility] phone outreaches documented. Patient called practice and scheduled a follow-up ED visit.     Thank you.  [facility] Corrgian  [facility]

## 2024-07-11 ENCOUNTER — OFFICE VISIT (OUTPATIENT)
Dept: PSYCHIATRY | Facility: CLINIC | Age: 74
End: 2024-07-11
Payer: MEDICARE

## 2024-07-11 DIAGNOSIS — F33.1 MODERATE EPISODE OF RECURRENT MAJOR DEPRESSIVE DISORDER (HCC): Primary | ICD-10-CM

## 2024-07-11 DIAGNOSIS — F41.1 GENERALIZED ANXIETY DISORDER: ICD-10-CM

## 2024-07-11 DIAGNOSIS — F51.01 PRIMARY INSOMNIA: ICD-10-CM

## 2024-07-11 PROCEDURE — G2211 COMPLEX E/M VISIT ADD ON: HCPCS | Performed by: PHYSICIAN ASSISTANT

## 2024-07-11 PROCEDURE — 99214 OFFICE O/P EST MOD 30 MIN: CPT | Performed by: PHYSICIAN ASSISTANT

## 2024-07-11 RX ORDER — TRAZODONE HYDROCHLORIDE 150 MG/1
75 TABLET ORAL
Qty: 45 TABLET | Refills: 1 | Status: SHIPPED | OUTPATIENT
Start: 2024-07-11 | End: 2025-01-07

## 2024-07-11 RX ORDER — BUSPIRONE HYDROCHLORIDE 5 MG/1
5 TABLET ORAL 3 TIMES DAILY
Qty: 270 TABLET | Refills: 1 | Status: SHIPPED | OUTPATIENT
Start: 2024-07-11 | End: 2025-01-07

## 2024-07-11 RX ORDER — BUPROPION HYDROCHLORIDE 150 MG/1
150 TABLET ORAL DAILY
Qty: 90 TABLET | Refills: 1 | Status: SHIPPED | OUTPATIENT
Start: 2024-07-11 | End: 2025-01-07

## 2024-07-11 RX ORDER — ESCITALOPRAM OXALATE 20 MG/1
20 TABLET ORAL DAILY
Qty: 90 TABLET | Refills: 1 | Status: SHIPPED | OUTPATIENT
Start: 2024-07-11 | End: 2025-01-07

## 2024-07-11 NOTE — PSYCH
This note was not shared with the patient due to reasonable likelihood of causing patient harm    PROGRESS NOTE        [facility]      Name and Date of Birth:  Zulma Marcelo 74 y.o. 1950 MRN: [id number]    Insurance: Payor: MEDICARE / Plan: MEDICARE A AND B / Product Type: Medicare A & B Fee for Service /     Date of Visit: July 11, 2024    Reason for Visit:   Chief Complaint   Patient presents with    Follow-up    Medication Management       SUBJECTIVE:    Zulma Marcelo is a joni 74 y.o. female with a history of Major Depressive Disorder, Generalized Anxiety Disorder, and insomnia who presents today for follow-up and medication management. Since her last visit she reports her mood and anxiety are improved. She states the only thing that wasn't great was that she had to be hospitalized for an ulcer recently. She feels generally her mood is good and the anxiety is under control. She states her biggest stressor is just accepting that she can't do everything she used to do, and that it's okay.     She denies any suicidal ideation, intent or plan at present; denies any homicidal ideation, intent or plan at present.    She denies any auditory hallucinations, denies any visual hallucinations, denies any delusions.    She denies any side effects from current psychiatric medications.    HPI ROS Appetite Changes and Sleep:     She reports adequate number of sleep hours, adequate appetite, adequate energy level    Current Rating Scores:     None completed today.    Review Of Systems:    Mood euthymic   Behavior appropriate, cooperative, and calm   Thought Content normal   General normal    Personality no change in personality   Other Psych Symptoms normal   Constitutional as noted in HPI   ENT as noted in HPI   Cardiovascular as noted in HPI   Respiratory as noted in HPI   Gastrointestinal as noted in HPI   Genitourinary as noted in HPI   Musculoskeletal as noted in  HPI   Integumentary as noted in HPI   Neurological as noted in HPI   Endocrine negative   Other Symptoms none, all other systems are negative     Family Psychiatric History:     Family History   Problem Relation Age of Onset    Heart disease Mother     Stroke Son     Stroke Maternal Grandfather     Breast cancer Daughter 40       Social/Substance Abuse History:    Social History     Socioeconomic History    Marital status:      Spouse name: Not on file    Number of children: Not on file    Years of education: Not on file    Highest education level: Not on file   Occupational History    Not on file   Tobacco Use    Smoking status: Former     Current packs/day: 0.00     Average packs/day: 1 pack/day for 67.1 years (67.1 ttl pk-yrs)     Types: Cigarettes     Start date: 6/15/1956     Quit date: 2023     Years since quittin.9     Passive exposure: Past    Smokeless tobacco: Never   Vaping Use    Vaping status: Never Used   Substance and Sexual Activity    Alcohol use: Yes     Comment: rarely    Drug use: No    Sexual activity: Never   Other Topics Concern    Not on file   Social History Narrative    Not on file     Social Determinants of Health     Financial Resource Strain: High Risk (2023)    Overall Financial Resource Strain (CARDIA)     Difficulty of Paying Living Expenses: Hard   Food Insecurity: No Food Insecurity (2023)    Hunger Vital Sign     Worried About Running Out of Food in the Last Year: Never true     Ran Out of Food in the Last Year: Never true   Transportation Needs: Unmet Transportation Needs (2023)    PRAPARE - Transportation     Lack of Transportation (Medical): Yes     Lack of Transportation (Non-Medical): Yes   Physical Activity: Not on file   Stress: Not on file   Social Connections: Not on file   Intimate Partner Violence: Not on file   Housing Stability: Low Risk  (2023)    Housing Stability Vital Sign     Unable to Pay for Housing in the Last Year: No      Number of Times Moved in the Last Year: 1     Homeless in the Last Year: No       The following portions of the patient's history were reviewed and updated as appropriate: past family history, past medical history, past social history, past surgical history and problem list.    OBJECTIVE:     Mental Status Evaluation:  Appearance:  dressed appropriately, adequate grooming, looks stated age   Behavior:  pleasant, cooperative, calm, interacts appropriately with this writer   Speech:  normal rate, normal volume, normal pitch   Mood:  improved, euthymic   Affect:  slightly brighter   Thought Process:  organized, logical, coherent   Associations: intact associations   Thought Content:  no overt delusions, no paranoia noted on exam   Perceptual Disturbances: no auditory hallucinations, no visual hallucinations   Risk Potential: Suicidal ideation - None  Homicidal ideation - None  Potential for aggression - No   Sensorium:  oriented to person, place, and time/date   Memory:  recent and remote memory grossly intact   Consciousness:  alert and awake   Attention/Concentration: attention span and concentration are age appropriate   Insight:  age appropriate   Judgment: age appropriate   Gait/Station: unstable gait, uses cane   Motor Activity: no abnormal movements     Laboratory Results: I have personally reviewed all pertinent laboratory/tests results    Suicide/Homicide Risk Assessment:    Risk of Harm to Self:  The following ratings are based on assessment at the time of the interview  Based on today's assessment, Zulma presents the following risk of harm to self: none    Risk of Harm to Others:  The following ratings are based on assessment at the time of the interview  Based on today's assessment, Zulma presents the following risk of harm to others: none    The following interventions are recommended: no intervention changes needed    Assessment/Plan:      She reports that she is doing much better since her last visit and  "that the medicines are \"working fine\". She did have a visit to the hospital for ulcers which she was upset about but states she is feeling better. Since her mood is stable and improved and she reports low anxiety, I have advised no changes in the medication. She will continue to work with Vannesa Stone LCSW, as well. We have discussed her safety plan and she agrees that if she experience unsafe thoughts that she will reach out to her supports including this office, the suicide hotline, and emergency services if necessary. Zulma is aware of non-emergent and emergent mental health resources. They are able to contract for their own safety at this time.    Will follow up in 2 months. Patient is aware to call the office if questions or concerns arise sooner.      Diagnoses and all orders for this visit:    Moderate episode of recurrent major depressive disorder (HCC)  -     buPROPion (WELLBUTRIN XL) 150 mg 24 hr tablet; Take 1 tablet (150 mg total) by mouth daily  -     busPIRone (BUSPAR) 5 mg tablet; Take 1 tablet (5 mg total) by mouth 3 (three) times a day  -     escitalopram (LEXAPRO) 20 mg tablet; Take 1 tablet (20 mg total) by mouth daily    Generalized anxiety disorder  -     buPROPion (WELLBUTRIN XL) 150 mg 24 hr tablet; Take 1 tablet (150 mg total) by mouth daily  -     busPIRone (BUSPAR) 5 mg tablet; Take 1 tablet (5 mg total) by mouth 3 (three) times a day  -     escitalopram (LEXAPRO) 20 mg tablet; Take 1 tablet (20 mg total) by mouth daily    Primary insomnia  -     traZODone (DESYREL) 150 mg tablet; Take 0.5 tablets (75 mg total) by mouth daily at bedtime        Treatment Recommendations/Precautions:    Continue current medications:     Wellbutrin 150 mg daily for depressive symptoms  BuSpar 5 mg 3 times daily for anxiety  Lexapro 20 mg daily for depressive symptoms  Trazodone 75 mg nightly for insomnia    Medication management every 2 months  Continue psychotherapy with [facility] therapist Vannesa Stone, " LCSW  Aware of 24 hour and weekend coverage for urgent situations accessed by calling [facility] main practice number  I am scheduling this patient out for greater than 3 months: No    Medications Risks/Benefits      Risks, Benefits And Possible Side Effects Of Medications:    Risks, benefits, and possible side effects of medications explained to Zumla and she verbalizes understanding and agreement for treatment.    Controlled Medication Discussion:     Not applicable    Psychotherapy Provided:     Individual psychotherapy provided: No    Treatment Plan:    Completed and signed during the session: Not applicable - Treatment Plan to be completed by [facility] therapist    Visit Time    Visit Start Time:  2:00 PM  Visit End Time:  2:20 PM  Total Visit Duration:  20 minutes    Alis Kearns 07/11/24

## 2024-07-15 ENCOUNTER — SOCIAL WORK (OUTPATIENT)
Dept: BEHAVIORAL/MENTAL HEALTH CLINIC | Facility: CLINIC | Age: 74
End: 2024-07-15
Payer: MEDICARE

## 2024-07-15 DIAGNOSIS — F33.41 RECURRENT MAJOR DEPRESSIVE DISORDER, IN PARTIAL REMISSION (HCC): Primary | ICD-10-CM

## 2024-07-15 PROCEDURE — 90837 PSYTX W PT 60 MINUTES: CPT | Performed by: SOCIAL WORKER

## 2024-07-15 NOTE — PSYCH
Behavioral Health Psychotherapy Progress Note    Psychotherapy Provided: Individual Psychotherapy     1. Recurrent major depressive disorder, in partial remission (HCC)            Goals addressed in session: Goal 1     DATA: Zulma shared feeling ok stating that she was just recently in the hospital for exacerbation of old ulcers. She reported taking a lot of Motrin to help alleviate sinus headaches. Her sons Ponce and Rhett were supportive of her. She has a follow up endoscopy, to which writer suggested that someone go with her to help act as a cognitive support. She said that her  will go with her. Despite her recent stressors she is feeling well and grateful for what she has. She and Yin are getting along well too which is a big relief. No new symptoms of safety concerns. Continues to work on expanding black and white self image. Return in 2 weeks or sooner if needed.   During this session, this clinician used the following therapeutic modalities: Cognitive Behavioral Therapy and Cognitive Processing Therapy    Substance Abuse was not addressed during this session. If the client is diagnosed with a co-occurring substance use disorder, please indicate any changes in the frequency or amount of use: NA. Stage of change for addressing substance use diagnoses: No substance use/Not applicable    ASSESSMENT:  Zulma Marcelo presents with a Euthymic/ normal mood.     her affect is Normal range and intensity, which is congruent, with her mood and the content of the session. The client has made progress on their goals.    During this session the client was oriented to person, place, and time. The client was engaged in the session. The client was able to sustain direct eye contact and was without psychomotor agitation. The client's thought processes were linear and clear.   Zulma Marcelo presents with a none risk of suicide, none risk of self-harm, and none risk of harm to others.    For any risk assessment  "that surpasses a \"low\" rating, a safety plan must be developed.    A safety plan was indicated: no  If yes, describe in detail NA    PLAN: Between sessions, Zulma Marcelo will take medication as prescribed and practice positive coping strategies as needed . At the next session, the therapist will use Cognitive Behavioral Therapy and Cognitive Processing Therapy to address stressors.    Behavioral Health Treatment Plan and Discharge Planning: Zulma Marcelo is aware of and agrees to continue to work on their treatment plan. They have identified and are working toward their discharge goals. yes    Visit start and stop times:    07/15/24  Start Time: 1000  Stop Time: 1100  Total Visit Time: 60 minutes  "

## 2024-07-22 ENCOUNTER — ANESTHESIA EVENT (OUTPATIENT)
Dept: ANESTHESIOLOGY | Facility: HOSPITAL | Age: 74
End: 2024-07-22

## 2024-07-22 ENCOUNTER — ANESTHESIA (OUTPATIENT)
Dept: ANESTHESIOLOGY | Facility: HOSPITAL | Age: 74
End: 2024-07-22

## 2024-07-29 ENCOUNTER — SOCIAL WORK (OUTPATIENT)
Dept: BEHAVIORAL/MENTAL HEALTH CLINIC | Facility: CLINIC | Age: 74
End: 2024-07-29
Payer: MEDICARE

## 2024-07-29 DIAGNOSIS — F33.41 RECURRENT MAJOR DEPRESSIVE DISORDER, IN PARTIAL REMISSION (HCC): Primary | ICD-10-CM

## 2024-07-29 PROCEDURE — 90834 PSYTX W PT 45 MINUTES: CPT | Performed by: SOCIAL WORKER

## 2024-07-29 NOTE — PSYCH
"Behavioral Health Psychotherapy Progress Note    Psychotherapy Provided: Individual Psychotherapy     1. Recurrent major depressive disorder, in partial remission (HCC)            Goals addressed in session: Goal 1     DATA: Zulma reports feeling well stating that she is working on decluttering and focusing her attention on what she can control. She has been struggling with chronic pain related to her stomach/ulcers and is anxiously awaiting having an endoscopy. She is otherwise feeling ok and getting along well with her children. Her great grand daughter will be visiting next month which she is looking forward to. She continues to take her medication as prescribed. No new safety concerns or worsening symptoms. Return in 2 weeks or sooner if needed.   During this session, this clinician used the following therapeutic modalities: Cognitive Behavioral Therapy and Cognitive Processing Therapy    Substance Abuse was not addressed during this session. If the client is diagnosed with a co-occurring substance use disorder, please indicate any changes in the frequency or amount of use: NA. Stage of change for addressing substance use diagnoses: No substance use/Not applicable    ASSESSMENT:  Zulma Marcelo presents with a Euthymic/ normal mood.     her affect is Normal range and intensity, which is congruent, with her mood and the content of the session. The client has made progress on their goals.    During this session the client was oriented to person, place, and time. The client was engaged in the session. The client was able to sustain direct eye contact and was without psychomotor agitation. The client's thought processes were linear and clear.   Zulma Marcelo presents with a none risk of suicide, none risk of self-harm, and none risk of harm to others.    For any risk assessment that surpasses a \"low\" rating, a safety plan must be developed.    A safety plan was indicated: no  If yes, describe in detail NA    PLAN: " Between sessions, Zulma Marcelo will take medication as prescribed and practice positive coping strategies as needed . At the next session, the therapist will use Cognitive Behavioral Therapy and Cognitive Processing Therapy to address stressors.    Behavioral Health Treatment Plan and Discharge Planning: Zulma Marcelo is aware of and agrees to continue to work on their treatment plan. They have identified and are working toward their discharge goals. yes    Visit start and stop times:    07/29/24  Start Time: 1000  Stop Time: 1050  Total Visit Time: 50 minutes

## 2024-07-31 ENCOUNTER — RA CDI HCC (OUTPATIENT)
Dept: OTHER | Facility: HOSPITAL | Age: 74
End: 2024-07-31

## 2024-08-05 ENCOUNTER — TELEPHONE (OUTPATIENT)
Dept: GASTROENTEROLOGY | Facility: AMBULARY SURGERY CENTER | Age: 74
End: 2024-08-05

## 2024-08-05 RX ORDER — SODIUM CHLORIDE, SODIUM LACTATE, POTASSIUM CHLORIDE, CALCIUM CHLORIDE 600; 310; 30; 20 MG/100ML; MG/100ML; MG/100ML; MG/100ML
75 INJECTION, SOLUTION INTRAVENOUS CONTINUOUS
Status: CANCELLED | OUTPATIENT
Start: 2024-08-05

## 2024-08-06 ENCOUNTER — ANESTHESIA (OUTPATIENT)
Dept: GASTROENTEROLOGY | Facility: AMBULARY SURGERY CENTER | Age: 74
End: 2024-08-06

## 2024-08-06 ENCOUNTER — HOSPITAL ENCOUNTER (OUTPATIENT)
Dept: GASTROENTEROLOGY | Facility: AMBULARY SURGERY CENTER | Age: 74
Setting detail: OUTPATIENT SURGERY
Discharge: HOME | End: 2024-08-06
Attending: INTERNAL MEDICINE
Payer: MEDICARE

## 2024-08-06 ENCOUNTER — ANESTHESIA EVENT (OUTPATIENT)
Dept: GASTROENTEROLOGY | Facility: AMBULARY SURGERY CENTER | Age: 74
End: 2024-08-06

## 2024-08-06 VITALS
DIASTOLIC BLOOD PRESSURE: 70 MMHG | TEMPERATURE: 97.1 F | RESPIRATION RATE: 18 BRPM | SYSTOLIC BLOOD PRESSURE: 162 MMHG | HEART RATE: 53 BPM | OXYGEN SATURATION: 95 %

## 2024-08-06 DIAGNOSIS — K28.9 MARGINAL ULCER: ICD-10-CM

## 2024-08-06 DIAGNOSIS — R10.13 EPIGASTRIC PAIN: ICD-10-CM

## 2024-08-06 DIAGNOSIS — R11.2 NAUSEA AND VOMITING, UNSPECIFIED VOMITING TYPE: ICD-10-CM

## 2024-08-06 PROCEDURE — 43239 EGD BIOPSY SINGLE/MULTIPLE: CPT | Performed by: INTERNAL MEDICINE

## 2024-08-06 PROCEDURE — 88305 TISSUE EXAM BY PATHOLOGIST: CPT | Performed by: PATHOLOGY

## 2024-08-06 RX ORDER — SODIUM CHLORIDE, SODIUM LACTATE, POTASSIUM CHLORIDE, CALCIUM CHLORIDE 600; 310; 30; 20 MG/100ML; MG/100ML; MG/100ML; MG/100ML
75 INJECTION, SOLUTION INTRAVENOUS CONTINUOUS
Status: DISCONTINUED | OUTPATIENT
Start: 2024-08-06 | End: 2024-08-10 | Stop reason: HOSPADM

## 2024-08-06 RX ORDER — PROPOFOL 10 MG/ML
INJECTION, EMULSION INTRAVENOUS AS NEEDED
Status: DISCONTINUED | OUTPATIENT
Start: 2024-08-06 | End: 2024-08-06

## 2024-08-06 RX ORDER — LIDOCAINE HYDROCHLORIDE 10 MG/ML
INJECTION, SOLUTION EPIDURAL; INFILTRATION; INTRACAUDAL; PERINEURAL AS NEEDED
Status: DISCONTINUED | OUTPATIENT
Start: 2024-08-06 | End: 2024-08-06

## 2024-08-06 RX ORDER — SODIUM CHLORIDE, SODIUM LACTATE, POTASSIUM CHLORIDE, CALCIUM CHLORIDE 600; 310; 30; 20 MG/100ML; MG/100ML; MG/100ML; MG/100ML
INJECTION, SOLUTION INTRAVENOUS CONTINUOUS PRN
Status: DISCONTINUED | OUTPATIENT
Start: 2024-08-06 | End: 2024-08-06

## 2024-08-06 RX ADMIN — PROPOFOL 70 MG: 10 INJECTION, EMULSION INTRAVENOUS at 15:10

## 2024-08-06 RX ADMIN — SODIUM CHLORIDE, SODIUM LACTATE, POTASSIUM CHLORIDE, AND CALCIUM CHLORIDE: .6; .31; .03; .02 INJECTION, SOLUTION INTRAVENOUS at 15:02

## 2024-08-06 RX ADMIN — SODIUM CHLORIDE, SODIUM LACTATE, POTASSIUM CHLORIDE, AND CALCIUM CHLORIDE 75 ML/HR: .6; .31; .03; .02 INJECTION, SOLUTION INTRAVENOUS at 14:45

## 2024-08-06 RX ADMIN — LIDOCAINE HYDROCHLORIDE 50 MG: 10 INJECTION, SOLUTION EPIDURAL; INFILTRATION; INTRACAUDAL; PERINEURAL at 15:10

## 2024-08-06 RX ADMIN — PROPOFOL 30 MG: 10 INJECTION, EMULSION INTRAVENOUS at 15:19

## 2024-08-06 RX ADMIN — PROPOFOL 30 MG: 10 INJECTION, EMULSION INTRAVENOUS at 15:14

## 2024-08-06 NOTE — ANESTHESIA PREPROCEDURE EVALUATION
Procedure:  EGD    Relevant Problems   CARDIO   (+) Aortic valve stenosis   (+) Ascending aorta dilatation (HCC)   (+) Essential hypertension   (+) Mixed hyperlipidemia   (+) SVT (supraventricular tachycardia)      GI/HEPATIC   (+) Chronic GERD      MUSCULOSKELETAL  Hardware in both ankles   (+) Chronic gout   (+) Chronic idiopathic gout of left foot   (+) Primary osteoarthritis      NEURO/PSYCH   (+) Generalized anxiety disorder   (+) Severe episode of recurrent major depressive disorder, without psychotic features (HCC)      PULMONARY   (+) Asthma   (+) Chronic obstructive pulmonary disease with acute exacerbation (HCC)   (+) Simple chronic bronchitis (HCC)   (+) Sleep apnea        Physical Exam    Airway    Mallampati score: III  TM Distance: >3 FB  Neck ROM: full     Dental        Cardiovascular  Rhythm: regular, Rate: normal    Pulmonary   Breath sounds clear to auscultation    Other Findings  post-pubertal.      Anesthesia Plan  ASA Score- 3     Anesthesia Type- IV sedation with anesthesia with ASA Monitors.         Additional Monitors:     Airway Plan:            Plan Factors-    Chart reviewed.        Patient is not a current smoker.              Induction- intravenous.    Postoperative Plan-     Perioperative Resuscitation Plan -   The DNR status was discussed with the patient and/or POA, and Level 3 code status (DNR/DNI) will be maintained throughout the perioperative period.     Informed Consent- Anesthetic plan and risks discussed with patient.  I personally reviewed this patient with the CRNA. Discussed and agreed on the Anesthesia Plan with the CRNA..

## 2024-08-06 NOTE — H&P
History and Physical -  [facility]  Zulma Marcelo 74 y.o. female MRN: [id number]                  HPI: Zulma Marcelo is a 74 y.o. year old female who presents for pain/nausea/vomiting.      REVIEW OF SYSTEMS: Per the HPI, and otherwise unremarkable.    Historical Information   Past Medical History:   Diagnosis Date    Acid reflux     Anxiety     Arthritis     Asthma     Cardiac disease     Chronic kidney disease     passed on own kidney stone    Depression     Diverticulitis     GERD (gastroesophageal reflux disease)     Hayfever     [place] (hard of hearing)     bilateral hearing aids    Hyperlipemia     Hypertension     Panic attack     Tachycardia      Past Surgical History:   Procedure Laterality Date    ABLATION OF DYSRHYTHMIC FOCUS      APPENDECTOMY      CHOLECYSTECTOMY      open    FRACTURE SURGERY      ORIF fx (L) tibia, fibula     GASTRIC BYPASS OPEN      HYSTERECTOMY      VT XCAPSL CTRC RMVL INSJ IO LENS PROSTH W/O ECP Left 2016    Procedure: EXTRACTION EXTRACAPSULAR CATARACT PHACO INTRAOCULAR LENS (IOL);  Surgeon: Dane Wells MD;  Location: [facility] MAIN OR;  Service: Ophthalmology    VT XCAPSL CTRC RMVL INSJ IO LENS PROSTH W/O ECP Right 3/1/2021    Procedure: EXTRACTION EXTRACAPSULAR CATARACT PHACO INTRAOCULAR LENS (IOL);  Surgeon: Dane Wells MD;  Location: [facility] MAIN OR;  Service: Ophthalmology    TONSILECTOMY AND ADNOIDECTOMY       Social History   Social History     Substance and Sexual Activity   Alcohol Use Yes    Comment: rarely     Social History     Substance and Sexual Activity   Drug Use No     Social History     Tobacco Use   Smoking Status Former    Current packs/day: 0.00    Average packs/day: 1 pack/day for 67.1 years (67.1 ttl pk-yrs)    Types: Cigarettes    Start date: 6/15/1956    Quit date: 2023    Years since quittin.0    Passive exposure: Past   Smokeless Tobacco Never     Family History   Problem Relation Age of Onset    Heart disease Mother   "   Stroke Son     Stroke Maternal Grandfather     Breast cancer Daughter 40       Meds/Allergies       Current Outpatient Medications:     albuterol (PROVENTIL HFA,VENTOLIN HFA) 90 mcg/act inhaler    allopurinol (ZYLOPRIM) 100 mg tablet    aspirin 81 mg chewable tablet    buPROPion (WELLBUTRIN XL) 150 mg 24 hr tablet    busPIRone (BUSPAR) 5 mg tablet    dextromethorphan-guaifenesin (MUCINEX DM)  MG per 12 hr tablet    donepezil (ARICEPT) 10 mg tablet    escitalopram (LEXAPRO) 20 mg tablet    fluticasone (FLONASE) 50 mcg/act nasal spray    Ketotifen Fumarate (ZADITOR) 0.035 % ophthalmic solution    nebivolol (BYSTOLIC) 5 mg tablet    ondansetron (ZOFRAN-ODT) 4 mg disintegrating tablet    pantoprazole (PROTONIX) 40 mg tablet    sucralfate (CARAFATE) 1 g tablet    traZODone (DESYREL) 150 mg tablet    Vitamin D, Ergocalciferol, 50 MCG (2000 UT) CAPS    Allergies   Allergen Reactions    Augmentin [Amoxicillin-Pot Clavulanate] GI Intolerance, Other (See Comments) and Hives     VOMITING  VOMITING  Reaction Date: [id number];     Sulfa Antibiotics Itching, Other (See Comments) and Hives     RASH, ITCHY  RASH, ITCHY    Morphine Other (See Comments)     \"DOESN'T WORK\"    Latex Rash     Unsure if this is an allergy       Objective     /69   Pulse (!) 52   Temp (!) 97.1 °F (36.2 °C) (Tympanic)   Resp 18   LMP  (LMP Unknown)   SpO2 97%       PHYSICAL EXAM    Gen: NAD  Head: NCAT  CV: RRR  CHEST: Clear  ABD: soft, NT/ND  EXT: no edema      ASSESSMENT/PLAN:  This is a 74 y.o. year old female here for EGD, and she is stable and optimized for her procedure.        " Adjacent Tissue Transfer Text: The defect edges were debeveled with a #15 scalpel blade.  Given the location of the defect and the proximity to free margins an adjacent tissue transfer was deemed most appropriate.  Using a sterile surgical marker, an appropriate flap was drawn incorporating the defect and placing the expected incisions within the relaxed skin tension lines where possible.    The area thus outlined was incised deep to adipose tissue with a #15 scalpel blade.  The skin margins were undermined to an appropriate distance in all directions utilizing iris scissors.

## 2024-08-06 NOTE — ANESTHESIA POSTPROCEDURE EVALUATION
Post-Op Assessment Note    CV Status:  Stable  Pain Score: 0    Pain management: adequate       Mental Status:  Awake   Hydration Status:  Stable   PONV Controlled:  None   Airway Patency:  Patent     Post Op Vitals Reviewed: Yes    No anethesia notable event occurred.    Staff: CRNA               /56 (08/06/24 1524)    Temp      Pulse 58 (08/06/24 1524)   Resp 18 (08/06/24 1524)    SpO2 95 % (08/06/24 1524)

## 2024-08-07 ENCOUNTER — TELEPHONE (OUTPATIENT)
Dept: OBGYN CLINIC | Facility: HOSPITAL | Age: 74
End: 2024-08-07

## 2024-08-07 ENCOUNTER — OFFICE VISIT (OUTPATIENT)
Dept: FAMILY MEDICINE CLINIC | Facility: CLINIC | Age: 74
End: 2024-08-07
Payer: MEDICARE

## 2024-08-07 VITALS
SYSTOLIC BLOOD PRESSURE: 138 MMHG | RESPIRATION RATE: 16 BRPM | BODY MASS INDEX: 28.9 KG/M2 | HEIGHT: 60 IN | HEART RATE: 54 BPM | DIASTOLIC BLOOD PRESSURE: 80 MMHG | WEIGHT: 147.2 LBS | TEMPERATURE: 98.3 F

## 2024-08-07 DIAGNOSIS — R41.89 COGNITIVE IMPAIRMENT: ICD-10-CM

## 2024-08-07 DIAGNOSIS — G89.29 CHRONIC PAIN OF RIGHT KNEE: ICD-10-CM

## 2024-08-07 DIAGNOSIS — F33.2 SEVERE EPISODE OF RECURRENT MAJOR DEPRESSIVE DISORDER, WITHOUT PSYCHOTIC FEATURES (HCC): Chronic | ICD-10-CM

## 2024-08-07 DIAGNOSIS — Z72.0 TOBACCO ABUSE: ICD-10-CM

## 2024-08-07 DIAGNOSIS — Z87.891 PERSONAL HISTORY OF NICOTINE DEPENDENCE: ICD-10-CM

## 2024-08-07 DIAGNOSIS — M77.32 CALCANEAL SPUR OF LEFT FOOT: ICD-10-CM

## 2024-08-07 DIAGNOSIS — M25.561 CHRONIC PAIN OF RIGHT KNEE: ICD-10-CM

## 2024-08-07 DIAGNOSIS — I10 ESSENTIAL HYPERTENSION: ICD-10-CM

## 2024-08-07 DIAGNOSIS — J44.1 CHRONIC OBSTRUCTIVE PULMONARY DISEASE WITH ACUTE EXACERBATION (HCC): Primary | ICD-10-CM

## 2024-08-07 PROBLEM — Z00.8 MEDICAL CLEARANCE FOR PSYCHIATRIC ADMISSION: Status: RESOLVED | Noted: 2023-08-04 | Resolved: 2024-08-07

## 2024-08-07 PROCEDURE — G2211 COMPLEX E/M VISIT ADD ON: HCPCS | Performed by: INTERNAL MEDICINE

## 2024-08-07 PROCEDURE — 99214 OFFICE O/P EST MOD 30 MIN: CPT | Performed by: INTERNAL MEDICINE

## 2024-08-07 RX ORDER — DONEPEZIL HYDROCHLORIDE 10 MG/1
10 TABLET, FILM COATED ORAL
Qty: 90 TABLET | Refills: 3 | Status: SHIPPED | OUTPATIENT
Start: 2024-08-07 | End: 2025-02-03

## 2024-08-07 NOTE — ASSESSMENT & PLAN NOTE
She continues to follow with her psychiatrist and feels her depression control is adequate at present.  Asked patient to call sooner than next scheduled appointment for any complaints or issues.

## 2024-08-07 NOTE — PROGRESS NOTES
Ambulatory Visit  Name: Zulma Marcelo      : 1950      MRN: [id number]  Encounter Provider: Lydia Cruz MD  Encounter Date: 2024   Encounter department: [facility]    Assessment & Plan   1. Chronic obstructive pulmonary disease with acute exacerbation (HCC)  Assessment & Plan:  She feels this is well controlled, continue proventil as needed.   2. Severe episode of recurrent major depressive disorder, without psychotic features (HCC)  Assessment & Plan:  She continues to follow with her psychiatrist and feels her depression control is adequate at present.  Asked patient to call sooner than next scheduled appointment for any complaints or issues.    3. Essential hypertension  Assessment & Plan:  Controlled on current medications and will continue as ordered.   4. Chronic pain of right knee  -     Diclofenac Sodium (VOLTAREN) 1 %; Apply 2 g topically 4 (four) times a day  -     Ambulatory Referral to Orthopedic Surgery; Future  5. Tobacco abuse  -     CT lung screening program; Future; Expected date: 2024  6. Personal history of nicotine dependence  -     CT lung screening program; Future; Expected date: 2024  7. Cognitive impairment  Assessment & Plan:  Stable, continue donepezil.  Orders:  -     donepezil (ARICEPT) 10 mg tablet; Take 1 tablet (10 mg total) by mouth daily at bedtime  -     donepezil (ARICEPT) 10 mg tablet; Take 1 tablet (10 mg total) by mouth daily at bedtime  8. Calcaneal spur of left foot  -     Ambulatory Referral to Podiatry; Future       History of Present Illness     Here for follow up visit.  Has not been having a good summer.  Has been having abdominal pain.  Yesterday has an EGD and was found to have a marginal ulcer. Is not on carafate.  Her right knee has been bothering her, she is taking tylenol and using voltaren gel. Would like a referral to ortho.  She has a L heel spur and feels this is exacerbating her knee.  Continues to follow up with  psychiatry and feels her mood is doing okay.  Denies SI/HI.         Review of Systems   Constitutional: Negative.    Respiratory: Negative.  Negative for shortness of breath and wheezing.    Cardiovascular: Negative.    Psychiatric/Behavioral: Negative.         Objective     /80   Pulse (!) 54   Temp 98.3 °F (36.8 °C)   Resp 16   Ht 5' (1.524 m)   Wt 66.8 kg (147 lb 3.2 oz)   LMP  (LMP Unknown)   BMI 28.75 kg/m²     Physical Exam  Constitutional:       Appearance: Normal appearance.   HENT:      Head: Normocephalic and atraumatic.   Eyes:      Pupils: Pupils are equal, round, and reactive to light.   Cardiovascular:      Rate and Rhythm: Normal rate and regular rhythm.      Heart sounds: No murmur heard.     No friction rub. No gallop.   Pulmonary:      Effort: Pulmonary effort is normal.      Breath sounds: Decreased breath sounds present. No wheezing or rales.   Abdominal:      General: Abdomen is flat. Bowel sounds are normal.      Palpations: Abdomen is soft.      Tenderness: There is no abdominal tenderness.   Musculoskeletal:         General: No swelling.   Neurological:      Mental Status: She is alert.   Psychiatric:         Mood and Affect: Mood normal.         Behavior: Behavior normal.         Thought Content: Thought content normal.         Judgment: Judgment normal.       Administrative Statements       I discussed with her that she is a candidate for lung cancer CT screening.     The following Shared Decision-Making points were covered:  Benefits of screening were discussed, including the rates of reduction in death from lung cancer and other causes.  Harms of screening were reviewed, including false positive tests, radiation exposure levels, risks of invasive procedures, risks of complications of screening, and risk of overdiagnosis.  I counseled on the importance of adherence to annual lung cancer LDCT screening, impact of co-morbidities, and ability or willingness to undergo diagnosis  and treatment.  I counseled on the importance of maintaining abstinence as a former smoker or was counseled on the importance of smoking cessation if a current smoker    Review of Eligibility Criteria: She meets all of the criteria for Lung Cancer Screening.   She is 74 y.o.   She has 20 pack year tobacco history and is a current smoker or has quit within the past 15 years  She presents no signs or symptoms of lung cancer    After discussion, the patient decided to elect lung cancer screening.

## 2024-08-09 PROCEDURE — 88305 TISSUE EXAM BY PATHOLOGIST: CPT | Performed by: PATHOLOGY

## 2024-08-12 ENCOUNTER — SOCIAL WORK (OUTPATIENT)
Dept: BEHAVIORAL/MENTAL HEALTH CLINIC | Facility: CLINIC | Age: 74
End: 2024-08-12
Payer: MEDICARE

## 2024-08-12 DIAGNOSIS — F33.41 RECURRENT MAJOR DEPRESSIVE DISORDER, IN PARTIAL REMISSION (HCC): Primary | ICD-10-CM

## 2024-08-12 PROCEDURE — 90834 PSYTX W PT 45 MINUTES: CPT | Performed by: SOCIAL WORKER

## 2024-08-12 NOTE — PSYCH
Behavioral Health Psychotherapy Progress Note    Psychotherapy Provided: Individual Psychotherapy     1. Recurrent major depressive disorder, in partial remission (HCC)            Goals addressed in session: Goal 1     DATA: Zulma reports feeling ok but bothered stating that she has had a rough couple of weeks. She shared having an endoscopy to assess current ulcers which turned out to be ok but felt that she was treated rudely by a nurse who was bothered by her ride arriving a few minutes late. She shared feeling demoralized and humiliated because she is struggling already with having to be reliant on others. We talked about her discouragement with having to keep up with several losses and ongoing medical problems. Focused on how she responds to this and leaning into reliant friends, family, and associates like her Lidia Sinclair . Assisted her with navigating her phone and talked about her granddaughter coming to visit this week.   During this session, this clinician used the following therapeutic modalities: Cognitive Behavioral Therapy, Cognitive Processing Therapy, and Supportive Psychotherapy    Substance Abuse was not addressed during this session. If the client is diagnosed with a co-occurring substance use disorder, please indicate any changes in the frequency or amount of use: NA. Stage of change for addressing substance use diagnoses: No substance use/Not applicable    ASSESSMENT:  Zulma Marcelo presents with a Euthymic/ normal mood.     her affect is Normal range and intensity, which is congruent, with her mood and the content of the session. The client has made progress on their goals.    During this session the client was oriented to person, place, and time. The client was engaged in the session. The client was able to sustain direct eye contact and was without psychomotor agitation. The client's thought processes were linear and clear.   Zulma Marcelo presents with a none risk of suicide, none  "risk of self-harm, and none risk of harm to others.    For any risk assessment that surpasses a \"low\" rating, a safety plan must be developed.    A safety plan was indicated: no  If yes, describe in detail NA    PLAN: Between sessions, Zulma Marcelo will take medication as prescribed and practice positive coping strategies as needed . At the next session, the therapist will use Cognitive Behavioral Therapy, Cognitive Processing Therapy, and Supportive Psychotherapy to address stressors.    Behavioral Health Treatment Plan and Discharge Planning: Zulma Marcelo is aware of and agrees to continue to work on their treatment plan. Will review her treatment plan at the next session. They have identified and are working toward their discharge goals. yes    Visit start and stop times:    08/12/24  Start Time: 1000  Stop Time: 1050  Total Visit Time: 50 minutes  "

## 2024-08-16 DIAGNOSIS — I10 ESSENTIAL HYPERTENSION: ICD-10-CM

## 2024-08-16 RX ORDER — NEBIVOLOL 5 MG/1
5 TABLET ORAL DAILY
Qty: 90 TABLET | Refills: 1 | Status: SHIPPED | OUTPATIENT
Start: 2024-08-16 | End: 2025-02-12

## 2024-08-16 NOTE — TELEPHONE ENCOUNTER
Reason for call:   [x] Refill   [] Prior Auth  [] Other:     Office:   [x] PCP/Provider - [facility]  [] Specialty/Provider -     Medication: nebivolol (BYSTOLIC) 5 mg tablet      Dose/Frequency: 5 mg, Daily     Quantity: 100    Pharmacy: [facility]    Does the patient have enough for 3 days?   [x] Yes   [] No - Send as HP to POD

## 2024-08-21 ENCOUNTER — OFFICE VISIT (OUTPATIENT)
Dept: OBGYN CLINIC | Facility: CLINIC | Age: 74
End: 2024-08-21
Payer: MEDICARE

## 2024-08-21 ENCOUNTER — APPOINTMENT (OUTPATIENT)
Dept: RADIOLOGY | Facility: CLINIC | Age: 74
End: 2024-08-21
Payer: MEDICARE

## 2024-08-21 VITALS
BODY MASS INDEX: 29.06 KG/M2 | WEIGHT: 148 LBS | HEART RATE: 54 BPM | HEIGHT: 60 IN | SYSTOLIC BLOOD PRESSURE: 156 MMHG | DIASTOLIC BLOOD PRESSURE: 68 MMHG

## 2024-08-21 DIAGNOSIS — M25.561 CHRONIC PAIN OF RIGHT KNEE: ICD-10-CM

## 2024-08-21 DIAGNOSIS — M17.0 PRIMARY OSTEOARTHRITIS OF BOTH KNEES: Primary | ICD-10-CM

## 2024-08-21 DIAGNOSIS — G89.29 CHRONIC PAIN OF RIGHT KNEE: ICD-10-CM

## 2024-08-21 PROCEDURE — 73560 X-RAY EXAM OF KNEE 1 OR 2: CPT

## 2024-08-21 PROCEDURE — 99203 OFFICE O/P NEW LOW 30 MIN: CPT | Performed by: ORTHOPAEDIC SURGERY

## 2024-08-21 PROCEDURE — 20610 DRAIN/INJ JOINT/BURSA W/O US: CPT | Performed by: ORTHOPAEDIC SURGERY

## 2024-08-21 PROCEDURE — 73562 X-RAY EXAM OF KNEE 3: CPT

## 2024-08-21 RX ORDER — TRIAMCINOLONE ACETONIDE 40 MG/ML
80 INJECTION, SUSPENSION INTRA-ARTICULAR; INTRAMUSCULAR
Status: COMPLETED | OUTPATIENT
Start: 2024-08-21 | End: 2024-08-21

## 2024-08-21 RX ORDER — ROPIVACAINE HYDROCHLORIDE 2 MG/ML
2 INJECTION, SOLUTION EPIDURAL; INFILTRATION; PERINEURAL
Status: COMPLETED | OUTPATIENT
Start: 2024-08-21 | End: 2024-08-21

## 2024-08-21 RX ADMIN — ROPIVACAINE HYDROCHLORIDE 2 ML: 2 INJECTION, SOLUTION EPIDURAL; INFILTRATION; PERINEURAL at 10:15

## 2024-08-21 RX ADMIN — TRIAMCINOLONE ACETONIDE 80 MG: 40 INJECTION, SUSPENSION INTRA-ARTICULAR; INTRAMUSCULAR at 10:15

## 2024-08-21 NOTE — PROGRESS NOTES
Assessment/Plan:  1. Primary osteoarthritis of both knees        2. Chronic pain of right knee  Ambulatory Referral to Orthopedic Surgery    XR knee 3 vw right non injury    XR knee 1 or 2 vw left        Scribe Attestation      I,:  Christ Solorzano am acting as a scribe while in the presence of the attending physician.:       I,:  Andrew Lehman, DO personally performed the services described in this documentation    as scribed in my presence.:           Zulma is a pleasant 74-year-old female presents today for initial evaluation of her right knee pain.  After reviewing her imaging and performing a thorough history and physical exam I explained that she is symptomatic of her end-stage underlying osteoarthritis.  I recommended initiating injection therapy for her right knee and she was amenable to this plan.  She consented to and underwent right knee injection as documented below without issue or complication and this was well-tolerated by the patient.  Postinjection instructions were provided.  She understands can be repeated no sooner than 3 months.  She also understands that we can initiate this treatment for her left knee when her pain increases in frequency and intensity.  She will continue using Tylenol and Voltaren for her left knee for now.  During the course of her visit today, she also offers complaints of bilateral hand pain.  We do have a new hand provider who will begin treating patients in September.  We will facilitate an appointment for her.  All of her questions and concerns were addressed today.  We will see her back as needed for her knees.  Large joint arthrocentesis: R knee  [facility] Protocol:  procedure performed by consultantConsent: Verbal consent obtained.  Risks and benefits: risks, benefits and alternatives were discussed  Consent given by: patient  Patient understanding: patient states understanding of the procedure being performed  Site marked: the operative site was marked  Patient  identity confirmed: verbally with patient  Supporting Documentation  Indications: pain   Procedure Details  Location: knee - R knee  Preparation: Patient was prepped and draped in the usual sterile fashion  Needle size: 20 G  Ultrasound guidance: no  Approach: anterolateral  Medications administered: 80 mg triamcinolone acetonide 40 mg/mL; 2 mL ropivacaine 0.2 %    Patient tolerance: patient tolerated the procedure well with no immediate complications  Dressing:  Sterile dressing applied        Subjective: Initial evaluation for right knee pain    Patient ID: Zulma Marcelo is a 74 y.o. female who presents today for initial evaluation of her right knee pain.  At today's visit, she reports that her right knee has been painful for the last few years.  She describes aching pain about the medial and anteromedial knee that can reach 7/10 on the pain scale with activity.  She has been using Tylenol and Voltaren gel for this which provides minimal benefit for her.  Occasionally, she experiences similar symptoms in her left knee.  She also utilizes a cane for ambulatory assistance.  She denies any recent injury or trauma.    Review of Systems   Constitutional:  Positive for activity change. Negative for chills, fever and unexpected weight change.   HENT:  Negative for hearing loss, nosebleeds and sore throat.    Eyes:  Negative for pain, redness and visual disturbance.   Respiratory:  Negative for cough, shortness of breath and wheezing.    Cardiovascular:  Negative for chest pain, palpitations and leg swelling.   Gastrointestinal:  Negative for abdominal pain, nausea and vomiting.   Endocrine: Negative for polydipsia and polyuria.   Genitourinary:  Negative for dysuria and hematuria.   Musculoskeletal:  Positive for arthralgias and myalgias. Negative for joint swelling.        See HPI   Skin:  Negative for rash and wound.   Neurological:  Negative for dizziness, numbness and headaches.   Psychiatric/Behavioral:  Negative  for decreased concentration and suicidal ideas. The patient is not nervous/anxious.          Past Medical History:   Diagnosis Date   • Acid reflux    • Anxiety    • Arthritis    • Asthma    • Cardiac disease    • Chronic kidney disease     passed on own kidney stone   • Depression    • Diverticulitis    • GERD (gastroesophageal reflux disease)    • Hayfever    • [place] (hard of hearing)     bilateral hearing aids   • Hyperlipemia    • Hypertension    • Panic attack    • Tachycardia        Past Surgical History:   Procedure Laterality Date   • ABLATION OF DYSRHYTHMIC FOCUS     • APPENDECTOMY     • CHOLECYSTECTOMY      open   • FRACTURE SURGERY      ORIF fx (L) tibia, fibula    • GASTRIC BYPASS OPEN     • HYSTERECTOMY     • RI XCAPSL CTRC RMVL INSJ IO LENS PROSTH W/O ECP Left 2016    Procedure: EXTRACTION EXTRACAPSULAR CATARACT PHACO INTRAOCULAR LENS (IOL);  Surgeon: Dane Wells MD;  Location: [facility] MAIN OR;  Service: Ophthalmology   • RI XCAPSL CTRC RMVL INSJ IO LENS PROSTH W/O ECP Right 3/1/2021    Procedure: EXTRACTION EXTRACAPSULAR CATARACT PHACO INTRAOCULAR LENS (IOL);  Surgeon: Dane Wells MD;  Location: [facility] MAIN OR;  Service: Ophthalmology   • TONSILECTOMY AND ADNOIDECTOMY         Family History   Problem Relation Age of Onset   • Heart disease Mother    • Stroke Son    • Stroke Maternal Grandfather    • Breast cancer Daughter 40       Social History     Occupational History   • Not on file   Tobacco Use   • Smoking status: Former     Current packs/day: 0.00     Average packs/day: 1 pack/day for 67.1 years (67.1 ttl pk-yrs)     Types: Cigarettes     Start date: 6/15/1956     Quit date: 2023     Years since quittin.0     Passive exposure: Past   • Smokeless tobacco: Never   Vaping Use   • Vaping status: Never Used   Substance and Sexual Activity   • Alcohol use: Yes     Comment: rarely   • Drug use: No   • Sexual activity: Never         Current Outpatient Medications:   •  albuterol  (PROVENTIL HFA,VENTOLIN HFA) 90 mcg/act inhaler, Inhale 2 puffs every 6 (six) hours as needed for wheezing, Disp: 20.4 g, Rfl: 3  •  buPROPion (WELLBUTRIN XL) 150 mg 24 hr tablet, Take 1 tablet (150 mg total) by mouth daily, Disp: 90 tablet, Rfl: 1  •  busPIRone (BUSPAR) 5 mg tablet, Take 1 tablet (5 mg total) by mouth 3 (three) times a day, Disp: 270 tablet, Rfl: 1  •  dextromethorphan-guaifenesin (MUCINEX DM)  MG per 12 hr tablet, Take 1 tablet by mouth every 12 (twelve) hours as needed for cough, Disp: 30 tablet, Rfl: 0  •  Diclofenac Sodium (VOLTAREN) 1 %, Apply 2 g topically 4 (four) times a day, Disp: 100 g, Rfl: 3  •  donepezil (ARICEPT) 10 mg tablet, Take 1 tablet (10 mg total) by mouth daily at bedtime, Disp: 90 tablet, Rfl: 3  •  escitalopram (LEXAPRO) 20 mg tablet, Take 1 tablet (20 mg total) by mouth daily, Disp: 90 tablet, Rfl: 1  •  fluticasone (FLONASE) 50 mcg/act nasal spray, 1 spray into each nostril 2 (two) times a day, Disp: 9.9 mL, Rfl: 1  •  nebivolol (BYSTOLIC) 5 mg tablet, Take 1 tablet (5 mg total) by mouth daily, Disp: 90 tablet, Rfl: 1  •  pantoprazole (PROTONIX) 40 mg tablet, Take 1 tablet (40 mg total) by mouth 2 (two) times a day, Disp: 60 tablet, Rfl: 0  •  sucralfate (CARAFATE) 1 g tablet, Take 1 tablet (1 g total) by mouth 4 (four) times a day (before meals and at bedtime), Disp: 120 tablet, Rfl: 3  •  traZODone (DESYREL) 150 mg tablet, Take 0.5 tablets (75 mg total) by mouth daily at bedtime, Disp: 45 tablet, Rfl: 1  •  Vitamin D, Ergocalciferol, 50 MCG (2000 UT) CAPS, Take 1 capsule by mouth in the morning, Disp: 100 capsule, Rfl: 3  •  allopurinol (ZYLOPRIM) 100 mg tablet, Take 1 tablet (100 mg total) by mouth daily, Disp: 90 tablet, Rfl: 1  •  aspirin 81 mg chewable tablet, Chew 1 tablet (81 mg total) daily, Disp: 90 tablet, Rfl: 1  •  donepezil (ARICEPT) 10 mg tablet, Take 1 tablet (10 mg total) by mouth daily at bedtime (Patient not taking: Reported on 8/21/2024), Disp: 90  "tablet, Rfl: 3  •  Ketotifen Fumarate (ZADITOR) 0.035 % ophthalmic solution, Administer 1 drop to both eyes 2 (two) times a day, Disp: 3 mL, Rfl: 1  •  ondansetron (ZOFRAN-ODT) 4 mg disintegrating tablet, Take 1 tablet (4 mg total) by mouth every 6 (six) hours as needed for nausea for up to 3 days, Disp: 9 tablet, Rfl: 0    Allergies   Allergen Reactions   • Augmentin [Amoxicillin-Pot Clavulanate] GI Intolerance, Other (See Comments) and Hives     VOMITING  VOMITING  Reaction Date: 07Dec2004;    • Sulfa Antibiotics Itching, Other (See Comments) and Hives     RASH, ITCHY  RASH, ITCHY   • Morphine Other (See Comments)     \"DOESN'T WORK\"   • Latex Rash     Unsure if this is an allergy       Objective:  Vitals:    08/21/24 1014   BP: 156/68   Pulse: (!) 54       Body mass index is 28.9 kg/m².    Right Knee Exam     Tenderness   The patient is experiencing tenderness in the medial joint line.    Range of Motion   Extension:  0   Flexion:  120     Tests   Varus: negative Valgus: negative  Drawer:  Anterior - negative    Posterior - negative    Other   Erythema: absent  Scars: absent  Sensation: normal  Pulse: present  Swelling: none  Effusion: no effusion present    Comments:  Stable at 0, 30 and 90 degrees  Neurovascularly in tact distally  No warmth or erythema  Parapatellar crepitance noted  Patellofemoral grind: positive      Left Knee Exam     Tenderness   The patient is experiencing tenderness in the medial joint line.    Range of Motion   Extension:  0   Flexion:  120     Tests   Varus: negative Valgus: negative  Drawer:  Anterior - negative     Posterior - negative    Other   Erythema: absent  Scars: absent  Sensation: normal  Pulse: present  Swelling: none  Effusion: no effusion present    Comments:  Stable at 0, 30 and 90 degrees  Neurovascularly in tact distally  No warmth or erythema  Parapatellar crepitance noted  Patellofemoral grind: positive          Observations   Left Knee   Negative for effusion. "     Right Knee   Negative for effusion.       Physical Exam  Vitals and nursing note reviewed.   Constitutional:       Appearance: Normal appearance. She is well-developed.   HENT:      Head: Normocephalic and atraumatic.      Right Ear: External ear normal.      Left Ear: External ear normal.   Eyes:      General: No scleral icterus.     Extraocular Movements: Extraocular movements intact.      Conjunctiva/sclera: Conjunctivae normal.   Cardiovascular:      Rate and Rhythm: Normal rate.   Pulmonary:      Effort: Pulmonary effort is normal. No respiratory distress.   Musculoskeletal:      Cervical back: Normal range of motion and neck supple.      Right knee: No effusion.      Left knee: No effusion.      Comments: See Ortho exam   Skin:     General: Skin is warm and dry.   Neurological:      General: No focal deficit present.      Mental Status: She is alert and oriented to person, place, and time.   Psychiatric:         Behavior: Behavior normal.       I have personally reviewed pertinent films in PACS.    X-ray of the right knee obtained on 8/21/2024 reviewed demonstrating end-stage degenerative change in a varus pattern with bone-on-bone contact medially.  There is tricompartmental sclerosis and osteophytosis.  There is no acute fracture, dislocation, lytic or blastic lesion.  The visualized left knee demonstrates similar degenerative changes.    This document was created using speech voice recognition software.   Grammatical errors, random word insertions, pronoun errors, and incomplete sentences are an occasional consequence of this system due to software limitations, ambient noise, and hardware issues.   Any formal questions or concerns about content, text, or information contained within the body of this dictation should be directly addressed to the provider for clarification.

## 2024-08-23 ENCOUNTER — TELEPHONE (OUTPATIENT)
Dept: PSYCHIATRY | Facility: CLINIC | Age: 74
End: 2024-08-23

## 2024-08-23 NOTE — TELEPHONE ENCOUNTER
Patients Name: Zulma Marcelo  : 1950  Phone Number: [phone]  Appointment Date: 2024  Appointment time: 10:00   Address: [address] [place] [id number]  Drop off Facility/Office Name: [facility]  Drop off  Address: [address] [place] [address]: [phone]  Special notes/directions for   Note for scheduling team

## 2024-08-26 ENCOUNTER — SOCIAL WORK (OUTPATIENT)
Dept: BEHAVIORAL/MENTAL HEALTH CLINIC | Facility: CLINIC | Age: 74
End: 2024-08-26
Payer: MEDICARE

## 2024-08-26 DIAGNOSIS — F33.41 RECURRENT MAJOR DEPRESSIVE DISORDER, IN PARTIAL REMISSION (HCC): Primary | ICD-10-CM

## 2024-08-26 PROCEDURE — 90834 PSYTX W PT 45 MINUTES: CPT | Performed by: SOCIAL WORKER

## 2024-08-26 NOTE — PSYCH
"Behavioral Health Psychotherapy Progress Note    Psychotherapy Provided: Individual Psychotherapy     1. Recurrent major depressive disorder, in partial remission (HCC)            Goals addressed in session: Goal 1     DATA: Zulma reports feeling ok overall. She reports that her youngest son, Ponce, was diagnosed with prostate cancer, which she is very concerned about. He goes in for further testing next week so hopefully they will have more answers then. She talked about her children and how she struggles to relate to them at times because she was brought up in a different time where \"family and tradition mattered\". We discussed positive takeaways and worked to identify what she does see in her kids that she can relate to, so as to create a bridge of connection. She is working on finding small projects around the house that she can tackle because she is feeling bored. She is following up with medical problems and is compliant with recommendations. No new symptoms or safety concerns. Continue with current treatment plan. Return in 2 weeks.   During this session, this clinician used the following therapeutic modalities: Cognitive Behavioral Therapy, Cognitive Processing Therapy, and Supportive Psychotherapy    Substance Abuse was not addressed during this session. If the client is diagnosed with a co-occurring substance use disorder, please indicate any changes in the frequency or amount of use: NA. Stage of change for addressing substance use diagnoses: No substance use/Not applicable    ASSESSMENT:  Zulma Marcelo presents with a Euthymic/ normal mood.     her affect is Normal range and intensity, which is congruent, with her mood and the content of the session. The client has made progress on their goals.    During this session the client was oriented to person, place, and time. The client was engaged in the session. The client was able to sustain direct eye contact and was without psychomotor agitation. The " "client's thought processes were linear and clear.   Zulma Marcelo presents with a none risk of suicide, none risk of self-harm, and none risk of harm to others.    For any risk assessment that surpasses a \"low\" rating, a safety plan must be developed.    A safety plan was indicated: no  If yes, describe in detail NA    PLAN: Between sessions, Zulma Marcelo will take medication as prescribed and practice positive coping strategies as needed . At the next session, the therapist will use Cognitive Behavioral Therapy, Cognitive Processing Therapy, and Supportive Psychotherapy to address stressors.    Behavioral Health Treatment Plan and Discharge Planning: Zulma Marcelo is aware of and agrees to continue to work on their treatment plan. They have identified and are working toward their discharge goals. yes    Visit start and stop times:    08/26/24  Start Time: 1000  Stop Time: 1050  Total Visit Time: 50 minutes  "

## 2024-09-09 ENCOUNTER — SOCIAL WORK (OUTPATIENT)
Dept: BEHAVIORAL/MENTAL HEALTH CLINIC | Facility: CLINIC | Age: 74
End: 2024-09-09
Payer: MEDICARE

## 2024-09-09 DIAGNOSIS — F33.41 RECURRENT MAJOR DEPRESSIVE DISORDER, IN PARTIAL REMISSION (HCC): Primary | ICD-10-CM

## 2024-09-09 PROCEDURE — 90834 PSYTX W PT 45 MINUTES: CPT | Performed by: SOCIAL WORKER

## 2024-09-09 NOTE — PSYCH
Behavioral Health Psychotherapy Progress Note    Psychotherapy Provided: Individual Psychotherapy     1. Recurrent major depressive disorder, in partial remission (HCC)            Goals addressed in session: Goal 1     DATA: Zulma reported feeling well stating that she is now [facility] which will afford her a bit more breathing room financially. As such she reports feeling in a good mood and optimistic for the future. She continues to compliment her  stating that her assistance has been invaluable. She and Rhett have been tending to their plants and she brought in a basil plant for this writer. Writer thanked her for this. The kids are getting along well and Ponce has been coming over more. She is happy with this and thankful for the extra time. Reiterated values which are most important to her right now, to include: self sufficiency and civility. She is feeling relieved because she is leaning into both. No new symptoms or safety concerns. Treturn in 2 weeks.   During this session, this clinician used the following therapeutic modalities: Cognitive Behavioral Therapy, Cognitive Processing Therapy, and Supportive Psychotherapy    Substance Abuse was not addressed during this session. If the client is diagnosed with a co-occurring substance use disorder, please indicate any changes in the frequency or amount of use: NA. Stage of change for addressing substance use diagnoses: No substance use/Not applicable    ASSESSMENT:  Zulma Marcelo presents with a Euthymic/ normal mood.     her affect is Normal range and intensity, which is congruent, with her mood and the content of the session. The client has made progress on their goals.    During this session the client was oriented to person, place, and time. The client was engaged in the session. The client was able to sustain direct eye contact and was without psychomotor agitation. The client's thought processes were linear and  "clear.   Zulma Marcelo presents with a none risk of suicide, none risk of self-harm, and none risk of harm to others.    For any risk assessment that surpasses a \"low\" rating, a safety plan must be developed.    A safety plan was indicated: no  If yes, describe in detail NA    PLAN: Between sessions, Zulma Marcelo will take medication as prescribed and practice positive coping strategies as needed . At the next session, the therapist will use Cognitive Behavioral Therapy, Cognitive Processing Therapy, and Supportive Psychotherapy to address stressors.    Behavioral Health Treatment Plan and Discharge Planning: Zulma Marcelo is aware of and agrees to continue to work on their treatment plan. They have identified and are working toward their discharge goals. yes    Visit start and stop times:    09/09/24  Start Time: 1000  Stop Time: 1050  Total Visit Time: 50 minutes  "

## 2024-09-12 ENCOUNTER — OFFICE VISIT (OUTPATIENT)
Dept: PSYCHIATRY | Facility: CLINIC | Age: 74
End: 2024-09-12
Payer: MEDICARE

## 2024-09-12 DIAGNOSIS — F41.1 GENERALIZED ANXIETY DISORDER: ICD-10-CM

## 2024-09-12 DIAGNOSIS — F51.01 PRIMARY INSOMNIA: ICD-10-CM

## 2024-09-12 DIAGNOSIS — F33.1 MODERATE EPISODE OF RECURRENT MAJOR DEPRESSIVE DISORDER (HCC): Primary | ICD-10-CM

## 2024-09-12 PROBLEM — Z91.51 HISTORY OF SUICIDE ATTEMPT: Status: ACTIVE | Noted: 2023-07-29

## 2024-09-12 PROCEDURE — G2211 COMPLEX E/M VISIT ADD ON: HCPCS | Performed by: PHYSICIAN ASSISTANT

## 2024-09-12 PROCEDURE — 99214 OFFICE O/P EST MOD 30 MIN: CPT | Performed by: PHYSICIAN ASSISTANT

## 2024-09-16 NOTE — PROGRESS NOTES
ORTHOPAEDIC HAND, WRIST, AND ELBOW OFFICE  VISIT       ASSESSMENT/PLAN:    Zulma Marcelo is a 74 y.o. 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 We discussed the utility of therapy vs. steroid injection vs. surgery.     - We will initiate a course of conservative treatment to include custom splinting and physical/occupational therapy as well as CSI into the more symptomatic right CMC joint.  Prescription was provided.      - Should the patient's symptoms persist over the next 3 to 4 months she will return to clinic or we can discuss further interventions.             The patient verbalized understanding of exam findings and treatment plan. We engaged in the shared decision-making process and treatment options were discussed at length with the patient. Surgical and conservative management discussed today along with risks and benefits.    Follow Up:  3-4 months, repeat clinical evaluation      General Discussions:           CMC Arthritis: The anatomy and physiology of carpometacarpal joint arthritis was discussed with the patient today in the office.  Deterioration of the articular cartilage eventually leads to hypermobility at the thumb CMC joint, resulting in joint subluxation, osteophyte formation, cystic changes within the trapezium and base of the first metacarpal, as well as subchondral sclerosis.  Eventually, pain, limited mobility, and compensatory hyperextension at the metacarpophalangeal joint may develop.  While normal activity and usage of the thumb joint may provide a painful experience to the patient, this typically does not result in damage to the thumb or hand.  Treatment options include resting thumb spica splints to decreased joint edema, pain, and inflammation.  Therapy exercises to strengthen the thenar musculature may relieve pain, but do not alter the overall continued development of  osteoarthritis.  Oral medications, topical medications, corticosteroid injections may decrease pain and increase overall function.  Eventually, approximately 5% of patients may require surgical intervention.                                                                                                                                                                                                                                                                                                                                           CHIEF COMPLAINT:  Bilateral hand pain      SUBJECTIVE:  I had the pleasure of seeing Zulma Marcelo in consultation today. Zulma Marcelo is a 74 y.o. RHD female who presents with bilateral hand pain. She has had bilateral hand pain for many years, but over the last year specifically, pain at bilateral thumbs has been getting significantly worse. Patient states she has been having increasingly difficulty opening jars and doors, and holding objects. She has never had prior intervention including OT or steroid injections into her hands. Denies numbness or tingling to extremity. No other complaints.    I have personally reviewed all the relevant PMH, PSH, SH, FH, Medications and allergies      PAST MEDICAL HISTORY:  Past Medical History:   Diagnosis Date    Acid reflux     Anxiety     Arthritis     Asthma     Cardiac disease     Chronic kidney disease     passed on own kidney stone    Depression     Diverticulitis     GERD (gastroesophageal reflux disease)     Hayfever     [place] (hard of hearing)     bilateral hearing aids    Hyperlipemia     Hypertension     Panic attack     Tachycardia        PAST SURGICAL HISTORY:  Past Surgical History:   Procedure Laterality Date    ABLATION OF DYSRHYTHMIC FOCUS      APPENDECTOMY      CHOLECYSTECTOMY      open    FRACTURE SURGERY      ORIF fx (L) tibia, fibula 2009    GASTRIC BYPASS OPEN      HYSTERECTOMY      CO XCAPSL CTRC RMVL INSJ IO LENS PROSTH W/O ECP  Left 2016    Procedure: EXTRACTION EXTRACAPSULAR CATARACT PHACO INTRAOCULAR LENS (IOL);  Surgeon: Dane Wells MD;  Location: [facility] MAIN OR;  Service: Ophthalmology    AK XCAPSL CTRC RMVL INSJ IO LENS PROSTH W/O ECP Right 3/1/2021    Procedure: EXTRACTION EXTRACAPSULAR CATARACT PHACO INTRAOCULAR LENS (IOL);  Surgeon: Dane Wells MD;  Location: [facility] MAIN OR;  Service: Ophthalmology    TONSILECTOMY AND ADNOIDECTOMY         FAMILY HISTORY:  Family History   Problem Relation Age of Onset    Heart disease Mother     Stroke Son     Stroke Maternal Grandfather     Breast cancer Daughter 40       SOCIAL HISTORY:  Social History     Tobacco Use    Smoking status: Former     Current packs/day: 0.00     Average packs/day: 1 pack/day for 67.1 years (67.1 ttl pk-yrs)     Types: Cigarettes     Start date: 6/15/1956     Quit date: 2023     Years since quittin.1     Passive exposure: Past    Smokeless tobacco: Never   Vaping Use    Vaping status: Never Used   Substance Use Topics    Alcohol use: Yes     Comment: rarely    Drug use: No       MEDICATIONS:    Current Outpatient Medications:     albuterol (PROVENTIL HFA,VENTOLIN HFA) 90 mcg/act inhaler, Inhale 2 puffs every 6 (six) hours as needed for wheezing, Disp: 20.4 g, Rfl: 3    allopurinol (ZYLOPRIM) 100 mg tablet, Take 1 tablet (100 mg total) by mouth daily, Disp: 90 tablet, Rfl: 1    aspirin 81 mg chewable tablet, Chew 1 tablet (81 mg total) daily, Disp: 90 tablet, Rfl: 1    buPROPion (WELLBUTRIN XL) 150 mg 24 hr tablet, Take 1 tablet (150 mg total) by mouth daily, Disp: 90 tablet, Rfl: 1    busPIRone (BUSPAR) 5 mg tablet, Take 1 tablet (5 mg total) by mouth 3 (three) times a day, Disp: 270 tablet, Rfl: 1    dextromethorphan-guaifenesin (MUCINEX DM)  MG per 12 hr tablet, Take 1 tablet by mouth every 12 (twelve) hours as needed for cough, Disp: 30 tablet, Rfl: 0    Diclofenac Sodium (VOLTAREN) 1 %, Apply 2 g topically 4 (four) times a day, Disp: 100  "g, Rfl: 3    donepezil (ARICEPT) 10 mg tablet, Take 1 tablet (10 mg total) by mouth daily at bedtime, Disp: 90 tablet, Rfl: 3    donepezil (ARICEPT) 10 mg tablet, Take 1 tablet (10 mg total) by mouth daily at bedtime, Disp: 90 tablet, Rfl: 3    escitalopram (LEXAPRO) 20 mg tablet, Take 1 tablet (20 mg total) by mouth daily, Disp: 90 tablet, Rfl: 1    fluticasone (FLONASE) 50 mcg/act nasal spray, 1 spray into each nostril 2 (two) times a day, Disp: 9.9 mL, Rfl: 1    Ketotifen Fumarate (ZADITOR) 0.035 % ophthalmic solution, Administer 1 drop to both eyes 2 (two) times a day, Disp: 3 mL, Rfl: 1    nebivolol (BYSTOLIC) 5 mg tablet, Take 1 tablet (5 mg total) by mouth daily, Disp: 90 tablet, Rfl: 1    ondansetron (ZOFRAN-ODT) 4 mg disintegrating tablet, Take 1 tablet (4 mg total) by mouth every 6 (six) hours as needed for nausea for up to 3 days, Disp: 9 tablet, Rfl: 0    pantoprazole (PROTONIX) 40 mg tablet, Take 1 tablet (40 mg total) by mouth 2 (two) times a day, Disp: 60 tablet, Rfl: 0    sucralfate (CARAFATE) 1 g tablet, Take 1 tablet (1 g total) by mouth 4 (four) times a day (before meals and at bedtime), Disp: 120 tablet, Rfl: 3    traZODone (DESYREL) 150 mg tablet, Take 0.5 tablets (75 mg total) by mouth daily at bedtime, Disp: 45 tablet, Rfl: 1    Vitamin D, Ergocalciferol, 50 MCG (2000 UT) CAPS, Take 1 capsule by mouth in the morning, Disp: 100 capsule, Rfl: 3    ALLERGIES:  Allergies   Allergen Reactions    Augmentin [Amoxicillin-Pot Clavulanate] GI Intolerance, Other (See Comments) and Hives     VOMITING  VOMITING  Reaction Date: 07Dec2004;     Sulfa Antibiotics Itching, Other (See Comments) and Hives     RASH, ITCHY  RASH, ITCHY    Morphine Other (See Comments)     \"DOESN'T WORK\"    Latex Rash     Unsure if this is an allergy           REVIEW OF SYSTEMS:  Review of Systems    VITALS:  There were no vitals filed for this visit.    LABS:      _____________________________________________________  PHYSICAL " EXAMINATION:  General: well developed and well nourished, alert, oriented times 3, and appears comfortable  Psychiatric: Normal  HEENT: Normocephalic, Atraumatic Trachea Midline, No torticollis  Pulmonary: No audible wheezing or respiratory distress   Abdomen/GI: Non tender, non distended   Cardiovascular: No pitting edema, 2+ radial pulse   Skin: No masses, erythema, lacerations, fluctation, ulcerations  Neurovascular: Sensation Intact to the Median, Ulnar, Radial Nerve, Motor Intact to the Median, Ulnar, Radial Nerve, and Pulses Intact  Musculoskeletal: Normal, except as noted in detailed exam and in HPI.      FOCUSED MUSCULOSKELETAL EXAMINATION:  Right Upper Extremity  Inspection: skin intact, no notable deformity   Palpation:  TTP at right CMC joint, no tenderness elsewhere   Neurologic:  5/5 elbow flexion, 5/5 elbow extension, 5/5 wrist extension, 5/5 wrist flexion, 5/5 finger flexion, 5/5 finger extension, 5/5 FPL, 5/5 EPL, 4/5 APB, 5/5 intrinsics, sensation intact to median, radial, and ulnar nerve distributions  Vascular: Palpable radial pulse, brisk cap refill <2sec, hand warm and well perfused  MSK: full painless active and passive ROM  negative carpal compression test  negative elbow flexion compression test  + CMC grind  negative Finkelstein maneuver  no Triggering elicited upon motion of digits.    Left Upper Extremity  Inspection: skin intact, no notable deformity   Palpation:  TTP at right CMC joint, no tenderness elsewhere   Neurologic:  5/5 elbow flexion, 5/5 elbow extension, 5/5 wrist extension, 5/5 wrist flexion, 5/5 finger flexion, 5/5 finger extension, 5/5 FPL, 5/5 EPL, 4/5 APB, 5/5 intrinsics, sensation intact to median, radial, and ulnar nerve distributions  Vascular: Palpable radial pulse, brisk cap refill <2sec, hand warm and well perfused  MSK: full painless active and passive ROM  negative carpal compression test  negative elbow flexion compression test  + CMC grind  negative Finkelstein  maneuver  no Triggering elicited upon motion of digits.        ___________________________________________________  STUDIES REVIEWED:  I have personally reviewed and interpreted  AP lateral and oblique radiographs of bilateral wrists obained on 9/17/24   which demonstrate no acute fracture or dislocation. There is significant bilateral radiocarpal osteoarthritis as well as bilateral thumb CMC osteoarthritis [place] stage 3/4        LABS REVIEWED:    HgA1c:   Lab Results   Component Value Date    HGBA1C 5.7 (H) 11/29/2023     BMP:   Lab Results   Component Value Date    CALCIUM 9.4 07/01/2024    K 3.3 (L) 07/01/2024    CO2 26 07/01/2024     07/01/2024    BUN 7 07/01/2024    CREATININE 0.61 07/01/2024               PROCEDURES PERFORMED:  Small joint arthrocentesis: R thumb CMC  [place] Protocol:  Consent given by: patient  Supporting Documentation  Indications: pain   Procedure Details  Location: thumb - R thumb CMC  Preparation: Patient was prepped and draped in the usual sterile fashion  Needle size: 25 G  Ultrasound guidance: no  Medications administered: 1 mL lidocaine 1 %; 6 mg betamethasone acetate-betamethasone sodium phosphate 6 (3-3) mg/mL    Patient tolerance: patient tolerated the procedure well with no immediate complications  Dressing:  Sterile dressing applied    Dx: right CMC osteoarthritis    PROCEDURE: A timeout was performed in which the side, site and laterality were confirmed by all present. After informed consent was obtained under sterile conditions a combination of 1% lidocaine and 6 mg betamethasone acetate-betamethasone sodium phosphate 6 (3-3) mg/mL were combined in a 1:1 mixture for a total of 2 mL. The area of injection was cleansed with an alcohol preparation and the right CMC joint was subsequently injected with the above mixture with no untoward complications. The patient tolerated the procedure well. No samples, findings or blood loss was noted due to the fact that this was an  injection.                _____________________________________________________      I agree with the history, physical examination, assessment and plan of care as documented above.    Chester Lopez M.D.  Attending, Orthopaedic Surgery  Hand, Wrist, and Elbow Surgery  [facility]

## 2024-09-17 ENCOUNTER — OFFICE VISIT (OUTPATIENT)
Dept: OBGYN CLINIC | Facility: CLINIC | Age: 74
End: 2024-09-17
Payer: MEDICARE

## 2024-09-17 ENCOUNTER — APPOINTMENT (OUTPATIENT)
Dept: RADIOLOGY | Facility: CLINIC | Age: 74
End: 2024-09-17
Payer: MEDICARE

## 2024-09-17 VITALS
BODY MASS INDEX: 28.66 KG/M2 | HEIGHT: 60 IN | SYSTOLIC BLOOD PRESSURE: 123 MMHG | DIASTOLIC BLOOD PRESSURE: 77 MMHG | WEIGHT: 146 LBS | HEART RATE: 86 BPM

## 2024-09-17 DIAGNOSIS — M25.532 BILATERAL WRIST PAIN: Primary | ICD-10-CM

## 2024-09-17 DIAGNOSIS — M25.531 BILATERAL WRIST PAIN: Primary | ICD-10-CM

## 2024-09-17 DIAGNOSIS — M25.532 BILATERAL WRIST PAIN: ICD-10-CM

## 2024-09-17 DIAGNOSIS — M18.0 ARTHRITIS OF CARPOMETACARPAL (CMC) JOINT OF BOTH THUMBS: ICD-10-CM

## 2024-09-17 DIAGNOSIS — M25.531 BILATERAL WRIST PAIN: ICD-10-CM

## 2024-09-17 PROCEDURE — 99214 OFFICE O/P EST MOD 30 MIN: CPT | Performed by: STUDENT IN AN ORGANIZED HEALTH CARE EDUCATION/TRAINING PROGRAM

## 2024-09-17 PROCEDURE — 20600 DRAIN/INJ JOINT/BURSA W/O US: CPT | Performed by: STUDENT IN AN ORGANIZED HEALTH CARE EDUCATION/TRAINING PROGRAM

## 2024-09-17 PROCEDURE — 73110 X-RAY EXAM OF WRIST: CPT

## 2024-09-17 RX ORDER — BETAMETHASONE SODIUM PHOSPHATE AND BETAMETHASONE ACETATE 3; 3 MG/ML; MG/ML
6 INJECTION, SUSPENSION INTRA-ARTICULAR; INTRALESIONAL; INTRAMUSCULAR; SOFT TISSUE
Status: COMPLETED | OUTPATIENT
Start: 2024-09-17 | End: 2024-09-17

## 2024-09-17 RX ORDER — LIDOCAINE HYDROCHLORIDE 10 MG/ML
1 INJECTION, SOLUTION INFILTRATION; PERINEURAL
Status: COMPLETED | OUTPATIENT
Start: 2024-09-17 | End: 2024-09-17

## 2024-09-17 RX ADMIN — LIDOCAINE HYDROCHLORIDE 1 ML: 10 INJECTION, SOLUTION INFILTRATION; PERINEURAL at 09:30

## 2024-09-17 RX ADMIN — BETAMETHASONE SODIUM PHOSPHATE AND BETAMETHASONE ACETATE 6 MG: 3; 3 INJECTION, SUSPENSION INTRA-ARTICULAR; INTRALESIONAL; INTRAMUSCULAR; SOFT TISSUE at 09:30

## 2024-09-18 ENCOUNTER — TELEPHONE (OUTPATIENT)
Age: 74
End: 2024-09-18

## 2024-09-18 DIAGNOSIS — M18.0 ARTHRITIS OF CARPOMETACARPAL (CMC) JOINT OF BOTH THUMBS: Primary | ICD-10-CM

## 2024-09-18 NOTE — TELEPHONE ENCOUNTER
Caller: Patient    Doctor: John    Reason for call: Patient received a message from the [facility] that the Voltaren is no longer covered as it's OTC. Patient questioned if there is an alternative gel that would be prescription that she could submit to the [facility]?    Call back#: [phone]

## 2024-09-19 ENCOUNTER — OFFICE VISIT (OUTPATIENT)
Age: 74
End: 2024-09-19
Payer: MEDICARE

## 2024-09-19 VITALS
WEIGHT: 146 LBS | DIASTOLIC BLOOD PRESSURE: 79 MMHG | SYSTOLIC BLOOD PRESSURE: 137 MMHG | RESPIRATION RATE: 17 BRPM | HEIGHT: 60 IN | HEART RATE: 65 BPM | BODY MASS INDEX: 28.66 KG/M2

## 2024-09-19 DIAGNOSIS — M25.572 CHRONIC PAIN OF LEFT ANKLE: Primary | ICD-10-CM

## 2024-09-19 DIAGNOSIS — M21.962 ACQUIRED DEFORMITY OF LEFT FOOT: ICD-10-CM

## 2024-09-19 DIAGNOSIS — G89.29 CHRONIC PAIN OF LEFT ANKLE: Primary | ICD-10-CM

## 2024-09-19 DIAGNOSIS — M77.9 TENDONITIS: ICD-10-CM

## 2024-09-19 DIAGNOSIS — L03.031 PARONYCHIA OF TOENAIL OF RIGHT FOOT: ICD-10-CM

## 2024-09-19 DIAGNOSIS — M77.32 CALCANEAL SPUR OF LEFT FOOT: ICD-10-CM

## 2024-09-19 DIAGNOSIS — B35.1 ONYCHOMYCOSIS: ICD-10-CM

## 2024-09-19 PROCEDURE — 99203 OFFICE O/P NEW LOW 30 MIN: CPT | Performed by: PODIATRIST

## 2024-09-19 PROCEDURE — 20605 DRAIN/INJ JOINT/BURSA W/O US: CPT | Performed by: PODIATRIST

## 2024-09-19 RX ORDER — TERBINAFINE HYDROCHLORIDE 250 MG/1
TABLET ORAL
Qty: 15 TABLET | Refills: 0 | Status: SHIPPED | OUTPATIENT
Start: 2024-09-19 | End: 2024-10-24

## 2024-09-19 RX ORDER — TRIAMCINOLONE ACETONIDE 40 MG/ML
20 INJECTION, SUSPENSION INTRA-ARTICULAR; INTRAMUSCULAR
Status: SHIPPED | OUTPATIENT
Start: 2024-09-19

## 2024-09-19 RX ADMIN — TRIAMCINOLONE ACETONIDE 20 MG: 40 INJECTION, SUSPENSION INTRA-ARTICULAR; INTRAMUSCULAR at 10:15
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
## 2024-09-23 ENCOUNTER — SOCIAL WORK (OUTPATIENT)
Dept: BEHAVIORAL/MENTAL HEALTH CLINIC | Facility: CLINIC | Age: 74
End: 2024-09-23
Payer: MEDICARE

## 2024-09-23 DIAGNOSIS — F33.41 RECURRENT MAJOR DEPRESSIVE DISORDER, IN PARTIAL REMISSION (HCC): Primary | ICD-10-CM

## 2024-09-23 PROCEDURE — 90834 PSYTX W PT 45 MINUTES: CPT | Performed by: SOCIAL WORKER

## 2024-09-24 NOTE — PSYCH
Behavioral Health Psychotherapy Progress Note    Psychotherapy Provided: Individual Psychotherapy     1. Recurrent major depressive disorder, in partial remission (HCC)            Goals addressed in session: Goal 1     DATA: Zulma reports feeling well overall. She is in the process of refinancing her home which she is hoping will help reduce financial stress. She continues to think about, plan, and execute tasks which will allow for she and Rhett more flexibility and stability whether that be sending out their laundry to be cleaned, removing their bathtub and putting in a shower, and minimizing monthly expenses (obj 1). As a result she reports feeling more confident and calm towards her future. She is managing her family relationships with somewhat greater ease but does admit to loaning Ponce money which he didn't repay. She now feels very angry and wants to cut him off. She is refraining from beating herself up about this and is taking time to cool down (obj 2). No new symptoms or safety concerns. Return in 2 weeks.   During this session, this clinician used the following therapeutic modalities: Cognitive Behavioral Therapy and Cognitive Processing Therapy    Substance Abuse was not addressed during this session. If the client is diagnosed with a co-occurring substance use disorder, please indicate any changes in the frequency or amount of use: NA. Stage of change for addressing substance use diagnoses: No substance use/Not applicable    ASSESSMENT:  Zulma Marcelo presents with a Euthymic/ normal mood.     her affect is Normal range and intensity, which is congruent, with her mood and the content of the session. The client has made progress on their goals.    During this session the client was oriented to person, place, and time. The client was engaged in the session. The client was able to sustain direct eye contact and was without psychomotor agitation. The client's thought processes were linear and clear.    "Zulma Marcelo presents with a none risk of suicide, none risk of self-harm, and none risk of harm to others.    For any risk assessment that surpasses a \"low\" rating, a safety plan must be developed.    A safety plan was indicated: no  If yes, describe in detail NA    PLAN: Between sessions, Zulma Marcelo will take medication as prescribed and practice positive coping strategies as needed . At the next session, the therapist will use Cognitive Behavioral Therapy and Cognitive Processing Therapy to address stressors.    Behavioral Health Treatment Plan and Discharge Planning: Zulma Marcelo is aware of and agrees to continue to work on their treatment plan. They have identified and are working toward their discharge goals. yes    Visit start and stop times:    09/24/24  Start Time: 1000  Stop Time: 1050  Total Visit Time: 50 minutes  "

## 2024-10-03 ENCOUNTER — EVALUATION (OUTPATIENT)
Dept: OCCUPATIONAL THERAPY | Facility: CLINIC | Age: 74
End: 2024-10-03
Payer: MEDICARE

## 2024-10-03 ENCOUNTER — EVALUATION (OUTPATIENT)
Dept: PHYSICAL THERAPY | Facility: CLINIC | Age: 74
End: 2024-10-03
Payer: MEDICARE

## 2024-10-03 DIAGNOSIS — R29.6 FREQUENT FALLS: ICD-10-CM

## 2024-10-03 DIAGNOSIS — G89.29 CHRONIC PAIN OF LEFT ANKLE: Primary | ICD-10-CM

## 2024-10-03 DIAGNOSIS — M77.9 TENDONITIS: ICD-10-CM

## 2024-10-03 DIAGNOSIS — M18.0 ARTHRITIS OF CARPOMETACARPAL (CMC) JOINT OF BOTH THUMBS: Primary | ICD-10-CM

## 2024-10-03 DIAGNOSIS — M25.572 CHRONIC PAIN OF LEFT ANKLE: Primary | ICD-10-CM

## 2024-10-03 PROCEDURE — 97166 OT EVAL MOD COMPLEX 45 MIN: CPT

## 2024-10-03 PROCEDURE — 97161 PT EVAL LOW COMPLEX 20 MIN: CPT

## 2024-10-03 PROCEDURE — 97110 THERAPEUTIC EXERCISES: CPT

## 2024-10-03 NOTE — PROGRESS NOTES
OT Evaluation     Today's date: 10/3/2024  Patient name: Zulma Marcelo  : 1950  MRN: [id number]  Referring provider: Chester Lopez*  Dx:   Encounter Diagnosis     ICD-10-CM    1. Arthritis of carpometacarpal (CMC) joint of both thumbs  M18.0 Ambulatory Referral to PT/OT Hand Therapy          Start Time: 1145  Stop Time: 1230  Total time in clinic (min): 45 minutes    Assessment  Impairments: abnormal or restricted ROM, activity intolerance, impaired physical strength, lacks appropriate home exercise program, pain with function and weight-bearing intolerance  Understanding of Dx/Px/POC: good     Prognosis: good    Goals  Short Term Goals by 2 - 4 weeks:    Establish HEP to increase performance with daily activities.     Patient will increase  strength by at least 10 lbs to assist in completing ADLs.  (Will evaluate at a later date)    Patient will increase pinch strength by at least 2 lbs to assist in completing ADLs. (Will evaluate at a later date)    Patient will increase ROM of BUE by at least 10 degrees to complete ADLs.     Patient will decrease pain rate of UE by at least 2 points per the VAS scale.     Patient to demonstrate ability to independently don and doff splint in clinic following IE.     Patient to demonstrate knowledge and understand of orthotic wear schedule and compliance with use of orthotic.             Long Term Goals by discharge:    Establish final home exercise program to enhance maximal functional level with ADLs.    Achieve functional active range of motion of BUE for full return to household chores.                             Achieve functional strength of BUE for full return to high level ADLs.                 Plan  Patient would benefit from: skilled occupational therapy, custom splinting, orthotics and OT eval  Planned modality interventions: TENS, electrical stimulation/Russian stimulation, cryotherapy, thermotherapy: hydrocollator packs, thermotherapy: paraffin  bath, ultrasound and unattended electrical stimulation    Planned therapy interventions: IASTM, joint mobilization, kinesiology taping, manual therapy, orthotic fitting/training, orthotic management and training, patient/caregiver education, neuromuscular re-education, strengthening, stretching, therapeutic activities, therapeutic exercise, home exercise program, graded exercise, graded motor, functional ROM exercises and flexibility    Frequency: 2x week  Duration in weeks: 8  Plan of Care beginning date: 10/3/2024  Plan of Care expiration date: 2024  Treatment plan discussed with: patient      Subjective Evaluation    History of Present Illness  Mechanism of injury: Patient is a 74 y.o. RHD female who presents for OT IE and treatment for arthritis of carpometacarpal (CMC) joint of bilateral thumbs. Patient reports she had bilateral hand pain for many years, but has noted significant pain within this past year with difficulty with ADLs/IADLs (cleaning the house, and ADLs ). With bilateral thumb CMC arthritis R>L. Patient notes difficulty with opening jars, doors, and holding objects. Patient lives with her son who is disabled. Patient reports difficulty with showering specifically due to tub shower; banging hands every time on the wall when she is in the tub shower; looking into getting a walk in shower. Patient notes less than 50% improvement in the R thumb s/p CSI injection of the R thumb. Patient referred by Dr. Lopez to initiate treatment including hand therapy.     Quality of life: good    Patient Goals  Patient goals for therapy: increased motion, decreased pain, increased strength, independence with ADLs/IADLs and return to sport/leisure activities    Pain  Current pain ratin (at rest 0/10; using 7/10)  At worst pain rating: 10  Aggravating factors: lifting    Social Support  Lives with: adult children    Employment status: not working  Hand dominance: right    Treatments  Current treatment:  medication        Objective     Active Range of Motion     Left Wrist   Wrist flexion: 80 degrees   Wrist extension: 75 degrees   Radial deviation: 21 degrees   Ulnar deviation: 5 degrees     Right Wrist   Wrist flexion: 69 degrees   Wrist extension: 60 degrees   Radial deviation: 14 degrees   Ulnar deviation: 15 degrees     Left Thumb   Flexion     CMC: 20 degrees    MP: 50 degrees    DIP: 62 degrees  Extension     CMC: 4 degrees    MP: 0 degrees    DIP: 10 degrees  Palmar Abduction     CMC: 42 degrees  Radial abduction    CMC: 48 degrees      Right Thumb   Flexion     CMC: -4    MP: 48    DIP: 48  Extension     CMC: 8    MP: 14    DIP: 0  Palmar Abduction    CMC: 40  Radial Abduction    CMC: 34    Strength/Myotome Testing     Additional Strength Details  Functional strength deferred at this time secondary subjective report of pain.              Precautions: Universal. Fall risk.         Auth Tracker  Auth Status Total   Visits  Expiration date Co-Insurance   Approved                                  Visit Tracker: NO AUTH REQ. BOMN  Date 10/3  IE                                                                                    Manuals HEP 10/3/2024                       Ther Ex     Patient education on Dx and HEP  x5min   Custom orthotic hand based opponens splint, wear schedule and patient education/training  x30min   Wrist AROM x 3x10   Thumb AROM  x 3x10   Thumb Opposition x 3x10        Ther Activity                                   Modalities                   Assessment:   Patient tolerated session well. Upon today's evaluation, patient demonstrates functional ROM deficits with the R > L UE. Functional strength deferred at this time secondary to patient subjective report of pain. The physician's order and diagnosis were confirmed prior evaluation/assessment and placement of orthotic.  This orthotic was deemed medically necessary by the ordering physician. The orthotic size was determined after performing  appropriate custom measurements. The orthotic was then applied to the patient, securing all straps with moderate tension.  The orthotic was then re-evaluated to confirm fit and verify that no compromise of circulation was occurring.  All bony prominences were evaluated and padding was utilized as needed to further protect bony prominences.  Application instructions and care of the orthotic were reviewed in detail with the patient, including need for regular inspection of the orthotic.  It was verified that the patient was able to perform independent application and removal of the orthotic with appropriate technique.  Usage instructions were reviewed as per the physician's order.  The patient verbalized an understanding of all instructions. Patient session focused on patient education on anatomy and physiology concerning current dx, techniques for decreasing deficits through HEP, and appropriate use of modalities. Patient educated on HEP with verbal instructions and handouts for patient reference. Patient educated on treatment plan at this time. Patient benefiting from skilled hand therapy OT to reduce deficits to improve independence with daily activities.       Plan:   Focus on AROM, AAROM, PROM, tendon gliding, Thumb web sapactive thumb opposition, thenar strengthening, pain free isometric thumb flexion/adduction exercises, joint protection strategies, custom orthotic fit/train, bracing/splinting as needed, adaptive equipment, UE strengthening and all modalities as seen fit to improve ability to complete daily activites with ease.  POC: 10/3/2024 - 11/28/2024

## 2024-10-03 NOTE — PROGRESS NOTES
PT Evaluation     Today's date: 10/3/2024  Patient name: Zulma Marcelo  : 1950  MRN: [id number]  Referring provider: Lucien Mcginnis DPM  Dx:   Encounter Diagnosis     ICD-10-CM    1. Chronic pain of left ankle  M25.572 Ambulatory referral to Physical Therapy    G89.29       2. Tendonitis  M77.9 Ambulatory referral to Physical Therapy      3. Frequent falls  R29.6 Ambulatory Referral to Physical Therapy                     Assessment  Impairments: abnormal gait, abnormal muscle firing, abnormal or restricted ROM, activity intolerance, impaired balance, impaired physical strength, lacks appropriate home exercise program, pain with function, safety issue, weight-bearing intolerance and poor body mechanics    Assessment details: Zulma Marcelo is a 74 y.o. female who presents to hospital-based outpatient PT with chronic left ankle pain with past surgical history of ORIF left fibula fracture several years ago. Patient presents with the following impairments: left foot/ankle pain, . Due to these impairments, patient has difficulty performing the following: ADL's, recreational activities, engaging in social activities, ambulation, stair negotiation, lifting/carrying, transfers, squatting, and household chores. Patient has been educated in home exercise program and plan of care. Patient would benefit from skilled physical therapy services to address the above functional limitations and progress towards prior level of function and independence with home exercise program.  Understanding of Dx/Px/POC: good     Prognosis: good    Goals  Short Term Goals [3 weeks; target date: 11/15/24]  1. Patient will be independent with initial HEP.  2. Patient will demonstrate an increase in left ankle AROM by 5°.  3. Patient will demonstrate an increase in left ankle strength of 1/2 grade on MMT.  4. Patient will present with decreased pain intensity of 6/10 at worst.    Long Term Goals [6 weeks; target date: 24]  1. Patient  will be independent with comprehensive HEP.   2. Patient will demonstrate an increase in left ankle AROM to WNL in order to promote self-care activities pain-free.  3. Patient will demonstrate an increase in left ankle strength of 4+/5 on MMT.  4. Patient will be able to perform a full, functional squat with proper mechanics.   5. Patient will be able to ascend/descend 5 stairs reciprocally.       Plan  Patient would benefit from: skilled physical therapy  Planned modality interventions: cryotherapy, electrical stimulation/Russian stimulation, TENS, ultrasound, thermotherapy: hydrocollator packs, unattended electrical stimulation, high voltage pulsed current: pain management and high voltage pulsed current: spasm management    Planned therapy interventions: flexibility, functional ROM exercises, graded exercise, home exercise program, joint mobilization, manual therapy, neuromuscular re-education, patient education, strengthening, stretching, therapeutic exercise, therapeutic activities, Baca taping, balance/weight bearing training, gait training, abdominal trunk stabilization, activity modification, balance, body mechanics training, graded activity, self care, postural training, IADL retraining, ADL training, breathing training, behavior modification, muscle pump exercises, therapeutic training and transfer training    Frequency: 2x week  Duration in weeks: 6  Plan of Care beginning date: 10/3/2024  Plan of Care expiration date: 12/31/2024  Treatment plan discussed with: patient  Plan details: Patient has verbalized understanding and agreement with insurance verification form.     Subjective Evaluation    History of Present Illness  Mechanism of injury: Pt reports left foot/ankle pain over the past 4-5 years. Pt states her foot is deformed from walking differently. Pt states she also has neuropathy in her feet and gout. Pt states that she has pain in her heel and forefoot. Pt states she also has knee  arthritis. Pt states that she also has a history of left ankle fracture ~15 years ago and has hardware in that ankle. Pt saw podiatry 2 weeks ago and was referred to PT. Pt states she wishes to avoid an ankle surgery.   Patient Goals  Patient goals for therapy: decreased pain  Patient goal: To be able to walk with less pain  Pain  Current pain ratin  At best pain ratin  At worst pain ratin  Location: Left heel and forefoot (plantar)  Quality: sharp and radiating  Alleviating factors: Tylenol and voltaren (cannot take NSAIDs)  Aggravating factors: walking and stair climbing  Progression: worsening    Social Support  Steps to enter house: yes  4  Stairs in house: no   Lives in: one-story house  Lives with: adult children    Employment status: not working  Hand dominance: right  Exercise history: None      Diagnostic Tests  X-ray: abnormal (Left heel spur )  Treatments  Previous treatment: injection treatment      Objective     Active Range of Motion   Left Ankle/Foot   Dorsiflexion (ke): 5 degrees   Plantar flexion: 40 degrees   Inversion: 25 degrees   Eversion: 15 degrees     Right Ankle/Foot   Dorsiflexion (ke): 8 degrees   Plantar flexion: 55 degrees   Inversion: 35 degrees   Eversion: 25 degrees     Strength/Myotome Testing     Left Ankle/Foot   Dorsiflexion: 4  Plantar flexion: 4-  Inversion: 4-  Eversion: 3    Right Ankle/Foot   Dorsiflexion: 5  Plantar flexion: 5  Inversion: 5  Eversion: 5    Ambulation     Comments   Ambulates with rollator with antalgic gait, decreased stance time LLE             Insurance:  AMA/CMS Eval/ Re-eval POC expires FOTO Auth Status   CMS - [facility] 10/3/24 12/31/24  None req                                    1  10/3 2.  3.  4.  5.  6.    7.  8.  9.  10.  11.  12.    13.  14.  15.  16.  17.  18.    19.  20. 21. 22. 23. 24.    25.  26. 27. 28. 29. 30.   31. 32.  33. 34. 35. 36.        Precautions: Cardiac, COPD       EVAL 2 3 4 5 6   Manuals         STM/MFR         Manual  flexibility         Joint mobs ankle                  Ther Ex         Bike         Ankle pumps 10x        Ankle circles 10/10        Gastroc stretch 1x15s seated                                                      Neuro Re-Ed         SLS         Towel toe curls                                                                        Ther Activity         Pt education POC, HEP

## 2024-10-08 ENCOUNTER — OFFICE VISIT (OUTPATIENT)
Dept: OCCUPATIONAL THERAPY | Facility: CLINIC | Age: 74
End: 2024-10-08
Payer: MEDICARE

## 2024-10-08 ENCOUNTER — TELEPHONE (OUTPATIENT)
Age: 74
End: 2024-10-08

## 2024-10-08 DIAGNOSIS — M18.0 ARTHRITIS OF CARPOMETACARPAL (CMC) JOINT OF BOTH THUMBS: Primary | ICD-10-CM

## 2024-10-08 PROCEDURE — 97110 THERAPEUTIC EXERCISES: CPT

## 2024-10-08 PROCEDURE — 97530 THERAPEUTIC ACTIVITIES: CPT

## 2024-10-08 NOTE — ASSESSMENT & PLAN NOTE
Much improved and advised not to stop medications without medical advice and to call for any needed refills prior to next appointment  Asked patient to call sooner than next scheduled appointment for any complaints or issues  no

## 2024-10-08 NOTE — PROGRESS NOTES
"Daily Note     Today's date: 10/8/2024  Patient name: Zulma Marcelo  : 1950  MRN: [id number]  Referring provider: Chester Lopez*  Dx:   Encounter Diagnosis     ICD-10-CM    1. Arthritis of carpometacarpal (CMC) joint of both thumbs  M18.0           Start Time: 0800  Stop Time: 0845  Total time in clinic (min): 45 minutes    Subjective: Patient reports irritation to the thumb portion of the custom splint for the R hand. Patient reports being unable to complete HEP this past weekend.       Objective: See treatment diary below           Precautions: Universal. Fall risk.         Auth Tracker  Auth Status Total   Visits  Expiration date Co-Insurance   Approved                                  Visit Tracker: NO AUTH REQ. BOMN  Date 10/3  IE 10/8                                                                                   Manuals HEP 10/8/2024                       Ther Ex     Patient education on Dx and HEP  x5min   Adjustment of R thumb splint  x5min   Wrist AROM x 3x10   Thumb AROM  x 3x10   Thumb Opposition x 3x10        Ther Activity     Dice, IHM  Whole container   Dice, opposition  Whole container    Lacrosse \"ABC\"s  A-M and M-Z bilaterally    Towel scrunches  x5             Modalities     MHP  x5min            Assessment:   Patient tolerated session well. Session focused on HEP education, range of motion, thumb webspace active thumb opposition, pain free isometric ROM abd/add exercises, joint protection strategies, adaptive equipment (foam gripper), custom orthotic splint adjustment and all modalities as seen fit. Educated patient on adaptive equipment - foam gripper to assist with participating leisure activity of coloring due to difficulty holding colored pen secondary to pain and ROM deficits. Patient tolerated all TA & TE with moderate complaints of pain. Patient progressing well towards goals. Patient benefiting from skilled hand therapy OT to reduce deficits to improve independence " with daily activities.           Plan:   Focus on AROM, AAROM, PROM, tendon gliding, Thumb webspace active thumb opposition, thenar strengthening, pain free isometric thumb flexion/adduction exercises, joint protection strategies, custom orthotic fit/train, bracing/splinting as needed, adaptive equipment, UE strengthening and all modalities as seen fit to improve ability to complete daily activites with ease.  POC: 10/3/2024 - 11/28/2024

## 2024-10-10 ENCOUNTER — OFFICE VISIT (OUTPATIENT)
Dept: PHYSICAL THERAPY | Facility: CLINIC | Age: 74
End: 2024-10-10
Payer: MEDICARE

## 2024-10-10 ENCOUNTER — OFFICE VISIT (OUTPATIENT)
Dept: OCCUPATIONAL THERAPY | Facility: CLINIC | Age: 74
End: 2024-10-10
Payer: MEDICARE

## 2024-10-10 DIAGNOSIS — M77.9 TENDONITIS: ICD-10-CM

## 2024-10-10 DIAGNOSIS — M18.0 ARTHRITIS OF CARPOMETACARPAL (CMC) JOINT OF BOTH THUMBS: Primary | ICD-10-CM

## 2024-10-10 DIAGNOSIS — G89.29 CHRONIC PAIN OF LEFT ANKLE: Primary | ICD-10-CM

## 2024-10-10 DIAGNOSIS — M25.572 CHRONIC PAIN OF LEFT ANKLE: Primary | ICD-10-CM

## 2024-10-10 DIAGNOSIS — R29.6 FREQUENT FALLS: ICD-10-CM

## 2024-10-10 PROCEDURE — 97110 THERAPEUTIC EXERCISES: CPT

## 2024-10-10 PROCEDURE — 97110 THERAPEUTIC EXERCISES: CPT | Performed by: PHYSICAL THERAPIST

## 2024-10-10 PROCEDURE — 97530 THERAPEUTIC ACTIVITIES: CPT

## 2024-10-10 NOTE — PROGRESS NOTES
Daily Note     Today's date: 10/10/2024  Patient name: Zulma Marcelo  : 1950  MRN: [id number]  Referring provider: Lydia Cruz MD  Dx:   Encounter Diagnosis     ICD-10-CM    1. Chronic pain of left ankle  M25.572     G89.29       2. Tendonitis  M77.9       3. Frequent falls  R29.6                      Subjective: Pt is complaining she is not better since starting PT, she is aware that prehab is beneficial before surgery. She had pain in her knees with recumbent bike today. Pt is inquiring why most of her therex she can feel in her ankle.       Objective: See treatment diary below      Assessment: Tolerated treatment fair. Patient has discomfort throughout therex, pain and fatigue limit her with majority of therex. Swelling posterior medial malleolous. Post session pt is reporting she is sore. She is exercising her ankle throughout her day she is describing which may be provoking ongoing soreness.       Plan: Continue per plan of care.      Insurance:  AMA/CMS Eval/ Re-eval POC expires FOTO Auth Status   CMS - [facility] 10/3/24 12/31/24  None req                                    1  10/3 2.   10/10 3.  4.  5.  6.      7.   8.   9.   10.   11.   12.   13.   14.   15.   16.   17.   18.   19.   20.  21.  22.  23.  24.   25.   26.  27.  28.  29.  30.  31.  32.   33.  34.  35.  36.       Precautions: Cardiac, COPD       EVAL 2 3 4 5 6   Manuals         STM/MFR         Manual flexibility         Joint mobs ankle                  Ther Ex         Bike  5'       Ankle pumps 10x x10       Ankle circles 10/10 x10       Gastroc stretch 1x15s seated  5s x 10       Soleus str  5s x10       Fitter board  All directions seated 25x ea                                  Neuro Re-Ed         SLS         Towel toe curls  20x                                                                      Ther Activity         Pt education POC, HEP

## 2024-10-10 NOTE — PROGRESS NOTES
"Daily Note     Today's date: 10/10/2024  Patient name: Zulma Marcelo  : 1950  MRN: [id number]  Referring provider: Chester Lopez*  Dx:   Encounter Diagnosis     ICD-10-CM    1. Arthritis of carpometacarpal (CMC) joint of both thumbs  M18.0           Start Time: 1145  Stop Time: 1230  Total time in clinic (min): 45 minutes    Subjective: Patient reports getting heating gloves that she uses daily in addition to wearing the custom short opponens splints underneath the heating gloves to assist with pain and the arthritis bilateral hands. Patient reports increase participation in leisure activities/coloring utilizing the foam grippers that was provided to her at last session.           Objective: See treatment diary below           Precautions: Universal. Fall risk.         Auth Tracker  Auth Status Total   Visits  Expiration date Co-Insurance   Approved                                  Visit Tracker: NO AUTH REQ. BOMN  Date 10/3  IE 10/8 10/10                                                                                  Manuals HEP 10/10/2024                       Ther Ex     Patient education on Dx and HEP  x5min   Custom short thumb spica hand based opponens splint L hand  x20min   Wrist AROM x x10   Thumb AROM  x 3x10   Thumb Opposition x 3x10        Ther Activity     Dice, IHM  Whole container   Dice, opposition  Whole container    Lacrosse \"ABC\"s  A-M and M-Z bilaterally    Towel scrunches  x5             Modalities     MHP  Not performed             Assessment:   Patient tolerated session well. The physician's order and diagnosis were confirmed prior evaluation/assessment and placement of orthotic.  This orthotic was deemed medically necessary by the ordering physician. The orthotic size was determined after performing appropriate custom measurements. The orthotic was then applied to the patient, securing all straps with moderate tension.  The orthotic was then re-evaluated to confirm fit and " verify that no compromise of circulation was occurring.  All bony prominences were evaluated and padding was utilized as needed to further protect bony prominences.  Application instructions and care of the orthotic were reviewed in detail with the patient, including need for regular inspection of the orthotic.  It was verified that the patient was able to perform independent application and removal of the orthotic with appropriate technique.  Usage instructions were reviewed as per the physician's order.  The patient verbalized an understanding of all instructions. Session focused on HEP education, range of motion, thumb webspace active thumb opposition, pain free isometric ROM abd/add exercises, joint protection strategies, adaptive equipment (foam gripper) custom orthotic splint adjustment and all modalities as seen fit. Patient tolerated all TA & TE with moderate complaints of pain bilaterally. Patient progressing well towards goals. Patient benefiting from skilled hand therapy OT to reduce deficits to improve independence with daily activities.           Plan:   Focus on AROM, AAROM, PROM, tendon gliding, Thumb webspace active thumb opposition, thenar strengthening, pain free isometric thumb flexion/adduction exercises, joint protection strategies, custom orthotic fit/train, bracing/splinting as needed, adaptive equipment, UE strengthening and all modalities as seen fit to improve ability to complete daily activites with ease.  POC: 10/3/2024 - 11/28/2024

## 2024-10-11 ENCOUNTER — SOCIAL WORK (OUTPATIENT)
Dept: BEHAVIORAL/MENTAL HEALTH CLINIC | Facility: CLINIC | Age: 74
End: 2024-10-11
Payer: MEDICARE

## 2024-10-11 DIAGNOSIS — F33.41 RECURRENT MAJOR DEPRESSIVE DISORDER, IN PARTIAL REMISSION (HCC): Primary | ICD-10-CM

## 2024-10-11 PROCEDURE — 90834 PSYTX W PT 45 MINUTES: CPT | Performed by: SOCIAL WORKER

## 2024-10-14 NOTE — PSYCH
Behavioral Health Psychotherapy Progress Note    Psychotherapy Provided: Individual Psychotherapy     1. Recurrent major depressive disorder, in partial remission (HCC)            Goals addressed in session: Goal 1     DATA: Zulma reports feeling frustrated and emotional stating that it has been a challenging week. She has been very worried about her granddaughter Chris who lives in [place] and has been waiting on updates to see how she and their home is. She and Yin have been arguing and are currently not speaking to one another, and she found drug paraphernalia in her home presumably from Ponce. As such she is cutting ties with him as well. She discussed feeling disgusted by their current states and committed to focusing on only she and Rhett. She reports feeling a lot of pressure because Rhett cannot care for himself as well. She assumes partial responsibility for this because she feels that her home was probably chaotic for the kids growing up. She reports being very angry with them however in the way that they have chosen to grow up. She is receiving support from her [employer]  and is thankful for that. Writer provided emotional support and encouragement to take some time for herself away from Yin and Ponce so that she can work through her anger. Refocused energy to what she can control and additional supports available to her, such as at the [employer]. No new symptoms or safety concerns. Return in 2 weeks.   During this session, this clinician used the following therapeutic modalities: Client-centered Therapy    Substance Abuse was not addressed during this session. If the client is diagnosed with a co-occurring substance use disorder, please indicate any changes in the frequency or amount of use: NA. Stage of change for addressing substance use diagnoses: No substance use/Not applicable    ASSESSMENT:  Zulma Marcelo presents with a Euthymic/ normal mood.     her affect is Normal  "range and intensity, which is congruent, with her mood and the content of the session. The client has made progress on their goals.    During this session the client was oriented to person, place, and time. The client was engaged in the session. The client was able to sustain direct eye contact and was without psychomotor agitation. The client's thought processes were linear and clear.   Zulma Marcelo presents with a none risk of suicide, none risk of self-harm, and none risk of harm to others.    For any risk assessment that surpasses a \"low\" rating, a safety plan must be developed.    A safety plan was indicated: no  If yes, describe in detail NA    PLAN: Between sessions, Zulma Marcelo will take medication as prescribed and practice positive coping strategies as needed . At the next session, the therapist will use Cognitive Processing Therapy to address stressors.    Behavioral Health Treatment Plan and Discharge Planning: Zulma Marcelo is aware of and agrees to continue to work on their treatment plan. They have identified and are working toward their discharge goals. yes    Visit start and stop times:    10/14/24  Start Time: 1300  Stop Time: 1350  Total Visit Time: 50 minutes  "

## 2024-10-15 ENCOUNTER — OFFICE VISIT (OUTPATIENT)
Dept: PHYSICAL THERAPY | Facility: CLINIC | Age: 74
End: 2024-10-15
Payer: MEDICARE

## 2024-10-15 ENCOUNTER — OFFICE VISIT (OUTPATIENT)
Dept: OCCUPATIONAL THERAPY | Facility: CLINIC | Age: 74
End: 2024-10-15
Payer: MEDICARE

## 2024-10-15 DIAGNOSIS — R29.6 FREQUENT FALLS: ICD-10-CM

## 2024-10-15 DIAGNOSIS — G89.29 CHRONIC PAIN OF LEFT ANKLE: Primary | ICD-10-CM

## 2024-10-15 DIAGNOSIS — M25.572 CHRONIC PAIN OF LEFT ANKLE: Primary | ICD-10-CM

## 2024-10-15 DIAGNOSIS — M77.9 TENDONITIS: ICD-10-CM

## 2024-10-15 DIAGNOSIS — M18.0 ARTHRITIS OF CARPOMETACARPAL (CMC) JOINT OF BOTH THUMBS: Primary | ICD-10-CM

## 2024-10-15 PROCEDURE — 97110 THERAPEUTIC EXERCISES: CPT

## 2024-10-15 PROCEDURE — 97530 THERAPEUTIC ACTIVITIES: CPT

## 2024-10-15 NOTE — PROGRESS NOTES
"Daily Note     Today's date: 10/15/2024  Patient name: Zulma Marcelo  : 1950  MRN: [id number]  Referring provider: Chester Lopez*  Dx:   Encounter Diagnosis     ICD-10-CM    1. Arthritis of carpometacarpal (CMC) joint of both thumbs  M18.0           Start Time: 1240  Stop Time: 1325  Total time in clinic (min): 45 minutes    Subjective: Patient reports consistency with HEP and buying dice to complete HEP.         Objective: See treatment diary below           Precautions: Universal. Fall risk.         Auth Tracker  Auth Status Total   Visits  Expiration date Co-Insurance   Approved                                  Visit Tracker: NO AUTH REQ. BOMN  Date 10/3  IE 10/8 10/10 10/15                                                                                 Manuals HEP 10/15/2024                       Ther Ex     Patient education on Dx and HEP  x5min   Wrist AROM x x10   Thumb AROM  x 3x10   Thumb Opposition x 3x10        Ther Activity     Dice, IHM     Dice, opposition     Lacrosse \"ABC\"s  A-M and M-Z bilaterally    Towel scrunches  x5   Sponge squeezes red Red x5-10 bilaterally    First web space interossei strengthening with rubberband (index finger) x Yellow  x5-10 bilaterally    juxaciser  X3 bilaterally         Modalities     MHP  x5min            Assessment:   Patient tolerated session well. Session focused on HEP education, range of motion, thumb webspace active thumb opposition, pain free isometric ROM abd/add exercises, joint protection strategies, adaptive equipment (foam gripper), custom orthotic splint adjustment and all modalities as seen fit. Patient tolerated all TA & TE with moderate complaints of pain bilaterally. Patient progressing well towards goals. Patient benefiting from skilled hand therapy OT to reduce deficits to improve independence with daily activities.           Plan:   Focus on AROM, AAROM, PROM, tendon gliding, Thumb webspace active thumb opposition, thenar " strengthening, pain free isometric thumb flexion/adduction exercises, joint protection strategies, custom orthotic fit/train, bracing/splinting as needed, adaptive equipment, UE strengthening and all modalities as seen fit to improve ability to complete daily activites with ease.  POC: 10/3/2024 - 11/28/2024

## 2024-10-15 NOTE — PROGRESS NOTES
"Daily Note     Today's date: 10/15/2024  Patient name: Zulma Marcelo  : 1950  MRN: [id number]  Referring provider: Lydia Cruz MD  Dx:   Encounter Diagnosis     ICD-10-CM    1. Chronic pain of left ankle  M25.572     G89.29       2. Tendonitis  M77.9       3. Frequent falls  R29.6           Start Time: 1325  Stop Time: 1410  Total time in clinic (min): 45 minutes    Subjective: Pt  reports that the ankle feels good today; she has an increasing amount of pain in R knee.      Objective: See treatment diary below      Assessment: Tolerated treatment fair. Held on ankle inversion with YTB resistance due to pt report of pain in peroneals/gastroc region. Introduced HR/TR as well as ankle alphabet and pt tolerated with proper muscle fatiuge. Pt states that she feels limited by R knee pain today and was unable to perform nustep due to this pain. Performed off loaded exercises 2* knee pain.       Plan: Continue per plan of care.      Insurance:  AMA/CMS Eval/ Re-eval POC expires FOTO Auth Status   CMS - [facility] 10/3/24 12/31/24  None req                                    1  10/3 2.   10/10 3.   10/15 4.  5.  6.      7.   8.   9.   10.   11.   12.   13.   14.   15.   16.   17.   18.   19.   20.  21.  22.  23.  24.   25.   26.  27.  28.  29.  30.  31.  32.   33.  34.  35.  36.       Precautions: Cardiac, COPD       EVAL 2 3 4 5 6   Manuals         STM/MFR         Manual flexibility         Joint mobs ankle                  Ther Ex         Bike  5' 1' pain      Ankle pumps 10x x10       Ankle circles 10/10 x10 2xABBCs      Gastroc stretch 1x15s seated  5s x 10 10\" x5 green strap      Soleus str  5s x10 5x10\" green strap seated       Fitter board  All directions seated 25x ea 25x all direction         HR/TR x30 ea.         Ev/inv 20xYTB         Marches 2x10 seated      Neuro Re-Ed         SLS         Towel toe curls  20x 20x                                                                     Ther Activity         Pt " education POC, HEP

## 2024-10-17 ENCOUNTER — OFFICE VISIT (OUTPATIENT)
Dept: PHYSICAL THERAPY | Facility: CLINIC | Age: 74
End: 2024-10-17
Payer: MEDICARE

## 2024-10-17 ENCOUNTER — OFFICE VISIT (OUTPATIENT)
Dept: OCCUPATIONAL THERAPY | Facility: CLINIC | Age: 74
End: 2024-10-17
Payer: MEDICARE

## 2024-10-17 DIAGNOSIS — G89.29 CHRONIC PAIN OF LEFT ANKLE: Primary | ICD-10-CM

## 2024-10-17 DIAGNOSIS — M25.572 CHRONIC PAIN OF LEFT ANKLE: Primary | ICD-10-CM

## 2024-10-17 DIAGNOSIS — M18.0 ARTHRITIS OF CARPOMETACARPAL (CMC) JOINT OF BOTH THUMBS: Primary | ICD-10-CM

## 2024-10-17 DIAGNOSIS — R29.6 FREQUENT FALLS: ICD-10-CM

## 2024-10-17 DIAGNOSIS — M77.9 TENDONITIS: ICD-10-CM

## 2024-10-17 PROCEDURE — 97110 THERAPEUTIC EXERCISES: CPT

## 2024-10-17 PROCEDURE — 97530 THERAPEUTIC ACTIVITIES: CPT

## 2024-10-17 NOTE — PROGRESS NOTES
"Daily Note      Today's date: 10/17/2024  Patient name: Zulma Marcelo  : 1950  MRN: [id number]  Referring provider: Lydia Cruz MD  Dx:   Encounter Diagnosis     ICD-10-CM    1. Chronic pain of left ankle  M25.572     G89.29       2. Tendonitis  M77.9           Subjective: Pt reports that her \"arthritis is giving me a hard time.\"        Objective: See treatment diary below    Assessment: Pt tolerated treatment well.  Pt continues to present with limited mobility and control of the left ankle into inversion and eversion. Pt noted more difficulty with ankle inversion against theraband resistance. Pt progressed to standing heel raises and hip abduction and extension and noted fatigue with heel raises.     Plan: Continue per plan of care.  Progress treatment as tolerated.          Insurance:  AMA/CMS Eval/ Re-eval POC expires FOTO Auth Status   CMS - [facility] 10/3/24 12/31/24  None req                                  1  10/3 2.   10/10 3.  10/15  4.  10/17  5.  6.    7.  8.  9.  10.  11.  12.    13.  14.  15.  16.  17.  18.    19.  20. 21. 22. 23. 24.    25.  26. 27. 28. 29. 30.   31. 32.  33. 34. 35. 36.          Precautions: Cardiac, COPD       EVAL 2 3 4 5 6   Manuals 10/3/24 10/10/24 10/15/24 10/17/24     STM/MFR         Manual flexibility         Joint mobs ankle                  Ther Ex         Bike  5' 1' pain Nustep 8' L5     Ankle pumps 10x x10  30x     Ankle circles 10/10 x10 2xABBCs 20/20     Gastroc stretch 1x15s seated  5s x 10 10\" x5 green strap 5x15s      Soleus str  5s x10 5x10\" green strap seated  5x15s     Fitter board  All directions seated 25x ea 25x all direction 20x A-P, M-L, circles     HR/TR   HR/TR x30 ea. 30x ea. Seated  10x standing HR     Theraband 4-way   Ev/inv 20xYTB 20x YTB     Seated  2x10 seated 2x10     Neuro Re-Ed         SLS         Towel toe curls  20x 20x HEP     Standing hip abd/ext    10x ea. B/L                                                          "   Ther Activity         Pt education POC, HEP

## 2024-10-17 NOTE — PROGRESS NOTES
"Daily Note     Today's date: 10/17/2024  Patient name: Zulma Marcelo  : 1950  MRN: [id number]  Referring provider: Chester Lopez*  Dx:   Encounter Diagnosis     ICD-10-CM    1. Arthritis of carpometacarpal (CMC) joint of both thumbs  M18.0           Start Time: 1145  Stop Time: 1230  Total time in clinic (min): 45 minutes    Subjective: Patient reports that the hands are feeling good today; \"not bad\", continues to note consistency with HEP. Patient notes the L thumb is hurting more than the R thumb.          Objective: See treatment diary below           Precautions: Universal. Fall risk.         Auth Tracker  Auth Status Total   Visits  Expiration date Co-Insurance   Approved                                  Visit Tracker: NO AUTH REQ. BOMN  Date 10/3  IE 10/8 10/10 10/15 10/17                                                                                Manuals HEP 10/17/2024                       Ther Ex     Patient education on Dx and HEP  x5min   Wrist AROM x x10   Thumb AROM  x 3x10   Thumb Opposition x 3x10   Kinesio tape CMC joint with patient education  X8min         Ther Activity     Dice, IHM  Whole container bilaterally   Dice, opposition number 5 dice  Whole container   Lacrosse \"ABC\"s  A-M and M-Z bilaterally    Towel scrunches  x5   Sponge squeezes red Red x5-10 bilaterally    First web space interossei strengthening with rubberband (index finger) x Yellow  x5-10 bilaterally    juxaciser  X3 bilaterally    Dice with pincer  Yellow 3pt bilaterally        Modalities     MHP  x5min            Assessment:   Patient tolerated session well. Session focused on HEP education, range of motion, thumb webspace active thumb opposition, pain free isometric ROM abd/add exercises, joint protection strategies, adaptive equipment (foam gripper), custom orthotic splint adjustment and all modalities as seen fit. Initiated kinesio tape to bilateral CMC joints for joint protection and positioning. " Educated patient on potential adverse effects and to remove the tape within 3 to 5 days of placement. Patient tolerated all TA & TE with moderate complaints of pain bilaterally. Patient progressing well towards goals. Patient benefiting from skilled hand therapy OT to reduce deficits to improve independence with daily activities.           Plan:   Focus on AROM, AAROM, PROM, tendon gliding, Thumb webspace active thumb opposition, thenar strengthening, pain free isometric thumb flexion/adduction exercises, joint protection strategies, custom orthotic fit/train, bracing/splinting as needed, adaptive equipment, UE strengthening and all modalities as seen fit to improve ability to complete daily activites with ease.  POC: 10/3/2024 - 11/28/2024

## 2024-10-21 ENCOUNTER — SOCIAL WORK (OUTPATIENT)
Dept: BEHAVIORAL/MENTAL HEALTH CLINIC | Facility: CLINIC | Age: 74
End: 2024-10-21
Payer: MEDICARE

## 2024-10-21 DIAGNOSIS — F33.41 RECURRENT MAJOR DEPRESSIVE DISORDER, IN PARTIAL REMISSION (HCC): Primary | ICD-10-CM

## 2024-10-21 PROCEDURE — 90834 PSYTX W PT 45 MINUTES: CPT | Performed by: SOCIAL WORKER

## 2024-10-22 ENCOUNTER — APPOINTMENT (OUTPATIENT)
Dept: PHYSICAL THERAPY | Facility: CLINIC | Age: 74
End: 2024-10-22
Payer: MEDICARE

## 2024-10-22 ENCOUNTER — APPOINTMENT (OUTPATIENT)
Dept: OCCUPATIONAL THERAPY | Facility: CLINIC | Age: 74
End: 2024-10-22
Payer: MEDICARE

## 2024-10-24 ENCOUNTER — APPOINTMENT (OUTPATIENT)
Dept: OCCUPATIONAL THERAPY | Facility: CLINIC | Age: 74
End: 2024-10-24
Payer: MEDICARE

## 2024-10-24 ENCOUNTER — APPOINTMENT (OUTPATIENT)
Dept: PHYSICAL THERAPY | Facility: CLINIC | Age: 74
End: 2024-10-24
Payer: MEDICARE

## 2024-10-24 NOTE — PSYCH
Behavioral Health Psychotherapy Progress Note    Psychotherapy Provided: Individual Psychotherapy     1. Recurrent major depressive disorder, in partial remission (HCC)            Goals addressed in session: Goal 1     DATA: Zulma reports feeling ok overall. She has still not spoken to her daughter Yin or her son Ponce since our last session. She believes that it is best for her to keep her distance because she sees their own individual addictions as barriers in being able to connect and understand one another. She reports having made her peace with this and in a place of acceptance. 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 She continues to feel the support of her [employer]  who is helping manage her finances. Overall anxiety is tolerable, and minimal depressive symptoms are noted. Return in 2 weeks.   During this session, this clinician used the following therapeutic modalities: Client-centered Therapy    Substance Abuse was not addressed during this session. If the client is diagnosed with a co-occurring substance use disorder, please indicate any changes in the frequency or amount of use: NA. Stage of change for addressing substance use diagnoses: No substance use/Not applicable    ASSESSMENT:  Zulma Marcelo presents with a Euthymic/ normal mood.     her affect is Normal range and intensity, which is congruent, with her mood and the content of the session. The client has made progress on their goals.    During this session the client was oriented to person, place, and time. The client was engaged in the session. The client was able to sustain direct eye contact and was without psychomotor agitation. The client's thought processes were linear and clear.   Zulma Marcelo presents with a none risk of suicide, none risk of self-harm, and none risk of harm to others.    For any risk assessment that surpasses a  "\"low\" rating, a safety plan must be developed.    A safety plan was indicated: no  If yes, describe in detail NA    PLAN: Between sessions, Zulma Marcelo will take medication as prescribed and practice positive coping strategies as needed . At the next session, the therapist will use Client-centered Therapy to address stressors.    Behavioral Health Treatment Plan and Discharge Planning: Zulma Marcelo is aware of and agrees to continue to work on their treatment plan. They have identified and are working toward their discharge goals. yes    Visit start and stop times:    10/24/24  Start Time: 1000  Stop Time: 1050  Total Visit Time: 50 minutes  "

## 2024-10-29 ENCOUNTER — APPOINTMENT (OUTPATIENT)
Dept: PHYSICAL THERAPY | Facility: CLINIC | Age: 74
End: 2024-10-29
Payer: MEDICARE

## 2024-10-29 ENCOUNTER — APPOINTMENT (OUTPATIENT)
Dept: OCCUPATIONAL THERAPY | Facility: CLINIC | Age: 74
End: 2024-10-29
Payer: MEDICARE

## 2024-10-29 DIAGNOSIS — M10.071 ACUTE IDIOPATHIC GOUT OF RIGHT FOOT: ICD-10-CM

## 2024-10-30 RX ORDER — ALLOPURINOL 100 MG/1
100 TABLET ORAL DAILY
Qty: 90 TABLET | Refills: 1 | Status: SHIPPED | OUTPATIENT
Start: 2024-10-30

## 2024-10-31 ENCOUNTER — TELEPHONE (OUTPATIENT)
Age: 74
End: 2024-10-31

## 2024-10-31 ENCOUNTER — APPOINTMENT (OUTPATIENT)
Dept: OCCUPATIONAL THERAPY | Facility: CLINIC | Age: 74
End: 2024-10-31
Payer: MEDICARE

## 2024-10-31 ENCOUNTER — APPOINTMENT (OUTPATIENT)
Dept: PHYSICAL THERAPY | Facility: CLINIC | Age: 74
End: 2024-10-31
Payer: MEDICARE

## 2024-10-31 NOTE — TELEPHONE ENCOUNTER
PT contacted at 4:45, had 4:30 appt on 10/31. Call center forward note to confirm Lyft ride, at 3:00 pm. Spoke with son of PT stated she was waiting for Lyft outside (4:30). Lyft was not scheduled but call center in morning told son it was. Apologizes, and  PT was understanding. Rescheduled for 11/12 will need Lyft scheduled.

## 2024-10-31 NOTE — TELEPHONE ENCOUNTER
Caller: Umer Marcelo    Doctor: Ras    Reason for call: calling to verify FLORY figueroa has been set up for her appt today     Call back#: [phone]

## 2024-11-01 ENCOUNTER — TELEPHONE (OUTPATIENT)
Age: 74
End: 2024-11-01

## 2024-11-01 NOTE — TELEPHONE ENCOUNTER
Contacted son and he confirmed that the number we have for Zulma was in fact, a cell number. I updated that in her registration. Changed to mobile.    The Lyft that was scheduled is still good.

## 2024-11-04 ENCOUNTER — TELEPHONE (OUTPATIENT)
Age: 74
End: 2024-11-04

## 2024-11-04 ENCOUNTER — TELEMEDICINE (OUTPATIENT)
Dept: BEHAVIORAL/MENTAL HEALTH CLINIC | Facility: CLINIC | Age: 74
End: 2024-11-04
Payer: MEDICARE

## 2024-11-04 ENCOUNTER — TELEPHONE (OUTPATIENT)
Dept: PSYCHIATRY | Facility: CLINIC | Age: 74
End: 2024-11-04

## 2024-11-04 DIAGNOSIS — F33.41 RECURRENT MAJOR DEPRESSIVE DISORDER, IN PARTIAL REMISSION (HCC): Primary | ICD-10-CM

## 2024-11-04 PROCEDURE — 90834 PSYTX W PT 45 MINUTES: CPT | Performed by: SOCIAL WORKER

## 2024-11-04 NOTE — TELEPHONE ENCOUNTER
Patient and patient's son, Rhett, called stating that patient's ride never showed for her appointment at 10am.  Writer attempted to warm transfer and relayed a message to patient offering virtual or later in-person appointment.    Patient wants in-person appt for 11am.  If unable to receive transportation, Rhett will assist patient with virtual therapy.

## 2024-11-04 NOTE — TELEPHONE ENCOUNTER
Writer tried to call pt back to do a virtual appt. Garthr's phone is not working. Just need to give instructions on how to do a virtual appt

## 2024-11-06 NOTE — PSYCH
Virtual Regular Visit    Verification of patient location:    Patient is located at Home in the following state in which I hold an active license [place]      Assessment/Plan:    Problem List Items Addressed This Visit    None  Visit Diagnoses       Recurrent major depressive disorder, in partial remission (HCC)    -  Primary            Goals addressed in session: Goal 1          Reason for visit is No chief complaint on file.       Encounter provider Vannesa Stone LCSW      Recent Visits  Date Type Provider Dept   11/04/24 Telemedicine Vannesa Stone LCSW [facility] Therapist [facility]   Showing recent visits within past 7 days and meeting all other requirements  Future Appointments  No visits were found meeting these conditions.  Showing future appointments within next 150 days and meeting all other requirements       The patient was identified by name and date of birth. Zulma Marcelo was informed that this is a telemedicine visit and that the visit is being conducted throughthe Epic Embedded platform. She agrees to proceed..  My office door was closed. No one else was in the room.  She acknowledged consent and understanding of privacy and security of the video platform. The patient has agreed to participate and understands they can discontinue the visit at any time.    Patient is aware this is a billable service.     Subjective  Zulma Marcelo is a 74 y.o. female  .      HPI     Past Medical History:   Diagnosis Date    Acid reflux     Anxiety     Arthritis     Asthma     Cardiac disease     Chronic kidney disease     passed on own kidney stone    Depression     Diverticulitis     GERD (gastroesophageal reflux disease)     Hayfever     [place] (hard of hearing)     bilateral hearing aids    Hyperlipemia     Hypertension     Panic attack     Tachycardia        Past Surgical History:   Procedure Laterality Date    ABLATION OF DYSRHYTHMIC FOCUS      APPENDECTOMY      CHOLECYSTECTOMY      open     FRACTURE SURGERY      ORIF fx (L) tibia, fibula 2009    GASTRIC BYPASS OPEN      HYSTERECTOMY      CA XCAPSL CTRC RMVL INSJ IO LENS PROSTH W/O ECP Left 4/18/2016    Procedure: EXTRACTION EXTRACAPSULAR CATARACT PHACO INTRAOCULAR LENS (IOL);  Surgeon: Dane Wells MD;  Location: [facility] MAIN OR;  Service: Ophthalmology    CA XCAPSL CTRC RMVL INSJ IO LENS PROSTH W/O ECP Right 3/1/2021    Procedure: EXTRACTION EXTRACAPSULAR CATARACT PHACO INTRAOCULAR LENS (IOL);  Surgeon: Dane Wells MD;  Location: [facility] MAIN OR;  Service: Ophthalmology    TONSILECTOMY AND ADNOIDECTOMY         Current Outpatient Medications   Medication Sig Dispense Refill    albuterol (PROVENTIL HFA,VENTOLIN HFA) 90 mcg/act inhaler Inhale 2 puffs every 6 (six) hours as needed for wheezing 20.4 g 3    allopurinol (ZYLOPRIM) 100 mg tablet TAKE ONE TABLET BY MOUTH EVERY DAY 90 tablet 1    aspirin 81 mg chewable tablet Chew 1 tablet (81 mg total) daily 90 tablet 1    buPROPion (WELLBUTRIN XL) 150 mg 24 hr tablet Take 1 tablet (150 mg total) by mouth daily 90 tablet 1    busPIRone (BUSPAR) 5 mg tablet Take 1 tablet (5 mg total) by mouth 3 (three) times a day 270 tablet 1    dextromethorphan-guaifenesin (MUCINEX DM)  MG per 12 hr tablet Take 1 tablet by mouth every 12 (twelve) hours as needed for cough 30 tablet 0    Diclofenac Sodium (VOLTAREN) 1 % Apply 2 g topically 4 (four) times a day 100 g 3    Diclofenac Sodium (VOLTAREN) 1 % Apply 2 g topically 4 (four) times a day 50 g 3    donepezil (ARICEPT) 10 mg tablet Take 1 tablet (10 mg total) by mouth daily at bedtime 90 tablet 3    donepezil (ARICEPT) 10 mg tablet Take 1 tablet (10 mg total) by mouth daily at bedtime 90 tablet 3    escitalopram (LEXAPRO) 20 mg tablet Take 1 tablet (20 mg total) by mouth daily 90 tablet 1    fluticasone (FLONASE) 50 mcg/act nasal spray 1 spray into each nostril 2 (two) times a day 9.9 mL 1    Ketotifen Fumarate (ZADITOR) 0.035 % ophthalmic solution Administer 1  "drop to both eyes 2 (two) times a day 3 mL 1    nebivolol (BYSTOLIC) 5 mg tablet Take 1 tablet (5 mg total) by mouth daily 90 tablet 1    ondansetron (ZOFRAN-ODT) 4 mg disintegrating tablet Take 1 tablet (4 mg total) by mouth every 6 (six) hours as needed for nausea for up to 3 days 9 tablet 0    pantoprazole (PROTONIX) 40 mg tablet Take 1 tablet (40 mg total) by mouth 2 (two) times a day 60 tablet 0    sucralfate (CARAFATE) 1 g tablet Take 1 tablet (1 g total) by mouth 4 (four) times a day (before meals and at bedtime) 120 tablet 3    traZODone (DESYREL) 150 mg tablet Take 0.5 tablets (75 mg total) by mouth daily at bedtime 45 tablet 1    Vitamin D, Ergocalciferol, 50 MCG (2000 UT) CAPS Take 1 capsule by mouth in the morning 100 capsule 3     Current Facility-Administered Medications   Medication Dose Route Frequency Provider Last Rate Last Admin    triamcinolone acetonide (KENALOG-40) 40 mg/mL injection 20 mg  20 mg Intra-articular     20 mg at 09/19/24 1015        Allergies   Allergen Reactions    Augmentin [Amoxicillin-Pot Clavulanate] GI Intolerance, Other (See Comments) and Hives     VOMITING  VOMITING  Reaction Date: [id number];     Sulfa Antibiotics Itching, Other (See Comments) and Hives     RASH, ITCHY  RASH, ITCHY    Morphine Other (See Comments)     \"DOESN'T WORK\"    Latex Rash     Unsure if this is an allergy       Review of Systems    Video Exam    There were no vitals filed for this visit.    Physical Exam     Visit Time    Visit Start Time: 11:00  Visit Stop Time: 11:50  Total Visit Duration:  50 minutes            Behavioral Health Psychotherapy Progress Note    Psychotherapy Provided: Individual Psychotherapy     1. Recurrent major depressive disorder, in partial remission (HCC)            Goals addressed in session: Goal 1     DATA: Zulma reports feeling ok stating that she opted for a virtual appointment today because her transportation was not scheduled and she was having difficulties getting " "through the phone system. She expressed frustration with the \"new system\" to which writer validated. Reminded her that she can always opt for virtual appointments since she is in [place] and that especially on days when she is not feeling well or if there is bad weather, virtual may be best for her anyway. She agreed. No new updates with respect to she and [place] or [place]. Is working with Rhett on daily home projects, as well as, trying to stay on top of her allergies. No new depressive symptoms noted or safety concerns. Reports decreased frustration at the end of the call stating that in some way, virtual is better for her anyway. Scheduled her next appointment as such. Return in 2 weeks, or sooner if needed.   During this session, this clinician used the following therapeutic modalities: Cognitive Processing Therapy    Substance Abuse was not addressed during this session. If the client is diagnosed with a co-occurring substance use disorder, please indicate any changes in the frequency or amount of use: NA. Stage of change for addressing substance use diagnoses: No substance use/Not applicable    ASSESSMENT:  Zulma Marcelo presents with a Euthymic/ normal mood.     her affect is Normal range and intensity, which is congruent, with her mood and the content of the session. The client has made progress on their goals.    During this session the client was oriented to person, place, and time. The client was engaged in the session. The client was able to sustain direct eye contact and was without psychomotor agitation. The client's thought processes were linear and clear.   Zulma Marcelo presents with a none risk of suicide, none risk of self-harm, and none risk of harm to others.    For any risk assessment that surpasses a \"low\" rating, a safety plan must be developed.    A safety plan was indicated: no  If yes, describe in detail NA    PLAN: Between sessions, Zulma Marcelo will take medication as prescribed and " practice positive coping strategies as needed . At the next session, the therapist will use Cognitive Processing Therapy to address stressors.    Behavioral Health Treatment Plan and Discharge Planning: Zulma Marcelo is aware of and agrees to continue to work on their treatment plan. They have identified and are working toward their discharge goals. yes    Visit start and stop times:    11/06/24  Start Time: 1100  Stop Time: 1150  Total Visit Time: 50 minutes

## 2024-11-12 ENCOUNTER — OFFICE VISIT (OUTPATIENT)
Age: 74
End: 2024-11-12
Payer: MEDICARE

## 2024-11-12 VITALS
SYSTOLIC BLOOD PRESSURE: 137 MMHG | WEIGHT: 146 LBS | HEIGHT: 65 IN | DIASTOLIC BLOOD PRESSURE: 73 MMHG | HEART RATE: 79 BPM | RESPIRATION RATE: 17 BRPM | BODY MASS INDEX: 24.32 KG/M2

## 2024-11-12 DIAGNOSIS — L03.031 PARONYCHIA OF TOENAIL OF RIGHT FOOT: ICD-10-CM

## 2024-11-12 DIAGNOSIS — G89.29 CHRONIC PAIN OF LEFT ANKLE: Primary | ICD-10-CM

## 2024-11-12 DIAGNOSIS — M25.572 CHRONIC PAIN OF LEFT ANKLE: Primary | ICD-10-CM

## 2024-11-12 DIAGNOSIS — B35.1 ONYCHOMYCOSIS: ICD-10-CM

## 2024-11-12 PROCEDURE — 20605 DRAIN/INJ JOINT/BURSA W/O US: CPT | Performed by: PODIATRIST

## 2024-11-12 PROCEDURE — 99212 OFFICE O/P EST SF 10 MIN: CPT | Performed by: PODIATRIST

## 2024-11-12 RX ORDER — TERBINAFINE HYDROCHLORIDE 250 MG/1
TABLET ORAL
Qty: 15 TABLET | Refills: 0 | Status: SHIPPED | OUTPATIENT
Start: 2024-11-12 | End: 2024-12-17

## 2024-11-12 RX ORDER — MELOXICAM 7.5 MG/1
7.5 TABLET ORAL DAILY
Qty: 10 TABLET | Refills: 0 | Status: SHIPPED | OUTPATIENT
Start: 2024-11-12 | End: 2024-11-22

## 2024-11-12 RX ORDER — TRIAMCINOLONE ACETONIDE 40 MG/ML
20 INJECTION, SUSPENSION INTRA-ARTICULAR; INTRAMUSCULAR
Status: SHIPPED | OUTPATIENT
Start: 2024-11-12

## 2024-11-12 RX ADMIN — TRIAMCINOLONE ACETONIDE 20 MG: 40 INJECTION, SUSPENSION INTRA-ARTICULAR; INTRAMUSCULAR at 13:00

## 2024-11-12 NOTE — PROGRESS NOTES
"Medium joint arthrocentesis: L ankle  Universal Protocol:  procedure performed by consultantConsent: Verbal consent obtained. Written consent not obtained.  Risks and benefits: risks, benefits and alternatives were discussed  Consent given by: patient  Time out: Immediately prior to procedure a \"time out\" was called to verify the correct patient, procedure, equipment, support staff and site/side marked as required.  Timeout called at: 11/12/2024 1:08 PM.  Patient understanding: patient states understanding of the procedure being performed  Patient identity confirmed: verbally with patient  Supporting Documentation  Indications: pain and joint swelling   Procedure Details  Location: ankle - L ankle  Preparation: Patient was prepped and draped in the usual sterile fashion  Needle size: 25 G  Ultrasound guidance: no  Approach: anterolateral  Medications administered: 20 mg triamcinolone acetonide 40 mg/mL    Patient tolerance: patient tolerated the procedure well with no immediate complications  Dressing:  Sterile dressing applied         Foot Exam       General  General Appearance: appears stated age and healthy   Orientation: alert and oriented to person, place, and time   Affect: appropriate   Gait: antalgic   Assistance: cane use         Right Foot/Ankle      Inspection and Palpation  Swelling: dorsum   Arch: pes cavus  Hallux valgus: yes  Skin Exam: dry skin;      Neurovascular  Dorsalis pedis: 2+  Posterior tibial: 2+        Left Foot/Ankle       Inspection and Palpation  Tenderness: bony tenderness, lesser metatarsophalangeal joints and metatarsals   Swelling: dorsum   Arch: pes cavus  Hallux valgus: yes  Skin Exam: dry skin;      Neurovascular  Dorsalis pedis: 2+  Posterior tibial: 2+        Physical Exam  Vitals and nursing note reviewed.   Constitutional:       Appearance: Normal appearance.   Cardiovascular:      Rate and Rhythm: Normal rate and regular rhythm.      Pulses:           Dorsalis pedis pulses " are 2+ on the right side and 2+ on the left side.        Posterior tibial pulses are 2+ on the right side and 2+ on the left side.   Musculoskeletal:      Right foot: Bunion present.      Left foot: Bunion and bony tenderness present.   Feet:      Right foot:      Skin integrity: Dry skin present.      Left foot:      Skin integrity: Dry skin present.      Comments: Patient has decreased range of motion of left rear foot and ankle joint.  Patient has equinus deformity.  Pain with palpation left ankle, lateral aspect.  Prior x-rays demonstrate osteoarthritis.  There is evidence of prior left ankle ORIF.     Pain with palpation left Lisfranc joint.  Pain with range of motion of extensor tendons.  No evidence of rupture.  Skin:     Capillary Refill: Capillary refill takes less than 2 seconds.      Comments: All nails are dystrophic.  Hallux bilateral demonstrates distal mycosis.  Right hallux has ingrown fibular aspect.  Negative pus.  Positive paronychia.   Neurological:      Mental Status: She is alert.   Psychiatric:         Mood and Affect: Mood normal.         Behavior: Behavior normal.         Thought Content: Thought content normal.         Judgment: Judgment normal.       Plan.  Chart reviewed.  PCP notes reviewed.  Patient evaluated.  Left ankle arthrocentesis done.  Patient has residual mycosis.  She will remain on terbinafine.  This been reordered.  In addition we will add Mobic.

## 2024-11-18 ENCOUNTER — OFFICE VISIT (OUTPATIENT)
Dept: FAMILY MEDICINE CLINIC | Facility: CLINIC | Age: 74
End: 2024-11-18
Payer: MEDICARE

## 2024-11-18 ENCOUNTER — TELEMEDICINE (OUTPATIENT)
Dept: BEHAVIORAL/MENTAL HEALTH CLINIC | Facility: CLINIC | Age: 74
End: 2024-11-18
Payer: MEDICARE

## 2024-11-18 VITALS
DIASTOLIC BLOOD PRESSURE: 80 MMHG | HEIGHT: 65 IN | HEART RATE: 62 BPM | BODY MASS INDEX: 25.49 KG/M2 | OXYGEN SATURATION: 94 % | SYSTOLIC BLOOD PRESSURE: 148 MMHG | WEIGHT: 153 LBS | TEMPERATURE: 97.3 F | RESPIRATION RATE: 24 BRPM

## 2024-11-18 DIAGNOSIS — F17.211 CIGARETTE NICOTINE DEPENDENCE IN REMISSION: ICD-10-CM

## 2024-11-18 DIAGNOSIS — F33.41 RECURRENT MAJOR DEPRESSIVE DISORDER, IN PARTIAL REMISSION (HCC): Primary | ICD-10-CM

## 2024-11-18 DIAGNOSIS — Z23 ENCOUNTER FOR IMMUNIZATION: Primary | ICD-10-CM

## 2024-11-18 DIAGNOSIS — Z23 NEED FOR COVID-19 VACCINE: ICD-10-CM

## 2024-11-18 DIAGNOSIS — Z00.00 MEDICARE ANNUAL WELLNESS VISIT, SUBSEQUENT: ICD-10-CM

## 2024-11-18 PROCEDURE — 90834 PSYTX W PT 45 MINUTES: CPT | Performed by: SOCIAL WORKER

## 2024-11-18 PROCEDURE — 90662 IIV NO PRSV INCREASED AG IM: CPT | Performed by: INTERNAL MEDICINE

## 2024-11-18 PROCEDURE — G0439 PPPS, SUBSEQ VISIT: HCPCS | Performed by: INTERNAL MEDICINE

## 2024-11-18 PROCEDURE — G0008 ADMIN INFLUENZA VIRUS VAC: HCPCS | Performed by: INTERNAL MEDICINE

## 2024-11-18 PROCEDURE — 91320 SARSCV2 VAC 30MCG TRS-SUC IM: CPT | Performed by: INTERNAL MEDICINE

## 2024-11-18 PROCEDURE — 90480 ADMN SARSCOV2 VAC 1/ONLY CMP: CPT | Performed by: INTERNAL MEDICINE

## 2024-11-18 NOTE — PROGRESS NOTES
Name: Zulma Marcelo      : 1950      MRN: [id number]  Encounter Provider: Lydia Cruz MD  Encounter Date: 2024   Encounter department: [facility]    Assessment & Plan  Encounter for immunization    Orders:    influenza vaccine, high-dose, PF 0.5 mL (Fluzone High Dose)    Need for COVID-19 vaccine    Orders:    COVID-19 Pfizer mRNA vaccine 12 yr and older (Comirnaty pre-filled syringe)    Cigarette nicotine dependence in remission    Orders:    CT lung screening program; Future    Medicare annual wellness visit, subsequent            Preventive health issues were discussed with patient, and age appropriate screening tests were ordered as noted in patient's After Visit Summary. Personalized health advice and appropriate referrals for health education or preventive services given if needed, as noted in patient's After Visit Summary.    History of Present Illness     Here for AWV. She feels well.  Breathing is better since she stopped smoking.        Patient Care Team:  Lydia Cruz MD as PCP - General  DO Windy Stahl PA-C Karen Hotchkin, MD    Review of Systems   Constitutional:  Negative for chills and fever.   HENT:  Negative for ear pain and sore throat.    Eyes:  Negative for pain and visual disturbance.   Respiratory:  Negative for cough and shortness of breath.    Cardiovascular:  Negative for chest pain and palpitations.   Gastrointestinal:  Negative for abdominal pain and vomiting.   Genitourinary:  Negative for dysuria and hematuria.   Musculoskeletal:  Negative for arthralgias and back pain.   Skin:  Negative for color change and rash.   Neurological:  Negative for seizures and syncope.   All other systems reviewed and are negative.    Medical History Reviewed by provider this encounter:       Annual Wellness Visit Questionnaire   Zulma is here for her Subsequent Wellness visit. Last Medicare Wellness visit information reviewed, patient interviewed  and updates made to the record today.      Health Risk Assessment:   Patient rates overall health as fair. Patient feels that their physical health rating is slightly worse. Patient is satisfied with their life. Eyesight was rated as slightly worse. Hearing was rated as slightly worse. Patient feels that their emotional and mental health rating is slightly better. Patients states they are sometimes angry. Patient states they are always unusually tired/fatigued. Pain experienced in the last 7 days has been some. Patient's pain rating has been 7/10. Patient states that she has experienced no weight loss or gain in last 6 months.     Depression Screening:   PHQ-9 Score: 13      Fall Risk Screening:   In the past year, patient has experienced: history of falling in past year    Number of falls: 2 or more  Injured during fall?: No    Feels unsteady when standing or walking?: Yes    Worried about falling?: Yes      Urinary Incontinence Screening:   Patient has leaked urine accidently in the last six months.     Home Safety:  Patient has trouble with stairs inside or outside of their home. Patient has working smoke alarms and has working carbon monoxide detector. Home safety hazards include: none.     Nutrition:   Current diet is Regular.     Medications:   Patient is currently taking over-the-counter supplements. OTC medications include: see medication list. Patient is able to manage medications.     Activities of Daily Living (ADLs)/Instrumental Activities of Daily Living (IADLs):   Walk and transfer into and out of bed and chair?: Yes  Dress and groom yourself?: Yes    Bathe or shower yourself?: Yes    Feed yourself? Yes  Do your laundry/housekeeping?: Yes  Manage your money, pay your bills and track your expenses?: Yes  Make your own meals?: Yes    Do your own shopping?: Yes    Previous Hospitalizations:   Any hospitalizations or ED visits within the last 12 months?: No      Advance Care Planning:   Living will: Yes     Durable POA for healthcare: Yes    Advanced directive: Yes    End of Life Decisions reviewed with patient: Yes      Cognitive Screening:   Provider or family/friend/caregiver concerned regarding cognition?: No    PREVENTIVE SCREENINGS      Cardiovascular Screening:    General: Screening Not Indicated and History Lipid Disorder      Diabetes Screening:     General: Screening Current      Colorectal Cancer Screening:     General: Screening Not Indicated      Breast Cancer Screening:     General: Screening Not Indicated      Cervical Cancer Screening:    General: Screening Not Indicated      Osteoporosis Screening:    General: Screening Current      Abdominal Aortic Aneurysm (AAA) Screening:        General: Screening Not Indicated      Lung Cancer Screening:     General: Risks and Benefits Discussed    Due for: Low Dose CT (LDCT)      Hepatitis C Screening:    General: Screening Current    Screening, Brief Intervention, and Referral to Treatment (SBIRT)    Screening  Typical number of drinks in a day: 0  Typical number of drinks in a week: 0  Interpretation: Low risk drinking behavior.    Single Item Drug Screening:  How often have you used an illegal drug (including marijuana) or a prescription medication for non-medical reasons in the past year? never    Single Item Drug Screen Score: 0  Interpretation: Negative screen for possible drug use disorder    Brief Intervention  Alcohol & drug use screenings were reviewed. No concerns regarding substance use disorder identified.     Other Counseling Topics:   Car/seat belt/driving safety, skin self-exam, sunscreen and calcium and vitamin D intake and regular weightbearing exercise.     Social Drivers of Health     Financial Resource Strain: High Risk (8/23/2023)    Overall Financial Resource Strain (CARDIA)     Difficulty of Paying Living Expenses: Hard   Food Insecurity: No Food Insecurity (7/31/2023)    Hunger Vital Sign     Worried About Running Out of Food in the Last  "Year: Never true     Ran Out of Food in the Last Year: Never true   Transportation Needs: Unmet Transportation Needs (8/23/2023)    PRAPARE - Transportation     Lack of Transportation (Medical): Yes     Lack of Transportation (Non-Medical): Yes   Housing Stability: Low Risk  (7/31/2023)    Housing Stability Vital Sign     Unable to Pay for Housing in the Last Year: No     Number of Times Moved in the Last Year: 1     Homeless in the Last Year: No     No results found.    Objective   /80 (BP Location: Right arm, Patient Position: Sitting, Cuff Size: Large)   Pulse 62   Temp (!) 97.3 °F (36.3 °C) (Temporal)   Resp (!) 24   Ht 5' 5\" (1.651 m)   Wt 69.4 kg (153 lb)   LMP  (LMP Unknown)   SpO2 94%   BMI 25.46 kg/m²     Physical Exam  Constitutional:       General: She is not in acute distress.     Appearance: She is well-developed. She is not diaphoretic.   HENT:      Head: Normocephalic and atraumatic.      Right Ear: External ear normal.      Left Ear: External ear normal.      Nose: Nose normal.   Eyes:      Pupils: Pupils are equal, round, and reactive to light.   Neck:      Thyroid: No thyromegaly.      Vascular: No JVD.   Cardiovascular:      Rate and Rhythm: Regular rhythm.      Heart sounds: No murmur heard.     No friction rub. No gallop.   Pulmonary:      Effort: Pulmonary effort is normal.      Breath sounds: Normal breath sounds. Decreased air movement present. No wheezing or rales.   Abdominal:      General: Bowel sounds are normal. There is no distension.      Palpations: Abdomen is soft.      Tenderness: There is no abdominal tenderness.   Musculoskeletal:         General: Normal range of motion.      Cervical back: Normal range of motion and neck supple.   Skin:     General: Skin is warm and dry.      Findings: No rash.   Neurological:      Mental Status: She is alert and oriented to person, place, and time.      Cranial Nerves: No cranial nerve deficit.      Sensory: No sensory deficit.     "  Motor: No abnormal muscle tone.      Coordination: Coordination normal.      Deep Tendon Reflexes: Reflexes normal.   Psychiatric:         Behavior: Behavior normal.         Thought Content: Thought content normal.         Judgment: Judgment normal.     I discussed with her that she is a candidate for lung cancer CT screening.     The following Shared Decision-Making points were covered:  Benefits of screening were discussed, including the rates of reduction in death from lung cancer and other causes.  Harms of screening were reviewed, including false positive tests, radiation exposure levels, risks of invasive procedures, risks of complications of screening, and risk of overdiagnosis.  I counseled on the importance of adherence to annual lung cancer LDCT screening, impact of co-morbidities, and ability or willingness to undergo diagnosis and treatment.  I counseled on the importance of maintaining abstinence as a former smoker or was counseled on the importance of smoking cessation if a current smoker    Review of Eligibility Criteria: She meets all of the criteria for Lung Cancer Screening.   She is 74 y.o.   She has 20 pack year tobacco history and is a current smoker or has quit within the past 15 years  She presents no signs or symptoms of lung cancer    After discussion, the patient decided to elect lung cancer screening.

## 2024-11-18 NOTE — PATIENT INSTRUCTIONS

## 2024-11-21 NOTE — PSYCH
Virtual Regular Visit    Verification of patient location:    Patient is located at Home in the following state in which I hold an active license [place]      Assessment/Plan:    Problem List Items Addressed This Visit    None  Visit Diagnoses         Recurrent major depressive disorder, in partial remission (HCC)    -  Primary            Goals addressed in session: Goal 1          Reason for visit is No chief complaint on file.       Encounter provider Vannesa Stone LCSW      Recent Visits  Date Type Provider Dept   11/18/24 Office Visit Lydia Cruz MD [facility]   11/18/24 Telemedicine Vannesa Stone LCSW [facility]   Showing recent visits within past 7 days and meeting all other requirements  Future Appointments  No visits were found meeting these conditions.  Showing future appointments within next 150 days and meeting all other requirements       The patient was identified by name and date of birth. Zulma Marcelo was informed that this is a telemedicine visit and that the visit is being conducted throughthe Epic Embedded platform. She agrees to proceed..  My office door was closed. No one else was in the room.  She acknowledged consent and understanding of privacy and security of the video platform. The patient has agreed to participate and understands they can discontinue the visit at any time.    Patient is aware this is a billable service.     Subjective  Zulma Marcelo is a 74 y.o. female  .      HPI     Past Medical History:   Diagnosis Date    Acid reflux     Anxiety     Arthritis     Asthma     Cardiac disease     Chronic kidney disease     passed on own kidney stone    Depression     Diverticulitis     GERD (gastroesophageal reflux disease)     Hayfever     [place] (hard of hearing)     bilateral hearing aids    Hyperlipemia     Hypertension     Panic attack     Tachycardia        Past Surgical History:   Procedure Laterality Date    ABLATION OF DYSRHYTHMIC  FOCUS      APPENDECTOMY      CHOLECYSTECTOMY      open    FRACTURE SURGERY      ORIF fx (L) tibia, fibula 2009    GASTRIC BYPASS OPEN      HYSTERECTOMY      ME XCAPSL CTRC RMVL INSJ IO LENS PROSTH W/O ECP Left 4/18/2016    Procedure: EXTRACTION EXTRACAPSULAR CATARACT PHACO INTRAOCULAR LENS (IOL);  Surgeon: Dane Wells MD;  Location: [facility] MAIN OR;  Service: Ophthalmology    ME XCAPSL CTRC RMVL INSJ IO LENS PROSTH W/O ECP Right 3/1/2021    Procedure: EXTRACTION EXTRACAPSULAR CATARACT PHACO INTRAOCULAR LENS (IOL);  Surgeon: Dane Wells MD;  Location: [facility] MAIN OR;  Service: Ophthalmology    TONSILECTOMY AND ADNOIDECTOMY         Current Outpatient Medications   Medication Sig Dispense Refill    albuterol (PROVENTIL HFA,VENTOLIN HFA) 90 mcg/act inhaler Inhale 2 puffs every 6 (six) hours as needed for wheezing 20.4 g 3    allopurinol (ZYLOPRIM) 100 mg tablet TAKE ONE TABLET BY MOUTH EVERY DAY 90 tablet 1    aspirin 81 mg chewable tablet Chew 1 tablet (81 mg total) daily 90 tablet 1    buPROPion (WELLBUTRIN XL) 150 mg 24 hr tablet Take 1 tablet (150 mg total) by mouth daily 90 tablet 1    busPIRone (BUSPAR) 5 mg tablet Take 1 tablet (5 mg total) by mouth 3 (three) times a day 270 tablet 1    dextromethorphan-guaifenesin (MUCINEX DM)  MG per 12 hr tablet Take 1 tablet by mouth every 12 (twelve) hours as needed for cough 30 tablet 0    Diclofenac Sodium (VOLTAREN) 1 % Apply 2 g topically 4 (four) times a day (Patient not taking: Reported on 11/18/2024) 100 g 3    donepezil (ARICEPT) 10 mg tablet Take 1 tablet (10 mg total) by mouth daily at bedtime 90 tablet 3    donepezil (ARICEPT) 10 mg tablet Take 1 tablet (10 mg total) by mouth daily at bedtime (Patient not taking: Reported on 11/18/2024) 90 tablet 3    escitalopram (LEXAPRO) 20 mg tablet Take 1 tablet (20 mg total) by mouth daily 90 tablet 1    fluticasone (FLONASE) 50 mcg/act nasal spray 1 spray into each nostril 2 (two) times a day 9.9 mL 1     "Ketotifen Fumarate (ZADITOR) 0.035 % ophthalmic solution Administer 1 drop to both eyes 2 (two) times a day 3 mL 1    meloxicam (MOBIC) 7.5 mg tablet Take 1 tablet (7.5 mg total) by mouth daily for 10 days 10 tablet 0    nebivolol (BYSTOLIC) 5 mg tablet Take 1 tablet (5 mg total) by mouth daily 90 tablet 1    ondansetron (ZOFRAN-ODT) 4 mg disintegrating tablet Take 1 tablet (4 mg total) by mouth every 6 (six) hours as needed for nausea for up to 3 days 9 tablet 0    pantoprazole (PROTONIX) 40 mg tablet Take 1 tablet (40 mg total) by mouth 2 (two) times a day 60 tablet 0    sucralfate (CARAFATE) 1 g tablet Take 1 tablet (1 g total) by mouth 4 (four) times a day (before meals and at bedtime) 120 tablet 3    terbinafine (LamISIL) 250 mg tablet 1 tab p.o. every other day. (Patient not taking: Reported on 11/18/2024) 15 tablet 0    traZODone (DESYREL) 150 mg tablet Take 0.5 tablets (75 mg total) by mouth daily at bedtime 45 tablet 1     Current Facility-Administered Medications   Medication Dose Route Frequency Provider Last Rate Last Admin    triamcinolone acetonide (KENALOG-40) 40 mg/mL injection 20 mg  20 mg Intra-articular     20 mg at 09/19/24 1015    triamcinolone acetonide (Kenalog-40) 40 mg/mL injection 20 mg  20 mg Intra-articular     20 mg at 11/12/24 1300        Allergies   Allergen Reactions    Augmentin [Amoxicillin-Pot Clavulanate] GI Intolerance, Other (See Comments) and Hives     VOMITING  VOMITING  Reaction Date: 07Dec2004;     Sulfa Antibiotics Itching, Other (See Comments) and Hives     RASH, ITCHY  RASH, ITCHY    Morphine Other (See Comments)     \"DOESN'T WORK\"    Latex Rash     Unsure if this is an allergy       Review of Systems    Video Exam    There were no vitals filed for this visit.    Physical Exam     Visit Time    Visit Start Time: 11:00   Visit Stop Time: 11:50  Total Visit Duration:  50 minutes        Behavioral Health Psychotherapy Progress Note    Psychotherapy Provided: Individual " "Psychotherapy     1. Recurrent major depressive disorder, in partial remission (HCC)            Goals addressed in session: Goal 1     DATA: Zulma reports feeling ok stating that she has been staying in due to feeling slightly sick. She has been coloring to pass the time which has been relaxing. She and Rhett continue to assist each other around the house and have simple plans for the upcoming holiday. No new issues related to [place] or Yin. Has been focusing on the positives and what is going well. No new symptoms or safety concerns. Will touch base again in 2 weeks.   During this session, this clinician used the following therapeutic modalities: Supportive Psychotherapy    Substance Abuse was not addressed during this session. If the client is diagnosed with a co-occurring substance use disorder, please indicate any changes in the frequency or amount of use: NA. Stage of change for addressing substance use diagnoses: No substance use/Not applicable    ASSESSMENT:  Zulma Marcelo presents with a Euthymic/ normal mood.     her affect is Normal range and intensity, which is congruent, with her mood and the content of the session. The client has made progress on their goals.    During this session the client was oriented to person, place, and time. The client was engaged in the session. The client was able to sustain direct eye contact and was without psychomotor agitation. The client's thought processes were linear and clear.   Zulma Marcelo presents with a none risk of suicide, none risk of self-harm, and none risk of harm to others.    For any risk assessment that surpasses a \"low\" rating, a safety plan must be developed.    A safety plan was indicated: no  If yes, describe in detail NA    PLAN: Between sessions, Zulma Marcelo will take medication as prescribed and practice positive coping strategies as needed . At the next session, the therapist will use Supportive Psychotherapy to address " stressors.    Behavioral Health Treatment Plan and Discharge Planning: Zulma CARD Davian is aware of and agrees to continue to work on their treatment plan. They have identified and are working toward their discharge goals. yes    Visit start and stop times:    11/21/24  Start Time: 1100  Stop Time: 1150  Total Visit Time: 50 minutes

## 2024-12-02 ENCOUNTER — TELEMEDICINE (OUTPATIENT)
Dept: BEHAVIORAL/MENTAL HEALTH CLINIC | Facility: CLINIC | Age: 74
End: 2024-12-02
Payer: MEDICARE

## 2024-12-02 DIAGNOSIS — F33.41 RECURRENT MAJOR DEPRESSIVE DISORDER, IN PARTIAL REMISSION (HCC): Primary | ICD-10-CM

## 2024-12-02 PROCEDURE — 90834 PSYTX W PT 45 MINUTES: CPT | Performed by: SOCIAL WORKER

## 2024-12-02 NOTE — PSYCH
Virtual Regular Visit    Verification of patient location:    Patient is located at Home in the following state in which I hold an active license [place]      Assessment/Plan:    Problem List Items Addressed This Visit    None  Visit Diagnoses         Recurrent major depressive disorder, in partial remission (HCC)    -  Primary            Goals addressed in session: Goal 1          Reason for visit is No chief complaint on file.       Encounter provider Vannesa Stone LCSW      Recent Visits  No visits were found meeting these conditions.  Showing recent visits within past 7 days and meeting all other requirements  Today's Visits  Date Type Provider Dept   12/02/24 Telemedicine Vannesa Stone LCSW [facility]   Showing today's visits and meeting all other requirements  Future Appointments  No visits were found meeting these conditions.  Showing future appointments within next 150 days and meeting all other requirements       The patient was identified by name and date of birth. Zulma Marcelo was informed that this is a telemedicine visit and that the visit is being conducted throughthe Epic Embedded platform. She agrees to proceed..  My office door was closed. No one else was in the room.  She acknowledged consent and understanding of privacy and security of the video platform. The patient has agreed to participate and understands they can discontinue the visit at any time.    Patient is aware this is a billable service.     Subjective  Zulma Marcelo is a 74 y.o. female  .      HPI     Past Medical History:   Diagnosis Date    Acid reflux     Anxiety     Arthritis     Asthma     Cardiac disease     Chronic kidney disease     passed on own kidney stone    Depression     Diverticulitis     GERD (gastroesophageal reflux disease)     Hayfever     [place] (hard of hearing)     bilateral hearing aids    Hyperlipemia     Hypertension     Panic attack     Tachycardia        Past Surgical  History:   Procedure Laterality Date    ABLATION OF DYSRHYTHMIC FOCUS      APPENDECTOMY      CHOLECYSTECTOMY      open    FRACTURE SURGERY      ORIF fx (L) tibia, fibula 2009    GASTRIC BYPASS OPEN      HYSTERECTOMY      NM XCAPSL CTRC RMVL INSJ IO LENS PROSTH W/O ECP Left 4/18/2016    Procedure: EXTRACTION EXTRACAPSULAR CATARACT PHACO INTRAOCULAR LENS (IOL);  Surgeon: Dane Wells MD;  Location: [facility] MAIN OR;  Service: Ophthalmology    NM XCAPSL CTRC RMVL INSJ IO LENS PROSTH W/O ECP Right 3/1/2021    Procedure: EXTRACTION EXTRACAPSULAR CATARACT PHACO INTRAOCULAR LENS (IOL);  Surgeon: Dane Wells MD;  Location: [facility] MAIN OR;  Service: Ophthalmology    TONSILECTOMY AND ADNOIDECTOMY         Current Outpatient Medications   Medication Sig Dispense Refill    albuterol (PROVENTIL HFA,VENTOLIN HFA) 90 mcg/act inhaler Inhale 2 puffs every 6 (six) hours as needed for wheezing 20.4 g 3    allopurinol (ZYLOPRIM) 100 mg tablet TAKE ONE TABLET BY MOUTH EVERY DAY 90 tablet 1    aspirin 81 mg chewable tablet Chew 1 tablet (81 mg total) daily 90 tablet 1    buPROPion (WELLBUTRIN XL) 150 mg 24 hr tablet Take 1 tablet (150 mg total) by mouth daily 90 tablet 1    busPIRone (BUSPAR) 5 mg tablet Take 1 tablet (5 mg total) by mouth 3 (three) times a day 270 tablet 1    dextromethorphan-guaifenesin (MUCINEX DM)  MG per 12 hr tablet Take 1 tablet by mouth every 12 (twelve) hours as needed for cough 30 tablet 0    Diclofenac Sodium (VOLTAREN) 1 % Apply 2 g topically 4 (four) times a day (Patient not taking: Reported on 11/18/2024) 100 g 3    donepezil (ARICEPT) 10 mg tablet Take 1 tablet (10 mg total) by mouth daily at bedtime 90 tablet 3    donepezil (ARICEPT) 10 mg tablet Take 1 tablet (10 mg total) by mouth daily at bedtime (Patient not taking: Reported on 11/18/2024) 90 tablet 3    escitalopram (LEXAPRO) 20 mg tablet Take 1 tablet (20 mg total) by mouth daily 90 tablet 1    fluticasone (FLONASE) 50 mcg/act nasal spray 1  "spray into each nostril 2 (two) times a day 9.9 mL 1    Ketotifen Fumarate (ZADITOR) 0.035 % ophthalmic solution Administer 1 drop to both eyes 2 (two) times a day 3 mL 1    meloxicam (MOBIC) 7.5 mg tablet Take 1 tablet (7.5 mg total) by mouth daily for 10 days 10 tablet 0    nebivolol (BYSTOLIC) 5 mg tablet Take 1 tablet (5 mg total) by mouth daily 90 tablet 1    ondansetron (ZOFRAN-ODT) 4 mg disintegrating tablet Take 1 tablet (4 mg total) by mouth every 6 (six) hours as needed for nausea for up to 3 days 9 tablet 0    pantoprazole (PROTONIX) 40 mg tablet Take 1 tablet (40 mg total) by mouth 2 (two) times a day 60 tablet 0    sucralfate (CARAFATE) 1 g tablet Take 1 tablet (1 g total) by mouth 4 (four) times a day (before meals and at bedtime) 120 tablet 3    terbinafine (LamISIL) 250 mg tablet 1 tab p.o. every other day. (Patient not taking: Reported on 11/18/2024) 15 tablet 0    traZODone (DESYREL) 150 mg tablet Take 0.5 tablets (75 mg total) by mouth daily at bedtime 45 tablet 1     Current Facility-Administered Medications   Medication Dose Route Frequency Provider Last Rate Last Admin    triamcinolone acetonide (KENALOG-40) 40 mg/mL injection 20 mg  20 mg Intra-articular     20 mg at 09/19/24 1015    triamcinolone acetonide (Kenalog-40) 40 mg/mL injection 20 mg  20 mg Intra-articular     20 mg at 11/12/24 1300        Allergies   Allergen Reactions    Augmentin [Amoxicillin-Pot Clavulanate] GI Intolerance, Other (See Comments) and Hives     VOMITING  VOMITING  Reaction Date: 07Dec2004;     Sulfa Antibiotics Itching, Other (See Comments) and Hives     RASH, ITCHY  RASH, ITCHY    Morphine Other (See Comments)     \"DOESN'T WORK\"    Latex Rash     Unsure if this is an allergy       Review of Systems    Video Exam    There were no vitals filed for this visit.    Physical Exam     Visit Time    Visit Start Time: 10:00   Visit Stop Time: 10:50  Total Visit Duration:  50 minutes        Behavioral Health Psychotherapy " Progress Note    Psychotherapy Provided: Individual Psychotherapy     1. Recurrent major depressive disorder, in partial remission (HCC)            Goals addressed in session: Goal 1     DATA: Zulma reports feeling alright stating that she has been staying put in the home because of her allergies and the bitter cold weather. She reported having a nice holiday at home with Rhett which was very relaxing. She has still not spoken to Yin which she is ok with. She shared going to the bank with her [employer] worker and allowing her son to be on her banking because she sometimes forgets if she made transactions. She is hopeful that this will free up any confusion moving forward. Discussed having a dream with her extended family wherein she stuck up for herself which felt very reassuring and has provided her with closure. She is focusing on decluttering her home and living a simple life. No new symptoms or safety concerns. Return in 2 weeks virtual.   During this session, this clinician used the following therapeutic modalities: Client-centered Therapy    Substance Abuse was not addressed during this session. If the client is diagnosed with a co-occurring substance use disorder, please indicate any changes in the frequency or amount of use: NA. Stage of change for addressing substance use diagnoses: No substance use/Not applicable    ASSESSMENT:  Zulma Marcelo presents with a Euthymic/ normal mood.     her affect is Normal range and intensity, which is congruent, with her mood and the content of the session. The client has made progress on their goals.    During this session the client was oriented to person, place, and time. The client was engaged in the session. The client was able to sustain direct eye contact and was without psychomotor agitation. The client's thought processes were linear and clear.   Zulma Marcelo presents with a none risk of suicide, none risk of self-harm, and none risk of harm to  "others.    For any risk assessment that surpasses a \"low\" rating, a safety plan must be developed.    A safety plan was indicated: no  If yes, describe in detail NA    PLAN: Between sessions, Zulma Marcelo will take medication as prescribed and practice positive coping strategies as needed . At the next session, the therapist will use Client-centered Therapy to address stressors.    Behavioral Health Treatment Plan and Discharge Planning: Zulma Marcelo is aware of and agrees to continue to work on their treatment plan. They have identified and are working toward their discharge goals. yes    Visit start and stop times:    12/02/24  Start Time: 1000  Stop Time: 1050  Total Visit Time: 50 minutes  "

## 2024-12-12 ENCOUNTER — OFFICE VISIT (OUTPATIENT)
Dept: PSYCHIATRY | Facility: CLINIC | Age: 74
End: 2024-12-12
Payer: MEDICARE

## 2024-12-12 DIAGNOSIS — F33.1 MODERATE EPISODE OF RECURRENT MAJOR DEPRESSIVE DISORDER (HCC): Primary | ICD-10-CM

## 2024-12-12 DIAGNOSIS — F51.01 PRIMARY INSOMNIA: ICD-10-CM

## 2024-12-12 DIAGNOSIS — F41.1 GENERALIZED ANXIETY DISORDER: ICD-10-CM

## 2024-12-12 PROCEDURE — G2211 COMPLEX E/M VISIT ADD ON: HCPCS | Performed by: PHYSICIAN ASSISTANT

## 2024-12-12 PROCEDURE — 99214 OFFICE O/P EST MOD 30 MIN: CPT | Performed by: PHYSICIAN ASSISTANT

## 2024-12-12 RX ORDER — TRAZODONE HYDROCHLORIDE 100 MG/1
100 TABLET ORAL
Qty: 90 TABLET | Refills: 1 | Status: SHIPPED | OUTPATIENT
Start: 2024-12-12 | End: 2024-12-12 | Stop reason: SDUPTHER

## 2024-12-12 RX ORDER — TRAZODONE HYDROCHLORIDE 100 MG/1
100 TABLET ORAL
Qty: 30 TABLET | Refills: 1 | Status: SHIPPED | OUTPATIENT
Start: 2024-12-12 | End: 2025-02-10

## 2024-12-12 NOTE — PSYCH
"This note was not shared with the patient due to reasonable likelihood of causing patient harm    PROGRESS NOTE        [facility]      Name and Date of Birth:  Zulma Marcelo 74 y.o. 1950 MRN: [id number]    Insurance: Payor: MEDICARE / Plan: MEDICARE A AND B / Product Type: Medicare A & B Fee for Service /     Date of Visit: December 12, 2024    Reason for Visit:   Chief Complaint   Patient presents with    Follow-up    Medication Management     Assessment & Plan  Moderate episode of recurrent major depressive disorder (HCC)  Not at goal - increase trazodone to 100 mg qhs; continue escitalopram 20 mg qd, bupropion  mg qd, buspirone 5 mg TID; continue talk therapy; f/u in 4-6 weeks         Generalized anxiety disorder  Not at goal - increase trazodone to 100 mg qhs; continue escitalopram 20 mg qd, bupropion  mg qd, buspirone 5 mg TID; continue talk therapy; f/u in 4-6 weeks         Primary insomnia  Not at goal - increase trazodone to 100 mg qhs; f/u in 4-6 weeks    Orders:    traZODone (DESYREL) 100 mg tablet; Take 1 tablet (100 mg total) by mouth daily at bedtime         SUBJECTIVE:    Zulma Marcelo is a joni 74 y.o. female with a history of Major Depressive Disorder, Generalized Anxiety Disorder, and insomnia who presents today for follow-up and medication management. Since her last visit she reports she has been feeling more down and anxious. She notes that in part this is from her kids not calling on Thanksgiving. She also has started having dreams of her past that she refers to as \"flashbacks\" that are upsetting. She is not sleeping well even with the trazodone now.     She denies any suicidal ideation, intent or plan at present; denies any homicidal ideation, intent or plan at present.    She denies any auditory hallucinations, denies any visual hallucinations, denies any delusions.    She denies any side effects from current psychiatric " medications.    HPI ROS Appetite Changes and Sleep:     She reports difficulty falling asleep, decreased appetite, low energy    Current Rating Scores:     None completed today.    Review Of Systems:    Mood anxiety and depression   Behavior appropriate, cooperative, and calm   Thought Content daily worries and negative thoughts   General emotional problems, sleep disturbances, appetite disturbances, and decreased functioning   Personality no change in personality   Other Psych Symptoms normal   Constitutional as noted in HPI   ENT as noted in HPI   Cardiovascular as noted in HPI   Respiratory as noted in HPI   Gastrointestinal as noted in HPI   Genitourinary as noted in HPI   Musculoskeletal as noted in HPI   Integumentary as noted in HPI   Neurological as noted in HPI   Endocrine negative   Other Symptoms none, all other systems are negative     Family Psychiatric History:     Family History   Problem Relation Age of Onset    Heart disease Mother     Stroke Son     Stroke Maternal Grandfather     Breast cancer Daughter 40     Social/Substance Abuse History:    Social History     Socioeconomic History    Marital status:      Spouse name: Not on file    Number of children: Not on file    Years of education: Not on file    Highest education level: Not on file   Occupational History    Not on file   Tobacco Use    Smoking status: Former     Current packs/day: 0.00     Average packs/day: 1 pack/day for 67.1 years (67.1 ttl pk-yrs)     Types: Cigarettes     Start date: 6/15/1956     Quit date: 2023     Years since quittin.3     Passive exposure: Past    Smokeless tobacco: Never   Vaping Use    Vaping status: Never Used   Substance and Sexual Activity    Alcohol use: Yes     Comment: rarely    Drug use: No    Sexual activity: Never   Other Topics Concern    Not on file   Social History Narrative    Not on file     Social Drivers of Health     Financial Resource Strain: High Risk (2023)    Overall  Financial Resource Strain (CARDIA)     Difficulty of Paying Living Expenses: Hard   Food Insecurity: No Food Insecurity (11/18/2024)    Hunger Vital Sign     Worried About Running Out of Food in the Last Year: Never true     Ran Out of Food in the Last Year: Never true   Transportation Needs: No Transportation Needs (11/18/2024)    PRAPARE - Transportation     Lack of Transportation (Medical): No     Lack of Transportation (Non-Medical): No   Physical Activity: Not on file   Stress: Not on file   Social Connections: Not on file   Intimate Partner Violence: Not on file   Housing Stability: Low Risk  (11/18/2024)    Housing Stability Vital Sign     Unable to Pay for Housing in the Last Year: No     Number of Times Moved in the Last Year: 1     Homeless in the Last Year: No     The following portions of the patient's history were reviewed and updated as appropriate: past family history, past medical history, past social history, past surgical history and problem list.    OBJECTIVE:     Mental Status Evaluation:  Appearance:  dressed appropriately, adequate grooming, looks stated age   Behavior:  pleasant, cooperative, calm, interacts appropriately with this writer   Speech:  normal rate, normal volume, normal pitch   Mood:  depressed   Affect:  constricted   Thought Process:  organized, logical, coherent   Associations: intact associations   Thought Content:  no overt delusions, no paranoia noted on exam   Perceptual Disturbances: no auditory hallucinations, no visual hallucinations   Risk Potential: Suicidal ideation - None  Homicidal ideation - None  Potential for aggression - No   Sensorium:  oriented to person, place, and time/date   Memory:  recent and remote memory grossly intact   Consciousness:  alert and awake   Attention/Concentration: attention span and concentration are age appropriate   Insight:  age appropriate   Judgment: age appropriate   Gait/Station: unstable gait, uses cane   Motor Activity: no  abnormal movements     Laboratory Results: I have personally reviewed all pertinent laboratory/tests results    Suicide/Homicide Risk Assessment:    Risk of Harm to Self:  The following ratings are based on assessment at the time of the interview  Based on today's assessment, Zulma presents the following risk of harm to self: none    Risk of Harm to Others:  The following ratings are based on assessment at the time of the interview  Based on today's assessment, Zulma presents the following risk of harm to others: none    The following interventions are recommended: no intervention changes needed    Assessment/Plan:      She unfortunately has been a little more depressed and has had more trouble sleeping in the past several weeks. Advised increasing trazodone to 100 mg qhs, no other med changes advised at this time. She will continue to work with Vannesa Stone LCSW, as well. We have discussed her safety plan and she agrees that if she experience unsafe thoughts that she will reach out to her supports including this office, the suicide hotline, and emergency services if necessary. Zulma is aware of non-emergent and emergent mental health resources. They are able to contract for their own safety at this time.    Will follow up in 1 months. Patient is aware to call the office if questions or concerns arise sooner.      Diagnoses and all orders for this visit:    Moderate episode of recurrent major depressive disorder (HCC)    Generalized anxiety disorder    Primary insomnia        Treatment Recommendations/Precautions:    Continue current medications:     Wellbutrin 150 mg daily for depressive symptoms  BuSpar 5 mg 3 times daily for anxiety  Lexapro 20 mg daily for depressive symptoms    Increase medication   Trazodone 75 mg nightly for insomnia to 100 mg qhs    Aware of 24 hour and weekend coverage for urgent situations accessed by calling [facility] main practice number  Medication management  every 1 month  Continue psychotherapy with [facility] therapist Clemente Stone  I am scheduling this patient out for greater than 3 months: No    Medications Risks/Benefits      Risks, Benefits And Possible Side Effects Of Medications:    Risks, benefits, and possible side effects of medications explained to Zulma and she verbalizes understanding and agreement for treatment.    Controlled Medication Discussion:     Not applicable    Psychotherapy Provided:     Individual psychotherapy provided: No    Treatment Plan:    Completed and signed during the session: Not applicable - Treatment Plan to be completed by  [facility] therapist    Visit Time    Visit Start Time:  12:00 PM  Visit End Time:  12:15 PM  Total Visit Duration:  15 minutes    Alis Kearns 12/12/24

## 2024-12-12 NOTE — ASSESSMENT & PLAN NOTE
Not at goal - increase trazodone to 100 mg qhs; continue escitalopram 20 mg qd, bupropion  mg qd, buspirone 5 mg TID; continue talk therapy; f/u in 4-6 weeks

## 2024-12-16 ENCOUNTER — APPOINTMENT (EMERGENCY)
Dept: RADIOLOGY | Facility: HOSPITAL | Age: 74
DRG: 326 | End: 2024-12-16
Payer: MEDICARE

## 2024-12-16 ENCOUNTER — ANESTHESIA EVENT (EMERGENCY)
Dept: PERIOP | Facility: HOSPITAL | Age: 74
DRG: 326 | End: 2024-12-16
Payer: MEDICARE

## 2024-12-16 ENCOUNTER — HOSPITAL ENCOUNTER (INPATIENT)
Facility: HOSPITAL | Age: 74
LOS: 5 days | Discharge: HOME HEALTH | DRG: 326 | End: 2024-12-21
Attending: STUDENT IN AN ORGANIZED HEALTH CARE EDUCATION/TRAINING PROGRAM | Admitting: SURGERY
Payer: MEDICARE

## 2024-12-16 ENCOUNTER — TELEPHONE (OUTPATIENT)
Age: 74
End: 2024-12-16

## 2024-12-16 ENCOUNTER — ANESTHESIA (EMERGENCY)
Dept: PERIOP | Facility: HOSPITAL | Age: 74
DRG: 326 | End: 2024-12-16
Payer: MEDICARE

## 2024-12-16 DIAGNOSIS — K27.5 PERFORATED PEPTIC ULCER (HCC): ICD-10-CM

## 2024-12-16 DIAGNOSIS — I10 ESSENTIAL HYPERTENSION: ICD-10-CM

## 2024-12-16 DIAGNOSIS — I35.0 AORTIC VALVE STENOSIS, ETIOLOGY OF CARDIAC VALVE DISEASE UNSPECIFIED: ICD-10-CM

## 2024-12-16 DIAGNOSIS — R19.8 PERFORATED ABDOMINAL VISCUS: ICD-10-CM

## 2024-12-16 DIAGNOSIS — I47.10 SVT (SUPRAVENTRICULAR TACHYCARDIA) (HCC): ICD-10-CM

## 2024-12-16 DIAGNOSIS — K25.5 GASTRIC PERFORATION (HCC): ICD-10-CM

## 2024-12-16 DIAGNOSIS — K66.8 PNEUMOPERITONEUM: Primary | ICD-10-CM

## 2024-12-16 PROBLEM — R94.31 QT PROLONGATION: Status: ACTIVE | Noted: 2024-12-16

## 2024-12-16 LAB
2HR DELTA HS TROPONIN: 0 NG/L
ALBUMIN SERPL BCG-MCNC: 3.9 G/DL (ref 3.5–5)
ALP SERPL-CCNC: 59 U/L (ref 34–104)
ALT SERPL W P-5'-P-CCNC: 5 U/L (ref 7–52)
ANION GAP SERPL CALCULATED.3IONS-SCNC: 8 MMOL/L (ref 4–13)
AST SERPL W P-5'-P-CCNC: 12 U/L (ref 13–39)
ATRIAL RATE: 54 BPM
BASE EX.OXY STD BLDV CALC-SCNC: 56.4 % (ref 60–80)
BASE EXCESS BLDV CALC-SCNC: 3.6 MMOL/L
BASOPHILS # BLD AUTO: 0.03 THOUSANDS/ÂΜL (ref 0–0.1)
BASOPHILS NFR BLD AUTO: 0 % (ref 0–1)
BILIRUB SERPL-MCNC: 0.67 MG/DL (ref 0.2–1)
BUN SERPL-MCNC: 11 MG/DL (ref 5–25)
CALCIUM SERPL-MCNC: 9.3 MG/DL (ref 8.4–10.2)
CARDIAC TROPONIN I PNL SERPL HS: 10 NG/L (ref ?–50)
CARDIAC TROPONIN I PNL SERPL HS: 10 NG/L (ref ?–50)
CHLORIDE SERPL-SCNC: 99 MMOL/L (ref 96–108)
CO2 SERPL-SCNC: 29 MMOL/L (ref 21–32)
CREAT SERPL-MCNC: 0.58 MG/DL (ref 0.6–1.3)
EOSINOPHIL # BLD AUTO: 0.04 THOUSAND/ÂΜL (ref 0–0.61)
EOSINOPHIL NFR BLD AUTO: 0 % (ref 0–6)
ERYTHROCYTE [DISTWIDTH] IN BLOOD BY AUTOMATED COUNT: 14.6 % (ref 11.6–15.1)
FLUAV AG UPPER RESP QL IA.RAPID: NEGATIVE
FLUBV AG UPPER RESP QL IA.RAPID: NEGATIVE
GFR SERPL CREATININE-BSD FRML MDRD: 89 ML/MIN/1.73SQ M
GLUCOSE SERPL-MCNC: 135 MG/DL (ref 65–140)
HCO3 BLDV-SCNC: 29.6 MMOL/L (ref 24–30)
HCT VFR BLD AUTO: 43.6 % (ref 34.8–46.1)
HGB BLD-MCNC: 14 G/DL (ref 11.5–15.4)
IMM GRANULOCYTES # BLD AUTO: 0.07 THOUSAND/UL (ref 0–0.2)
IMM GRANULOCYTES NFR BLD AUTO: 1 % (ref 0–2)
LACTATE SERPL-SCNC: 1.2 MMOL/L (ref 0.5–2)
LYMPHOCYTES # BLD AUTO: 1.64 THOUSANDS/ÂΜL (ref 0.6–4.47)
LYMPHOCYTES NFR BLD AUTO: 16 % (ref 14–44)
MAGNESIUM SERPL-MCNC: 1.9 MG/DL (ref 1.9–2.7)
MCH RBC QN AUTO: 28.6 PG (ref 26.8–34.3)
MCHC RBC AUTO-ENTMCNC: 32.1 G/DL (ref 31.4–37.4)
MCV RBC AUTO: 89 FL (ref 82–98)
MONOCYTES # BLD AUTO: 0.61 THOUSAND/ÂΜL (ref 0.17–1.22)
MONOCYTES NFR BLD AUTO: 6 % (ref 4–12)
NEUTROPHILS # BLD AUTO: 8.22 THOUSANDS/ÂΜL (ref 1.85–7.62)
NEUTS SEG NFR BLD AUTO: 77 % (ref 43–75)
NRBC BLD AUTO-RTO: 0 /100 WBCS
O2 CT BLDV-SCNC: 12 ML/DL
P AXIS: 87 DEGREES
PCO2 BLDV: 49.6 MM HG (ref 42–50)
PH BLDV: 7.39 [PH] (ref 7.3–7.4)
PLATELET # BLD AUTO: 360 THOUSANDS/UL (ref 149–390)
PMV BLD AUTO: 9.5 FL (ref 8.9–12.7)
PO2 BLDV: 30.4 MM HG (ref 35–45)
POTASSIUM SERPL-SCNC: 3.1 MMOL/L (ref 3.5–5.3)
PR INTERVAL: 188 MS
PROCALCITONIN SERPL-MCNC: 2.08 NG/ML
PROT SERPL-MCNC: 6.6 G/DL (ref 6.4–8.4)
QRS AXIS: 84 DEGREES
QRSD INTERVAL: 140 MS
QT INTERVAL: 536 MS
QTC INTERVAL: 508 MS
RBC # BLD AUTO: 4.9 MILLION/UL (ref 3.81–5.12)
SARS-COV+SARS-COV-2 AG RESP QL IA.RAPID: NEGATIVE
SODIUM SERPL-SCNC: 136 MMOL/L (ref 135–147)
T WAVE AXIS: 47 DEGREES
VENTRICULAR RATE: 54 BPM
WBC # BLD AUTO: 10.61 THOUSAND/UL (ref 4.31–10.16)

## 2024-12-16 PROCEDURE — 0DJ08ZZ INSPECTION OF UPPER INTESTINAL TRACT, VIA NATURAL OR ARTIFICIAL OPENING ENDOSCOPIC: ICD-10-PCS | Performed by: SURGERY

## 2024-12-16 PROCEDURE — 84145 PROCALCITONIN (PCT): CPT | Performed by: STUDENT IN AN ORGANIZED HEALTH CARE EDUCATION/TRAINING PROGRAM

## 2024-12-16 PROCEDURE — 71275 CT ANGIOGRAPHY CHEST: CPT

## 2024-12-16 PROCEDURE — 87040 BLOOD CULTURE FOR BACTERIA: CPT | Performed by: STUDENT IN AN ORGANIZED HEALTH CARE EDUCATION/TRAINING PROGRAM

## 2024-12-16 PROCEDURE — 96361 HYDRATE IV INFUSION ADD-ON: CPT

## 2024-12-16 PROCEDURE — 74174 CTA ABD&PLVS W/CONTRAST: CPT

## 2024-12-16 PROCEDURE — 43659 UNLISTED LAPS PX STOMACH: CPT | Performed by: SURGERY

## 2024-12-16 PROCEDURE — 96375 TX/PRO/DX INJ NEW DRUG ADDON: CPT

## 2024-12-16 PROCEDURE — 96372 THER/PROPH/DIAG INJ SC/IM: CPT

## 2024-12-16 PROCEDURE — 74176 CT ABD & PELVIS W/O CONTRAST: CPT

## 2024-12-16 PROCEDURE — 93010 ELECTROCARDIOGRAM REPORT: CPT | Performed by: INTERNAL MEDICINE

## 2024-12-16 PROCEDURE — 96365 THER/PROPH/DIAG IV INF INIT: CPT

## 2024-12-16 PROCEDURE — C9290 INJ, BUPIVACAINE LIPOSOME: HCPCS | Performed by: PHYSICIAN ASSISTANT

## 2024-12-16 PROCEDURE — 87804 INFLUENZA ASSAY W/OPTIC: CPT | Performed by: STUDENT IN AN ORGANIZED HEALTH CARE EDUCATION/TRAINING PROGRAM

## 2024-12-16 PROCEDURE — 93005 ELECTROCARDIOGRAM TRACING: CPT

## 2024-12-16 PROCEDURE — 96376 TX/PRO/DX INJ SAME DRUG ADON: CPT

## 2024-12-16 PROCEDURE — 0DU44JZ SUPPLEMENT ESOPHAGOGASTRIC JUNCTION WITH SYNTHETIC SUBSTITUTE, PERCUTANEOUS ENDOSCOPIC APPROACH: ICD-10-PCS | Performed by: SURGERY

## 2024-12-16 PROCEDURE — 36415 COLL VENOUS BLD VENIPUNCTURE: CPT | Performed by: STUDENT IN AN ORGANIZED HEALTH CARE EDUCATION/TRAINING PROGRAM

## 2024-12-16 PROCEDURE — 87811 SARS-COV-2 COVID19 W/OPTIC: CPT | Performed by: STUDENT IN AN ORGANIZED HEALTH CARE EDUCATION/TRAINING PROGRAM

## 2024-12-16 PROCEDURE — 99222 1ST HOSP IP/OBS MODERATE 55: CPT | Performed by: SURGERY

## 2024-12-16 PROCEDURE — 99222 1ST HOSP IP/OBS MODERATE 55: CPT | Performed by: INTERNAL MEDICINE

## 2024-12-16 PROCEDURE — 85025 COMPLETE CBC W/AUTO DIFF WBC: CPT | Performed by: STUDENT IN AN ORGANIZED HEALTH CARE EDUCATION/TRAINING PROGRAM

## 2024-12-16 PROCEDURE — 96367 TX/PROPH/DG ADDL SEQ IV INF: CPT

## 2024-12-16 PROCEDURE — 99223 1ST HOSP IP/OBS HIGH 75: CPT | Performed by: PHYSICIAN ASSISTANT

## 2024-12-16 PROCEDURE — 83735 ASSAY OF MAGNESIUM: CPT | Performed by: INTERNAL MEDICINE

## 2024-12-16 PROCEDURE — 84484 ASSAY OF TROPONIN QUANT: CPT | Performed by: STUDENT IN AN ORGANIZED HEALTH CARE EDUCATION/TRAINING PROGRAM

## 2024-12-16 PROCEDURE — 80053 COMPREHEN METABOLIC PANEL: CPT | Performed by: STUDENT IN AN ORGANIZED HEALTH CARE EDUCATION/TRAINING PROGRAM

## 2024-12-16 PROCEDURE — 0DNU4ZZ RELEASE OMENTUM, PERCUTANEOUS ENDOSCOPIC APPROACH: ICD-10-PCS | Performed by: SURGERY

## 2024-12-16 PROCEDURE — 99291 CRITICAL CARE FIRST HOUR: CPT | Performed by: STUDENT IN AN ORGANIZED HEALTH CARE EDUCATION/TRAINING PROGRAM

## 2024-12-16 PROCEDURE — 83605 ASSAY OF LACTIC ACID: CPT | Performed by: STUDENT IN AN ORGANIZED HEALTH CARE EDUCATION/TRAINING PROGRAM

## 2024-12-16 PROCEDURE — 82805 BLOOD GASES W/O2 SATURATION: CPT | Performed by: STUDENT IN AN ORGANIZED HEALTH CARE EDUCATION/TRAINING PROGRAM

## 2024-12-16 PROCEDURE — 99285 EMERGENCY DEPT VISIT HI MDM: CPT

## 2024-12-16 RX ORDER — FENTANYL CITRATE/PF 50 MCG/ML
25 SYRINGE (ML) INJECTION
Status: DISCONTINUED | OUTPATIENT
Start: 2024-12-16 | End: 2024-12-16 | Stop reason: HOSPADM

## 2024-12-16 RX ORDER — METRONIDAZOLE 500 MG/100ML
500 INJECTION, SOLUTION INTRAVENOUS EVERY 8 HOURS
Status: DISCONTINUED | OUTPATIENT
Start: 2024-12-16 | End: 2024-12-19

## 2024-12-16 RX ORDER — HYDROMORPHONE HCL/PF 1 MG/ML
0.5 SYRINGE (ML) INJECTION EVERY 4 HOURS PRN
Status: DISCONTINUED | OUTPATIENT
Start: 2024-12-16 | End: 2024-12-21 | Stop reason: HOSPADM

## 2024-12-16 RX ORDER — MAGNESIUM HYDROXIDE 1200 MG/15ML
LIQUID ORAL AS NEEDED
Status: DISCONTINUED | OUTPATIENT
Start: 2024-12-16 | End: 2024-12-16 | Stop reason: HOSPADM

## 2024-12-16 RX ORDER — HEPARIN SODIUM 5000 [USP'U]/ML
5000 INJECTION, SOLUTION INTRAVENOUS; SUBCUTANEOUS ONCE
Status: CANCELLED | OUTPATIENT
Start: 2024-12-16 | End: 2024-12-16

## 2024-12-16 RX ORDER — ACETAMINOPHEN 10 MG/ML
1000 INJECTION, SOLUTION INTRAVENOUS ONCE
Status: DISCONTINUED | OUTPATIENT
Start: 2024-12-16 | End: 2024-12-16 | Stop reason: HOSPADM

## 2024-12-16 RX ORDER — FAMOTIDINE 10 MG/ML
20 INJECTION, SOLUTION INTRAVENOUS EVERY 12 HOURS SCHEDULED
Status: DISCONTINUED | OUTPATIENT
Start: 2024-12-16 | End: 2024-12-21 | Stop reason: HOSPADM

## 2024-12-16 RX ORDER — HEPARIN SODIUM 5000 [USP'U]/ML
5000 INJECTION, SOLUTION INTRAVENOUS; SUBCUTANEOUS ONCE
OUTPATIENT
Start: 2024-12-16 | End: 2024-12-16

## 2024-12-16 RX ORDER — ACETAMINOPHEN 10 MG/ML
1000 INJECTION, SOLUTION INTRAVENOUS EVERY 8 HOURS SCHEDULED
Status: DISCONTINUED | OUTPATIENT
Start: 2024-12-16 | End: 2024-12-21 | Stop reason: HOSPADM

## 2024-12-16 RX ORDER — ACETAMINOPHEN 10 MG/ML
1000 INJECTION, SOLUTION INTRAVENOUS ONCE
OUTPATIENT
Start: 2024-12-16 | End: 2024-12-16

## 2024-12-16 RX ORDER — ONDANSETRON 2 MG/ML
INJECTION INTRAMUSCULAR; INTRAVENOUS AS NEEDED
Status: DISCONTINUED | OUTPATIENT
Start: 2024-12-16 | End: 2024-12-16

## 2024-12-16 RX ORDER — ONDANSETRON 2 MG/ML
4 INJECTION INTRAMUSCULAR; INTRAVENOUS EVERY 6 HOURS PRN
Status: DISCONTINUED | OUTPATIENT
Start: 2024-12-16 | End: 2024-12-21 | Stop reason: HOSPADM

## 2024-12-16 RX ORDER — DIPHENHYDRAMINE HCL 25 MG
25 TABLET ORAL
Status: DISCONTINUED | OUTPATIENT
Start: 2024-12-16 | End: 2024-12-21 | Stop reason: HOSPADM

## 2024-12-16 RX ORDER — HYDROMORPHONE HCL/PF 1 MG/ML
0.5 SYRINGE (ML) INJECTION ONCE
Refills: 0 | Status: COMPLETED | OUTPATIENT
Start: 2024-12-16 | End: 2024-12-16

## 2024-12-16 RX ORDER — HEPARIN SODIUM 5000 [USP'U]/ML
5000 INJECTION, SOLUTION INTRAVENOUS; SUBCUTANEOUS EVERY 8 HOURS SCHEDULED
Status: DISCONTINUED | OUTPATIENT
Start: 2024-12-16 | End: 2024-12-21 | Stop reason: HOSPADM

## 2024-12-16 RX ORDER — METRONIDAZOLE 500 MG/1
500 TABLET ORAL ONCE
Status: DISCONTINUED | OUTPATIENT
Start: 2024-12-16 | End: 2024-12-16

## 2024-12-16 RX ORDER — ONDANSETRON 2 MG/ML
4 INJECTION INTRAMUSCULAR; INTRAVENOUS ONCE AS NEEDED
Status: DISCONTINUED | OUTPATIENT
Start: 2024-12-16 | End: 2024-12-16 | Stop reason: HOSPADM

## 2024-12-16 RX ORDER — CEFEPIME HYDROCHLORIDE 2 G/50ML
2000 INJECTION, SOLUTION INTRAVENOUS ONCE
Status: COMPLETED | OUTPATIENT
Start: 2024-12-16 | End: 2024-12-16

## 2024-12-16 RX ORDER — LORAZEPAM 2 MG/ML
0.5 INJECTION INTRAMUSCULAR EVERY 6 HOURS PRN
Status: DISCONTINUED | OUTPATIENT
Start: 2024-12-16 | End: 2024-12-21 | Stop reason: HOSPADM

## 2024-12-16 RX ORDER — ACETAMINOPHEN 10 MG/ML
1000 INJECTION, SOLUTION INTRAVENOUS ONCE
Status: COMPLETED | OUTPATIENT
Start: 2024-12-16 | End: 2024-12-16

## 2024-12-16 RX ORDER — ROCURONIUM BROMIDE 10 MG/ML
INJECTION, SOLUTION INTRAVENOUS AS NEEDED
Status: DISCONTINUED | OUTPATIENT
Start: 2024-12-16 | End: 2024-12-16

## 2024-12-16 RX ORDER — ALBUTEROL SULFATE 5 MG/ML
5 SOLUTION RESPIRATORY (INHALATION) ONCE
Status: COMPLETED | OUTPATIENT
Start: 2024-12-16 | End: 2024-12-16

## 2024-12-16 RX ORDER — ALBUTEROL SULFATE 90 UG/1
2 INHALANT RESPIRATORY (INHALATION) EVERY 6 HOURS PRN
Status: DISCONTINUED | OUTPATIENT
Start: 2024-12-16 | End: 2024-12-21 | Stop reason: HOSPADM

## 2024-12-16 RX ORDER — GLYCOPYRROLATE 0.2 MG/ML
INJECTION INTRAMUSCULAR; INTRAVENOUS AS NEEDED
Status: DISCONTINUED | OUTPATIENT
Start: 2024-12-16 | End: 2024-12-16

## 2024-12-16 RX ORDER — METOPROLOL TARTRATE 1 MG/ML
5 INJECTION, SOLUTION INTRAVENOUS EVERY 8 HOURS
Status: DISCONTINUED | OUTPATIENT
Start: 2024-12-16 | End: 2024-12-18

## 2024-12-16 RX ORDER — SODIUM CHLORIDE, SODIUM LACTATE, POTASSIUM CHLORIDE, CALCIUM CHLORIDE 600; 310; 30; 20 MG/100ML; MG/100ML; MG/100ML; MG/100ML
100 INJECTION, SOLUTION INTRAVENOUS CONTINUOUS
Status: DISCONTINUED | OUTPATIENT
Start: 2024-12-16 | End: 2024-12-18

## 2024-12-16 RX ORDER — PROPOFOL 10 MG/ML
INJECTION, EMULSION INTRAVENOUS AS NEEDED
Status: DISCONTINUED | OUTPATIENT
Start: 2024-12-16 | End: 2024-12-16

## 2024-12-16 RX ORDER — SODIUM CHLORIDE, SODIUM LACTATE, POTASSIUM CHLORIDE, CALCIUM CHLORIDE 600; 310; 30; 20 MG/100ML; MG/100ML; MG/100ML; MG/100ML
INJECTION, SOLUTION INTRAVENOUS CONTINUOUS PRN
Status: DISCONTINUED | OUTPATIENT
Start: 2024-12-16 | End: 2024-12-16

## 2024-12-16 RX ORDER — SUCCINYLCHOLINE/SOD CL,ISO/PF 100 MG/5ML
SYRINGE (ML) INTRAVENOUS AS NEEDED
Status: DISCONTINUED | OUTPATIENT
Start: 2024-12-16 | End: 2024-12-16

## 2024-12-16 RX ORDER — LEVALBUTEROL INHALATION SOLUTION 0.63 MG/3ML
0.63 SOLUTION RESPIRATORY (INHALATION) EVERY 8 HOURS PRN
Status: DISCONTINUED | OUTPATIENT
Start: 2024-12-16 | End: 2024-12-21 | Stop reason: HOSPADM

## 2024-12-16 RX ORDER — LIDOCAINE HYDROCHLORIDE 10 MG/ML
INJECTION, SOLUTION EPIDURAL; INFILTRATION; INTRACAUDAL; PERINEURAL AS NEEDED
Status: DISCONTINUED | OUTPATIENT
Start: 2024-12-16 | End: 2024-12-16

## 2024-12-16 RX ORDER — NALOXONE HYDROCHLORIDE 0.4 MG/ML
INJECTION, SOLUTION INTRAMUSCULAR; INTRAVENOUS; SUBCUTANEOUS AS NEEDED
Status: DISCONTINUED | OUTPATIENT
Start: 2024-12-16 | End: 2024-12-16

## 2024-12-16 RX ORDER — FLUCONAZOLE 2 MG/ML
200 INJECTION, SOLUTION INTRAVENOUS ONCE
Status: COMPLETED | OUTPATIENT
Start: 2024-12-16 | End: 2024-12-16

## 2024-12-16 RX ORDER — ACETAMINOPHEN 10 MG/ML
1000 INJECTION, SOLUTION INTRAVENOUS ONCE
Status: CANCELLED | OUTPATIENT
Start: 2024-12-16 | End: 2024-12-16

## 2024-12-16 RX ORDER — HEPARIN SODIUM 5000 [USP'U]/ML
5000 INJECTION, SOLUTION INTRAVENOUS; SUBCUTANEOUS ONCE
Status: COMPLETED | OUTPATIENT
Start: 2024-12-16 | End: 2024-12-16

## 2024-12-16 RX ORDER — DIPHENHYDRAMINE HYDROCHLORIDE 50 MG/ML
12.5 INJECTION INTRAMUSCULAR; INTRAVENOUS ONCE AS NEEDED
Status: DISCONTINUED | OUTPATIENT
Start: 2024-12-16 | End: 2024-12-16 | Stop reason: HOSPADM

## 2024-12-16 RX ORDER — CEFEPIME HYDROCHLORIDE 2 G/50ML
2000 INJECTION, SOLUTION INTRAVENOUS EVERY 8 HOURS
Status: DISCONTINUED | OUTPATIENT
Start: 2024-12-16 | End: 2024-12-16

## 2024-12-16 RX ORDER — CEFEPIME HYDROCHLORIDE 2 G/50ML
2000 INJECTION, SOLUTION INTRAVENOUS EVERY 8 HOURS
Status: DISCONTINUED | OUTPATIENT
Start: 2024-12-16 | End: 2024-12-18

## 2024-12-16 RX ORDER — MORPHINE SULFATE 4 MG/ML
4 INJECTION, SOLUTION INTRAMUSCULAR; INTRAVENOUS EVERY 2 HOUR PRN
Status: DISCONTINUED | OUTPATIENT
Start: 2024-12-16 | End: 2024-12-21 | Stop reason: HOSPADM

## 2024-12-16 RX ORDER — DEXAMETHASONE SODIUM PHOSPHATE 10 MG/ML
INJECTION, SOLUTION INTRAMUSCULAR; INTRAVENOUS AS NEEDED
Status: DISCONTINUED | OUTPATIENT
Start: 2024-12-16 | End: 2024-12-16

## 2024-12-16 RX ADMIN — PROPOFOL 120 MG: 10 INJECTION, EMULSION INTRAVENOUS at 14:33

## 2024-12-16 RX ADMIN — GLYCOPYRROLATE 0.1 MG: 0.2 INJECTION, SOLUTION INTRAMUSCULAR; INTRAVENOUS at 15:27

## 2024-12-16 RX ADMIN — HYDROMORPHONE HYDROCHLORIDE 0.5 MG: 1 INJECTION, SOLUTION INTRAMUSCULAR; INTRAVENOUS; SUBCUTANEOUS at 13:13

## 2024-12-16 RX ADMIN — NALOXONE HYDROCHLORIDE 0.04 MG: 0.4 INJECTION, SOLUTION INTRAMUSCULAR; INTRAVENOUS; SUBCUTANEOUS at 16:29

## 2024-12-16 RX ADMIN — ROCURONIUM BROMIDE 30 MG: 10 INJECTION, SOLUTION INTRAVENOUS at 14:40

## 2024-12-16 RX ADMIN — Medication 100 MG: at 14:33

## 2024-12-16 RX ADMIN — FAMOTIDINE 20 MG: 10 INJECTION, SOLUTION INTRAVENOUS at 20:21

## 2024-12-16 RX ADMIN — IPRATROPIUM BROMIDE 0.5 MG: 0.5 SOLUTION RESPIRATORY (INHALATION) at 08:38

## 2024-12-16 RX ADMIN — CEFEPIME HYDROCHLORIDE 2000 MG: 2 INJECTION, SOLUTION INTRAVENOUS at 19:31

## 2024-12-16 RX ADMIN — FOSAPREPITANT 150 MG: 150 INJECTION, POWDER, LYOPHILIZED, FOR SOLUTION INTRAVENOUS at 13:38

## 2024-12-16 RX ADMIN — SUGAMMADEX 200 MG: 100 INJECTION, SOLUTION INTRAVENOUS at 16:10

## 2024-12-16 RX ADMIN — ROCURONIUM BROMIDE 20 MG: 10 INJECTION, SOLUTION INTRAVENOUS at 14:53

## 2024-12-16 RX ADMIN — METRONIDAZOLE 500 MG: 500 INJECTION, SOLUTION INTRAVENOUS at 20:23

## 2024-12-16 RX ADMIN — SODIUM CHLORIDE 1000 ML: 0.9 INJECTION, SOLUTION INTRAVENOUS at 08:34

## 2024-12-16 RX ADMIN — GLYCOPYRROLATE 0.1 MG: 0.2 INJECTION, SOLUTION INTRAMUSCULAR; INTRAVENOUS at 15:29

## 2024-12-16 RX ADMIN — SODIUM CHLORIDE, SODIUM LACTATE, POTASSIUM CHLORIDE, AND CALCIUM CHLORIDE: .6; .31; .03; .02 INJECTION, SOLUTION INTRAVENOUS at 15:29

## 2024-12-16 RX ADMIN — PHENYLEPHRINE HYDROCHLORIDE 100 MCG/MIN: 10 INJECTION INTRAVENOUS at 14:39

## 2024-12-16 RX ADMIN — PANTOPRAZOLE SODIUM 80 MG: 40 INJECTION, POWDER, FOR SOLUTION INTRAVENOUS at 12:04

## 2024-12-16 RX ADMIN — FLUCONAZOLE 200 MG: 2 INJECTION, SOLUTION INTRAVENOUS at 11:57

## 2024-12-16 RX ADMIN — SODIUM CHLORIDE, SODIUM LACTATE, POTASSIUM CHLORIDE, AND CALCIUM CHLORIDE 100 ML/HR: .6; .31; .03; .02 INJECTION, SOLUTION INTRAVENOUS at 18:18

## 2024-12-16 RX ADMIN — LIDOCAINE HYDROCHLORIDE 50 MG: 10 INJECTION, SOLUTION EPIDURAL; INFILTRATION; INTRACAUDAL; PERINEURAL at 14:33

## 2024-12-16 RX ADMIN — ONDANSETRON 4 MG: 2 INJECTION INTRAMUSCULAR; INTRAVENOUS at 14:44

## 2024-12-16 RX ADMIN — SODIUM CHLORIDE, SODIUM LACTATE, POTASSIUM CHLORIDE, AND CALCIUM CHLORIDE: .6; .31; .03; .02 INJECTION, SOLUTION INTRAVENOUS at 14:22

## 2024-12-16 RX ADMIN — ACETAMINOPHEN 1000 MG: 1000 INJECTION, SOLUTION INTRAVENOUS at 22:07

## 2024-12-16 RX ADMIN — ROCURONIUM BROMIDE 10 MG: 10 INJECTION, SOLUTION INTRAVENOUS at 15:53

## 2024-12-16 RX ADMIN — ACETAMINOPHEN 1000 MG: 1000 INJECTION, SOLUTION INTRAVENOUS at 08:38

## 2024-12-16 RX ADMIN — ALBUTEROL SULFATE 5 MG: 2.5 SOLUTION RESPIRATORY (INHALATION) at 08:37

## 2024-12-16 RX ADMIN — PANTOPRAZOLE SODIUM 8 MG/HR: 40 INJECTION, POWDER, FOR SOLUTION INTRAVENOUS at 19:25

## 2024-12-16 RX ADMIN — HEPARIN SODIUM 5000 UNITS: 5000 INJECTION, SOLUTION INTRAVENOUS; SUBCUTANEOUS at 22:07

## 2024-12-16 RX ADMIN — Medication 80 MG: at 14:34

## 2024-12-16 RX ADMIN — DEXAMETHASONE SODIUM PHOSPHATE 10 MG: 10 INJECTION, SOLUTION INTRAMUSCULAR; INTRAVENOUS at 14:44

## 2024-12-16 RX ADMIN — CEFEPIME HYDROCHLORIDE 2000 MG: 2 INJECTION, SOLUTION INTRAVENOUS at 10:19

## 2024-12-16 RX ADMIN — SUGAMMADEX 100 MG: 100 INJECTION, SOLUTION INTRAVENOUS at 16:28

## 2024-12-16 RX ADMIN — IOHEXOL 100 ML: 350 INJECTION, SOLUTION INTRAVENOUS at 09:15

## 2024-12-16 RX ADMIN — METOPROLOL TARTRATE 5 MG: 5 INJECTION INTRAVENOUS at 19:23

## 2024-12-16 RX ADMIN — HEPARIN SODIUM 5000 UNITS: 5000 INJECTION, SOLUTION INTRAVENOUS; SUBCUTANEOUS at 13:35

## 2024-12-16 RX ADMIN — METRONIDAZOLE 500 MG: 500 INJECTION, SOLUTION INTRAVENOUS at 10:42

## 2024-12-16 RX ADMIN — HYDROMORPHONE HYDROCHLORIDE 0.5 MG: 1 INJECTION, SOLUTION INTRAMUSCULAR; INTRAVENOUS; SUBCUTANEOUS at 08:38

## 2024-12-16 RX ADMIN — HYDROMORPHONE HYDROCHLORIDE 0.5 MG: 1 INJECTION, SOLUTION INTRAMUSCULAR; INTRAVENOUS; SUBCUTANEOUS at 10:17

## 2024-12-16 RX ADMIN — HYDROMORPHONE HYDROCHLORIDE 0.5 MG: 1 INJECTION, SOLUTION INTRAMUSCULAR; INTRAVENOUS; SUBCUTANEOUS at 10:35

## 2024-12-16 NOTE — TELEPHONE ENCOUNTER
Patient is calling regarding cancelling an appointment.    Date/Time: 12/16/2024 10am    Reason: patient in the er possible heart attack    Patient was rescheduled: YES [] NO [x]  If yes, when was Patient reschedule for:     Patient requesting call back to reschedule: YES [x] NO []

## 2024-12-16 NOTE — ASSESSMENT & PLAN NOTE
Presenting with left sided chest pain, LLQ abdominal pain, shortness of breath and associated diarrhea.     Past surgical hx of open gastric bypass el charr 2009, Open cholecystectomy and hysterectomy.     Ct a/p: ew large amount of pneumoperitoneum, predominantly within the upper abdomen. This is highly suspicious for a hollow visceral organ perforation. However, at the site of the perforation is unclear. The patient is undergone prior gastric bypass surgery and there are numerous diverticula. While there is a small amount of ascites, there is no localized drainable fluid collection     WBC: 10.61  Lactic acid 1.2.   Procal 2.08.     -Obtain stat CT a/p with PO contrast.   -PRN analgesics.   -Consult Bariatrics.

## 2024-12-16 NOTE — ANESTHESIA PREPROCEDURE EVALUATION
Procedure:  LAPAROSCOPY DIAGNOSTIC (Abdomen)    Relevant Problems   CARDIO   (+) Aortic valve stenosis   (+) Arteriosclerotic cardiovascular disease   (+) Ascending aorta dilatation (HCC)   (+) Essential hypertension   (+) Mixed hyperlipidemia   (+) SVT (supraventricular tachycardia) (HCC)      GI/HEPATIC   (+) Chronic GERD      MUSCULOSKELETAL   (+) Chronic gout   (+) Chronic idiopathic gout of left foot   (+) Primary osteoarthritis      NEURO/PSYCH   (+) Generalized anxiety disorder   (+) Moderate episode of recurrent major depressive disorder (HCC)      PULMONARY   (+) Asthma   (+) Chronic obstructive pulmonary disease with acute exacerbation (HCC)   (+) Simple chronic bronchitis (HCC)   (+) Sleep apnea      Normal stress test 2023    PFTs - moderate COPD, reports that she should be on home O2 but currently isn't    SORAYA, doesn't have a CPAP    ECHO 1/18/2023:   Left Ventricle: Left ventricular cavity size is normal. Wall thickness is mildly increased. The left ventricular ejection fraction is 60% by visual estimation. Systolic function is normal. Wall motion is normal. Diastolic function is moderately abnormal, consistent with grade II (pseudonormal) relaxation.  Left atrial filling pressure is elevated.    Left Atrium: The atrium is severely dilated (>48 mL/m2).    Aortic Valve: The leaflets are severely calcified. There is moderately reduced mobility. There is mild to moderate regurgitation. There is mild to moderate stenosis. The aortic valve peak gradient is 36 mmHg. The aortic valve mean gradient is 18 mmHg.    Mitral Valve: There is moderate annular calcification.    Tricuspid Valve: There is mild regurgitation. The right ventricular systolic pressure is normal. The estimated right ventricular systolic pressure is 24.00 mmHg.    Pulmonic Valve: There is mild regurgitation.  Physical Exam    Airway    Mallampati score: II  TM Distance: >3 FB  Neck ROM: full     Dental         Cardiovascular      Pulmonary      Other Findings  post-pubertal.      Anesthesia Plan  ASA Score- 4 Emergent    Anesthesia Type- general with ASA Monitors.         Additional Monitors: arterial line.    Airway Plan: ETT.           Plan Factors-    Chart reviewed.    Patient summary reviewed.                  Induction- intravenous and rapid sequence induction.    Postoperative Plan- Plan for postoperative opioid use. Planned trial extubation    Perioperative Resuscitation Plan - Level 1 - Full Code.       Informed Consent- Anesthetic plan and risks discussed with patient.  I personally reviewed this patient with the CRNA. Discussed and agreed on the Anesthesia Plan with the CRNA..

## 2024-12-16 NOTE — H&P
Consultation - Bariatric Surgery   Zulma Marcelo 74 y.o. female MRN: [id number]  Unit/Bed#: ED 03 Encounter: [id number]    Assessment & Plan     Assessment:  75 yo F with hx of RYGB with severe abdominal pain and N/V and pneumoperitoneum seen on CTA without PO contrast. 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 Currently hemodynamically stable, VSS.    Plan:  STAT repeat CTAP with PO and IV contrast and likely will be taken back to the OR with us asap today; OR and ED staff notified    IV PPI, broad spectrum antibiotics, and antifungals for bowel perforation     Case discussed with Dr. Pino    History of Present Illness     HPI:  Zulma Marcelo is a 74 y.o. female Body mass index is 24.87 kg/m². who presents with hx of RYGB at least 10 years ago presents to the ED with severe abdominal pain, nausea and vomiting. She notes generalized abdominal pain that has progressively worsening over the last week.  She has hx of MU d/t NSAID use and smoking although last EGD with GI in August 2024 - no MU and she notes she has been taking her PPI - unsure if she has been taking it BID and she has been taking carafate about TID. She has hx of MU's - 3 deep ulcers last noted on EGD in August 2023. She denies smoking for the last year and no NSAIDS in the last 3 months. She did not take any medications this morning and last ate a small amount of eggs yesterday. Hx of open skyla, open BILL, RYGB unsure of exact date in [place] likely laparoscopic.     Consults      Historical Information   Past Medical History:   Diagnosis Date    Acid reflux     Anxiety     Arthritis     Asthma     Cardiac disease     Chronic kidney disease     passed on own kidney stone    Depression     Diverticulitis     GERD (gastroesophageal reflux disease)     Hayfever     [place] hearing[place]     bilateral hearing aids    Hyperlipemia     Hypertension     Panic attack     Tachycardia      Past Surgical History:   Procedure  "Laterality Date    ABLATION OF DYSRHYTHMIC FOCUS      APPENDECTOMY      CHOLECYSTECTOMY      open    FRACTURE SURGERY      ORIF fx (L) tibia, fibula     GASTRIC BYPASS OPEN      HYSTERECTOMY      NM XCAPSL CTRC RMVL INSJ IO LENS PROSTH W/O ECP Left 2016    Procedure: EXTRACTION EXTRACAPSULAR CATARACT PHACO INTRAOCULAR LENS (IOL);  Surgeon: Dane Wells MD;  Location: [facility] MAIN OR;  Service: Ophthalmology    NM XCAPSL CTRC RMVL INSJ IO LENS PROSTH W/O ECP Right 3/1/2021    Procedure: EXTRACTION EXTRACAPSULAR CATARACT PHACO INTRAOCULAR LENS (IOL);  Surgeon: Dane Wells MD;  Location: [facility] MAIN OR;  Service: Ophthalmology    TONSILECTOMY AND ADNOIDECTOMY       Social History   Social History     Substance and Sexual Activity   Alcohol Use Yes    Comment: rarely     Social History     Substance and Sexual Activity   Drug Use Yes    Types: Marijuana     Social History     Tobacco Use   Smoking Status Former    Current packs/day: 0.00    Average packs/day: 1 pack/day for 67.1 years (67.1 ttl pk-yrs)    Types: Cigarettes    Start date: 6/15/1956    Quit date: 2023    Years since quittin.3    Passive exposure: Past   Smokeless Tobacco Never     Family History: Family history non-contributory    Meds/Allergies   current meds:   Current Facility-Administered Medications:     metroNIDAZOLE (FLAGYL) IVPB (premix) 500 mg 100 mL, Q8H, Last Rate: 500 mg (24 1042)    triamcinolone acetonide (KENALOG-40) 40 mg/mL injection 20 mg,     triamcinolone acetonide (Kenalog-40) 40 mg/mL injection 20 mg,   Allergies   Allergen Reactions    Augmentin [Amoxicillin-Pot Clavulanate] GI Intolerance, Other (See Comments) and Hives     VOMITING  VOMITING  Reaction Date: 2004;     Sulfa Antibiotics Itching, Other (See Comments) and Hives     RASH, ITCHY  RASH, ITCHY    Morphine Other (See Comments)     \"DOESN'T WORK\"    Latex Rash     Unsure if this is an allergy       Objective     Current Vitals:   Blood " "Pressure: 156/70 (12/16/24 1100)  Pulse: 66 (12/16/24 1100)  Temperature: 97.9 °F (36.6 °C) (12/16/24 0807)  Temp Source: Oral (12/16/24 0807)  Respirations: 16 (12/16/24 1100)  Height: 5' 5\" (165.1 cm) (12/16/24 0808)  Weight - Scale: 67.8 kg (149 lb 7.6 oz) (12/16/24 0808)  SpO2: 96 % (12/16/24 1100)      Intake/Output Summary (Last 24 hours) at 12/16/2024 1102  Last data filed at 12/16/2024 1040  Gross per 24 hour   Intake 1150 ml   Output --   Net 1150 ml       Invasive Devices       Peripheral Intravenous Line  Duration             Peripheral IV 12/16/24 Dorsal (posterior);Right Forearm <1 day    Peripheral IV 12/16/24 Right Antecubital <1 day                    Physical Exam  Vitals reviewed.   Constitutional:       General: She is not in acute distress.     Appearance: She is well-developed.   HENT:      Head: Normocephalic and atraumatic.   Eyes:      General: No scleral icterus.  Cardiovascular:      Rate and Rhythm: Normal rate and regular rhythm.      Heart sounds: Normal heart sounds.   Pulmonary:      Effort: Pulmonary effort is normal. No respiratory distress.   Abdominal:      General: There is no distension.      Tenderness: There is abdominal tenderness. There is guarding.      Comments: Peritoneal signs - generalized tenderness   Skin:     General: Skin is warm and dry.   Neurological:      Mental Status: She is alert.   Psychiatric:         Mood and Affect: Mood normal.         Behavior: Behavior normal.         Lab Results: I have personally reviewed pertinent lab results.  , CBC:   Lab Results   Component Value Date    WBC 10.61 (H) 12/16/2024    HGB 14.0 12/16/2024    HCT 43.6 12/16/2024    MCV 89 12/16/2024     12/16/2024    RBC 4.90 12/16/2024    MCH 28.6 12/16/2024    MCHC 32.1 12/16/2024    RDW 14.6 12/16/2024    MPV 9.5 12/16/2024    NRBC 0 12/16/2024   , CMP:   Lab Results   Component Value Date    SODIUM 136 12/16/2024    K 3.1 (L) 12/16/2024    CL 99 12/16/2024    CO2 29 " "12/16/2024    BUN 11 12/16/2024    CREATININE 0.58 (L) 12/16/2024    CALCIUM 9.3 12/16/2024    AST 12 (L) 12/16/2024    ALT 5 (L) 12/16/2024    ALKPHOS 59 12/16/2024    EGFR 89 12/16/2024     Imaging:   CTA dissection protocol 12/16/24:  \"New large amount of pneumoperitoneum, predominantly within the upper abdomen. This is highly suspicious for a hollow visceral organ perforation. However, at the site of the perforation is unclear. The patient is undergone prior gastric bypass surgery and there are numerous diverticula. While there is a small amount of ascites, there is no localized drainable fluid collection.\"    EKG, Pathology, and Other Studies:   Reviewed    Counseling / Coordination of Care  Total floor / unit time spent today 60 minutes.  Greater than 50% of total time was spent with the patient and / or family counseling and / or coordination of care.     "

## 2024-12-16 NOTE — ED NOTES
Pt wiped down with chlorhexidine wipes and clean gown and linens applied.     Elizabeth Hair RN  12/16/24 [id number]

## 2024-12-16 NOTE — ANESTHESIA POSTPROCEDURE EVALUATION
Post-Op Assessment Note    CV Status:  Stable    Pain management: adequate       Mental Status:  Alert and awake   Hydration Status:  Euvolemic   PONV Controlled:  Controlled   Airway Patency:  Patent     Post Op Vitals Reviewed: Yes    No anethesia notable event occurred.    Staff: Anesthesiologist, CRNA           Last Filed PACU Vitals:  Vitals Value Taken Time   Temp     Pulse 91    /95    Resp 22    SpO2 98        Modified [place]:  No data recorded

## 2024-12-16 NOTE — CONSULTS
"Consultation - Surgery-General   Name: Zulma Marcelo 74 y.o. female I MRN: [id number]  Unit/Bed#: ED 03 I Date of Admission: 12/16/2024   Date of Service: 12/16/2024 I Hospital Day: 0   Consults  Physician Requesting Evaluation: Sami Jaime DO   Reason for Evaluation / Principal Problem: Pneumoperitoneum    Assessment & Plan  Pneumoperitoneum  Presenting with left sided chest pain, LLQ abdominal pain, shortness of breath and associated diarrhea.     Past surgical hx of open gastric bypass venkata sloan 2009, Open cholecystectomy and hysterectomy.     Ct a/p: ew large amount of pneumoperitoneum, predominantly within the upper abdomen. This is highly suspicious for a hollow visceral organ perforation. However, at the site of the perforation is unclear. The patient is undergone prior gastric bypass surgery and there are numerous diverticula. While there is a small amount of ascites, there is no localized drainable fluid collection     WBC: 10.61  Lactic acid 1.2.   Procal 2.08.     -Obtain stat CT a/p with PO contrast.   -PRN analgesics.   -Consult Bariatrics.       History of Present Illness   Zulma Marcelo is a 74 y.o. female past medical history of GERD, CKD, hyperlipidemia, hypertension, COPD, presenting with left sided chest pain, left sided abdominal pain, shortness of breath and associated diarrhea. Patient reports abdominal pain and diarrhea started approximately 4-5 days ago. Patient reports she has hx of \"ulcers\" and attributed pain to her ulcers. Patient reports have worsening chest pain and shortness of breath prompting her to come to the emergency dept. Patient has past surgical hx of open gastric bypass with Dr. Venkata Villasenor approximately 15 years ago, open cholecystectomy, appendectomy, hysterectomy.    Review of Systems   Constitutional:  Positive for activity change and fever.   HENT:  Negative for congestion.    Respiratory:  Positive for shortness of breath.    Cardiovascular:  Positive for chest pain. "   Gastrointestinal:  Positive for abdominal pain and diarrhea.   Genitourinary:  Negative for difficulty urinating.   Skin:  Negative for wound.   Neurological:  Negative for dizziness.     I have reviewed the patient's PMH, PSH, Social History, Family History, Meds, and Allergies  Historical Information   Past Medical History:   Diagnosis Date    Acid reflux     Anxiety     Arthritis     Asthma     Cardiac disease     Chronic kidney disease     passed on own kidney stone    Depression     Diverticulitis     GERD (gastroesophageal reflux disease)     Hayfever     [place] (hard of hearing)     bilateral hearing aids    Hyperlipemia     Hypertension     Panic attack     Tachycardia      Past Surgical History:   Procedure Laterality Date    ABLATION OF DYSRHYTHMIC FOCUS      APPENDECTOMY      CHOLECYSTECTOMY      open    FRACTURE SURGERY      ORIF fx (L) tibia, fibula     GASTRIC BYPASS OPEN      HYSTERECTOMY      OR XCAPSL CTRC RMVL INSJ IO LENS PROSTH W/O ECP Left 2016    Procedure: EXTRACTION EXTRACAPSULAR CATARACT PHACO INTRAOCULAR LENS (IOL);  Surgeon: Dane Wells MD;  Location: [facility] MAIN OR;  Service: Ophthalmology    OR XCAPSL CTRC RMVL INSJ IO LENS PROSTH W/O ECP Right 3/1/2021    Procedure: EXTRACTION EXTRACAPSULAR CATARACT PHACO INTRAOCULAR LENS (IOL);  Surgeon: Dane Wells MD;  Location: [facility] MAIN OR;  Service: Ophthalmology    TONSILECTOMY AND ADNOIDECTOMY       Social History     Tobacco Use    Smoking status: Former     Current packs/day: 0.00     Average packs/day: 1 pack/day for 67.1 years (67.1 ttl pk-yrs)     Types: Cigarettes     Start date: 6/15/1956     Quit date: 2023     Years since quittin.3     Passive exposure: Past    Smokeless tobacco: Never   Vaping Use    Vaping status: Never Used   Substance and Sexual Activity    Alcohol use: Yes     Comment: rarely    Drug use: Yes     Types: Marijuana    Sexual activity: Never     E-Cigarette/Vaping    E-Cigarette Use Never User       E-Cigarette/Vaping Substances    Nicotine No     THC No     CBD No     Flavoring No     Other No     Unknown No      Family history non-contributory  Social History     Tobacco Use    Smoking status: Former     Current packs/day: 0.00     Average packs/day: 1 pack/day for 67.1 years (67.1 ttl pk-yrs)     Types: Cigarettes     Start date: 6/15/1956     Quit date: 2023     Years since quittin.3     Passive exposure: Past    Smokeless tobacco: Never   Vaping Use    Vaping status: Never Used   Substance and Sexual Activity    Alcohol use: Yes     Comment: rarely    Drug use: Yes     Types: Marijuana    Sexual activity: Never       Current Facility-Administered Medications:     cefepime (MAXIPIME) IVPB (premix in dextrose) 2,000 mg 50 mL, Once, Last Rate: 2,000 mg (24 1019)    iohexol (OMNIPAQUE) 240 MG/ML solution 50 mL, Once in imaging    metroNIDAZOLE (FLAGYL) IVPB (premix) 500 mg 100 mL, Q8H    sodium chloride 0.9 % bolus 1,000 mL, Once, Last Rate: 1,000 mL (24 0834)    triamcinolone acetonide (KENALOG-40) 40 mg/mL injection 20 mg,     triamcinolone acetonide (Kenalog-40) 40 mg/mL injection 20 mg,   Prior to Admission Medications   Prescriptions Last Dose Informant Patient Reported? Taking?   Diclofenac Sodium (VOLTAREN) 1 %  Self No No   Sig: Apply 2 g topically 4 (four) times a day   Ketotifen Fumarate (ZADITOR) 0.035 % ophthalmic solution  Self No No   Sig: Administer 1 drop to both eyes 2 (two) times a day   albuterol (PROVENTIL HFA,VENTOLIN HFA) 90 mcg/act inhaler  Self No No   Sig: Inhale 2 puffs every 6 (six) hours as needed for wheezing   allopurinol (ZYLOPRIM) 100 mg tablet  Self No No   Sig: TAKE ONE TABLET BY MOUTH EVERY DAY   aspirin 81 mg chewable tablet  Self No No   Sig: Chew 1 tablet (81 mg total) daily   buPROPion (WELLBUTRIN XL) 150 mg 24 hr tablet  Self No No   Sig: Take 1 tablet (150 mg total) by mouth daily   busPIRone (BUSPAR) 5 mg tablet  Self No No   Sig: Take 1  tablet (5 mg total) by mouth 3 (three) times a day   dextromethorphan-guaifenesin (MUCINEX DM)  MG per 12 hr tablet  Self No No   Sig: Take 1 tablet by mouth every 12 (twelve) hours as needed for cough   donepezil (ARICEPT) 10 mg tablet  Self No No   Sig: Take 1 tablet (10 mg total) by mouth daily at bedtime   donepezil (ARICEPT) 10 mg tablet  Self No No   Sig: Take 1 tablet (10 mg total) by mouth daily at bedtime   escitalopram (LEXAPRO) 20 mg tablet  Self No No   Sig: Take 1 tablet (20 mg total) by mouth daily   fluticasone (FLONASE) 50 mcg/act nasal spray  Self No No   Si spray into each nostril 2 (two) times a day   meloxicam (MOBIC) 7.5 mg tablet  Self No No   Sig: Take 1 tablet (7.5 mg total) by mouth daily for 10 days   nebivolol (BYSTOLIC) 5 mg tablet  Self No No   Sig: Take 1 tablet (5 mg total) by mouth daily   ondansetron (ZOFRAN-ODT) 4 mg disintegrating tablet  Self No No   Sig: Take 1 tablet (4 mg total) by mouth every 6 (six) hours as needed for nausea for up to 3 days   pantoprazole (PROTONIX) 40 mg tablet  Self No No   Sig: Take 1 tablet (40 mg total) by mouth 2 (two) times a day   sucralfate (CARAFATE) 1 g tablet  Self No No   Sig: Take 1 tablet (1 g total) by mouth 4 (four) times a day (before meals and at bedtime)   terbinafine (LamISIL) 250 mg tablet  Self No No   Si tab p.o. every other day.   traZODone (DESYREL) 100 mg tablet   No No   Sig: Take 1 tablet (100 mg total) by mouth daily at bedtime      Facility-Administered Medications Last Administration Doses Remaining   triamcinolone acetonide (KENALOG-40) 40 mg/mL injection 20 mg 2024 10:15 AM    triamcinolone acetonide (Kenalog-40) 40 mg/mL injection 20 mg 2024  1:00 PM         Augmentin [amoxicillin-pot clavulanate], Sulfa antibiotics, Morphine, and Latex    Objective :  Temp:  [97.9 °F (36.6 °C)] 97.9 °F (36.6 °C)  HR:  [53-62] 62  BP: (143-189)/(68-79) 143/68  Resp:  [20-24] 20  SpO2:  [99 %-100 %] 99 %  O2 Device:  Nasal cannula  Nasal Cannula O2 Flow Rate (L/min):  [2 L/min-6 L/min] 2 L/min      Physical Exam  Constitutional:       Appearance: She is ill-appearing.   HENT:      Head: Normocephalic and atraumatic.   Cardiovascular:      Rate and Rhythm: Normal rate.   Pulmonary:      Breath sounds: Decreased breath sounds present.   Abdominal:      General: There is no distension.      Palpations: Abdomen is soft.      Tenderness: There is abdominal tenderness. There is guarding.   Neurological:      Mental Status: She is alert.       Lab Results: I have reviewed the following results:  Recent Labs     12/16/24  0830   WBC 10.61*   HGB 14.0   HCT 43.6      SODIUM 136   K 3.1*   CL 99   CO2 29   BUN 11   CREATININE 0.58*   GLUC 135   AST 12*   ALT 5*   ALB 3.9   TBILI 0.67   ALKPHOS 59   HSTNI0 10   LACTICACID 1.2     Imaging reviewed.   CTA dissection protocol chest/abdomen/pelvis   Final Result by Adrian Snow MD (12/16 9190)      1.  New large amount of pneumoperitoneum, predominantly within the upper abdomen. This is highly suspicious for a hollow visceral organ perforation. However, at the site of the perforation is unclear. The patient is undergone prior gastric bypass surgery    and there are numerous diverticula. While there is a small amount of ascites, there is no localized drainable fluid collection.      Please refer to the report body for description of other incidental, chronic and/or benign findings.      I directly communicated these results to GABRIELA LINDSEY via [employer] secure chat with confirmation of receipt on 12/16/2024 9:57 AM.               Workstation performed: [id number]         CT abdomen pelvis wo contrast    (Results Pending)           VTE Mechanical Prophylaxis: sequential compression device    Administrative Statements

## 2024-12-16 NOTE — CONSULTS
Consultation - Hospitalist   Name: Zulma Marcelo 74 y.o. female I MRN: [id number]  Unit/Bed#: OR POOL I Date of Admission: 12/16/2024   Date of Service: 12/16/2024 I Hospital Day: 0   Inpatient consult to Internal Medicine  Consult performed by: Atilio Garner MD  Consult ordered by: Elisabet Burgos PA-C        Physician Requesting Evaluation: Alex Mohr MD   Reason for Evaluation / Principal Problem: History of SVT    Assessment & Plan  Pneumoperitoneum  Presented with left lower quadrant pain nausea with nonbloody bilious emesis over few days progressing to worsening abdominal pain epigastric/chest pain radiating to left shoulder today  Evaluated in ED, workup revealed pneumoperitoneum with focal perforation of medial wall of proximal Debra-en-Y immediately distal to GJ anastomosis   status post emergent diagnostic laparoscopy with extensive lysis of adhesion with repair of marginal ulcer perforation with primary repair and Amilcar patch with drain placement and intraoperative endoscopy.  Patient was noted to have peritonitis with purulent content with significant adhesions.   Postoperatively, hemodynamically stable.  Abdomen with mild appropriate tenderness otherwise soft.  Strict n.p.o.  IV PPI drip  IV cefepime, metronidazole  Received IV fluconazole in ED, will change to micafungin due to QT prolongation  Follow-up CBC, CMP  Monitor intake output  Postoperative care as per primary team  Symptomatic treatment, pain control  History of Debra-en-Y gastric bypass  History of Debra-en-Y gastric 2011 with history of chronic marginal ulcer  SVT (supraventricular tachycardia) (HCC)  History of SVT, hold nebivolol  Telemetry  IV metoprolol  Chest pain  Reported on presentation chest pain radiating to left shoulder  Suspect secondary to pneumoperitoneum  CTA dissection protocol without any acute intrathoracic abnormality  EKG sinus bradycardia, old RBBB, nonspecific ST-T wave changes with baseline artifact.  QTc  508  Troponin-10-10  Denies any chest pain at present  Monitor symptoms  COPD  Appears stable  Respiratory protocol  Bronchodilators as needed  Incentive spirometry  Essential hypertension  Holding nebivolol.  Continue IV metoprolol for now  Chronic GERD  IV PPI  Sleep apnea  Prior history of SORAYA, awaiting repeat sleep studies as per records  Moderate episode of recurrent major depressive disorder (HCC)  Hold p.o. meds at present  Cognitive impairment  On donepezil, holding at present  QT prolongation  Telemetry  Monitor and replete electrolytes  Check magnesium level  I have discussed the above management plan in detail with the primary service.       VTE Pharmacologic Prophylaxis:   Moderate Risk (Score 3-4) - Pharmacological DVT Prophylaxis Ordered: heparin.      Anticipated Length of Stay: Patient will be admitted on an inpatient basis with an anticipated length of stay of greater than 2 midnights secondary to perforated viscus.    History of Present Illness   Chief Complaint: Chest pain, abdominal pain    Zulma Marcelo is a 74 y.o. female with a PMH of history of SVT status post ablation, coronary artery disease, COPD (FEV1 58% predicted), CKD, hypertension, dyslipidemia, GERD, history of gastric bypass depression/anxiety, history of SORAYA not on CPAP, ex-smoker who presents with chest pain, abdominal pain and shortness of breath.  As per the EMS report patient complained of left lower quadrant pain and shortness of breath for 2 days and started having left-sided abdominal pain today morning.    In ED, patient was noted to be hypertension, tachypneic but afebrile saturating adequately on room air.  Lab revealed leukocytosis, stable hemoglobin, cardiac markers were negative.  Lactic acid was 1.2.  Procalcitonin was elevated.  CMP was remarkable for hypokalemia.  VBG was 7.39/49.  Patient has CTA chest abdomen pelvis which revealed new large amount of pneumoperitoneum predominantly in upper abdomen highly  suspicious for a visceral perforation with small amount of ascites.  Patient was seen by surgery who recommended CT with p.o. contrast and bariatric surgery evaluation.  Subsequent abdomen pelvis CT with p.o. contrast revealed focal perforation of medial wall of proximal Debra-en-Y immediately distal to GJ anastomosis.  Patient was evaluated by bariatric surgery and patient underwent emergent diagnostic laparoscopy with extensive lysis of adhesion with repair of marginal ulcer perforation with primary repair and Amilcar patch with drain placement and intraoperative endoscopy.  Patient was noted to have peritonitis with purulent content with significant adhesions.  Internal medicine consult requested for comorbidities.  Patient was seen postoperatively, lying comfortably in bed.  Denies any chest pain or shortness of breath but reports mild postoperative abdominal discomfort and left shoulder discomfort.  She reports that she was sick for a week with nausea bilious emesis diarrhea and abdominal pain which worsened today with left-sided chest and shoulder pain and she presented to ED for further evaluation.    Review of Systems   Reason unable to perform ROS: Review of system prior to presentation, now resolved.   Constitutional:  Positive for chills.   Respiratory:  Positive for cough and shortness of breath.    Cardiovascular:  Positive for chest pain.   Gastrointestinal:  Positive for abdominal pain, diarrhea, nausea and vomiting.   Neurological:  Positive for dizziness.       Historical Information   Past Medical History:   Diagnosis Date    Acid reflux     Anxiety     Arthritis     Asthma     Cardiac disease     Chronic kidney disease     passed on own kidney stone    Depression     Diverticulitis     GERD (gastroesophageal reflux disease)     Hayfever     [place] (hard of hearing)     bilateral hearing aids    Hyperlipemia     Hypertension     Panic attack     Tachycardia      Past Surgical History:   Procedure  Laterality Date    ABLATION OF DYSRHYTHMIC FOCUS      APPENDECTOMY      CHOLECYSTECTOMY      open    FRACTURE SURGERY      ORIF fx (L) tibia, fibula     GASTRIC BYPASS OPEN      HYSTERECTOMY      PA XCAPSL CTRC RMVL INSJ IO LENS PROSTH W/O ECP Left 2016    Procedure: EXTRACTION EXTRACAPSULAR CATARACT PHACO INTRAOCULAR LENS (IOL);  Surgeon: Dane Wells MD;  Location: [facility] MAIN OR;  Service: Ophthalmology    PA XCAPSL CTRC RMVL INSJ IO LENS PROSTH W/O ECP Right 3/1/2021    Procedure: EXTRACTION EXTRACAPSULAR CATARACT PHACO INTRAOCULAR LENS (IOL);  Surgeon: Dane Wells MD;  Location: [facility] MAIN OR;  Service: Ophthalmology    TONSILECTOMY AND ADNOIDECTOMY       Social History     Tobacco Use    Smoking status: Former     Current packs/day: 0.00     Average packs/day: 1 pack/day for 67.1 years (67.1 ttl pk-yrs)     Types: Cigarettes     Start date: 6/15/1956     Quit date: 2023     Years since quittin.3     Passive exposure: Past    Smokeless tobacco: Never   Vaping Use    Vaping status: Never Used   Substance and Sexual Activity    Alcohol use: Yes     Comment: rarely    Drug use: Yes     Types: Marijuana    Sexual activity: Never     E-Cigarette/Vaping    E-Cigarette Use Never User      E-Cigarette/Vaping Substances    Nicotine No     THC No     CBD No     Flavoring No     Other No     Unknown No      Family History   Problem Relation Age of Onset    Heart disease Mother     Stroke Son     Stroke Maternal Grandfather     Breast cancer Daughter 40     Social History:  Marital Status:    Occupation: Retired      Meds/Allergies   I have reviewed home medications using recent Epic encounter.  Prior to Admission medications    Medication Sig Start Date End Date Taking? Authorizing Provider   albuterol (PROVENTIL HFA,VENTOLIN HFA) 90 mcg/act inhaler Inhale 2 puffs every 6 (six) hours as needed for wheezing 24   Lydia Cruz MD   allopurinol (ZYLOPRIM) 100 mg tablet TAKE ONE TABLET BY  MOUTH EVERY DAY 10/30/24   Lydia Cruz MD   aspirin 81 mg chewable tablet Chew 1 tablet (81 mg total) daily 1/22/24 11/18/24  Lydia Cruz MD   buPROPion (WELLBUTRIN XL) 150 mg 24 hr tablet Take 1 tablet (150 mg total) by mouth daily 7/11/24 1/7/25  Alis Kearns   busPIRone (BUSPAR) 5 mg tablet Take 1 tablet (5 mg total) by mouth 3 (three) times a day 7/11/24 1/7/25  Alis Kearns   dextromethorphan-guaifenesin (MUCINEX DM)  MG per 12 hr tablet Take 1 tablet by mouth every 12 (twelve) hours as needed for cough 5/8/24   Lydia Cruz MD   Diclofenac Sodium (VOLTAREN) 1 % Apply 2 g topically 4 (four) times a day 8/7/24   Lydia Cruz MD   donepezil (ARICEPT) 10 mg tablet Take 1 tablet (10 mg total) by mouth daily at bedtime 8/7/24 2/3/25  Lydia Cruz MD   donepezil (ARICEPT) 10 mg tablet Take 1 tablet (10 mg total) by mouth daily at bedtime 8/7/24 2/3/25  Lydia Cruz MD   escitalopram (LEXAPRO) 20 mg tablet Take 1 tablet (20 mg total) by mouth daily 7/11/24 1/7/25  Alis Kearns   fluticasone (FLONASE) 50 mcg/act nasal spray 1 spray into each nostril 2 (two) times a day 1/22/24   Lydia Cruz MD   Ketotifen Fumarate (ZADITOR) 0.035 % ophthalmic solution Administer 1 drop to both eyes 2 (two) times a day 1/22/24 11/18/24  Lydia Cruz MD   meloxicam (MOBIC) 7.5 mg tablet Take 1 tablet (7.5 mg total) by mouth daily for 10 days 11/12/24 11/22/24  Lucien Mcginnis DPM   nebivolol (BYSTOLIC) 5 mg tablet Take 1 tablet (5 mg total) by mouth daily 8/16/24 2/12/25  Lydia Cruz MD   ondansetron (ZOFRAN-ODT) 4 mg disintegrating tablet Take 1 tablet (4 mg total) by mouth every 6 (six) hours as needed for nausea for up to 3 days 7/3/24 11/18/24  Kelly Miguel PA-C   pantoprazole (PROTONIX) 40 mg tablet Take 1 tablet (40 mg total) by mouth 2 (two) times a day 7/3/24   Kelly Miguel PA-C   sucralfate (CARAFATE) 1 g tablet Take 1 tablet (1 g total) by mouth 4 (four) times a  "day (before meals and at bedtime) 7/3/24 11/18/24  Kelly Miguel PA-C   terbinafine (LamISIL) 250 mg tablet 1 tab p.o. every other day. 11/12/24 12/17/24  Lucien Mcginnis DPM   traZODone (DESYREL) 100 mg tablet Take 1 tablet (100 mg total) by mouth daily at bedtime 12/12/24 2/10/25  Alis Kearns     Allergies   Allergen Reactions    Augmentin [Amoxicillin-Pot Clavulanate] GI Intolerance, Other (See Comments) and Hives     VOMITING  VOMITING  Reaction Date: 07Dec2004;     Sulfa Antibiotics Itching, Other (See Comments) and Hives     RASH, ITCHY  RASH, ITCHY    Morphine Other (See Comments)     \"DOESN'T WORK\"    Latex Rash     Unsure if this is an allergy       Objective :  Temp:  [97.9 °F (36.6 °C)] 97.9 °F (36.6 °C)  HR:  [53-91] 69  BP: (117-189)/(59-95) 119/69  Resp:  [16-24] 20  SpO2:  [94 %-100 %] 94 %  O2 Device: None (Room air)  Nasal Cannula O2 Flow Rate (L/min):  [2 L/min-6 L/min] 2 L/min    Physical Exam  Constitutional:       General: She is not in acute distress.  HENT:      Head: Normocephalic and atraumatic.      Mouth/Throat:      Mouth: Mucous membranes are dry.   Cardiovascular:      Rate and Rhythm: Normal rate.   Pulmonary:      Effort: Pulmonary effort is normal. No respiratory distress.      Breath sounds: Decreased breath sounds present. No wheezing or rales.   Abdominal:      General: Bowel sounds are normal. There is distension (Mild).      Palpations: Abdomen is soft.      Tenderness: There is abdominal tenderness (Mild appropriate). There is no guarding or rebound.   Musculoskeletal:         General: Tenderness (Mild left shoulder) present. Normal range of motion.      Right lower leg: No edema.      Left lower leg: No edema.   Skin:     General: Skin is warm and dry.      Findings: No rash.   Neurological:      General: No focal deficit present.      Mental Status: She is alert. Mental status is at baseline.      Cranial Nerves: No cranial nerve deficit.      "       Lines/Drains:  Lines/Drains/Airways       Active Status       Name Placement date Placement time Site Days    Closed/Suction Drain LUQ Bulb 19 Fr. 12/16/24  1601  LUQ  less than 1                          Lab Results: I have reviewed the following results:  Results from last 7 days   Lab Units 12/16/24  0830   WBC Thousand/uL 10.61*   HEMOGLOBIN g/dL 14.0   HEMATOCRIT % 43.6   PLATELETS Thousands/uL 360   SEGS PCT % 77*   LYMPHO PCT % 16   MONO PCT % 6   EOS PCT % 0     Results from last 7 days   Lab Units 12/16/24  0830   SODIUM mmol/L 136   POTASSIUM mmol/L 3.1*   CHLORIDE mmol/L 99   CO2 mmol/L 29   BUN mg/dL 11   CREATININE mg/dL 0.58*   ANION GAP mmol/L 8   CALCIUM mg/dL 9.3   ALBUMIN g/dL 3.9   TOTAL BILIRUBIN mg/dL 0.67   ALK PHOS U/L 59   ALT U/L 5*   AST U/L 12*   GLUCOSE RANDOM mg/dL 135             Lab Results   Component Value Date    HGBA1C 5.7 (H) 11/29/2023    HGBA1C 6.0 (H) 02/10/2016     Results from last 7 days   Lab Units 12/16/24  0830   LACTIC ACID mmol/L 1.2   PROCALCITONIN ng/ml 2.08*       Imaging Results Review: I reviewed radiology reports from this admission including: CT chest and CT abdomen/pelvis.  Other Study Results Review: EKG was reviewed.   EKG sinus bradycardia, old RBBB, nonspecific ST-T wave changes with baseline artifact.  QTc 508      ** Please Note: This note has been constructed using a voice recognition system. **

## 2024-12-16 NOTE — ED NOTES
Dr. Jaime directs pt to go to CT ASAP and take a sip prior to scan. Rayne in CT made aware.      Elizabeth Hair RN  12/16/24 8071

## 2024-12-16 NOTE — ED NOTES
Pt began drinking contrast at this time. Will be ready for CT at 12:24.     Elizabeth Hair RN  12/16/24 3053

## 2024-12-16 NOTE — ED PROVIDER NOTES
Time reflects when diagnosis was documented in both MDM as applicable and the Disposition within this note       Time User Action Codes Description Comment    12/16/2024 11:57 AM Elisabet Burgos Add [K66.8] Pneumoperitoneum     12/16/2024 11:57 AM Elisabet Burgos Add [R19.8] Perforated abdominal viscus     12/16/2024 12:04 PM Sami Jaime Add [K25.5] Gastric perforation (HCC)           ED Disposition       ED Disposition   Send to OR    Condition   --    Date/Time   Mon Dec 16, 2024 12:04 PM    Comment   --             Assessment & Plan       Medical Decision Making  Patient is a 74 y.o. female who presents to the ED for chest pain, shortness of breath, abdominal pain.  Patient is nontoxic, well-appearing.  She is slightly tachypneic.    Differential includes but is not limited to: Viral URI, pneumonia, diverticulitis.  Considered dissection given patient's history of aneurysm but feel this is less likely given current history and physical.    Plan: Labs, pain control, breathing treatment, viral testing, reassess                   Amount and/or Complexity of Data Reviewed  Labs: ordered.  Radiology: ordered.    Risk  Prescription drug management.        ED Course as of 12/16/24 1511   Mon Dec 16, 2024   0907 Procalcitonin(!): 2.08   0957 CTA dissection protocol chest/abdomen/pelvis   1005 Discussed with surgery.  Recommends repeat with p.o. contrast.  Recommends bariatric consult   1015 Bariatrics agrees with repeat CT scan.  Will be down to see patient   1047 Bariatrics at bedside   1100 Bariatrics would like CT scan now   1143 Repeat CT scan with contrast leaking.  Tiger text sent to bariatrics   1200 Patient will be going to the operating room with bariatrics.  States they will admit.       Medications   metroNIDAZOLE (FLAGYL) IVPB (premix) 500 mg 100 mL (0 mg Intravenous Stopped 12/16/24 1147)   acetaminophen (Ofirmev) injection 1,000 mg (0 mg Intravenous Hold 12/16/24 1332)   HYDROmorphone (DILAUDID) injection  0.5 mg (0.5 mg Intravenous Given 12/16/24 0838)   albuterol inhalation solution 5 mg (5 mg Nebulization Given 12/16/24 0837)   ipratropium (ATROVENT) 0.02 % inhalation solution 0.5 mg (0.5 mg Nebulization Given 12/16/24 0838)   sodium chloride 0.9 % bolus 1,000 mL (0 mL Intravenous Stopped 12/16/24 1038)   acetaminophen (Ofirmev) injection 1,000 mg (0 mg Intravenous Stopped 12/16/24 0853)   iohexol (OMNIPAQUE) 350 MG/ML injection (MULTI-DOSE) 100 mL (100 mL Intravenous Given 12/16/24 0915)   cefepime (MAXIPIME) IVPB (premix in dextrose) 2,000 mg 50 mL (0 mg Intravenous Stopped 12/16/24 1040)   HYDROmorphone (DILAUDID) injection 0.5 mg (0.5 mg Intravenous Given 12/16/24 1017)   iohexol (OMNIPAQUE) 240 MG/ML solution 50 mL (50 mL Oral Given 12/16/24 1024)   HYDROmorphone (DILAUDID) injection 0.5 mg (0.5 mg Intravenous Given 12/16/24 1035)   pantoprazole (PROTONIX) 80 mg in sodium chloride 0.9 % 100 mL IVPB (0 mg Intravenous Stopped 12/16/24 1219)   fluconazole (DIFLUCAN) IVPB (premix) 200 mg (0 mg Intravenous Stopped 12/16/24 1257)   fosaprepitant (EMEND) 150 mg in sodium chloride 0.9 % 250 mL IVPB (150 mg Intravenous New Bag 12/16/24 1338)   heparin (porcine) subcutaneous injection 5,000 Units (5,000 Units Subcutaneous Given 12/16/24 1335)   HYDROmorphone (DILAUDID) injection 0.5 mg (0.5 mg Intravenous Given 12/16/24 1313)       ED Risk Strat Scores                                              History of Present Illness       Chief Complaint   Patient presents with    Chest Pain    Abdominal Pain     Pt arrives from home with EMS. Left sided chest pain began around 0715 this morning. C/o LLQ pain x2 days. SOB x 2 days. O2 sat at 99% on RA upon arrival. Put on 2 L/min NC for comfort. Pt states son is sick at home.        Past Medical History:   Diagnosis Date    Acid reflux     Anxiety     Arthritis     Asthma     Cardiac disease     Chronic kidney disease     passed on own kidney stone    Depression      Diverticulitis     GERD (gastroesophageal reflux disease)     Hayfever     [place] (hard of hearing)     bilateral hearing aids    Hyperlipemia     Hypertension     Panic attack     Tachycardia       Past Surgical History:   Procedure Laterality Date    ABLATION OF DYSRHYTHMIC FOCUS      APPENDECTOMY      CHOLECYSTECTOMY      open    FRACTURE SURGERY      ORIF fx (L) tibia, fibula     GASTRIC BYPASS OPEN      HYSTERECTOMY      MS XCAPSL CTRC RMVL INSJ IO LENS PROSTH W/O ECP Left 2016    Procedure: EXTRACTION EXTRACAPSULAR CATARACT PHACO INTRAOCULAR LENS (IOL);  Surgeon: Dane Wells MD;  Location: [facility] MAIN OR;  Service: Ophthalmology    MS XCAPSL CTRC RMVL INSJ IO LENS PROSTH W/O ECP Right 3/1/2021    Procedure: EXTRACTION EXTRACAPSULAR CATARACT PHACO INTRAOCULAR LENS (IOL);  Surgeon: Dane Wells MD;  Location: [facility] MAIN OR;  Service: Ophthalmology    TONSILECTOMY AND ADNOIDECTOMY        Family History   Problem Relation Age of Onset    Heart disease Mother     Stroke Son     Stroke Maternal Grandfather     Breast cancer Daughter 40      Social History     Tobacco Use    Smoking status: Former     Current packs/day: 0.00     Average packs/day: 1 pack/day for 67.1 years (67.1 ttl pk-yrs)     Types: Cigarettes     Start date: 6/15/1956     Quit date: 2023     Years since quittin.3     Passive exposure: Past    Smokeless tobacco: Never   Vaping Use    Vaping status: Never Used   Substance Use Topics    Alcohol use: Yes     Comment: rarely    Drug use: Yes     Types: Marijuana      E-Cigarette/Vaping    E-Cigarette Use Never User       E-Cigarette/Vaping Substances    Nicotine No     THC No     CBD No     Flavoring No     Other No     Unknown No       I have reviewed and agree with the history as documented.     Patient is a 74-year-old female, past medical history including CKD, depression, GERD, hypertension, hyperlipidemia, and anxiety, who presents to the emergency department for chest pain.   Began around 715 this morning.  Mainly left-sided.  Does not radiate.  No modifying factors.  Associated with left lower quadrant abdominal pain (x 2 days), body aches, and shortness of breath (also for 2 days).  No modifying factors.  No other associated symptoms.  No fevers.  No other complaints or concerns.      Chest Pain  Associated symptoms: abdominal pain    Associated symptoms: no fever    Abdominal Pain  Associated symptoms: chest pain    Associated symptoms: no fever        Review of Systems   Constitutional:  Negative for fever.   Cardiovascular:  Positive for chest pain.   Gastrointestinal:  Positive for abdominal pain.   All other systems reviewed and are negative.          Objective       ED Triage Vitals   Temperature Pulse Blood Pressure Respirations SpO2 Patient Position - Orthostatic VS   12/16/24 0807 12/16/24 0807 12/16/24 0807 12/16/24 0807 12/16/24 0807 12/16/24 0807   97.9 °F (36.6 °C) (!) 54 (!) 189/79 (!) 24 99 % Lying      Temp Source Heart Rate Source BP Location FiO2 (%) Pain Score    12/16/24 0807 12/16/24 0807 12/16/24 0807 -- 12/16/24 0838    Oral Monitor Left arm  10 - Worst Possible Pain      Vitals      Date and Time Temp Pulse SpO2 Resp BP Pain Score FACES Pain Rating User   12/16/24 1342 -- -- -- -- -- 2 --    12/16/24 1330 -- 56 98 % 24 130/60 -- --    12/16/24 1313 -- -- -- -- -- 4 --    12/16/24 1300 -- 66 98 % 24 127/60 -- --    12/16/24 1230 -- 60 99 % 20 140/65 -- --    12/16/24 1200 -- 60 97 % 20 131/59 -- --    12/16/24 1130 -- 64 96 % 20 161/74 -- --    12/16/24 1104 -- -- -- -- -- 5 --    12/16/24 1100 -- 66 96 % 16 156/70 -- --    12/16/24 1035 -- -- -- -- -- 10 - Worst Possible Pain --    12/16/24 1017 -- -- -- -- -- 10 - Worst Possible Pain --    12/16/24 0945 -- 62 99 % 20 143/68 -- --    12/16/24 0907 -- -- -- -- -- 5 --    12/16/24 0900 -- 53 100 % 20 171/72 -- --    12/16/24 0838 -- -- -- -- -- 10 - Worst Possible Pain --    12/16/24  0807 97.9 °F (36.6 °C) 54 99 % 24 189/79 -- -- KR            Physical Exam  Vitals and nursing note reviewed.   Constitutional:       General: She is not in acute distress.     Appearance: She is well-developed. She is not ill-appearing, toxic-appearing or diaphoretic.   HENT:      Head: Normocephalic and atraumatic.      Right Ear: External ear normal.      Left Ear: External ear normal.      Nose: Nose normal.   Eyes:      General: Lids are normal. No scleral icterus.  Cardiovascular:      Rate and Rhythm: Normal rate and regular rhythm.      Heart sounds: Normal heart sounds. No murmur heard.     No friction rub. No gallop.   Pulmonary:      Effort: Pulmonary effort is normal. Tachypnea present. No respiratory distress.      Breath sounds: Normal breath sounds. No wheezing or rales.   Abdominal:      Palpations: Abdomen is soft.      Tenderness: There is abdominal tenderness. There is no guarding or rebound.   Musculoskeletal:         General: No deformity. Normal range of motion.      Cervical back: Normal range of motion and neck supple.   Skin:     General: Skin is warm and dry.   Neurological:      General: No focal deficit present.      Mental Status: She is alert.   Psychiatric:         Mood and Affect: Mood normal.         Behavior: Behavior normal.         Results Reviewed       Procedure Component Value Units Date/Time    Blood culture #1 [[id number]] Collected: 12/16/24 0830    Lab Status: Preliminary result Specimen: Blood from Arm, Right Updated: 12/16/24 1501     Blood Culture Received in Microbiology Lab. Culture in Progress.    Blood culture #2 [[id number]] Collected: 12/16/24 0844    Lab Status: Preliminary result Specimen: Blood from Arm, Right Updated: 12/16/24 1501     Blood Culture Received in Microbiology Lab. Culture in Progress.    HS Troponin I 2hr [[id number]]  (Normal) Collected: 12/16/24 1034    Lab Status: Final result Specimen: Blood from Arm, Right Updated: 12/16/24 1115     hs TnI 2hr  10 ng/L      Delta 2hr hsTnI 0 ng/L     Lactic acid, plasma (w/reflex if result > 2.0) [[id number]]  (Normal) Collected: 12/16/24 0830    Lab Status: Final result Specimen: Blood from Arm, Right Updated: 12/16/24 0908     LACTIC ACID 1.2 mmol/L     Narrative:      Result may be elevated if tourniquet was used during collection.    Procalcitonin [[id number]]  (Abnormal) Collected: 12/16/24 0830    Lab Status: Final result Specimen: Blood from Arm, Right Updated: 12/16/24 0907     Procalcitonin 2.08 ng/ml     HS Troponin 0hr (reflex protocol) [[id number]]  (Normal) Collected: 12/16/24 0830    Lab Status: Final result Specimen: Blood from Arm, Right Updated: 12/16/24 0905     hs TnI 0hr 10 ng/L     Comprehensive metabolic panel [[id number]]  (Abnormal) Collected: 12/16/24 0830    Lab Status: Final result Specimen: Blood from Arm, Right Updated: 12/16/24 0859     Sodium 136 mmol/L      Potassium 3.1 mmol/L      Chloride 99 mmol/L      CO2 29 mmol/L      ANION GAP 8 mmol/L      BUN 11 mg/dL      Creatinine 0.58 mg/dL      Glucose 135 mg/dL      Calcium 9.3 mg/dL      AST 12 U/L      ALT 5 U/L      Alkaline Phosphatase 59 U/L      Total Protein 6.6 g/dL      Albumin 3.9 g/dL      Total Bilirubin 0.67 mg/dL      eGFR 89 ml/min/1.73sq m     Narrative:      National Kidney Disease Foundation guidelines for Chronic Kidney Disease (CKD):     Stage 1 with normal or high GFR (GFR > 90 mL/min/1.73 square meters)    Stage 2 Mild CKD (GFR = 60-89 mL/min/1.73 square meters)    Stage 3A Moderate CKD (GFR = 45-59 mL/min/1.73 square meters)    Stage 3B Moderate CKD (GFR = 30-44 mL/min/1.73 square meters)    Stage 4 Severe CKD (GFR = 15-29 mL/min/1.73 square meters)    Stage 5 End Stage CKD (GFR <15 mL/min/1.73 square meters)  Note: GFR calculation is accurate only with a steady state creatinine    FLU/COVID Rapid Antigen (30 min. TAT) - Preferred screening test in ED [[id number]]  (Normal) Collected: 12/16/24 5430    Lab Status: Final  result Specimen: Nares from Nose Updated: 12/16/24 0858     SARS COV Rapid Antigen Negative     Influenza A Rapid Antigen Negative     Influenza B Rapid Antigen Negative    Narrative:      This test has been performed using the [employer] Radha 2 FLU+SARS Antigen test under the Emergency Use Authorization (EUA). This test has been validated by the  and verified by the performing laboratory. The Radha uses lateral flow immunofluorescent sandwich assay to detect SARS-COV, Influenza A and Influenza B Antigen.     The [employer] Radha 2 SARS Antigen test does not differentiate between SARS-CoV and SARS-CoV-2.     Negative results are presumptive and may be confirmed with a molecular assay, if necessary, for patient management. Negative results do not rule out SARS-CoV-2 or influenza infection and should not be used as the sole basis for treatment or patient management decisions. A negative test result may occur if the level of antigen in a sample is below the limit of detection of this test.     Positive results are indicative of the presence of viral antigens, but do not rule out bacterial infection or co-infection with other viruses.     All test results should be used as an adjunct to clinical observations and other information available to the provider.    FOR PEDIATRIC PATIENTS - copy/paste COVID Guidelines URL to browser: [email or web address]    CBC and differential [[id number]]  (Abnormal) Collected: 12/16/24 0830    Lab Status: Final result Specimen: Blood from Arm, Right Updated: 12/16/24 0841     WBC 10.61 Thousand/uL      RBC 4.90 Million/uL      Hemoglobin 14.0 g/dL      Hematocrit 43.6 %      MCV 89 fL      MCH 28.6 pg      MCHC 32.1 g/dL      RDW 14.6 %      MPV 9.5 fL      Platelets 360 Thousands/uL      nRBC 0 /100 WBCs      Segmented % 77 %      Immature Grans % 1 %      Lymphocytes % 16 %      Monocytes % 6 %      Eosinophils Relative 0 %       Basophils Relative 0 %      Absolute Neutrophils 8.22 Thousands/µL      Absolute Immature Grans 0.07 Thousand/uL      Absolute Lymphocytes 1.64 Thousands/µL      Absolute Monocytes 0.61 Thousand/µL      Eosinophils Absolute 0.04 Thousand/µL      Basophils Absolute 0.03 Thousands/µL     Blood gas, venous [[id number]]  (Abnormal) Collected: 12/16/24 0830    Lab Status: Final result Specimen: Blood from Arm, Right Updated: 12/16/24 0840     pH, Adriano 7.393     pCO2, Adriano 49.6 mm Hg      pO2, Adriano 30.4 mm Hg      HCO3, Adriano 29.6 mmol/L      Base Excess, Adriano 3.6 mmol/L      O2 Content, Adriano 12.0 ml/dL      O2 HGB, VENOUS 56.4 %             CT abdomen pelvis wo contrast   Final Interpretation by Adrian Snow MD (12/16 2185)      1.  There is a focal perforation along the medial wall of the proximal Debra-en-Y limb, immediately distal to the gastrojejunal anastomosis. The oral contrast freely communicates with the peritoneum. I directly communicated these results to GABRIELA LINDSEY via [employer] with confirmation of receipt on 12/16/2024 11:43 AM. I also communicated these findings with Elisabet Burgos PA-C at 11:50 AM.      Workstation performed: [id number]         CTA dissection protocol chest/abdomen/pelvis   Final Interpretation by Adrian Snow MD (12/16 9102)      1.  New large amount of pneumoperitoneum, predominantly within the upper abdomen. This is highly suspicious for a hollow visceral organ perforation. However, at the site of the perforation is unclear. The patient is undergone prior gastric bypass surgery    and there are numerous diverticula. While there is a small amount of ascites, there is no localized drainable fluid collection.      Please refer to the report body for description of other incidental, chronic and/or benign findings.      I directly communicated these results to GABRIELA LINDSEY via Loop Survey with confirmation of receipt on 12/16/2024 9:57 AM.                Workstation performed: [id number]             ECG 12 Lead Documentation Only    Date/Time: 12/16/2024 8:16 AM    Performed by: Sami Jaime DO  Authorized by: Sami Jaime DO    ECG reviewed by me, the ED Provider: yes    Patient location:  ED  Previous ECG:     Previous ECG:  Compared to current    Similarity:  No change  Interpretation:     Interpretation: abnormal    Rate:     ECG rate:  54    ECG rate assessment: normal    Rhythm:     Rhythm: sinus rhythm    Ectopy:     Ectopy: none    QRS:     QRS axis:  Normal  Conduction:     Conduction: abnormal      Abnormal conduction: complete RBBB    ST segments:     ST segments:  Normal  T waves:     T waves: normal    CriticalCare Time    Date/Time: 12/16/2024 3:10 PM    Performed by: Sami Jaime DO  Authorized by: Sami Jaime DO    Critical care provider statement:     Critical care time (minutes):  75    Critical care start time:  12/16/2024 7:58 AM    Critical care end time:  12/16/2024 1:00 PM    Critical care time was exclusive of:  Separately billable procedures and treating other patients and teaching time    Critical care was necessary to treat or prevent imminent or life-threatening deterioration of the following conditions: gastric perf.    Critical care was time spent personally by me on the following activities:  Blood draw for specimens, obtaining history from patient or surrogate, development of treatment plan with patient or surrogate, review of old charts, re-evaluation of patient's condition, ordering and review of radiographic studies, ordering and review of laboratory studies, discussions with consultants, evaluation of patient's response to treatment, ordering and performing treatments and interventions and examination of patient    I assumed direction of critical care for this patient from another provider in my specialty: no        ED Medication and Procedure Management   Prior to Admission Medications   Prescriptions Last  Dose Informant Patient Reported? Taking?   Diclofenac Sodium (VOLTAREN) 1 %  Self No No   Sig: Apply 2 g topically 4 (four) times a day   Ketotifen Fumarate (ZADITOR) 0.035 % ophthalmic solution  Self No No   Sig: Administer 1 drop to both eyes 2 (two) times a day   albuterol (PROVENTIL HFA,VENTOLIN HFA) 90 mcg/act inhaler  Self No No   Sig: Inhale 2 puffs every 6 (six) hours as needed for wheezing   allopurinol (ZYLOPRIM) 100 mg tablet  Self No No   Sig: TAKE ONE TABLET BY MOUTH EVERY DAY   aspirin 81 mg chewable tablet  Self No No   Sig: Chew 1 tablet (81 mg total) daily   buPROPion (WELLBUTRIN XL) 150 mg 24 hr tablet  Self No No   Sig: Take 1 tablet (150 mg total) by mouth daily   busPIRone (BUSPAR) 5 mg tablet  Self No No   Sig: Take 1 tablet (5 mg total) by mouth 3 (three) times a day   dextromethorphan-guaifenesin (MUCINEX DM)  MG per 12 hr tablet  Self No No   Sig: Take 1 tablet by mouth every 12 (twelve) hours as needed for cough   donepezil (ARICEPT) 10 mg tablet  Self No No   Sig: Take 1 tablet (10 mg total) by mouth daily at bedtime   donepezil (ARICEPT) 10 mg tablet  Self No No   Sig: Take 1 tablet (10 mg total) by mouth daily at bedtime   escitalopram (LEXAPRO) 20 mg tablet  Self No No   Sig: Take 1 tablet (20 mg total) by mouth daily   fluticasone (FLONASE) 50 mcg/act nasal spray  Self No No   Si spray into each nostril 2 (two) times a day   meloxicam (MOBIC) 7.5 mg tablet  Self No No   Sig: Take 1 tablet (7.5 mg total) by mouth daily for 10 days   nebivolol (BYSTOLIC) 5 mg tablet  Self No No   Sig: Take 1 tablet (5 mg total) by mouth daily   ondansetron (ZOFRAN-ODT) 4 mg disintegrating tablet  Self No No   Sig: Take 1 tablet (4 mg total) by mouth every 6 (six) hours as needed for nausea for up to 3 days   pantoprazole (PROTONIX) 40 mg tablet  Self No No   Sig: Take 1 tablet (40 mg total) by mouth 2 (two) times a day   sucralfate (CARAFATE) 1 g tablet  Self No No   Sig: Take 1 tablet (1 g  total) by mouth 4 (four) times a day (before meals and at bedtime)   terbinafine (LamISIL) 250 mg tablet  Self No No   Si tab p.o. every other day.   traZODone (DESYREL) 100 mg tablet   No No   Sig: Take 1 tablet (100 mg total) by mouth daily at bedtime      Facility-Administered Medications Last Administration Doses Remaining   triamcinolone acetonide (KENALOG-40) 40 mg/mL injection 20 mg 2024 10:15 AM    triamcinolone acetonide (Kenalog-40) 40 mg/mL injection 20 mg 2024  1:00 PM         Current Discharge Medication List        CONTINUE these medications which have NOT CHANGED    Details   albuterol (PROVENTIL HFA,VENTOLIN HFA) 90 mcg/act inhaler Inhale 2 puffs every 6 (six) hours as needed for wheezing  Qty: 20.4 g, Refills: 3    Comments: Substitution to a formulary equivalent within the same pharmaceutical class is authorized.  Associated Diagnoses: COPD without exacerbation (HCC)      allopurinol (ZYLOPRIM) 100 mg tablet TAKE ONE TABLET BY MOUTH EVERY DAY  Qty: 90 tablet, Refills: 1    Associated Diagnoses: Acute idiopathic gout of right foot      aspirin 81 mg chewable tablet Chew 1 tablet (81 mg total) daily  Qty: 90 tablet, Refills: 1    Associated Diagnoses: Essential hypertension      buPROPion (WELLBUTRIN XL) 150 mg 24 hr tablet Take 1 tablet (150 mg total) by mouth daily  Qty: 90 tablet, Refills: 1    Associated Diagnoses: Moderate episode of recurrent major depressive disorder (HCC); Generalized anxiety disorder      busPIRone (BUSPAR) 5 mg tablet Take 1 tablet (5 mg total) by mouth 3 (three) times a day  Qty: 270 tablet, Refills: 1    Associated Diagnoses: Moderate episode of recurrent major depressive disorder (HCC); Generalized anxiety disorder      dextromethorphan-guaifenesin (MUCINEX DM)  MG per 12 hr tablet Take 1 tablet by mouth every 12 (twelve) hours as needed for cough  Qty: 30 tablet, Refills: 0    Associated Diagnoses: Chronic obstructive pulmonary disease with acute  exacerbation (HCC)      Diclofenac Sodium (VOLTAREN) 1 % Apply 2 g topically 4 (four) times a day  Qty: 100 g, Refills: 3    Associated Diagnoses: Chronic pain of right knee      !! donepezil (ARICEPT) 10 mg tablet Take 1 tablet (10 mg total) by mouth daily at bedtime  Qty: 90 tablet, Refills: 3    Associated Diagnoses: Cognitive impairment      !! donepezil (ARICEPT) 10 mg tablet Take 1 tablet (10 mg total) by mouth daily at bedtime  Qty: 90 tablet, Refills: 3    Associated Diagnoses: Cognitive impairment      escitalopram (LEXAPRO) 20 mg tablet Take 1 tablet (20 mg total) by mouth daily  Qty: 90 tablet, Refills: 1    Associated Diagnoses: Moderate episode of recurrent major depressive disorder (HCC); Generalized anxiety disorder      fluticasone (FLONASE) 50 mcg/act nasal spray 1 spray into each nostril 2 (two) times a day  Qty: 9.9 mL, Refills: 1    Associated Diagnoses: COPD without exacerbation (HCC)      Ketotifen Fumarate (ZADITOR) 0.035 % ophthalmic solution Administer 1 drop to both eyes 2 (two) times a day  Qty: 3 mL, Refills: 1    Associated Diagnoses: Medical clearance for psychiatric admission      meloxicam (MOBIC) 7.5 mg tablet Take 1 tablet (7.5 mg total) by mouth daily for 10 days  Qty: 10 tablet, Refills: 0    Associated Diagnoses: Chronic pain of left ankle      nebivolol (BYSTOLIC) 5 mg tablet Take 1 tablet (5 mg total) by mouth daily  Qty: 90 tablet, Refills: 1    Associated Diagnoses: Essential hypertension      ondansetron (ZOFRAN-ODT) 4 mg disintegrating tablet Take 1 tablet (4 mg total) by mouth every 6 (six) hours as needed for nausea for up to 3 days  Qty: 9 tablet, Refills: 0    Associated Diagnoses: Gastritis      pantoprazole (PROTONIX) 40 mg tablet Take 1 tablet (40 mg total) by mouth 2 (two) times a day  Qty: 60 tablet, Refills: 0    Associated Diagnoses: Marginal ulcer      sucralfate (CARAFATE) 1 g tablet Take 1 tablet (1 g total) by mouth 4 (four) times a day (before meals and at  bedtime)  Qty: 120 tablet, Refills: 3    Associated Diagnoses: Gastroesophageal reflux disease without esophagitis      terbinafine (LamISIL) 250 mg tablet 1 tab p.o. every other day.  Qty: 15 tablet, Refills: 0    Associated Diagnoses: Onychomycosis      traZODone (DESYREL) 100 mg tablet Take 1 tablet (100 mg total) by mouth daily at bedtime  Qty: 30 tablet, Refills: 1    Comments: 90 day also sent to [facility]  Associated Diagnoses: Primary insomnia       !! - Potential duplicate medications found. Please discuss with provider.        No discharge procedures on file.  ED SEPSIS DOCUMENTATION   Time reflects when diagnosis was documented in both MDM as applicable and the Disposition within this note       Time User Action Codes Description Comment    12/16/2024 11:57 AM Elisabet Burgos [K66.8] Pneumoperitoneum     12/16/2024 11:57 AM Elisabet Burgos [R19.8] Perforated abdominal viscus     12/16/2024 12:04 PM Sami Jaime [K25.5] Gastric perforation (HCC)                  Sami Jaime DO  12/16/24 1511

## 2024-12-16 NOTE — OP NOTE
[facility]   [address] [place] [address], [address] [facility], [address] [phone] (Fax)      Operative Report  LAPAROSCOPY DIAGNOSTIC,  LYSIS OF ADHESIONS, REPAIR PERFORATED MARGINAL ULCER, INTRA-OP EGD     Patient Name: Zulma Marcelo    :  1950  MRN: [id number]  Patient Location: [facility] ROOM 01  Surgery Date : 2024  Surgeons:  Surgeons and Role:     * Alex Mohr MD - Primary     * Vitor Hayes MD - Assisting     * Elisabet Burgos PA-C    Diagnosis:    Pre-Op Diagnosis Codes:  Pneumoperitoneum [K66.8]  Perforated abdominal viscus [R19.8]    Post-Op Diagnosis Codes:     * Pneumoperitoneum [K66.8]     * Perforated abdominal viscus [R19.8]    Procedure    Diagnostic Laparoscopy  Extensive Lysis of Adhesions  Marginal ulcer perforation primary repair and reinforcement with a Amilcar patch.  Drain placement  Intraoperative Endoscopy    Specimen(s):  * No specimens in log *    Estimated Blood Loss:    50 mL   Closed/Suction Drain LUQ Bulb 19 Fr. (Active)   Number of days: 0       Anesthesia Type:     General    Operative Indications:    Pneumoperitoneum [K66.8]  Perforated abdominal viscus [R19.8]      Operative Findings:    Peritonitis with purulent contents and significant amount of adhesions.  Perforated anterior marginal ulcer.    Complications:     None    Procedure and Technique:    INDICATION:    Zulma Marcelo is a 74 y.o. female with a Body mass index is 24.87 kg/m². and a history of laparoscopic Debra-en-Y gastric bypass performed in  by Dr. Gonzalo Villasenor.  She has a history of heavy smoking with prior marginal ulcerations.  She states that she quit smoking over a year ago she presented to the emergency room complaining of abdominal pain and a CT scan showed pneumoperitoneum with contrast extravasation.  He was consented to go to the OR and we took her for a diagnostic laparoscopy..      OPERATIVE TECHNIQUE    The patient was taken to the operating room and  placed in a supine position. A dose of IV antibiotic prophylaxis that consisted of Metronidazole 500mg and Cefepime was given.     Sequential compression devices were placed on both lower extremities.  5,000 units of subcu heparin were given for DVT prophylaxis.    After satisfactory general anesthesia induction and endotracheal intubation was achieved, the extremities were secured to prevent neurovascular and musculoskeletal injuries as best as possible. Subsequently, the abdominal wall was prepped and draped in a surgical standard sterile fashion.  After a timeout was done and the patient was properly identified and the type of procedure was confirmed a supra-umbilical transverse skin incision was made, and the subcutaneous tissues dissected. Access to the peritoneal cavity was gained with a Veress needle and a optical trocar. With this device, we were able to visualize the layers of the abdominal wall, and enter the peritoneal cavity under direct visualization.   Pneumoperitoneum was then established with CO2 insufflation.    There was a significant amount of pus and inflammatory reaction with a lot of intra-abdominal adhesions involving loops of small bowel and colon.    Under direct laparoscopic visualization, four additional trocars were placed: a 5 mm in the right upper quadrant subcostal position in the anterior axillary line, a 5-mm port was placed in the right flank midclavicular line, a 5-mm port was placed in the left upper quadrant subcostal position in the midclavicular line and another 12-mm port was placed in the left quadrant anterior axillary line lateral to the supraumbilical port.    Four-quadrant tap block was performed using Exparel.    Once again, a significant amount of purulent peritonitis and inflammatory reaction was encountered.  We suctioned about 300 cc of pus from the vicinity of the perforation and around the spleen    The omentum was densely adherent to the area of the GJ anastomosis  and this was peeled off carefully until we found the perforated marginal ulcer.  It was a round 5 mm anterior perforation with purulent and fibrinous exudate surrounding it.    We irrigated the area and placed two Lembert sutures using 2 0 Vicryl imbricating this perforated ulcer within the jejunum and the pouch.    We then ran the Debra limb from the pouch all the way down but I was not able to visualize the JJ anastomosis as once again there were significant amount of adhesions.    I then proceeded to irrigate the entire abdominal cavity with 12 L of normal saline until the irrigant coming out of the suction was clear.    We clamped the Debra limb and perform an intraoperative endoscopy confirming that there was no air leak with our repair.    The whole G-J anastomotic area was sprayed with Vistaseal and covered with at tongue of omentum in a Amilcar patch fashion.    A 19 Georgian Shashi drain was placed above the ulcer repair and the gastrojejunal anastomosis and was left in situ with the tip pointing towards the spleen.    The sponge, needle and instrument count was reported complete.    The 12-mm port site on the midline was then closed with the use of a suture closure device and a 0 absorbable suture.      The remaining ports were then also removed under laparoscopic visualization. The skin incisions were all closed with a surgical skin stapler.    The patient tolerated the procedure well, was extubated uneventfully and was transferred to the recovery room in stable condition.  I was present for the entire length of the procedure as the attending of record.    No qualified resident was available to assist.  The presence of an assistant was necessary for camera holding, traction and counter traction and for help with suturing and irrigating.    Patient Disposition:    PACU     Signature: Alex Mohr MD  Date: December 16, 2024  Time: 4:17 PM

## 2024-12-16 NOTE — ED NOTES
Called pt's son Rhett and updated him on pt status per pt request.      Elizabeth Hair RN  12/16/24 3072

## 2024-12-16 NOTE — ED NOTES
ADAM Gonzales directed that CT be completed after one hour from beginning of drinking contrast. Pt and CT made aware. Pt will now be ready for CT at 11:24.     Elizabeth Hair RN  12/16/24 9694

## 2024-12-17 ENCOUNTER — TELEPHONE (OUTPATIENT)
Age: 74
End: 2024-12-17

## 2024-12-17 LAB
ALBUMIN SERPL BCG-MCNC: 3 G/DL (ref 3.5–5)
ALP SERPL-CCNC: 42 U/L (ref 34–104)
ALT SERPL W P-5'-P-CCNC: 4 U/L (ref 7–52)
ANION GAP SERPL CALCULATED.3IONS-SCNC: 5 MMOL/L (ref 4–13)
AST SERPL W P-5'-P-CCNC: 9 U/L (ref 13–39)
BILIRUB DIRECT SERPL-MCNC: 0.08 MG/DL (ref 0–0.2)
BILIRUB SERPL-MCNC: 0.63 MG/DL (ref 0.2–1)
BUN SERPL-MCNC: 12 MG/DL (ref 5–25)
CALCIUM SERPL-MCNC: 7.9 MG/DL (ref 8.4–10.2)
CHLORIDE SERPL-SCNC: 105 MMOL/L (ref 96–108)
CO2 SERPL-SCNC: 26 MMOL/L (ref 21–32)
CREAT SERPL-MCNC: 0.54 MG/DL (ref 0.6–1.3)
ERYTHROCYTE [DISTWIDTH] IN BLOOD BY AUTOMATED COUNT: 14.5 % (ref 11.6–15.1)
GFR SERPL CREATININE-BSD FRML MDRD: 91 ML/MIN/1.73SQ M
GLUCOSE SERPL-MCNC: 118 MG/DL (ref 65–140)
HCT VFR BLD AUTO: 37.4 % (ref 34.8–46.1)
HGB BLD-MCNC: 12 G/DL (ref 11.5–15.4)
MAGNESIUM SERPL-MCNC: 1.8 MG/DL (ref 1.9–2.7)
MCH RBC QN AUTO: 28.7 PG (ref 26.8–34.3)
MCHC RBC AUTO-ENTMCNC: 32.1 G/DL (ref 31.4–37.4)
MCV RBC AUTO: 90 FL (ref 82–98)
PHOSPHATE SERPL-MCNC: 3.4 MG/DL (ref 2.3–4.1)
PLATELET # BLD AUTO: 289 THOUSANDS/UL (ref 149–390)
PMV BLD AUTO: 10 FL (ref 8.9–12.7)
POTASSIUM SERPL-SCNC: 3.4 MMOL/L (ref 3.5–5.3)
PROCALCITONIN SERPL-MCNC: 3.61 NG/ML
PROT SERPL-MCNC: 5.1 G/DL (ref 6.4–8.4)
RBC # BLD AUTO: 4.18 MILLION/UL (ref 3.81–5.12)
SODIUM SERPL-SCNC: 136 MMOL/L (ref 135–147)
WBC # BLD AUTO: 13.13 THOUSAND/UL (ref 4.31–10.16)

## 2024-12-17 PROCEDURE — 84145 PROCALCITONIN (PCT): CPT | Performed by: INTERNAL MEDICINE

## 2024-12-17 PROCEDURE — 83735 ASSAY OF MAGNESIUM: CPT | Performed by: INTERNAL MEDICINE

## 2024-12-17 PROCEDURE — 85027 COMPLETE CBC AUTOMATED: CPT | Performed by: PHYSICIAN ASSISTANT

## 2024-12-17 PROCEDURE — 80048 BASIC METABOLIC PNL TOTAL CA: CPT | Performed by: PHYSICIAN ASSISTANT

## 2024-12-17 PROCEDURE — 99024 POSTOP FOLLOW-UP VISIT: CPT | Performed by: PHYSICIAN ASSISTANT

## 2024-12-17 PROCEDURE — 99232 SBSQ HOSP IP/OBS MODERATE 35: CPT | Performed by: FAMILY MEDICINE

## 2024-12-17 PROCEDURE — 84100 ASSAY OF PHOSPHORUS: CPT | Performed by: INTERNAL MEDICINE

## 2024-12-17 PROCEDURE — 80076 HEPATIC FUNCTION PANEL: CPT | Performed by: INTERNAL MEDICINE

## 2024-12-17 RX ORDER — DIPHENHYDRAMINE HYDROCHLORIDE 50 MG/ML
12.5 INJECTION INTRAMUSCULAR; INTRAVENOUS ONCE
Status: COMPLETED | OUTPATIENT
Start: 2024-12-17 | End: 2024-12-17

## 2024-12-17 RX ORDER — MAGNESIUM SULFATE HEPTAHYDRATE 40 MG/ML
2 INJECTION, SOLUTION INTRAVENOUS ONCE
Status: COMPLETED | OUTPATIENT
Start: 2024-12-17 | End: 2024-12-17

## 2024-12-17 RX ORDER — POTASSIUM CHLORIDE 29.8 MG/ML
40 INJECTION INTRAVENOUS ONCE
Status: DISCONTINUED | OUTPATIENT
Start: 2024-12-17 | End: 2024-12-17

## 2024-12-17 RX ORDER — POTASSIUM CHLORIDE 14.9 MG/ML
20 INJECTION INTRAVENOUS ONCE
Status: COMPLETED | OUTPATIENT
Start: 2024-12-17 | End: 2024-12-17

## 2024-12-17 RX ADMIN — SODIUM CHLORIDE, SODIUM LACTATE, POTASSIUM CHLORIDE, AND CALCIUM CHLORIDE 100 ML/HR: .6; .31; .03; .02 INJECTION, SOLUTION INTRAVENOUS at 16:01

## 2024-12-17 RX ADMIN — DIPHENHYDRAMINE HYDROCHLORIDE 12.5 MG: 50 INJECTION, SOLUTION INTRAMUSCULAR; INTRAVENOUS at 21:32

## 2024-12-17 RX ADMIN — FAMOTIDINE 20 MG: 10 INJECTION, SOLUTION INTRAVENOUS at 21:32

## 2024-12-17 RX ADMIN — METOPROLOL TARTRATE 5 MG: 5 INJECTION INTRAVENOUS at 17:41

## 2024-12-17 RX ADMIN — METRONIDAZOLE 500 MG: 500 INJECTION, SOLUTION INTRAVENOUS at 11:49

## 2024-12-17 RX ADMIN — METRONIDAZOLE 500 MG: 500 INJECTION, SOLUTION INTRAVENOUS at 02:32

## 2024-12-17 RX ADMIN — MICAFUNGIN SODIUM 100 MG: 50 INJECTION, POWDER, LYOPHILIZED, FOR SOLUTION INTRAVENOUS at 12:32

## 2024-12-17 RX ADMIN — Medication 1 SPRAY: at 19:21

## 2024-12-17 RX ADMIN — MORPHINE SULFATE 2 MG: 2 INJECTION, SOLUTION INTRAMUSCULAR; INTRAVENOUS at 11:51

## 2024-12-17 RX ADMIN — SODIUM CHLORIDE, SODIUM LACTATE, POTASSIUM CHLORIDE, AND CALCIUM CHLORIDE 100 ML/HR: .6; .31; .03; .02 INJECTION, SOLUTION INTRAVENOUS at 05:08

## 2024-12-17 RX ADMIN — ACETAMINOPHEN 1000 MG: 1000 INJECTION, SOLUTION INTRAVENOUS at 21:33

## 2024-12-17 RX ADMIN — PANTOPRAZOLE SODIUM 8 MG/HR: 40 INJECTION, POWDER, FOR SOLUTION INTRAVENOUS at 02:32

## 2024-12-17 RX ADMIN — MORPHINE SULFATE 2 MG: 2 INJECTION, SOLUTION INTRAMUSCULAR; INTRAVENOUS at 08:09

## 2024-12-17 RX ADMIN — MAGNESIUM SULFATE HEPTAHYDRATE 2 G: 40 INJECTION, SOLUTION INTRAVENOUS at 14:11

## 2024-12-17 RX ADMIN — HEPARIN SODIUM 5000 UNITS: 5000 INJECTION, SOLUTION INTRAVENOUS; SUBCUTANEOUS at 21:32

## 2024-12-17 RX ADMIN — CEFEPIME HYDROCHLORIDE 2000 MG: 2 INJECTION, SOLUTION INTRAVENOUS at 11:07

## 2024-12-17 RX ADMIN — METRONIDAZOLE 500 MG: 500 INJECTION, SOLUTION INTRAVENOUS at 18:49

## 2024-12-17 RX ADMIN — MORPHINE SULFATE 2 MG: 2 INJECTION, SOLUTION INTRAMUSCULAR; INTRAVENOUS at 19:22

## 2024-12-17 RX ADMIN — ALBUTEROL SULFATE 2 PUFF: 90 AEROSOL, METERED RESPIRATORY (INHALATION) at 19:21

## 2024-12-17 RX ADMIN — FAMOTIDINE 20 MG: 10 INJECTION, SOLUTION INTRAVENOUS at 08:13

## 2024-12-17 RX ADMIN — CEFEPIME HYDROCHLORIDE 2000 MG: 2 INJECTION, SOLUTION INTRAVENOUS at 02:15

## 2024-12-17 RX ADMIN — CEFEPIME HYDROCHLORIDE 2000 MG: 2 INJECTION, SOLUTION INTRAVENOUS at 17:41

## 2024-12-17 RX ADMIN — POTASSIUM CHLORIDE 20 MEQ: 14.9 INJECTION, SOLUTION INTRAVENOUS at 14:59

## 2024-12-17 RX ADMIN — PANTOPRAZOLE SODIUM 8 MG/HR: 40 INJECTION, POWDER, FOR SOLUTION INTRAVENOUS at 09:06

## 2024-12-17 RX ADMIN — HEPARIN SODIUM 5000 UNITS: 5000 INJECTION, SOLUTION INTRAVENOUS; SUBCUTANEOUS at 05:06

## 2024-12-17 RX ADMIN — HEPARIN SODIUM 5000 UNITS: 5000 INJECTION, SOLUTION INTRAVENOUS; SUBCUTANEOUS at 13:45

## 2024-12-17 RX ADMIN — ALBUTEROL SULFATE 2 PUFF: 90 AEROSOL, METERED RESPIRATORY (INHALATION) at 08:09

## 2024-12-17 RX ADMIN — METOPROLOL TARTRATE 5 MG: 5 INJECTION INTRAVENOUS at 11:06

## 2024-12-17 RX ADMIN — ACETAMINOPHEN 1000 MG: 1000 INJECTION, SOLUTION INTRAVENOUS at 05:06

## 2024-12-17 RX ADMIN — PANTOPRAZOLE SODIUM 8 MG/HR: 40 INJECTION, POWDER, FOR SOLUTION INTRAVENOUS at 19:22

## 2024-12-17 RX ADMIN — METOPROLOL TARTRATE 5 MG: 5 INJECTION INTRAVENOUS at 02:15

## 2024-12-17 RX ADMIN — ACETAMINOPHEN 1000 MG: 1000 INJECTION, SOLUTION INTRAVENOUS at 13:44

## 2024-12-17 RX ADMIN — POTASSIUM CHLORIDE 20 MEQ: 14.9 INJECTION, SOLUTION INTRAVENOUS at 11:07

## 2024-12-17 RX ADMIN — HYDROMORPHONE HYDROCHLORIDE 0.5 MG: 1 INJECTION, SOLUTION INTRAMUSCULAR; INTRAVENOUS; SUBCUTANEOUS at 02:15

## 2024-12-17 NOTE — PROGRESS NOTES
Progress Note - Hospitalist   Name: Zulma Marcelo 74 y.o. female I MRN: [id number]  Unit/Bed#: 2 [address] Date of Admission: 12/16/2024   Date of Service: 12/17/2024 I Hospital Day: 1     Assessment & Plan  Pneumoperitoneum  Presented with left lower quadrant pain nausea with nonbloody bilious emesis over few days progressing to worsening abdominal pain epigastric/chest pain radiating to left shoulder today  Evaluated in ED, workup revealed pneumoperitoneum with focal perforation of medial wall of proximal Debra-en-Y immediately distal to GJ anastomosis   status post emergent diagnostic laparoscopy with extensive lysis of adhesion with repair of marginal ulcer perforation with primary repair and Amilcar patch with drain placement and intraoperative endoscopy.  Patient was noted to have peritonitis with purulent content with significant adhesions.   Postoperatively, hemodynamically stable.  Abdomen with mild appropriate tenderness otherwise soft.  Strict n.p.o.  IV PPI drip  IV cefepime, metronidazole  Received IV fluconazole in ED, will change to micafungin due to QT prolongation  Follow-up CBC, CMP  Monitor intake output  Postoperative care as per primary team  Symptomatic treatment, pain control    - Patient is status post diagnostic laparoscopy with extensive lysis of adhesions found to have marginal ulcer perforation s/p primary repair and reinforcement with a Amilcar patch in addition to placement of a drain.  Patient continued on IV cefepime, Flagyl and micafungin.  Will ask infectious disease to evaluate to assist with antibiotics/antifungals.  History of Debra-en-Y gastric bypass  History of Debra-en-Y gastric 2011 with history of chronic marginal ulcer  SVT (supraventricular tachycardia) (HCC)  History of SVT, hold nebivolol  Telemetry  IV metoprolol  Chest pain  Reported on presentation chest pain radiating to left shoulder  Suspect secondary to pneumoperitoneum  CTA dissection protocol without any acute  intrathoracic abnormality  EKG sinus bradycardia, old RBBB, nonspecific ST-T wave changes with baseline artifact.  QTc 508  Troponin-10-10  Denies any chest pain at present  Monitor symptoms  COPD  Appears stable  Respiratory protocol  Bronchodilators as needed  Incentive spirometry  Essential hypertension  Holding nebivolol.  Continue IV metoprolol for now  Chronic GERD  IV PPI  Sleep apnea  Prior history of SORAYA, awaiting repeat sleep studies as per records  Moderate episode of recurrent major depressive disorder (HCC)  Hold p.o. meds at present  Cognitive impairment  On donepezil, holding at present  QT prolongation  Telemetry  Monitor and replete electrolytes  Check magnesium level  VTE Pharmacologic Prophylaxis:   Heparin prophylaxis.    Mobility:   Basic Mobility Inpatient Raw Score: 18  JH-HLM Goal: 6: Walk 10 steps or more  JH-HLM Achieved: 6: Walk 10 steps or more    Patient Centered Rounds: I performed bedside rounds with nursing staff today.     Current Length of Stay: 1 day(s)  Current Patient Status: Inpatient   Certification Statement: The patient will continue to require additional inpatient hospital stay due to above.  Discharge Plan: TBD    Code Status: Prior    Subjective   Patient was seen and examined at bedside.  No acute events overnight.  Patient reports that her abdominal pain is improved compared to yesterday    Objective :  Temp:  [97.2 °F (36.2 °C)-98.7 °F (37.1 °C)] 98.7 °F (37.1 °C)  HR:  [58-91] 59  BP: (101-138)/(54-95) 135/95  Resp:  [18-24] 18  SpO2:  [94 %-98 %] 95 %  O2 Device: None (Room air)    Body mass index is 29.1 kg/m².     Input and Output Summary (last 24 hours):     Intake/Output Summary (Last 24 hours) at 12/17/2024 1556  Last data filed at 12/17/2024 1232  Gross per 24 hour   Intake --   Output 555 ml   Net -555 ml     Physical Exam  HENT:      Head: Normocephalic.      Mouth/Throat:      Mouth: Mucous membranes are moist.   Eyes:      Extraocular Movements: Extraocular  movements intact.   Cardiovascular:      Rate and Rhythm: Normal rate.   Pulmonary:      Effort: Pulmonary effort is normal. No respiratory distress.   Abdominal:      Palpations: Abdomen is soft.      Comments: + afua drain with scant serosanguinous drainage   Skin:     General: Skin is warm.   Neurological:      Mental Status: She is alert and oriented to person, place, and time.   Psychiatric:         Mood and Affect: Mood normal.       Lines/Drains:  Lines/Drains/Airways       Active Status       Name Placement date Placement time Site Days    Closed/Suction Drain LUQ Bulb 19 Fr. 12/16/24  1601  LUQ  less than 1                  Telemetry:  Telemetry Orders (From admission, onward)               24 Hour Telemetry Monitoring  Continuous x 24 Hours (Telem)        Expiring   Question:  Reason for 24 Hour Telemetry  Answer:  Arrhythmias requiring acute medical intervention / PPM or ICD malfunction                  Lab Results: I have reviewed the following results:   Results from last 7 days   Lab Units 12/17/24  0511 12/16/24  0830   WBC Thousand/uL 13.13* 10.61*   HEMOGLOBIN g/dL 12.0 14.0   HEMATOCRIT % 37.4 43.6   PLATELETS Thousands/uL 289 360   SEGS PCT %  --  77*   LYMPHO PCT %  --  16   MONO PCT %  --  6   EOS PCT %  --  0     Results from last 7 days   Lab Units 12/17/24  0511   SODIUM mmol/L 136   POTASSIUM mmol/L 3.4*   CHLORIDE mmol/L 105   CO2 mmol/L 26   BUN mg/dL 12   CREATININE mg/dL 0.54*   ANION GAP mmol/L 5   CALCIUM mg/dL 7.9*   ALBUMIN g/dL 3.0*   TOTAL BILIRUBIN mg/dL 0.63   ALK PHOS U/L 42   ALT U/L 4*   AST U/L 9*   GLUCOSE RANDOM mg/dL 118     Results from last 7 days   Lab Units 12/17/24  0511 12/16/24  0830   LACTIC ACID mmol/L  --  1.2   PROCALCITONIN ng/ml 3.61* 2.08*     Recent Cultures (last 7 days):   Results from last 7 days   Lab Units 12/16/24  0844 12/16/24  0830   BLOOD CULTURE  No Growth at 24 hrs. No Growth at 24 hrs.     Last 24 Hours Medication List:     Current  Facility-Administered Medications:     acetaminophen (Ofirmev) injection 1,000 mg, Q8H JUDIE, Last Rate: 1,000 mg (12/17/24 1344)    albuterol (PROVENTIL HFA,VENTOLIN HFA) inhaler 2 puff, Q6H PRN    cefepime (MAXIPIME) IVPB (premix in dextrose) 2,000 mg 50 mL, Q8H, Last Rate: 2,000 mg (12/17/24 1107)    diphenhydrAMINE (BENADRYL) tablet 25 mg, HS PRN    Famotidine (PF) (PEPCID) injection 20 mg, Q12H JUDIE    heparin (porcine) subcutaneous injection 5,000 Units, Q8H JUDIE **AND** Platelet count, Once    HYDROmorphone (DILAUDID) injection 0.5 mg, Q4H PRN    levalbuterol (XOPENEX) inhalation solution 0.63 mg, Q8H PRN **AND** ipratropium (ATROVENT) 0.02 % inhalation solution 0.5 mg, Q8H PRN    lactated ringers bolus 1,000 mL, Once PRN **AND** lactated ringers bolus 1,000 mL, Once PRN    lactated ringers infusion, Continuous, Last Rate: 100 mL/hr (12/17/24 0508)    LORazepam (ATIVAN) injection 0.5 mg, Q6H PRN    magnesium sulfate 2 g/50 mL IVPB (premix) 2 g, Once, Last Rate: 2 g (12/17/24 1411)    metoprolol (LOPRESSOR) injection 5 mg, Q8H    metroNIDAZOLE (FLAGYL) IVPB (premix) 500 mg 100 mL, Q8H, Last Rate: 500 mg (12/17/24 1149)    micafungin (MYCAMINE) 100 mg in sodium chloride 0.9 % 100 mL IVPB, Q24H, Last Rate: 100 mg (12/17/24 1232)    morphine injection 2 mg, Q2H PRN    morphine injection 4 mg, Q2H PRN    ondansetron (ZOFRAN) injection 4 mg, Q6H PRN    pantoprazole (PROTONIX) 80 mg in sodium chloride 0.9 % 100 mL infusion, Continuous, Last Rate: 8 mg/hr (12/17/24 0906)    phenol (CHLORASEPTIC) 1.4 % mucosal liquid 1 spray, Q2H PRN    [COMPLETED] potassium chloride 20 mEq IVPB (premix), Once, Last Rate: 20 mEq (12/17/24 1107) **FOLLOWED BY** potassium chloride 20 mEq IVPB (premix), Once, Last Rate: 20 mEq (12/17/24 1459)    sodium chloride 0.9 % bolus 1,000 mL, Once PRN **AND** sodium chloride 0.9 % bolus 1,000 mL, Once PRN    Administrative Statements   Today, Patient Was Seen By: Onesimo Chapin MD    **Please  Note: This note may have been constructed using a voice recognition system.**
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
## 2024-12-17 NOTE — PLAN OF CARE
Problem: Potential for Falls  Goal: Patient will remain free of falls  Description: INTERVENTIONS:  - Educate patient/family on patient safety including physical limitations  - Instruct patient to call for assistance with activity   - Consult OT/PT to assist with strengthening/mobility   - Keep Call bell within reach  - Keep bed low and locked with side rails adjusted as appropriate  - Keep care items and personal belongings within reach  - Initiate and maintain comfort rounds  -      Problem: RESPIRATORY - ADULT  Goal: Achieves optimal ventilation and oxygenation  Description: INTERVENTIONS:  - Assess for changes in respiratory status  - Assess for changes in mentation and behavior  - Position to facilitate oxygenation and minimize respiratory effort  - Oxygen administered by appropriate delivery if ordered  - Initiate smoking cessation education as indicated  - Encourage broncho-pulmonary hygiene including cough, deep breathe, Incentive Spirometry  - Assess the need for suctioning and aspirate as needed  - Assess and instruct to report SOB or any respiratory difficulty  - Respiratory Therapy support as indicated  Outcome: Progressing     Problem: PAIN - ADULT  Goal: Verbalizes/displays adequate comfort level or baseline comfort level  Description: Interventions:  - Encourage patient to monitor pain and request assistance  - Assess pain using appropriate pain scale  - Administer analgesics based on type and severity of pain and evaluate response  - Implement non-pharmacological measures as appropriate and evaluate response  - Consider cultural and social influences on pain and pain management  - Notify physician/advanced practitioner if interventions unsuccessful or patient reports new pain  Outcome: Progressing

## 2024-12-17 NOTE — PROGRESS NOTES
Progress Note - Bariatrics   Name: Zulma Marcelo 74 y.o. female I MRN: [id number]  Unit/Bed#: 2 [address] Date of Admission: 12/16/2024   Date of Service: 12/17/2024 I Hospital Day: 1     Assessment & Plan  Pneumoperitoneum  75 yo F with hx of LRYGB with Dr. Gonzalo Villasenor in 2011 with hx of smoking and NSAID use s/p status post emergent diagnostic laparoscopy with extensive lysis of adhesion with repair of marginal ulcer perforation with primary repair and Amilcar patch with drain placement and intraoperative endoscopy POD1. WBC increased to 13, VSS, pain greatly improved, micturating, remains NPO. 155cc serosanginous drainage since surgery.    Strict NPO  OOB in chair, frequent ambulation  Monitor I/O  ISS and SCD's  Heparin DVT pphx  Maintain GABI drain   IV cefepime, metronidazole, micafungin  Monitor daily labs  Replace lytes prn - appreciate SLIM  greatly  Chest pain  Improving; but Still c/o left sided mild chest pain radiating to left shoulder, likely secondary to perforation   Essential hypertension  Well controlled, continue IV metoprolol per SLIM  SVT (supraventricular tachycardia) (HCC)  On Telemetry and IV metoprolol  COPD  ISS encouraged    Subjective   Patient notes feeling much improved today. Urination last night and a small amount this morning. Mild left upper chest and shoulder pain. No flatus. Limited incisional pain mostly at drain site. Denies fevers, chills, sweats, SOB, calf pain/swelling.    Objective   Temp:  [97.2 °F (36.2 °C)-97.4 °F (36.3 °C)] 97.3 °F (36.3 °C)  HR:  [56-91] 58  BP: (101-161)/(54-95) 132/62  Resp:  [18-24] 20  SpO2:  [94 %-99 %] 95 %  O2 Device: None (Room air)  Nasal Cannula O2 Flow Rate (L/min):  [2 L/min] 2 L/min    Physical Exam  Vitals reviewed.   Constitutional:       General: She is not in acute distress.     Appearance: She is well-developed.   HENT:      Head: Normocephalic and atraumatic.   Eyes:      General: No scleral icterus.  Cardiovascular:      Rate and Rhythm:  Normal rate and regular rhythm.   Pulmonary:      Effort: Pulmonary effort is normal. No respiratory distress.   Abdominal:      General: Bowel sounds are normal. There is no distension.      Palpations: Abdomen is soft.      Comments: Incisions C/D/I, mirapex dressings in place. GABI drain with scant serosanginous fluid - drain successfully stripped   Skin:     General: Skin is warm and dry.   Neurological:      Mental Status: She is alert.   Psychiatric:         Mood and Affect: Mood normal.         Behavior: Behavior normal.       Lab Results: I have reviewed the following results:  Lab Results   Component Value Date    WBC 13.13 (H) 12/17/2024    HGB 12.0 12/17/2024    HCT 37.4 12/17/2024    MCV 90 12/17/2024     12/17/2024     Lab Results   Component Value Date    SODIUM 136 12/17/2024    K 3.4 (L) 12/17/2024     12/17/2024    CO2 26 12/17/2024    AGAP 5 12/17/2024    BUN 12 12/17/2024    CREATININE 0.54 (L) 12/17/2024    GLUC 118 12/17/2024    GLUF 99 05/21/2021    CALCIUM 7.9 (L) 12/17/2024    AST 9 (L) 12/17/2024    ALT 4 (L) 12/17/2024    ALKPHOS 42 12/17/2024    TP 5.1 (L) 12/17/2024    TBILI 0.63 12/17/2024    EGFR 91 12/17/2024     Lab Results   Component Value Date    HGBA1C 5.7 (H) 11/29/2023     Lab Results   Component Value Date    GTY5YHAJKMAO 4.978 (H) 11/27/2023     Lab Results   Component Value Date    CHOLESTEROL 154 05/21/2021     Lab Results   Component Value Date    HDL 38 (L) 05/21/2021     Lab Results   Component Value Date    TRIG 73 05/21/2021     Lab Results   Component Value Date    LDLCALC 101 (H) 05/21/2021       Imaging Results Review: No pertinent imaging studies reviewed.  Other Study Results Review: No additional pertinent studies reviewed.    VTE Pharmacologic Prophylaxis: Heparin  VTE Mechanical Prophylaxis: sequential compression device

## 2024-12-17 NOTE — ASSESSMENT & PLAN NOTE
Improving; but Still c/o left sided mild chest pain radiating to left shoulder, likely secondary to perforation

## 2024-12-17 NOTE — ASSESSMENT & PLAN NOTE
Presented with left lower quadrant pain nausea with nonbloody bilious emesis over few days progressing to worsening abdominal pain epigastric/chest pain radiating to left shoulder today  Evaluated in ED, workup revealed pneumoperitoneum with focal perforation of medial wall of proximal Debra-en-Y immediately distal to GJ anastomosis   status post emergent diagnostic laparoscopy with extensive lysis of adhesion with repair of marginal ulcer perforation with primary repair and Amilcar patch with drain placement and intraoperative endoscopy.  Patient was noted to have peritonitis with purulent content with significant adhesions.   Postoperatively, hemodynamically stable.  Abdomen with mild appropriate tenderness otherwise soft.  Strict n.p.o.  IV PPI drip  IV cefepime, metronidazole  Received IV fluconazole in ED, will change to micafungin due to QT prolongation  Follow-up CBC, CMP  Monitor intake output  Postoperative care as per primary team  Symptomatic treatment, pain control    - Patient is status post diagnostic laparoscopy with extensive lysis of adhesions found to have marginal ulcer perforation s/p primary repair and reinforcement with a Amilcar patch in addition to placement of a drain.  Patient continued on IV cefepime, Flagyl and micafungin.  Will ask infectious disease to evaluate to assist with antibiotics/antifungals.

## 2024-12-17 NOTE — ASSESSMENT & PLAN NOTE
73 yo F with hx of LRYGB with Dr. Gonzalo Villasenor in 2011 with hx of smoking and NSAID use s/p status post emergent diagnostic laparoscopy with extensive lysis of adhesion with repair of marginal ulcer perforation with primary repair and Amilcar patch with drain placement and intraoperative endoscopy POD1. WBC increased to 13, VSS, pain greatly improved, micturating, remains NPO. 155cc serosanginous drainage since surgery.    Strict NPO  OOB in chair, frequent ambulation  Monitor I/O  ISS and SCD's  Heparin DVT pphx  Maintain GABI drain   IV cefepime, metronidazole, micafungin  Monitor daily labs  Replace lytes prn - appreciate SLIM  greatly

## 2024-12-17 NOTE — PLAN OF CARE
Problem: Potential for Falls  Goal: Patient will remain free of falls  Description: INTERVENTIONS:  - Educate patient/family on patient safety including physical limitations  - Instruct patient to call for assistance with activity   - Consult OT/PT to assist with strengthening/mobility   - Keep Call bell within reach  - Keep bed low and locked with side rails adjusted as appropriate  - Keep care items and personal belongings within reach  - Initiate and maintain comfort rounds  - Make Fall Risk Sign visible to staff  - Offer Toileting every 2 Hours, in advance of need  - Initiate/Maintain BED alarm  - Obtain necessary fall risk management equipment:   - Apply yellow socks and bracelet for high fall risk patients  - Consider moving patient to room near nurses station  12/17/2024 0107 by Joycelyn Garner RN  Outcome: Progressing  12/17/2024 0105 by Joycelyn Garner RN  Outcome: Progressing     Problem: RESPIRATORY - ADULT  Goal: Achieves optimal ventilation and oxygenation  Description: INTERVENTIONS:  - Assess for changes in respiratory status  - Assess for changes in mentation and behavior  - Position to facilitate oxygenation and minimize respiratory effort  - Oxygen administered by appropriate delivery if ordered  - Initiate smoking cessation education as indicated  - Encourage broncho-pulmonary hygiene including cough, deep breathe, Incentive Spirometry  - Assess the need for suctioning and aspirate as needed  - Assess and instruct to report SOB or any respiratory difficulty  - Respiratory Therapy support as indicated  12/17/2024 0107 by Joycelyn Garner RN  Outcome: Progressing  12/17/2024 0105 by Joycelyn Garner RN  Outcome: Progressing     Problem: PAIN - ADULT  Goal: Verbalizes/displays adequate comfort level or baseline comfort level  Description: Interventions:  - Encourage patient to monitor pain and request assistance  - Assess pain using appropriate pain scale  - Administer analgesics  based on type and severity of pain and evaluate response  - Implement non-pharmacological measures as appropriate and evaluate response  - Consider cultural and social influences on pain and pain management  - Notify physician/advanced practitioner if interventions unsuccessful or patient reports new pain  Outcome: Progressing     Problem: INFECTION - ADULT  Goal: Absence or prevention of progression during hospitalization  Description: INTERVENTIONS:  - Assess and monitor for signs and symptoms of infection  - Monitor lab/diagnostic results  - Monitor all insertion sites, i.e. indwelling lines, tubes, and drains  - Monitor endotracheal if appropriate and nasal secretions for changes in amount and color  - [place] appropriate cooling/warming therapies per order  - Administer medications as ordered  - Instruct and encourage patient and family to use good hand hygiene technique  - Identify and instruct in appropriate isolation precautions for identified infection/condition  Outcome: Progressing  Goal: Absence of fever/infection during neutropenic period  Description: INTERVENTIONS:  - Monitor WBC    Outcome: Progressing     Problem: SAFETY ADULT  Goal: Patient will remain free of falls  Description: INTERVENTIONS:  - Educate patient/family on patient safety including physical limitations  - Instruct patient to call for assistance with activity   - Consult OT/PT to assist with strengthening/mobility   - Keep Call bell within reach  - Keep bed low and locked with side rails adjusted as appropriate  - Keep care items and personal belongings within reach  - Initiate and maintain comfort rounds  - Make Fall Risk Sign visible to staff  - Offer Toileting every 2 Hours, in advance of need  - Initiate/Maintain BED alarm  - Obtain necessary fall risk management equipment:   - Apply yellow socks and bracelet for high fall risk patients  - Consider moving patient to room near nurses station  12/17/2024 0107 by Joycelyn Schaffer  CHAD Garner  Outcome: Progressing  12/17/2024 0105 by Joycelyn Garner RN  Outcome: Progressing  Goal: Maintain or return to baseline ADL function  Description: INTERVENTIONS:  -  Assess patient's ability to carry out ADLs; assess patient's baseline for ADL function and identify physical deficits which impact ability to perform ADLs (bathing, care of mouth/teeth, toileting, grooming, dressing, etc.)  - Assess/evaluate cause of self-care deficits   - Assess range of motion  - Assess patient's mobility; develop plan if impaired  - Assess patient's need for assistive devices and provide as appropriate  - Encourage maximum independence but intervene and supervise when necessary  - Involve family in performance of ADLs  - Assess for home care needs following discharge   - Consider OT consult to assist with ADL evaluation and planning for discharge  - Provide patient education as appropriate  Outcome: Progressing  Goal: Maintains/Returns to pre admission functional level  Description: INTERVENTIONS:  - Perform AM-PAC 6 Click Basic Mobility/ Daily Activity assessment daily.  - Set and communicate daily mobility goal to care team and patient/family/caregiver.   - Collaborate with rehabilitation services on mobility goals if consulted  - Perform Range of Motion 3 times a day.  - Reposition patient every 2 hours.  - Dangle patient 3 times a day  - Stand patient 3 times a day  - Ambulate patient 3 times a day  - Out of bed to chair 3 times a day   - Out of bed for meals 3 times a day  - Out of bed for toileting  - Record patient progress and toleration of activity level   Outcome: Progressing     Problem: DISCHARGE PLANNING  Goal: Discharge to home or other facility with appropriate resources  Description: INTERVENTIONS:  - Identify barriers to discharge w/patient and caregiver  - Arrange for needed discharge resources and transportation as appropriate  - Identify discharge learning needs (meds, wound care, etc.)  - Arrange  for interpretive services to assist at discharge as needed  - Refer to Case Management Department for coordinating discharge planning if the patient needs post-hospital services based on physician/advanced practitioner order or complex needs related to functional status, cognitive ability, or social support system  Outcome: Progressing     Problem: Knowledge Deficit  Goal: Patient/family/caregiver demonstrates understanding of disease process, treatment plan, medications, and discharge instructions  Description: Complete learning assessment and assess knowledge base.  Interventions:  - Provide teaching at level of understanding  - Provide teaching via preferred learning methods  Outcome: Progressing

## 2024-12-17 NOTE — TELEPHONE ENCOUNTER
Caller: Zulma    Doctor: John    Reason for call: Wanted to apologize for missing her appt. She was admitted for emergency surgery last night. She will call back after she is feeling better to r/s     Call back#: [phone]

## 2024-12-17 NOTE — ASSESSMENT & PLAN NOTE
Presented with left lower quadrant pain nausea with nonbloody bilious emesis over few days progressing to worsening abdominal pain epigastric/chest pain radiating to left shoulder today  Evaluated in ED, workup revealed pneumoperitoneum with focal perforation of medial wall of proximal Debra-en-Y immediately distal to GJ anastomosis   status post emergent diagnostic laparoscopy with extensive lysis of adhesion with repair of marginal ulcer perforation with primary repair and Amilcar patch with drain placement and intraoperative endoscopy.  Patient was noted to have peritonitis with purulent content with significant adhesions.   Postoperatively, hemodynamically stable.  Abdomen with mild appropriate tenderness otherwise soft.  Strict n.p.o.  IV PPI drip  IV cefepime, metronidazole  Received IV fluconazole in ED, will change to micafungin due to QT prolongation  Follow-up CBC, CMP  Monitor intake output  Postoperative care as per primary team  Symptomatic treatment, pain control

## 2024-12-17 NOTE — ASSESSMENT & PLAN NOTE
Reported on presentation chest pain radiating to left shoulder  Suspect secondary to pneumoperitoneum  CTA dissection protocol without any acute intrathoracic abnormality  EKG sinus bradycardia, old RBBB, nonspecific ST-T wave changes with baseline artifact.  QTc 508  Troponin-10-10  Denies any chest pain at present  Monitor symptoms

## 2024-12-18 ENCOUNTER — APPOINTMENT (INPATIENT)
Dept: RADIOLOGY | Facility: HOSPITAL | Age: 74
DRG: 326 | End: 2024-12-18
Payer: MEDICARE

## 2024-12-18 PROBLEM — Z88.0 PENICILLIN ALLERGY: Status: ACTIVE | Noted: 2024-12-18

## 2024-12-18 PROBLEM — K27.5 PERFORATED PEPTIC ULCER (HCC): Status: ACTIVE | Noted: 2024-12-18

## 2024-12-18 PROBLEM — K65.9 PERITONITIS (HCC): Status: ACTIVE | Noted: 2024-12-18

## 2024-12-18 LAB
ALBUMIN SERPL BCG-MCNC: 2.8 G/DL (ref 3.5–5)
ALP SERPL-CCNC: 38 U/L (ref 34–104)
ALT SERPL W P-5'-P-CCNC: 5 U/L (ref 7–52)
ANION GAP SERPL CALCULATED.3IONS-SCNC: 7 MMOL/L (ref 4–13)
AST SERPL W P-5'-P-CCNC: 9 U/L (ref 13–39)
BASOPHILS # BLD AUTO: 0.01 THOUSANDS/ÂΜL (ref 0–0.1)
BASOPHILS NFR BLD AUTO: 0 % (ref 0–1)
BILIRUB SERPL-MCNC: 0.49 MG/DL (ref 0.2–1)
BUN SERPL-MCNC: 15 MG/DL (ref 5–25)
CALCIUM ALBUM COR SERPL-MCNC: 8.8 MG/DL (ref 8.3–10.1)
CALCIUM SERPL-MCNC: 7.8 MG/DL (ref 8.4–10.2)
CHLORIDE SERPL-SCNC: 107 MMOL/L (ref 96–108)
CO2 SERPL-SCNC: 23 MMOL/L (ref 21–32)
CREAT SERPL-MCNC: 0.54 MG/DL (ref 0.6–1.3)
EOSINOPHIL # BLD AUTO: 0 THOUSAND/ÂΜL (ref 0–0.61)
EOSINOPHIL NFR BLD AUTO: 0 % (ref 0–6)
ERYTHROCYTE [DISTWIDTH] IN BLOOD BY AUTOMATED COUNT: 14.8 % (ref 11.6–15.1)
GFR SERPL CREATININE-BSD FRML MDRD: 91 ML/MIN/1.73SQ M
GLUCOSE SERPL-MCNC: 89 MG/DL (ref 65–140)
HCT VFR BLD AUTO: 33.7 % (ref 34.8–46.1)
HGB BLD-MCNC: 10.9 G/DL (ref 11.5–15.4)
IMM GRANULOCYTES # BLD AUTO: 0.1 THOUSAND/UL (ref 0–0.2)
IMM GRANULOCYTES NFR BLD AUTO: 1 % (ref 0–2)
LYMPHOCYTES # BLD AUTO: 0.77 THOUSANDS/ÂΜL (ref 0.6–4.47)
LYMPHOCYTES NFR BLD AUTO: 6 % (ref 14–44)
MAGNESIUM SERPL-MCNC: 2.1 MG/DL (ref 1.9–2.7)
MCH RBC QN AUTO: 29 PG (ref 26.8–34.3)
MCHC RBC AUTO-ENTMCNC: 32.3 G/DL (ref 31.4–37.4)
MCV RBC AUTO: 90 FL (ref 82–98)
MONOCYTES # BLD AUTO: 0.69 THOUSAND/ÂΜL (ref 0.17–1.22)
MONOCYTES NFR BLD AUTO: 5 % (ref 4–12)
NEUTROPHILS # BLD AUTO: 12.12 THOUSANDS/ÂΜL (ref 1.85–7.62)
NEUTS SEG NFR BLD AUTO: 88 % (ref 43–75)
NRBC BLD AUTO-RTO: 0 /100 WBCS
PLATELET # BLD AUTO: 249 THOUSANDS/UL (ref 149–390)
PMV BLD AUTO: 10.8 FL (ref 8.9–12.7)
POTASSIUM SERPL-SCNC: 3.5 MMOL/L (ref 3.5–5.3)
PROT SERPL-MCNC: 5 G/DL (ref 6.4–8.4)
RBC # BLD AUTO: 3.76 MILLION/UL (ref 3.81–5.12)
SODIUM SERPL-SCNC: 137 MMOL/L (ref 135–147)
WBC # BLD AUTO: 13.69 THOUSAND/UL (ref 4.31–10.16)

## 2024-12-18 PROCEDURE — 99232 SBSQ HOSP IP/OBS MODERATE 35: CPT | Performed by: FAMILY MEDICINE

## 2024-12-18 PROCEDURE — 83735 ASSAY OF MAGNESIUM: CPT | Performed by: FAMILY MEDICINE

## 2024-12-18 PROCEDURE — 74240 X-RAY XM UPR GI TRC 1CNTRST: CPT

## 2024-12-18 PROCEDURE — 99223 1ST HOSP IP/OBS HIGH 75: CPT | Performed by: INTERNAL MEDICINE

## 2024-12-18 PROCEDURE — 99024 POSTOP FOLLOW-UP VISIT: CPT | Performed by: SURGERY

## 2024-12-18 PROCEDURE — 85025 COMPLETE CBC W/AUTO DIFF WBC: CPT | Performed by: FAMILY MEDICINE

## 2024-12-18 PROCEDURE — 80053 COMPREHEN METABOLIC PANEL: CPT | Performed by: FAMILY MEDICINE

## 2024-12-18 RX ORDER — TRAZODONE HYDROCHLORIDE 100 MG/1
100 TABLET ORAL
Status: DISCONTINUED | OUTPATIENT
Start: 2024-12-18 | End: 2024-12-21 | Stop reason: HOSPADM

## 2024-12-18 RX ORDER — SUCRALFATE 1 G/1
1 TABLET ORAL 4 TIMES DAILY
Status: DISCONTINUED | OUTPATIENT
Start: 2024-12-18 | End: 2024-12-21 | Stop reason: HOSPADM

## 2024-12-18 RX ORDER — CIPROFLOXACIN 2 MG/ML
400 INJECTION, SOLUTION INTRAVENOUS EVERY 12 HOURS
Status: DISCONTINUED | OUTPATIENT
Start: 2024-12-18 | End: 2024-12-18

## 2024-12-18 RX ORDER — POTASSIUM CHLORIDE 29.8 MG/ML
40 INJECTION INTRAVENOUS ONCE
Status: DISCONTINUED | OUTPATIENT
Start: 2024-12-18 | End: 2024-12-18 | Stop reason: CLARIF

## 2024-12-18 RX ORDER — BUSPIRONE HYDROCHLORIDE 5 MG/1
5 TABLET ORAL 3 TIMES DAILY
Status: DISCONTINUED | OUTPATIENT
Start: 2024-12-18 | End: 2024-12-21 | Stop reason: HOSPADM

## 2024-12-18 RX ORDER — DONEPEZIL HYDROCHLORIDE 5 MG/1
10 TABLET, FILM COATED ORAL
Status: DISCONTINUED | OUTPATIENT
Start: 2024-12-18 | End: 2024-12-21 | Stop reason: HOSPADM

## 2024-12-18 RX ORDER — ESCITALOPRAM OXALATE 10 MG/1
20 TABLET ORAL DAILY
Status: DISCONTINUED | OUTPATIENT
Start: 2024-12-18 | End: 2024-12-21 | Stop reason: HOSPADM

## 2024-12-18 RX ORDER — BUPROPION HYDROCHLORIDE 150 MG/1
150 TABLET ORAL DAILY
Status: DISCONTINUED | OUTPATIENT
Start: 2024-12-18 | End: 2024-12-21 | Stop reason: HOSPADM

## 2024-12-18 RX ORDER — POTASSIUM CHLORIDE 14.9 MG/ML
20 INJECTION INTRAVENOUS ONCE
Status: COMPLETED | OUTPATIENT
Start: 2024-12-18 | End: 2024-12-18

## 2024-12-18 RX ORDER — HYDRALAZINE HYDROCHLORIDE 20 MG/ML
10 INJECTION INTRAMUSCULAR; INTRAVENOUS EVERY 6 HOURS PRN
Status: DISCONTINUED | OUTPATIENT
Start: 2024-12-18 | End: 2024-12-21 | Stop reason: HOSPADM

## 2024-12-18 RX ORDER — NEBIVOLOL 10 MG/1
5 TABLET ORAL DAILY
Status: DISCONTINUED | OUTPATIENT
Start: 2024-12-18 | End: 2024-12-21 | Stop reason: HOSPADM

## 2024-12-18 RX ORDER — AMLODIPINE BESYLATE 5 MG/1
5 TABLET ORAL DAILY
Status: DISCONTINUED | OUTPATIENT
Start: 2024-12-18 | End: 2024-12-19

## 2024-12-18 RX ORDER — CEFEPIME HYDROCHLORIDE 2 G/50ML
2000 INJECTION, SOLUTION INTRAVENOUS EVERY 12 HOURS
Status: DISCONTINUED | OUTPATIENT
Start: 2024-12-18 | End: 2024-12-19

## 2024-12-18 RX ADMIN — METRONIDAZOLE 500 MG: 500 INJECTION, SOLUTION INTRAVENOUS at 02:12

## 2024-12-18 RX ADMIN — CEFEPIME HYDROCHLORIDE 2000 MG: 2 INJECTION, SOLUTION INTRAVENOUS at 13:24

## 2024-12-18 RX ADMIN — ACETAMINOPHEN 1000 MG: 1000 INJECTION, SOLUTION INTRAVENOUS at 05:58

## 2024-12-18 RX ADMIN — SODIUM CHLORIDE, SODIUM LACTATE, POTASSIUM CHLORIDE, AND CALCIUM CHLORIDE 100 ML/HR: .6; .31; .03; .02 INJECTION, SOLUTION INTRAVENOUS at 05:58

## 2024-12-18 RX ADMIN — IOHEXOL 46 ML: 350 INJECTION, SOLUTION INTRAVENOUS at 12:29

## 2024-12-18 RX ADMIN — SUCRALFATE 1 G: 1 TABLET ORAL at 09:53

## 2024-12-18 RX ADMIN — HEPARIN SODIUM 5000 UNITS: 5000 INJECTION, SOLUTION INTRAVENOUS; SUBCUTANEOUS at 21:30

## 2024-12-18 RX ADMIN — HYDROMORPHONE HYDROCHLORIDE 0.5 MG: 1 INJECTION, SOLUTION INTRAMUSCULAR; INTRAVENOUS; SUBCUTANEOUS at 23:39

## 2024-12-18 RX ADMIN — SUCRALFATE 1 G: 1 TABLET ORAL at 12:34

## 2024-12-18 RX ADMIN — BUSPIRONE HYDROCHLORIDE 5 MG: 5 TABLET ORAL at 16:51

## 2024-12-18 RX ADMIN — POTASSIUM CHLORIDE 20 MEQ: 14.9 INJECTION, SOLUTION INTRAVENOUS at 11:33

## 2024-12-18 RX ADMIN — ESCITALOPRAM OXALATE 20 MG: 10 TABLET ORAL at 14:34

## 2024-12-18 RX ADMIN — BUSPIRONE HYDROCHLORIDE 5 MG: 5 TABLET ORAL at 21:30

## 2024-12-18 RX ADMIN — METOPROLOL TARTRATE 5 MG: 5 INJECTION INTRAVENOUS at 02:15

## 2024-12-18 RX ADMIN — HEPARIN SODIUM 5000 UNITS: 5000 INJECTION, SOLUTION INTRAVENOUS; SUBCUTANEOUS at 13:24

## 2024-12-18 RX ADMIN — POTASSIUM CHLORIDE 20 MEQ: 14.9 INJECTION, SOLUTION INTRAVENOUS at 13:25

## 2024-12-18 RX ADMIN — PANTOPRAZOLE SODIUM 8 MG/HR: 40 INJECTION, POWDER, FOR SOLUTION INTRAVENOUS at 16:59

## 2024-12-18 RX ADMIN — HYDROMORPHONE HYDROCHLORIDE 0.5 MG: 1 INJECTION, SOLUTION INTRAMUSCULAR; INTRAVENOUS; SUBCUTANEOUS at 14:34

## 2024-12-18 RX ADMIN — ACETAMINOPHEN 1000 MG: 1000 INJECTION, SOLUTION INTRAVENOUS at 21:29

## 2024-12-18 RX ADMIN — HYDROMORPHONE HYDROCHLORIDE 0.5 MG: 1 INJECTION, SOLUTION INTRAMUSCULAR; INTRAVENOUS; SUBCUTANEOUS at 07:51

## 2024-12-18 RX ADMIN — AMLODIPINE BESYLATE 5 MG: 5 TABLET ORAL at 13:24

## 2024-12-18 RX ADMIN — FAMOTIDINE 20 MG: 10 INJECTION, SOLUTION INTRAVENOUS at 21:59

## 2024-12-18 RX ADMIN — PANTOPRAZOLE SODIUM 8 MG/HR: 40 INJECTION, POWDER, FOR SOLUTION INTRAVENOUS at 06:00

## 2024-12-18 RX ADMIN — DONEPEZIL HYDROCHLORIDE 10 MG: 5 TABLET ORAL at 21:30

## 2024-12-18 RX ADMIN — FAMOTIDINE 20 MG: 10 INJECTION, SOLUTION INTRAVENOUS at 09:53

## 2024-12-18 RX ADMIN — METOPROLOL TARTRATE 5 MG: 5 INJECTION INTRAVENOUS at 09:53

## 2024-12-18 RX ADMIN — BUPROPION HYDROCHLORIDE 150 MG: 150 TABLET, EXTENDED RELEASE ORAL at 13:24

## 2024-12-18 RX ADMIN — METRONIDAZOLE 500 MG: 500 INJECTION, SOLUTION INTRAVENOUS at 17:33

## 2024-12-18 RX ADMIN — NEBIVOLOL 5 MG: 10 TABLET ORAL at 14:34

## 2024-12-18 RX ADMIN — MORPHINE SULFATE 2 MG: 2 INJECTION, SOLUTION INTRAMUSCULAR; INTRAVENOUS at 17:03

## 2024-12-18 RX ADMIN — TRAZODONE HYDROCHLORIDE 100 MG: 100 TABLET ORAL at 21:30

## 2024-12-18 RX ADMIN — SUCRALFATE 1 G: 1 TABLET ORAL at 17:00

## 2024-12-18 RX ADMIN — HEPARIN SODIUM 5000 UNITS: 5000 INJECTION, SOLUTION INTRAVENOUS; SUBCUTANEOUS at 05:58

## 2024-12-18 RX ADMIN — CEFEPIME HYDROCHLORIDE 2000 MG: 2 INJECTION, SOLUTION INTRAVENOUS at 02:11

## 2024-12-18 RX ADMIN — MICAFUNGIN SODIUM 100 MG: 50 INJECTION, POWDER, LYOPHILIZED, FOR SOLUTION INTRAVENOUS at 10:52

## 2024-12-18 RX ADMIN — SUCRALFATE 1 G: 1 TABLET ORAL at 21:30

## 2024-12-18 RX ADMIN — METRONIDAZOLE 500 MG: 500 INJECTION, SOLUTION INTRAVENOUS at 10:02

## 2024-12-18 RX ADMIN — HYDROMORPHONE HYDROCHLORIDE 0.5 MG: 1 INJECTION, SOLUTION INTRAMUSCULAR; INTRAVENOUS; SUBCUTANEOUS at 02:10

## 2024-12-18 RX ADMIN — ACETAMINOPHEN 1000 MG: 1000 INJECTION, SOLUTION INTRAVENOUS at 13:24

## 2024-12-18 NOTE — OCCUPATIONAL THERAPY NOTE
Occupational Therapy Cancellation     Patient Name: Zulma Marcelo  Today's Date: 12/18/2024  Problem List  Principal Problem:    Pneumoperitoneum  Active Problems:    Chest pain    Essential hypertension    Chronic GERD    Mixed hyperlipidemia    Vitamin B12 deficiency    Vitamin D deficiency    SVT (supraventricular tachycardia) (HCC)    COPD    Sleep apnea    Cognitive impairment    History of Debra-en-Y gastric bypass    Generalized anxiety disorder    Moderate episode of recurrent major depressive disorder (HCC)    QT prolongation    Peritonitis (HCC)    Perforated peptic ulcer (HCC)    Penicillin allergy    Past Medical History  Past Medical History:   Diagnosis Date    Acid reflux     Anxiety     Arthritis     Asthma     Cardiac disease     Chronic kidney disease     passed on own kidney stone    Depression     Diverticulitis     GERD (gastroesophageal reflux disease)     Hayfever     [place] (hard of hearing)     bilateral hearing aids    Hyperlipemia     Hypertension     Panic attack     Tachycardia      Past Surgical History  Past Surgical History:   Procedure Laterality Date    ABLATION OF DYSRHYTHMIC FOCUS      APPENDECTOMY      CHOLECYSTECTOMY      open    FRACTURE SURGERY      ORIF fx (L) tibia, fibula 2009    GASTRIC BYPASS OPEN      HYSTERECTOMY      IN LAPS ABD PRTM&OMENTUM DX W/WO SPEC BR/WA SPX N/A 12/16/2024    Procedure: LAPAROSCOPY DIAGNOSTIC,  LYSIS OF ADHESIONS, REPAIR PERFORATED MARGINAL ULCER, INTRA-OP EGD;  Surgeon: Alex Mohr MD;  Location: [facility] OR;  Service: Bariatrics    IN XCAPSL CTRC RMVL INSJ IO LENS PROSTH W/O ECP Left 4/18/2016    Procedure: EXTRACTION EXTRACAPSULAR CATARACT PHACO INTRAOCULAR LENS (IOL);  Surgeon: Dane Wells MD;  Location: [facility] MAIN OR;  Service: Ophthalmology    IN XCAPSL CTRC RMVL INSJ IO LENS PROSTH W/O ECP Right 3/1/2021    Procedure: EXTRACTION EXTRACAPSULAR CATARACT PHACO INTRAOCULAR LENS (IOL);  Surgeon: Dane Wells MD;  Location: [facility]  OR;  Service: Ophthalmology    TONSILECTOMY AND ADNOIDECTOMY           12/18/24 5195   Note Type   Note type Cancelled Session  (Wed 12/18/24 X 2)   Cancel Reasons Refusal  (Pt declined reporting anxiety)   Additional Comments Contact made w/ pt. Pt seated at EOB w/ PCA present getting washed up. Pt declined participation stating that she just wants to talk to Hakeem. Will cancel and continue to follow as appropriate / schedule allows.   Licensure   [place] License Number  Kamla Pearl, OTR/L [id number]       Kamla Pearl, OTR/L  [id number]  [id number]

## 2024-12-18 NOTE — QUICK NOTE
75 yo F with s/p LRYGB with Dr. Gonzalo Villasenor in 2011 with hx of chronic marginal ulcers due to smoking and NSAID use s/p status post emergent diagnostic laparoscopy with extensive lysis of adhesion and repair of marginal ulcer perforation with primary Amilcar patch and drain placement.  POD2.   WBC unchanged.  BP (!) 176/91   Pulse 59   Temp 97.8 °F (36.6 °C)   Resp 18   Ht 5' (1.524 m)   Wt 67.6 kg (149 lb)   LMP  (LMP Unknown)   SpO2 95%   BMI 29.10 kg/m²   Abdomen benign. Soft.  Tender yvonne-incisional.  Dressings intact.  Drain with small amount of serosanguineous drainage.      Would favor to broaden the coverage of the antibiotics with Ciprofloxacillin.  Upper GI today to assess for contrast leak.  Out of bed to ambulate with assistance and if not possible counsult PT.  Incentive spirometer every hour.    Alex Mohr MD.  7:47 AM

## 2024-12-18 NOTE — ASSESSMENT & PLAN NOTE
75 yo F with hx of LRYGB with Dr. Gonzalo Villasenor in 2011 with hx of smoking and NSAID use s/p status post emergent diagnostic laparoscopy with extensive lysis of adhesion with repair of marginal ulcer perforation with primary repair and Amilcar patch with drain placement and intraoperative endoscopy POD2. WBC increased stable 13, pain continues to improve but persists at drain site, micturating, remains NPO. 40cc serosanginous drainage last 24 hours.     Strict NPO  UGI today - will start clears if no leak   OOB in chair, frequent ambulation  Monitor I/O  IV protonix and start carafate slurry QID  ISS and SCD's  Heparin DVT pphx  Maintain GABI drain   Change IV antibiotics to ciproflaxacin and can continue micafungin for 1 more day  Monitor daily labs  Replace lytes prn - appreciate SLIM  greatly  Consult PT/OT  CM Consult for dispo planning later this week    Case discussed with Dr. Mohr

## 2024-12-18 NOTE — PROGRESS NOTES
Progress Note - Bariatrics   Name: Zulma Marcelo 74 y.o. female I MRN: [id number]  Unit/Bed#: 2 [address] Date of Admission: 12/16/2024   Date of Service: 12/18/2024 I Hospital Day: 2     Assessment & Plan  Pneumoperitoneum  73 yo F with hx of LRYGB with Dr. Gonzalo Villasenor in 2011 with hx of smoking and NSAID use s/p status post emergent diagnostic laparoscopy with extensive lysis of adhesion with repair of marginal ulcer perforation with primary repair and Amilcar patch with drain placement and intraoperative endoscopy POD2. WBC increased stable 13, pain continues to improve but persists at drain site, micturating, remains NPO. 40cc serosanginous drainage last 24 hours.     Strict NPO  UGI today - will start clears if no leak   OOB in chair, frequent ambulation  Monitor I/O  IV protonix and start carafate slurry QID  ISS and SCD's  Heparin DVT pphx  Maintain GABI drain   Change IV antibiotics to ciproflaxacin and can continue micafungin for 1 more day  Monitor daily labs  Replace lytes prn - appreciate SLIM  greatly  Consult PT/OT  CM Consult for dispo planning later this week    Case discussed with Dr. Mohr  Chest pain  Denies today; resolved  Essential hypertension  Elevations this morning - not accurate measures - RN to repeat stat;  continue IV metoprolol per SLIM  SVT (supraventricular tachycardia) (HCC)  On Telemetry and IV metoprolol  COPD  ISS encouraged    Subjective   Feeling better again today, still pain at drain site when she moves. Urinating frequently, no flatus or BM yet. Denies CP, SOB, fevers, chills,     Objective   Temp:  [97.8 °F (36.6 °C)-98.7 °F (37.1 °C)] 98.3 °F (36.8 °C)  HR:  [59] 59  BP: (135-194)/(85-95) 194/87  Resp:  [18] 18  SpO2:  [95 %] 95 %  O2 Device: None (Room air)    Physical Exam  Vitals reviewed.   Constitutional:       General: She is not in acute distress.     Appearance: She is well-developed.   HENT:      Head: Normocephalic and atraumatic.   Eyes:      General: No scleral  icterus.  Cardiovascular:      Rate and Rhythm: Normal rate and regular rhythm.   Pulmonary:      Effort: Pulmonary effort is normal. No respiratory distress.   Abdominal:      General: There is no distension.      Palpations: Abdomen is soft.      Tenderness: There is abdominal tenderness. There is no guarding.      Comments: Incisions intact under mepilex dressings, GABI drain intact with scant serosan output   Skin:     General: Skin is warm and dry.   Neurological:      Mental Status: She is alert.   Psychiatric:         Mood and Affect: Mood normal.         Behavior: Behavior normal.       Lab Results: I have reviewed the following results:  Lab Results   Component Value Date    WBC 13.69 (H) 12/18/2024    HGB 10.9 (L) 12/18/2024    HCT 33.7 (L) 12/18/2024    MCV 90 12/18/2024     12/18/2024     Lab Results   Component Value Date    SODIUM 137 12/18/2024    K 3.5 12/18/2024     12/18/2024    CO2 23 12/18/2024    AGAP 7 12/18/2024    BUN 15 12/18/2024    CREATININE 0.54 (L) 12/18/2024    GLUC 89 12/18/2024    GLUF 99 05/21/2021    CALCIUM 7.8 (L) 12/18/2024    AST 9 (L) 12/18/2024    ALT 5 (L) 12/18/2024    ALKPHOS 38 12/18/2024    TP 5.0 (L) 12/18/2024    TBILI 0.49 12/18/2024    EGFR 91 12/18/2024     Lab Results   Component Value Date    HGBA1C 5.7 (H) 11/29/2023     Lab Results   Component Value Date    UNJ9ORNWLSNH 4.978 (H) 11/27/2023     Lab Results   Component Value Date    CHOLESTEROL 154 05/21/2021     Lab Results   Component Value Date    HDL 38 (L) 05/21/2021     Lab Results   Component Value Date    TRIG 73 05/21/2021     Lab Results   Component Value Date    LDLCALC 101 (H) 05/21/2021       Imaging Results Review: No pertinent imaging studies reviewed.  Other Study Results Review: No additional pertinent studies reviewed.    VTE Pharmacologic Prophylaxis: Heparin  VTE Mechanical Prophylaxis: sequential compression device

## 2024-12-18 NOTE — PHYSICAL THERAPY NOTE
PT Cancellation Note       12/18/24 1031   Note Type   Note type Cancelled Session   Cancel Reasons Refusal   Additional Comments PT orders received and chart reviewed. Pt refusing, reports she has had a bad morning and is leaving for testing soon. Requesting PT return later. Will follow-up as schedule allows.   Licensure   [place] License Number  Valerie Morgan [id number]

## 2024-12-18 NOTE — PLAN OF CARE
Problem: Potential for Falls  Goal: Patient will remain free of falls  Description: INTERVENTIONS:  - Educate patient/family on patient safety including physical limitations  - Instruct patient to call for assistance with activity   - Consult OT/PT to assist with strengthening/mobility   - Keep Call bell within reach  - Keep bed low and locked with side rails adjusted as appropriate  - Keep care items and personal belongings within reach  - Initiate and maintain comfort rounds  - Make Fall Risk Sign visible to staff  - Offer Toileting every 2 Hours, in advance of need  - Initiate/Maintain bed   alarm  - Obtain necessary fall risk management equipment: alarm  - Apply yellow socks and bracelet for high fall risk patients  - Consider moving patient to room near nurses station  Outcome: Progressing     Problem: RESPIRATORY - ADULT  Goal: Achieves optimal ventilation and oxygenation  Description: INTERVENTIONS:  - Assess for changes in respiratory status  - Assess for changes in mentation and behavior  - Position to facilitate oxygenation and minimize respiratory effort  - Oxygen administered by appropriate delivery if ordered  - Initiate smoking cessation education as indicated  - Encourage broncho-pulmonary hygiene including cough, deep breathe, Incentive Spirometry  - Assess the need for suctioning and aspirate as needed  - Assess and instruct to report SOB or any respiratory difficulty  - Respiratory Therapy support as indicated  Outcome: Progressing     Problem: PAIN - ADULT  Goal: Verbalizes/displays adequate comfort level or baseline comfort level  Description: Interventions:  - Encourage patient to monitor pain and request assistance  - Assess pain using appropriate pain scale  - Administer analgesics based on type and severity of pain and evaluate response  - Implement non-pharmacological measures as appropriate and evaluate response  - Consider cultural and social influences on pain and pain management  -  Notify physician/advanced practitioner if interventions unsuccessful or patient reports new pain  Outcome: Progressing     Problem: INFECTION - ADULT  Goal: Absence or prevention of progression during hospitalization  Description: INTERVENTIONS:  - Assess and monitor for signs and symptoms of infection  - Monitor lab/diagnostic results  - Monitor all insertion sites, i.e. indwelling lines, tubes, and drains  - Monitor endotracheal if appropriate and nasal secretions for changes in amount and color  - [place] appropriate cooling/warming therapies per order  - Administer medications as ordered  - Instruct and encourage patient and family to use good hand hygiene technique  - Identify and instruct in appropriate isolation precautions for identified infection/condition  Outcome: Progressing  Goal: Absence of fever/infection during neutropenic period  Description: INTERVENTIONS:  - Monitor WBC    Outcome: Progressing     Problem: SAFETY ADULT  Goal: Patient will remain free of falls  Description: INTERVENTIONS:  - Educate patient/family on patient safety including physical limitations  - Instruct patient to call for assistance with activity   - Consult OT/PT to assist with strengthening/mobility   - Keep Call bell within reach  - Keep bed low and locked with side rails adjusted as appropriate  - Keep care items and personal belongings within reach  - Initiate and maintain comfort rounds  - Make Fall Risk Sign visible to staff  - Offer Toileting every 2 Hours, in advance of need  - Initiate/Maintain bed alarm  - Obtain necessary fall risk management equipment: alarm  - Apply yellow socks and bracelet for high fall risk patients  - Consider moving patient to room near nurses station  Outcome: Progressing  Goal: Maintain or return to baseline ADL function  Description: INTERVENTIONS:  -  Assess patient's ability to carry out ADLs; assess patient's baseline for ADL function and identify physical deficits which impact  ability to perform ADLs (bathing, care of mouth/teeth, toileting, grooming, dressing, etc.)  - Assess/evaluate cause of self-care deficits   - Assess range of motion  - Assess patient's mobility; develop plan if impaired  - Assess patient's need for assistive devices and provide as appropriate  - Encourage maximum independence but intervene and supervise when necessary  - Involve family in performance of ADLs  - Assess for home care needs following discharge   - Consider OT consult to assist with ADL evaluation and planning for discharge  - Provide patient education as appropriate  Outcome: Progressing  Goal: Maintains/Returns to pre admission functional level  Description: INTERVENTIONS:  - Perform AM-PAC 6 Click Basic Mobility/ Daily Activity assessment daily.  - Set and communicate daily mobility goal to care team and patient/family/caregiver.   - Collaborate with rehabilitation services on mobility goals if consulted  - Perform Range of Motion 3 times a day.  - Reposition patient every 2 hours.  - Dangle patient 3 times a day  - Stand patient 3 times a day  - Ambulate patient 3 times a day  - Out of bed to chair 3 times a day   - Out of bed for meals 3 times a day  - Out of bed for toileting  - Record patient progress and toleration of activity level   Outcome: Progressing     Problem: DISCHARGE PLANNING  Goal: Discharge to home or other facility with appropriate resources  Description: INTERVENTIONS:  - Identify barriers to discharge w/patient and caregiver  - Arrange for needed discharge resources and transportation as appropriate  - Identify discharge learning needs (meds, wound care, etc.)  - Arrange for interpretive services to assist at discharge as needed  - Refer to Case Management Department for coordinating discharge planning if the patient needs post-hospital services based on physician/advanced practitioner order or complex needs related to functional status, cognitive ability, or social support  system  Outcome: Progressing     Problem: Knowledge Deficit  Goal: Patient/family/caregiver demonstrates understanding of disease process, treatment plan, medications, and discharge instructions  Description: Complete learning assessment and assess knowledge base.  Interventions:  - Provide teaching at level of understanding  - Provide teaching via preferred learning methods  Outcome: Progressing

## 2024-12-18 NOTE — ASSESSMENT & PLAN NOTE
Holding nebivolol.  Continue IV metoprolol for now    - BP elevated. Discussed with bariatrics. Ok starting oral medications. Will initiate norvasc 5mg in addition to PO bystolic. Continue hydralazine prn.  Will continue to monitor blood pressure and make further adjustments as needed.

## 2024-12-18 NOTE — OCCUPATIONAL THERAPY NOTE
Occupational Therapy Cancellation     Patient Name: Zulma Marcelo  Today's Date: 12/18/2024  Problem List  Principal Problem:    Pneumoperitoneum  Active Problems:    Chest pain    Essential hypertension    Chronic GERD    Mixed hyperlipidemia    Vitamin B12 deficiency    Vitamin D deficiency    SVT (supraventricular tachycardia) (HCC)    COPD    Sleep apnea    Cognitive impairment    History of Debra-en-Y gastric bypass    Generalized anxiety disorder    Moderate episode of recurrent major depressive disorder (HCC)    QT prolongation    Peritonitis (HCC)    Perforated peptic ulcer (HCC)    Penicillin allergy    Past Medical History  Past Medical History:   Diagnosis Date    Acid reflux     Anxiety     Arthritis     Asthma     Cardiac disease     Chronic kidney disease     passed on own kidney stone    Depression     Diverticulitis     GERD (gastroesophageal reflux disease)     Hayfever     Jena (hard of hearing)     bilateral hearing aids    Hyperlipemia     Hypertension     Panic attack     Tachycardia      Past Surgical History  Past Surgical History:   Procedure Laterality Date    ABLATION OF DYSRHYTHMIC FOCUS      APPENDECTOMY      CHOLECYSTECTOMY      open    FRACTURE SURGERY      ORIF fx (L) tibia, fibula 2009    GASTRIC BYPASS OPEN      HYSTERECTOMY      NV LAPS ABD PRTM&OMENTUM DX W/WO SPEC BR/WA SPX N/A 12/16/2024    Procedure: LAPAROSCOPY DIAGNOSTIC,  LYSIS OF ADHESIONS, REPAIR PERFORATED MARGINAL ULCER, INTRA-OP EGD;  Surgeon: Alex Mohr MD;  Location: [facility] MAIN OR;  Service: Bariatrics    NV XCAPSL CTRC RMVL INSJ IO LENS PROSTH W/O ECP Left 4/18/2016    Procedure: EXTRACTION EXTRACAPSULAR CATARACT PHACO INTRAOCULAR LENS (IOL);  Surgeon: Dane Wells MD;  Location: [facility] MAIN OR;  Service: Ophthalmology    NV XCAPSL CTRC RMVL INSJ IO LENS PROSTH W/O ECP Right 3/1/2021    Procedure: EXTRACTION EXTRACAPSULAR CATARACT PHACO INTRAOCULAR LENS (IOL);  Surgeon: Dane Wells MD;  Location: [facility]  OR;  Service: Ophthalmology    TONSILECTOMY AND ADNOIDECTOMY             12/18/24 1033   Note Type   Note type Cancelled Session  (Wed 12/18/24)   Cancel Reasons Refusal  (spoke w/ PT, Valerie. Pt declined participation reporting she is not feeling well)   Additional Comments OT orders received and chart review completed. Will continue to follow   Licensure   [place] License Number  Kamla Pearl, OTR/L [id number]       Kamla Pearl OTR/L  [id number]  [id number]

## 2024-12-18 NOTE — PROGRESS NOTES
Progress Note - Hospitalist   Name: Zulma Marcelo 74 y.o. female I MRN: [id number]  Unit/Bed#: 2 [address] Date of Admission: 12/16/2024   Date of Service: 12/18/2024 I Hospital Day: 2     Assessment & Plan  Pneumoperitoneum  Presented with left lower quadrant pain nausea with nonbloody bilious emesis over few days progressing to worsening abdominal pain epigastric/chest pain radiating to left shoulder today  Evaluated in ED, workup revealed pneumoperitoneum with focal perforation of medial wall of proximal Debra-en-Y immediately distal to GJ anastomosis   status post emergent diagnostic laparoscopy with extensive lysis of adhesion with repair of marginal ulcer perforation with primary repair and Amilcar patch with drain placement and intraoperative endoscopy.  Patient was noted to have peritonitis with purulent content with significant adhesions.   Postoperatively, hemodynamically stable.  Abdomen with mild appropriate tenderness otherwise soft.  Strict n.p.o.  IV PPI drip  IV cefepime, metronidazole  Received IV fluconazole in ED, will change to micafungin due to QT prolongation  Follow-up CBC, CMP  Monitor intake output  Postoperative care as per primary team  Symptomatic treatment, pain control    - Patient is status post diagnostic laparoscopy with extensive lysis of adhesions found to have marginal ulcer perforation s/p primary repair and reinforcement with a Amilcar patch in addition to placement of a drain.  Patient continued on IV cefepime, Flagyl and micafungin.  Upper GI series performed today showed no evidence of leak.  Infectious disease is following. Will continue to monitor.  History of Debra-en-Y gastric bypass  History of Debra-en-Y gastric 2011 with history of chronic marginal ulcer  SVT (supraventricular tachycardia) (HCC)  History of SVT, hold nebivolol  Telemetry  IV metoprolol  Chest pain  Reported on presentation chest pain radiating to left shoulder  Suspect secondary to pneumoperitoneum  CTA  dissection protocol without any acute intrathoracic abnormality  EKG sinus bradycardia, old RBBB, nonspecific ST-T wave changes with baseline artifact.  QTc 508  Troponin-10-10  Denies any chest pain at present  Monitor symptoms  COPD  Appears stable  Respiratory protocol  Bronchodilators as needed  Incentive spirometry  Essential hypertension  Holding nebivolol.  Continue IV metoprolol for now    - BP elevated. Discussed with bariatrics. Ok starting oral medications. Will initiate norvasc 5mg in addition to PO bystolic. Continue hydralazine prn.  Will continue to monitor blood pressure and make further adjustments as needed.  Chronic GERD  IV PPI  Sleep apnea  Prior history of SORAYA, awaiting repeat sleep studies as per records  Moderate episode of recurrent major depressive disorder (HCC)  PO meds restarted.  Cognitive impairment  Donepezil restarted  QT prolongation  Telemetry  Monitor and replete electrolytes  Check magnesium level  Peritonitis (HCC)    Perforated peptic ulcer (HCC)    Penicillin allergy    VTE Pharmacologic Prophylaxis:   Heparin prophylaxis.    Mobility:   Basic Mobility Inpatient Raw Score: 18  JH-HLM Goal: 6: Walk 10 steps or more  JH-HLM Achieved: 6: Walk 10 steps or more    Patient Centered Rounds: I performed bedside rounds with nursing staff today.     Current Length of Stay: 2 day(s)  Current Patient Status: Inpatient   Certification Statement: The patient will continue to require additional inpatient hospital stay due to above.  Discharge Plan: TBD    Code Status: Prior    Subjective   Patient was seen and examined at bedside.  No acute events overnight.  No new complaints. Abdominal pain gradually improving.    Objective :  Temp:  [97.4 °F (36.3 °C)-98.3 °F (36.8 °C)] 97.4 °F (36.3 °C)  BP: (141-194)/(68-91) 157/86  Resp:  [18] 18    Body mass index is 29.1 kg/m².     Input and Output Summary (last 24 hours):     Intake/Output Summary (Last 24 hours) at 12/18/2024 1509  Last data filed  at 12/18/2024 0734  Gross per 24 hour   Intake --   Output 615 ml   Net -615 ml     Physical Exam  HENT:      Head: Normocephalic.      Mouth/Throat:      Mouth: Mucous membranes are moist.   Eyes:      Extraocular Movements: Extraocular movements intact.   Cardiovascular:      Rate and Rhythm: Normal rate.   Pulmonary:      Effort: Pulmonary effort is normal. No respiratory distress.   Abdominal:      Palpations: Abdomen is soft.      Comments: + afua drain with scant serosanguinous drainage   Skin:     General: Skin is warm.   Neurological:      Mental Status: She is alert and oriented to person, place, and time.   Psychiatric:         Mood and Affect: Mood normal.       Lines/Drains:  Lines/Drains/Airways       Active Status       Name Placement date Placement time Site Days    Closed/Suction Drain LUQ Bulb 19 Fr. 12/16/24  1601  LUQ  1                  Telemetry:  Telemetry Orders (From admission, onward)               24 Hour Telemetry Monitoring  Continuous x 24 Hours (Telem)        Expiring   Question:  Reason for 24 Hour Telemetry  Answer:  Arrhythmias requiring acute medical intervention / PPM or ICD malfunction                  Lab Results: I have reviewed the following results:   Results from last 7 days   Lab Units 12/18/24  0531   WBC Thousand/uL 13.69*   HEMOGLOBIN g/dL 10.9*   HEMATOCRIT % 33.7*   PLATELETS Thousands/uL 249   SEGS PCT % 88*   LYMPHO PCT % 6*   MONO PCT % 5   EOS PCT % 0     Results from last 7 days   Lab Units 12/18/24  0531   SODIUM mmol/L 137   POTASSIUM mmol/L 3.5   CHLORIDE mmol/L 107   CO2 mmol/L 23   BUN mg/dL 15   CREATININE mg/dL 0.54*   ANION GAP mmol/L 7   CALCIUM mg/dL 7.8*   ALBUMIN g/dL 2.8*   TOTAL BILIRUBIN mg/dL 0.49   ALK PHOS U/L 38   ALT U/L 5*   AST U/L 9*   GLUCOSE RANDOM mg/dL 89     Results from last 7 days   Lab Units 12/17/24  0511 12/16/24  0830   LACTIC ACID mmol/L  --  1.2   PROCALCITONIN ng/ml 3.61* 2.08*     Recent Cultures (last 7 days):   Results from  last 7 days   Lab Units 12/16/24  0844 12/16/24  0830   BLOOD CULTURE  No Growth at 48 hrs. No Growth at 48 hrs.     Last 24 Hours Medication List:     Current Facility-Administered Medications:     acetaminophen (Ofirmev) injection 1,000 mg, Q8H JUDIE, Last Rate: 1,000 mg (12/18/24 1324)    albuterol (PROVENTIL HFA,VENTOLIN HFA) inhaler 2 puff, Q6H PRN    amLODIPine (NORVASC) tablet 5 mg, Daily    buPROPion (WELLBUTRIN XL) 24 hr tablet 150 mg, Daily    busPIRone (BUSPAR) tablet 5 mg, TID    cefepime (MAXIPIME) IVPB (premix in dextrose) 2,000 mg 50 mL, Q12H, Last Rate: 2,000 mg (12/18/24 1324)    diphenhydrAMINE (BENADRYL) tablet 25 mg, HS PRN    donepezil (ARICEPT) tablet 10 mg, HS    escitalopram (LEXAPRO) tablet 20 mg, Daily    Famotidine (PF) (PEPCID) injection 20 mg, Q12H JUDIE    heparin (porcine) subcutaneous injection 5,000 Units, Q8H JUDIE **AND** Platelet count, Once    hydrALAZINE (APRESOLINE) injection 10 mg, Q6H PRN    HYDROmorphone (DILAUDID) injection 0.5 mg, Q4H PRN    levalbuterol (XOPENEX) inhalation solution 0.63 mg, Q8H PRN **AND** ipratropium (ATROVENT) 0.02 % inhalation solution 0.5 mg, Q8H PRN    lactated ringers infusion, Continuous, Last Rate: 100 mL/hr (12/18/24 0558)    LORazepam (ATIVAN) injection 0.5 mg, Q6H PRN    metroNIDAZOLE (FLAGYL) IVPB (premix) 500 mg 100 mL, Q8H, Last Rate: 500 mg (12/18/24 1002)    micafungin (MYCAMINE) 100 mg in sodium chloride 0.9 % 100 mL IVPB, Q24H, Last Rate: 100 mg (12/18/24 1052)    morphine injection 2 mg, Q2H PRN    morphine injection 4 mg, Q2H PRN    nebivolol (BYSTOLIC) tablet 5 mg, Daily    ondansetron (ZOFRAN) injection 4 mg, Q6H PRN    pantoprazole (PROTONIX) 80 mg in sodium chloride 0.9 % 100 mL infusion, Continuous, Last Rate: 8 mg/hr (12/18/24 0600)    phenol (CHLORASEPTIC) 1.4 % mucosal liquid 1 spray, Q2H PRN    [COMPLETED] potassium chloride 20 mEq IVPB (premix), Once, Last Rate: 20 mEq (12/18/24 1133) **FOLLOWED BY** potassium chloride 20 mEq IVPB  (premix), Once, Last Rate: 20 mEq (12/18/24 1325)    sucralfate (CARAFATE) tablet 1 g, 4x Daily    traZODone (DESYREL) tablet 100 mg, HS    Administrative Statements   Today, Patient Was Seen By: Onesimo Chapin MD    **Please Note: This note may have been constructed using a voice recognition system.**

## 2024-12-18 NOTE — ASSESSMENT & PLAN NOTE
Patient presented with perforated marginal ulcer.  She has a history of chronic marginal ulcer post RYGB and has been taking excessive amount of ibuprofen recently.  This is likely etiology of her marginal ulcer perforation.  Patient is status post repair with Amilcar patch.  As in above, patient is at high risk for development of intra-abdominal abscess and will need to be monitored closely.  Antibiotic/antifungal plan as in above.  Monitor abdominal pain and WBC as in above.  Bariatric surgery follow-up.

## 2024-12-18 NOTE — CONSULTS
Consultation - Infectious Disease   Name: Zulma Marcelo 74 y.o. female I MRN: [id number]  Unit/Bed#: [address] [place] [address] Date of Admission: 12/16/2024   Date of Service: 12/18/2024 I Hospital Day: 2   Inpatient consult to Infectious Diseases  Consult performed by: Mark Mcneill MD  Consult ordered by: Onesimo Chapin MD        Physician Requesting Evaluation: Alex Mohr MD   Reason for Evaluation / Principal Problem: Peritonitis secondary to perforated marginal ulcer.    Assessment & Plan  Peritonitis (HCC)  Patient presented with peritonitis secondary to perforated marginal ulcer.  She is status post laparoscopy with irrigation and repair of perforation with Amilcar patch.  There was no IntraOp culture obtained to guide antibiotic choice.  With this clinical finding, patient is at high risk for development of intra-abdominal abscess.  Overall, patient has low risk for MDRO, given lack of chronic antibiotic use and hospitalization.  Her current antibiotic/antifungal regimen is more than adequate empirically.  Patient still has leukocytosis.  If there is concern that she is not improving as expected, the more major concern would be intra-abdominal abscess development rather than inadequate antibiotic.  She would need workup for intra-abdominal abscess rather than empirically broaden antibiotic regimen, which would increase risk of antibiotic toxicities.  Ciprofloxacin would not add much to current antibiotic regimen.  Furthermore, fluoroquinolones also has risk of exacerbating QTc prolongation in this patient already with QTc prolongation.  At this point, it is too early in the postop period (POD #2) to expect to see any abscess fully developed on imaging.  Therefore, I would continue current antibiotic regimen and monitor abdominal pain and WBC for the next 2-3 days.  If, at that time, her WBC and abdominal pain have not improved, we should repeat abdomen/pelvis CT.  Change ciprofloxacin back to  cefepime.  Continue Flagyl/micafungin empirically.  Monitor abdominal pain.  Monitor WBC.  If abdominal pain and WBC do not improve in the next 2 to 3 days, recommend repeat abdomen/pelvis CT.  Perforated peptic ulcer (HCC)  Patient presented with perforated marginal ulcer.  She has a history of chronic marginal ulcer post RYGB and has been taking excessive amount of ibuprofen recently.  This is likely etiology of her marginal ulcer perforation.  Patient is status post repair with Amilcar patch.  As in above, patient is at high risk for development of intra-abdominal abscess and will need to be monitored closely.  Antibiotic/antifungal plan as in above.  Monitor abdominal pain and WBC as in above.  Bariatric surgery follow-up.  QT prolongation  There is high risk of worsening QTc prolongation with azoles, precipitating torsades.  Avoid azoles and fluoroquinolones in anti-infective regimen.  History of Debra-en-Y gastric bypass  Patient is status post RYGB in 2011, complicated by marginal ulcer.  Bariatric surgery follow-up.  Penicillin allergy  Patient has history of GI intolerance and hives with Augmentin.  She has tolerated cefepime well during hospitalization thus far.  Monitor for cross allergic reaction.    Prior records reviewed in detail.  Discussed with patient in detail regarding the above plan.  I have discussed with Dr. Fani Burgos PA-C from bariatric surgery service and Dr. Chapin from medicine service regarding  the above plan to modify antibiotic regimen.  Team agrees with the plan.    Antibiotics:  Ciprofloxacin/Flagyl/micafungin  POD # 2    History of Present Illness   Zulma Marcelo is a 74 y.o. year old female, with multiple medical problems including status post RYGB 2011 and chronic marginal ulcers, who presented to the ER on 12/16 with 1 week history of progressing severe abdominal pain.  On presentation, patient had no fever and only mild leukocytosis.  Abdomen/pelvis CT with  evidence of focal perforation and oral contrast extravasating into peritoneum.  Patient was taken to the OR emergently to undergo diagnostic laparoscopy.  Marginal ulcer perforation was found.  This was repaired with Amilcar patch, in addition to extensive lysis adhesion and abdominal irrigation.  Patient has been on cefepime/Flagyl/micafungin since surgery.  Due to concern of ongoing abdominal pain, we are asked to evaluate the patient with regards to adequacy of empiric antibiotic regimen.  Antibiotic was changed from cefepime to ciprofloxacin.    At present, patient still has significant LUQ abdominal pain.  Thinks that the pain is a little better but only a little.  No fever or chills.    Of note, despite history of chronic marginal ulcers, patient has been taking ibuprofen 3 times a day for the last few weeks due to her chronic foot pain.    A complete review of systems is negative other than that noted in the HPI.    I have reviewed the patient's PMH, PSH, Social History, Family History, Meds, and Allergies    Objective :  Temp:  [97.8 °F (36.6 °C)-98.7 °F (37.1 °C)] 98.3 °F (36.8 °C)  HR:  [59] 59  BP: (135-194)/(85-95) 194/87  Resp:  [18] 18  SpO2:  [95 %] 95 %  O2 Device: None (Room air)    General:  No acute distress  Psychiatric:  Awake and alert  Pulmonary:  Normal respiratory excursion without accessory muscle use  Abdomen:  Soft, mildly distended, moderate LUQ tenderness, drain serous, bowel sounds present but decreased.  Extremities:  No edema  Skin:  No rashes      Lab Results: I have reviewed the following results:  Results from last 7 days   Lab Units 12/18/24  0531 12/17/24  0511 12/16/24  0830   WBC Thousand/uL 13.69* 13.13* 10.61*   HEMOGLOBIN g/dL 10.9* 12.0 14.0   PLATELETS Thousands/uL 249 289 360     Results from last 7 days   Lab Units 12/18/24  0531 12/17/24  0511 12/16/24  0830   SODIUM mmol/L 137 136 136   POTASSIUM mmol/L 3.5 3.4* 3.1*   CHLORIDE mmol/L 107 105 99   CO2 mmol/L 23 26 29    BUN mg/dL 15 12 11   CREATININE mg/dL 0.54* 0.54* 0.58*   EGFR ml/min/1.73sq m 91 91 89   CALCIUM mg/dL 7.8* 7.9* 9.3   AST U/L 9* 9* 12*   ALT U/L 5* 4* 5*   ALK PHOS U/L 38 42 59   ALBUMIN g/dL 2.8* 3.0* 3.9     Results from last 7 days   Lab Units 12/16/24  0844 12/16/24  0830   BLOOD CULTURE  No Growth at 24 hrs. No Growth at 24 hrs.     Results from last 7 days   Lab Units 12/17/24  0511 12/16/24  0830   PROCALCITONIN ng/ml 3.61* 2.08*                   Imaging Results Review: I personally reviewed the following image studies in PACS and associated radiology reports: CT abdomen/pelvis. My interpretation of the radiology images/reports is: CT with perforation at GJ anastomosis with oral contrast extravasating into peritoneum..

## 2024-12-18 NOTE — ASSESSMENT & PLAN NOTE
There is high risk of worsening QTc prolongation with azoles, precipitating torsades.  Avoid azoles and fluoroquinolones in anti-infective regimen.

## 2024-12-18 NOTE — ASSESSMENT & PLAN NOTE
Patient has history of GI intolerance and hives with Augmentin.  She has tolerated cefepime well during hospitalization thus far.  Monitor for cross allergic reaction.    Prior records reviewed in detail.  Discussed with patient in detail regarding the above plan.

## 2024-12-18 NOTE — ASSESSMENT & PLAN NOTE
Elevations this morning - not accurate measures - RN to repeat stat;  continue IV metoprolol per SLIM

## 2024-12-18 NOTE — ASSESSMENT & PLAN NOTE
Presented with left lower quadrant pain nausea with nonbloody bilious emesis over few days progressing to worsening abdominal pain epigastric/chest pain radiating to left shoulder today  Evaluated in ED, workup revealed pneumoperitoneum with focal perforation of medial wall of proximal Debra-en-Y immediately distal to GJ anastomosis   status post emergent diagnostic laparoscopy with extensive lysis of adhesion with repair of marginal ulcer perforation with primary repair and Amilcar patch with drain placement and intraoperative endoscopy.  Patient was noted to have peritonitis with purulent content with significant adhesions.   Postoperatively, hemodynamically stable.  Abdomen with mild appropriate tenderness otherwise soft.  Strict n.p.o.  IV PPI drip  IV cefepime, metronidazole  Received IV fluconazole in ED, will change to micafungin due to QT prolongation  Follow-up CBC, CMP  Monitor intake output  Postoperative care as per primary team  Symptomatic treatment, pain control    - Patient is status post diagnostic laparoscopy with extensive lysis of adhesions found to have marginal ulcer perforation s/p primary repair and reinforcement with a Amilcar patch in addition to placement of a drain.  Patient continued on IV cefepime, Flagyl and micafungin.  Upper GI series performed today showed no evidence of leak.  Infectious disease is following. Will continue to monitor.

## 2024-12-19 ENCOUNTER — TELEPHONE (OUTPATIENT)
Age: 74
End: 2024-12-19

## 2024-12-19 ENCOUNTER — APPOINTMENT (INPATIENT)
Dept: RADIOLOGY | Facility: HOSPITAL | Age: 74
DRG: 326 | End: 2024-12-19
Payer: MEDICARE

## 2024-12-19 PROBLEM — R09.02 HYPOXIA: Status: ACTIVE | Noted: 2024-12-19

## 2024-12-19 LAB
ALBUMIN SERPL BCG-MCNC: 3 G/DL (ref 3.5–5)
ALP SERPL-CCNC: 51 U/L (ref 34–104)
ALT SERPL W P-5'-P-CCNC: 7 U/L (ref 7–52)
ANION GAP SERPL CALCULATED.3IONS-SCNC: 9 MMOL/L (ref 4–13)
AST SERPL W P-5'-P-CCNC: 15 U/L (ref 13–39)
BASOPHILS # BLD AUTO: 0.02 THOUSANDS/ÂΜL (ref 0–0.1)
BASOPHILS NFR BLD AUTO: 0 % (ref 0–1)
BILIRUB SERPL-MCNC: 0.55 MG/DL (ref 0.2–1)
BUN SERPL-MCNC: 10 MG/DL (ref 5–25)
CALCIUM ALBUM COR SERPL-MCNC: 8.5 MG/DL (ref 8.3–10.1)
CALCIUM SERPL-MCNC: 7.7 MG/DL (ref 8.4–10.2)
CHLORIDE SERPL-SCNC: 103 MMOL/L (ref 96–108)
CO2 SERPL-SCNC: 25 MMOL/L (ref 21–32)
CREAT SERPL-MCNC: 0.57 MG/DL (ref 0.6–1.3)
EOSINOPHIL # BLD AUTO: 0.05 THOUSAND/ÂΜL (ref 0–0.61)
EOSINOPHIL NFR BLD AUTO: 0 % (ref 0–6)
ERYTHROCYTE [DISTWIDTH] IN BLOOD BY AUTOMATED COUNT: 15 % (ref 11.6–15.1)
GFR SERPL CREATININE-BSD FRML MDRD: 90 ML/MIN/1.73SQ M
GLUCOSE SERPL-MCNC: 74 MG/DL (ref 65–140)
HCT VFR BLD AUTO: 36.6 % (ref 34.8–46.1)
HGB BLD-MCNC: 11.5 G/DL (ref 11.5–15.4)
IMM GRANULOCYTES # BLD AUTO: 0.13 THOUSAND/UL (ref 0–0.2)
IMM GRANULOCYTES NFR BLD AUTO: 1 % (ref 0–2)
LYMPHOCYTES # BLD AUTO: 1.27 THOUSANDS/ÂΜL (ref 0.6–4.47)
LYMPHOCYTES NFR BLD AUTO: 11 % (ref 14–44)
MAGNESIUM SERPL-MCNC: 1.9 MG/DL (ref 1.9–2.7)
MCH RBC QN AUTO: 28.5 PG (ref 26.8–34.3)
MCHC RBC AUTO-ENTMCNC: 31.4 G/DL (ref 31.4–37.4)
MCV RBC AUTO: 91 FL (ref 82–98)
MONOCYTES # BLD AUTO: 0.66 THOUSAND/ÂΜL (ref 0.17–1.22)
MONOCYTES NFR BLD AUTO: 6 % (ref 4–12)
NEUTROPHILS # BLD AUTO: 9.85 THOUSANDS/ÂΜL (ref 1.85–7.62)
NEUTS SEG NFR BLD AUTO: 82 % (ref 43–75)
NRBC BLD AUTO-RTO: 0 /100 WBCS
PHOSPHATE SERPL-MCNC: 2.8 MG/DL (ref 2.3–4.1)
PLATELET # BLD AUTO: 292 THOUSANDS/UL (ref 149–390)
PMV BLD AUTO: 10.6 FL (ref 8.9–12.7)
POTASSIUM SERPL-SCNC: 3.2 MMOL/L (ref 3.5–5.3)
PROT SERPL-MCNC: 5.9 G/DL (ref 6.4–8.4)
RBC # BLD AUTO: 4.04 MILLION/UL (ref 3.81–5.12)
SODIUM SERPL-SCNC: 137 MMOL/L (ref 135–147)
WBC # BLD AUTO: 11.98 THOUSAND/UL (ref 4.31–10.16)

## 2024-12-19 PROCEDURE — 99024 POSTOP FOLLOW-UP VISIT: CPT | Performed by: PHYSICIAN ASSISTANT

## 2024-12-19 PROCEDURE — 99232 SBSQ HOSP IP/OBS MODERATE 35: CPT | Performed by: FAMILY MEDICINE

## 2024-12-19 PROCEDURE — 83735 ASSAY OF MAGNESIUM: CPT | Performed by: FAMILY MEDICINE

## 2024-12-19 PROCEDURE — 97163 PT EVAL HIGH COMPLEX 45 MIN: CPT

## 2024-12-19 PROCEDURE — 84100 ASSAY OF PHOSPHORUS: CPT | Performed by: FAMILY MEDICINE

## 2024-12-19 PROCEDURE — 85025 COMPLETE CBC W/AUTO DIFF WBC: CPT | Performed by: FAMILY MEDICINE

## 2024-12-19 PROCEDURE — 97167 OT EVAL HIGH COMPLEX 60 MIN: CPT

## 2024-12-19 PROCEDURE — 97530 THERAPEUTIC ACTIVITIES: CPT

## 2024-12-19 PROCEDURE — 80053 COMPREHEN METABOLIC PANEL: CPT | Performed by: FAMILY MEDICINE

## 2024-12-19 PROCEDURE — 71045 X-RAY EXAM CHEST 1 VIEW: CPT

## 2024-12-19 PROCEDURE — 94760 N-INVAS EAR/PLS OXIMETRY 1: CPT

## 2024-12-19 PROCEDURE — 99232 SBSQ HOSP IP/OBS MODERATE 35: CPT | Performed by: INTERNAL MEDICINE

## 2024-12-19 RX ORDER — POTASSIUM CHLORIDE 20MEQ/15ML
40 LIQUID (ML) ORAL EVERY 4 HOURS
Status: COMPLETED | OUTPATIENT
Start: 2024-12-19 | End: 2024-12-19

## 2024-12-19 RX ORDER — AMLODIPINE BESYLATE 5 MG/1
5 TABLET ORAL ONCE
Status: COMPLETED | OUTPATIENT
Start: 2024-12-19 | End: 2024-12-19

## 2024-12-19 RX ORDER — CYANOCOBALAMIN 1000 UG/ML
1000 INJECTION, SOLUTION INTRAMUSCULAR; SUBCUTANEOUS ONCE
Status: COMPLETED | OUTPATIENT
Start: 2024-12-19 | End: 2024-12-19

## 2024-12-19 RX ORDER — CEFPODOXIME PROXETIL 200 MG/1
400 TABLET, FILM COATED ORAL 2 TIMES DAILY WITH MEALS
Status: DISCONTINUED | OUTPATIENT
Start: 2024-12-19 | End: 2024-12-21 | Stop reason: HOSPADM

## 2024-12-19 RX ORDER — AMLODIPINE BESYLATE 10 MG/1
10 TABLET ORAL DAILY
Status: DISCONTINUED | OUTPATIENT
Start: 2024-12-20 | End: 2024-12-21 | Stop reason: HOSPADM

## 2024-12-19 RX ORDER — METRONIDAZOLE 500 MG/1
500 TABLET ORAL EVERY 8 HOURS SCHEDULED
Status: DISCONTINUED | OUTPATIENT
Start: 2024-12-19 | End: 2024-12-21 | Stop reason: HOSPADM

## 2024-12-19 RX ORDER — AMLODIPINE BESYLATE 10 MG/1
10 TABLET ORAL DAILY
Status: DISCONTINUED | OUTPATIENT
Start: 2024-12-19 | End: 2024-12-19

## 2024-12-19 RX ADMIN — BUPROPION HYDROCHLORIDE 150 MG: 150 TABLET, EXTENDED RELEASE ORAL at 09:47

## 2024-12-19 RX ADMIN — NEBIVOLOL 5 MG: 10 TABLET ORAL at 09:47

## 2024-12-19 RX ADMIN — METRONIDAZOLE 500 MG: 500 INJECTION, SOLUTION INTRAVENOUS at 09:47

## 2024-12-19 RX ADMIN — HEPARIN SODIUM 5000 UNITS: 5000 INJECTION, SOLUTION INTRAVENOUS; SUBCUTANEOUS at 21:14

## 2024-12-19 RX ADMIN — FAMOTIDINE 20 MG: 10 INJECTION, SOLUTION INTRAVENOUS at 09:47

## 2024-12-19 RX ADMIN — FAMOTIDINE 20 MG: 10 INJECTION, SOLUTION INTRAVENOUS at 21:14

## 2024-12-19 RX ADMIN — CYANOCOBALAMIN 1000 MCG: 1000 INJECTION, SOLUTION INTRAMUSCULAR; SUBCUTANEOUS at 09:47

## 2024-12-19 RX ADMIN — CEFPODOXIME PROXETIL 400 MG: 200 TABLET, FILM COATED ORAL at 16:47

## 2024-12-19 RX ADMIN — SUCRALFATE 1 G: 1 TABLET ORAL at 09:48

## 2024-12-19 RX ADMIN — DONEPEZIL HYDROCHLORIDE 10 MG: 5 TABLET ORAL at 21:13

## 2024-12-19 RX ADMIN — THIAMINE HYDROCHLORIDE: 100 INJECTION, SOLUTION INTRAMUSCULAR; INTRAVENOUS at 10:45

## 2024-12-19 RX ADMIN — BUSPIRONE HYDROCHLORIDE 5 MG: 5 TABLET ORAL at 16:48

## 2024-12-19 RX ADMIN — BUSPIRONE HYDROCHLORIDE 5 MG: 5 TABLET ORAL at 09:47

## 2024-12-19 RX ADMIN — POTASSIUM CHLORIDE 40 MEQ: 20 SOLUTION ORAL at 09:47

## 2024-12-19 RX ADMIN — PANTOPRAZOLE SODIUM 8 MG/HR: 40 INJECTION, POWDER, FOR SOLUTION INTRAVENOUS at 00:35

## 2024-12-19 RX ADMIN — ACETAMINOPHEN 1000 MG: 1000 INJECTION, SOLUTION INTRAVENOUS at 21:14

## 2024-12-19 RX ADMIN — METRONIDAZOLE 500 MG: 500 INJECTION, SOLUTION INTRAVENOUS at 02:02

## 2024-12-19 RX ADMIN — ESCITALOPRAM OXALATE 20 MG: 10 TABLET ORAL at 09:47

## 2024-12-19 RX ADMIN — AMLODIPINE BESYLATE 5 MG: 5 TABLET ORAL at 09:48

## 2024-12-19 RX ADMIN — METRONIDAZOLE 500 MG: 500 TABLET ORAL at 13:34

## 2024-12-19 RX ADMIN — AMLODIPINE BESYLATE 5 MG: 5 TABLET ORAL at 16:48

## 2024-12-19 RX ADMIN — HEPARIN SODIUM 5000 UNITS: 5000 INJECTION, SOLUTION INTRAVENOUS; SUBCUTANEOUS at 05:39

## 2024-12-19 RX ADMIN — BUSPIRONE HYDROCHLORIDE 5 MG: 5 TABLET ORAL at 21:13

## 2024-12-19 RX ADMIN — LORAZEPAM 0.5 MG: 2 INJECTION INTRAMUSCULAR; INTRAVENOUS at 17:02

## 2024-12-19 RX ADMIN — HYDROMORPHONE HYDROCHLORIDE 0.5 MG: 1 INJECTION, SOLUTION INTRAMUSCULAR; INTRAVENOUS; SUBCUTANEOUS at 04:43

## 2024-12-19 RX ADMIN — ACETAMINOPHEN 1000 MG: 1000 INJECTION, SOLUTION INTRAVENOUS at 05:39

## 2024-12-19 RX ADMIN — TRAZODONE HYDROCHLORIDE 100 MG: 100 TABLET ORAL at 21:13

## 2024-12-19 RX ADMIN — SUCRALFATE 1 G: 1 TABLET ORAL at 13:33

## 2024-12-19 RX ADMIN — SUCRALFATE 1 G: 1 TABLET ORAL at 21:13

## 2024-12-19 RX ADMIN — METRONIDAZOLE 500 MG: 500 TABLET ORAL at 21:13

## 2024-12-19 RX ADMIN — ACETAMINOPHEN 1000 MG: 1000 INJECTION, SOLUTION INTRAVENOUS at 13:28

## 2024-12-19 RX ADMIN — HEPARIN SODIUM 5000 UNITS: 5000 INJECTION, SOLUTION INTRAVENOUS; SUBCUTANEOUS at 13:34

## 2024-12-19 RX ADMIN — CEFEPIME HYDROCHLORIDE 2000 MG: 2 INJECTION, SOLUTION INTRAVENOUS at 02:02

## 2024-12-19 RX ADMIN — POTASSIUM CHLORIDE 40 MEQ: 20 SOLUTION ORAL at 13:34

## 2024-12-19 RX ADMIN — SUCRALFATE 1 G: 1 TABLET ORAL at 17:03

## 2024-12-19 NOTE — ASSESSMENT & PLAN NOTE
Patient currently on 3.5 L of oxygen.  Will obtain a chest x-ray for further evaluation.  Patient will need an ambulatory walk test prior to discharge.

## 2024-12-19 NOTE — ASSESSMENT & PLAN NOTE
Patient has history of GI intolerance and hives with Augmentin.  She has tolerated cefepime well during hospitalization thus far.  She should tolerate other cephalosporins well also.  Monitor for cross allergic reaction.    Discussed with patient in detail regarding the above plan.

## 2024-12-19 NOTE — PROGRESS NOTES
Patient:    MRN:  [id number]    Michellein Request ID:  [id number]    Level of care reserved:  Home Health Agency    Partner Reserved:  [place] [address] [phone]    Clinical needs requested:    Geography searched:  [phone]    Start of Service:    Request sent:  3:32pm EST on 12/18/2024 by Alpa Conley    Partner reserved:  3:05pm EST on 12/19/2024 by Alpa Conley    Choice list shared:  10:02am EST on 12/19/2024 by Alpa Conley

## 2024-12-19 NOTE — PROGRESS NOTES
Progress Note - Infectious Disease   Name: Zulma Marcelo 74 y.o. female I MRN: [id number]  Unit/Bed#: 2 [address] Date of Admission: 12/16/2024   Date of Service: 12/19/2024 I Hospital Day: 3    Assessment & Plan  Peritonitis (HCC)  Patient presented with peritonitis secondary to perforated marginal ulcer.  She is status post laparoscopy with irrigation and repair of perforation with Amilcar patch.  There was no IntraOp culture obtained to guide antibiotic choice.  With this clinical finding, patient is at high risk for development of intra-abdominal abscess.  Overall, patient has low risk for MDRO or fungal infection, given lack of chronic antibiotic use and hospitalization.  Her current antibiotic/antifungal regimen is more than adequate empirically.  Patient is clinically much improved.  She had abdominal pain yesterday but this is now much improved with her having 2 BMs overnight.  Abdominal pain yesterday was most likely secondary to distention.  WBC also decreased today.  Plan is for discharge tomorrow on p.o. antibiotic.  Given that antibiotic course is all empiric, we will transition her to p.o. Augmentin today and observe overnight for any deterioration, prior to discharge tomorrow.  Change antibiotic to p.o. cefpodoxime/Flagyl.  Monitor abdominal pain.  Monitor WBC.  If patient continues to improve, can discharge on same p.o antibiotic regimen in the next 24 hours.  Treat x 10-day postop, through 12/26.  Perforated peptic ulcer (HCC)  Patient presented with perforated marginal ulcer.  She has a history of chronic marginal ulcer post RYGB and has been taking excessive amount of ibuprofen recently.  This is likely etiology of her marginal ulcer perforation.  Patient is status post repair with Amilcar patch.   Antibiotic plan as in above.  Monitor abdominal pain and WBC as in above.  Bariatric surgery follow-up.  QT prolongation  There is high risk of worsening QTc prolongation with azoles, precipitating  torsades.  Avoid azoles and fluoroquinolones in anti-infective regimen.  History of Debra-en-Y gastric bypass  Patient is status post RYGB in 2011, complicated by marginal ulcer.  Bariatric surgery follow-up.  Penicillin allergy  Patient has history of GI intolerance and hives with Augmentin.  She has tolerated cefepime well during hospitalization thus far.  She should tolerate other cephalosporins well also.  Monitor for cross allergic reaction.    Discussed with patient in detail regarding the above plan.  I have discussed with Elisabet Burgos PA-C from bariatric surgery service regarding the above plan to his antibiotic regimen to p.o.  She agrees with the plan.    Antibiotics:  Cefepime/Flagyl/micafungin  POD # 3    Subjective   Patient had 2 BMs overnight.  Her abdominal pain is much improved afterwards.  Temperature stays down.  No chills.  She is tolerating antibiotics well.  No nausea, vomiting or diarrhea.    Objective :  Temp:  [97.2 °F (36.2 °C)-98 °F (36.7 °C)] 97.4 °F (36.3 °C)  HR:  [56-58] 56  BP: (131-173)/(63-86) 165/76  Resp:  [16-19] 16  SpO2:  [97 %] 97 %  O2 Device: Nasal cannula  Nasal Cannula O2 Flow Rate (L/min):  [2 L/min-3.5 L/min] 3.5 L/min    General:  No acute distress  Psychiatric:  Awake and alert  Pulmonary:  Normal respiratory excursion without accessory muscle use  Abdomen:  Soft, nondistended, very mild and much improved LUQ tenderness, drain serous, bowel sounds present  Extremities:  No edema  Skin:  No rashes      Lab Results: I have reviewed the following results:  Results from last 7 days   Lab Units 12/19/24 0453 12/18/24 0531 12/17/24  0511   WBC Thousand/uL 11.98* 13.69* 13.13*   HEMOGLOBIN g/dL 11.5 10.9* 12.0   PLATELETS Thousands/uL 292 249 289     Results from last 7 days   Lab Units 12/19/24 0453 12/18/24  0531 12/17/24  0511   SODIUM mmol/L 137 137 136   POTASSIUM mmol/L 3.2* 3.5 3.4*   CHLORIDE mmol/L 103 107 105   CO2 mmol/L 25 23 26   BUN mg/dL 10 15 12    CREATININE mg/dL 0.57* 0.54* 0.54*   EGFR ml/min/1.73sq m 90 91 91   CALCIUM mg/dL 7.7* 7.8* 7.9*   AST U/L 15 9* 9*   ALT U/L 7 5* 4*   ALK PHOS U/L 51 38 42   ALBUMIN g/dL 3.0* 2.8* 3.0*     Results from last 7 days   Lab Units 12/16/24  0844 12/16/24  0830   BLOOD CULTURE  No Growth at 48 hrs. No Growth at 48 hrs.     Results from last 7 days   Lab Units 12/17/24  0511 12/16/24  0830   PROCALCITONIN ng/ml 3.61* 2.08*
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
## 2024-12-19 NOTE — ASSESSMENT & PLAN NOTE
Patient presented with perforated marginal ulcer.  She has a history of chronic marginal ulcer post RYGB and has been taking excessive amount of ibuprofen recently.  This is likely etiology of her marginal ulcer perforation.  Patient is status post repair with Amilcar patch.   Antibiotic plan as in above.  Monitor abdominal pain and WBC as in above.  Bariatric surgery follow-up.
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
## 2024-12-19 NOTE — ASSESSMENT & PLAN NOTE
Holding nebivolol.  Continue IV metoprolol for now    - Patient on norvasc 5mg in addition to PO bystolic 5mg. Will increase norvasc to 10mg daily. Continue hydralazine prn.  Will continue to monitor blood pressure and make further adjustments as needed.

## 2024-12-19 NOTE — TELEPHONE ENCOUNTER
Pt called from [facility] where she is at right now. She stated she was in crisis. She states she missed her appt with Provider Vannesa Stone because she is in the hospital. She states she needs an increase in her medication for anxiety. A nurse came in to the room and pt asked writer to speak to her. I asked the nurse if pt was in crisis. She said no and that she was going to discuss her anxiety with the assigned Doctor there. Pt asked for Provider to return her call. Writer informed a message would be put through to the Provider.

## 2024-12-19 NOTE — PLAN OF CARE
Problem: PHYSICAL THERAPY ADULT  Goal: Performs mobility at highest level of function for planned discharge setting.  See evaluation for individualized goals.  Description: Treatment/Interventions: ADL retraining, Functional transfer training, LE strengthening/ROM, Therapeutic exercise, Endurance training, Bed mobility, Gait training, Spoke to nursing, OT          See flowsheet documentation for full assessment, interventions and recommendations.  Note: Prognosis: Good  Problem List: Decreased strength, Decreased endurance, Impaired balance, Decreased mobility, Pain  Assessment: Patient seen for Physical Therapy evaluation. Patient admitted with Pneumoperitoneum.  Comorbidities affecting patient's physical performance include: anxiety, arthritis, asthma, cardiac disease, GERD, gout, hypertension, hyperlipidemia, and osteoarthritis.  Personal factors affecting patient at time of initial evaluation include: lives in 1 story house, ambulating with assistive device, stairs to enter home, inability to navigate community distances, inability to navigate level surfaces without external assistance, positive fall history, and inability to perform IADLS . Prior to admission, patient was independent with functional mobility with walker, independent with ADLS, requiring assist for IADLS, living with son in a 1 level home with 5 steps to enter, ambulating household distance, and home with family assist.  Please find objective findings from Physical Therapy assessment regarding body systems outlined above with impairments and limitations including weakness, impaired balance, decreased endurance, gait deviations, pain, decreased activity tolerance, decreased functional mobility tolerance, and fall risk.  The Barthel Index was used as a functional outcome tool presenting with a score of Barthel Index Score: 55 today indicating marked limitations of functional mobility and ADLS.  Patient's clinical presentation is currently  unstable/unpredictable as seen in patient's presentation of changing level of pain, increased fall risk, new onset of impairment of functional mobility, and decreased endurance. Pt would benefit from continued Physical Therapy treatment to address deficits as defined above and maximize level of functional mobility. As demonstrated by objective findings, the assigned level of complexity for this evaluation is high.The patient's [facility] Basic Mobility Inpatient Short Form Raw Score is 18. A Raw score of greater than 16 suggests the patient may benefit from discharge to home. Please also refer to the recommendation of the Physical Therapist for safe discharge planning.        Rehab Resource Intensity Level, PT: III (Minimum Resource Intensity)    See flowsheet documentation for full assessment.

## 2024-12-19 NOTE — ASSESSMENT & PLAN NOTE
Presented with left lower quadrant pain nausea with nonbloody bilious emesis over few days progressing to worsening abdominal pain epigastric/chest pain radiating to left shoulder today  Evaluated in ED, workup revealed pneumoperitoneum with focal perforation of medial wall of proximal Debra-en-Y immediately distal to GJ anastomosis   status post emergent diagnostic laparoscopy with extensive lysis of adhesion with repair of marginal ulcer perforation with primary repair and Amilcar patch with drain placement and intraoperative endoscopy.  Patient was noted to have peritonitis with purulent content with significant adhesions.   Postoperatively, hemodynamically stable.  Abdomen with mild appropriate tenderness otherwise soft.  Strict n.p.o.  IV PPI drip  IV cefepime, metronidazole  Received IV fluconazole in ED, will change to micafungin due to QT prolongation  Follow-up CBC, CMP  Monitor intake output  Postoperative care as per primary team  Symptomatic treatment, pain control    - Patient is status post diagnostic laparoscopy with extensive lysis of adhesions found to have marginal ulcer perforation s/p primary repair and reinforcement with a Amilcar patch in addition to placement of a drain.  Patient continued on oral cefpodoxime and Flagyl per recommendations from infectious disease. Upper GI series performed yesterday showed no evidence of leak.  Diet advancement per surgery.  Will continue to monitor.

## 2024-12-19 NOTE — PROGRESS NOTES
Progress Note - Hospitalist   Name: Zulma Marcelo 74 y.o. female I MRN: [id number]  Unit/Bed#: 2 [address] Date of Admission: 12/16/2024   Date of Service: 12/19/2024 I Hospital Day: 3     Assessment & Plan  Pneumoperitoneum  Presented with left lower quadrant pain nausea with nonbloody bilious emesis over few days progressing to worsening abdominal pain epigastric/chest pain radiating to left shoulder today  Evaluated in ED, workup revealed pneumoperitoneum with focal perforation of medial wall of proximal Debra-en-Y immediately distal to GJ anastomosis   status post emergent diagnostic laparoscopy with extensive lysis of adhesion with repair of marginal ulcer perforation with primary repair and Amilcar patch with drain placement and intraoperative endoscopy.  Patient was noted to have peritonitis with purulent content with significant adhesions.   Postoperatively, hemodynamically stable.  Abdomen with mild appropriate tenderness otherwise soft.  Strict n.p.o.  IV PPI drip  IV cefepime, metronidazole  Received IV fluconazole in ED, will change to micafungin due to QT prolongation  Follow-up CBC, CMP  Monitor intake output  Postoperative care as per primary team  Symptomatic treatment, pain control    - Patient is status post diagnostic laparoscopy with extensive lysis of adhesions found to have marginal ulcer perforation s/p primary repair and reinforcement with a Amilcar patch in addition to placement of a drain.  Patient continued on oral cefpodoxime and Flagyl per recommendations from infectious disease. Upper GI series performed yesterday showed no evidence of leak.  Diet advancement per surgery.  Will continue to monitor.  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  QTc 508  Troponin-10-10  Denies any chest pain at present  Monitor symptoms  COPD  Appears stable  Respiratory protocol  Bronchodilators as needed  Incentive spirometry  Essential hypertension  Holding nebivolol.  Continue IV metoprolol for now    - Patient on norvasc 5mg in addition to PO bystolic 5mg. Will increase norvasc to 10mg daily. Continue hydralazine prn.  Will continue to monitor blood pressure and make further adjustments as needed.  Chronic GERD  IV PPI  Sleep apnea  Prior history of SORAYA, awaiting repeat sleep studies as per records  Moderate episode of recurrent major depressive disorder (HCC)  PO meds restarted.  Cognitive impairment  Donepezil restarted  QT prolongation  Telemetry  Monitor and replete electrolytes  Check magnesium level  Peritonitis (HCC)    Perforated peptic ulcer (HCC)    Penicillin allergy    Hypoxia  Patient currently on 3.5 L of oxygen.  Will obtain a chest x-ray for further evaluation.  Patient will need an ambulatory walk test prior to discharge.  VTE Pharmacologic Prophylaxis:   Heparin prophylaxis.    Mobility:   Basic Mobility Inpatient Raw Score: 18  JH-HLM Goal: 6: Walk 10 steps or more  JH-HLM Achieved: 6: Walk 10 steps or more    Patient Centered Rounds: I performed bedside rounds with nursing staff today.     Current Length of Stay: 3 day(s)  Current Patient Status: Inpatient   Certification Statement: The patient will continue to require additional inpatient hospital stay due to above.  Discharge Plan: TBD    Code Status: Prior    Subjective   Patient was seen and examined at bedside.  No acute events overnight.  No new complaints.    Objective :  Temp:  [97.2 °F (36.2 °C)-98 °F (36.7 °C)] 97.4 °F (36.3 °C)  HR:  [56-58] 57  BP: (131-173)/(63-86) 165/76  Resp:   [16-21] 21  SpO2:  [96 %-97 %] 96 %  O2 Device: Nasal cannula  Nasal Cannula O2 Flow Rate (L/min):  [2 L/min-3.5 L/min] 3.5 L/min    Body mass index is 29.1 kg/m².     Input and Output Summary (last 24 hours):     Intake/Output Summary (Last 24 hours) at 12/19/2024 1425  Last data filed at 12/19/2024 1123  Gross per 24 hour   Intake 730 ml   Output 790 ml   Net -60 ml     Physical Exam  HENT:      Head: Normocephalic.      Mouth/Throat:      Mouth: Mucous membranes are moist.   Eyes:      Extraocular Movements: Extraocular movements intact.   Cardiovascular:      Rate and Rhythm: Normal rate.   Pulmonary:      Effort: Pulmonary effort is normal. No respiratory distress.   Abdominal:      Palpations: Abdomen is soft.      Comments: + afua drain with a small amount of serosanguinous drainage   Skin:     General: Skin is warm.   Neurological:      Mental Status: She is alert and oriented to person, place, and time.   Psychiatric:         Mood and Affect: Mood normal.       Lines/Drains:  Lines/Drains/Airways       Active Status       Name Placement date Placement time Site Days    Closed/Suction Drain LUQ Bulb 19 Fr. 12/16/24  1601  LUQ  2                  Telemetry:  Telemetry Orders (From admission, onward)               24 Hour Telemetry Monitoring  Continuous x 24 Hours (Telem)        Expiring   Question:  Reason for 24 Hour Telemetry  Answer:  Arrhythmias requiring acute medical intervention / PPM or ICD malfunction                  Lab Results: I have reviewed the following results:   Results from last 7 days   Lab Units 12/19/24  0453   WBC Thousand/uL 11.98*   HEMOGLOBIN g/dL 11.5   HEMATOCRIT % 36.6   PLATELETS Thousands/uL 292   SEGS PCT % 82*   LYMPHO PCT % 11*   MONO PCT % 6   EOS PCT % 0     Results from last 7 days   Lab Units 12/19/24  0453   SODIUM mmol/L 137   POTASSIUM mmol/L 3.2*   CHLORIDE mmol/L 103   CO2 mmol/L 25   BUN mg/dL 10   CREATININE mg/dL 0.57*   ANION GAP mmol/L 9   CALCIUM mg/dL 7.7*    ALBUMIN g/dL 3.0*   TOTAL BILIRUBIN mg/dL 0.55   ALK PHOS U/L 51   ALT U/L 7   AST U/L 15   GLUCOSE RANDOM mg/dL 74     Results from last 7 days   Lab Units 12/17/24  0511 12/16/24  0830   LACTIC ACID mmol/L  --  1.2   PROCALCITONIN ng/ml 3.61* 2.08*     Recent Cultures (last 7 days):   Results from last 7 days   Lab Units 12/16/24  0844 12/16/24  0830   BLOOD CULTURE  No Growth at 72 hrs. No Growth at 72 hrs.     Last 24 Hours Medication List:     Current Facility-Administered Medications:     acetaminophen (Ofirmev) injection 1,000 mg, Q8H JUDIE, Last Rate: 1,000 mg (12/19/24 1328)    albuterol (PROVENTIL HFA,VENTOLIN HFA) inhaler 2 puff, Q6H PRN    amLODIPine (NORVASC) tablet 5 mg, Daily    buPROPion (WELLBUTRIN XL) 24 hr tablet 150 mg, Daily    busPIRone (BUSPAR) tablet 5 mg, TID    cefpodoxime (VANTIN) tablet 400 mg, BID With Meals    diphenhydrAMINE (BENADRYL) tablet 25 mg, HS PRN    donepezil (ARICEPT) tablet 10 mg, HS    escitalopram (LEXAPRO) tablet 20 mg, Daily    Famotidine (PF) (PEPCID) injection 20 mg, Q12H JUDIE    folic acid 1 mg, thiamine (VITAMIN B1) 100 mg in sodium chloride 0.9 % 100 mL IV piggyback, Daily    heparin (porcine) subcutaneous injection 5,000 Units, Q8H JUDIE **AND** Platelet count, Once    hydrALAZINE (APRESOLINE) injection 10 mg, Q6H PRN    HYDROmorphone (DILAUDID) injection 0.5 mg, Q4H PRN    levalbuterol (XOPENEX) inhalation solution 0.63 mg, Q8H PRN **AND** ipratropium (ATROVENT) 0.02 % inhalation solution 0.5 mg, Q8H PRN    LORazepam (ATIVAN) injection 0.5 mg, Q6H PRN    metroNIDAZOLE (FLAGYL) tablet 500 mg, Q8H JUDIE    morphine injection 2 mg, Q2H PRN    morphine injection 4 mg, Q2H PRN    nebivolol (BYSTOLIC) tablet 5 mg, Daily    ondansetron (ZOFRAN) injection 4 mg, Q6H PRN    pantoprazole (PROTONIX) 80 mg in sodium chloride 0.9 % 100 mL infusion, Continuous, Last Rate: 8 mg/hr (12/19/24 0035)    phenol (CHLORASEPTIC) 1.4 % mucosal liquid 1 spray, Q2H PRN    sucralfate (CARAFATE)  tablet 1 g, 4x Daily    traZODone (DESYREL) tablet 100 mg, HS    Administrative Statements   Today, Patient Was Seen By: Onesimo Chapin MD    **Please Note: This note may have been constructed using a voice recognition system.**

## 2024-12-19 NOTE — OCCUPATIONAL THERAPY NOTE
Occupational Therapy Evaluation     Patient Name: Zulma Marcelo  Today's Date: 12/19/2024  Problem List  Principal Problem:    Pneumoperitoneum  Active Problems:    Chest pain    Essential hypertension    Chronic GERD    Mixed hyperlipidemia    Vitamin B12 deficiency    Vitamin D deficiency    SVT (supraventricular tachycardia) (HCC)    COPD    Sleep apnea    Cognitive impairment    History of Debra-en-Y gastric bypass    Generalized anxiety disorder    Moderate episode of recurrent major depressive disorder (HCC)    QT prolongation    Peritonitis (HCC)    Perforated peptic ulcer (HCC)    Penicillin allergy    Past Medical History  Past Medical History:   Diagnosis Date    Acid reflux     Anxiety     Arthritis     Asthma     Cardiac disease     Chronic kidney disease     passed on own kidney stone    Depression     Diverticulitis     GERD (gastroesophageal reflux disease)     Hayfever     [place] (hard of hearing)     bilateral hearing aids    Hyperlipemia     Hypertension     Panic attack     Tachycardia      Past Surgical History  Past Surgical History:   Procedure Laterality Date    ABLATION OF DYSRHYTHMIC FOCUS      APPENDECTOMY      CHOLECYSTECTOMY      open    FRACTURE SURGERY      ORIF fx (L) tibia, fibula 2009    GASTRIC BYPASS OPEN      HYSTERECTOMY      CT LAPS ABD PRTM&OMENTUM DX W/WO SPEC BR/WA SPX N/A 12/16/2024    Procedure: LAPAROSCOPY DIAGNOSTIC,  LYSIS OF ADHESIONS, REPAIR PERFORATED MARGINAL ULCER, INTRA-OP EGD;  Surgeon: Alex Mohr MD;  Location: [facility] OR;  Service: Bariatrics    CT XCAPSL CTRC RMVL INSJ IO LENS PROSTH W/O ECP Left 4/18/2016    Procedure: EXTRACTION EXTRACAPSULAR CATARACT PHACO INTRAOCULAR LENS (IOL);  Surgeon: Dane Wells MD;  Location: [facility] OR;  Service: Ophthalmology    CT XCAPSL CTRC RMVL INSJ IO LENS PROSTH W/O ECP Right 3/1/2021    Procedure: EXTRACTION EXTRACAPSULAR CATARACT PHACO INTRAOCULAR LENS (IOL);  Surgeon: Dane Wells MD;  Location: [facility]  "OR;  Service: Ophthalmology    TONSILECTOMY AND ADNOIDECTOMY          12/19/24 0908   OT Last Visit   OT Visit Date 12/19/24  (Thursday)   Note Type   Note type Evaluation  ([facility] [id number])   Additional Comments Identified pt by full name and wristband   Pain Assessment   Pain Assessment Tool FLACC   Pain Location/Orientation Location: Abdomen;Location: Incision  (GABI drain)   Effect of Pain on Daily Activities limits pace during ADLs   Patient's Stated Pain Goal No pain   Hospital Pain Intervention(s) Repositioned;Ambulation/increased activity;Emotional support   Pain Rating: FLACC (Rest) - Face 0   Pain Rating: FLACC (Rest) - Legs 0   Pain Rating: FLACC (Rest) - Activity 0   Pain Rating: FLACC (Rest) - Cry 0   Pain Rating: FLACC (Rest) - Consolability 0   Score: FLACC (Rest) 0   Pain Rating: FLACC (Activity) - Face 1   Pain Rating: FLACC (Activity) - Legs 1   Pain Rating: FLACC (Activity) - Activity 0   Pain Rating: FLACC (Activity) - Cry 1   Pain Rating: FLACC (Activity) - Consolability 1   Score: FLACC (Activity) 4   Restrictions/Precautions   Weight Bearing Precautions Per Order No   Other Precautions Bed Alarm;Chair Alarm;Multiple lines;O2;Fall Risk;Pain  (GABI drain abdomen; acute 3L O2, Abdirahman)   Home Living   Type of Home House  (\"small ranch\")   Home Layout One level;Performs ADLs on one level;Able to live on main level with bedroom/bathroom   Bathroom Shower/Tub Tub/shower unit   Bathroom Toilet Standard   Bathroom Equipment Grab bars in shower;Hand-held shower   Bathroom Accessibility Accessible   Home Equipment Walker;Wheelchair-manual   Additional Comments Pt reports that her son lives w/ her in 1 SH   Prior Function   Level of [place] Independent with ADLs;Independent with functional mobility;Needs assistance with IADLS  (laundry service; son assists as needed)   Lives With Son   Receives Help From Family;Home health  ([facility])   IADLs Family/Friend/Other provides " "transportation;Family/Friend/Other provides meals;Family/Friend/Other provides medication management   Falls in the last 6 months 1 to 4   Vocational Retired   Comments Pt reports I w/ basic ADLs and functional mobility using RW on room air. Needs assistance w/ IADLs;  has laundry service   Lifestyle   Autonomy Pt reports I w/ basic ADLs using AD for functional mobility   Reciprocal Relationships Pt reports that her son lives w/ her   Service to Others Pt reports retired   Intrinsic Gratification Pt reports enjoying adult coloring   General   Additional Pertinent History Pt admitted to [facility] on 12/16/24. Diagnosed w/ pneumoperitoneum   Family/Caregiver Present No   Subjective   Subjective \"I am not sure they can get me a commode and oxygen in one day\"   ADL   Where Assessed Edge of bed  (vs OOB in high back arm chair post eval)   Eating Assistance 6  Modified independent   Eating Deficit Setup  (full bariatric liquid diet)   Grooming Assistance 6  Modified Independent   Grooming Deficit Setup;Increased time to complete  (seated OOB in chair to comb and pull back hair using ree pin)   UB Bathing Assistance Unable to assess  (pt reports washing up w/ RN staff earlier; anticipate S seated after set- up based on fxal obs skills, clinical judgement)   LB Bathing Assistance Unable to assess  (anticipate S seated vs standing using AD based on fxal obs skills, clinical judgement)   UB Dressing Assistance 5  Supervision/Setup   UB Dressing Deficit Setup;Supervision/safety;Increased time to complete   LB Dressing Assistance 5  Supervision/Setup   LB Dressing Deficit Setup;Requires assistive device for steadying;Supervision/safety;Increased time to complete  (don underwear threading LE while seated; able to ext rot / flex hip to thread)   Toileting Assistance  Unable to assess  (voided w PT prior therapist arrival. PT reports pt required S using commode)   Additional Comments on 3L acute O2   Bed Mobility   Supine to Sit 5  " "Supervision   Additional items Assist x 1;HOB elevated;Bedrails;Increased time required  (to pt's R)   Sit to Supine Unable to assess   Additional Comments Pt seated OOB in chair post eval w/ needs met, call bell in reach and chair alarm activated on soft care cushion   Transfers   Sit to Stand 5  Supervision   Additional items Assist x 1;Increased time required   Stand to Sit 5  Supervision   Additional items Assist x 1;Increased time required   Additional Comments Pt performed sit <> stand w/ S and + time from EOB   Functional Mobility   Functional Mobility 5  Supervision   Additional Comments short distances in room using RW on O2; sats >90% on 3L   Additional items Rolling walker   Balance   Static Sitting Fair +   Dynamic Sitting Fair   Static Standing Fair   Ambulatory Fair   Activity Tolerance   Activity Tolerance Patient limited by pain;Patient tolerated treatment well   Medical Staff Made Aware spoke w/ PT, Valerie   Nurse Made Aware spoke w/ RN   RUE Assessment   RUE Assessment X   RUE Strength   RUE Overall Strength Within Functional Limits - able to perform ADL tasks with strength   LUE Assessment   LUE Assessment X   LUE Strength   LUE Overall Strength Within Functional Limits - able to perform ADL tasks with strength   Hand Function   Gross Motor Coordination Functional   Fine Motor Coordination Functional   Psychosocial   Patient Behaviors/Mood Cooperative   Cognition   Overall Cognitive Status WFL   Arousal/Participation Alert;Cooperative   Attention Attends with cues to redirect   Orientation Level Oriented X4  (\"I think it is the 19th and Thursday\")   Memory Within functional limits   Following Commands Follows multistep commands without difficulty   Comments Alert, cooperative and able to follow directions / participate in conversation   Assessment   Limitation Decreased ADL status;Decreased endurance;Decreased self-care trans;Decreased high-level ADLs  (impaired pain, activity tolerance, standing " tolerance)   Assessment Pt is a 73yo female admitted to [facility] on 12/16/24 w/ chest pain, SOB, abdominal pain. Diagnosed w/ pneumoperitoneum and is s/p diagnostic laparoscopy, extensive lysis of adhesions, marginal ulcer perforation repair, GABI drain placement, intraoperative endoscopy on 12/16/24. Significant PMH impacting her occupational performance includes anxiety, arthritis, depression, hard of hearing, HTN, panic attack, L tib / fib fracture s/p ORIF (2009), gastric bypass, COPD. Personal and environmental factors supporting performance includes first floor environment, supporting family, access to AD/ DME; barriers include fall history, inability to complete IADLs, anxiety. Pt reports that her son lives w/ her in 1 SH. Pt reports I w/ ADLs using AD and needs assistance w/ IADLs on room air. Pt reports history of CPAP but is unable to go for her test. Upon eval, pt alert and generally oriented. Motivated to return home. Pt required mod I grooming seated, S UBD, S LBD, S bed mobility, S sit <> stand and S using RW for short distance functional mobility on 3L acute O2. O2 sats >90% on 3L. Pt is completing ADLs at / near baseline level of I w/ decreased activity tolerance, standing tolerance, and activity tolerance. Pt would benefit from OT in acute care to max I w/ ADLs, achieve highest level of function. Recommend level III rehab resource intensity when medically stable for discharge from acute care. Will continue to follow   Goals   Patient Goals Pt stated that she would like to return home and stay out of the nursing home   LTG Time Frame 7-10   Long Term Goal #1 see below   Plan   Treatment Interventions ADL retraining;Endurance training;Patient/family training;Equipment evaluation/education;Continued evaluation;Energy conservation;Activityengagement;Compensatory technique education   Goal Expiration Date 12/26/24   OT Treatment Day 0  (Thurs 12/19/24)   OT Frequency 2-3x/wk   Discharge Recommendation   Rehab  Resource Intensity Level, OT III (Minimum Resource Intensity)   AM-PAC Daily Activity Inpatient   Lower Body Dressing 3   Bathing 3   Toileting 3   Upper Body Dressing 4   Grooming 4   Eating 4   Daily Activity Raw Score 21   Daily Activity Standardized Score (Calc for Raw Score >=11) 44.27   AM-PAC Applied Cognition Inpatient   Following a Speech/Presentation 4   Understanding Ordinary Conversation 4   Taking Medications 3   Remembering Where Things Are Placed or Put Away 4   Remembering List of 4-5 Errands 4   Taking Care of Complicated Tasks 3   Applied Cognition Raw Score 22   Applied Cognition Standardized Score 47.83   Barthel Index   Feeding 10   Bathing 0   Grooming Score 5   Dressing Score 5   Bladder Score 10   Bowels Score 10   Toilet Use Score 5   Transfers (Bed/Chair) Score 10   Mobility (Level Surface) Score 0   Stairs Score 0   Barthel Index Score 55   End of Consult   Education Provided Yes   Patient Position at End of Consult Bedside chair;Bed/Chair alarm activated;All needs within reach   Nurse Communication Nurse aware of consult   Licensure   [place] License Number  Kamla Pearl, OTR/L [id number]        The patient's raw score on the AM-PAC Daily Activity Inpatient Short Form is 21. A raw score of greater than or equal to 19 suggests the patient may benefit from discharge to home. Please refer to the recommendation of the Occupational Therapist for safe discharge planning.      Pt goals to be met by 12/26/24 to max I w/ ADLs and improve engagement to return home to OF w/ son includes:    -Pt will complete bed mobility supine <> sit independently in preparation for ADLs    -Pt will complete functional transfers to bed, chair, and toilet using LRAD w/ mod I for + time to minimize burden of care to return home    -Pt will complete functional tub/ shower transfer w/ S to max I w/ bathing    -Pt will complete LBD w/ mod I for + time w/ out rest break    -Pt will complete UBD independently    -Pt  will demonstrate improved AMPAC score to at least 23 to max I w/ ADLs, assist in DC planning    -Pt will consistently engage in functional mobility household distances w/ mod I for + time using LRAD as needed    -Pt will demonstrate good attention and understanding EC tech to max I w/ ADLs    -Pt will demonstrate improved activity and sitting tolerance OOB in chair for all meals    Kamla Pearl, OTR/L  [id number]  [id number]

## 2024-12-19 NOTE — PHYSICAL THERAPY NOTE
PHYSICAL THERAPY EVALUATION/TREATMENT       12/19/24 0850   PT Last Visit   PT Visit Date 12/19/24   Note Type   Note type Evaluation   Pain Assessment   Pain Score 5   Pain Location/Orientation Location: Abdomen   Effect of Pain on Daily Activities limits rest/mobility   Patient's Stated Pain Goal No pain   Hospital Pain Intervention(s) Repositioned;Ambulation/increased activity   Multiple Pain Sites No   Restrictions/Precautions   Weight Bearing Precautions Per Order No   Other Precautions Bed Alarm;Chair Alarm;Fall Risk;Pain   Home Living   Type of Home House   Home Layout One level;Performs ADLs on one level;Able to live on main level with bedroom/bathroom;Stairs to enter with rails  (5 JESUS)   Bathroom Toilet Standard   Bathroom Equipment Grab bars in shower   Home Equipment Walker;Wheelchair-manual  (RW + rollator)   Additional Comments Hoping to get shower chair/commode from outpatient  soon   Prior Function   Level of [place] Independent with ADLs;Independent with functional mobility;Needs assistance with IADLS  (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)   Lives With Son   Receives Help From Family;Other (Comment)  (outpatient case management)   IADLs Family/Friend/Other provides meals;Family/Friend/Other provides transportation   Falls in the last 6 months 1 to 4   Vocational Retired   General   Additional Pertinent History Pt is a 74 year-old female who was admitted to the hospital on 12/16/24 due to abdominal pain ; POD #3 emergent diagnostic laparoscopy with extensive lysis of adhesion with repair of marginal ulcer perforation with primary repair and Amilcar patch with drain placement and intraoperative endoscopy   Family/Caregiver Present No   Cognition   Overall Cognitive Status WFL   Arousal/Participation Cooperative   Attention Attends with cues to redirect   Orientation Level Oriented X4   Following Commands Follows multistep commands with increased time or repetition   Subjective  "  Subjective \"I'm feeling better today\"   RLE Assessment   RLE Assessment WFL  (At least 3 to 3+/5)   LLE Assessment   LLE Assessment WFL  (At least 3 to 3+/5)   Bed Mobility   Supine to Sit 5  Supervision   Additional items Assist x 1;Verbal cues;Increased time required;HOB elevated;Bedrails   Sit to Supine 5  Supervision   Additional items Assist x 1;Verbal cues;Increased time required;HOB elevated   Transfers   Sit to Stand 5  Supervision   Additional items Assist x 1;Verbal cues;Increased time required   Stand to Sit 5  Supervision   Additional items Assist x 1;Verbal cues;Increased time required;Armrests   Ambulation/Elevation   Gait pattern Foward flexed;Short stride;Step through pattern  (decreased gait speed)   Gait Assistance 5  Supervision   Additional items Verbal cues   Assistive Device Rolling walker   Distance 5 feet + 10 feet   Stair Management Assistance Not tested   Balance   Static Sitting Fair +   Dynamic Sitting Fair   Static Standing Fair   Dynamic Standing Fair   Ambulatory Fair -  (RW)   Activity Tolerance   Activity Tolerance Patient tolerated treatment well;Patient limited by fatigue   Nurse Made Aware yes CHAD [place]   Assessment   Prognosis Good   Problem List Decreased strength;Decreased endurance;Impaired balance;Decreased mobility;Pain   Assessment Patient seen for Physical Therapy evaluation. Patient admitted with Pneumoperitoneum.  Comorbidities affecting patient's physical performance include: anxiety, arthritis, asthma, cardiac disease, GERD, gout, hypertension, hyperlipidemia, and osteoarthritis.  Personal factors affecting patient at time of initial evaluation include: lives in 1 story house, ambulating with assistive device, stairs to enter home, inability to navigate community distances, inability to navigate level surfaces without external assistance, positive fall history, and inability to perform IADLS . Prior to admission, patient was independent with functional mobility with " walker, independent with ADLS, requiring assist for IADLS, living with son in a 1 level home with 5 steps to enter, ambulating household distance, and home with family assist.  Please find objective findings from Physical Therapy assessment regarding body systems outlined above with impairments and limitations including weakness, impaired balance, decreased endurance, gait deviations, pain, decreased activity tolerance, decreased functional mobility tolerance, and fall risk.  The Barthel Index was used as a functional outcome tool presenting with a score of Barthel Index Score: 55 today indicating marked limitations of functional mobility and ADLS.  Patient's clinical presentation is currently unstable/unpredictable as seen in patient's presentation of changing level of pain, increased fall risk, new onset of impairment of functional mobility, and decreased endurance. Pt would benefit from continued Physical Therapy treatment to address deficits as defined above and maximize level of functional mobility. As demonstrated by objective findings, the assigned level of complexity for this evaluation is high.The patient's -[facility] Basic Mobility Inpatient Short Form Raw Score is 18. A Raw score of greater than 16 suggests the patient may benefit from discharge to home. Please also refer to the recommendation of the Physical Therapist for safe discharge planning.   Goals   Patient Goals to feel better   STG Expiration Date 12/26/24   Short Term Goal #1 Pt will perform bed mobility/transfers IND ; Standing balance will improve to fair+ to decrease risk of falls ; BLE strength will improve by 1/2 grade to improve tolerance to functional mobility ; Pt will ambulate x 50 feet with supervision + RW ; Pt will negotiate x 5 steps with Min A to enter/exit home ; AMPAC score will improve >20/24 to demonstrate improved functional independence   LTG Expiration Date 01/02/25   Long Term Goal #1 Standing balance will improve to good to  "decrease risk of falls ; BLE strength will improve by 1 grade to improve tolerance to functional mobility ; Pt will ambulate x 50 feet Mod I + RW ; Pt will negotiate x 5 steps with supervision to enter/exit home ; AMPAC score will improve >22/24 to demonstrate improved functional independence   Plan   Treatment/Interventions ADL retraining;Functional transfer training;LE strengthening/ROM;Therapeutic exercise;Endurance training;Bed mobility;Gait training;Spoke to nursing;OT   PT Frequency 3-5x/wk   Discharge Recommendation   Rehab Resource Intensity Level, PT III (Minimum Resource Intensity)   AM-PAC Basic Mobility Inpatient   Turning in Flat Bed Without Bedrails 3   Lying on Back to Sitting on Edge of Flat Bed Without Bedrails 3   Moving Bed to Chair 3   Standing Up From Chair Using Arms 3   Walk in Room 3   Climb 3-5 Stairs With Railing 3   Basic Mobility Inpatient Raw Score 18   Basic Mobility Standardized Score 41.05   [facility] Highest Level Of Mobility   JH-HLM Goal 6: Walk 10 steps or more   JH-HLM Achieved 6: Walk 10 steps or more   Barthel Index   Feeding 10   Bathing 0   Grooming Score 5   Dressing Score 5   Bladder Score 10   Bowels Score 10   Toilet Use Score 5   Transfers (Bed/Chair) Score 10   Mobility (Level Surface) Score 0   Stairs Score 0   Barthel Index Score 55   Additional Treatment Session   Start Time 0840   End Time 0850   Treatment Assessment S: \"I need to go to the bathroom\" O/A: Pt able to perform transfer on to commode with supervision using RW. Pt requires increased time + verbal cues for proper alignment/hand placement for safe transfer to sitting. Able to perform pericare with supervision while standing with use of RW. Repositioned in supine with all  needs within reach. P: pt will benefit from skilled PT to address deficits to maximize IND for safe d/c   Equipment Use walker, commode   End of Consult   Patient Position at End of Consult Supine;Bed/Chair alarm activated;All needs " within reach   Licensure   [place] License Number  Valerie Ohans [id number]

## 2024-12-19 NOTE — ASSESSMENT & PLAN NOTE
75 yo F with hx of LRYGB with Dr. Gonzalo Villasenor in 2011 with hx of smoking and NSAID use s/p status post emergent diagnostic laparoscopy with extensive lysis of adhesion with repair of marginal ulcer perforation with primary repair and Amilcar patch with drain placement and intraoperative endoscopy POD3. UGI yesterday confirmed no leak. WBC trending down today, pain continues to improve but persists at drain site with movement, micturating, tolerating clear liquid diet, had large BM yesterday, passing flatus, 40cc serosanginous drainage last 24 hours.     Advance diet to Full liquid with protein supplements TID  Cyanocobalamin injection x1  Thiamin/folate daily  OOB in chair, frequent ambulation  Monitor I/O  IV protonix and start carafate slurry QID  ISS and SCD's  Heparin DVT pphx  Maintain GABI drain   Continue IV antibiotics - appreciate ID recommendations - will need PO D/C recommendations for tomorrow  Monitor daily labs  Replace lytes prn - appreciate SLIM  greatly  Continue PT/OT  CM for Dispo planning for home tomorrow    Case discussed with Dr. Mohr

## 2024-12-19 NOTE — PLAN OF CARE
Problem: PAIN - ADULT  Goal: Verbalizes/displays adequate comfort level or baseline comfort level  Description: Interventions:  - Encourage patient to monitor pain and request assistance  - Assess pain using appropriate pain scale  - Administer analgesics based on type and severity of pain and evaluate response  - Implement non-pharmacological measures as appropriate and evaluate response  - Consider cultural and social influences on pain and pain management  - Notify physician/advanced practitioner if interventions unsuccessful or patient reports new pain  Outcome: Progressing     Problem: INFECTION - ADULT  Goal: Absence or prevention of progression during hospitalization  Description: INTERVENTIONS:  - Assess and monitor for signs and symptoms of infection  - Monitor lab/diagnostic results  - Monitor all insertion sites, i.e. indwelling lines, tubes, and drains  - Monitor endotracheal if appropriate and nasal secretions for changes in amount and color  - [place] appropriate cooling/warming therapies per order  - Administer medications as ordered  - Instruct and encourage patient and family to use good hand hygiene technique  - Identify and instruct in appropriate isolation precautions for identified infection/condition  Outcome: Progressing     Problem: SAFETY ADULT  Goal: Patient will remain free of falls  Description: INTERVENTIONS:  - Educate patient/family on patient safety including physical limitations  - Instruct patient to call for assistance with activity   - Consult OT/PT to assist with strengthening/mobility   - Keep Call bell within reach  - Keep bed low and locked with side rails adjusted as appropriate  - Keep care items and personal belongings within reach  - Initiate and maintain comfort rounds  - Make Fall Risk Sign visible to staff  - Offer Toileting every 2 Hours, in advance of need  - Initiate/Maintain bed alarm  - Obtain necessary fall risk management equipment: non-slip socks  - Apply  yellow socks and bracelet for high fall risk patients  - Consider moving patient to room near nurses station  Outcome: Progressing     Problem: DISCHARGE PLANNING  Goal: Discharge to home or other facility with appropriate resources  Description: INTERVENTIONS:  - Identify barriers to discharge w/patient and caregiver  - Arrange for needed discharge resources and transportation as appropriate  - Identify discharge learning needs (meds, wound care, etc.)  - Arrange for interpretive services to assist at discharge as needed  - Refer to Case Management Department for coordinating discharge planning if the patient needs post-hospital services based on physician/advanced practitioner order or complex needs related to functional status, cognitive ability, or social support system  Outcome: Progressing     Problem: Knowledge Deficit  Goal: Patient/family/caregiver demonstrates understanding of disease process, treatment plan, medications, and discharge instructions  Description: Complete learning assessment and assess knowledge base.  Interventions:  - Provide teaching at level of understanding  - Provide teaching via preferred learning methods  Outcome: Progressing

## 2024-12-19 NOTE — ASSESSMENT & PLAN NOTE
Patient presented with peritonitis secondary to perforated marginal ulcer.  She is status post laparoscopy with irrigation and repair of perforation with Amilcar patch.  There was no IntraOp culture obtained to guide antibiotic choice.  With this clinical finding, patient is at high risk for development of intra-abdominal abscess.  Overall, patient has low risk for MDRO or fungal infection, given lack of chronic antibiotic use and hospitalization.  Her current antibiotic/antifungal regimen is more than adequate empirically.  Patient is clinically much improved.  She had abdominal pain yesterday but this is now much improved with her having 2 BMs overnight.  Abdominal pain yesterday was most likely secondary to distention.  WBC also decreased today.  Plan is for discharge tomorrow on p.o. antibiotic.  Given that antibiotic course is all empiric, we will transition her to p.o. Augmentin today and observe overnight for any deterioration, prior to discharge tomorrow.  Change antibiotic to p.o. cefpodoxime/Flagyl.  Monitor abdominal pain.  Monitor WBC.  If patient continues to improve, can discharge on same p.o antibiotic regimen in the next 24 hours.  Treat x 10-day postop, through 12/26.

## 2024-12-20 ENCOUNTER — APPOINTMENT (INPATIENT)
Dept: NON INVASIVE DIAGNOSTICS | Facility: HOSPITAL | Age: 74
DRG: 326 | End: 2024-12-20
Payer: MEDICARE

## 2024-12-20 ENCOUNTER — TELEPHONE (OUTPATIENT)
Dept: PSYCHIATRY | Facility: CLINIC | Age: 74
End: 2024-12-20

## 2024-12-20 ENCOUNTER — TELEPHONE (OUTPATIENT)
Dept: BARIATRICS | Facility: CLINIC | Age: 74
End: 2024-12-20

## 2024-12-20 LAB
ANION GAP SERPL CALCULATED.3IONS-SCNC: 9 MMOL/L (ref 4–13)
AORTIC ROOT: 2.6 CM
AORTIC VALVE MEAN VELOCITY: 26.9 M/S
APICAL FOUR CHAMBER EJECTION FRACTION: 78 %
AV AREA BY CONTINUOUS VTI: 1.5 CM2
AV AREA PEAK VELOCITY: 1.4 CM2
AV LVOT MEAN GRADIENT: 7 MMHG
AV LVOT PEAK GRADIENT: 12 MMHG
AV MEAN GRADIENT: 31 MMHG
AV PEAK GRADIENT: 47 MMHG
AV REGURGITATION PRESSURE HALF TIME: 721 MS
AV VALVE AREA: 1.54 CM2
AV VELOCITY RATIO: 0.5
BASOPHILS # BLD AUTO: 0.02 THOUSANDS/ÂΜL (ref 0–0.1)
BASOPHILS NFR BLD AUTO: 0 % (ref 0–1)
BSA FOR ECHO PROCEDURE: 1.65 M2
BUN SERPL-MCNC: 6 MG/DL (ref 5–25)
CALCIUM SERPL-MCNC: 7.9 MG/DL (ref 8.4–10.2)
CHLORIDE SERPL-SCNC: 100 MMOL/L (ref 96–108)
CO2 SERPL-SCNC: 28 MMOL/L (ref 21–32)
CREAT SERPL-MCNC: 0.47 MG/DL (ref 0.6–1.3)
DOP CALC AO PEAK VEL: 3.44 M/S
DOP CALC AO VTI: 81.86 CM
DOP CALC LVOT AREA: 2.83 CM2
DOP CALC LVOT CARDIAC INDEX: 4.32 L/MIN/M2
DOP CALC LVOT CARDIAC OUTPUT: 7.12 L/MIN
DOP CALC LVOT DIAMETER: 1.9 CM
DOP CALC LVOT PEAK VEL VTI: 44.46 CM
DOP CALC LVOT PEAK VEL: 1.72 M/S
DOP CALC LVOT STROKE INDEX: 77 ML/M2
DOP CALC LVOT STROKE VOLUME: 125.99
DOP CALC MV VTI: 81.16 CM
E WAVE DECELERATION TIME: 615 MS
E/A RATIO: 1.27
EOSINOPHIL # BLD AUTO: 0.12 THOUSAND/ÂΜL (ref 0–0.61)
EOSINOPHIL NFR BLD AUTO: 1 % (ref 0–6)
ERYTHROCYTE [DISTWIDTH] IN BLOOD BY AUTOMATED COUNT: 14.6 % (ref 11.6–15.1)
FRACTIONAL SHORTENING: 30 (ref 28–44)
GFR SERPL CREATININE-BSD FRML MDRD: 96 ML/MIN/1.73SQ M
GLUCOSE SERPL-MCNC: 80 MG/DL (ref 65–140)
HCT VFR BLD AUTO: 37.7 % (ref 34.8–46.1)
HGB BLD-MCNC: 12 G/DL (ref 11.5–15.4)
IMM GRANULOCYTES # BLD AUTO: 0.14 THOUSAND/UL (ref 0–0.2)
IMM GRANULOCYTES NFR BLD AUTO: 2 % (ref 0–2)
INTERVENTRICULAR SEPTUM IN DIASTOLE (PARASTERNAL SHORT AXIS VIEW): 1 CM
INTERVENTRICULAR SEPTUM: 1 CM (ref 0.6–1.1)
LAAS-AP2: 33.6 CM2
LAAS-AP4: 29.3 CM2
LEFT ATRIUM SIZE: 4.3 CM
LEFT ATRIUM VOLUME (MOD BIPLANE): 112 ML
LEFT ATRIUM VOLUME INDEX (MOD BIPLANE): 67.9 ML/M2
LEFT INTERNAL DIMENSION IN SYSTOLE: 3.7 CM (ref 2.1–4)
LEFT VENTRICULAR INTERNAL DIMENSION IN DIASTOLE: 5.3 CM (ref 3.5–6)
LEFT VENTRICULAR POSTERIOR WALL IN END DIASTOLE: 1 CM
LEFT VENTRICULAR STROKE VOLUME: 76 ML
LVSV (TEICH): 76 ML
LYMPHOCYTES # BLD AUTO: 1.21 THOUSANDS/ÂΜL (ref 0.6–4.47)
LYMPHOCYTES NFR BLD AUTO: 13 % (ref 14–44)
MAGNESIUM SERPL-MCNC: 1.8 MG/DL (ref 1.9–2.7)
MCH RBC QN AUTO: 28.2 PG (ref 26.8–34.3)
MCHC RBC AUTO-ENTMCNC: 31.8 G/DL (ref 31.4–37.4)
MCV RBC AUTO: 89 FL (ref 82–98)
MONOCYTES # BLD AUTO: 0.61 THOUSAND/ÂΜL (ref 0.17–1.22)
MONOCYTES NFR BLD AUTO: 7 % (ref 4–12)
MV E'TISSUE VEL-LAT: 7 CM/S
MV E'TISSUE VEL-SEP: 4 CM/S
MV MEAN GRADIENT: 7 MMHG
MV PEAK A VEL: 1.33 M/S
MV PEAK E VEL: 169 CM/S
MV PEAK GRADIENT: 15 MMHG
MV STENOSIS PRESSURE HALF TIME: 178 MS
MV VALVE AREA BY CONTINUITY EQUATION: 1.55 CM2
MV VALVE AREA P 1/2 METHOD: 1.24
NEUTROPHILS # BLD AUTO: 7.22 THOUSANDS/ÂΜL (ref 1.85–7.62)
NEUTS SEG NFR BLD AUTO: 77 % (ref 43–75)
NRBC BLD AUTO-RTO: 0 /100 WBCS
PHOSPHATE SERPL-MCNC: 2.1 MG/DL (ref 2.3–4.1)
PLATELET # BLD AUTO: 322 THOUSANDS/UL (ref 149–390)
PMV BLD AUTO: 10.3 FL (ref 8.9–12.7)
POTASSIUM SERPL-SCNC: 3.4 MMOL/L (ref 3.5–5.3)
RA PRESSURE ESTIMATED: 8 MMHG
RBC # BLD AUTO: 4.25 MILLION/UL (ref 3.81–5.12)
RIGHT ATRIUM AREA SYSTOLE A4C: 22.3 CM2
RIGHT VENTRICLE ID DIMENSION: 2.8 CM
RV PSP: 50 MMHG
SL CV AV DECELERATION TIME RETROGRADE: 2487 MS
SL CV AV PEAK GRADIENT RETROGRADE: 67 MMHG
SL CV LEFT ATRIUM LENGTH A2C: 7 CM
SL CV LV EF: 55
SL CV PED ECHO LEFT VENTRICLE DIASTOLIC VOLUME (MOD BIPLANE) 2D: 134 ML
SL CV PED ECHO LEFT VENTRICLE SYSTOLIC VOLUME (MOD BIPLANE) 2D: 58 ML
SODIUM SERPL-SCNC: 137 MMOL/L (ref 135–147)
TR MAX PG: 42 MMHG
TR PEAK VELOCITY: 3.2 M/S
TRICUSPID ANNULAR PLANE SYSTOLIC EXCURSION: 2.6 CM
TRICUSPID VALVE PEAK REGURGITATION VELOCITY: 3.23 M/S
WBC # BLD AUTO: 9.32 THOUSAND/UL (ref 4.31–10.16)

## 2024-12-20 PROCEDURE — 99232 SBSQ HOSP IP/OBS MODERATE 35: CPT | Performed by: INTERNAL MEDICINE

## 2024-12-20 PROCEDURE — 85025 COMPLETE CBC W/AUTO DIFF WBC: CPT | Performed by: FAMILY MEDICINE

## 2024-12-20 PROCEDURE — 93306 TTE W/DOPPLER COMPLETE: CPT

## 2024-12-20 PROCEDURE — 83735 ASSAY OF MAGNESIUM: CPT | Performed by: FAMILY MEDICINE

## 2024-12-20 PROCEDURE — 94762 N-INVAS EAR/PLS OXIMTRY CONT: CPT

## 2024-12-20 PROCEDURE — 84100 ASSAY OF PHOSPHORUS: CPT | Performed by: FAMILY MEDICINE

## 2024-12-20 PROCEDURE — 99232 SBSQ HOSP IP/OBS MODERATE 35: CPT | Performed by: FAMILY MEDICINE

## 2024-12-20 PROCEDURE — 99024 POSTOP FOLLOW-UP VISIT: CPT | Performed by: PHYSICIAN ASSISTANT

## 2024-12-20 PROCEDURE — 93306 TTE W/DOPPLER COMPLETE: CPT | Performed by: INTERNAL MEDICINE

## 2024-12-20 PROCEDURE — 80048 BASIC METABOLIC PNL TOTAL CA: CPT | Performed by: FAMILY MEDICINE

## 2024-12-20 RX ORDER — ACETAMINOPHEN 325 MG/1
1000 TABLET ORAL EVERY 8 HOURS SCHEDULED
Start: 2024-12-20 | End: 2024-12-27

## 2024-12-20 RX ORDER — METRONIDAZOLE 500 MG/1
500 TABLET ORAL EVERY 8 HOURS SCHEDULED
Qty: 18 TABLET | Refills: 0 | Status: SHIPPED | OUTPATIENT
Start: 2024-12-20 | End: 2024-12-27

## 2024-12-20 RX ORDER — POTASSIUM CHLORIDE 1500 MG/1
40 TABLET, EXTENDED RELEASE ORAL ONCE
Status: COMPLETED | OUTPATIENT
Start: 2024-12-20 | End: 2024-12-20

## 2024-12-20 RX ORDER — FUROSEMIDE 10 MG/ML
40 INJECTION INTRAMUSCULAR; INTRAVENOUS ONCE
Status: COMPLETED | OUTPATIENT
Start: 2024-12-20 | End: 2024-12-20

## 2024-12-20 RX ORDER — POTASSIUM CHLORIDE 14.9 MG/ML
20 INJECTION INTRAVENOUS ONCE
Status: COMPLETED | OUTPATIENT
Start: 2024-12-20 | End: 2024-12-20

## 2024-12-20 RX ORDER — CEFPODOXIME PROXETIL 200 MG/1
400 TABLET, FILM COATED ORAL 2 TIMES DAILY WITH MEALS
Qty: 24 TABLET | Refills: 0 | Status: SHIPPED | OUTPATIENT
Start: 2024-12-20 | End: 2024-12-27

## 2024-12-20 RX ORDER — AMLODIPINE BESYLATE 10 MG/1
10 TABLET ORAL DAILY
Qty: 30 TABLET | Refills: 0 | Status: SHIPPED | OUTPATIENT
Start: 2024-12-20

## 2024-12-20 RX ORDER — MAGNESIUM SULFATE HEPTAHYDRATE 40 MG/ML
2 INJECTION, SOLUTION INTRAVENOUS ONCE
Status: COMPLETED | OUTPATIENT
Start: 2024-12-20 | End: 2024-12-20

## 2024-12-20 RX ADMIN — SUCRALFATE 1 G: 1 TABLET ORAL at 11:33

## 2024-12-20 RX ADMIN — CEFPODOXIME PROXETIL 400 MG: 200 TABLET, FILM COATED ORAL at 08:24

## 2024-12-20 RX ADMIN — SUCRALFATE 1 G: 1 TABLET ORAL at 22:44

## 2024-12-20 RX ADMIN — ACETAMINOPHEN 1000 MG: 1000 INJECTION, SOLUTION INTRAVENOUS at 15:02

## 2024-12-20 RX ADMIN — AMLODIPINE BESYLATE 10 MG: 10 TABLET ORAL at 08:24

## 2024-12-20 RX ADMIN — CEFPODOXIME PROXETIL 400 MG: 200 TABLET, FILM COATED ORAL at 15:42

## 2024-12-20 RX ADMIN — BUPROPION HYDROCHLORIDE 150 MG: 150 TABLET, EXTENDED RELEASE ORAL at 08:23

## 2024-12-20 RX ADMIN — BUSPIRONE HYDROCHLORIDE 5 MG: 5 TABLET ORAL at 15:02

## 2024-12-20 RX ADMIN — HEPARIN SODIUM 5000 UNITS: 5000 INJECTION, SOLUTION INTRAVENOUS; SUBCUTANEOUS at 15:02

## 2024-12-20 RX ADMIN — METRONIDAZOLE 500 MG: 500 TABLET ORAL at 05:26

## 2024-12-20 RX ADMIN — BUSPIRONE HYDROCHLORIDE 5 MG: 5 TABLET ORAL at 08:23

## 2024-12-20 RX ADMIN — ACETAMINOPHEN 1000 MG: 1000 INJECTION, SOLUTION INTRAVENOUS at 05:26

## 2024-12-20 RX ADMIN — FUROSEMIDE 40 MG: 10 INJECTION, SOLUTION INTRAMUSCULAR; INTRAVENOUS at 11:32

## 2024-12-20 RX ADMIN — METRONIDAZOLE 500 MG: 500 TABLET ORAL at 22:44

## 2024-12-20 RX ADMIN — HEPARIN SODIUM 5000 UNITS: 5000 INJECTION, SOLUTION INTRAVENOUS; SUBCUTANEOUS at 05:26

## 2024-12-20 RX ADMIN — POTASSIUM CHLORIDE 40 MEQ: 1500 TABLET, EXTENDED RELEASE ORAL at 08:23

## 2024-12-20 RX ADMIN — METRONIDAZOLE 500 MG: 500 TABLET ORAL at 15:02

## 2024-12-20 RX ADMIN — POTASSIUM & SODIUM PHOSPHATES POWDER PACK 280-160-250 MG 2 PACKET: 280-160-250 PACK at 08:23

## 2024-12-20 RX ADMIN — THIAMINE HYDROCHLORIDE: 100 INJECTION, SOLUTION INTRAMUSCULAR; INTRAVENOUS at 09:31

## 2024-12-20 RX ADMIN — MAGNESIUM SULFATE HEPTAHYDRATE 2 G: 40 INJECTION, SOLUTION INTRAVENOUS at 11:33

## 2024-12-20 RX ADMIN — SUCRALFATE 1 G: 1 TABLET ORAL at 08:23

## 2024-12-20 RX ADMIN — PANTOPRAZOLE SODIUM 8 MG/HR: 40 INJECTION, POWDER, FOR SOLUTION INTRAVENOUS at 20:11

## 2024-12-20 RX ADMIN — FAMOTIDINE 20 MG: 10 INJECTION, SOLUTION INTRAVENOUS at 20:06

## 2024-12-20 RX ADMIN — FAMOTIDINE 20 MG: 10 INJECTION, SOLUTION INTRAVENOUS at 11:53

## 2024-12-20 RX ADMIN — HYDROMORPHONE HYDROCHLORIDE 0.5 MG: 1 INJECTION, SOLUTION INTRAMUSCULAR; INTRAVENOUS; SUBCUTANEOUS at 23:05

## 2024-12-20 RX ADMIN — SUCRALFATE 1 G: 1 TABLET ORAL at 17:16

## 2024-12-20 RX ADMIN — TRAZODONE HYDROCHLORIDE 100 MG: 100 TABLET ORAL at 22:45

## 2024-12-20 RX ADMIN — ACETAMINOPHEN 1000 MG: 1000 INJECTION, SOLUTION INTRAVENOUS at 22:45

## 2024-12-20 RX ADMIN — POTASSIUM CHLORIDE 20 MEQ: 14.9 INJECTION, SOLUTION INTRAVENOUS at 11:33

## 2024-12-20 RX ADMIN — HEPARIN SODIUM 5000 UNITS: 5000 INJECTION, SOLUTION INTRAVENOUS; SUBCUTANEOUS at 22:45

## 2024-12-20 RX ADMIN — PANTOPRAZOLE SODIUM 8 MG/HR: 40 INJECTION, POWDER, FOR SOLUTION INTRAVENOUS at 07:15

## 2024-12-20 RX ADMIN — NEBIVOLOL 5 MG: 10 TABLET ORAL at 08:23

## 2024-12-20 RX ADMIN — BUSPIRONE HYDROCHLORIDE 5 MG: 5 TABLET ORAL at 20:06

## 2024-12-20 RX ADMIN — ESCITALOPRAM OXALATE 20 MG: 10 TABLET ORAL at 08:23

## 2024-12-20 RX ADMIN — DONEPEZIL HYDROCHLORIDE 10 MG: 5 TABLET ORAL at 22:44

## 2024-12-20 NOTE — DISCHARGE SUMMARY
"  Discharge Summary - Zulma Marcelo 74 y.o. female MRN: [id number]    Unit/Bed#: [address] [place] [address] Encounter: [id number]      Pre-Operative Diagnosis: Pre-Op Diagnosis Codes:      * Pneumoperitoneum [K66.8]     * Perforated abdominal viscus [R19.8]    Post-Operative Diagnosis: Post-Op Diagnosis Codes:     * Pneumoperitoneum [K66.8]     * Perforated abdominal viscus [R19.8]    Procedures Performed:  Procedure(s):  LAPAROSCOPY DIAGNOSTIC,  LYSIS OF ADHESIONS, REPAIR PERFORATED MARGINAL ULCER, INTRA-OP EGD    Surgeon: Alex Mohr MD    See H & P for full details of admission and Operative Note for full details of operations performed.     Hospital Course:  73 yo F with hx of LRYGB with Dr. Gonzalo Villasenor in 2011 with hx of smoking and NSAID use was admitted emergently on 12/16 and underwent diagnostic laparoscopy with extensive lysis of adhesion with repair of marginal ulcer perforation with primary repair and Amilcar patch with drain placement and intraoperative endoscopy. Postoperatively she recovered without incident on Med Surg floor. UGI on 12/18 confirmed no leak and clear liquid diet was started. She began having bowel function and was advanced to full liquid diet on 12/19. WBC normalized today 12/20 on PO antibiotics - vitals and labs continue to be stable and she denies pain and feels confident and ready for D/C home.     Patient was seen and examined prior to discharge.      Provisions for Follow-Up Care:  See After Visit Summary for information related to follow-up care and home orders.      Disposition: Home, in stable condition. Patient should refer to \"Discharge Instructions\" for further information.    Planned Readmission: No    Discharge Medications:  See After Visit Summary for reconciled discharge medications provided to patient and family.      Post Operative instructions: Reviewed with patient and/or family.    This text is generated with voice recognition software. There may be translation, syntax,  or " grammatical errors. If you have any questions, please contact the dictating provider.     Signature:   Elisabet Burgos PA-C  Date: 12/20/2024 Time: 7:06 AM

## 2024-12-20 NOTE — PROGRESS NOTES
Progress Note - Infectious Disease   Name: Zulma Marcelo 74 y.o. female I MRN: [id number]  Unit/Bed#: 2 [address] Date of Admission: 12/16/2024   Date of Service: 12/20/2024 I Hospital Day: 4    Assessment & Plan  Peritonitis (HCC)  Patient presented with peritonitis secondary to perforated marginal ulcer.  She is status post laparoscopy with irrigation and repair of perforation with Amilcar patch.  There was no IntraOp culture obtained to guide antibiotic choice.  With this clinical finding, patient is at high risk for development of intra-abdominal abscess.  Overall, patient has low risk for MDRO or fungal infection, given lack of chronic antibiotic use and hospitalization.  Her current antibiotic/antifungal regimen is more than adequate empirically.  Patient is clinically much improved.  Abdominal pain has resolved.  Drain remains serous.  Antibiotic was changed to p.o. yesterday and she continues to improve on p.o. antibiotic.  WBC has normalized.  Continue to p.o. cefpodoxime/Flagyl.  Treat x 10-day postop, through 12/26.  Perforated peptic ulcer (HCC)  Patient presented with perforated marginal ulcer.  She has a history of chronic marginal ulcer post RYGB and has been taking excessive amount of ibuprofen recently.  This is likely etiology of her marginal ulcer perforation.  Patient is status post repair with Amilcar patch.   Antibiotic plan as in above.  Bariatric surgery follow-up.  QT prolongation  There is high risk of worsening QTc prolongation with azoles, precipitating torsades.  Avoid azoles and fluoroquinolones in anti-infective regimen.  History of Debra-en-Y gastric bypass  Patient is status post RYGB in 2011, complicated by marginal ulcer.  Bariatric surgery follow-up.  Penicillin allergy  Patient has history of GI intolerance and hives with Augmentin.  She tolerated cefepime well and is tolerating cefpodoxime well also.  Monitor for cross allergic reaction.    Discussed with patient in detail  regarding the above plan.  I have discussed with Elisabet Burgos PA-C from bariatric surgery service regarding the above plan to continue current antibiotics.  She agrees with the plan.  Okay for discharge from ID viewpoint.    Antibiotics:  Cefpodoxime/Flagyl  POD # 3    Subjective   Patient feels well.  She has minimal abdominal pain.  Temperature stays down.  No chills.  She is tolerating antibiotic well.  No nausea, vomiting or diarrhea.    Objective :  Temp:  [97.4 °F (36.3 °C)-97.5 °F (36.4 °C)] 97.5 °F (36.4 °C)  HR:  [57] 57  BP: (151-165)/(76-96) 151/96  Resp:  [18-21] 18  SpO2:  [92 %-97 %] 97 %  O2 Device: Nasal cannula  Nasal Cannula O2 Flow Rate (L/min):  [2 L/min-3.5 L/min] 2 L/min    General:  No acute distress  Psychiatric:  Awake and alert  Pulmonary:  Normal respiratory excursion without accessory muscle use  Abdomen:  Soft, nondistended, minimal LUQ tenderness, drain serous, all incision and tube sites clean, without drainage/erythema.  Extremities:  No edema  Skin:  No rashes      Lab Results: I have reviewed the following results:  Results from last 7 days   Lab Units 12/20/24  0522 12/19/24  0453 12/18/24  0531   WBC Thousand/uL 9.32 11.98* 13.69*   HEMOGLOBIN g/dL 12.0 11.5 10.9*   PLATELETS Thousands/uL 322 292 249     Results from last 7 days   Lab Units 12/20/24  0522 12/19/24  0453 12/18/24  0531 12/17/24  0511   SODIUM mmol/L 137 137 137 136   POTASSIUM mmol/L 3.4* 3.2* 3.5 3.4*   CHLORIDE mmol/L 100 103 107 105   CO2 mmol/L 28 25 23 26   BUN mg/dL 6 10 15 12   CREATININE mg/dL 0.47* 0.57* 0.54* 0.54*   EGFR ml/min/1.73sq m 96 90 91 91   CALCIUM mg/dL 7.9* 7.7* 7.8* 7.9*   AST U/L  --  15 9* 9*   ALT U/L  --  7 5* 4*   ALK PHOS U/L  --  51 38 42   ALBUMIN g/dL  --  3.0* 2.8* 3.0*     Results from last 7 days   Lab Units 12/16/24  0844 12/16/24  0830   BLOOD CULTURE  No Growth at 72 hrs. No Growth at 72 hrs.     Results from last 7 days   Lab Units 12/17/24  0511 12/16/24  0830    PROCALCITONIN ng/ml 3.61* 2.08*

## 2024-12-20 NOTE — ASSESSMENT & PLAN NOTE
Patient presented with perforated marginal ulcer.  She has a history of chronic marginal ulcer post RYGB and has been taking excessive amount of ibuprofen recently.  This is likely etiology of her marginal ulcer perforation.  Patient is status post repair with Amilcar patch.   Antibiotic plan as in above.  Bariatric surgery follow-up.

## 2024-12-20 NOTE — TELEPHONE ENCOUNTER
Rcvd a call from graciela salas surgical PA wanting for patient Zulma to f/u with our surgeon Dr. Walden or Fani at our [place] office...Lvm for the patient advising I palced her for next week w/ Dr. Walden on 12/26 at 945am...will reach out again with the patient to make sure she f/u at the office.   
 delivery delivered

## 2024-12-20 NOTE — ASSESSMENT & PLAN NOTE
75 yo F with hx of LRYGB with Dr. Gonzalo Villasenor in 2011 with hx of smoking and NSAID use s/p status post emergent diagnostic laparoscopy with extensive lysis of adhesion with repair of marginal ulcer perforation with primary repair and Amilcar patch with drain placement and intraoperative endoscopy POD4. UGI on 12/18 confirmed no leak. WBC normalized today on PO antibiotics, pain continues to improve but persists at drain site with movement, micturating, tolerating full liquid diet, continues to pass gas.     Continue Full liquid with protein supplements TID  Continue Protonix 40mg BID and Carafate slurry 1g QID - absolute cessation of NSAIDS and smoking/tobacco  Maintain GABI drain - will remove in office in 1 week  Continue PO antibiotics - appreciate ID recommendations  Replace lytes prn - appreciate SLIM  greatly  Continue PT/OT  Plan for D/C home today    Case discussed with Dr. Mohr

## 2024-12-20 NOTE — PLAN OF CARE
Problem: Potential for Falls  Goal: Patient will remain free of falls  Description: INTERVENTIONS:  - Educate patient/family on patient safety including physical limitations  - Instruct patient to call for assistance with activity   - Consult OT/PT to assist with strengthening/mobility   - Keep Call bell within reach  - Keep bed low and locked with side rails adjusted as appropriate  - Keep care items and personal belongings within reach  - Initiate and maintain comfort rounds  - Make Fall Risk Sign visible to staff  - Offer Toileting every 2 Hours, in advance of need  - Initiate/Maintain bed/ chair alarm  - Obtain necessary fall risk management equipment: call bell   - Apply yellow socks and bracelet for high fall risk patients  - Consider moving patient to room near nurses station  Outcome: Progressing     Problem: RESPIRATORY - ADULT  Goal: Achieves optimal ventilation and oxygenation  Description: INTERVENTIONS:  - Assess for changes in respiratory status  - Assess for changes in mentation and behavior  - Position to facilitate oxygenation and minimize respiratory effort  - Oxygen administered by appropriate delivery if ordered  - Initiate smoking cessation education as indicated  - Encourage broncho-pulmonary hygiene including cough, deep breathe, Incentive Spirometry  - Assess the need for suctioning and aspirate as needed  - Assess and instruct to report SOB or any respiratory difficulty  - Respiratory Therapy support as indicated  Outcome: Progressing     Problem: PAIN - ADULT  Goal: Verbalizes/displays adequate comfort level or baseline comfort level  Description: Interventions:  - Encourage patient to monitor pain and request assistance  - Assess pain using appropriate pain scale  - Administer analgesics based on type and severity of pain and evaluate response  - Implement non-pharmacological measures as appropriate and evaluate response  - Consider cultural and social influences on pain and pain  management  - Notify physician/advanced practitioner if interventions unsuccessful or patient reports new pain  Outcome: Progressing     Problem: INFECTION - ADULT  Goal: Absence or prevention of progression during hospitalization  Description: INTERVENTIONS:  - Assess and monitor for signs and symptoms of infection  - Monitor lab/diagnostic results  - Monitor all insertion sites, i.e. indwelling lines, tubes, and drains  - Monitor endotracheal if appropriate and nasal secretions for changes in amount and color  - [place] appropriate cooling/warming therapies per order  - Administer medications as ordered  - Instruct and encourage patient and family to use good hand hygiene technique  - Identify and instruct in appropriate isolation precautions for identified infection/condition  Outcome: Progressing  Goal: Absence of fever/infection during neutropenic period  Description: INTERVENTIONS:  - Monitor WBC    Outcome: Progressing     Problem: SAFETY ADULT  Goal: Patient will remain free of falls  Description: INTERVENTIONS:  - Educate patient/family on patient safety including physical limitations  - Instruct patient to call for assistance with activity   - Consult OT/PT to assist with strengthening/mobility   - Keep Call bell within reach  - Keep bed low and locked with side rails adjusted as appropriate  - Keep care items and personal belongings within reach  - Initiate and maintain comfort rounds  - Make Fall Risk Sign visible to staff  - Offer Toileting every 2 Hours, in advance of need  - Initiate/Maintain bed/ chair alarm  - Obtain necessary fall risk management equipment: call bell  - Apply yellow socks and bracelet for high fall risk patients  - Consider moving patient to room near nurses station  Outcome: Progressing  Goal: Maintain or return to baseline ADL function  Description: INTERVENTIONS:  -  Assess patient's ability to carry out ADLs; assess patient's baseline for ADL function and identify physical  deficits which impact ability to perform ADLs (bathing, care of mouth/teeth, toileting, grooming, dressing, etc.)  - Assess/evaluate cause of self-care deficits   - Assess range of motion  - Assess patient's mobility; develop plan if impaired  - Assess patient's need for assistive devices and provide as appropriate  - Encourage maximum independence but intervene and supervise when necessary  - Involve family in performance of ADLs  - Assess for home care needs following discharge   - Consider OT consult to assist with ADL evaluation and planning for discharge  - Provide patient education as appropriate  Outcome: Progressing  Goal: Maintains/Returns to pre admission functional level  Description: INTERVENTIONS:  - Perform AM-PAC 6 Click Basic Mobility/ Daily Activity assessment daily.  - Set and communicate daily mobility goal to care team and patient/family/caregiver.   - Collaborate with rehabilitation services on mobility goals if consulted  - Ambulate patient 3 times a day  - Out of bed to chair 3 times a day   - Out of bed for meals 3 times a day  - Out of bed for toileting  - Record patient progress and toleration of activity level   Outcome: Progressing     Problem: DISCHARGE PLANNING  Goal: Discharge to home or other facility with appropriate resources  Description: INTERVENTIONS:  - Identify barriers to discharge w/patient and caregiver  - Arrange for needed discharge resources and transportation as appropriate  - Identify discharge learning needs (meds, wound care, etc.)  - Arrange for interpretive services to assist at discharge as needed  - Refer to Case Management Department for coordinating discharge planning if the patient needs post-hospital services based on physician/advanced practitioner order or complex needs related to functional status, cognitive ability, or social support system  Outcome: Progressing     Problem: Knowledge Deficit  Goal: Patient/family/caregiver demonstrates understanding of  disease process, treatment plan, medications, and discharge instructions  Description: Complete learning assessment and assess knowledge base.  Interventions:  - Provide teaching at level of understanding  - Provide teaching via preferred learning methods  Outcome: Progressing

## 2024-12-20 NOTE — PROGRESS NOTES
Progress Note - Hospitalist   Name: Zulma Marcelo 74 y.o. female I MRN: [id number]  Unit/Bed#: 2 [address] Date of Admission: 12/16/2024   Date of Service: 12/20/2024 I Hospital Day: 4     Assessment & Plan  Pneumoperitoneum  Presented with left lower quadrant pain nausea with nonbloody bilious emesis over few days progressing to worsening abdominal pain epigastric/chest pain radiating to left shoulder today  Evaluated in ED, workup revealed pneumoperitoneum with focal perforation of medial wall of proximal Debra-en-Y immediately distal to GJ anastomosis   status post emergent diagnostic laparoscopy with extensive lysis of adhesion with repair of marginal ulcer perforation with primary repair and Amilcar patch with drain placement and intraoperative endoscopy.  Patient was noted to have peritonitis with purulent content with significant adhesions.   Postoperatively, hemodynamically stable.  Abdomen with mild appropriate tenderness otherwise soft.  Strict n.p.o.  IV PPI drip  IV cefepime, metronidazole  Received IV fluconazole in ED, will change to micafungin due to QT prolongation  Follow-up CBC, CMP  Monitor intake output  Postoperative care as per primary team  Symptomatic treatment, pain control    - Patient is status post diagnostic laparoscopy with extensive lysis of adhesions found to have marginal ulcer perforation s/p primary repair and reinforcement with a Amilcar patch in addition to placement of a drain.  Patient continued on oral cefpodoxime and Flagyl per recommendations from infectious disease. Upper GI series performed yesterday showed no evidence of leak.  Diet advancement per surgery.  Will continue to monitor.  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  QTc 508  Troponin-10-10  Denies any chest pain at present  Monitor symptoms  COPD  Appears stable  Respiratory protocol  Bronchodilators as needed  Incentive spirometry  Essential hypertension  Holding nebivolol.  Continue IV metoprolol for now    - Norvasc increased to 10mg daily. Continue hydralazine prn.  Will continue to monitor blood pressure and make further adjustments as needed.  Chronic GERD  IV PPI  Sleep apnea  Prior history of SORAYA, awaiting repeat sleep studies as per records  Moderate episode of recurrent major depressive disorder (HCC)  PO meds restarted.  Cognitive impairment  Donepezil restarted  QT prolongation  Telemetry  Monitor and replete electrolytes  Check magnesium level  Peritonitis (HCC)    Perforated peptic ulcer (HCC)    Penicillin allergy    Hypoxia  CXR performed showing small effusions and edema concerning for CHF. Will administer a dose of IV lasix therapy. Will also obtain a 2D echo. Repeat chest x-ray tomorrow morning. Patient currently on 2L of oxygen.  Continue supplemental oxygen therapy as needed and wean as tolerated.  Ambulatory walk test to be performed prior to discharge.  VTE Pharmacologic Prophylaxis:   Heparin prophylaxis.    Mobility:   Basic Mobility Inpatient Raw Score: 18  JH-HLM Goal: 6: Walk 10 steps or more  JH-HLM Achieved: 2: Bed activities/Dependent transfer    Patient Centered Rounds: I performed bedside rounds with nursing staff today.     Current Length of Stay: 4 day(s)  Current Patient Status: Inpatient   Certification Statement: The patient will continue to require additional inpatient hospital stay due to above.  Discharge Plan: TBD    Code Status: Prior    Subjective   Patient was seen and examined at bedside.  No acute events overnight.  No new  complaints.    Objective :  Temp:  [97.5 °F (36.4 °C)-97.6 °F (36.4 °C)] 97.6 °F (36.4 °C)  HR:  [62] 62  BP: (132-151)/(75-96) 132/75  Resp:  [18] 18  SpO2:  [92 %-97 %] 97 %  O2 Device: Nasal cannula  Nasal Cannula O2 Flow Rate (L/min):  [2 L/min] 2 L/min    Body mass index is 29.1 kg/m².     Input and Output Summary (last 24 hours):     Intake/Output Summary (Last 24 hours) at 12/20/2024 1240  Last data filed at 12/20/2024 0855  Gross per 24 hour   Intake 240 ml   Output 585 ml   Net -345 ml     Physical Exam  HENT:      Head: Normocephalic.      Mouth/Throat:      Mouth: Mucous membranes are moist.   Eyes:      Extraocular Movements: Extraocular movements intact.   Cardiovascular:      Rate and Rhythm: Normal rate.   Pulmonary:      Effort: Pulmonary effort is normal. No respiratory distress.   Abdominal:      Palpations: Abdomen is soft.      Comments: + mild tenderness   Skin:     General: Skin is warm.   Neurological:      Mental Status: She is alert and oriented to person, place, and time.   Psychiatric:         Mood and Affect: Mood normal.       Lines/Drains:  Lines/Drains/Airways       Active Status       Name Placement date Placement time Site Days    Closed/Suction Drain LUQ Bulb 19 Fr. 12/16/24  1601  LUQ  3                  Telemetry:  Telemetry Orders (From admission, onward)               24 Hour Telemetry Monitoring  Continuous x 24 Hours (Telem)        Expiring   Question:  Reason for 24 Hour Telemetry  Answer:  Arrhythmias requiring acute medical intervention / PPM or ICD malfunction                  Lab Results: I have reviewed the following results:   Results from last 7 days   Lab Units 12/20/24  0522   WBC Thousand/uL 9.32   HEMOGLOBIN g/dL 12.0   HEMATOCRIT % 37.7   PLATELETS Thousands/uL 322   SEGS PCT % 77*   LYMPHO PCT % 13*   MONO PCT % 7   EOS PCT % 1     Results from last 7 days   Lab Units 12/20/24  0522 12/19/24  0453   SODIUM mmol/L 137 137   POTASSIUM mmol/L 3.4* 3.2*   CHLORIDE  mmol/L 100 103   CO2 mmol/L 28 25   BUN mg/dL 6 10   CREATININE mg/dL 0.47* 0.57*   ANION GAP mmol/L 9 9   CALCIUM mg/dL 7.9* 7.7*   ALBUMIN g/dL  --  3.0*   TOTAL BILIRUBIN mg/dL  --  0.55   ALK PHOS U/L  --  51   ALT U/L  --  7   AST U/L  --  15   GLUCOSE RANDOM mg/dL 80 74     Results from last 7 days   Lab Units 12/17/24  0511 12/16/24  0830   LACTIC ACID mmol/L  --  1.2   PROCALCITONIN ng/ml 3.61* 2.08*     Recent Cultures (last 7 days):   Results from last 7 days   Lab Units 12/16/24  0844 12/16/24  0830   BLOOD CULTURE  No Growth at 72 hrs. No Growth at 72 hrs.     Last 24 Hours Medication List:     Current Facility-Administered Medications:     acetaminophen (Ofirmev) injection 1,000 mg, Q8H JUDIE, Last Rate: 1,000 mg (12/20/24 0526)    albuterol (PROVENTIL HFA,VENTOLIN HFA) inhaler 2 puff, Q6H PRN    amLODIPine (NORVASC) tablet 10 mg, Daily    buPROPion (WELLBUTRIN XL) 24 hr tablet 150 mg, Daily    busPIRone (BUSPAR) tablet 5 mg, TID    cefpodoxime (VANTIN) tablet 400 mg, BID With Meals    diphenhydrAMINE (BENADRYL) tablet 25 mg, HS PRN    donepezil (ARICEPT) tablet 10 mg, HS    escitalopram (LEXAPRO) tablet 20 mg, Daily    Famotidine (PF) (PEPCID) injection 20 mg, Q12H JUDIE    folic acid 1 mg, thiamine (VITAMIN B1) 100 mg in sodium chloride 0.9 % 100 mL IV piggyback, Daily    heparin (porcine) subcutaneous injection 5,000 Units, Q8H JUDIE **AND** Platelet count, Once    hydrALAZINE (APRESOLINE) injection 10 mg, Q6H PRN    HYDROmorphone (DILAUDID) injection 0.5 mg, Q4H PRN    levalbuterol (XOPENEX) inhalation solution 0.63 mg, Q8H PRN **AND** ipratropium (ATROVENT) 0.02 % inhalation solution 0.5 mg, Q8H PRN    LORazepam (ATIVAN) injection 0.5 mg, Q6H PRN    magnesium sulfate 2 g/50 mL IVPB (premix) 2 g, Once, Last Rate: 2 g (12/20/24 1133)    metroNIDAZOLE (FLAGYL) tablet 500 mg, Q8H JUDIE    morphine injection 2 mg, Q2H PRN    morphine injection 4 mg, Q2H PRN    nebivolol (BYSTOLIC) tablet 5 mg, Daily     ondansetron (ZOFRAN) injection 4 mg, Q6H PRN    pantoprazole (PROTONIX) 80 mg in sodium chloride 0.9 % 100 mL infusion, Continuous, Last Rate: 8 mg/hr (12/20/24 0715)    phenol (CHLORASEPTIC) 1.4 % mucosal liquid 1 spray, Q2H PRN    potassium chloride 20 mEq IVPB (premix), Once, Last Rate: 20 mEq (12/20/24 1133)    sucralfate (CARAFATE) tablet 1 g, 4x Daily    traZODone (DESYREL) tablet 100 mg, HS    Administrative Statements   Today, Patient Was Seen By: Onesimo Chapin MD    **Please Note: This note may have been constructed using a voice recognition system.**

## 2024-12-20 NOTE — DISCHARGE INSTR - AVS FIRST PAGE
Please continue taking all medications as prescribed. Please follow up with your primary medical doctor within 1 week of discharge. Please follow up with bariatrics in 1 week. Please return to the nearest emergency department if you develop any signs or symptoms of worsening disease or infection, including but not limited to chest pain, shortness of breath, abdominal pain, lightheadedness, dizziness, palpitations, fevers or chills.     Bariatric/Weight Loss Surgery  Hospital Discharge Instructions  ACTIVITY:  Progress as feels comfortable - a good rule is:  if you are doing something and it begins to hurt, stop doing the activity. Walk every hour while at home.  You may walk stairs if you do so slowly  You may shower 48 hours after surgery.  Use your incentive spirometer 10 times per hour while awake for 1 week  Do NOT drive for 48 hours after surgery. No driving 24 hours after taking certain prescription pain medications . Examples of such medication are Percocet, Darvocet, Oxycodone, Tylenol #3, and Tylenol with Codeine. Follow your pharmacist’s orders.    DIET  Stay on a full liquid diet    MEDICATIONS:  The abdominal nerve block will wear off during the first 1-2 days that you are home, and you may become sore. Continue to take your Tylenol and your pain medication as instructed.   Start vitamins and minerals per Heather's instructions   NO smoking or Motrin, Aleve, Ibuprofen or any NSAIDS  Take Protonix 40mg twice a day and carafate slurry 1g four times a day  Take antibiotics as directed  Anti-acid Medication as per prescription.  Other medications as indicated on the Physician Patient Discharge Instructions form given to you at the time of discharge.  You will need to consult with your Family Doctor in regards to all your prescribed medication, particularly those for blood pressure and diabetes.  As you lose weight, medical conditions may change, requiring an alteration or elimination of the drug dose.   DO  NOT TAKE BIRTH CONTROL(BC) MEDICATIONS, INSERT BC VAGINAL RINGS, OR PLACE IUD OR ANY OTHER BC METHODS UNTIL 31 DAYS FROM DAY OF DISCHARGE FROM HOSPITAL. THIS PLACES YOU AT HIGH RISK FOR A POTENTIALLY LIFE THREATENING BLOOD CLOT. Remember to always use barrier methods for birth control and speak to your GYN about using two forms of birth control to start 31 days after surgery. It is very important to avoid pregnancy until at least 18-24 months after surgery.       INCISION CARE  You may shower and get incisions wet 2 days after surgery. No soaking tub baths or swimming for 30 days after surgery. Keep abdominal area and incisions clean. Use soap and water to create a good lather and rinse off. Do not scrub incisions.   If you have a drain, empty the drain as the nurses instructed.    FOLLOW-UP APPOINTMENT should be made for one week after discharge. Call surgeon’s office at [phone] to schedule an appointment.    CALL YOUR DOCTOR FOR:  pain not controlled by pain medications, a temperature greater than 101.5° F, any increase or change in drainage or redness from any incision, any vomiting or inability to keep liquids down, shortness of breath, shoulder pain, or bleeding

## 2024-12-20 NOTE — ASSESSMENT & PLAN NOTE
Improved but still elevated , continue BP medications per SLIM upon D/C and f/u outpatient with PCP

## 2024-12-20 NOTE — ASSESSMENT & PLAN NOTE
Holding nebivolol.  Continue IV metoprolol for now    - Norvasc increased to 10mg daily. Continue hydralazine prn.  Will continue to monitor blood pressure and make further adjustments as needed.

## 2024-12-20 NOTE — TELEPHONE ENCOUNTER
Spoke to Zulma.  Informed her that Alis is unable to adjust her medications while she is inpatient.  Informed her that she should ask to have inpatient psychiatry consulted however Zulma said she is going to be discharged today.  States due to insurance she has to be discharged.  States she is feeling a little better and she will do the best she can.    Will refer to Alis Kearns for review.

## 2024-12-20 NOTE — ASSESSMENT & PLAN NOTE
Patient has history of GI intolerance and hives with Augmentin.  She tolerated cefepime well and is tolerating cefpodoxime well also.  Monitor for cross allergic reaction.    Discussed with patient in detail regarding the above plan.

## 2024-12-20 NOTE — PLAN OF CARE
Problem: Potential for Falls  Goal: Patient will remain free of falls  Description: INTERVENTIONS:  - Educate patient/family on patient safety including physical limitations  - Instruct patient to call for assistance with activity   - Consult OT/PT to assist with strengthening/mobility   - Keep Call bell within reach  - Keep bed low and locked with side rails adjusted as appropriate  - Keep care items and personal belongings within reach  - Initiate and maintain comfort rounds  - Make Fall Risk Sign visible to staff  - Offer Toileting every 2 Hours, in advance of need  - Initiate/Maintain bed alarm  - Obtain necessary fall risk management equipment:   - Apply yellow socks and bracelet for high fall risk patients  - Consider moving patient to room near nurses station  Outcome: Progressing     Problem: RESPIRATORY - ADULT  Goal: Achieves optimal ventilation and oxygenation  Description: INTERVENTIONS:  - Assess for changes in respiratory status  - Assess for changes in mentation and behavior  - Position to facilitate oxygenation and minimize respiratory effort  - Oxygen administered by appropriate delivery if ordered  - Initiate smoking cessation education as indicated  - Encourage broncho-pulmonary hygiene including cough, deep breathe, Incentive Spirometry  - Assess the need for suctioning and aspirate as needed  - Assess and instruct to report SOB or any respiratory difficulty  - Respiratory Therapy support as indicated  Outcome: Progressing     Problem: PAIN - ADULT  Goal: Verbalizes/displays adequate comfort level or baseline comfort level  Description: Interventions:  - Encourage patient to monitor pain and request assistance  - Assess pain using appropriate pain scale  - Administer analgesics based on type and severity of pain and evaluate response  - Implement non-pharmacological measures as appropriate and evaluate response  - Consider cultural and social influences on pain and pain management  - Notify  physician/advanced practitioner if interventions unsuccessful or patient reports new pain  Outcome: Progressing     Problem: INFECTION - ADULT  Goal: Absence or prevention of progression during hospitalization  Description: INTERVENTIONS:  - Assess and monitor for signs and symptoms of infection  - Monitor lab/diagnostic results  - Monitor all insertion sites, i.e. indwelling lines, tubes, and drains  - Monitor endotracheal if appropriate and nasal secretions for changes in amount and color  - [place] appropriate cooling/warming therapies per order  - Administer medications as ordered  - Instruct and encourage patient and family to use good hand hygiene technique  - Identify and instruct in appropriate isolation precautions for identified infection/condition  Outcome: Progressing  Goal: Absence of fever/infection during neutropenic period  Description: INTERVENTIONS:  - Monitor WBC    Outcome: Progressing     Problem: SAFETY ADULT  Goal: Patient will remain free of falls  Description: INTERVENTIONS:  - Educate patient/family on patient safety including physical limitations  - Instruct patient to call for assistance with activity   - Consult OT/PT to assist with strengthening/mobility   - Keep Call bell within reach  - Keep bed low and locked with side rails adjusted as appropriate  - Keep care items and personal belongings within reach  - Initiate and maintain comfort rounds  - Make Fall Risk Sign visible to staff  - Offer Toileting every 2 Hours, in advance of need  - Initiate/Maintain bed alarm  - Obtain necessary fall risk management equipment:   - Apply yellow socks and bracelet for high fall risk patients  - Consider moving patient to room near nurses station  Outcome: Progressing  Goal: Maintain or return to baseline ADL function  Description: INTERVENTIONS:  -  Assess patient's ability to carry out ADLs; assess patient's baseline for ADL function and identify physical deficits which impact ability to  perform ADLs (bathing, care of mouth/teeth, toileting, grooming, dressing, etc.)  - Assess/evaluate cause of self-care deficits   - Assess range of motion  - Assess patient's mobility; develop plan if impaired  - Assess patient's need for assistive devices and provide as appropriate  - Encourage maximum independence but intervene and supervise when necessary  - Involve family in performance of ADLs  - Assess for home care needs following discharge   - Consider OT consult to assist with ADL evaluation and planning for discharge  - Provide patient education as appropriate  Outcome: Progressing  Goal: Maintains/Returns to pre admission functional level  Description: INTERVENTIONS:  - Perform AM-PAC 6 Click Basic Mobility/ Daily Activity assessment daily.  - Set and communicate daily mobility goal to care team and patient/family/caregiver.   - Collaborate with rehabilitation services on mobility goals if consulted  - Perform Range of Motion 3 times a day.  - Reposition patient every 2 hours.  - Dangle patient 3 times a day  - Stand patient 3 times a day  - Ambulate patient 3 times a day  - Out of bed to chair 3 times a day   - Out of bed for meals 3 times a day  - Out of bed for toileting  - Record patient progress and toleration of activity level   Outcome: Progressing     Problem: DISCHARGE PLANNING  Goal: Discharge to home or other facility with appropriate resources  Description: INTERVENTIONS:  - Identify barriers to discharge w/patient and caregiver  - Arrange for needed discharge resources and transportation as appropriate  - Identify discharge learning needs (meds, wound care, etc.)  - Arrange for interpretive services to assist at discharge as needed  - Refer to Case Management Department for coordinating discharge planning if the patient needs post-hospital services based on physician/advanced practitioner order or complex needs related to functional status, cognitive ability, or social support  system  Outcome: Progressing     Problem: Knowledge Deficit  Goal: Patient/family/caregiver demonstrates understanding of disease process, treatment plan, medications, and discharge instructions  Description: Complete learning assessment and assess knowledge base.  Interventions:  - Provide teaching at level of understanding  - Provide teaching via preferred learning methods  Outcome: Progressing

## 2024-12-20 NOTE — ASSESSMENT & PLAN NOTE
Patient presented with peritonitis secondary to perforated marginal ulcer.  She is status post laparoscopy with irrigation and repair of perforation with Amilcar patch.  There was no IntraOp culture obtained to guide antibiotic choice.  With this clinical finding, patient is at high risk for development of intra-abdominal abscess.  Overall, patient has low risk for MDRO or fungal infection, given lack of chronic antibiotic use and hospitalization.  Her current antibiotic/antifungal regimen is more than adequate empirically.  Patient is clinically much improved.  Abdominal pain has resolved.  Drain remains serous.  Antibiotic was changed to p.o. yesterday and she continues to improve on p.o. antibiotic.  WBC has normalized.  Continue to p.o. cefpodoxime/Flagyl.  Treat x 10-day postop, through 12/26.

## 2024-12-20 NOTE — PROGRESS NOTES
Progress Note - Bariatrics   Name: Zulma Marcelo 74 y.o. female I MRN: [id number]  Unit/Bed#: 2 [address] Date of Admission: 12/16/2024   Date of Service: 12/20/2024 I Hospital Day: 4     Assessment & Plan  Pneumoperitoneum  75 yo F with hx of LRYGB with Dr. Gonzalo Villasenor in 2011 with hx of smoking and NSAID use s/p status post emergent diagnostic laparoscopy with extensive lysis of adhesion with repair of marginal ulcer perforation with primary repair and Amilcar patch with drain placement and intraoperative endoscopy POD4. UGI on 12/18 confirmed no leak. WBC normalized today on PO antibiotics, pain continues to improve but persists at drain site with movement, micturating, tolerating full liquid diet, continues to pass gas.     Continue Full liquid with protein supplements TID  Continue Protonix 40mg BID and Carafate slurry 1g QID - absolute cessation of NSAIDS and smoking/tobacco  Maintain GABI drain - will remove in office in 1 week  Continue PO antibiotics - appreciate ID recommendations  Replace lytes prn - appreciate SLIM  greatly  Continue PT/OT  Plan for D/C home today    Case discussed with Dr. Mohr  Chest pain  Denies today; resolved  Essential hypertension  Improved but still elevated , continue BP medications per SLIM upon D/C and f/u outpatient with PCP  SVT (supraventricular tachycardia) (HCC)  On Telemetry and IV metoprolol  COPD  ISS STRONGLY encouraged    Subjective   Continues to feel better. Tolerating full liquid diet with protein shakes. Wishes to go home. Passing flatus and urinating.    Objective :  Temp:  [97.4 °F (36.3 °C)-97.5 °F (36.4 °C)] 97.5 °F (36.4 °C)  HR:  [57] 57  BP: (151-165)/(76-96) 151/96  Resp:  [18-21] 18  SpO2:  [92 %-97 %] 97 %  O2 Device: Nasal cannula  Nasal Cannula O2 Flow Rate (L/min):  [2 L/min-3.5 L/min] 2 L/min    Physical Exam  Vitals reviewed.   Constitutional:       General: She is not in acute distress.     Appearance: She is well-developed.   HENT:      Head:  Normocephalic and atraumatic.   Eyes:      General: No scleral icterus.  Cardiovascular:      Rate and Rhythm: Normal rate and regular rhythm.   Pulmonary:      Effort: Pulmonary effort is normal. No respiratory distress.   Abdominal:      General: There is no distension.      Palpations: Abdomen is soft.      Tenderness: There is abdominal tenderness (mildly tender at drain site).      Comments: Incisions C/D/I, mepilex dressings removed bedside   Skin:     General: Skin is warm and dry.   Neurological:      Mental Status: She is alert.   Psychiatric:         Mood and Affect: Mood normal.         Behavior: Behavior normal.       Lab Results: I have reviewed the following results:  Lab Results   Component Value Date    WBC 9.32 12/20/2024    HGB 12.0 12/20/2024    HCT 37.7 12/20/2024    MCV 89 12/20/2024     12/20/2024     Lab Results   Component Value Date    SODIUM 137 12/20/2024    K 3.4 (L) 12/20/2024     12/20/2024    CO2 28 12/20/2024    AGAP 9 12/20/2024    BUN 6 12/20/2024    CREATININE 0.47 (L) 12/20/2024    GLUC 80 12/20/2024    GLUF 99 05/21/2021    CALCIUM 7.9 (L) 12/20/2024    AST 15 12/19/2024    ALT 7 12/19/2024    ALKPHOS 51 12/19/2024    TP 5.9 (L) 12/19/2024    TBILI 0.55 12/19/2024    EGFR 96 12/20/2024     Lab Results   Component Value Date    HGBA1C 5.7 (H) 11/29/2023     Lab Results   Component Value Date    UTP6JHFLYZJT 4.978 (H) 11/27/2023     Lab Results   Component Value Date    CHOLESTEROL 154 05/21/2021     Lab Results   Component Value Date    HDL 38 (L) 05/21/2021     Lab Results   Component Value Date    TRIG 73 05/21/2021     Lab Results   Component Value Date    LDLCALC 101 (H) 05/21/2021       Imaging Results Review: No pertinent imaging studies reviewed.  Other Study Results Review: No additional pertinent studies reviewed.    VTE Pharmacologic Prophylaxis: Heparin  VTE Mechanical Prophylaxis: sequential compression device

## 2024-12-20 NOTE — ASSESSMENT & PLAN NOTE
CXR performed showing small effusions and edema concerning for CHF. Will administer a dose of IV lasix therapy. Will also obtain a 2D echo. Repeat chest x-ray tomorrow morning. Patient currently on 2L of oxygen.  Continue supplemental oxygen therapy as needed and wean as tolerated.  Ambulatory walk test to be performed prior to discharge.

## 2024-12-21 ENCOUNTER — APPOINTMENT (INPATIENT)
Dept: RADIOLOGY | Facility: HOSPITAL | Age: 74
DRG: 326 | End: 2024-12-21
Payer: MEDICARE

## 2024-12-21 VITALS
WEIGHT: 149 LBS | RESPIRATION RATE: 20 BRPM | DIASTOLIC BLOOD PRESSURE: 66 MMHG | OXYGEN SATURATION: 94 % | HEART RATE: 60 BPM | HEIGHT: 60 IN | SYSTOLIC BLOOD PRESSURE: 153 MMHG | BODY MASS INDEX: 29.25 KG/M2 | TEMPERATURE: 99.4 F

## 2024-12-21 PROBLEM — I50.33 ACUTE ON CHRONIC DIASTOLIC (CONGESTIVE) HEART FAILURE (HCC): Status: ACTIVE | Noted: 2024-12-21

## 2024-12-21 LAB
ANION GAP SERPL CALCULATED.3IONS-SCNC: 10 MMOL/L (ref 4–13)
BACTERIA BLD CULT: NORMAL
BACTERIA BLD CULT: NORMAL
BASOPHILS # BLD MANUAL: 0.1 THOUSAND/UL (ref 0–0.1)
BASOPHILS NFR MAR MANUAL: 1 % (ref 0–1)
BUN SERPL-MCNC: 10 MG/DL (ref 5–25)
CALCIUM SERPL-MCNC: 8.9 MG/DL (ref 8.4–10.2)
CHLORIDE SERPL-SCNC: 97 MMOL/L (ref 96–108)
CO2 SERPL-SCNC: 27 MMOL/L (ref 21–32)
CREAT SERPL-MCNC: 0.48 MG/DL (ref 0.6–1.3)
EOSINOPHIL # BLD MANUAL: 0.19 THOUSAND/UL (ref 0–0.4)
EOSINOPHIL NFR BLD MANUAL: 2 % (ref 0–6)
ERYTHROCYTE [DISTWIDTH] IN BLOOD BY AUTOMATED COUNT: 14.4 % (ref 11.6–15.1)
GFR SERPL CREATININE-BSD FRML MDRD: 95 ML/MIN/1.73SQ M
GLUCOSE SERPL-MCNC: 99 MG/DL (ref 65–140)
HCT VFR BLD AUTO: 39 % (ref 34.8–46.1)
HGB BLD-MCNC: 12.7 G/DL (ref 11.5–15.4)
LYMPHOCYTES # BLD AUTO: 2.09 THOUSAND/UL (ref 0.6–4.47)
LYMPHOCYTES # BLD AUTO: 20 % (ref 14–44)
MAGNESIUM SERPL-MCNC: 2 MG/DL (ref 1.9–2.7)
MCH RBC QN AUTO: 28.7 PG (ref 26.8–34.3)
MCHC RBC AUTO-ENTMCNC: 32.6 G/DL (ref 31.4–37.4)
MCV RBC AUTO: 88 FL (ref 82–98)
MONOCYTES # BLD AUTO: 0.67 THOUSAND/UL (ref 0–1.22)
MONOCYTES NFR BLD: 7 % (ref 4–12)
MYELOCYTE ABSOLUTE CT: 0.1 THOUSAND/UL (ref 0–0.1)
MYELOCYTES NFR BLD MANUAL: 1 % (ref 0–1)
NEUTROPHILS # BLD MANUAL: 6.37 THOUSAND/UL (ref 1.85–7.62)
NEUTS BAND NFR BLD MANUAL: 1 % (ref 0–8)
NEUTS SEG NFR BLD AUTO: 66 % (ref 43–75)
PHOSPHATE SERPL-MCNC: 2.9 MG/DL (ref 2.3–4.1)
PLATELET # BLD AUTO: 337 THOUSANDS/UL (ref 149–390)
PLATELET BLD QL SMEAR: ADEQUATE
PMV BLD AUTO: 10.1 FL (ref 8.9–12.7)
POTASSIUM SERPL-SCNC: 3.7 MMOL/L (ref 3.5–5.3)
RBC # BLD AUTO: 4.42 MILLION/UL (ref 3.81–5.12)
RBC MORPH BLD: NORMAL
SODIUM SERPL-SCNC: 134 MMOL/L (ref 135–147)
VARIANT LYMPHS # BLD AUTO: 2 %
WBC # BLD AUTO: 9.51 THOUSAND/UL (ref 4.31–10.16)

## 2024-12-21 PROCEDURE — 99238 HOSP IP/OBS DSCHRG MGMT 30/<: CPT | Performed by: FAMILY MEDICINE

## 2024-12-21 PROCEDURE — 80048 BASIC METABOLIC PNL TOTAL CA: CPT | Performed by: FAMILY MEDICINE

## 2024-12-21 PROCEDURE — 94761 N-INVAS EAR/PLS OXIMETRY MLT: CPT

## 2024-12-21 PROCEDURE — 84100 ASSAY OF PHOSPHORUS: CPT | Performed by: FAMILY MEDICINE

## 2024-12-21 PROCEDURE — 83735 ASSAY OF MAGNESIUM: CPT | Performed by: FAMILY MEDICINE

## 2024-12-21 PROCEDURE — 99024 POSTOP FOLLOW-UP VISIT: CPT | Performed by: STUDENT IN AN ORGANIZED HEALTH CARE EDUCATION/TRAINING PROGRAM

## 2024-12-21 PROCEDURE — 85027 COMPLETE CBC AUTOMATED: CPT | Performed by: FAMILY MEDICINE

## 2024-12-21 PROCEDURE — 71045 X-RAY EXAM CHEST 1 VIEW: CPT

## 2024-12-21 PROCEDURE — 85007 BL SMEAR W/DIFF WBC COUNT: CPT | Performed by: FAMILY MEDICINE

## 2024-12-21 RX ADMIN — ESCITALOPRAM OXALATE 20 MG: 10 TABLET ORAL at 08:58

## 2024-12-21 RX ADMIN — NEBIVOLOL 5 MG: 10 TABLET ORAL at 08:59

## 2024-12-21 RX ADMIN — AMLODIPINE BESYLATE 10 MG: 10 TABLET ORAL at 08:59

## 2024-12-21 RX ADMIN — SUCRALFATE 1 G: 1 TABLET ORAL at 11:34

## 2024-12-21 RX ADMIN — METRONIDAZOLE 500 MG: 500 TABLET ORAL at 14:25

## 2024-12-21 RX ADMIN — HEPARIN SODIUM 5000 UNITS: 5000 INJECTION, SOLUTION INTRAVENOUS; SUBCUTANEOUS at 05:39

## 2024-12-21 RX ADMIN — SUCRALFATE 1 G: 1 TABLET ORAL at 08:58

## 2024-12-21 RX ADMIN — BUPROPION HYDROCHLORIDE 150 MG: 150 TABLET, EXTENDED RELEASE ORAL at 08:58

## 2024-12-21 RX ADMIN — PANTOPRAZOLE SODIUM 8 MG/HR: 40 INJECTION, POWDER, FOR SOLUTION INTRAVENOUS at 02:06

## 2024-12-21 RX ADMIN — FAMOTIDINE 20 MG: 10 INJECTION, SOLUTION INTRAVENOUS at 08:58

## 2024-12-21 RX ADMIN — CEFPODOXIME PROXETIL 400 MG: 200 TABLET, FILM COATED ORAL at 07:46

## 2024-12-21 RX ADMIN — BUSPIRONE HYDROCHLORIDE 5 MG: 5 TABLET ORAL at 08:58

## 2024-12-21 RX ADMIN — ACETAMINOPHEN 1000 MG: 1000 INJECTION, SOLUTION INTRAVENOUS at 05:39

## 2024-12-21 RX ADMIN — THIAMINE HYDROCHLORIDE: 100 INJECTION, SOLUTION INTRAMUSCULAR; INTRAVENOUS at 08:59

## 2024-12-21 RX ADMIN — METRONIDAZOLE 500 MG: 500 TABLET ORAL at 05:39

## 2024-12-21 NOTE — ASSESSMENT & PLAN NOTE
CXR performed previously showing small effusions and edema concerning for CHF.  Patient received a dose of IV Lasix therapy with significant improvement in her symptoms.  Patient has been able to be weaned off of supplemental oxygen therapy.  Ambulatory walk test was performed today and patient was saturating well on room air at rest and on ambulation.  2D echocardiogram shows diastolic dysfunction.  Repeat chest x-ray today shows improved effusion/edema.
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
## 2024-12-21 NOTE — PROGRESS NOTES
Progress Note - Bariatrics   Name: Zulma Marcelo 74 y.o. female I MRN: [id number]  Unit/Bed#: 2 [address] Date of Admission: 12/16/2024   Date of Service: 12/21/2024 I Hospital Day: 5     Assessment & Plan  Pneumoperitoneum  73 yo F with hx of LRYGB with Dr. Gonzalo Villasenor in 2011 with hx of smoking and NSAID use s/p status post emergent diagnostic laparoscopy with extensive lysis of adhesion with repair of marginal ulcer perforation with primary repair and Amilcar patch with drain placement and intraoperative endoscopy POD5. UGI on 12/18 confirmed no leak. WBC remains normal on PO antibiotics, abdominal pain continues to improve, micturating, tolerating full liquid diet, continues to pass gas.     Continue Full liquid with protein supplements TID  Continue Protonix 40mg BID and Carafate slurry 1g QID - absolute cessation of NSAIDS and smoking/tobacco  Maintain GABI drain - will remove in office in 1 week  Continue PO antibiotics - appreciate ID recommendations  Replace lytes prn - appreciate SLIM  greatly  Continue PT/OT  Plan for D/C home today    Case discussed with Dr. Mohr  Chest pain  Denies today; resolved  Essential hypertension  Improved but still elevated , continue BP medications per SLIM upon D/C and f/u outpatient with PCP  SVT (supraventricular tachycardia) (HCC)  On Telemetry and IV metoprolol  COPD  ISS STRONGLY encouraged  Chronic GERD    Mixed hyperlipidemia    Vitamin B12 deficiency    Vitamin D deficiency    Sleep apnea    Cognitive impairment    History of Debra-en-Y gastric bypass    Generalized anxiety disorder    Moderate episode of recurrent major depressive disorder (HCC)    QT prolongation    Peritonitis (HCC)    Perforated peptic ulcer (HCC)    Penicillin allergy    Hypoxia      Subjective   Continues to feel better. Tolerating full liquid diet with protein shakes. Wishes to go home. States her abdominal pain continues to improve. Passing flatus and urinating.    Objective :  Temp:  [97.7 °F  (36.5 °C)-98.4 °F (36.9 °C)] 98.4 °F (36.9 °C)  HR:  [57-74] 60  BP: (128-165)/(70-99) 165/99  Resp:  [13-20] 20  SpO2:  [94 %-98 %] 94 %  O2 Device: None (Room air)  Nasal Cannula O2 Flow Rate (L/min):  [2 L/min] 2 L/min    Physical Exam  Vitals reviewed.   Constitutional:       General: She is not in acute distress.     Appearance: She is well-developed.   HENT:      Head: Normocephalic and atraumatic.   Eyes:      General: No scleral icterus.  Cardiovascular:      Rate and Rhythm: Normal rate and regular rhythm.   Pulmonary:      Effort: Pulmonary effort is normal. No respiratory distress.   Abdominal:      General: There is no distension.      Palpations: Abdomen is soft.      Tenderness: There is abdominal tenderness (mildly tender at drain site).      Comments: Incisions C/D/I, staples in place   Skin:     General: Skin is warm and dry.   Neurological:      Mental Status: She is alert.   Psychiatric:         Mood and Affect: Mood normal.         Behavior: Behavior normal.       Lab Results: I have reviewed the following results:  Lab Results   Component Value Date    WBC 9.51 12/21/2024    HGB 12.7 12/21/2024    HCT 39.0 12/21/2024    MCV 88 12/21/2024     12/21/2024     Lab Results   Component Value Date    SODIUM 134 (L) 12/21/2024    K 3.7 12/21/2024    CL 97 12/21/2024    CO2 27 12/21/2024    AGAP 10 12/21/2024    BUN 10 12/21/2024    CREATININE 0.48 (L) 12/21/2024    GLUC 99 12/21/2024    GLUF 99 05/21/2021    CALCIUM 8.9 12/21/2024    AST 15 12/19/2024    ALT 7 12/19/2024    ALKPHOS 51 12/19/2024    TP 5.9 (L) 12/19/2024    TBILI 0.55 12/19/2024    EGFR 95 12/21/2024     Lab Results   Component Value Date    HGBA1C 5.7 (H) 11/29/2023     Lab Results   Component Value Date    ODB2SUIHWYWV 4.978 (H) 11/27/2023     Lab Results   Component Value Date    CHOLESTEROL 154 05/21/2021     Lab Results   Component Value Date    HDL 38 (L) 05/21/2021     Lab Results   Component Value Date    TRIG 73  05/21/2021     Lab Results   Component Value Date    LDLCALC 101 (H) 05/21/2021       Imaging Results Review: No pertinent imaging studies reviewed.  Other Study Results Review: No additional pertinent studies reviewed.    VTE Pharmacologic Prophylaxis: Heparin  VTE Mechanical Prophylaxis: sequential compression device

## 2024-12-21 NOTE — NURSING NOTE
Patient's IV removed. Patient left with all belongings. AVS reviewed with patient and patient verbalized understanding of AVS. Patient brought to lobby in wheelchair and helped into lyft.

## 2024-12-21 NOTE — ASSESSMENT & PLAN NOTE
ISS STRONGLY encouraged   Otolaryngologist Procedure Text (F): After obtaining clear surgical margins the patient was sent to otolaryngology for surgical repair.  The patient understands they will receive post-surgical care and follow-up from the referring physician's office.

## 2024-12-21 NOTE — ASSESSMENT & PLAN NOTE
Reported on presentation chest pain radiating to left shoulder  Suspect secondary to pneumoperitoneum  CTA dissection protocol without any acute intrathoracic abnormality  EKG sinus bradycardia, old RBBB, nonspecific ST-T wave changes with baseline artifact.  QTc 508  Troponin-10-10    - No further chest pain reported

## 2024-12-21 NOTE — ASSESSMENT & PLAN NOTE
Presented with left lower quadrant pain nausea with nonbloody bilious emesis over few days progressing to worsening abdominal pain epigastric/chest pain radiating to left shoulder today  Evaluated in ED, workup revealed pneumoperitoneum with focal perforation of medial wall of proximal Debra-en-Y immediately distal to GJ anastomosis   status post emergent diagnostic laparoscopy with extensive lysis of adhesion with repair of marginal ulcer perforation with primary repair and Amilcar patch with drain placement and intraoperative endoscopy.  Patient was noted to have peritonitis with purulent content with significant adhesions.   Postoperatively, hemodynamically stable.  Abdomen with mild appropriate tenderness otherwise soft.    - Patient is status post diagnostic laparoscopy with extensive lysis of adhesions found to have marginal ulcer perforation s/p primary repair and reinforcement with a Amilcar patch in addition to placement of a drain.  Patient continued on oral cefpodoxime and Flagyl per recommendations from infectious disease. Upper GI series performed previously showed no evidence of leak.  Patient is tolerating a diet without difficulty. Patient cleared by surgery.  Will continue to monitor.

## 2024-12-21 NOTE — PLAN OF CARE
Problem: PAIN - ADULT  Goal: Verbalizes/displays adequate comfort level or baseline comfort level  Description: Interventions:  - Encourage patient to monitor pain and request assistance  - Assess pain using appropriate pain scale  - Administer analgesics based on type and severity of pain and evaluate response  - Implement non-pharmacological measures as appropriate and evaluate response  - Consider cultural and social influences on pain and pain management  - Notify physician/advanced practitioner if interventions unsuccessful or patient reports new pain  Outcome: Progressing     Problem: RESPIRATORY - ADULT  Goal: Achieves optimal ventilation and oxygenation  Description: INTERVENTIONS:  - Assess for changes in respiratory status  - Assess for changes in mentation and behavior  - Position to facilitate oxygenation and minimize respiratory effort  - Oxygen administered by appropriate delivery if ordered  - Initiate smoking cessation education as indicated  - Encourage broncho-pulmonary hygiene including cough, deep breathe, Incentive Spirometry  - Assess the need for suctioning and aspirate as needed  - Assess and instruct to report SOB or any respiratory difficulty  - Respiratory Therapy support as indicated  Outcome: Progressing     Problem: INFECTION - ADULT  Goal: Absence or prevention of progression during hospitalization  Description: INTERVENTIONS:  - Assess and monitor for signs and symptoms of infection  - Monitor lab/diagnostic results  - Monitor all insertion sites, i.e. indwelling lines, tubes, and drains  - Monitor endotracheal if appropriate and nasal secretions for changes in amount and color  - [place] appropriate cooling/warming therapies per order  - Administer medications as ordered  - Instruct and encourage patient and family to use good hand hygiene technique  - Identify and instruct in appropriate isolation precautions for identified infection/condition  Outcome: Progressing

## 2024-12-21 NOTE — ASSESSMENT & PLAN NOTE
Wt Readings from Last 3 Encounters:   12/20/24 67.6 kg (149 lb)   11/18/24 69.4 kg (153 lb)   11/12/24 66.2 kg (146 lb)     Patient with evidence of hypoxia and chest x-ray showing small effusions and edema consistent with CHF.  2D echocardiogram showing diastolic dysfunction.  Patient received a dose of IV Lasix with significant improvement in her symptoms.  Patient is now off oxygen therapy and denies any shortness of breath.  Repeat chest x-ray performed today shows improvement in edema/effusions.

## 2024-12-21 NOTE — RESPIRATORY THERAPY NOTE
Home Oxygen Qualifying Test     Patient name: Zulma Marcelo        : 1950   Date of Test:  2024  Diagnosis:    Home Oxygen Test:    **Medicare Guidelines require item(s) 1-5 on all ambulatory patients or 1 and 2 on non-ambulatory patients.    1. Baseline SPO2 on Room Air at rest 93 %   If <= 88% on Room Air add O2 via NC to obtain SpO2 >=88%. If LPM needed, document LPM  needed to reach =>88%    SPO2 during exertion on Room Air 92 %  During exertion monitor SPO2. If SPO2 increases >=89%, do not add supplemental oxygen    SPO2 on Oxygen at Rest  % at  LPM    SPO2 during exertion on Oxygen % at  LPM    Test performed during exertion activity.   Walking     []  Supplemental Home Oxygen is indicated.    [x]  Client does not qualify for home oxygen.    Respiratory Additional Notes-     Oswald Teran, RT

## 2024-12-21 NOTE — DISCHARGE SUMMARY
Discharge Summary - Hospitalist   Name: Zulma Marcelo 74 y.o. female I MRN: [id number]  Unit/Bed#: [address] [place] [address] Date of Admission: 12/16/2024   Date of Service: 12/21/2024 I Hospital Day: 5     Assessment & Plan  Pneumoperitoneum  Presented with left lower quadrant pain nausea with nonbloody bilious emesis over few days progressing to worsening abdominal pain epigastric/chest pain radiating to left shoulder today  Evaluated in ED, workup revealed pneumoperitoneum with focal perforation of medial wall of proximal Debra-en-Y immediately distal to GJ anastomosis   status post emergent diagnostic laparoscopy with extensive lysis of adhesion with repair of marginal ulcer perforation with primary repair and Amilcar patch with drain placement and intraoperative endoscopy.  Patient was noted to have peritonitis with purulent content with significant adhesions.   Postoperatively, hemodynamically stable.  Abdomen with mild appropriate tenderness otherwise soft.    - Patient is status post diagnostic laparoscopy with extensive lysis of adhesions found to have marginal ulcer perforation s/p primary repair and reinforcement with a Amilcar patch in addition to placement of a drain.  Patient continued on oral cefpodoxime and Flagyl per recommendations from infectious disease. Upper GI series performed previously showed no evidence of leak.  Patient is tolerating a diet without difficulty. Patient cleared by surgery.  Will continue to monitor.  History of Debra-en-Y gastric bypass  History of Debra-en-Y gastric 2011 with history of chronic marginal ulcer  SVT (supraventricular tachycardia) (HCC)  History of SVT, hold nebivolol  Telemetry  IV metoprolol  Chest pain  Reported on presentation chest pain radiating to left shoulder  Suspect secondary to pneumoperitoneum  CTA dissection protocol without any acute intrathoracic abnormality  EKG sinus bradycardia, old RBBB, nonspecific ST-T wave changes with baseline artifact.  QTc  508  Troponin-10-10    - No further chest pain reported  COPD  Appears stable  Respiratory protocol  Bronchodilators as needed  Incentive spirometry  Essential hypertension  Holding nebivolol.  Continue IV metoprolol for now    - Norvasc increased to 10mg daily. Continue hydralazine prn.  Will continue to monitor blood pressure and make further adjustments as needed.  Chronic GERD  IV PPI  Sleep apnea  Prior history of SORAYA, awaiting repeat sleep studies as per records  Moderate episode of recurrent major depressive disorder (HCC)  PO meds restarted.  Cognitive impairment  Donepezil restarted  QT prolongation  Telemetry  Monitor and replete electrolytes  Check magnesium level  Peritonitis (HCC)    Perforated peptic ulcer (HCC)    Penicillin allergy    Hypoxia  CXR performed previously showing small effusions and edema concerning for CHF.  Patient received a dose of IV Lasix therapy with significant improvement in her symptoms.  Patient has been able to be weaned off of supplemental oxygen therapy.  Ambulatory walk test was performed today and patient was saturating well on room air at rest and on ambulation.  2D echocardiogram shows diastolic dysfunction.  Repeat chest x-ray today shows improved effusion/edema.  Acute on chronic diastolic (congestive) heart failure (HCC)  Wt Readings from Last 3 Encounters:   12/20/24 67.6 kg (149 lb)   11/18/24 69.4 kg (153 lb)   11/12/24 66.2 kg (146 lb)     Patient with evidence of hypoxia and chest x-ray showing small effusions and edema consistent with CHF.  2D echocardiogram showing diastolic dysfunction.  Patient received a dose of IV Lasix with significant improvement in her symptoms.  Patient is now off oxygen therapy and denies any shortness of breath.  Repeat chest x-ray performed today shows improvement in edema/effusions.     Medical Problems       Resolved Problems  Date Reviewed: 12/12/2024   None       Discharging Physician / Practitioner: Onesimo Chapin,  MD  PCP: Lydia Cruz MD  Admission Date:   Admission Orders (From admission, onward)       Ordered        12/16/24 1613  Inpatient Admission  Once                          Discharge Date: 12/21/24    Consultations During Hospital Stay:  Bariatrics    Reason for Admission: Zulma Marcelo is a 74 y.o. female with a PMH of history of SVT status post ablation, coronary artery disease, COPD (FEV1 58% predicted), CKD, hypertension, dyslipidemia, GERD, history of gastric bypass depression/anxiety, history of SORAYA not on CPAP, ex-smoker who presents with chest pain, abdominal pain and shortness of breath.  As per the EMS report patient complained of left lower quadrant pain and shortness of breath for 2 days and started having left-sided abdominal pain today morning. In ED, patient was noted to be hypertension, tachypneic but afebrile saturating adequately on room air.  Lab revealed leukocytosis, stable hemoglobin, cardiac markers were negative.  Lactic acid was 1.2.  Procalcitonin was elevated.  CMP was remarkable for hypokalemia.  VBG was 7.39/49.  Patient has CTA chest abdomen pelvis which revealed new large amount of pneumoperitoneum predominantly in upper abdomen highly suspicious for a visceral perforation with small amount of ascites.  Patient was seen by surgery who recommended CT with p.o. contrast and bariatric surgery evaluation.  Subsequent abdomen pelvis CT with p.o. contrast revealed focal perforation of medial wall of proximal Debra-en-Y immediately distal to GJ anastomosis.  Patient was evaluated by bariatric surgery and patient underwent emergent diagnostic laparoscopy with extensive lysis of adhesion with repair of marginal ulcer perforation with primary repair and Amilcar patch with drain placement and intraoperative endoscopy.  Patient was noted to have peritonitis with purulent content with significant adhesions.  Internal medicine consult requested for comorbidities.  Patient was seen postoperatively,  lying comfortably in bed.  Denies any chest pain or shortness of breath but reports mild postoperative abdominal discomfort and left shoulder discomfort.  She reports that she was sick for a week with nausea bilious emesis diarrhea and abdominal pain which worsened today with left-sided chest and shoulder pain and she presented to ED for further evaluation.    Hospital Course: Patient was seen and examined in the emergency department and was admitted to the hospital for further evaluation and management.  Patient presenting with left lower quadrant pain found on CT imaging to have evidence of pneumoperitoneum with focal perforation of the medial wall of the proximal Debra-en-Y immediately distal to the GJ anastomosis.  Patient underwent emergent diagnostic laparoscopy with extensive lysis of adhesions with repair of marginal ulcer perforation with primary repair and Amilcar patch with drain placement and intraoperative endoscopy.  Patient was noted to have peritonitis with purulent content with significant adhesions.  Infectious disease was consulted to help with antibiotic therapy.  Patient was initially placed on IV cefepime, Flagyl and cefepime.  Patient was subsequently transition to oral cefpodoxime and Flagyl to complete a course of antibiotic therapy through December 26th.  Postoperatively patient was noted to be more short of breath and requiring supplemental oxygen.  Chest x-ray was performed which showed small effusions and edema.  2D echocardiogram showed grade 2 diastolic dysfunction.  Patient received a dose of IV Lasix therapy with significant improvement.  Patient's shortness of breath has resolved.  She has been weaned off of supplemental oxygen and an ambulatory walk test was performed with patient saturating 93% on room air at rest and 92% on room air on ambulation.  Patient has remained afebrile and white blood cell count is within normal limits.  Patient has been cleared for discharge with  continued follow-up with bariatrics in the outpatient setting.  At the time of discharge patient reports feeling significantly improved and currently denies any acute complaints or concerns including chest pain or shortness of breath.    Please see above list of diagnoses and related plan for additional information.     Condition at Discharge: stable    Discharge Day Visit / Exam:   Vitals: Blood Pressure: 151/66 (12/21/24 0859)  Pulse: 60 (12/21/24 0744)  Temperature: 98.4 °F (36.9 °C) (12/21/24 0744)  Temp Source: Oral (12/21/24 0744)  Respirations: 20 (12/21/24 0744)  Height: 5' (152.4 cm) (12/20/24 1004)  Weight - Scale: 67.6 kg (149 lb) (12/20/24 1004)  SpO2: 94 % (12/21/24 0744)  Physical Exam  HENT:      Head: Normocephalic.      Mouth/Throat:      Mouth: Mucous membranes are moist.   Eyes:      Extraocular Movements: Extraocular movements intact.   Cardiovascular:      Rate and Rhythm: Normal rate.   Pulmonary:      Effort: Pulmonary effort is normal. No respiratory distress.   Abdominal:      Palpations: Abdomen is soft.      Tenderness: There is no abdominal tenderness.      Comments: + afua drain. Incision site clean, dry and intact   Skin:     General: Skin is warm.   Neurological:      Mental Status: She is alert and oriented to person, place, and time.   Psychiatric:         Mood and Affect: Mood normal.       Discharge instructions/Information to patient and family:   See after visit summary for information provided to patient and family.      Provisions for Follow-Up Care:  See after visit summary for information related to follow-up care and any pertinent home health orders.      Mobility at time of Discharge:   Basic Mobility Inpatient Raw Score: 21  JH-HLM Goal: 6: Walk 10 steps or more  JH-HLM Achieved: 6: Walk 10 steps or more     Disposition:   Home    Discharge Medications:  See after visit summary for reconciled discharge medications provided to patient and/or family.      Administrative  Statements   Discharge Statement:  I have spent a total time of 35 minutes in caring for this patient on the day of the visit/encounter.    **Please Note: This note may have been constructed using a voice recognition system**

## 2024-12-21 NOTE — PLAN OF CARE
Problem: Potential for Falls  Goal: Patient will remain free of falls  Description: INTERVENTIONS:  - Educate patient/family on patient safety including physical limitations  - Instruct patient to call for assistance with activity   - Consult OT/PT to assist with strengthening/mobility   - Keep Call bell within reach  - Keep bed low and locked with side rails adjusted as appropriate  - Keep care items and personal belongings within reach  - Initiate and maintain comfort rounds  - Make Fall Risk Sign visible to staff  - Offer Toileting every 2 Hours, in advance of need  - Initiate/Maintain bedalarm  - Obtain necessary fall risk management equipment: socks    - Apply yellow socks and bracelet for high fall risk patients  - Consider moving patient to room near nurses station  Outcome: Progressing     Problem: RESPIRATORY - ADULT  Goal: Achieves optimal ventilation and oxygenation  Description: INTERVENTIONS:  - Assess for changes in respiratory status  - Assess for changes in mentation and behavior  - Position to facilitate oxygenation and minimize respiratory effort  - Oxygen administered by appropriate delivery if ordered  - Initiate smoking cessation education as indicated  - Encourage broncho-pulmonary hygiene including cough, deep breathe, Incentive Spirometry  - Assess the need for suctioning and aspirate as needed  - Assess and instruct to report SOB or any respiratory difficulty  - Respiratory Therapy support as indicated  Outcome: Progressing     Problem: PAIN - ADULT  Goal: Verbalizes/displays adequate comfort level or baseline comfort level  Description: Interventions:  - Encourage patient to monitor pain and request assistance  - Assess pain using appropriate pain scale  - Administer analgesics based on type and severity of pain and evaluate response  - Implement non-pharmacological measures as appropriate and evaluate response  - Consider cultural and social influences on pain and pain management  -  Notify physician/advanced practitioner if interventions unsuccessful or patient reports new pain  Outcome: Progressing     Problem: INFECTION - ADULT  Goal: Absence or prevention of progression during hospitalization  Description: INTERVENTIONS:  - Assess and monitor for signs and symptoms of infection  - Monitor lab/diagnostic results  - Monitor all insertion sites, i.e. indwelling lines, tubes, and drains  - Monitor endotracheal if appropriate and nasal secretions for changes in amount and color  - Coy appropriate cooling/warming therapies per order  - Administer medications as ordered  - Instruct and encourage patient and family to use good hand hygiene technique  - Identify and instruct in appropriate isolation precautions for identified infection/condition  Outcome: Progressing  Goal: Absence of fever/infection during neutropenic period  Description: INTERVENTIONS:  - Monitor WBC    Outcome: Progressing     Problem: SAFETY ADULT  Goal: Patient will remain free of falls  Description: INTERVENTIONS:  - Educate patient/family on patient safety including physical limitations  - Instruct patient to call for assistance with activity   - Consult OT/PT to assist with strengthening/mobility   - Keep Call bell within reach  - Keep bed low and locked with side rails adjusted as appropriate  - Keep care items and personal belongings within reach  - Initiate and maintain comfort rounds  - Make Fall Risk Sign visible to staff  - Offer Toileting every 2 Hours, in advance of need  - Initiate/Maintain bedalarm  - Obtain necessary fall risk management equipment: socks    - Apply yellow socks and bracelet for high fall risk patients  - Consider moving patient to room near nurses station  Outcome: Progressing  Goal: Maintain or return to baseline ADL function  Description: INTERVENTIONS:  -  Assess patient's ability to carry out ADLs; assess patient's baseline for ADL function and identify physical deficits which impact  ability to perform ADLs (bathing, care of mouth/teeth, toileting, grooming, dressing, etc.)  - Assess/evaluate cause of self-care deficits   - Assess range of motion  - Assess patient's mobility; develop plan if impaired  - Assess patient's need for assistive devices and provide as appropriate  - Encourage maximum independence but intervene and supervise when necessary  - Involve family in performance of ADLs  - Assess for home care needs following discharge   - Consider OT consult to assist with ADL evaluation and planning for discharge  - Provide patient education as appropriate  Outcome: Progressing  Goal: Maintains/Returns to pre admission functional level  Description: INTERVENTIONS:  - Perform AM-PAC 6 Click Basic Mobility/ Daily Activity assessment daily.  - Set and communicate daily mobility goal to care team and patient/family/caregiver.   - Collaborate with rehabilitation services on mobility goals if consulted  - Perform Range of Motion 4 times a day.  - Reposition patient every 2 hours.  - Dangle patient 4 times a day  - Stand patient 4 times a day  - Ambulate patient 4 times a day  - Out of bed to chair 3 times a day   - Out of bed for meals 3 times a day  - Out of bed for toileting  - Record patient progress and toleration of activity level   Outcome: Progressing     Problem: DISCHARGE PLANNING  Goal: Discharge to home or other facility with appropriate resources  Description: INTERVENTIONS:  - Identify barriers to discharge w/patient and caregiver  - Arrange for needed discharge resources and transportation as appropriate  - Identify discharge learning needs (meds, wound care, etc.)  - Arrange for interpretive services to assist at discharge as needed  - Refer to Case Management Department for coordinating discharge planning if the patient needs post-hospital services based on physician/advanced practitioner order or complex needs related to functional status, cognitive ability, or social support  system  Outcome: Progressing     Problem: Knowledge Deficit  Goal: Patient/family/caregiver demonstrates understanding of disease process, treatment plan, medications, and discharge instructions  Description: Complete learning assessment and assess knowledge base.  Interventions:  - Provide teaching at level of understanding  - Provide teaching via preferred learning methods  Outcome: Progressing     Problem: Prexisting or High Potential for Compromised Skin Integrity  Goal: Skin integrity is maintained or improved  Description: INTERVENTIONS:  - Identify patients at risk for skin breakdown  - Assess and monitor skin integrity  - Assess and monitor nutrition and hydration status  - Monitor labs   - Assess for incontinence   - Turn and reposition patient  - Assist with mobility/ambulation  - Relieve pressure over bony prominences  - Avoid friction and shearing  - Provide appropriate hygiene as needed including keeping skin clean and dry  - Evaluate need for skin moisturizer/barrier cream  - Collaborate with interdisciplinary team   - Patient/family teaching  - Consider wound care consult   Outcome: Progressing

## 2024-12-21 NOTE — ASSESSMENT & PLAN NOTE
75 yo F with hx of LRYGB with Dr. Gonzalo Villasenor in 2011 with hx of smoking and NSAID use s/p status post emergent diagnostic laparoscopy with extensive lysis of adhesion with repair of marginal ulcer perforation with primary repair and Amilcar patch with drain placement and intraoperative endoscopy POD5. UGI on 12/18 confirmed no leak. WBC remains normal on PO antibiotics, abdominal pain continues to improve, micturating, tolerating full liquid diet, continues to pass gas.     Continue Full liquid with protein supplements TID  Continue Protonix 40mg BID and Carafate slurry 1g QID - absolute cessation of NSAIDS and smoking/tobacco  Maintain GABI drain - will remove in office in 1 week  Continue PO antibiotics - appreciate ID recommendations  Replace lytes prn - appreciate SLIM  greatly  Continue PT/OT  Plan for D/C home today    Case discussed with Dr. Mohr

## 2024-12-23 ENCOUNTER — TELEPHONE (OUTPATIENT)
Dept: FAMILY MEDICINE CLINIC | Facility: CLINIC | Age: 74
End: 2024-12-23

## 2024-12-23 ENCOUNTER — TELEMEDICINE (OUTPATIENT)
Dept: BEHAVIORAL/MENTAL HEALTH CLINIC | Facility: CLINIC | Age: 74
End: 2024-12-23
Payer: MEDICARE

## 2024-12-23 ENCOUNTER — TRANSITIONAL CARE MANAGEMENT (OUTPATIENT)
Dept: FAMILY MEDICINE CLINIC | Facility: CLINIC | Age: 74
End: 2024-12-23

## 2024-12-23 DIAGNOSIS — F41.1 GENERALIZED ANXIETY DISORDER: ICD-10-CM

## 2024-12-23 DIAGNOSIS — Z91.89 HIGH RISK FOR READMISSION: Primary | ICD-10-CM

## 2024-12-23 DIAGNOSIS — F33.41 RECURRENT MAJOR DEPRESSIVE DISORDER, IN PARTIAL REMISSION (HCC): Primary | ICD-10-CM

## 2024-12-23 PROCEDURE — 90834 PSYTX W PT 45 MINUTES: CPT | Performed by: SOCIAL WORKER

## 2024-12-23 NOTE — TELEPHONE ENCOUNTER
First attempt to reach Zulma. Message left to call back on 12/23/24 so I can schedule a TCM visit for her with Dr Nancy Michelle

## 2024-12-23 NOTE — PSYCH
Virtual Regular Visit    Verification of patient location:    Patient is located at Home in the following state in which I hold an active license [place]      Assessment/Plan:    Problem List Items Addressed This Visit       Generalized anxiety disorder     Other Visit Diagnoses         Recurrent major depressive disorder, in partial remission (HCC)    -  Primary            Goals addressed in session: Goal 1     Depression Follow-up Plan Completed: Not applicable    Reason for visit is No chief complaint on file.       Encounter provider Vannesa Stone LCSW      Recent Visits  No visits were found meeting these conditions.  Showing recent visits within past 7 days and meeting all other requirements  Today's Visits  Date Type Provider Dept   12/23/24 Telemedicine Vannesa Stone LCSW [facility]   Showing today's visits and meeting all other requirements  Future Appointments  No visits were found meeting these conditions.  Showing future appointments within next 150 days and meeting all other requirements       The patient was identified by name and date of birth. Zulma Marcelo was informed that this is a telemedicine visit and that the visit is being conducted throughthe Epic Embedded platform. She agrees to proceed..  My office door was closed. No one else was in the room.  She acknowledged consent and understanding of privacy and security of the video platform. The patient has agreed to participate and understands they can discontinue the visit at any time.    Patient is aware this is a billable service.     Subjective  Zulma Marcelo is a 74 y.o. female  .      HPI     Past Medical History:   Diagnosis Date    Acid reflux     Anxiety     Arthritis     Asthma     Cardiac disease     Chronic kidney disease     passed on own kidney stone    Depression     Diverticulitis     GERD (gastroesophageal reflux disease)     Hayfever     Native (hard of hearing)     bilateral hearing aids     Hyperlipemia     Hypertension     Panic attack     Tachycardia        Past Surgical History:   Procedure Laterality Date    ABLATION OF DYSRHYTHMIC FOCUS      APPENDECTOMY      CHOLECYSTECTOMY      open    FRACTURE SURGERY      ORIF fx (L) tibia, fibula 2009    GASTRIC BYPASS OPEN      HYSTERECTOMY      VT LAPS ABD PRTM&OMENTUM DX W/WO SPEC BR/WA SPX N/A 12/16/2024    Procedure: LAPAROSCOPY DIAGNOSTIC,  LYSIS OF ADHESIONS, REPAIR PERFORATED MARGINAL ULCER, INTRA-OP EGD;  Surgeon: Alex Mohr MD;  Location: [facility] MAIN OR;  Service: Bariatrics    VT XCAPSL CTRC RMVL INSJ IO LENS PROSTH W/O ECP Left 4/18/2016    Procedure: EXTRACTION EXTRACAPSULAR CATARACT PHACO INTRAOCULAR LENS (IOL);  Surgeon: Dane Wells MD;  Location: [facility] MAIN OR;  Service: Ophthalmology    VT XCAPSL CTRC RMVL INSJ IO LENS PROSTH W/O ECP Right 3/1/2021    Procedure: EXTRACTION EXTRACAPSULAR CATARACT PHACO INTRAOCULAR LENS (IOL);  Surgeon: Dane Wells MD;  Location: [facility] MAIN OR;  Service: Ophthalmology    TONSILECTOMY AND ADNOIDECTOMY         Current Outpatient Medications   Medication Sig Dispense Refill    acetaminophen (TYLENOL) 325 mg tablet Take 3 tablets (975 mg total) by mouth every 8 (eight) hours for 7 days      albuterol (PROVENTIL HFA,VENTOLIN HFA) 90 mcg/act inhaler Inhale 2 puffs every 6 (six) hours as needed for wheezing 20.4 g 3    allopurinol (ZYLOPRIM) 100 mg tablet TAKE ONE TABLET BY MOUTH EVERY DAY 90 tablet 1    amLODIPine (NORVASC) 10 mg tablet Take 1 tablet (10 mg total) by mouth daily 30 tablet 0    buPROPion (WELLBUTRIN XL) 150 mg 24 hr tablet Take 1 tablet (150 mg total) by mouth daily 90 tablet 1    busPIRone (BUSPAR) 5 mg tablet Take 1 tablet (5 mg total) by mouth 3 (three) times a day 270 tablet 1    cefpodoxime (VANTIN) 200 mg tablet Take 2 tablets (400 mg total) by mouth 2 (two) times a day with meals for 6 days 24 tablet 0    dextromethorphan-guaifenesin (MUCINEX DM)  MG per 12 hr tablet Take 1  "tablet by mouth every 12 (twelve) hours as needed for cough 30 tablet 0    Diclofenac Sodium (VOLTAREN) 1 % Apply 2 g topically 4 (four) times a day 100 g 3    donepezil (ARICEPT) 10 mg tablet Take 1 tablet (10 mg total) by mouth daily at bedtime 90 tablet 3    donepezil (ARICEPT) 10 mg tablet Take 1 tablet (10 mg total) by mouth daily at bedtime 90 tablet 3    escitalopram (LEXAPRO) 20 mg tablet Take 1 tablet (20 mg total) by mouth daily 90 tablet 1    fluticasone (FLONASE) 50 mcg/act nasal spray 1 spray into each nostril 2 (two) times a day 9.9 mL 1    Ketotifen Fumarate (ZADITOR) 0.035 % ophthalmic solution Administer 1 drop to both eyes 2 (two) times a day 3 mL 1    metroNIDAZOLE (FLAGYL) 500 mg tablet Take 1 tablet (500 mg total) by mouth every 8 (eight) hours for 6 days 18 tablet 0    nebivolol (BYSTOLIC) 5 mg tablet Take 1 tablet (5 mg total) by mouth daily 90 tablet 1    ondansetron (ZOFRAN-ODT) 4 mg disintegrating tablet Take 1 tablet (4 mg total) by mouth every 6 (six) hours as needed for nausea for up to 3 days 9 tablet 0    pantoprazole (PROTONIX) 40 mg tablet Take 1 tablet (40 mg total) by mouth 2 (two) times a day 60 tablet 0    sucralfate (CARAFATE) 1 g tablet Take 1 tablet (1 g total) by mouth 4 (four) times a day (before meals and at bedtime) 120 tablet 3    traZODone (DESYREL) 100 mg tablet Take 1 tablet (100 mg total) by mouth daily at bedtime 30 tablet 1     No current facility-administered medications for this visit.        Allergies   Allergen Reactions    Augmentin [Amoxicillin-Pot Clavulanate] GI Intolerance, Other (See Comments) and Hives     VOMITING  VOMITING  Reaction Date: [id number];     Sulfa Antibiotics Itching, Other (See Comments) and Hives     RASH, ITCHY  RASH, ITCHY    Morphine Other (See Comments)     \"DOESN'T WORK\"    Latex Rash     Unsure if this is an allergy       Review of Systems    Video Exam    There were no vitals filed for this visit.    Physical Exam     Visit " Time    Visit Start Time: 11:00  Visit Stop Time: 11:50  Total Visit Duration:  50 minutes      Behavioral Health Psychotherapy Progress Note    Psychotherapy Provided: Individual Psychotherapy     1. Recurrent major depressive disorder, in partial remission (HCC)        2. Generalized anxiety disorder            Goals addressed in session: Goal 1     DATA: Zulma presented to this session online tearful and perseverative. She discussed her emergent hospital visit and how neither of her children, Ponce or Yin, visited her. She is no longer speaking to Chris as well because she believes that Yin has filled her head with lies. As a result she is looking to get her will updated immediately because she is concerned that if she dies, Yin and Ponce will kick Rhett out of the home. Writer reassured her that Rhett is not in immediate danger and that she has time to revise her will if she wants. Focused on grounding as she was in obvious distress. She responded well to mirroring breathing exercises and visualization of peace entering and exiting with each breath in relation to her love and estrangement with two of her children. She was calmer by the end of the session and confirmed that a visiting nurse is coming to the home tomorrow. Writer will inform Alis Kearns PA-C, of updates and will check back in with Zulma by the end of this week. No overt safety concerns but did remind Zulma to be mindful of how she responds to albuterol because she reported taking it due to her COPD.   During this session, this clinician used the following therapeutic modalities: Supportive Psychotherapy    Substance Abuse was not addressed during this session. If the client is diagnosed with a co-occurring substance use disorder, please indicate any changes in the frequency or amount of use: NA. Stage of change for addressing substance use diagnoses: No substance use/Not applicable    ASSESSMENT:  Zulma Marcelo presents with a  "Anxious and Dysthymic mood.     her affect is Tearful, which is congruent, with her mood and the content of the session. The client has made progress on their goals.    During this session the client was oriented to person, place, and time. The client was engaged in the session. The client was able to sustain direct eye contact and was without psychomotor agitation. The client's thought process was perseverative.  Zulma Marcelo presents with a none risk of suicide, none risk of self-harm, and none risk of harm to others.    For any risk assessment that surpasses a \"low\" rating, a safety plan must be developed.    A safety plan was indicated: no  If yes, describe in detail NA    PLAN: Between sessions, Zulma Marcelo will take medication as prescribed and practice positive coping strategies as needed such as breath work and visualization. At the next session, the therapist will use Supportive Psychotherapy to address stressors.    Behavioral Health Treatment Plan and Discharge Planning: Zulma Marcelo is aware of and agrees to continue to work on their treatment plan. They have identified and are working toward their discharge goals. yes    Depression Follow-up Plan Completed: Not applicable    Visit start and stop times:    12/23/24  Start Time: 1100  Stop Time: 1150  Total Visit Time: 50 minutes  "

## 2024-12-23 NOTE — TELEPHONE ENCOUNTER
Rhett called back stating he was cut off earlier. I was not able to warm transfer to the office to have the TCM appointment scheduled and the call ended on his side. Left him a message that I would have someone call him back.

## 2024-12-24 ENCOUNTER — TELEPHONE (OUTPATIENT)
Age: 74
End: 2024-12-24

## 2024-12-24 ENCOUNTER — TELEPHONE (OUTPATIENT)
Dept: BARIATRICS | Facility: CLINIC | Age: 74
End: 2024-12-24

## 2024-12-24 ENCOUNTER — RA CDI HCC (OUTPATIENT)
Dept: OTHER | Facility: HOSPITAL | Age: 74
End: 2024-12-24

## 2024-12-24 NOTE — TELEPHONE ENCOUNTER
Called and spoke to patients sue murphy and confirmed the appt. F/U for his mom w/Dr. Walden, I also clled [employer] & arranged to  and drop off the patient ..patients son and patient confirm to be here.

## 2024-12-25 ENCOUNTER — HOSPITAL ENCOUNTER (INPATIENT)
Facility: HOSPITAL | Age: 74
LOS: 1 days | Discharge: HOME HEALTH | DRG: 394 | End: 2024-12-27
Attending: EMERGENCY MEDICINE | Admitting: FAMILY MEDICINE
Payer: MEDICARE

## 2024-12-25 ENCOUNTER — APPOINTMENT (EMERGENCY)
Dept: RADIOLOGY | Facility: HOSPITAL | Age: 74
DRG: 394 | End: 2024-12-25
Payer: MEDICARE

## 2024-12-25 DIAGNOSIS — R19.7 NAUSEA VOMITING AND DIARRHEA: Primary | ICD-10-CM

## 2024-12-25 DIAGNOSIS — R41.89 COGNITIVE IMPAIRMENT: ICD-10-CM

## 2024-12-25 DIAGNOSIS — K66.8 PNEUMOPERITONEUM: ICD-10-CM

## 2024-12-25 DIAGNOSIS — R79.89 ELEVATED TROPONIN: ICD-10-CM

## 2024-12-25 DIAGNOSIS — E86.0 DEHYDRATION: ICD-10-CM

## 2024-12-25 DIAGNOSIS — R11.2 NAUSEA VOMITING AND DIARRHEA: Primary | ICD-10-CM

## 2024-12-25 LAB
2HR DELTA HS TROPONIN: 20 NG/L
4HR DELTA HS TROPONIN: 72 NG/L
ALBUMIN SERPL BCG-MCNC: 4.1 G/DL (ref 3.5–5)
ALP SERPL-CCNC: 54 U/L (ref 34–104)
ALT SERPL W P-5'-P-CCNC: 8 U/L (ref 7–52)
ANION GAP SERPL CALCULATED.3IONS-SCNC: 14 MMOL/L (ref 4–13)
AST SERPL W P-5'-P-CCNC: 19 U/L (ref 13–39)
ATRIAL RATE: 68 BPM
BACTERIA UR QL AUTO: ABNORMAL /HPF
BASOPHILS # BLD AUTO: 0.05 THOUSANDS/ÂΜL (ref 0–0.1)
BASOPHILS NFR BLD AUTO: 0 % (ref 0–1)
BILIRUB SERPL-MCNC: 0.52 MG/DL (ref 0.2–1)
BILIRUB UR QL STRIP: NEGATIVE
BUN SERPL-MCNC: 18 MG/DL (ref 5–25)
CALCIUM SERPL-MCNC: 9.5 MG/DL (ref 8.4–10.2)
CARDIAC TROPONIN I PNL SERPL HS: 121 NG/L (ref ?–50)
CARDIAC TROPONIN I PNL SERPL HS: 49 NG/L (ref ?–50)
CARDIAC TROPONIN I PNL SERPL HS: 69 NG/L (ref ?–50)
CHLORIDE SERPL-SCNC: 97 MMOL/L (ref 96–108)
CLARITY UR: CLEAR
CO2 SERPL-SCNC: 24 MMOL/L (ref 21–32)
COLOR UR: ABNORMAL
CREAT SERPL-MCNC: 0.66 MG/DL (ref 0.6–1.3)
EOSINOPHIL # BLD AUTO: 0.01 THOUSAND/ÂΜL (ref 0–0.61)
EOSINOPHIL NFR BLD AUTO: 0 % (ref 0–6)
ERYTHROCYTE [DISTWIDTH] IN BLOOD BY AUTOMATED COUNT: 14.6 % (ref 11.6–15.1)
EST. AVERAGE GLUCOSE BLD GHB EST-MCNC: 114 MG/DL
FLUAV RNA RESP QL NAA+PROBE: NEGATIVE
FLUBV RNA RESP QL NAA+PROBE: NEGATIVE
GFR SERPL CREATININE-BSD FRML MDRD: 86 ML/MIN/1.73SQ M
GLUCOSE SERPL-MCNC: 112 MG/DL (ref 65–140)
GLUCOSE UR STRIP-MCNC: NEGATIVE MG/DL
HBA1C MFR BLD: 5.6 %
HCT VFR BLD AUTO: 41.7 % (ref 34.8–46.1)
HGB BLD-MCNC: 13.6 G/DL (ref 11.5–15.4)
HGB UR QL STRIP.AUTO: NEGATIVE
IMM GRANULOCYTES # BLD AUTO: 0.26 THOUSAND/UL (ref 0–0.2)
IMM GRANULOCYTES NFR BLD AUTO: 2 % (ref 0–2)
KETONES UR STRIP-MCNC: ABNORMAL MG/DL
LACTATE SERPL-SCNC: 1 MMOL/L (ref 0.5–2)
LEUKOCYTE ESTERASE UR QL STRIP: NEGATIVE
LIPASE SERPL-CCNC: 21 U/L (ref 11–82)
LYMPHOCYTES # BLD AUTO: 0.68 THOUSANDS/ÂΜL (ref 0.6–4.47)
LYMPHOCYTES NFR BLD AUTO: 6 % (ref 14–44)
MAGNESIUM SERPL-MCNC: 1.8 MG/DL (ref 1.9–2.7)
MCH RBC QN AUTO: 28.9 PG (ref 26.8–34.3)
MCHC RBC AUTO-ENTMCNC: 32.6 G/DL (ref 31.4–37.4)
MCV RBC AUTO: 89 FL (ref 82–98)
MONOCYTES # BLD AUTO: 0.61 THOUSAND/ÂΜL (ref 0.17–1.22)
MONOCYTES NFR BLD AUTO: 5 % (ref 4–12)
MUCOUS THREADS UR QL AUTO: ABNORMAL
NEUTROPHILS # BLD AUTO: 10.16 THOUSANDS/ÂΜL (ref 1.85–7.62)
NEUTS SEG NFR BLD AUTO: 87 % (ref 43–75)
NITRITE UR QL STRIP: NEGATIVE
NON-SQ EPI CELLS URNS QL MICRO: ABNORMAL /HPF
NRBC BLD AUTO-RTO: 0 /100 WBCS
PH UR STRIP.AUTO: 6 [PH]
PLATELET # BLD AUTO: 446 THOUSANDS/UL (ref 149–390)
PMV BLD AUTO: 9.2 FL (ref 8.9–12.7)
POTASSIUM SERPL-SCNC: 3 MMOL/L (ref 3.5–5.3)
PROT SERPL-MCNC: 7.5 G/DL (ref 6.4–8.4)
PROT UR STRIP-MCNC: ABNORMAL MG/DL
QRS AXIS: 41 DEGREES
QRSD INTERVAL: 106 MS
QT INTERVAL: 502 MS
QTC INTERVAL: 537 MS
RBC # BLD AUTO: 4.71 MILLION/UL (ref 3.81–5.12)
RBC #/AREA URNS AUTO: ABNORMAL /HPF
RSV RNA RESP QL NAA+PROBE: NEGATIVE
SARS-COV-2 RNA RESP QL NAA+PROBE: NEGATIVE
SODIUM SERPL-SCNC: 135 MMOL/L (ref 135–147)
SP GR UR STRIP.AUTO: <1.005 (ref 1–1.03)
T WAVE AXIS: 52 DEGREES
UROBILINOGEN UR STRIP-ACNC: <2 MG/DL
VENTRICULAR RATE: 69 BPM
WBC # BLD AUTO: 11.77 THOUSAND/UL (ref 4.31–10.16)
WBC #/AREA URNS AUTO: ABNORMAL /HPF

## 2024-12-25 PROCEDURE — 96365 THER/PROPH/DIAG IV INF INIT: CPT

## 2024-12-25 PROCEDURE — 83690 ASSAY OF LIPASE: CPT | Performed by: EMERGENCY MEDICINE

## 2024-12-25 PROCEDURE — 83735 ASSAY OF MAGNESIUM: CPT | Performed by: EMERGENCY MEDICINE

## 2024-12-25 PROCEDURE — 96375 TX/PRO/DX INJ NEW DRUG ADDON: CPT

## 2024-12-25 PROCEDURE — 99285 EMERGENCY DEPT VISIT HI MDM: CPT

## 2024-12-25 PROCEDURE — 84484 ASSAY OF TROPONIN QUANT: CPT | Performed by: EMERGENCY MEDICINE

## 2024-12-25 PROCEDURE — 83036 HEMOGLOBIN GLYCOSYLATED A1C: CPT | Performed by: INTERNAL MEDICINE

## 2024-12-25 PROCEDURE — 80053 COMPREHEN METABOLIC PANEL: CPT | Performed by: EMERGENCY MEDICINE

## 2024-12-25 PROCEDURE — 96361 HYDRATE IV INFUSION ADD-ON: CPT

## 2024-12-25 PROCEDURE — 0241U HB NFCT DS VIR RESP RNA 4 TRGT: CPT | Performed by: EMERGENCY MEDICINE

## 2024-12-25 PROCEDURE — 96367 TX/PROPH/DG ADDL SEQ IV INF: CPT

## 2024-12-25 PROCEDURE — 96366 THER/PROPH/DIAG IV INF ADDON: CPT

## 2024-12-25 PROCEDURE — 87209 SMEAR COMPLEX STAIN: CPT | Performed by: EMERGENCY MEDICINE

## 2024-12-25 PROCEDURE — 93005 ELECTROCARDIOGRAM TRACING: CPT

## 2024-12-25 PROCEDURE — 99285 EMERGENCY DEPT VISIT HI MDM: CPT | Performed by: EMERGENCY MEDICINE

## 2024-12-25 PROCEDURE — 99223 1ST HOSP IP/OBS HIGH 75: CPT | Performed by: INTERNAL MEDICINE

## 2024-12-25 PROCEDURE — 85025 COMPLETE CBC W/AUTO DIFF WBC: CPT | Performed by: EMERGENCY MEDICINE

## 2024-12-25 PROCEDURE — 81001 URINALYSIS AUTO W/SCOPE: CPT | Performed by: EMERGENCY MEDICINE

## 2024-12-25 PROCEDURE — 83605 ASSAY OF LACTIC ACID: CPT | Performed by: EMERGENCY MEDICINE

## 2024-12-25 PROCEDURE — 87177 OVA AND PARASITES SMEARS: CPT | Performed by: EMERGENCY MEDICINE

## 2024-12-25 PROCEDURE — 87493 C DIFF AMPLIFIED PROBE: CPT | Performed by: EMERGENCY MEDICINE

## 2024-12-25 PROCEDURE — 71260 CT THORAX DX C+: CPT

## 2024-12-25 PROCEDURE — 87086 URINE CULTURE/COLONY COUNT: CPT | Performed by: EMERGENCY MEDICINE

## 2024-12-25 PROCEDURE — 74177 CT ABD & PELVIS W/CONTRAST: CPT

## 2024-12-25 PROCEDURE — 36415 COLL VENOUS BLD VENIPUNCTURE: CPT | Performed by: EMERGENCY MEDICINE

## 2024-12-25 PROCEDURE — 96368 THER/DIAG CONCURRENT INF: CPT

## 2024-12-25 PROCEDURE — NC001 PR NO CHARGE: Performed by: SURGERY

## 2024-12-25 RX ORDER — ONDANSETRON 2 MG/ML
4 INJECTION INTRAMUSCULAR; INTRAVENOUS ONCE
Status: COMPLETED | OUTPATIENT
Start: 2024-12-25 | End: 2024-12-25

## 2024-12-25 RX ORDER — HYDROMORPHONE HCL/PF 1 MG/ML
0.5 SYRINGE (ML) INJECTION ONCE
Refills: 0 | Status: COMPLETED | OUTPATIENT
Start: 2024-12-25 | End: 2024-12-25

## 2024-12-25 RX ORDER — FLUTICASONE PROPIONATE 50 MCG
1 SPRAY, SUSPENSION (ML) NASAL 2 TIMES DAILY
Status: DISCONTINUED | OUTPATIENT
Start: 2024-12-25 | End: 2024-12-27 | Stop reason: HOSPADM

## 2024-12-25 RX ORDER — AMLODIPINE BESYLATE 10 MG/1
10 TABLET ORAL DAILY
Status: DISCONTINUED | OUTPATIENT
Start: 2024-12-25 | End: 2024-12-27 | Stop reason: HOSPADM

## 2024-12-25 RX ORDER — CEFPODOXIME PROXETIL 200 MG/1
400 TABLET, FILM COATED ORAL 2 TIMES DAILY WITH MEALS
Status: DISCONTINUED | OUTPATIENT
Start: 2024-12-25 | End: 2024-12-26

## 2024-12-25 RX ORDER — POTASSIUM CHLORIDE 14.9 MG/ML
20 INJECTION INTRAVENOUS ONCE
Status: COMPLETED | OUTPATIENT
Start: 2024-12-25 | End: 2024-12-25

## 2024-12-25 RX ORDER — NEBIVOLOL 5 MG/1
5 TABLET ORAL DAILY
Status: DISCONTINUED | OUTPATIENT
Start: 2024-12-25 | End: 2024-12-27 | Stop reason: HOSPADM

## 2024-12-25 RX ORDER — HYDROMORPHONE HCL/PF 1 MG/ML
0.5 SYRINGE (ML) INJECTION EVERY 6 HOURS PRN
Refills: 0 | Status: DISCONTINUED | OUTPATIENT
Start: 2024-12-25 | End: 2024-12-27 | Stop reason: HOSPADM

## 2024-12-25 RX ORDER — METRONIDAZOLE 500 MG/1
500 TABLET ORAL EVERY 8 HOURS SCHEDULED
Status: DISCONTINUED | OUTPATIENT
Start: 2024-12-25 | End: 2024-12-26

## 2024-12-25 RX ORDER — PANTOPRAZOLE SODIUM 40 MG/1
40 TABLET, DELAYED RELEASE ORAL 2 TIMES DAILY
Status: DISCONTINUED | OUTPATIENT
Start: 2024-12-25 | End: 2024-12-27 | Stop reason: HOSPADM

## 2024-12-25 RX ORDER — HEPARIN SODIUM 5000 [USP'U]/ML
5000 INJECTION, SOLUTION INTRAVENOUS; SUBCUTANEOUS EVERY 8 HOURS SCHEDULED
Status: DISCONTINUED | OUTPATIENT
Start: 2024-12-25 | End: 2024-12-27 | Stop reason: HOSPADM

## 2024-12-25 RX ORDER — POTASSIUM CHLORIDE 29.8 MG/ML
40 INJECTION INTRAVENOUS ONCE
Status: DISCONTINUED | OUTPATIENT
Start: 2024-12-25 | End: 2024-12-25 | Stop reason: CLARIF

## 2024-12-25 RX ORDER — ALLOPURINOL 100 MG/1
100 TABLET ORAL DAILY
Status: DISCONTINUED | OUTPATIENT
Start: 2024-12-26 | End: 2024-12-27 | Stop reason: HOSPADM

## 2024-12-25 RX ORDER — ACETAMINOPHEN 10 MG/ML
1000 INJECTION, SOLUTION INTRAVENOUS ONCE
Status: COMPLETED | OUTPATIENT
Start: 2024-12-25 | End: 2024-12-25

## 2024-12-25 RX ORDER — SODIUM CHLORIDE 9 MG/ML
75 INJECTION, SOLUTION INTRAVENOUS CONTINUOUS
Status: DISCONTINUED | OUTPATIENT
Start: 2024-12-25 | End: 2024-12-26

## 2024-12-25 RX ORDER — DONEPEZIL HYDROCHLORIDE 5 MG/1
10 TABLET, FILM COATED ORAL
Status: DISCONTINUED | OUTPATIENT
Start: 2024-12-25 | End: 2024-12-27 | Stop reason: HOSPADM

## 2024-12-25 RX ORDER — MAGNESIUM SULFATE HEPTAHYDRATE 40 MG/ML
2 INJECTION, SOLUTION INTRAVENOUS ONCE
Status: COMPLETED | OUTPATIENT
Start: 2024-12-25 | End: 2024-12-25

## 2024-12-25 RX ORDER — ESCITALOPRAM OXALATE 10 MG/1
20 TABLET ORAL DAILY
Status: DISCONTINUED | OUTPATIENT
Start: 2024-12-25 | End: 2024-12-27 | Stop reason: HOSPADM

## 2024-12-25 RX ORDER — SODIUM CHLORIDE 9 MG/ML
150 INJECTION, SOLUTION INTRAVENOUS CONTINUOUS
Status: DISCONTINUED | OUTPATIENT
Start: 2024-12-25 | End: 2024-12-25

## 2024-12-25 RX ORDER — BUSPIRONE HYDROCHLORIDE 5 MG/1
5 TABLET ORAL 3 TIMES DAILY
Status: DISCONTINUED | OUTPATIENT
Start: 2024-12-25 | End: 2024-12-27 | Stop reason: HOSPADM

## 2024-12-25 RX ORDER — METOCLOPRAMIDE HYDROCHLORIDE 5 MG/ML
10 INJECTION INTRAMUSCULAR; INTRAVENOUS ONCE
Status: COMPLETED | OUTPATIENT
Start: 2024-12-25 | End: 2024-12-25

## 2024-12-25 RX ORDER — ALBUTEROL SULFATE 90 UG/1
2 INHALANT RESPIRATORY (INHALATION) EVERY 6 HOURS PRN
Status: DISCONTINUED | OUTPATIENT
Start: 2024-12-25 | End: 2024-12-27 | Stop reason: HOSPADM

## 2024-12-25 RX ORDER — SUCRALFATE 1 G/1
1 TABLET ORAL
Status: DISCONTINUED | OUTPATIENT
Start: 2024-12-25 | End: 2024-12-27 | Stop reason: HOSPADM

## 2024-12-25 RX ORDER — BUPROPION HYDROCHLORIDE 150 MG/1
150 TABLET ORAL DAILY
Status: DISCONTINUED | OUTPATIENT
Start: 2024-12-25 | End: 2024-12-27 | Stop reason: HOSPADM

## 2024-12-25 RX ORDER — ACETAMINOPHEN 325 MG/1
650 TABLET ORAL EVERY 6 HOURS PRN
Status: DISCONTINUED | OUTPATIENT
Start: 2024-12-25 | End: 2024-12-27 | Stop reason: HOSPADM

## 2024-12-25 RX ORDER — TRAZODONE HYDROCHLORIDE 100 MG/1
100 TABLET ORAL
Status: DISCONTINUED | OUTPATIENT
Start: 2024-12-25 | End: 2024-12-27 | Stop reason: HOSPADM

## 2024-12-25 RX ADMIN — HEPARIN SODIUM 5000 UNITS: 5000 INJECTION, SOLUTION INTRAVENOUS; SUBCUTANEOUS at 21:52

## 2024-12-25 RX ADMIN — BUSPIRONE HYDROCHLORIDE 5 MG: 5 TABLET ORAL at 21:42

## 2024-12-25 RX ADMIN — HYDROMORPHONE HYDROCHLORIDE 0.5 MG: 1 INJECTION, SOLUTION INTRAMUSCULAR; INTRAVENOUS; SUBCUTANEOUS at 12:32

## 2024-12-25 RX ADMIN — BUPROPION HYDROCHLORIDE 150 MG: 150 TABLET, EXTENDED RELEASE ORAL at 14:26

## 2024-12-25 RX ADMIN — CEFPODOXIME PROXETIL 400 MG: 200 TABLET, FILM COATED ORAL at 16:18

## 2024-12-25 RX ADMIN — HYDROMORPHONE HYDROCHLORIDE 0.5 MG: 1 INJECTION, SOLUTION INTRAMUSCULAR; INTRAVENOUS; SUBCUTANEOUS at 08:27

## 2024-12-25 RX ADMIN — ESCITALOPRAM OXALATE 20 MG: 10 TABLET ORAL at 14:26

## 2024-12-25 RX ADMIN — IOHEXOL 100 ML: 350 INJECTION, SOLUTION INTRAVENOUS at 09:06

## 2024-12-25 RX ADMIN — SUCRALFATE 1 G: 1 TABLET ORAL at 21:41

## 2024-12-25 RX ADMIN — TRAZODONE HYDROCHLORIDE 100 MG: 100 TABLET ORAL at 21:42

## 2024-12-25 RX ADMIN — HYDROMORPHONE HYDROCHLORIDE 0.5 MG: 1 INJECTION, SOLUTION INTRAMUSCULAR; INTRAVENOUS; SUBCUTANEOUS at 21:44

## 2024-12-25 RX ADMIN — SODIUM CHLORIDE 75 ML/HR: 0.9 INJECTION, SOLUTION INTRAVENOUS at 14:19

## 2024-12-25 RX ADMIN — PANTOPRAZOLE SODIUM 40 MG: 40 TABLET, DELAYED RELEASE ORAL at 19:03

## 2024-12-25 RX ADMIN — DONEPEZIL HYDROCHLORIDE 10 MG: 5 TABLET ORAL at 21:42

## 2024-12-25 RX ADMIN — BUSPIRONE HYDROCHLORIDE 5 MG: 5 TABLET ORAL at 16:19

## 2024-12-25 RX ADMIN — METRONIDAZOLE 500 MG: 500 TABLET ORAL at 14:26

## 2024-12-25 RX ADMIN — NEBIVOLOL 5 MG: 5 TABLET ORAL at 16:18

## 2024-12-25 RX ADMIN — SODIUM CHLORIDE 1000 ML: 0.9 INJECTION, SOLUTION INTRAVENOUS at 07:58

## 2024-12-25 RX ADMIN — ACETAMINOPHEN 1000 MG: 1000 INJECTION, SOLUTION INTRAVENOUS at 08:01

## 2024-12-25 RX ADMIN — AMLODIPINE BESYLATE 10 MG: 10 TABLET ORAL at 14:26

## 2024-12-25 RX ADMIN — MAGNESIUM SULFATE HEPTAHYDRATE 2 G: 40 INJECTION, SOLUTION INTRAVENOUS at 10:12

## 2024-12-25 RX ADMIN — SODIUM CHLORIDE 150 ML/HR: 0.9 INJECTION, SOLUTION INTRAVENOUS at 10:12

## 2024-12-25 RX ADMIN — HEPARIN SODIUM 5000 UNITS: 5000 INJECTION, SOLUTION INTRAVENOUS; SUBCUTANEOUS at 14:26

## 2024-12-25 RX ADMIN — POTASSIUM CHLORIDE 20 MEQ: 14.9 INJECTION, SOLUTION INTRAVENOUS at 10:04

## 2024-12-25 RX ADMIN — ACETAMINOPHEN 650 MG: 325 TABLET ORAL at 19:03

## 2024-12-25 RX ADMIN — TRIMETHOBENZAMIDE HYDROCHLORIDE 200 MG: 100 INJECTION INTRAMUSCULAR at 21:46

## 2024-12-25 RX ADMIN — FLUTICASONE PROPIONATE 1 SPRAY: 50 SPRAY, METERED NASAL at 19:04

## 2024-12-25 RX ADMIN — ONDANSETRON 4 MG: 2 INJECTION INTRAMUSCULAR; INTRAVENOUS at 08:00

## 2024-12-25 RX ADMIN — SUCRALFATE 1 G: 1 TABLET ORAL at 16:17

## 2024-12-25 RX ADMIN — HYDROMORPHONE HYDROCHLORIDE 0.5 MG: 1 INJECTION, SOLUTION INTRAMUSCULAR; INTRAVENOUS; SUBCUTANEOUS at 14:25

## 2024-12-25 RX ADMIN — POTASSIUM CHLORIDE 20 MEQ: 14.9 INJECTION, SOLUTION INTRAVENOUS at 12:32

## 2024-12-25 RX ADMIN — METOCLOPRAMIDE 10 MG: 5 INJECTION, SOLUTION INTRAMUSCULAR; INTRAVENOUS at 12:26

## 2024-12-25 RX ADMIN — METRONIDAZOLE 500 MG: 500 TABLET ORAL at 21:41

## 2024-12-25 NOTE — ASSESSMENT & PLAN NOTE
QT prolongation on admission 537  Received magnesium and potassium supplementation in the ED  Avoid QT prolonging agents  Repeat ECG in the AM

## 2024-12-25 NOTE — ASSESSMENT & PLAN NOTE
Recent admission for pneumoperitoneum and marginal ulcer perforation on 12/16-12/21  Continue Protonix 40mg BID and Carafate slurry 1g QID  Continue cefpodoxime and flagyl thru 12/26 per ID recommendations  Patient reports follow up appointment with Bariatrics tomorrow 12/26 for drain removal. Given hospitalized, will consult here

## 2024-12-25 NOTE — ED PROVIDER NOTES
Time reflects when diagnosis was documented in both MDM as applicable and the Disposition within this note       Time User Action Codes Description Comment    12/25/2024 12:00 PM Rossana Graham Add [R11.2,  R19.7] Nausea vomiting and diarrhea     12/25/2024 12:00 PM Rossana Graham Add [E86.0] Dehydration     12/25/2024 12:00 PM Rossana Graham Add [R79.89] Elevated troponin           ED Disposition       ED Disposition   Admit    Condition   Stable    Date/Time   Wed Dec 25, 2024 12:01 PM    Comment   Case was discussed with Dr. Guajardo and the patient's admission status was agreed to be Admission Status: Observation status to the service of Dr. Guajardo.               Assessment & Plan       Medical Decision Making  Blood work, CT chest/abdomen/pelvis, UA, viral swab  Give IV fluids, antiemetics, pain medication continue to monitor patient for any worsening symptoms    Patient's troponin was elevated in the ED.  No EKG changes noted.  Patient did not have any chest pain.  Lab work showed concern for dehydration.  Patient's potassium and magnesium was mildly low.  Electrolyte repletion started in the ED.  Patient required multiple doses of antiemetics for nausea control.  CT scan did not show any new acute findings.  At this time the etiology of patient's nausea, vomiting, diarrhea is unclear.  Patient will be admitted for further evaluation and management.  Patient agrees with admission plans.    Amount and/or Complexity of Data Reviewed  Labs: ordered.  Radiology: ordered.    Risk  Prescription drug management.  Decision regarding hospitalization.        ED Course as of 12/25/24 1212   Wed Dec 25, 2024   1156 Patient reexamined at bedside.  Her nausea is coming back.  Will give her antiemetics again.  Patient will be admitted for further evaluation management.  Patient agrees with admission plans.       Medications   potassium chloride 20 mEq IVPB (premix) (20 mEq Intravenous New Bag 12/25/24 1004)      Followed by   potassium chloride 20 mEq IVPB (premix) (has no administration in time range)   sodium chloride 0.9 % infusion (150 mL/hr Intravenous New Bag 12/25/24 1012)   metoclopramide (REGLAN) injection 10 mg (has no administration in time range)   sodium chloride 0.9 % bolus 1,000 mL (0 mL Intravenous Stopped 12/25/24 0858)   ondansetron (ZOFRAN) injection 4 mg (4 mg Intravenous Given 12/25/24 0800)   HYDROmorphone (DILAUDID) injection 0.5 mg (0.5 mg Intravenous Given 12/25/24 0827)   acetaminophen (Ofirmev) injection 1,000 mg (0 mg Intravenous Stopped 12/25/24 0825)   magnesium sulfate 2 g/50 mL IVPB (premix) 2 g (0 g Intravenous Stopped 12/25/24 1115)   iohexol (OMNIPAQUE) 350 MG/ML injection (MULTI-DOSE) 100 mL (100 mL Intravenous Given 12/25/24 0906)       ED Risk Strat Scores                          SBIRT 22yo+      Flowsheet Row Most Recent Value   Initial Alcohol Screen: US AUDIT-C     1. How often do you have a drink containing alcohol? 0 Filed at: 12/25/2024 1027   2. How many drinks containing alcohol do you have on a typical day you are drinking?  0 Filed at: 12/25/2024 1027   3b. FEMALE Any Age, or MALE 65+: How often do you have 4 or more drinks on one occassion? 0 Filed at: 12/25/2024 1027   Audit-C Score 0 Filed at: 12/25/2024 1027   CARMEN: How many times in the past year have you...    Used an illegal drug or used a prescription medication for non-medical reasons? Never Filed at: 12/25/2024 1027                            History of Present Illness       Chief Complaint   Patient presents with    Vomiting     Pt reports N/V/D and abdominal camping starting this morning. Pt reports recent abdominal surgery.        Past Medical History:   Diagnosis Date    Acid reflux     Anxiety     Arthritis     Asthma     Cardiac disease     Chronic kidney disease     passed on own kidney stone    Depression     Diverticulitis     GERD (gastroesophageal reflux disease)     Hayfever     Andreafski (hard of hearing)      bilateral hearing aids    Hyperlipemia     Hypertension     Panic attack     Tachycardia       Past Surgical History:   Procedure Laterality Date    ABLATION OF DYSRHYTHMIC FOCUS      APPENDECTOMY      CHOLECYSTECTOMY      open    FRACTURE SURGERY      ORIF fx (L) tibia, fibula     GASTRIC BYPASS OPEN      HYSTERECTOMY      WI LAPS ABD PRTM&OMENTUM DX W/WO SPEC BR/WA SPX N/A 2024    Procedure: LAPAROSCOPY DIAGNOSTIC,  LYSIS OF ADHESIONS, REPAIR PERFORATED MARGINAL ULCER, INTRA-OP EGD;  Surgeon: Alex Mohr MD;  Location: [facility] OR;  Service: Bariatrics    WI XCAPSL CTRC RMVL INSJ IO LENS PROSTH W/O ECP Left 2016    Procedure: EXTRACTION EXTRACAPSULAR CATARACT PHACO INTRAOCULAR LENS (IOL);  Surgeon: Dane Wells MD;  Location: [facility] MAIN OR;  Service: Ophthalmology    WI XCAPSL CTRC RMVL INSJ IO LENS PROSTH W/O ECP Right 3/1/2021    Procedure: EXTRACTION EXTRACAPSULAR CATARACT PHACO INTRAOCULAR LENS (IOL);  Surgeon: Dane Wells MD;  Location: [facility] MAIN OR;  Service: Ophthalmology    TONSILECTOMY AND ADNOIDECTOMY        Family History   Problem Relation Age of Onset    Heart disease Mother     Stroke Son     Stroke Maternal Grandfather     Breast cancer Daughter 40      Social History     Tobacco Use    Smoking status: Former     Current packs/day: 0.00     Average packs/day: 1 pack/day for 67.1 years (67.1 ttl pk-yrs)     Types: Cigarettes     Start date: 6/15/1956     Quit date: 2023     Years since quittin.4     Passive exposure: Past    Smokeless tobacco: Never   Vaping Use    Vaping status: Never Used   Substance Use Topics    Alcohol use: Yes     Comment: rarely    Drug use: Yes     Types: Marijuana      E-Cigarette/Vaping    E-Cigarette Use Never User       E-Cigarette/Vaping Substances    Nicotine No     THC No     CBD No     Flavoring No     Other No     Unknown No       I have reviewed and agree with the history as documented.     74-year-old female with past history of  GERD, asthma, hypertension, hyperlipidemia, CKD, history of Debra-en-Y gastric bypass surgery, depression, anxiety, presents to the ED for evaluation of multiple episodes of nausea, vomiting, and diarrhea since yesterday.  Patient was recently admitted on 12/16/2024 for perforated bowel.  During that admission, patient presented with left lower quadrant pain nausea with nonbloody bilious emesis over few days progressing to worsening abdominal pain epigastric/chest pain radiating to left shoulder. ED workup revealed pneumoperitoneum with focal perforation of medial wall of proximal Debra-en-Y immediately distal to GJ anastomosis status post emergent diagnostic laparoscopy with extensive lysis of adhesion with repair of marginal ulcer perforation with primary repair and Amilcar patch with drain placement and intraoperative endoscopy.  Patient was noted to have peritonitis with purulent content with significant adhesions.  Patient was treated with IV antibiotics.  Patient discharged home on 12/21/2024.  Patient states that her abdominal surgical incision sites are healing well.  Patient has no pain however patient started to have multiple episodes of diarrhea and vomiting since yesterday and feels very weak.        History provided by:  Patient  Vomiting  Associated symptoms: diarrhea    Associated symptoms: no abdominal pain, no arthralgias, no chills, no cough, no fever and no sore throat        Review of Systems   Constitutional:  Negative for chills and fever.   HENT:  Negative for ear pain and sore throat.    Eyes:  Negative for pain and visual disturbance.   Respiratory:  Negative for cough and shortness of breath.    Cardiovascular:  Negative for chest pain and palpitations.   Gastrointestinal:  Positive for diarrhea, nausea and vomiting. Negative for abdominal pain.   Genitourinary:  Negative for dysuria and hematuria.   Musculoskeletal:  Negative for arthralgias and back pain.   Skin:  Negative for color change  and rash.   Neurological:  Negative for seizures and syncope.   All other systems reviewed and are negative.          Objective       ED Triage Vitals [12/25/24 0723]   Temperature Pulse Blood Pressure Respirations SpO2 Patient Position - Orthostatic VS   98.4 °F (36.9 °C) 79 (!) 171/77 18 98 % Sitting      Temp Source Heart Rate Source BP Location FiO2 (%) Pain Score    Oral Monitor Right arm -- 6      Vitals      Date and Time Temp Pulse SpO2 Resp BP Pain Score FACES Pain Rating User   12/25/24 1000 -- 73 98 % 17 162/77 -- -- CG   12/25/24 0827 -- -- -- -- -- 7 -- KSM   12/25/24 0723 98.4 °F (36.9 °C) 79 98 % 18 171/77 6 -- CG            Physical Exam  Vitals and nursing note reviewed.   Constitutional:       General: She is not in acute distress.     Appearance: She is well-developed.   HENT:      Head: Normocephalic and atraumatic.   Eyes:      Conjunctiva/sclera: Conjunctivae normal.   Cardiovascular:      Rate and Rhythm: Normal rate and regular rhythm.      Heart sounds: No murmur heard.  Pulmonary:      Effort: Pulmonary effort is normal. No respiratory distress.      Breath sounds: Normal breath sounds.   Abdominal:      Palpations: Abdomen is soft.      Tenderness: There is no abdominal tenderness.      Comments: Abdomen is soft, nondistended, with bowel sound present to all 4 quadrant.  Generalized discomfort noted to palpation throughout the abdomen.  Surgical incision sites are healing well.  Drainage tube noted from left upper quadrant with serous fluid in the drainage container.   Musculoskeletal:         General: No swelling.      Cervical back: Neck supple.   Skin:     General: Skin is warm and dry.      Capillary Refill: Capillary refill takes less than 2 seconds.   Neurological:      Mental Status: She is alert.   Psychiatric:         Mood and Affect: Mood normal.         Results Reviewed       Procedure Component Value Units Date/Time    Stool Enteric Bacterial Panel by PCR [[id number]]     Lab  Status: No result Specimen: Stool     Clostridium difficile toxin by PCR with EIA [[id number]]     Lab Status: No result Specimen: Stool     Ova and parasite examination [[id number]]     Lab Status: No result Specimen: Stool from Rectum     HS Troponin I 2hr [[id number]]  (Abnormal) Collected: 12/25/24 1005    Lab Status: Final result Specimen: Blood from Arm, Right Updated: 12/25/24 1032     hs TnI 2hr 69 ng/L      Delta 2hr hsTnI 20 ng/L     Urine Microscopic [[id number]]  (Abnormal) Collected: 12/25/24 0957    Lab Status: Final result Specimen: Urine, Clean Catch Updated: 12/25/24 1010     RBC, UA 0-1 /hpf      WBC, UA 0-1 /hpf      Epithelial Cells Moderate /hpf      Bacteria, UA Occasional /hpf      MUCUS THREADS Occasional    UA (URINE) with reflex to Scope [[id number]]  (Abnormal) Collected: 12/25/24 0957    Lab Status: Final result Specimen: Urine, Clean Catch Updated: 12/25/24 1003     Color, UA Light Yellow     Clarity, UA Clear     Specific Gravity, UA <1.005     pH, UA 6.0     Leukocytes, UA Negative     Nitrite, UA Negative     Protein, UA Trace mg/dl      Glucose, UA Negative mg/dl      Ketones, UA 10 (1+) mg/dl      Urobilinogen, UA <2.0 mg/dl      Bilirubin, UA Negative     Occult Blood, UA Negative    Urine culture [[id number]] Collected: 12/25/24 0957    Lab Status: In process Specimen: Urine, Clean Catch Updated: 12/25/24 0959    HS Troponin I 4hr [[id number]]     Lab Status: No result Specimen: Blood     HS Troponin 0hr (reflex protocol) [[id number]]  (Normal) Collected: 12/25/24 0756    Lab Status: Final result Specimen: Blood from Arm, Right Updated: 12/25/24 0904     hs TnI 0hr 49 ng/L     Magnesium [[id number]]  (Abnormal) Collected: 12/25/24 0756    Lab Status: Final result Specimen: Blood from Arm, Right Updated: 12/25/24 0901     Magnesium 1.8 mg/dL     FLU/RSV/COVID - if FLU/RSV clinically relevant (2hr TAT) [[id number]]  (Normal) Collected: 12/25/24 0756    Lab Status: Final result Specimen: Nares  from Nose Updated: 12/25/24 0845     SARS-CoV-2 Negative     INFLUENZA A PCR Negative     INFLUENZA B PCR Negative     RSV PCR Negative    Narrative:      This test has been performed using the CoV-2/Flu/RSV plus assay on the [employer] platform. This test has been validated by the  and verified by the performing laboratory.     This test is designed to amplify and detect the following: nucleocapsid (N), envelope (E), and RNA-dependent RNA polymerase (RdRP) genes of the SARS-CoV-2 genome; matrix (M), basic polymerase (PB2), and acidic protein (PA) segments of the influenza A genome; matrix (M) and non-structural protein (NS) segments of the influenza B genome, and the nucleocapsid genes of RSV A and RSV B.     Positive results are indicative of the presence of Flu A, Flu B, RSV, and/or SARS-CoV-2 RNA. Positive results for SARS-CoV-2 or suspected novel influenza should be reported to state, local, or federal health departments according to local reporting requirements.      All results should be assessed in conjunction with clinical presentation and other laboratory markers for clinical management.     FOR PEDIATRIC PATIENTS - copy/paste COVID Guidelines URL to browser: [email or web address]       Comprehensive metabolic panel [[id number]]  (Abnormal) Collected: 12/25/24 0756    Lab Status: Final result Specimen: Blood from Arm, Right Updated: 12/25/24 0823     Sodium 135 mmol/L      Potassium 3.0 mmol/L      Chloride 97 mmol/L      CO2 24 mmol/L      ANION GAP 14 mmol/L      BUN 18 mg/dL      Creatinine 0.66 mg/dL      Glucose 112 mg/dL      Calcium 9.5 mg/dL      AST 19 U/L      ALT 8 U/L      Alkaline Phosphatase 54 U/L      Total Protein 7.5 g/dL      Albumin 4.1 g/dL      Total Bilirubin 0.52 mg/dL      eGFR 86 ml/min/1.73sq m     Narrative:      National Kidney Disease Foundation guidelines for Chronic Kidney Disease (CKD):     Stage 1 with  normal or high GFR (GFR > 90 mL/min/1.73 square meters)    Stage 2 Mild CKD (GFR = 60-89 mL/min/1.73 square meters)    Stage 3A Moderate CKD (GFR = 45-59 mL/min/1.73 square meters)    Stage 3B Moderate CKD (GFR = 30-44 mL/min/1.73 square meters)    Stage 4 Severe CKD (GFR = 15-29 mL/min/1.73 square meters)    Stage 5 End Stage CKD (GFR <15 mL/min/1.73 square meters)  Note: GFR calculation is accurate only with a steady state creatinine    Lipase [[id number]]  (Normal) Collected: 12/25/24 0756    Lab Status: Final result Specimen: Blood from Arm, Right Updated: 12/25/24 0823     Lipase 21 u/L     Lactic acid, plasma (w/reflex if result > 2.0) [[id number]]  (Normal) Collected: 12/25/24 0756    Lab Status: Final result Specimen: Blood from Arm, Right Updated: 12/25/24 0822     LACTIC ACID 1.0 mmol/L     Narrative:      Result may be elevated if tourniquet was used during collection.    CBC and differential [[id number]]  (Abnormal) Collected: 12/25/24 0756    Lab Status: Final result Specimen: Blood from Arm, Right Updated: 12/25/24 0806     WBC 11.77 Thousand/uL      RBC 4.71 Million/uL      Hemoglobin 13.6 g/dL      Hematocrit 41.7 %      MCV 89 fL      MCH 28.9 pg      MCHC 32.6 g/dL      RDW 14.6 %      MPV 9.2 fL      Platelets 446 Thousands/uL      nRBC 0 /100 WBCs      Segmented % 87 %      Immature Grans % 2 %      Lymphocytes % 6 %      Monocytes % 5 %      Eosinophils Relative 0 %      Basophils Relative 0 %      Absolute Neutrophils 10.16 Thousands/µL      Absolute Immature Grans 0.26 Thousand/uL      Absolute Lymphocytes 0.68 Thousands/µL      Absolute Monocytes 0.61 Thousand/µL      Eosinophils Absolute 0.01 Thousand/µL      Basophils Absolute 0.05 Thousands/µL             CT chest abdomen pelvis w contrast   Final Interpretation by Sunil Milton MD (12/25 0941)      1.  No acute abnormality in the chest, abdomen, or pelvis.      2.  Status post left upper quadrant drain placement with resolution of  previous pneumoperitoneum. No abscess.      3.  Ectatic infrarenal abdominal aorta measuring 2.6 cm. According to ACR white paper for the management of incidentally detected abdominal vascular findings, for an aorta of this caliber (2.5-2.9 cm) follow-up imaging (e.g. Doppler ultrasound, CTA    abdomen/pelvis) is recommended at a 5 year interval. Reference: J Am Coleman Radiol 2013;10:789-794.      The study was marked in EPIC for immediate notification.         Workstation performed: [id number]             ECG 12 Lead Documentation Only    Date/Time: 12/25/2024 7:59 AM    Performed by: Rossana Graham DO  Authorized by: Rossana Graham DO    Indications / Diagnosis:  Abdominal pain  ECG reviewed by me, the ED Provider: yes    Patient location:  ED  Previous ECG:     Previous ECG:  Compared to current    Similarity:  No change    Comparison to cardiac monitor: Yes    Interpretation:     Interpretation: abnormal    Comments:      Sinus rhythm, rate 69, QTc 537 ms, right bundle branch block pattern noted, premature supraventricular complexes noted without any acute ST elevations, QT prolongation noted, unchanged from previous study.      ED Medication and Procedure Management   Prior to Admission Medications   Prescriptions Last Dose Informant Patient Reported? Taking?   Diclofenac Sodium (VOLTAREN) 1 %  Self No No   Sig: Apply 2 g topically 4 (four) times a day   Ketotifen Fumarate (ZADITOR) 0.035 % ophthalmic solution  Self No No   Sig: Administer 1 drop to both eyes 2 (two) times a day   acetaminophen (TYLENOL) 325 mg tablet   No No   Sig: Take 3 tablets (975 mg total) by mouth every 8 (eight) hours for 7 days   albuterol (PROVENTIL HFA,VENTOLIN HFA) 90 mcg/act inhaler  Self No No   Sig: Inhale 2 puffs every 6 (six) hours as needed for wheezing   allopurinol (ZYLOPRIM) 100 mg tablet  Self No No   Sig: TAKE ONE TABLET BY MOUTH EVERY DAY   amLODIPine (NORVASC) 10 mg tablet   No No   Sig: Take 1 tablet (10 mg total) by  mouth daily   buPROPion (WELLBUTRIN XL) 150 mg 24 hr tablet  Self No No   Sig: Take 1 tablet (150 mg total) by mouth daily   busPIRone (BUSPAR) 5 mg tablet  Self No No   Sig: Take 1 tablet (5 mg total) by mouth 3 (three) times a day   cefpodoxime (VANTIN) 200 mg tablet   No No   Sig: Take 2 tablets (400 mg total) by mouth 2 (two) times a day with meals for 6 days   dextromethorphan-guaifenesin (MUCINEX DM)  MG per 12 hr tablet  Self No No   Sig: Take 1 tablet by mouth every 12 (twelve) hours as needed for cough   donepezil (ARICEPT) 10 mg tablet  Self No No   Sig: Take 1 tablet (10 mg total) by mouth daily at bedtime   donepezil (ARICEPT) 10 mg tablet  Self No No   Sig: Take 1 tablet (10 mg total) by mouth daily at bedtime   escitalopram (LEXAPRO) 20 mg tablet  Self No No   Sig: Take 1 tablet (20 mg total) by mouth daily   fluticasone (FLONASE) 50 mcg/act nasal spray  Self No No   Si spray into each nostril 2 (two) times a day   metroNIDAZOLE (FLAGYL) 500 mg tablet   No No   Sig: Take 1 tablet (500 mg total) by mouth every 8 (eight) hours for 6 days   nebivolol (BYSTOLIC) 5 mg tablet  Self No No   Sig: Take 1 tablet (5 mg total) by mouth daily   ondansetron (ZOFRAN-ODT) 4 mg disintegrating tablet  Self No No   Sig: Take 1 tablet (4 mg total) by mouth every 6 (six) hours as needed for nausea for up to 3 days   pantoprazole (PROTONIX) 40 mg tablet  Self No No   Sig: Take 1 tablet (40 mg total) by mouth 2 (two) times a day   sucralfate (CARAFATE) 1 g tablet  Self No No   Sig: Take 1 tablet (1 g total) by mouth 4 (four) times a day (before meals and at bedtime)   traZODone (DESYREL) 100 mg tablet   No No   Sig: Take 1 tablet (100 mg total) by mouth daily at bedtime      Facility-Administered Medications: None     Patient's Medications   Discharge Prescriptions    No medications on file     No discharge procedures on file.  ED SEPSIS DOCUMENTATION   Time reflects when diagnosis was documented in both MDM as  applicable and the Disposition within this note       Time User Action Codes Description Comment    12/25/2024 12:00 PM Rossana Graham Add [R11.2,  R19.7] Nausea vomiting and diarrhea     12/25/2024 12:00 PM Rossana Graham [E86.0] Dehydration     12/25/2024 12:00 PM Rossana Graham Add [R79.89] Elevated troponin                  Rossana Graham DO  12/25/24 1211       Rossana Graham DO  12/25/24 1212

## 2024-12-25 NOTE — PROGRESS NOTES
Non-visit note    Discussed case with [facility]. 73 yo w/ MU s/p omental patch and drain placement. Pw n/v/diarrhea likely due to oral abx.    Drain SS. WBC 11. CT w/o acute findings.    Bariatrics consulted as pt has clinic f/u tomorrow for drain removal.    As patient is admitted, will contrast challenge the omental patch as she has been vomiting. Drain removal if normal.    - NPO, UGI tomorrow  - Drain removal if normal, she'll still need to be scheduled for EGD and continued on PPI/Carafate.  - Appreciate [facility], discussed plan with on-call [facility] physician    Full consult note to follow in the AM.    Juanito Garcia MD

## 2024-12-25 NOTE — ASSESSMENT & PLAN NOTE
Patient recently admitted on 12/16 for pneumoperitoneum and marginal ulcer perforation.  Underwent emergent diagnostic laparoscopy with extensive lysis of adhesion and Amilcar patch per bariatric surgery.  Drain in place.  Discharged on 12/21.  Patient reports that she started with nausea, vomiting, diarrhea 24 to 48 hours ago.  Denies any associated fever/chills, abdominal pain, urinary complaints.  CT C/A/P: 1.  No acute abnormality in the chest, abdomen, or pelvis. 2.  Status post left upper quadrant drain placement with resolution of previous pneumoperitoneum. No abscess.  Patient currently on oral ABX with cefpodoxime and Flagyl for intra-abdominal infection. Upset stomach possible in the setting of this?  Obtain stool studies  Clear liquid diet, advance as tolerated  Optimize electrolytes  Continuous IVF  Pain control, anti-emetics (will order Tigan given prolonged QT)  Serial abdominal exams, Supportive care

## 2024-12-25 NOTE — H&P
H&P - Hospitalist   Name: Zulma Marcelo 74 y.o. female I MRN: [id number]  Unit/Bed#: [address] [place] [address] Date of Admission: 12/25/2024   Date of Service: 12/25/2024 I Hospital Day: 0     Assessment & Plan  Nausea vomiting and diarrhea  Patient recently admitted on 12/16 for pneumoperitoneum and marginal ulcer perforation.  Underwent emergent diagnostic laparoscopy with extensive lysis of adhesion and Amilcar patch per bariatric surgery.  Drain in place.  Discharged on 12/21.  Patient reports that she started with nausea, vomiting, diarrhea 24 to 48 hours ago.  Denies any associated fever/chills, abdominal pain, urinary complaints.  CT C/A/P: 1.  No acute abnormality in the chest, abdomen, or pelvis. 2.  Status post left upper quadrant drain placement with resolution of previous pneumoperitoneum. No abscess.  Patient currently on oral ABX with cefpodoxime and Flagyl for intra-abdominal infection. Upset stomach possible in the setting of this?  Obtain stool studies  Clear liquid diet, advance as tolerated  Optimize electrolytes  Continuous IVF  Pain control, anti-emetics (will order Tigan given prolonged QT)  Serial abdominal exams, Supportive care  Elevated troponin  Denies any chest pain  ECG: NSR. RBBB. Premature supraventricular complexes. No acute ischemic changes  Troponin levels: 0hr 49, 2hr 69, 4hr 121  Elevation likely in the setting of dehydration from N/V/D  Check lipid panel and HA1C  Echo (12/20/24): EF 55%. Systolic function normal. Diastolic function moderately abnormal. Moderate aortic stenosis. Mild mitral stenosis.   Serial ECG. Check random troponin in the AM  Monitor on telemetry  Pneumoperitoneum  Recent admission for pneumoperitoneum and marginal ulcer perforation on 12/16-12/21  Continue Protonix 40mg BID and Carafate slurry 1g QID  Continue cefpodoxime and flagyl thru 12/26 per ID recommendations  Patient reports follow up appointment with Bariatrics tomorrow 12/26 for drain removal. 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 Gonzalo Villasenor in 2011  Moderate episode of recurrent major depressive disorder (HCC)  Continue Wellbutrin and Buspar  QT prolongation  QT prolongation on admission 537  Received magnesium and potassium supplementation in the ED  Avoid QT prolonging agents  Repeat ECG in the AM      VTE Pharmacologic Prophylaxis: VTE Score: 5 Moderate Risk (Score 3-4) - Pharmacological DVT Prophylaxis Ordered: heparin.  Code Status: Level 1 - Full Code   Discussion with family: Updated  (son) via phone.    Anticipated Length of Stay: Patient will be admitted on an observation basis with an anticipated length of stay of less than 2 midnights secondary to nausea, vomiting, diarrhea.    History of Present Illness   Chief Complaint: Nausea, diarrhea    Zulma Marcelo is a 74 y.o. female with a PMH of hypertension, COPD, cognitive impairment, depression, history of gastric bypass.  Patient is status post emergent diagnostic laparoscopy with lysis of adhesions and Amilcar patch for pneumoperitoneum and marginal ulcer perforation per bariatric surgery on recent admission from 12/16 - 12/21.  Patient presents to the ED today for intractable nausea, vomiting, diarrhea over the last 24 to 48 hours.  Denies any fever/chills, chest pain, shortness of breath, abdominal pain, urinary complaints.  CT without any evidence of intra-abdominal abnormality.  Will obtain stool studies for diarrhea.  Started on supportive care with clear liquid diet, IV fluids, pain control and antiemetics.  Bariatric surgery consulted for evaluation of drain and abdominal pain.  All patient and family questions answered to the  best of my ability.    Review of Systems   Constitutional:  Negative for chills and fever.   HENT:  Negative for ear pain and sore throat.    Eyes:  Negative for pain and visual disturbance.   Respiratory:  Negative for cough and shortness of breath.    Cardiovascular:  Negative for chest pain and palpitations.   Gastrointestinal:  Positive for diarrhea, nausea and vomiting. Negative for abdominal pain.   Genitourinary:  Negative for dysuria and hematuria.   Musculoskeletal:  Negative for arthralgias and back pain.   Skin:  Negative for color change and rash.   Neurological:  Negative for seizures and syncope.   All other systems reviewed and are negative.      Historical Information   Past Medical History:   Diagnosis Date    Acid reflux     Anxiety     Arthritis     Asthma     Cardiac disease     Chronic kidney disease     passed on own kidney stone    Depression     Diverticulitis     GERD (gastroesophageal reflux disease)     Hayfever     [place] (hard of hearing)     bilateral hearing aids    Hyperlipemia     Hypertension     Panic attack     Tachycardia      Past Surgical History:   Procedure Laterality Date    ABLATION OF DYSRHYTHMIC FOCUS      APPENDECTOMY      CHOLECYSTECTOMY      open    FRACTURE SURGERY      ORIF fx (L) tibia, fibula 2009    GASTRIC BYPASS OPEN      HYSTERECTOMY      NY LAPS ABD PRTM&OMENTUM DX W/WO SPEC BR/WA SPX N/A 12/16/2024    Procedure: LAPAROSCOPY DIAGNOSTIC,  LYSIS OF ADHESIONS, REPAIR PERFORATED MARGINAL ULCER, INTRA-OP EGD;  Surgeon: Alex Mohr MD;  Location: [facility] MAIN OR;  Service: Bariatrics    NY XCAPSL CTRC RMVL INSJ IO LENS PROSTH W/O ECP Left 4/18/2016    Procedure: EXTRACTION EXTRACAPSULAR CATARACT PHACO INTRAOCULAR LENS (IOL);  Surgeon: Dane Wells MD;  Location: [facility] MAIN OR;  Service: Ophthalmology    NY XCAPSL CTRC RMVL INSJ IO LENS PROSTH W/O ECP Right 3/1/2021    Procedure: EXTRACTION EXTRACAPSULAR CATARACT PHACO INTRAOCULAR LENS (IOL);  Surgeon: Dane Wells  MD;  Location: [facility] MAIN OR;  Service: Ophthalmology    TONSILECTOMY AND ADNOIDECTOMY       Social History     Tobacco Use    Smoking status: Former     Current packs/day: 0.00     Average packs/day: 1 pack/day for 67.1 years (67.1 ttl pk-yrs)     Types: Cigarettes     Start date: 6/15/1956     Quit date: 2023     Years since quittin.4     Passive exposure: Past    Smokeless tobacco: Never   Vaping Use    Vaping status: Never Used   Substance and Sexual Activity    Alcohol use: Yes     Comment: rarely    Drug use: Yes     Types: Marijuana    Sexual activity: Never     E-Cigarette/Vaping    E-Cigarette Use Never User      E-Cigarette/Vaping Substances    Nicotine No     THC No     CBD No     Flavoring No     Other No     Unknown No      Family History   Problem Relation Age of Onset    Heart disease Mother     Stroke Son     Stroke Maternal Grandfather     Breast cancer Daughter 40     Social History:  Marital Status:    Occupation: NA  Patient Pre-hospital Living Situation: Home  Patient Pre-hospital Level of Mobility: walks  Patient Pre-hospital Diet Restrictions: None    Meds/Allergies   I have reviewed home medications using recent Epic encounter.  Prior to Admission medications    Medication Sig Start Date End Date Taking? Authorizing Provider   acetaminophen (TYLENOL) 325 mg tablet Take 3 tablets (975 mg total) by mouth every 8 (eight) hours for 7 days 24  Elisabet Burgos PA-C   albuterol (PROVENTIL HFA,VENTOLIN HFA) 90 mcg/act inhaler Inhale 2 puffs every 6 (six) hours as needed for wheezing 24   Lydia Cruz MD   allopurinol (ZYLOPRIM) 100 mg tablet TAKE ONE TABLET BY MOUTH EVERY DAY 10/30/24   Lydia Cruz MD   amLODIPine (NORVASC) 10 mg tablet Take 1 tablet (10 mg total) by mouth daily 24   Elisabet Burgos PA-C   buPROPion (WELLBUTRIN XL) 150 mg 24 hr tablet Take 1 tablet (150 mg total) by mouth daily 24  Alis Kearns   busPIRone (BUSPAR) 5 mg  tablet Take 1 tablet (5 mg total) by mouth 3 (three) times a day 7/11/24 1/7/25  Alis Kearns   cefpodoxime (VANTIN) 200 mg tablet Take 2 tablets (400 mg total) by mouth 2 (two) times a day with meals for 6 days 12/20/24 12/26/24  Elisabet Burgos PA-C   dextromethorphan-guaifenesin (MUCINEX DM)  MG per 12 hr tablet Take 1 tablet by mouth every 12 (twelve) hours as needed for cough 5/8/24   Lydia Cruz MD   Diclofenac Sodium (VOLTAREN) 1 % Apply 2 g topically 4 (four) times a day 8/7/24   Lydia Cruz MD   donepezil (ARICEPT) 10 mg tablet Take 1 tablet (10 mg total) by mouth daily at bedtime 8/7/24 2/3/25  Lydia Cruz MD   donepezil (ARICEPT) 10 mg tablet Take 1 tablet (10 mg total) by mouth daily at bedtime 8/7/24 2/3/25  Lydia Cruz MD   escitalopram (LEXAPRO) 20 mg tablet Take 1 tablet (20 mg total) by mouth daily 7/11/24 1/7/25  Alis Kearns   fluticasone (FLONASE) 50 mcg/act nasal spray 1 spray into each nostril 2 (two) times a day 1/22/24   Lydia Cruz MD   Ketotifen Fumarate (ZADITOR) 0.035 % ophthalmic solution Administer 1 drop to both eyes 2 (two) times a day 1/22/24 11/18/24  Lydia Cruz MD   metroNIDAZOLE (FLAGYL) 500 mg tablet Take 1 tablet (500 mg total) by mouth every 8 (eight) hours for 6 days 12/20/24 12/26/24  Elisabet Burgos PA-C   nebivolol (BYSTOLIC) 5 mg tablet Take 1 tablet (5 mg total) by mouth daily 8/16/24 2/12/25  Lydia Cruz MD   ondansetron (ZOFRAN-ODT) 4 mg disintegrating tablet Take 1 tablet (4 mg total) by mouth every 6 (six) hours as needed for nausea for up to 3 days 7/3/24 11/18/24  Kelly Miguel PA-C   pantoprazole (PROTONIX) 40 mg tablet Take 1 tablet (40 mg total) by mouth 2 (two) times a day 7/3/24   Kelly Miguel PA-C   sucralfate (CARAFATE) 1 g tablet Take 1 tablet (1 g total) by mouth 4 (four) times a day (before meals and at bedtime) 7/3/24 11/18/24  Kelly Miguel PA-C   traZODone (DESYREL) 100 mg tablet Take 1  "tablet (100 mg total) by mouth daily at bedtime 12/12/24 2/10/25  Alis Kearns     Allergies   Allergen Reactions    Augmentin [Amoxicillin-Pot Clavulanate] GI Intolerance, Other (See Comments) and Hives     VOMITING  VOMITING  Reaction Date: [id number];     Sulfa Antibiotics Itching, Other (See Comments) and Hives     RASH, ITCHY  RASH, ITCHY    Morphine Other (See Comments)     \"DOESN'T WORK\"    Latex Rash     Unsure if this is an allergy       Objective :  Temp:  [98.4 °F (36.9 °C)] 98.4 °F (36.9 °C)  HR:  [67-79] 67  BP: (135-171)/(64-77) 135/64  Resp:  [17-25] 25  SpO2:  [98 %] 98 %  O2 Device: None (Room air)    Physical Exam  Vitals and nursing note reviewed.   Constitutional:       General: She is not in acute distress.     Appearance: She is well-developed.   HENT:      Head: Normocephalic and atraumatic.   Eyes:      Conjunctiva/sclera: Conjunctivae normal.   Cardiovascular:      Rate and Rhythm: Normal rate and regular rhythm.   Pulmonary:      Effort: Pulmonary effort is normal. No respiratory distress.      Breath sounds: Normal breath sounds.   Abdominal:      General: Bowel sounds are normal.      Palpations: Abdomen is soft.      Tenderness: There is no abdominal tenderness.   Musculoskeletal:         General: No swelling.      Cervical back: Neck supple.      Right lower leg: No edema.      Left lower leg: No edema.   Skin:     General: Skin is warm and dry.      Capillary Refill: Capillary refill takes less than 2 seconds.   Neurological:      Mental Status: She is alert.   Psychiatric:         Mood and Affect: Mood normal.          Lines/Drains:  Lines/Drains/Airways       Active Status       Name Placement date Placement time Site Days    Closed/Suction Drain LUQ Bulb 19 Fr. 12/16/24  1601  LUQ  8                          Lab Results: I have reviewed the following results:  Results from last 7 days   Lab Units 12/25/24  0756 12/21/24  0516   WBC Thousand/uL 11.77* 9.51   HEMOGLOBIN g/dL 13.6 " 12.7   HEMATOCRIT % 41.7 39.0   PLATELETS Thousands/uL 446* 337   BANDS PCT %  --  1   SEGS PCT % 87*  --    LYMPHO PCT % 6* 20   MONO PCT % 5 7   EOS PCT % 0 2     Results from last 7 days   Lab Units 12/25/24  0756   SODIUM mmol/L 135   POTASSIUM mmol/L 3.0*   CHLORIDE mmol/L 97   CO2 mmol/L 24   BUN mg/dL 18   CREATININE mg/dL 0.66   ANION GAP mmol/L 14*   CALCIUM mg/dL 9.5   ALBUMIN g/dL 4.1   TOTAL BILIRUBIN mg/dL 0.52   ALK PHOS U/L 54   ALT U/L 8   AST U/L 19   GLUCOSE RANDOM mg/dL 112             Lab Results   Component Value Date    HGBA1C 5.7 (H) 11/29/2023    HGBA1C 6.0 (H) 02/10/2016     Results from last 7 days   Lab Units 12/25/24  0756   LACTIC ACID mmol/L 1.0       Imaging Results Review: I reviewed radiology reports from this admission including: CT chest and CT abdomen/pelvis.  Other Study Results Review: EKG was reviewed.     Administrative Statements   I have spent a total time of 30 minutes in caring for this patient on the day of the visit/encounter including Diagnostic results, Risks and benefits of tx options, Instructions for management, Patient and family education, Importance of tx compliance, Counseling / Coordination of care, Documenting in the medical record, Reviewing / ordering tests, medicine, procedures  , Obtaining or reviewing history  , and Communicating with other healthcare professionals .    ** Please Note: This note has been constructed using a voice recognition system. **

## 2024-12-25 NOTE — PLAN OF CARE

## 2024-12-26 ENCOUNTER — TELEPHONE (OUTPATIENT)
Age: 74
End: 2024-12-26

## 2024-12-26 ENCOUNTER — APPOINTMENT (OUTPATIENT)
Dept: RADIOLOGY | Facility: HOSPITAL | Age: 74
DRG: 394 | End: 2024-12-26
Payer: MEDICARE

## 2024-12-26 ENCOUNTER — PATIENT OUTREACH (OUTPATIENT)
Dept: CASE MANAGEMENT | Facility: OTHER | Age: 74
End: 2024-12-26

## 2024-12-26 LAB
ALBUMIN SERPL BCG-MCNC: 3.2 G/DL (ref 3.5–5)
ALP SERPL-CCNC: 42 U/L (ref 34–104)
ALT SERPL W P-5'-P-CCNC: 7 U/L (ref 7–52)
ANION GAP SERPL CALCULATED.3IONS-SCNC: 7 MMOL/L (ref 4–13)
AST SERPL W P-5'-P-CCNC: 18 U/L (ref 13–39)
BACTERIA UR CULT: NORMAL
BILIRUB SERPL-MCNC: 0.37 MG/DL (ref 0.2–1)
BUN SERPL-MCNC: 8 MG/DL (ref 5–25)
C DIFF TOX GENS STL QL NAA+PROBE: NEGATIVE
CALCIUM ALBUM COR SERPL-MCNC: 8.4 MG/DL (ref 8.3–10.1)
CALCIUM SERPL-MCNC: 7.8 MG/DL (ref 8.4–10.2)
CARDIAC TROPONIN I PNL SERPL HS: 278 NG/L (ref 8–18)
CHLORIDE SERPL-SCNC: 104 MMOL/L (ref 96–108)
CHOLEST SERPL-MCNC: 99 MG/DL (ref ?–200)
CO2 SERPL-SCNC: 25 MMOL/L (ref 21–32)
CREAT SERPL-MCNC: 0.44 MG/DL (ref 0.6–1.3)
ERYTHROCYTE [DISTWIDTH] IN BLOOD BY AUTOMATED COUNT: 14.7 % (ref 11.6–15.1)
GFR SERPL CREATININE-BSD FRML MDRD: 98 ML/MIN/1.73SQ M
GLUCOSE P FAST SERPL-MCNC: 98 MG/DL (ref 65–99)
GLUCOSE SERPL-MCNC: 101 MG/DL (ref 65–140)
GLUCOSE SERPL-MCNC: 98 MG/DL (ref 65–140)
HCT VFR BLD AUTO: 36.2 % (ref 34.8–46.1)
HDLC SERPL-MCNC: 30 MG/DL
HGB BLD-MCNC: 11.7 G/DL (ref 11.5–15.4)
LDLC SERPL CALC-MCNC: 46 MG/DL (ref 0–100)
MAGNESIUM SERPL-MCNC: 1.8 MG/DL (ref 1.9–2.7)
MCH RBC QN AUTO: 28.3 PG (ref 26.8–34.3)
MCHC RBC AUTO-ENTMCNC: 32.3 G/DL (ref 31.4–37.4)
MCV RBC AUTO: 87 FL (ref 82–98)
PLATELET # BLD AUTO: 393 THOUSANDS/UL (ref 149–390)
PMV BLD AUTO: 9.4 FL (ref 8.9–12.7)
POTASSIUM SERPL-SCNC: 3 MMOL/L (ref 3.5–5.3)
PROT SERPL-MCNC: 5.6 G/DL (ref 6.4–8.4)
RBC # BLD AUTO: 4.14 MILLION/UL (ref 3.81–5.12)
SODIUM SERPL-SCNC: 136 MMOL/L (ref 135–147)
TRIGL SERPL-MCNC: 114 MG/DL (ref ?–150)
WBC # BLD AUTO: 8.89 THOUSAND/UL (ref 4.31–10.16)

## 2024-12-26 PROCEDURE — 93005 ELECTROCARDIOGRAM TRACING: CPT

## 2024-12-26 PROCEDURE — NC001 PR NO CHARGE: Performed by: STUDENT IN AN ORGANIZED HEALTH CARE EDUCATION/TRAINING PROGRAM

## 2024-12-26 PROCEDURE — 99232 SBSQ HOSP IP/OBS MODERATE 35: CPT | Performed by: INTERNAL MEDICINE

## 2024-12-26 PROCEDURE — 83735 ASSAY OF MAGNESIUM: CPT | Performed by: INTERNAL MEDICINE

## 2024-12-26 PROCEDURE — 85027 COMPLETE CBC AUTOMATED: CPT | Performed by: INTERNAL MEDICINE

## 2024-12-26 PROCEDURE — 74240 X-RAY XM UPR GI TRC 1CNTRST: CPT

## 2024-12-26 PROCEDURE — 99222 1ST HOSP IP/OBS MODERATE 55: CPT | Performed by: STUDENT IN AN ORGANIZED HEALTH CARE EDUCATION/TRAINING PROGRAM

## 2024-12-26 PROCEDURE — 97535 SELF CARE MNGMENT TRAINING: CPT

## 2024-12-26 PROCEDURE — 80053 COMPREHEN METABOLIC PANEL: CPT | Performed by: INTERNAL MEDICINE

## 2024-12-26 PROCEDURE — 80061 LIPID PANEL: CPT | Performed by: INTERNAL MEDICINE

## 2024-12-26 PROCEDURE — 84484 ASSAY OF TROPONIN QUANT: CPT | Performed by: INTERNAL MEDICINE

## 2024-12-26 PROCEDURE — 97167 OT EVAL HIGH COMPLEX 60 MIN: CPT

## 2024-12-26 PROCEDURE — 82948 REAGENT STRIP/BLOOD GLUCOSE: CPT

## 2024-12-26 RX ORDER — POTASSIUM CHLORIDE 14.9 MG/ML
20 INJECTION INTRAVENOUS
Status: DISPENSED | OUTPATIENT
Start: 2024-12-26 | End: 2024-12-26

## 2024-12-26 RX ORDER — MAGNESIUM SULFATE HEPTAHYDRATE 40 MG/ML
2 INJECTION, SOLUTION INTRAVENOUS ONCE
Status: COMPLETED | OUTPATIENT
Start: 2024-12-26 | End: 2024-12-26

## 2024-12-26 RX ORDER — SACCHAROMYCES BOULARDII 250 MG
250 CAPSULE ORAL 2 TIMES DAILY
Status: DISCONTINUED | OUTPATIENT
Start: 2024-12-26 | End: 2024-12-27 | Stop reason: HOSPADM

## 2024-12-26 RX ADMIN — BUSPIRONE HYDROCHLORIDE 5 MG: 5 TABLET ORAL at 10:57

## 2024-12-26 RX ADMIN — METRONIDAZOLE 500 MG: 500 TABLET ORAL at 05:54

## 2024-12-26 RX ADMIN — NEBIVOLOL 5 MG: 5 TABLET ORAL at 10:56

## 2024-12-26 RX ADMIN — HEPARIN SODIUM 5000 UNITS: 5000 INJECTION, SOLUTION INTRAVENOUS; SUBCUTANEOUS at 22:58

## 2024-12-26 RX ADMIN — MAGNESIUM SULFATE HEPTAHYDRATE 2 G: 40 INJECTION, SOLUTION INTRAVENOUS at 10:56

## 2024-12-26 RX ADMIN — SODIUM CHLORIDE 75 ML/HR: 0.9 INJECTION, SOLUTION INTRAVENOUS at 01:05

## 2024-12-26 RX ADMIN — PANTOPRAZOLE SODIUM 40 MG: 40 TABLET, DELAYED RELEASE ORAL at 17:37

## 2024-12-26 RX ADMIN — ACETAMINOPHEN 650 MG: 325 TABLET ORAL at 13:32

## 2024-12-26 RX ADMIN — SUCRALFATE 1 G: 1 TABLET ORAL at 06:10

## 2024-12-26 RX ADMIN — HEPARIN SODIUM 5000 UNITS: 5000 INJECTION, SOLUTION INTRAVENOUS; SUBCUTANEOUS at 05:54

## 2024-12-26 RX ADMIN — BUSPIRONE HYDROCHLORIDE 5 MG: 5 TABLET ORAL at 22:58

## 2024-12-26 RX ADMIN — IOHEXOL 100 ML: 350 INJECTION, SOLUTION INTRAVENOUS at 09:45

## 2024-12-26 RX ADMIN — ALLOPURINOL 100 MG: 100 TABLET ORAL at 10:56

## 2024-12-26 RX ADMIN — SUCRALFATE 1 G: 1 TABLET ORAL at 10:56

## 2024-12-26 RX ADMIN — HYDROMORPHONE HYDROCHLORIDE 0.5 MG: 1 INJECTION, SOLUTION INTRAMUSCULAR; INTRAVENOUS; SUBCUTANEOUS at 06:10

## 2024-12-26 RX ADMIN — TRAZODONE HYDROCHLORIDE 100 MG: 100 TABLET ORAL at 22:58

## 2024-12-26 RX ADMIN — ESCITALOPRAM OXALATE 20 MG: 10 TABLET ORAL at 10:56

## 2024-12-26 RX ADMIN — PANTOPRAZOLE SODIUM 40 MG: 40 TABLET, DELAYED RELEASE ORAL at 11:30

## 2024-12-26 RX ADMIN — Medication 250 MG: at 17:40

## 2024-12-26 RX ADMIN — CEFPODOXIME PROXETIL 400 MG: 200 TABLET, FILM COATED ORAL at 10:56

## 2024-12-26 RX ADMIN — METRONIDAZOLE 500 MG: 500 TABLET ORAL at 14:50

## 2024-12-26 RX ADMIN — BUPROPION HYDROCHLORIDE 150 MG: 150 TABLET, EXTENDED RELEASE ORAL at 10:56

## 2024-12-26 RX ADMIN — FLUTICASONE PROPIONATE 1 SPRAY: 50 SPRAY, METERED NASAL at 10:59

## 2024-12-26 RX ADMIN — Medication 250 MG: at 11:30

## 2024-12-26 RX ADMIN — BUSPIRONE HYDROCHLORIDE 5 MG: 5 TABLET ORAL at 15:01

## 2024-12-26 RX ADMIN — POTASSIUM CHLORIDE 20 MEQ: 200 INJECTION, SOLUTION INTRAVENOUS at 11:00

## 2024-12-26 RX ADMIN — HEPARIN SODIUM 5000 UNITS: 5000 INJECTION, SOLUTION INTRAVENOUS; SUBCUTANEOUS at 14:50

## 2024-12-26 RX ADMIN — ACETAMINOPHEN 650 MG: 325 TABLET ORAL at 01:06

## 2024-12-26 RX ADMIN — POTASSIUM CHLORIDE 20 MEQ: 200 INJECTION, SOLUTION INTRAVENOUS at 09:30

## 2024-12-26 RX ADMIN — AMLODIPINE BESYLATE 10 MG: 10 TABLET ORAL at 10:57

## 2024-12-26 RX ADMIN — FLUTICASONE PROPIONATE 1 SPRAY: 50 SPRAY, METERED NASAL at 17:37

## 2024-12-26 RX ADMIN — SUCRALFATE 1 G: 1 TABLET ORAL at 22:58

## 2024-12-26 RX ADMIN — DONEPEZIL HYDROCHLORIDE 10 MG: 5 TABLET ORAL at 22:57

## 2024-12-26 RX ADMIN — ACETAMINOPHEN 650 MG: 325 TABLET ORAL at 22:57

## 2024-12-26 RX ADMIN — SUCRALFATE 1 G: 1 TABLET ORAL at 15:00

## 2024-12-26 NOTE — ASSESSMENT & PLAN NOTE
Recent admission for pneumoperitoneum and marginal ulcer perforation on 12/16-12/21  Continue Protonix 40mg BID and Carafate slurry 1g QID  Continue cefpodoxime and flagyl thru 12/26 per ID recommendations  Patient reports follow up appointment with Bariatrics 12/26 for drain removal  Bariatrics consulted

## 2024-12-26 NOTE — PROGRESS NOTES
Outpatient Care Management Note:    New HRR referral received. Patient is currently hospitalized. CM will attempt to outreach on discharge.

## 2024-12-26 NOTE — ASSESSMENT & PLAN NOTE
QT prolongation on admission 537  Optimize electrolytes. K>4 and Mg >2  Avoid QT prolonging agents  Repeat ECG 12/26 shows improving   Daily ECG monitoring

## 2024-12-26 NOTE — OCCUPATIONAL THERAPY NOTE
"Occupational Therapy Evaluation/Treatment       12/26/24 1035   OT Last Visit   OT Visit Date 12/26/24   Note Type   Note type Evaluation   Pain Assessment   Pain Assessment Tool 0-10   Pain Score No Pain   Restrictions/Precautions   Other Precautions Chair Alarm;Bed Alarm;Contact/isolation;Fall Risk   Home Living   Type of Home House   Home Layout Two level;Able to live on main level with bedroom/bathroom  (4 JESUS)   Bathroom Shower/Tub Tub/shower unit   Bathroom Toilet Standard   Bathroom Equipment Grab bars in shower;Tub transfer bench   Home Equipment Walker;Wheelchair-manual  (pt reports using walker for mobility at home)   Additional Comments Patient reports they are looking into a homemaker for home for IADLS as her and her son \"dont make a full person\"   Prior Function   Level of [place] Independent with ADLs;Independent with functional mobility;Needs assistance with IADLS   Lives With Son   Receives Help From Family   Comments pt reports she cleans and she has a robot vacuum   Lifestyle   Intrinsic Gratification coloring   ADL   Eating Assistance 7  Independent   Grooming Assistance 7  Independent   UB Bathing Assistance 5  Supervision/Setup   LB Bathing Assistance 4  Minimal Assistance   UB Dressing Assistance 5  Supervision/Setup   LB Dressing Assistance 4  Minimal Assistance   Toileting Assistance  4  Minimal Assistance   Bed Mobility   Supine to Sit 5  Supervision   Transfers   Sit to Stand 4  Minimal assistance   Stand to Sit 4  Minimal assistance   Toilet transfer 4  Minimal assistance   Additional items Commode   Functional Mobility   Functional Mobility 4  Minimal assistance   Additional Comments short household distance with RW   Balance   Static Sitting Fair +   Dynamic Sitting Fair   Static Standing Fair   Dynamic Standing Fair -   Activity Tolerance   Activity Tolerance Patient limited by fatigue   RUE Assessment   RUE Assessment WFL   LUE Assessment   LUE Assessment WFL   Cognition "   Overall Cognitive Status WFL   Arousal/Participation Cooperative   Attention Within functional limits   Orientation Level Oriented X4   Following Commands Follows all commands and directions without difficulty   Assessment   Limitation Decreased ADL status;Decreased UE strength;Decreased Safe judgement during ADL;Decreased endurance;Decreased high-level ADLs;Decreased self-care trans  (decreased balance and mobility)   Prognosis Good   Assessment Patient evaluated by Occupational Therapy.  Patient admitted with Nausea vomiting and diarrhea.  The patients occupational profile, medical and therapy history includes a extensive additional review of physical, cognitive, or psychosocial history related to current functional performance.  Comorbidities affecting functional mobility and ADLS include: anemia, anxiety, arthritis, cardiac disease, depression, and hypertension.  Prior to admission, patient was independent with functional mobility with walker, independent with ADLS, and requiring assist for IADLS.  The evaluation identifies the following performance deficits: weakness, impaired balance, decreased endurance, increased fall risk, new onset of impairment of functional mobility, decreased ADLS, decreased IADLS, decreased activity tolerance, decreased safety awareness, and decreased strength, that result in activity limitations and/or participation restrictions. This evaluation requires clinical decision making of high complexity, because the patient presents with comorbidites that affect occupational performance and required significant modification of tasks or assistance with consideration of multiple treatment options.  The Barthel Index was used as a functional outcome tool presenting with a score of Barthel Index Score: 60, indicating marked limitations of functional mobility and ADLS.  The patient's raw score on the -PAC Daily Activity Inpatient Short Form is 21. A raw score of greater than or equal to 19  suggests the patient may benefit from discharge to home. Please refer to the recommendation of the Occupational Therapist for safe discharge planning.  Patient will benefit from skilled Occupational Therapy services to address above deficits and facilitate a safe return to prior level of function.   Goals   Patient Goals to feel better and go home   STG Time Frame   (1-7 days)   Short Term Goal  Goals established to promote Patient Goals: to feel better and go home:   Grooming: supervision standing at sink; Bathing: supervision; Upper Body Dressing independent; Lower Body Dressing: supervision; Toileting: supervision; Patient will increase ambulatory standard toilet transfer to supervision with rolling walker to increase performance and safety with ADLS and functional mobility; Patient will increase standing tolerance to 3 minutes during ADL task to decrease assistance level and decrease fall risk; Patient will increase bed mobility to independent in preparation for ADLS and transfers; Patient will increase functional mobility to and from bathroom with rolling walker with supervision to increase performance with ADLS and to use a toilet; Patient will tolerate 5 minutes of UE ROM/strengthening to increase general activity tolerance and performance in ADLS/IADLS; Patient will improve functional activity tolerance to 10 minutes of sustained functional tasks to increase participation in basic self-care and decrease assistance level; Patient will increase dynamic standing balance to fair to improve postural stability and decrease fall risk during standing ADLS and transfers.   LTG Time Frame   (8-14 days)   Long Term Goal Grooming: independent standing at sink; Bathing: independent; Lower Body Dressing: independent; Toileting: independent; Patient will increase ambulatory standard toilet transfer to independent with rolling walker to increase performance and safety with ADLS and functional mobility; Patient will increase  standing tolerance to 6 minutes during ADL task to decrease assistance level and decrease fall risk; Patient will increase functional mobility to and from bathroom with rolling walker independently to increase performance with ADLS and to use a toilet; Patient will tolerate 10 minutes of UE ROM/strengthening to increase general activity tolerance and performance in ADLS/IADLS; Patient will improve functional activity tolerance to 20 minutes of sustained functional tasks to increase participation in basic self-care and decrease assistance level; Patient will increase dynamic standing balance to fair+ to improve postural stability and decrease fall risk during standing ADLS and transfers.  Pt will score >/= 24/24 on AM-PAC Daily Activity Inpatient scale to promote safe independence with ADLs and functional mobility; Pt will score >/= 90/100 on Barthel Index in order to decrease caregiver assistance needed and increase ability to perform ADLs and functional mobility.   Plan   Treatment Interventions ADL retraining;Functional transfer training;UE strengthening/ROM;Endurance training;Patient/family training;Equipment evaluation/education;Activityengagement;Compensatory technique education   Goal Expiration Date 01/09/25   OT Frequency 3-5x/wk   Discharge Recommendation   Rehab Resource Intensity Level, OT III (Minimum Resource Intensity)   AM-PAC Daily Activity Inpatient   Lower Body Dressing 3   Bathing 3   Toileting 3   Upper Body Dressing 4   Grooming 4   Eating 4   Daily Activity Raw Score 21   Daily Activity Standardized Score (Calc for Raw Score >=11) 44.27   AM-PAC Applied Cognition Inpatient   Following a Speech/Presentation 4   Understanding Ordinary Conversation 4   Taking Medications 4   Remembering Where Things Are Placed or Put Away 4   Remembering List of 4-5 Errands 4   Taking Care of Complicated Tasks 4   Applied Cognition Raw Score 24   Applied Cognition Standardized Score 62.21   Barthel Index   Feeding  10   Bathing 0   Grooming Score 5   Dressing Score 5   Bladder Score 10   Bowels Score 10   Toilet Use Score 5   Transfers (Bed/Chair) Score 10   Mobility (Level Surface) Score 0   Stairs Score 5   Barthel Index Score 60   Additional Treatment Session   Start Time 1025   End Time 1035   Treatment Assessment S: denies pain O: Completed standard toilet transfer with min assist, hygiene independent seated with setup for urination.  Functional mobility to and from bathroom with RW min assist progressing to supervision with RW.  Stand to sit supervision.  Patient doffed/donned R sock seated with supervision.  A: Patient tolerated well.  Motivated to participate in OT session.  Patient will benefit from continued OT services to maximize functional performance with ADLS.  P: III-minimum resource intensity   Licensure   [place] License Number  Valerie Kramer MS OTR/L [id number]

## 2024-12-26 NOTE — PLAN OF CARE
Problem: Potential for Falls  Goal: Patient will remain free of falls  Description: INTERVENTIONS:  - Educate patient/family on patient safety including physical limitations  - Instruct patient to call for assistance with activity   - Consult OT/PT to assist with strengthening/mobility   - Keep Call bell within reach  - Keep bed low and locked with side rails adjusted as appropriate  - Keep care items and personal belongings within reach  - Initiate and maintain comfort rounds  - Make Fall Risk Sign visible to staff  - Offer Toileting every 2 Hours, in advance of need  - Initiate/Maintain bed alarm  - Obtain necessary fall risk management equipment: bell alarm   - Apply yellow socks and bracelet for high fall risk patients  - Consider moving patient to room near nurses station  Outcome: Progressing     Problem: PAIN - ADULT  Goal: Verbalizes/displays adequate comfort level or baseline comfort level  Description: Interventions:  - Encourage patient to monitor pain and request assistance  - Assess pain using appropriate pain scale  - Administer analgesics based on type and severity of pain and evaluate response  - Implement non-pharmacological measures as appropriate and evaluate response  - Consider cultural and social influences on pain and pain management  - Notify physician/advanced practitioner if interventions unsuccessful or patient reports new pain  Outcome: Progressing     Problem: INFECTION - ADULT  Goal: Absence or prevention of progression during hospitalization  Description: INTERVENTIONS:  - Assess and monitor for signs and symptoms of infection  - Monitor lab/diagnostic results  - Monitor all insertion sites, i.e. indwelling lines, tubes, and drains  - Monitor endotracheal if appropriate and nasal secretions for changes in amount and color  - [place] appropriate cooling/warming therapies per order  - Administer medications as ordered  - Instruct and encourage patient and family to use good hand  hygiene technique  - Identify and instruct in appropriate isolation precautions for identified infection/condition  Outcome: Progressing  Goal: Absence of fever/infection during neutropenic period  Description: INTERVENTIONS:  - Monitor WBC    Outcome: Progressing     Problem: SAFETY ADULT  Goal: Patient will remain free of falls  Description: INTERVENTIONS:  - Educate patient/family on patient safety including physical limitations  - Instruct patient to call for assistance with activity   - Consult OT/PT to assist with strengthening/mobility   - Keep Call bell within reach  - Keep bed low and locked with side rails adjusted as appropriate  - Keep care items and personal belongings within reach  - Initiate and maintain comfort rounds  - Make Fall Risk Sign visible to staff  - Offer Toileting every 2 Hours, in advance of need  - Initiate/Maintain bed call bell call bell alarm  - Obtain necessary fall risk management equipment: call bell   - Apply yellow socks and bracelet for high fall risk patients  - Consider moving patient to room near nurses station  Outcome: Progressing  Goal: Maintain or return to baseline ADL function  Description: INTERVENTIONS:  -  Assess patient's ability to carry out ADLs; assess patient's baseline for ADL function and identify physical deficits which impact ability to perform ADLs (bathing, care of mouth/teeth, toileting, grooming, dressing, etc.)  - Assess/evaluate cause of self-care deficits   - Assess range of motion  - Assess patient's mobility; develop plan if impaired  - Assess patient's need for assistive devices and provide as appropriate  - Encourage maximum independence but intervene and supervise when necessary  - Involve family in performance of ADLs  - Assess for home care needs following discharge   - Consider OT consult to assist with ADL evaluation and planning for discharge  - Provide patient education as appropriate  Outcome: Progressing  Goal: Maintains/Returns to pre  admission functional level  Description: INTERVENTIONS:  - Perform AM-PAC 6 Click Basic Mobility/ Daily Activity assessment daily.  - Set and communicate daily mobility goal to care team and patient/family/caregiver.   - Collaborate with rehabilitation services on mobility goals if consulted  - Perform Range of Motion 3 times a day.  - Reposition patient every 3 hours.  - Dangle patient 3 times a day  - Stand patient 3 times a day  - Ambulate patient 3 times a day  - Out of bed to chair 3 times a day   - Out of bed for meals 3 times a day  - Out of bed for toileting  - Record patient progress and toleration of activity level   Outcome: Progressing     Problem: DISCHARGE PLANNING  Goal: Discharge to home or other facility with appropriate resources  Description: INTERVENTIONS:  - Identify barriers to discharge w/patient and caregiver  - Arrange for needed discharge resources and transportation as appropriate  - Identify discharge learning needs (meds, wound care, etc.)  - Arrange for interpretive services to assist at discharge as needed  - Refer to Case Management Department for coordinating discharge planning if the patient needs post-hospital services based on physician/advanced practitioner order or complex needs related to functional status, cognitive ability, or social support system  Outcome: Progressing     Problem: Knowledge Deficit  Goal: Patient/family/caregiver demonstrates understanding of disease process, treatment plan, medications, and discharge instructions  Description: Complete learning assessment and assess knowledge base.  Interventions:  - Provide teaching at level of understanding  - Provide teaching via preferred learning methods  Outcome: Progressing     Problem: Prexisting or High Potential for Compromised Skin Integrity  Goal: Skin integrity is maintained or improved  Description: INTERVENTIONS:  - Identify patients at risk for skin breakdown  - Assess and monitor skin integrity  - Assess  and monitor nutrition and hydration status  - Monitor labs   - Assess for incontinence   - Turn and reposition patient  - Assist with mobility/ambulation  - Relieve pressure over bony prominences  - Avoid friction and shearing  - Provide appropriate hygiene as needed including keeping skin clean and dry  - Evaluate need for skin moisturizer/barrier cream  - Collaborate with interdisciplinary team   - Patient/family teaching  - Consider wound care consult   Outcome: Progressing

## 2024-12-26 NOTE — PLAN OF CARE
Problem: Potential for Falls  Goal: Patient will remain free of falls  Description: INTERVENTIONS:  - Educate patient/family on patient safety including physical limitations  - Instruct patient to call for assistance with activity   - Consult OT/PT to assist with strengthening/mobility   - Keep Call bell within reach  - Keep bed low and locked with side rails adjusted as appropriate  - Keep care items and personal belongings within reach  - Initiate and maintain comfort rounds  - Make Fall Risk Sign visible to staff  - Offer Toileting every 2 Hours, in advance of need  - Initiate/Maintain bed alarm  - Obtain necessary fall risk management equipment: yellow socks  - Apply yellow socks and bracelet for high fall risk patients  - Consider moving patient to room near nurses station  Outcome: Progressing     Problem: PAIN - ADULT  Goal: Verbalizes/displays adequate comfort level or baseline comfort level  Description: Interventions:  - Encourage patient to monitor pain and request assistance  - Assess pain using appropriate pain scale  - Administer analgesics based on type and severity of pain and evaluate response  - Implement non-pharmacological measures as appropriate and evaluate response  - Consider cultural and social influences on pain and pain management  - Notify physician/advanced practitioner if interventions unsuccessful or patient reports new pain  Outcome: Progressing     Problem: INFECTION - ADULT  Goal: Absence or prevention of progression during hospitalization  Description: INTERVENTIONS:  - Assess and monitor for signs and symptoms of infection  - Monitor lab/diagnostic results  - Monitor all insertion sites, i.e. indwelling lines, tubes, and drains  - Monitor endotracheal if appropriate and nasal secretions for changes in amount and color  - [place] appropriate cooling/warming therapies per order  - Administer medications as ordered  - Instruct and encourage patient and family to use good hand  hygiene technique  - Identify and instruct in appropriate isolation precautions for identified infection/condition  Outcome: Progressing  Goal: Absence of fever/infection during neutropenic period  Description: INTERVENTIONS:  - Monitor WBC    Outcome: Progressing     Problem: SAFETY ADULT  Goal: Patient will remain free of falls  Description: INTERVENTIONS:  - Educate patient/family on patient safety including physical limitations  - Instruct patient to call for assistance with activity   - Consult OT/PT to assist with strengthening/mobility   - Keep Call bell within reach  - Keep bed low and locked with side rails adjusted as appropriate  - Keep care items and personal belongings within reach  - Initiate and maintain comfort rounds  - Make Fall Risk Sign visible to staff  - Offer Toileting every 2 Hours, in advance of need  - Initiate/Maintain bed alarm  - Obtain necessary fall risk management equipment: yellow socks.  - Apply yellow socks and bracelet for high fall risk patients  - Consider moving patient to room near nurses station  Outcome: Progressing  Goal: Maintain or return to baseline ADL function  Description: INTERVENTIONS:  -  Assess patient's ability to carry out ADLs; assess patient's baseline for ADL function and identify physical deficits which impact ability to perform ADLs (bathing, care of mouth/teeth, toileting, grooming, dressing, etc.)  - Assess/evaluate cause of self-care deficits   - Assess range of motion  - Assess patient's mobility; develop plan if impaired  - Assess patient's need for assistive devices and provide as appropriate  - Encourage maximum independence but intervene and supervise when necessary  - Involve family in performance of ADLs  - Assess for home care needs following discharge   - Consider OT consult to assist with ADL evaluation and planning for discharge  - Provide patient education as appropriate  Outcome: Progressing  Goal: Maintains/Returns to pre admission  functional level  Description: INTERVENTIONS:  - Perform AM-PAC 6 Click Basic Mobility/ Daily Activity assessment daily.  - Set and communicate daily mobility goal to care team and patient/family/caregiver.   - Collaborate with rehabilitation services on mobility goals if consulted  - Perform Range of Motion 3 times a day.  - Reposition patient every 2 hours.  - Dangle patient 3 times a day  - Stand patient 3 times a day  - Ambulate patient 3 times a day  - Out of bed to chair 3 times a day   - Out of bed for meals 3 times a day  - Out of bed for toileting  - Record patient progress and toleration of activity level   Outcome: Progressing     Problem: DISCHARGE PLANNING  Goal: Discharge to home or other facility with appropriate resources  Description: INTERVENTIONS:  - Identify barriers to discharge w/patient and caregiver  - Arrange for needed discharge resources and transportation as appropriate  - Identify discharge learning needs (meds, wound care, etc.)  - Arrange for interpretive services to assist at discharge as needed  - Refer to Case Management Department for coordinating discharge planning if the patient needs post-hospital services based on physician/advanced practitioner order or complex needs related to functional status, cognitive ability, or social support system  Outcome: Progressing     Problem: Knowledge Deficit  Goal: Patient/family/caregiver demonstrates understanding of disease process, treatment plan, medications, and discharge instructions  Description: Complete learning assessment and assess knowledge base.  Interventions:  - Provide teaching at level of understanding  - Provide teaching via preferred learning methods  Outcome: Progressing     Problem: Prexisting or High Potential for Compromised Skin Integrity  Goal: Skin integrity is maintained or improved  Description: INTERVENTIONS:  - Identify patients at risk for skin breakdown  - Assess and monitor skin integrity  - Assess and monitor  nutrition and hydration status  - Monitor labs   - Assess for incontinence   - Turn and reposition patient  - Assist with mobility/ambulation  - Relieve pressure over bony prominences  - Avoid friction and shearing  - Provide appropriate hygiene as needed including keeping skin clean and dry  - Evaluate need for skin moisturizer/barrier cream  - Collaborate with interdisciplinary team   - Patient/family teaching  - Consider wound care consult   Outcome: Progressing

## 2024-12-26 NOTE — CONSULTS
Consultation - Bariatric Surgery   Zulma Marcelo 74 y.o. 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 Mohr.    Patient presents with nausea, vomiting and diarrhea from oral abx. Pt admitted for dehydration.    Tim consulted for drain management.    Plan:  - Pending UGI final read - no leak on my evaluation   - Will remove drain after final read. Will also remove staples.  - Resume diet  - Will need to stay on BID PPI (40 mg omeprazole) and carafate on DC  - F/u in clinic for EGD scheduling in 6-8 wks. And H. Pylori biopsy    History of Present Illness     HPI:  Zulma Marcelo is a 74 y.o. female Body mass index is 28.9 kg/m². with history of MU from chronic NSAID use from bone spurs.    She went to OR for omental patch with Dr. Mohr. UGI after case w/o leak.    She was transitioned to oral abx, and reported having nausea but was able to control it with antiemetics. When she went home, she was unable to obtain her abx for 2 days due to pharm hours. When she restarted oral abx she developed nausea 1 hour later with subsequent vomiting.    Her stools are mostly formed. No nausea. Tolerating diet. No abd pain.    Quit smoking 1 year ago. Smoking 3/4 PPD.        Inpatient consult to Bariatric Surgery     Date/Time  12/26/2024 10:52 AM     Performed by  Juanito Garcia MD   Authorized by  Jenn Clemente PA-C             Review of Systems   Constitutional:  Negative for chills and fever.   HENT:  Negative for sore throat.    Eyes:  Negative for visual disturbance.   Respiratory:  Negative for cough and shortness of breath.    Gastrointestinal:  Negative for abdominal pain and vomiting.   Musculoskeletal:  Negative for arthralgias.   Skin:  Negative for color change and rash.   Neurological:  Negative for seizures and syncope.   All other systems reviewed and  are negative.      Historical Information   Past Medical History:   Diagnosis Date    Acid reflux     Anxiety     Arthritis     Asthma     Cardiac disease     Chronic kidney disease     passed on own kidney stone    Depression     Diverticulitis     GERD (gastroesophageal reflux disease)     Hayfever     [place] (hard of hearing)     bilateral hearing aids    Hyperlipemia     Hypertension     Panic attack     Tachycardia      Past Surgical History:   Procedure Laterality Date    ABLATION OF DYSRHYTHMIC FOCUS      APPENDECTOMY      CHOLECYSTECTOMY      open    FRACTURE SURGERY      ORIF fx (L) tibia, fibula     GASTRIC BYPASS OPEN      HYSTERECTOMY      [place] LAPS ABD PRTM&OMENTUM DX W/WO SPEC BR/WA SPX N/A 2024    Procedure: LAPAROSCOPY DIAGNOSTIC,  LYSIS OF ADHESIONS, REPAIR PERFORATED MARGINAL ULCER, INTRA-OP EGD;  Surgeon: Alex Mohr MD;  Location: [facility] MAIN OR;  Service: Bariatrics    [place] XCAPSL CTRC RMVL INSJ IO LENS PROSTH W/O ECP Left 2016    Procedure: EXTRACTION EXTRACAPSULAR CATARACT PHACO INTRAOCULAR LENS (IOL);  Surgeon: Dane Wells MD;  Location: [facility] MAIN OR;  Service: Ophthalmology    [place] XCAPSL CTRC RMVL INSJ IO LENS PROSTH W/O ECP Right 3/1/2021    Procedure: EXTRACTION EXTRACAPSULAR CATARACT PHACO INTRAOCULAR LENS (IOL);  Surgeon: Dane Wells MD;  Location: [facility] MAIN OR;  Service: Ophthalmology    TONSILECTOMY AND ADNOIDECTOMY       Social History   Social History     Substance and Sexual Activity   Alcohol Use Yes    Comment: rarely     Social History     Substance and Sexual Activity   Drug Use Yes    Types: Marijuana     Social History     Tobacco Use   Smoking Status Former    Current packs/day: 0.00    Average packs/day: 1 pack/day for 67.1 years (67.1 ttl pk-yrs)    Types: Cigarettes    Start date: 6/15/1956    Quit date: 2023    Years since quittin.4    Passive exposure: Past   Smokeless Tobacco Never     Family History: Family history  "non-contributory    Meds/Allergies   all current active meds have been reviewed  Allergies   Allergen Reactions    Augmentin [Amoxicillin-Pot Clavulanate] GI Intolerance, Other (See Comments) and Hives     VOMITING  VOMITING  Reaction Date: 07Dec2004;     Sulfa Antibiotics Itching, Other (See Comments) and Hives     RASH, ITCHY  RASH, ITCHY    Morphine Other (See Comments)     \"DOESN'T WORK\"    Latex Rash     Unsure if this is an allergy       Objective   First Vitals:   Blood Pressure: (!) 171/77 (12/25/24 0723)  Pulse: 79 (12/25/24 0723)  Temperature: 98.4 °F (36.9 °C) (12/25/24 0723)  Temp Source: Oral (12/25/24 0723)  Respirations: 18 (12/25/24 0723)  Height: 5' (152.4 cm) (12/25/24 0723)  Weight - Scale: 67.1 kg (148 lb) (12/25/24 1810)  SpO2: 98 % (12/25/24 0723)    Current Vitals:   Blood Pressure: 124/63 (12/26/24 0851)  Pulse: 56 (12/26/24 0851)  Temperature: 98.1 °F (36.7 °C) (12/25/24 1810)  Temp Source: Oral (12/25/24 1810)  Respirations: 18 (12/25/24 1810)  Height: 5' (152.4 cm) (12/25/24 0723)  Weight - Scale: 67.1 kg (148 lb) (12/25/24 1810)  SpO2: 96 % (12/26/24 0851)      Intake/Output Summary (Last 24 hours) at 12/26/2024 1040  Last data filed at 12/26/2024 0110  Gross per 24 hour   Intake --   Output 880 ml   Net -880 ml       Invasive Devices       Peripheral Intravenous Line  Duration             Peripheral IV 12/25/24 Right Antecubital 1 day              Drain  Duration             Closed/Suction Drain LUQ Bulb 19 Fr. 9 days                    Physical Exam  Vitals reviewed.   Constitutional:       Appearance: Normal appearance.   HENT:      Head: Atraumatic.      Nose: Nose normal.   Cardiovascular:      Pulses: Normal pulses.   Pulmonary:      Effort: Pulmonary effort is normal.   Abdominal:      Comments: Soft, NT. Drain w/ SS output.    Staples in place.   Musculoskeletal:         General: Normal range of motion.   Neurological:      General: No focal deficit present.   Psychiatric:         " Behavior: Behavior normal.         Lab Results: I have personally reviewed pertinent lab results.    Imaging: Results Review Statement: I personally reviewed the following image studies/reports in PACS and discussed pertinent findings with Radiology: UGI. My interpretation of the radiology images/reports is: UGI.  EKG, Pathology, and Other Studies: Results Review Statement: I personally reviewed the following image studies/reports in PACS and discussed pertinent findings with Radiology: UGI. My interpretation of the radiology images/reports is: no leak, pending final read.

## 2024-12-26 NOTE — ASSESSMENT & PLAN NOTE
Denies any chest pain  ECG: NSR. RBBB. Premature supraventricular complexes. No acute ischemic changes  Troponin levels: 0hr 49, 2hr 69, 4hr 121. Random troponin 278  Elevation likely in the setting of dehydration from N/V/D  Lipid panel and HA1C WNL  Echo (12/20/24): EF 55%. Systolic function normal. Diastolic function moderately abnormal. Moderate aortic stenosis. Mild mitral stenosis.   No ECG or telemetry changes  Monitor

## 2024-12-26 NOTE — TELEPHONE ENCOUNTER
FYI: Zaira at [facility] stated that pt returned to hospital before they could start home services.

## 2024-12-26 NOTE — PHYSICAL THERAPY NOTE
PT Cancellation Note       12/26/24 1448   Note Type   Note type Cancelled Session   Cancel Reasons Refusal   Additional Comments PT orders received and chart reviewed. Pt politely declined, reports she is cold and just got comfortable in bed. Will follow-up as schedule allows.   Licensure   [place] License Number  Valerie Morgan [id number]

## 2024-12-26 NOTE — INCIDENTAL FINDINGS
The following findings require follow up:  Radiographic finding   Finding: Ectatic infrarenal abdominal aorta measuring 2.6 cm    Follow up required: CTA abdomen/pelvis   Follow up should be done within 5 years    Please notify the following clinician to assist with the follow up:   Dr. Cruz    Incidental finding results were discussed with the Patient by Jenn Clemente PA-C on 12/26/24.   They expressed understanding and all questions answered.

## 2024-12-26 NOTE — ASSESSMENT & PLAN NOTE
Patient recently admitted on 12/16 for pneumoperitoneum and marginal ulcer perforation.  Underwent emergent diagnostic laparoscopy with extensive lysis of adhesion and Amilcar patch per bariatric surgery.  Drain in place.  Discharged on 12/21.  Patient reports that she started with nausea, vomiting, diarrhea 24 to 48 hours ago.  Denies any associated fever/chills, abdominal pain, urinary complaints.  CT C/A/P: 1.  No acute abnormality in the chest, abdomen, or pelvis. 2.  Status post left upper quadrant drain placement with resolution of previous pneumoperitoneum. No abscess.  Patient currently on oral ABX with cefpodoxime and Flagyl for intra-abdominal infection. Upset stomach possible in the setting of this?  C diff negative. Stool enteric not collected. Reports no further diarrhea  Continuous IVF  UGI series per Bariatrics unremarkable. Advance to full liquid diet today  Pain control, anti-emetics (will order Tigan given prolonged QT)  Serial abdominal exams, Supportive care  Bariatrics consulted, recommendations are appreciated. Drain removal today 12/26. Follow up outpatient to schedule EGD.

## 2024-12-26 NOTE — PROGRESS NOTES
Progress Note - Hospitalist   Name: Zulma Marcelo 74 y.o. female I MRN: [id number]  Unit/Bed#: [address] [place] [address] Date of Admission: 12/25/2024   Date of Service: 12/26/2024 I Hospital Day: 0    Assessment & Plan  Nausea vomiting and diarrhea  Patient recently admitted on 12/16 for pneumoperitoneum and marginal ulcer perforation.  Underwent emergent diagnostic laparoscopy with extensive lysis of adhesion and Amilcar patch per bariatric surgery.  Drain in place.  Discharged on 12/21.  Patient reports that she started with nausea, vomiting, diarrhea 24 to 48 hours ago.  Denies any associated fever/chills, abdominal pain, urinary complaints.  CT C/A/P: 1.  No acute abnormality in the chest, abdomen, or pelvis. 2.  Status post left upper quadrant drain placement with resolution of previous pneumoperitoneum. No abscess.  Patient currently on oral ABX with cefpodoxime and Flagyl for intra-abdominal infection. Upset stomach possible in the setting of this?  C diff negative. Stool enteric not collected. Reports no further diarrhea  Continuous IVF  UGI series per Bariatrics unremarkable. Advance to full liquid diet today  Pain control, anti-emetics (will order Tigan given prolonged QT)  Serial abdominal exams, Supportive care  Bariatrics consulted, recommendations are appreciated. Drain removal today 12/26. Follow up outpatient to schedule EGD.   Elevated troponin  Denies any chest pain  ECG: NSR. RBBB. Premature supraventricular complexes. No acute ischemic changes  Troponin levels: 0hr 49, 2hr 69, 4hr 121. Random troponin 278  Elevation likely in the setting of dehydration from N/V/D  Lipid panel and HA1C WNL  Echo (12/20/24): EF 55%. Systolic function normal. Diastolic function moderately abnormal. Moderate aortic stenosis. Mild mitral stenosis.   No ECG or telemetry changes  Monitor   Pneumoperitoneum  Recent admission for pneumoperitoneum and marginal ulcer perforation on 12/16-12/21  Continue Protonix 40mg BID and Carafate  slurry 1g QID  Continue cefpodoxime and flagyl thru 12/26 per ID recommendations  Patient reports follow up appointment with Bariatrics 12/26 for drain removal  Bariatrics consulted  Essential hypertension  BP stable  Continue amlodipine and nebivolol  Monitor vitals per routine  Simple chronic bronchitis (HCC)  No acute exacerbation  Continue PRN inhalers  Cognitive impairment  Mental status at baseline  Continue Aricept  History of Debra-en-Y gastric bypass  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 K>4 and Mg >2  Avoid QT prolonging agents  Repeat ECG 12/26 shows improving   Daily ECG monitoring    VTE Pharmacologic Prophylaxis: VTE Score: 5 Moderate Risk (Score 3-4) - Pharmacological DVT Prophylaxis Ordered: heparin.    Mobility:   Basic Mobility Inpatient Raw Score: 18  JH-HLM Goal: 6: Walk 10 steps or more  JH-HLM Achieved: 6: Walk 10 steps or more  JH-HLM Goal achieved. Continue to encourage appropriate mobility.    Patient Centered Rounds: I performed bedside rounds with nursing staff today.   Discussions with Specialists or Other Care Team Provider: Nursing, Bariatrics    Education and Discussions with Family / Patient: Updated  (son) via phone.    Current Length of Stay: 0 day(s)  Current Patient Status: Observation   Certification Statement: The patient will continue to require additional inpatient hospital stay due to nausea, vomiting, diarrhea  Discharge Plan: Anticipate discharge in 24-48 hrs to home.    Code Status: Level 1 - Full Code    Subjective   Patient reports ongoing nausea but denies any vomiting. Reports that she had a bowel movement overnight that was formed.     Objective :  Temp:  [98.1 °F (36.7 °C)] 98.1 °F (36.7 °C)  HR:  [56-73] 56  BP: (124-162)/(63-77) 124/63  Resp:  [17-25] 18  SpO2:  [93 %-98 %]  96 %  O2 Device: None (Room air)    Body mass index is 28.9 kg/m².     Input and Output Summary (last 24 hours):     Intake/Output Summary (Last 24 hours) at 12/26/2024 0902  Last data filed at 12/26/2024 0110  Gross per 24 hour   Intake --   Output 880 ml   Net -880 ml       Physical Exam  Vitals and nursing note reviewed.   Constitutional:       General: She is not in acute distress.     Appearance: She is well-developed.   HENT:      Head: Normocephalic and atraumatic.   Eyes:      Conjunctiva/sclera: Conjunctivae normal.   Cardiovascular:      Rate and Rhythm: Normal rate and regular rhythm.      Heart sounds: No murmur heard.  Pulmonary:      Effort: Pulmonary effort is normal. No respiratory distress.      Breath sounds: Normal breath sounds.   Abdominal:      Palpations: Abdomen is soft.      Tenderness: There is abdominal tenderness (surrounding drain site. No erythema, edema or purulent drainage noted).   Musculoskeletal:         General: No swelling.      Cervical back: Neck supple.   Skin:     General: Skin is warm and dry.      Capillary Refill: Capillary refill takes less than 2 seconds.   Neurological:      Mental Status: She is alert. Mental status is at baseline.   Psychiatric:         Mood and Affect: Mood normal.           Lines/Drains:  Lines/Drains/Airways       Active Status       Name Placement date Placement time Site Days    Closed/Suction Drain LUQ Bulb 19 Fr. 12/16/24  1601  LUQ  9                      Telemetry:  Telemetry Orders (From admission, onward)               24 Hour Telemetry Monitoring  Continuous x 24 Hours (Telem)        Expiring   Question:  Reason for 24 Hour Telemetry  Answer:  Metabolic/electrolyte disturbance with high probability of dysrhythmia. K level <3 or >6 OR KCL infusion >10mEq/hr                     Telemetry Reviewed: Normal Sinus Rhythm  Indication for Continued Telemetry Use: No indication for continued use. Will discontinue.                Lab Results: I have  reviewed the following results:   Results from last 7 days   Lab Units 12/26/24  0540 12/25/24  0756 12/21/24  0516   WBC Thousand/uL 8.89 11.77* 9.51   HEMOGLOBIN g/dL 11.7 13.6 12.7   HEMATOCRIT % 36.2 41.7 39.0   PLATELETS Thousands/uL 393* 446* 337   BANDS PCT %  --   --  1   SEGS PCT %  --  87*  --    LYMPHO PCT %  --  6* 20   MONO PCT %  --  5 7   EOS PCT %  --  0 2     Results from last 7 days   Lab Units 12/26/24  0540   SODIUM mmol/L 136   POTASSIUM mmol/L 3.0*   CHLORIDE mmol/L 104   CO2 mmol/L 25   BUN mg/dL 8   CREATININE mg/dL 0.44*   ANION GAP mmol/L 7   CALCIUM mg/dL 7.8*   ALBUMIN g/dL 3.2*   TOTAL BILIRUBIN mg/dL 0.37   ALK PHOS U/L 42   ALT U/L 7   AST U/L 18   GLUCOSE RANDOM mg/dL 98         Results from last 7 days   Lab Units 12/26/24  0737   POC GLUCOSE mg/dl 101     Results from last 7 days   Lab Units 12/25/24  0756   HEMOGLOBIN A1C % 5.6     Results from last 7 days   Lab Units 12/25/24  0756   LACTIC ACID mmol/L 1.0       Recent Cultures (last 7 days):         Imaging Results Review: No pertinent imaging studies reviewed.  Other Study Results Review: EKG was reviewed.     Last 24 Hours Medication List:     Current Facility-Administered Medications:     acetaminophen (TYLENOL) tablet 650 mg, Q6H PRN    albuterol (PROVENTIL HFA,VENTOLIN HFA) inhaler 2 puff, Q6H PRN    allopurinol (ZYLOPRIM) tablet 100 mg, Daily    amLODIPine (NORVASC) tablet 10 mg, Daily    buPROPion (WELLBUTRIN XL) 24 hr tablet 150 mg, Daily    busPIRone (BUSPAR) tablet 5 mg, TID    cefpodoxime (VANTIN) tablet 400 mg, BID With Meals    donepezil (ARICEPT) tablet 10 mg, HS    escitalopram (LEXAPRO) tablet 20 mg, Daily    fluticasone (FLONASE) 50 mcg/act nasal spray 1 spray, BID    heparin (porcine) subcutaneous injection 5,000 Units, Q8H JUDIE    HYDROmorphone (DILAUDID) injection 0.5 mg, Q6H PRN    magnesium sulfate 2 g/50 mL IVPB (premix) 2 g, Once    metroNIDAZOLE (FLAGYL) tablet 500 mg, Q8H JUDIE    nebivolol (BYSTOLIC)  tablet 5 mg, Daily    pantoprazole (PROTONIX) EC tablet 40 mg, BID    potassium chloride 20 mEq IVPB (premix), Q2H    sodium chloride 0.9 % infusion, Continuous, Last Rate: 75 mL/hr (12/26/24 0105)    sucralfate (CARAFATE) tablet 1 g, 4x Daily (AC & HS)    traZODone (DESYREL) tablet 100 mg, HS    trimethobenzamide (TIGAN) IM injection 200 mg, Q6H PRN    Administrative Statements   Today, Patient Was Seen By: Jenn Clemente PA-C  I have spent a total time of 30 minutes in caring for this patient on the day of the visit/encounter including Diagnostic results, Risks and benefits of tx options, Instructions for management, Patient and family education, Importance of tx compliance, Counseling / Coordination of care, Documenting in the medical record, Reviewing / ordering tests, medicine, procedures  , Obtaining or reviewing history  , and Communicating with other healthcare professionals .    **Please Note: This note may have been constructed using a voice recognition system.**

## 2024-12-27 VITALS
RESPIRATION RATE: 20 BRPM | HEART RATE: 63 BPM | BODY MASS INDEX: 29.06 KG/M2 | WEIGHT: 148 LBS | HEIGHT: 60 IN | TEMPERATURE: 97.8 F | DIASTOLIC BLOOD PRESSURE: 64 MMHG | SYSTOLIC BLOOD PRESSURE: 132 MMHG | OXYGEN SATURATION: 95 %

## 2024-12-27 PROBLEM — R19.7 NAUSEA VOMITING AND DIARRHEA: Status: RESOLVED | Noted: 2024-12-25 | Resolved: 2024-12-27

## 2024-12-27 PROBLEM — R11.2 NAUSEA VOMITING AND DIARRHEA: Status: RESOLVED | Noted: 2024-12-25 | Resolved: 2024-12-27

## 2024-12-27 LAB
ANION GAP SERPL CALCULATED.3IONS-SCNC: 9 MMOL/L (ref 4–13)
BUN SERPL-MCNC: 5 MG/DL (ref 5–25)
CALCIUM SERPL-MCNC: 8.1 MG/DL (ref 8.4–10.2)
CHLORIDE SERPL-SCNC: 104 MMOL/L (ref 96–108)
CO2 SERPL-SCNC: 25 MMOL/L (ref 21–32)
CREAT SERPL-MCNC: 0.45 MG/DL (ref 0.6–1.3)
ERYTHROCYTE [DISTWIDTH] IN BLOOD BY AUTOMATED COUNT: 14.8 % (ref 11.6–15.1)
GFR SERPL CREATININE-BSD FRML MDRD: 97 ML/MIN/1.73SQ M
GLUCOSE SERPL-MCNC: 97 MG/DL (ref 65–140)
HCT VFR BLD AUTO: 38.6 % (ref 34.8–46.1)
HGB BLD-MCNC: 12.8 G/DL (ref 11.5–15.4)
MAGNESIUM SERPL-MCNC: 1.9 MG/DL (ref 1.9–2.7)
MCH RBC QN AUTO: 29 PG (ref 26.8–34.3)
MCHC RBC AUTO-ENTMCNC: 33.2 G/DL (ref 31.4–37.4)
MCV RBC AUTO: 87 FL (ref 82–98)
PLATELET # BLD AUTO: 382 THOUSANDS/UL (ref 149–390)
PMV BLD AUTO: 9 FL (ref 8.9–12.7)
POTASSIUM SERPL-SCNC: 3 MMOL/L (ref 3.5–5.3)
RBC # BLD AUTO: 4.42 MILLION/UL (ref 3.81–5.12)
SODIUM SERPL-SCNC: 138 MMOL/L (ref 135–147)
WBC # BLD AUTO: 8.74 THOUSAND/UL (ref 4.31–10.16)

## 2024-12-27 PROCEDURE — 93005 ELECTROCARDIOGRAM TRACING: CPT

## 2024-12-27 PROCEDURE — 97162 PT EVAL MOD COMPLEX 30 MIN: CPT

## 2024-12-27 PROCEDURE — 83735 ASSAY OF MAGNESIUM: CPT | Performed by: INTERNAL MEDICINE

## 2024-12-27 PROCEDURE — 80048 BASIC METABOLIC PNL TOTAL CA: CPT | Performed by: INTERNAL MEDICINE

## 2024-12-27 PROCEDURE — 99239 HOSP IP/OBS DSCHRG MGMT >30: CPT | Performed by: FAMILY MEDICINE

## 2024-12-27 PROCEDURE — 85027 COMPLETE CBC AUTOMATED: CPT | Performed by: INTERNAL MEDICINE

## 2024-12-27 RX ORDER — POTASSIUM CHLORIDE 1500 MG/1
40 TABLET, EXTENDED RELEASE ORAL ONCE
Status: COMPLETED | OUTPATIENT
Start: 2024-12-27 | End: 2024-12-27

## 2024-12-27 RX ADMIN — PANTOPRAZOLE SODIUM 40 MG: 40 TABLET, DELAYED RELEASE ORAL at 09:05

## 2024-12-27 RX ADMIN — POTASSIUM CHLORIDE 40 MEQ: 1500 TABLET, EXTENDED RELEASE ORAL at 09:04

## 2024-12-27 RX ADMIN — BUSPIRONE HYDROCHLORIDE 5 MG: 5 TABLET ORAL at 09:05

## 2024-12-27 RX ADMIN — AMLODIPINE BESYLATE 10 MG: 10 TABLET ORAL at 09:05

## 2024-12-27 RX ADMIN — FLUTICASONE PROPIONATE 1 SPRAY: 50 SPRAY, METERED NASAL at 09:06

## 2024-12-27 RX ADMIN — SUCRALFATE 1 G: 1 TABLET ORAL at 06:37

## 2024-12-27 RX ADMIN — ACETAMINOPHEN 650 MG: 325 TABLET ORAL at 12:21

## 2024-12-27 RX ADMIN — SUCRALFATE 1 G: 1 TABLET ORAL at 11:24

## 2024-12-27 RX ADMIN — BUPROPION HYDROCHLORIDE 150 MG: 150 TABLET, EXTENDED RELEASE ORAL at 09:07

## 2024-12-27 RX ADMIN — Medication 250 MG: at 09:05

## 2024-12-27 RX ADMIN — HEPARIN SODIUM 5000 UNITS: 5000 INJECTION, SOLUTION INTRAVENOUS; SUBCUTANEOUS at 06:37

## 2024-12-27 RX ADMIN — NEBIVOLOL 5 MG: 5 TABLET ORAL at 09:05

## 2024-12-27 RX ADMIN — ESCITALOPRAM OXALATE 20 MG: 10 TABLET ORAL at 09:05

## 2024-12-27 RX ADMIN — ALLOPURINOL 100 MG: 100 TABLET ORAL at 09:05

## 2024-12-27 NOTE — ASSESSMENT & PLAN NOTE
Denies any chest pain  ECG: NSR. RBBB. Premature supraventricular complexes. No acute ischemic changes  Troponin levels: 0hr 49, 2hr 69, 4hr 121. Random troponin 278  Lipid panel and HA1C WNL  Echo (12/20/24): EF 55%. Systolic function normal. Diastolic function moderately abnormal. Moderate aortic stenosis. Mild mitral stenosis.   No ECG or telemetry changes  Elevation likely in the setting of dehydration from N/V/D. Monitor

## 2024-12-27 NOTE — ASSESSMENT & PLAN NOTE
Recent admission for pneumoperitoneum and marginal ulcer perforation on 12/16-12/21  Continue Protonix 40mg BID and Carafate slurry 1g QID  Continue cefpodoxime and flagyl thru 12/26 per ID recommendations  Bariatrics consulted, drained removed 12/26. Follow up in office outpatient

## 2024-12-27 NOTE — ASSESSMENT & PLAN NOTE
QT prolongation on admission 537  Optimize electrolytes. K>4 and Mg >2  Avoid QT prolonging agents  Repeat ECG 12/27 shows improving QT 49
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
## 2024-12-27 NOTE — DISCHARGE SUMMARY
Discharge Summary - Hospitalist   Name: Zulma Mracelo 74 y.o. female I MRN: [id number]  Unit/Bed#: [address] [place] [address] Date of Admission: 12/25/2024   Date of Service: 12/27/2024 I Hospital Day: 1     Assessment & Plan  Nausea vomiting and diarrhea  Patient recently admitted on 12/16 for pneumoperitoneum and marginal ulcer perforation.  Underwent emergent diagnostic laparoscopy with extensive lysis of adhesion and Amilcar patch per bariatric surgery.  Drain in place.  Discharged on 12/21.  Patient reports that she started with nausea, vomiting, diarrhea 24 to 48 hours ago.  Denies any associated fever/chills, abdominal pain, urinary complaints.  CT C/A/P: 1.  No acute abnormality in the chest, abdomen, or pelvis. 2.  Status post left upper quadrant drain placement with resolution of previous pneumoperitoneum. No abscess.  Patient currently on oral ABX with cefpodoxime and Flagyl for intra-abdominal infection. Upset stomach possible in the setting of this. Completed ABX course.  C diff negative. Stool enteric not collected. Reports no further diarrhea  UGI series per Bariatrics unremarkable. Advance to full liquid diet. Continue at discharge  Serial abdominal exams, Supportive care  Bariatrics consulted, recommendations are appreciated. Drain removal 12/26. Follow up outpatient to schedule EGD.   Elevated troponin  Denies any chest pain  ECG: NSR. RBBB. Premature supraventricular complexes. No acute ischemic changes  Troponin levels: 0hr 49, 2hr 69, 4hr 121. Random troponin 278  Lipid panel and HA1C WNL  Echo (12/20/24): EF 55%. Systolic function normal. Diastolic function moderately abnormal. Moderate aortic stenosis. Mild mitral stenosis.   No ECG or telemetry changes  Elevation likely in the setting of dehydration from N/V/D. Monitor   Pneumoperitoneum  Recent admission for pneumoperitoneum and marginal ulcer perforation on 12/16-12/21  Continue Protonix 40mg BID and Carafate slurry 1g QID  Continue cefpodoxime and  flagyl thru 12/26 per ID recommendations  Bariatrics consulted, drained removed 12/26. 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 K>4 and Mg >2  Avoid QT prolonging agents  Repeat ECG 12/27 shows improving      Medical Problems       Resolved Problems  Date Reviewed: 12/12/2024   None       Discharging Physician / Practitioner: Jenn Clemente PA-C  PCP: Lydia Cruz MD  Admission Date:   Admission Orders (From admission, onward)       Ordered        12/26/24 1414  INPATIENT ADMISSION  Once            12/25/24 1201  Place in Observation  Once                          Discharge Date: 12/27/24    Consultations During Hospital Stay:  Bariatric Surgery    Procedures Performed:   None    Significant Findings / Test Results:   CT C/A/P (12/25/24): 1.  No acute abnormality in the chest, abdomen, or pelvis. 2.  Status post left upper quadrant drain placement with resolution of previous pneumoperitoneum. No abscess  FL upper GI UGI (12/26/24): 1.  Status post Debra-en-Y gastric bypass with patent gastrojejunostomy. 2.  No evidence of contrast extravasation at the site of the repaired marginal ulcer.    Incidental Findings:   CT C/A/P:   Ectatic infrarenal abdominal aorta measuring 2.6 cm. According to ACR white paper for the management of incidentally detected abdominal vascular findings, for an aorta of this caliber (2.5-2.9 cm) follow-up imaging (e.g. Doppler ultrasound, CTA  abdomen/pelvis) is recommended at a 5 year interval. Reference: J Josy  Coleman Radiol 2013;10:789-794.  I reviewed the above mentioned incidental findings with the patient and/or family and they expressed understanding.    Test Results Pending at Discharge (will require follow up):   Ova and parasite     Outpatient Tests Requested:  None    Complications:  None    Reason for Admission: Nausea, vomiting, diarrhea.     Hospital Course:   Zulma Marcelo is a 74 y.o. female patient who originally presented to the hospital on 12/25/2024 due to nausea, vomiting, diarrhea. Patient recently admitted on 12/16 for pneumoperitoneum and marginal ulcer perforation.  Underwent emergent diagnostic laparoscopy with extensive lysis of adhesion and Amilcar patch per bariatric surgery.  Patient has CT scan on admission which did not show any intra-abdominal abnormality.  Bariatric surgery was consulted and they ordered an upper GI series.  Upper GI series negative for any postoperative leak.  Patient's drain was removed per bariatric surgery and she was advanced to full liquid diet.  Continue full liquid diet at discharge.  Patient's C. difficile panel negative.  Patient denies any further nausea, vomiting, diarrhea.  Patient completed course of oral antibiotics per infectious disease recommendations.  PT evaluated patient and she is stable for discharge home with home health services.  Patient to follow-up with bariatric service as an outpatient.  All patient and family questions answered to the best of my ability.      Please see above list of diagnoses and related plan for additional information.     Condition at Discharge: stable    Discharge Day Visit / Exam:   Subjective: Patient sitting upright in bed, ate her entire breakfast.  Denies any further abdominal pain, nausea, vomiting, diarrhea.  Eager for discharge home today.    Vitals: Blood Pressure: 132/64 (12/27/24 0814)  Pulse: 63 (12/27/24 0814)  Temperature: 97.8 °F (36.6 °C) (12/27/24 0814)  Temp Source: Oral (12/27/24 0814)  Respirations: 20  (12/27/24 0814)  Height: 5' (152.4 cm) (12/25/24 0242)  Weight - Scale: 67.1 kg (148 lb) (12/25/24 1810)  SpO2: 95 % (12/27/24 0814)  Physical Exam  Vitals and nursing note reviewed.   Constitutional:       General: She is not in acute distress.     Appearance: She is well-developed.   HENT:      Head: Normocephalic and atraumatic.   Eyes:      Conjunctiva/sclera: Conjunctivae normal.   Cardiovascular:      Rate and Rhythm: Normal rate and regular rhythm.      Heart sounds: No murmur heard.  Pulmonary:      Effort: Pulmonary effort is normal. No respiratory distress.      Breath sounds: Normal breath sounds.   Abdominal:      Palpations: Abdomen is soft.      Tenderness: There is no abdominal tenderness.      Comments: Drain removal site is clean, dry, intact   Musculoskeletal:         General: No swelling.      Cervical back: Neck supple.   Skin:     General: Skin is warm and dry.      Capillary Refill: Capillary refill takes less than 2 seconds.   Neurological:      Mental Status: She is alert.   Psychiatric:         Mood and Affect: Mood normal.          Discussion with Family: Updated  (son) via phone.    Discharge instructions/Information to patient and family:   See after visit summary for information provided to patient and family.      Provisions for Follow-Up Care:  See after visit summary for information related to follow-up care and any pertinent home health orders.      Mobility at time of Discharge:   Basic Mobility Inpatient Raw Score: 21  JH-HLM Goal: 6: Walk 10 steps or more  JH-HLM Achieved: 7: Walk 25 feet or more  HLM Goal achieved. Continue to encourage appropriate mobility.     Disposition:   Home with [facility] Services (Reminder: Complete face to face encounter)    Planned Readmission: None    Discharge Medications:  See after visit summary for reconciled discharge medications provided to patient and/or family.      Administrative Statements   Discharge Statement:  I have spent a total  time of 50 minutes in caring for this patient on the day of the visit/encounter. >30 minutes of time was spent on: Diagnostic results, Risks and benefits of tx options, Instructions for management, Patient and family education, Counseling / Coordination of care, Documenting in the medical record, Reviewing / ordering tests, medicine, procedures  , and Communicating with other healthcare professionals .    **Please Note: This note may have been constructed using a voice recognition system**

## 2024-12-27 NOTE — PLAN OF CARE
Problem: Potential for Falls  Goal: Patient will remain free of falls  Description: INTERVENTIONS:  - Educate patient/family on patient safety including physical limitations  - Instruct patient to call for assistance with activity   - Consult OT/PT to assist with strengthening/mobility   - Keep Call bell within reach  - Keep bed low and locked with side rails adjusted as appropriate  - Keep care items and personal belongings within reach  - Initiate and maintain comfort rounds  - Make Fall Risk Sign visible to staff  - Offer Toileting every 2 Hours, in advance of need  - Initiate/Maintain bed alarm  - Obtain necessary fall risk management equipment: call bell   - Apply yellow socks and bracelet for high fall risk patients  - Consider moving patient to room near nurses station  Outcome: Progressing     Problem: PAIN - ADULT  Goal: Verbalizes/displays adequate comfort level or baseline comfort level  Description: Interventions:  - Encourage patient to monitor pain and request assistance  - Assess pain using appropriate pain scale  - Administer analgesics based on type and severity of pain and evaluate response  - Implement non-pharmacological measures as appropriate and evaluate response  - Consider cultural and social influences on pain and pain management  - Notify physician/advanced practitioner if interventions unsuccessful or patient reports new pain  Outcome: Progressing     Problem: INFECTION - ADULT  Goal: Absence or prevention of progression during hospitalization  Description: INTERVENTIONS:  - Assess and monitor for signs and symptoms of infection  - Monitor lab/diagnostic results  - Monitor all insertion sites, i.e. indwelling lines, tubes, and drains  - Monitor endotracheal if appropriate and nasal secretions for changes in amount and color  - [place] appropriate cooling/warming therapies per order  - Administer medications as ordered  - Instruct and encourage patient and family to use good hand  hygiene technique  - Identify and instruct in appropriate isolation precautions for identified infection/condition  Outcome: Progressing  Goal: Absence of fever/infection during neutropenic period  Description: INTERVENTIONS:  - Monitor WBC    Outcome: Progressing     Problem: SAFETY ADULT  Goal: Patient will remain free of falls  Description: INTERVENTIONS:  - Educate patient/family on patient safety including physical limitations  - Instruct patient to call for assistance with activity   - Consult OT/PT to assist with strengthening/mobility   - Keep Call bell within reach  - Keep bed low and locked with side rails adjusted as appropriate  - Keep care items and personal belongings within reach  - Initiate and maintain comfort rounds  - Make Fall Risk Sign visible to staff  - Offer Toileting every 2 Hours, in advance of need  - Initiate/Maintain bed alarm  - Obtain necessary fall risk management equipment: call bell   - Apply yellow socks and bracelet for high fall risk patients  - Consider moving patient to room near nurses station  Outcome: Progressing  Goal: Maintain or return to baseline ADL function  Description: INTERVENTIONS:  -  Assess patient's ability to carry out ADLs; assess patient's baseline for ADL function and identify physical deficits which impact ability to perform ADLs (bathing, care of mouth/teeth, toileting, grooming, dressing, etc.)  - Assess/evaluate cause of self-care deficits   - Assess range of motion  - Assess patient's mobility; develop plan if impaired  - Assess patient's need for assistive devices and provide as appropriate  - Encourage maximum independence but intervene and supervise when necessary  - Involve family in performance of ADLs  - Assess for home care needs following discharge   - Consider OT consult to assist with ADL evaluation and planning for discharge  - Provide patient education as appropriate  Outcome: Progressing  Goal: Maintains/Returns to pre admission functional  level  Description: INTERVENTIONS:  - Perform AM-PAC 6 Click Basic Mobility/ Daily Activity assessment daily.  - Set and communicate daily mobility goal to care team and patient/family/caregiver.   - Collaborate with rehabilitation services on mobility goals if consulted  - Perform Range of Motion 2 times a day.  - Reposition patient every 2 hours.  - Dangle patient 2 times a day  - Stand patient 2 times a day  - Ambulate patient 2 times a day  - Out of bed to chair 2 times a day   - Out of bed for meals 2 times a day  - Out of bed for toileting  - Record patient progress and toleration of activity level   Outcome: Progressing     Problem: DISCHARGE PLANNING  Goal: Discharge to home or other facility with appropriate resources  Description: INTERVENTIONS:  - Identify barriers to discharge w/patient and caregiver  - Arrange for needed discharge resources and transportation as appropriate  - Identify discharge learning needs (meds, wound care, etc.)  - Arrange for interpretive services to assist at discharge as needed  - Refer to Case Management Department for coordinating discharge planning if the patient needs post-hospital services based on physician/advanced practitioner order or complex needs related to functional status, cognitive ability, or social support system  Outcome: Progressing     Problem: Knowledge Deficit  Goal: Patient/family/caregiver demonstrates understanding of disease process, treatment plan, medications, and discharge instructions  Description: Complete learning assessment and assess knowledge base.  Interventions:  - Provide teaching at level of understanding  - Provide teaching via preferred learning methods  Outcome: Progressing     Problem: Prexisting or High Potential for Compromised Skin Integrity  Goal: Skin integrity is maintained or improved  Description: INTERVENTIONS:  - Identify patients at risk for skin breakdown  - Assess and monitor skin integrity  - Assess and monitor nutrition  and hydration status  - Monitor labs   - Assess for incontinence   - Turn and reposition patient  - Assist with mobility/ambulation  - Relieve pressure over bony prominences  - Avoid friction and shearing  - Provide appropriate hygiene as needed including keeping skin clean and dry  - Evaluate need for skin moisturizer/barrier cream  - Collaborate with interdisciplinary team   - Patient/family teaching  - Consider wound care consult   Outcome: Progressing

## 2024-12-27 NOTE — TELEPHONE ENCOUNTER
Tresa calling in to get a verbal for home orders for the pt. Pt was discharged and then was readmitted to hospital. Pt will be discharged today and Tresa will need updated orders.Clinical team was not available at the time of the call   Requesting a call back   Please advise, thank you

## 2024-12-27 NOTE — PLAN OF CARE
Problem: Potential for Falls  Goal: Patient will remain free of falls  Description: INTERVENTIONS:  - Educate patient/family on patient safety including physical limitations  - Instruct patient to call for assistance with activity   - Consult OT/PT to assist with strengthening/mobility   - Keep Call bell within reach  - Keep bed low and locked with side rails adjusted as appropriate  - Keep care items and personal belongings within reach  - Initiate and maintain comfort rounds  - Make Fall Risk Sign visible to staff  - Offer Toileting every 2 Hours, in advance of need  - Initiate/Maintain bed alarm  - Obtain necessary fall risk management equipment: non skid socks  - Apply yellow socks and bracelet for high fall risk patients  - Consider moving patient to room near nurses station  Outcome: Progressing     Problem: PAIN - ADULT  Goal: Verbalizes/displays adequate comfort level or baseline comfort level  Description: Interventions:  - Encourage patient to monitor pain and request assistance  - Assess pain using appropriate pain scale  - Administer analgesics based on type and severity of pain and evaluate response  - Implement non-pharmacological measures as appropriate and evaluate response  - Consider cultural and social influences on pain and pain management  - Notify physician/advanced practitioner if interventions unsuccessful or patient reports new pain  Outcome: Progressing     Problem: INFECTION - ADULT  Goal: Absence or prevention of progression during hospitalization  Description: INTERVENTIONS:  - Assess and monitor for signs and symptoms of infection  - Monitor lab/diagnostic results  - Monitor all insertion sites, i.e. indwelling lines, tubes, and drains  - Monitor endotracheal if appropriate and nasal secretions for changes in amount and color  - [place] appropriate cooling/warming therapies per order  - Administer medications as ordered  - Instruct and encourage patient and family to use good hand  hygiene technique  - Identify and instruct in appropriate isolation precautions for identified infection/condition  Outcome: Progressing  Goal: Absence of fever/infection during neutropenic period  Description: INTERVENTIONS:  - Monitor WBC    Outcome: Progressing     Problem: SAFETY ADULT  Goal: Patient will remain free of falls  Description: INTERVENTIONS:  - Educate patient/family on patient safety including physical limitations  - Instruct patient to call for assistance with activity   - Consult OT/PT to assist with strengthening/mobility   - Keep Call bell within reach  - Keep bed low and locked with side rails adjusted as appropriate  - Keep care items and personal belongings within reach  - Initiate and maintain comfort rounds  - Make Fall Risk Sign visible to staff  - Offer Toileting every 2 Hours, in advance of need  - Initiate/Maintain bed alarm  - Obtain necessary fall risk management equipment: non skid socks  - Apply yellow socks and bracelet for high fall risk patients  - Consider moving patient to room near nurses station  Outcome: Progressing  Goal: Maintain or return to baseline ADL function  Description: INTERVENTIONS:  -  Assess patient's ability to carry out ADLs; assess patient's baseline for ADL function and identify physical deficits which impact ability to perform ADLs (bathing, care of mouth/teeth, toileting, grooming, dressing, etc.)  - Assess/evaluate cause of self-care deficits   - Assess range of motion  - Assess patient's mobility; develop plan if impaired  - Assess patient's need for assistive devices and provide as appropriate  - Encourage maximum independence but intervene and supervise when necessary  - Involve family in performance of ADLs  - Assess for home care needs following discharge   - Consider OT consult to assist with ADL evaluation and planning for discharge  - Provide patient education as appropriate  Outcome: Progressing  Goal: Maintains/Returns to pre admission  functional level  Description: INTERVENTIONS:  - Perform AM-PAC 6 Click Basic Mobility/ Daily Activity assessment daily.  - Set and communicate daily mobility goal to care team and patient/family/caregiver.   - Collaborate with rehabilitation services on mobility goals if consulted  - Perform Range of Motion 3 times a day.  - Reposition patient every 2 hours.  - Dangle patient 3 times a day  - Stand patient 3 times a day  - Ambulate patient 3 times a day  - Out of bed to chair 3 times a day   - Out of bed for meals 3 times a day  - Out of bed for toileting  - Record patient progress and toleration of activity level   Outcome: Progressing     Problem: DISCHARGE PLANNING  Goal: Discharge to home or other facility with appropriate resources  Description: INTERVENTIONS:  - Identify barriers to discharge w/patient and caregiver  - Arrange for needed discharge resources and transportation as appropriate  - Identify discharge learning needs (meds, wound care, etc.)  - Arrange for interpretive services to assist at discharge as needed  - Refer to Case Management Department for coordinating discharge planning if the patient needs post-hospital services based on physician/advanced practitioner order or complex needs related to functional status, cognitive ability, or social support system  Outcome: Progressing     Problem: Knowledge Deficit  Goal: Patient/family/caregiver demonstrates understanding of disease process, treatment plan, medications, and discharge instructions  Description: Complete learning assessment and assess knowledge base.  Interventions:  - Provide teaching at level of understanding  - Provide teaching via preferred learning methods  Outcome: Progressing     Problem: Prexisting or High Potential for Compromised Skin Integrity  Goal: Skin integrity is maintained or improved  Description: INTERVENTIONS:  - Identify patients at risk for skin breakdown  - Assess and monitor skin integrity  - Assess and monitor  nutrition and hydration status  - Monitor labs   - Assess for incontinence   - Turn and reposition patient  - Assist with mobility/ambulation  - Relieve pressure over bony prominences  - Avoid friction and shearing  - Provide appropriate hygiene as needed including keeping skin clean and dry  - Evaluate need for skin moisturizer/barrier cream  - Collaborate with interdisciplinary team   - Patient/family teaching  - Consider wound care consult   Outcome: Progressing

## 2024-12-27 NOTE — PLAN OF CARE
Problem: PHYSICAL THERAPY ADULT  Goal: Performs mobility at highest level of function for planned discharge setting.  See evaluation for individualized goals.  Description: Treatment/Interventions: Functional transfer training, LE strengthening/ROM, Therapeutic exercise, Endurance training, Gait training, Spoke to nursing          See flowsheet documentation for full assessment, interventions and recommendations.  Note: Prognosis: Good  Problem List: Decreased strength, Decreased endurance, Impaired balance, Decreased mobility, Pain  Assessment: Patient seen for Physical Therapy evaluation. Patient admitted with Nausea vomiting and diarrhea.  Comorbidities affecting patient's physical performance include: arthritis, asthma, cardiac disease, CHF, COPD, GERD, gout, hypertension, hyperlipidemia, and osteoarthritis.  Personal factors affecting patient at time of initial evaluation include: lives in 1 story house, ambulating with assistive device, stairs to enter home, inability to navigate level surfaces without external assistance, and inability to perform IADLS . Prior to admission, patient was independent with functional mobility with rollator, independent with ADLS, requiring assist for IADLS, living with son in a 1 level home with 5 steps to enter, and ambulating household distance.  Please find objective findings from Physical Therapy assessment regarding body systems outlined above with impairments and limitations including weakness, impaired balance, decreased endurance, gait deviations, decreased activity tolerance, decreased functional mobility tolerance, and fall risk.  The Barthel Index was used as a functional outcome tool presenting with a score of Barthel Index Score: 70 today indicating moderate limitations of functional mobility and ADLS.  Patient's clinical presentation is currently evolving as seen in patient's presentation of increased fall risk and decreased endurance. Pt would benefit from  continued Physical Therapy treatment to address deficits as defined above and maximize level of functional mobility. As demonstrated by objective findings, the assigned level of complexity for this evaluation is moderate.The patient's AM-PAC Basic Mobility Inpatient Short Form Raw Score is 21. A Raw score of greater than 16 suggests the patient may benefit from discharge to home. Please also refer to the recommendation of the Physical Therapist for safe discharge planning.        Rehab Resource Intensity Level, PT: III (Minimum Resource Intensity)    See flowsheet documentation for full assessment.

## 2024-12-27 NOTE — PHYSICAL THERAPY NOTE
PT EVALUATION       12/27/24 0844   PT Last Visit   PT Visit Date 12/27/24   Note Type   Note type Evaluation   Pain Assessment   Pain Assessment Tool 0-10   Pain Score No Pain   Patient's Stated Pain Goal No pain   Multiple Pain Sites No   Restrictions/Precautions   Weight Bearing Precautions Per Order No   Other Precautions Bed Alarm;Chair Alarm;Fall Risk;Pain   Home Living   Type of Home House   Home Layout One level;Performs ADLs on one level;Able to live on main level with bedroom/bathroom;Stairs to enter with rails  (5 JESUS)   Bathroom Toilet Standard   Bathroom Equipment Grab bars in shower   Home Equipment Walker;Wheelchair-manual  (Rollator)   Additional Comments Pt reports using rollator at all times   Prior Function   Level of [place] Independent with ADLs;Independent with functional mobility;Needs assistance with IADLS   Lives With Son   Receives Help From Family   Falls in the last 6 months 0   Vocational Retired   General   Additional Pertinent History Pt is a 74 year-old female who was admitted to the hospital on 12/25/24 due to nausea/vomiting. Pt POD #11 diagnostic laparoscopy with extensive lysis of adhesion and Amilcar patch   Family/Caregiver Present No   Cognition   Overall Cognitive Status WFL   Arousal/Participation Cooperative   Orientation Level Oriented X4   Following Commands Follows all commands and directions without difficulty   Subjective   Subjective Pt agreeable to PT session this AM   RLE Assessment   RLE Assessment WFL  (3+/5)   LLE Assessment   LLE Assessment WFL  (Hip/knee 3+/5 ; L ankle DF 3-/5)   Bed Mobility   Supine to Sit 7  Independent   Sit to Supine 7  Independent   Transfers   Sit to Stand 6  Modified independent   Additional items Bedrails   Stand to Sit 6  Modified independent   Additional items Bedrails   Ambulation/Elevation   Gait pattern Short stride;Step through pattern;Foward flexed   Gait Assistance 5  Supervision  (to Mod I with RW)   Additional items  Verbal cues   Assistive Device Rolling walker   Distance 125 feet with change of direction   Stair Management Assistance Not tested  (pt declines steps ; reports she only negotiates to leave house which is not often)   Balance   Static Sitting Fair +   Dynamic Sitting Fair   Static Standing Fair   Dynamic Standing Fair -   Ambulatory Fair -  (RW)   Activity Tolerance   Activity Tolerance Patient tolerated treatment well   Nurse Made Aware yes   Assessment   Prognosis Good   Problem List Decreased strength;Decreased endurance;Impaired balance;Decreased mobility;Pain   Assessment Patient seen for Physical Therapy evaluation. Patient admitted with Nausea vomiting and diarrhea.  Comorbidities affecting patient's physical performance include: arthritis, asthma, cardiac disease, CHF, COPD, GERD, gout, hypertension, hyperlipidemia, and osteoarthritis.  Personal factors affecting patient at time of initial evaluation include: lives in 1 story house, ambulating with assistive device, stairs to enter home, inability to navigate level surfaces without external assistance, and inability to perform IADLS . Prior to admission, patient was independent with functional mobility with rollator, independent with ADLS, requiring assist for IADLS, living with son in a 1 level home with 5 steps to enter, and ambulating household distance.  Please find objective findings from Physical Therapy assessment regarding body systems outlined above with impairments and limitations including weakness, impaired balance, decreased endurance, gait deviations, decreased activity tolerance, decreased functional mobility tolerance, and fall risk.  The Barthel Index was used as a functional outcome tool presenting with a score of Barthel Index Score: 70 today indicating moderate limitations of functional mobility and ADLS.  Patient's clinical presentation is currently evolving as seen in patient's presentation of increased fall risk and decreased  endurance. Pt would benefit from continued Physical Therapy treatment to address deficits as defined above and maximize level of functional mobility. As demonstrated by objective findings, the assigned level of complexity for this evaluation is moderate.The patient's [facility] Basic Mobility Inpatient Short Form Raw Score is 21. A Raw score of greater than 16 suggests the patient may benefit from discharge to home. Please also refer to the recommendation of the Physical Therapist for safe discharge planning.   Goals   Patient Goals to get home ASAP   STG Expiration Date 01/03/25   Short Term Goal #1 Pt will perform bed mobility/transfers IND ; Pt will ambulate x 150 feet Mod I with RW ; Pt will negotiate x 5 steps with Min A to enter/exit home ; BLE strength will improve by 1/2 grade to improve tolerance to functional mobility ; Standing balance will improve to fair+ to decrease risk of falls ; AMPAC score will improve >22/24 to demonstrate improved functional independence   LTG Expiration Date 01/10/25   Long Term Goal #1 Pt will ambulate x 250 feet Mod I with RW ; Pt will negotiate x 5 steps with supervision to enter/exit home ; BLE strength will improve by 1 grade to improve tolerance to functional mobility ; Standing balance will improve to good to decrease risk of falls   Plan   Treatment/Interventions Functional transfer training;LE strengthening/ROM;Therapeutic exercise;Endurance training;Gait training;Spoke to nursing   PT Frequency 2-3x/wk   Discharge Recommendation   Rehab Resource Intensity Level, PT III (Minimum Resource Intensity)   AM-PAC Basic Mobility Inpatient   Turning in Flat Bed Without Bedrails 4   Lying on Back to Sitting on Edge of Flat Bed Without Bedrails 4   Moving Bed to Chair 3   Standing Up From Chair Using Arms 4   Walk in Room 3   Climb 3-5 Stairs With Railing 3   Basic Mobility Inpatient Raw Score 21   Basic Mobility Standardized Score 45.55   [facility] Highest Level Of Mobility    JH-HLM Goal 6: Walk 10 steps or more   JH-HLM Achieved 7: Walk 25 feet or more   Barthel Index   Feeding 10   Bathing 0   Grooming Score 5   Dressing Score 5   Bladder Score 10   Bowels Score 10   Toilet Use Score 5   Transfers (Bed/Chair) Score 10   Mobility (Level Surface) Score 10   Stairs Score 5   Barthel Index Score 70   End of Consult   Patient Position at End of Consult Supine;Bed/Chair alarm activated;All needs within reach  (reports she is cold and would like to return to bed)   Licensure   [place] License Number  Valerie Morgan [id number]

## 2024-12-27 NOTE — ASSESSMENT & PLAN NOTE
Patient recently admitted on 12/16 for pneumoperitoneum and marginal ulcer perforation.  Underwent emergent diagnostic laparoscopy with extensive lysis of adhesion and Amilcar patch per bariatric surgery.  Drain in place.  Discharged on 12/21.  Patient reports that she started with nausea, vomiting, diarrhea 24 to 48 hours ago.  Denies any associated fever/chills, abdominal pain, urinary complaints.  CT C/A/P: 1.  No acute abnormality in the chest, abdomen, or pelvis. 2.  Status post left upper quadrant drain placement with resolution of previous pneumoperitoneum. No abscess.  Patient currently on oral ABX with cefpodoxime and Flagyl for intra-abdominal infection. Upset stomach possible in the setting of this. Completed ABX course.  C diff negative. Stool enteric not collected. Reports no further diarrhea  UGI series per Bariatrics unremarkable. Advance to full liquid diet. Continue at discharge  Serial abdominal exams, Supportive care  Bariatrics consulted, recommendations are appreciated. Drain removal 12/26. Follow up outpatient to schedule EGD.

## 2024-12-27 NOTE — DISCHARGE INSTR - AVS FIRST PAGE
Follow ups:  -Bariatric surgery  -PCP    No new medication changes    Other instructions:  -Continue full liquid diet until otherwise specified by your surgeon. Dietary instructions below  -Stop your oral antibiotics as you finished your course here in the hospital

## 2024-12-28 LAB
ATRIAL RATE: 56 BPM
ATRIAL RATE: 62 BPM
ATRIAL RATE: 64 BPM
P AXIS: 66 DEGREES
P AXIS: 68 DEGREES
P AXIS: 73 DEGREES
PR INTERVAL: 182 MS
PR INTERVAL: 184 MS
PR INTERVAL: 194 MS
QRS AXIS: 7 DEGREES
QRS AXIS: 82 DEGREES
QRS AXIS: 84 DEGREES
QRSD INTERVAL: 102 MS
QRSD INTERVAL: 108 MS
QRSD INTERVAL: 108 MS
QT INTERVAL: 496 MS
QT INTERVAL: 510 MS
QT INTERVAL: 516 MS
QTC INTERVAL: 499 MS
QTC INTERVAL: 504 MS
QTC INTERVAL: 527 MS
T WAVE AXIS: 31 DEGREES
T WAVE AXIS: 53 DEGREES
T WAVE AXIS: 61 DEGREES
VENTRICULAR RATE: 56 BPM
VENTRICULAR RATE: 62 BPM
VENTRICULAR RATE: 64 BPM

## 2024-12-28 PROCEDURE — 93010 ELECTROCARDIOGRAM REPORT: CPT | Performed by: INTERNAL MEDICINE

## 2024-12-30 ENCOUNTER — PATIENT OUTREACH (OUTPATIENT)
Dept: CASE MANAGEMENT | Facility: OTHER | Age: 74
End: 2024-12-30

## 2024-12-30 ENCOUNTER — TELEPHONE (OUTPATIENT)
Age: 74
End: 2024-12-30

## 2024-12-30 NOTE — PROGRESS NOTES
Outpatient Care Management Note:    Discharge ADT alert received.  Patient was hospitalized from 12/25-12/27/24 with nausea, vomiting and diarrhea. She had been admitted from 12/16-12/21/24 with pneumoperitoneum with marginal ulcer perforation. She had a diagnostic laparoscopy with lysis of adhesions and Amilcar patch per bariatric surgery. She had a drain in place which was removed on 12/26. CT and upper GI showed no acute abnormalities. She was on antibiotics which may have been affecting her stomach. She is to follow up with her PCP (1/2) and bariatrics. [facility] will be followed [facility]: nursing, physical and occupational therapy.     Voice mail message left for Zulma, with my contact information, introducing myself and requesting a call back.

## 2025-01-02 ENCOUNTER — OFFICE VISIT (OUTPATIENT)
Dept: FAMILY MEDICINE CLINIC | Facility: CLINIC | Age: 75
End: 2025-01-02
Payer: MEDICARE

## 2025-01-02 VITALS
HEIGHT: 60 IN | BODY MASS INDEX: 27.45 KG/M2 | TEMPERATURE: 98.3 F | WEIGHT: 139.8 LBS | DIASTOLIC BLOOD PRESSURE: 60 MMHG | RESPIRATION RATE: 18 BRPM | HEART RATE: 60 BPM | SYSTOLIC BLOOD PRESSURE: 122 MMHG

## 2025-01-02 DIAGNOSIS — K29.70 GASTRITIS: ICD-10-CM

## 2025-01-02 DIAGNOSIS — K27.5 PERFORATED PEPTIC ULCER (HCC): Primary | ICD-10-CM

## 2025-01-02 DIAGNOSIS — Z98.84 HISTORY OF ROUX-EN-Y GASTRIC BYPASS: ICD-10-CM

## 2025-01-02 DIAGNOSIS — F33.1 MODERATE EPISODE OF RECURRENT MAJOR DEPRESSIVE DISORDER (HCC): Chronic | ICD-10-CM

## 2025-01-02 DIAGNOSIS — K66.8 PNEUMOPERITONEUM: ICD-10-CM

## 2025-01-02 DIAGNOSIS — R11.2 NAUSEA AND VOMITING, UNSPECIFIED VOMITING TYPE: ICD-10-CM

## 2025-01-02 DIAGNOSIS — I10 ESSENTIAL HYPERTENSION: ICD-10-CM

## 2025-01-02 DIAGNOSIS — K28.9 MARGINAL ULCER: ICD-10-CM

## 2025-01-02 DIAGNOSIS — R41.89 COGNITIVE IMPAIRMENT: ICD-10-CM

## 2025-01-02 PROBLEM — M1A.0720 CHRONIC IDIOPATHIC GOUT OF LEFT FOOT: Status: RESOLVED | Noted: 2023-08-04 | Resolved: 2025-01-02

## 2025-01-02 PROCEDURE — 99496 TRANSJ CARE MGMT HIGH F2F 7D: CPT | Performed by: INTERNAL MEDICINE

## 2025-01-02 RX ORDER — ONDANSETRON 4 MG/1
4 TABLET, ORALLY DISINTEGRATING ORAL EVERY 6 HOURS PRN
Qty: 9 TABLET | Refills: 0 | Status: SHIPPED | OUTPATIENT
Start: 2025-01-02 | End: 2025-01-02 | Stop reason: SDUPTHER

## 2025-01-02 RX ORDER — AMLODIPINE BESYLATE 10 MG/1
10 TABLET ORAL DAILY
Qty: 30 TABLET | Refills: 0 | Status: SHIPPED | OUTPATIENT
Start: 2025-01-02

## 2025-01-02 RX ORDER — ONDANSETRON 4 MG/1
4 TABLET, ORALLY DISINTEGRATING ORAL EVERY 6 HOURS PRN
Qty: 20 TABLET | Refills: 0 | Status: SHIPPED | OUTPATIENT
Start: 2025-01-02 | End: 2025-01-05

## 2025-01-02 RX ORDER — AMLODIPINE BESYLATE 10 MG/1
10 TABLET ORAL DAILY
Qty: 90 TABLET | Refills: 3 | Status: SHIPPED | OUTPATIENT
Start: 2025-01-02

## 2025-01-02 RX ORDER — PANTOPRAZOLE SODIUM 40 MG/1
40 TABLET, DELAYED RELEASE ORAL 2 TIMES DAILY
Qty: 60 TABLET | Refills: 0 | Status: SHIPPED | OUTPATIENT
Start: 2025-01-02

## 2025-01-02 NOTE — ASSESSMENT & PLAN NOTE
Depression Screening Follow-up Plan: Patient's depression screening was positive with a PHQ-9 score of 16. Patient with underlying depression and was advised to continue current medications as prescribed. Continue regular follow-up with their psychologist/therapist/psychiatrist who is managing their mental health condition(s).  She denies SI/HI.

## 2025-01-02 NOTE — ASSESSMENT & PLAN NOTE
Well controlled and will continue current medications as ordered.   Orders:  •  amLODIPine (NORVASC) 10 mg tablet; Take 1 tablet (10 mg total) by mouth daily  •  amLODIPine (NORVASC) 10 mg tablet; Take 1 tablet (10 mg total) by mouth daily

## 2025-01-02 NOTE — ASSESSMENT & PLAN NOTE
She is feeling much better, drain removed at last hospitalization and site is healing well.  She will schedule her follow up appointment with bariatric surgery.  Orders:  •  Ambulatory Referral to Weight Management; Future

## 2025-01-02 NOTE — PROGRESS NOTES
Transition of Care Visit  Name: Zulma Marcelo      : 1950      MRN: [id number]  Encounter Provider: Lydia Cruz MD  Encounter Date: 2025   Encounter department: [facility]    Assessment & Plan  Perforated peptic ulcer (HCC)  She is feeling much better, drain removed at last hospitalization and site is healing well.  She will schedule her follow up appointment with bariatric surgery.  Orders:  •  Ambulatory Referral to Weight Management; Future    Pneumoperitoneum  Resolved.       Nausea and vomiting, unspecified vomiting type  Improving, still with some nausea.  I renewed her ondansetron and we discussed continued bland diet, advance as tolerated.        Cognitive impairment  Unchanged, continue donepezil.       History of Debra-en-Y gastric bypass    Orders:  •  Ambulatory Referral to Weight Management; Future    Moderate episode of recurrent major depressive disorder (HCC)  Depression Screening Follow-up Plan: Patient's depression screening was positive with a PHQ-9 score of 16. Patient with underlying depression and was advised to continue current medications as prescribed. Continue regular follow-up with their psychologist/therapist/psychiatrist who is managing their mental health condition(s).  She denies SI/HI.          Essential hypertension  Well controlled and will continue current medications as ordered.   Orders:  •  amLODIPine (NORVASC) 10 mg tablet; Take 1 tablet (10 mg total) by mouth daily  •  amLODIPine (NORVASC) 10 mg tablet; Take 1 tablet (10 mg total) by mouth daily    Gastritis    Orders:  •  Ambulatory Referral to Weight Management; Future  •  ondansetron (ZOFRAN-ODT) 4 mg disintegrating tablet; Take 1 tablet (4 mg total) by mouth every 6 (six) hours as needed for nausea for up to 3 days    Marginal ulcer    Orders:  •  pantoprazole (PROTONIX) 40 mg tablet; Take 1 tablet (40 mg total) by mouth 2 (two) times a day         History of Present Illness     Transitional  Care Management Review:   Zulma Marcelo is a 74 y.o. female here for TCM follow up.     During the TCM phone call patient stated:  TCM Call     Date and time call was made  12/23/2024 10:22 AM    Hospital care reviewed  Records reviewed    Patient was hospitialized at  [facility]    Date of Admission  12/16/24    Date of discharge  12/21/24    Diagnosis  Pneumoperitoneum    Disposition  Home    Were the patients medications reviewed and updated  No  Her son Rhett declined but knows to call if any questions    Current Symptoms  Dizziness  Mild dizziness, even while hospitalized but not worsening , per her son Rhett    Dizziness severity  Mild      TCM Call     Post hospital issues  None    Should patient be enrolled in anticoag monitoring?  No    Scheduled for follow up?  Yes    Patients specialists  Other (comment)    Other specialists names  Bariatric Surgeon    Did you obtain your prescribed medications  Yes    Do you need help managing your prescriptions or medications  No    Is transportation to your appointment needed  Yes    Specify why  She does not drive    I have advised the patient to call PCP with any new or worsening symptoms  Leno Huizar LPN    Living Arrangements  Children    Support System  Family    The type of support provided  Physical; Emotional    Do you have social support  Yes, as much as I need    Are you recieving any outpatient services  No    Are you recieving home care services  Yes    Types of home care services  Nurse visit; Home PT; Other (comment)    Comment  OT    Have you fallen in the last 12 months  No    Interperter language line needed  No    Counseling  Family  Umer Delaney    Counseling topics  Activities of daily living; Importance of RX compliance; patient and family education; instructions for management; Risk factor reduction; Home health agency benefits    Comments  Zulma's son Rhett states that she is doing ok. He lives with her. She still has some mild dizziness but  no worsening s/s. He knows she should go to the ER if any chest pain, dyspnea, severe pain, etc Leno Huizar LPN        As per TCM.  She was admitted with abdominal pain and was found to have marginal ulcer with perforation and pneumoperitoneum.  S/p surgical repair, then was subsequently readmitted with nausea and vomitting, presumably from the antibiotics.  She is starting to feel a little better, still has some nausea but it is improving.  She is eating soft, bland foods.  Has [facility] coming to the house.  Feels very weak but will be starting home PT.  She is feeling depressed but continues to have issues with her children and has spoken to her therapist about this. Has upcoming appointment with psychiatry and denies SI/HI.    Review of Systems   Constitutional:  Positive for fatigue. Negative for chills and fever.   HENT:  Negative for ear pain and sore throat.    Eyes:  Negative for pain and visual disturbance.   Respiratory:  Negative for cough and shortness of breath.    Cardiovascular:  Negative for chest pain and palpitations.   Gastrointestinal:  Positive for nausea. Negative for abdominal distention, abdominal pain and vomiting.   Genitourinary:  Negative for dysuria and hematuria.   Musculoskeletal:  Negative for arthralgias and back pain.   Skin:  Negative for color change and rash.   Neurological:  Negative for seizures and syncope.   Psychiatric/Behavioral:  Positive for dysphoric mood.    All other systems reviewed and are negative.    Objective   /60   Pulse 60   Temp 98.3 °F (36.8 °C)   Resp 18   Ht 5' (1.524 m)   Wt 63.4 kg (139 lb 12.8 oz)   LMP  (LMP Unknown)   BMI 27.30 kg/m²     Physical Exam  Constitutional:       General: She is not in acute distress.     Appearance: She is well-developed. She is not diaphoretic.   HENT:      Head: Normocephalic and atraumatic.      Right Ear: External ear normal.      Left Ear: External ear normal.      Nose: Nose normal.   Eyes:      Pupils: Pupils  are equal, round, and reactive to light.   Neck:      Thyroid: No thyromegaly.      Vascular: No JVD.   Cardiovascular:      Rate and Rhythm: Regular rhythm.      Heart sounds: No murmur heard.     No friction rub. No gallop.   Pulmonary:      Effort: Pulmonary effort is normal.      Breath sounds: Normal breath sounds. No wheezing or rales.   Abdominal:      General: Bowel sounds are normal. There is no distension.      Palpations: Abdomen is soft.      Tenderness: There is no abdominal tenderness.   Musculoskeletal:         General: Normal range of motion.      Cervical back: Normal range of motion and neck supple.   Skin:     General: Skin is warm and dry.      Findings: No rash.   Neurological:      Mental Status: She is alert and oriented to person, place, and time.      Cranial Nerves: No cranial nerve deficit.      Sensory: No sensory deficit.      Motor: No abnormal muscle tone.      Coordination: Coordination normal.      Deep Tendon Reflexes: Reflexes normal.   Psychiatric:         Mood and Affect: Mood is depressed.         Behavior: Behavior normal.         Thought Content: Thought content normal.         Judgment: Judgment normal.     Medications have been reviewed by provider in current encounter

## 2025-01-03 ENCOUNTER — PATIENT OUTREACH (OUTPATIENT)
Dept: CASE MANAGEMENT | Facility: OTHER | Age: 75
End: 2025-01-03

## 2025-01-03 NOTE — LETTER
Date: 01/03/25    Dear Zulma Marcelo,   My name is Mary Beth Birmingham. I am a registered nurse care manager working with   [facility]  [address] [place] [id number] [phone].   I have not been able to reach you and would like to set a time that I can talk with you over the phone.  My work is to help patients that have complex medical conditions get the care they need. This includes patients who may have been in the hospital or emergency room.     Please call me with any questions you may have. I look forward to speaking with you.  Sincerely,  Mary Beth Birmingham  [phone]  Outpatient Care Manager  Copy:  (primary care physician name and address)

## 2025-01-03 NOTE — PROGRESS NOTES
Outpatient Care Management Note:    Voice mail message left for Zulma, with my contact information, introducing myself and requesting a call back.  (2nd attempt)    Unable to reach letter sent via my chart.

## 2025-01-06 ENCOUNTER — TELEMEDICINE (OUTPATIENT)
Dept: BEHAVIORAL/MENTAL HEALTH CLINIC | Facility: CLINIC | Age: 75
End: 2025-01-06
Payer: MEDICARE

## 2025-01-06 DIAGNOSIS — F33.41 RECURRENT MAJOR DEPRESSIVE DISORDER, IN PARTIAL REMISSION (HCC): Primary | ICD-10-CM

## 2025-01-06 PROCEDURE — 90834 PSYTX W PT 45 MINUTES: CPT | Performed by: SOCIAL WORKER

## 2025-01-06 NOTE — PSYCH
Virtual Regular Visit    Verification of patient location:    Patient is located at Home in the following state in which I hold an active license [place]      Assessment/Plan:    Problem List Items Addressed This Visit    None  Visit Diagnoses         Recurrent major depressive disorder, in partial remission (HCC)    -  Primary            Goals addressed in session: Goal 1     Depression Follow-up Plan Completed: No    Reason for visit is No chief complaint on file.       Encounter provider Vannesa Stone LCSW      Recent Visits  Date Type Provider Dept   01/02/25 Office Visit Lydia Cruz MD [facility]   Showing recent visits within past 7 days and meeting all other requirements  Today's Visits  Date Type Provider Dept   01/06/25 Telemedicine Vannesa Stone LCSW [facility]   Showing today's visits and meeting all other requirements  Future Appointments  No visits were found meeting these conditions.  Showing future appointments within next 150 days and meeting all other requirements       The patient was identified by name and date of birth. Zulma Marcelo was informed that this is a telemedicine visit and that the visit is being conducted throughthe Epic Embedded platform. She agrees to proceed..  My office door was closed. No one else was in the room.  She acknowledged consent and understanding of privacy and security of the video platform. The patient has agreed to participate and understands they can discontinue the visit at any time.    Patient is aware this is a billable service.     Subjective  Zulma Marcelo is a 74 y.o. female  .      HPI     Past Medical History:   Diagnosis Date    Acid reflux     Anxiety     Arthritis     Asthma     Cardiac disease     Chronic kidney disease     passed on own kidney stone    Depression     Diverticulitis     GERD (gastroesophageal reflux disease)     Hayfever     [place] (hard of hearing)     bilateral hearing aids     Hyperlipemia     Hypertension     Panic attack     Tachycardia        Past Surgical History:   Procedure Laterality Date    ABLATION OF DYSRHYTHMIC FOCUS      APPENDECTOMY      CHOLECYSTECTOMY      open    FRACTURE SURGERY      ORIF fx (L) tibia, fibula 2009    GASTRIC BYPASS OPEN      HYSTERECTOMY      MT LAPS ABD PRTM&OMENTUM DX W/WO SPEC BR/WA SPX N/A 12/16/2024    Procedure: LAPAROSCOPY DIAGNOSTIC,  LYSIS OF ADHESIONS, REPAIR PERFORATED MARGINAL ULCER, INTRA-OP EGD;  Surgeon: Alex Mohr MD;  Location: [facility] MAIN OR;  Service: Bariatrics    MT XCAPSL CTRC RMVL INSJ IO LENS PROSTH W/O ECP Left 4/18/2016    Procedure: EXTRACTION EXTRACAPSULAR CATARACT PHACO INTRAOCULAR LENS (IOL);  Surgeon: Dane Wells MD;  Location: [facility] MAIN OR;  Service: Ophthalmology    MT XCAPSL CTRC RMVL INSJ IO LENS PROSTH W/O ECP Right 3/1/2021    Procedure: EXTRACTION EXTRACAPSULAR CATARACT PHACO INTRAOCULAR LENS (IOL);  Surgeon: Dane Wells MD;  Location: [facility] MAIN OR;  Service: Ophthalmology    TONSILECTOMY AND ADNOIDECTOMY         Current Outpatient Medications   Medication Sig Dispense Refill    albuterol (PROVENTIL HFA,VENTOLIN HFA) 90 mcg/act inhaler Inhale 2 puffs every 6 (six) hours as needed for wheezing 20.4 g 3    allopurinol (ZYLOPRIM) 100 mg tablet TAKE ONE TABLET BY MOUTH EVERY DAY 90 tablet 1    amLODIPine (NORVASC) 10 mg tablet Take 1 tablet (10 mg total) by mouth daily 90 tablet 3    amLODIPine (NORVASC) 10 mg tablet Take 1 tablet (10 mg total) by mouth daily 30 tablet 0    buPROPion (WELLBUTRIN XL) 150 mg 24 hr tablet Take 1 tablet (150 mg total) by mouth daily 90 tablet 1    busPIRone (BUSPAR) 5 mg tablet Take 1 tablet (5 mg total) by mouth 3 (three) times a day 270 tablet 1    dextromethorphan-guaifenesin (MUCINEX DM)  MG per 12 hr tablet Take 1 tablet by mouth every 12 (twelve) hours as needed for cough 30 tablet 0    Diclofenac Sodium (VOLTAREN) 1 % Apply 2 g topically 4 (four) times a day 100 g 3  "   donepezil (ARICEPT) 10 mg tablet Take 1 tablet (10 mg total) by mouth daily at bedtime 90 tablet 3    donepezil (ARICEPT) 10 mg tablet Take 1 tablet (10 mg total) by mouth daily at bedtime 90 tablet 3    escitalopram (LEXAPRO) 20 mg tablet Take 1 tablet (20 mg total) by mouth daily 90 tablet 1    fluticasone (FLONASE) 50 mcg/act nasal spray 1 spray into each nostril 2 (two) times a day 9.9 mL 1    Ketotifen Fumarate (ZADITOR) 0.035 % ophthalmic solution Administer 1 drop to both eyes 2 (two) times a day 3 mL 1    nebivolol (BYSTOLIC) 5 mg tablet Take 1 tablet (5 mg total) by mouth daily 90 tablet 1    ondansetron (ZOFRAN-ODT) 4 mg disintegrating tablet Take 1 tablet (4 mg total) by mouth every 6 (six) hours as needed for nausea for up to 3 days 20 tablet 0    pantoprazole (PROTONIX) 40 mg tablet Take 1 tablet (40 mg total) by mouth 2 (two) times a day 60 tablet 0    sucralfate (CARAFATE) 1 g tablet Take 1 tablet (1 g total) by mouth 4 (four) times a day (before meals and at bedtime) 120 tablet 3    traZODone (DESYREL) 100 mg tablet Take 1 tablet (100 mg total) by mouth daily at bedtime 30 tablet 1     No current facility-administered medications for this visit.        Allergies   Allergen Reactions    Augmentin [Amoxicillin-Pot Clavulanate] GI Intolerance, Other (See Comments) and Hives     VOMITING  VOMITING  Reaction Date: [id number];     Sulfa Antibiotics Itching, Other (See Comments) and Hives     RASH, ITCHY  RASH, ITCHY    Morphine Other (See Comments)     \"DOESN'T WORK\"    Latex Rash     Unsure if this is an allergy       Review of Systems    Video Exam    There were no vitals filed for this visit.    Physical Exam     Visit Time    Visit Start Time: 10:00  Visit Stop Time: 10:50  Total Visit Duration:  50 minutes        Behavioral Health Psychotherapy Progress Note    Psychotherapy Provided: Individual Psychotherapy     1. Recurrent major depressive disorder, in partial remission (HCC)            Goals " "addressed in session: Goal 1     DATA: Zulma reports feeling well since our last session. She shared that she was readmitted to the hospital for a time being due to persistent vomiting but is now feeling better. She reports getting consistent rest and as such, her mind is much clearer. Although she reports disappointment in her children, Yin and Ponce, she is confident in that, \"it is what it is and I cannot allow for this feeling to interfere with my ability to take care of myself\". She attributed much of this changed perspective in getting adequate rest, feeling safe in her own home, and have continued support from this office, Lidia Sinclair, and now visiting nurse services. She reported feeling focused on getting her strength back and practicing physical therapy exercises. She denied any current safety concerns. Confirmed next appointment in 1 week.   During this session, this clinician used the following therapeutic modalities: Supportive Psychotherapy    Substance Abuse was not addressed during this session. If the client is diagnosed with a co-occurring substance use disorder, please indicate any changes in the frequency or amount of use: NA. Stage of change for addressing substance use diagnoses: No substance use/Not applicable    ASSESSMENT:  Zulma Marcelo presents with a Euthymic/ normal mood.     her affect is Normal range and intensity, which is congruent, with her mood and the content of the session. The client has made progress on their goals.    During this session the client was oriented to person, place, and time. The client was engaged in the session. The client was able to sustain direct eye contact and was without psychomotor agitation. The client's thought processes were linear and clear.   Zulma Marcelo presents with a none risk of suicide, none risk of self-harm, and none risk of harm to others.    For any risk assessment that surpasses a \"low\" rating, a safety plan must be developed.    A " safety plan was indicated: no  If yes, describe in detail NA    PLAN: Between sessions, Zulma Marcelo will take medication as prescribed and practice positive coping strategies as needed . At the next session, the therapist will use Supportive Psychotherapy to address stressors.    Behavioral Health Treatment Plan and Discharge Planning: Zulma Marcelo is aware of and agrees to continue to work on their treatment plan. They have identified and are working toward their discharge goals. yes    Depression Follow-up Plan Completed: Not applicable    Visit start and stop times:    01/06/25  Start Time: 1000  Stop Time: 1050  Total Visit Time: 50 minutes

## 2025-01-06 NOTE — BH TREATMENT PLAN
"Outpatient Behavioral Health Psychotherapy Treatment Plan    Zulma Marcelo  1950     Date of Initial Psychotherapy Assessment: 2/12/2024   Date of Current Treatment Plan: 01/06/25  Treatment Plan Target Date: 7/6/2025  Treatment Plan Expiration Date: 7/6/2025    Diagnosis:   1. Recurrent major depressive disorder, in partial remission (HCC)            Area(s) of Need: Depression    Long Term Goal 1 (in the client's own words): \"Learn ways to cope\".    Stage of Change: Action    Target Date for completion: 2/12/2025     Anticipated therapeutic modalities: Psychoeducation, motivational interviewing, CBT, ACT, DBT, somatic exercises, mindfulness skills training, and supportive psychotherapy.      People identified to complete this goal: Zulma Marcelo (client) and Vannesa Stone (clinician)      Objective 1: (identify the means of measuring success in meeting the objective): Use session time to identify and explore positive supports to reduce daily overwhelm. REVIEWED 8/26/2024: Zulma has been working to build upon different coping skills such as coloring, decorating, decluttering, and working with her  to help sort through and organize financial matters. This is helping her greatly and is overall doing better. She would like to learn additional ways to cope however stating that she would like to expand on her tools. REVIEWED 1/6/2025: Zulma has consistently verbalized increase hope and tolerance to everyday life stressors for several months now. She has however recently suffered a major setback having been in the hospital for a perforated ulcer and no longer being on speaking terms with Yin, Ponce, or Chris. As such her depression symptoms have increased and she is now needing to restabilize utilizing her tools.       Objective 2: (identify the means of measuring success in meeting the objective): Use session time to acknowledge and work through automatic negative thoughts which perpetuate " "feelings of worthlessness and hopelessness, and reframe/correct these thoughts with more positive and truthful depictions. REVIEWED 8/26/2024: Zulma has worked to identify automatic negative thoughts which perpetuate low self worth. She is able to clearly label them as they arise and is working on reframing how she sees herself through a more truthful and accepting lens. Continue goal. REVIEWED 1/6/2025: Zulma is able to correct automatic negative thoughts in the moment for the most part but seeing as how she is recently set back by her medical and family issues, she is struggling to do this on a more frequent basis. Will need ongoing support to help work through dread.        I am currently under the care of a [facility] psychiatric provider: yes    My [facility] psychiatric provider is: Alis Kearns PA-C    I am currently taking psychiatric medications: Yes, as prescribed    I feel that I will be ready for discharge from mental health care when I reach the following (measurable goal/objective): \"I don't think I'll ever be. It's part of what I need to do\".    For children and adults who have a legal guardian:   Has there been any change to custody orders and/or guardianship status? NA. If yes, attach updated documentation.    I have created my Crisis Plan and have been offered a copy of this plan    Behavioral Health Treatment Plan [facility]: Diagnosis and Treatment Plan explained to Zulma Marcelo acknowledges an understanding of their diagnosis. Zulma Marcelo agrees to this treatment plan.    I have been offered a copy of this Treatment Plan. yes        "

## 2025-01-07 ENCOUNTER — TELEPHONE (OUTPATIENT)
Age: 75
End: 2025-01-07

## 2025-01-07 NOTE — TELEPHONE ENCOUNTER
Patient called to follow up on sleep study that was done in hospital.No results noted.Please review. Once reviewed please advise patient. Thank you.

## 2025-01-10 NOTE — TELEPHONE ENCOUNTER
A.  Relayed results to (patient/patient representative as listed on communication consent form) as per provider message. Patient/Patient Representative expressed understanding and had the following question(s): Patient would like to know if that means she does not need a cpap or any other testing, Please advise.     B. Route to OFFICE CLINICAL POOL for follow-up with provider.

## 2025-01-13 ENCOUNTER — TELEMEDICINE (OUTPATIENT)
Dept: BEHAVIORAL/MENTAL HEALTH CLINIC | Facility: CLINIC | Age: 75
End: 2025-01-13
Payer: MEDICARE

## 2025-01-13 DIAGNOSIS — F33.41 RECURRENT MAJOR DEPRESSIVE DISORDER, IN PARTIAL REMISSION (HCC): Primary | ICD-10-CM

## 2025-01-13 PROCEDURE — 90834 PSYTX W PT 45 MINUTES: CPT | Performed by: SOCIAL WORKER

## 2025-01-13 NOTE — TELEPHONE ENCOUNTER
That can be a side effect of the amlodipine, and water pills would not help it.  She can elevate her legs or use support hose.

## 2025-01-13 NOTE — TELEPHONE ENCOUNTER
I spoke to the patient and informed her. She stated that she was going to call Dr Cruz, because she stated she started Amlodipine and has been retaining a lot of water and her ankles are swollen. She is wondering if she needs to be put on a water pill?    Nena Roque, CECILIA

## 2025-01-14 DIAGNOSIS — F51.01 PRIMARY INSOMNIA: ICD-10-CM

## 2025-01-14 DIAGNOSIS — K28.9 MARGINAL ULCER: ICD-10-CM

## 2025-01-14 DIAGNOSIS — B35.1 ONYCHOMYCOSIS: ICD-10-CM

## 2025-01-14 RX ORDER — TRAZODONE HYDROCHLORIDE 100 MG/1
100 TABLET ORAL
Qty: 30 TABLET | Refills: 1 | Status: SHIPPED | OUTPATIENT
Start: 2025-01-14

## 2025-01-15 RX ORDER — TERBINAFINE HYDROCHLORIDE 250 MG/1
TABLET ORAL
Qty: 15 TABLET | Refills: 0 | Status: SHIPPED | OUTPATIENT
Start: 2025-01-15 | End: 2025-02-21

## 2025-01-15 RX ORDER — PANTOPRAZOLE SODIUM 40 MG/1
40 TABLET, DELAYED RELEASE ORAL 2 TIMES DAILY
Qty: 60 TABLET | Refills: 0 | OUTPATIENT
Start: 2025-01-15

## 2025-01-17 ENCOUNTER — PATIENT OUTREACH (OUTPATIENT)
Dept: CASE MANAGEMENT | Facility: OTHER | Age: 75
End: 2025-01-17

## 2025-01-19 NOTE — PSYCH
"Behavioral Health Psychotherapy Progress Note    Psychotherapy Provided: Individual Psychotherapy     1. Recurrent major depressive disorder, in partial remission (HCC)            Goals addressed in session: Goal 1     DATA: Zulma reports feeling well stating that she is feeling a lot better since our last session. She is feeling stronger and very supported by her son, visiting nurse, Lidia Sinclair CARLEE, and this office. As a result her mood is stable and her outlook is optimistic. She reports trying to keep up with the various therapies that she has been assigned to. No new symptoms or safety concerns reported.   During this session, this clinician used the following therapeutic modalities: Client-centered Therapy    Substance Abuse was not addressed during this session. If the client is diagnosed with a co-occurring substance use disorder, please indicate any changes in the frequency or amount of use: NA. Stage of change for addressing substance use diagnoses: No substance use/Not applicable    ASSESSMENT:  Zulma Marcelo presents with a Euthymic/ normal mood.     her affect is Normal range and intensity, which is congruent, with her mood and the content of the session. The client has made progress on their goals.    During this session the client was oriented to person, place, and time. The client was engaged in the session. The client was able to sustain direct eye contact and was without psychomotor agitation. The client's thought processes were linear and clear.   Zulma Marcelo presents with a none risk of suicide, none risk of self-harm, and none risk of harm to others.    For any risk assessment that surpasses a \"low\" rating, a safety plan must be developed.    A safety plan was indicated: no  If yes, describe in detail NA    PLAN: Between sessions, Zulma Marcelo will take medication as prescribed and practice positive coping strategies as needed . At the next session, the therapist will use Client-centered " Therapy to address stressors.    Behavioral Health Treatment Plan and Discharge Planning: Zulma Marcelo is aware of and agrees to continue to work on their treatment plan. They have identified and are working toward their discharge goals. no    Depression Follow-up Plan Completed: Not applicable    Visit start and stop times:    01/19/25  Start Time: 1000  Stop Time: 1050  Total Visit Time: 50 minutes

## 2025-01-27 ENCOUNTER — TELEMEDICINE (OUTPATIENT)
Dept: PSYCHIATRY | Facility: CLINIC | Age: 75
End: 2025-01-27
Payer: MEDICARE

## 2025-01-27 DIAGNOSIS — F41.1 GENERALIZED ANXIETY DISORDER: ICD-10-CM

## 2025-01-27 DIAGNOSIS — F33.1 MODERATE EPISODE OF RECURRENT MAJOR DEPRESSIVE DISORDER (HCC): Primary | Chronic | ICD-10-CM

## 2025-01-27 DIAGNOSIS — F51.01 PRIMARY INSOMNIA: ICD-10-CM

## 2025-01-27 PROCEDURE — 99214 OFFICE O/P EST MOD 30 MIN: CPT | Performed by: PHYSICIAN ASSISTANT

## 2025-01-27 PROCEDURE — G2211 COMPLEX E/M VISIT ADD ON: HCPCS | Performed by: PHYSICIAN ASSISTANT

## 2025-01-27 RX ORDER — TRAZODONE HYDROCHLORIDE 100 MG/1
100 TABLET ORAL
Qty: 90 TABLET | Refills: 1 | Status: SHIPPED | OUTPATIENT
Start: 2025-01-27 | End: 2025-07-26

## 2025-01-27 RX ORDER — BUSPIRONE HYDROCHLORIDE 5 MG/1
5 TABLET ORAL 3 TIMES DAILY
Qty: 270 TABLET | Refills: 1 | Status: SHIPPED | OUTPATIENT
Start: 2025-01-27 | End: 2025-07-26

## 2025-01-27 RX ORDER — BUPROPION HYDROCHLORIDE 150 MG/1
150 TABLET ORAL DAILY
Qty: 90 TABLET | Refills: 1 | Status: SHIPPED | OUTPATIENT
Start: 2025-01-27 | End: 2025-07-26

## 2025-01-27 RX ORDER — ESCITALOPRAM OXALATE 20 MG/1
20 TABLET ORAL DAILY
Qty: 90 TABLET | Refills: 1 | Status: SHIPPED | OUTPATIENT
Start: 2025-01-27 | End: 2025-07-26

## 2025-01-27 NOTE — ASSESSMENT & PLAN NOTE
Improving - continue escitalopram 20 mg qd, bupropion  mg qAM, buspirone 5 mg TID, trazodone 100 mg qhs; continue talk therapy; f/u in 2 months

## 2025-01-27 NOTE — PSYCH
This note was not shared with the patient due to reasonable likelihood of causing patient harm    PROGRESS NOTE        [facility]      Name and Date of Birth:  Zulma Marcelo 74 y.o. 1950 MRN: [id number]    Insurance: Payor: MEDICARE / Plan: MEDICARE A AND B / Product Type: Medicare A & B Fee for Service /     Virtual Regular Visit    Visit Date: 01/27/25     Verification of patient location:    Patient is located at Home in the following state in which I hold an active license [place]    Reason for visit is   Chief Complaint   Patient presents with    Follow-up    Medication Management    Virtual Regular Visit     Encounter provider Alis Kearns    Provider located at [address]  [facility] [address] [phone]    The patient was identified by name and date of birth. Zulma Marcelo was informed that this is a telemedicine visit and that the visit is being conducted throughthe Epic Embedded platform. She agrees to proceed.  My office door was closed. No one else was in the room. She acknowledged consent and understanding of privacy and security of the video platform. The patient has agreed to participate and understands they can discontinue the visit at any time.    Patient is aware this is a billable service.     VIRTUAL VISIT DISCLAIMER    Zulma Marcelo verbally agrees to participate in Virtual Care Services. Pt is aware that Virtual Care Services could be limited without vital signs or the ability to perform a full hands-on physical exam. Zulma Marcelo understands she or the provider may request at any time to terminate the video visit and request the patient to seek care or treatment in person.   Assessment & Plan  Moderate episode of recurrent major depressive disorder (HCC)  Improving - continue escitalopram 20 mg qd, bupropion  mg qAM, buspirone 5 mg  "TID, trazodone 100 mg qhs; continue talk therapy; f/u in 2 months    Orders:    buPROPion (WELLBUTRIN XL) 150 mg 24 hr tablet; Take 1 tablet (150 mg total) by mouth daily    busPIRone (BUSPAR) 5 mg tablet; Take 1 tablet (5 mg total) by mouth 3 (three) times a day    escitalopram (LEXAPRO) 20 mg tablet; Take 1 tablet (20 mg total) by mouth daily    Generalized anxiety disorder  Improving - continue escitalopram 20 mg qd, bupropion  mg qAM, buspirone 5 mg TID, trazodone 100 mg qhs; continue talk therapy; f/u in 2 months    Orders:    buPROPion (WELLBUTRIN XL) 150 mg 24 hr tablet; Take 1 tablet (150 mg total) by mouth daily    busPIRone (BUSPAR) 5 mg tablet; Take 1 tablet (5 mg total) by mouth 3 (three) times a day    escitalopram (LEXAPRO) 20 mg tablet; Take 1 tablet (20 mg total) by mouth daily    Primary insomnia  Improving - continue trazodone 100 mg qhs; f/u in 2 months    Orders:    traZODone (DESYREL) 100 mg tablet; Take 1 tablet (100 mg total) by mouth daily at bedtime       SUBJECTIVE:    Zulma Marcelo is a joni 74 y.o. female with a history of Major Depressive Disorder, Generalized Anxiety Disorder, and insomnia who presents today for follow-up and medication management. Since her last visit she has overall been \"a lot better\". She reports the increase in trazodone was helpful and well tolerated. She also feels that the depression and anxiety is improved. She also reports that health-wise she is doing better. She states that since she is doing better in terms of health that her energy levels and appetite have also improved. She has started dating again and is seeing a gentleman named Nick for companionship.     She denies any suicidal ideation, intent or plan at present; denies any homicidal ideation, intent or plan at present.    She denies any auditory hallucinations, denies any visual hallucinations, denies any delusions.    She denies any side effects from current psychiatric medications.    HPI " ROS Appetite Changes and Sleep:     She reports adequate number of sleep hours, decreased appetite, adequate energy level    Current Rating Scores:     None completed today.    Review Of Systems:    Mood euthymic   Behavior appropriate, cooperative, and calm   Thought Content normal   General normal    Personality no change in personality   Other Psych Symptoms normal   Constitutional as noted in HPI   ENT as noted in HPI   Cardiovascular as noted in HPI   Respiratory as noted in HPI   Gastrointestinal as noted in HPI   Genitourinary as noted in HPI   Musculoskeletal as noted in HPI   Integumentary as noted in HPI   Neurological as noted in HPI   Endocrine negative   Other Symptoms none, all other systems are negative     Family Psychiatric History:     Family History   Problem Relation Age of Onset    Heart disease Mother     Stroke Son     Stroke Maternal Grandfather     Breast cancer Daughter 40     Social/Substance Abuse History:    Social History     Socioeconomic History    Marital status:      Spouse name: Not on file    Number of children: Not on file    Years of education: Not on file    Highest education level: Not on file   Occupational History    Not on file   Tobacco Use    Smoking status: Former     Current packs/day: 0.00     Average packs/day: 1 pack/day for 67.1 years (67.1 ttl pk-yrs)     Types: Cigarettes     Start date: 6/15/1956     Quit date: 2023     Years since quittin.5     Passive exposure: Past    Smokeless tobacco: Never   Vaping Use    Vaping status: Never Used   Substance and Sexual Activity    Alcohol use: Yes     Comment: rarely    Drug use: Yes     Types: Marijuana    Sexual activity: Never   Other Topics Concern    Not on file   Social History Narrative    Not on file     Social Drivers of Health     Financial Resource Strain: High Risk (2023)    Overall Financial Resource Strain (CARDIA)     Difficulty of Paying Living Expenses: Hard   Food Insecurity: No Food  Insecurity (2024)    Nursing - Inadequate Food Risk Classification     Worried About Running Out of Food in the Last Year: Never true     Ran Out of Food in the Last Year: Never true     Ran Out of Food in the Last Year: Never true   Recent Concern: Food Insecurity - Food Insecurity Present (2024)    Nursing - Inadequate Food Risk Classification     Worried About Running Out of Food in the Last Year: Never true     Ran Out of Food in the Last Year: Never true     Ran Out of Food in the Last Year: Often true   Transportation Needs: No Transportation Needs (2024)    Nursing - Transportation Risk Classification     Lack of Transportation: Not on file     Lack of Transportation: No   Physical Activity: Not on file   Stress: Not on file   Social Connections: Not on file   Intimate Partner Violence: Unknown (2024)    Nursing IPS     Feels Physically and Emotionally Safe: Not on file     Physically Hurt by Someone: Not on file     Humiliated or Emotionally Abused by Someone: Not on file     Physically Hurt by Someone: No     Hurt or Threatened by Someone: No   Housing Stability: Unknown (2024)    Nursing: Inadequate Housing Risk Classification     Has Housing: Not on file     Worried About Losing Housing: Not on file     Unable to Get Utilities: Not on file     Unable to Pay for Housing in the Last Year: No     Has Housin     The following portions of the patient's history were reviewed and updated as appropriate: past family history, past medical history, past social history, past surgical history and problem list.    OBJECTIVE:     Mental Status Evaluation:  Appearance:  dressed appropriately, adequate grooming, looks stated age   Behavior:  pleasant, cooperative, calm, interacts appropriately with this writer   Speech:  normal rate, normal volume, normal pitch   Mood:  improved, euthymic   Affect:  brighter, mood-congruent   Thought Process:  organized, logical, coherent   Associations:  intact associations   Thought Content:  no overt delusions, no paranoia noted on exam   Perceptual Disturbances: no auditory hallucinations, no visual hallucinations   Risk Potential: Suicidal ideation - None  Homicidal ideation - None  Potential for aggression - No   Sensorium:  oriented to person, place, and time/date   Memory:  recent and remote memory grossly intact   Consciousness:  alert and awake   Attention/Concentration: attention span and concentration are age appropriate   Insight:  age appropriate   Judgment: age appropriate   Gait/Station: Unable to assess today due to virtual visit   Motor Activity: unable to assess today due to virtual visit     Laboratory Results: I have personally reviewed all pertinent laboratory/tests results    Suicide/Homicide Risk Assessment:    Risk of Harm to Self:  The following ratings are based on assessment at the time of the interview  Based on today's assessment, Zulma presents the following risk of harm to self: none    Risk of Harm to Others:  The following ratings are based on assessment at the time of the interview  Based on today's assessment, Zulma presents the following risk of harm to others: none    The following interventions are recommended: no intervention changes needed    Assessment/Plan:      Thankfully she reports she is feeling much better than the last few visits and that she feels the medications are working well. No med changes advised at this time. She will continue to work with Vannesa Stone LCSW, as well. We have discussed her safety plan and she agrees that if she experience unsafe thoughts that she will reach out to her supports including this office, the suicide hotline, and emergency services if necessary. Zulma is aware of non-emergent and emergent mental health resources. They are able to contract for their own safety at this time.    Will follow up in 2 months. Patient is aware to call the office if questions or concerns arise sooner.       Diagnoses and all orders for this visit:    Moderate episode of recurrent major depressive disorder (HCC)  -     buPROPion (WELLBUTRIN XL) 150 mg 24 hr tablet; Take 1 tablet (150 mg total) by mouth daily  -     busPIRone (BUSPAR) 5 mg tablet; Take 1 tablet (5 mg total) by mouth 3 (three) times a day  -     escitalopram (LEXAPRO) 20 mg tablet; Take 1 tablet (20 mg total) by mouth daily    Generalized anxiety disorder  -     buPROPion (WELLBUTRIN XL) 150 mg 24 hr tablet; Take 1 tablet (150 mg total) by mouth daily  -     busPIRone (BUSPAR) 5 mg tablet; Take 1 tablet (5 mg total) by mouth 3 (three) times a day  -     escitalopram (LEXAPRO) 20 mg tablet; Take 1 tablet (20 mg total) by mouth daily    Primary insomnia  -     traZODone (DESYREL) 100 mg tablet; Take 1 tablet (100 mg total) by mouth daily at bedtime        Treatment Recommendations/Precautions:    Continue current medications:     Wellbutrin 150 mg daily for depressive symptoms  BuSpar 5 mg 3 times daily for anxiety  Lexapro 20 mg daily for depressive symptoms  Trazodone 100 mg nightly for insomnia    Aware of 24 hour and weekend coverage for urgent situations accessed by calling [facility] main practice number  Medication management every 2 months  [facility]  I am scheduling this patient out for greater than 3 months: No    Medications Risks/Benefits      Risks, Benefits And Possible Side Effects Of Medications:    Risks, benefits, and possible side effects of medications explained to Zulma and she verbalizes understanding and agreement for treatment.    Controlled Medication Discussion:     Not applicable    Psychotherapy Provided:     Individual psychotherapy provided: No    Treatment Plan:    Completed and signed during the session: Not applicable - Treatment Plan to be completed by [facility] therapist    Visit Time    Visit Start Time:  11:30 AM  Visit End  Time:  11:45 AM  Total Visit Duration:  15 minutes    Alis Kearns 01/27/25

## 2025-01-28 DIAGNOSIS — K28.9 MARGINAL ULCER: ICD-10-CM

## 2025-01-28 RX ORDER — PANTOPRAZOLE SODIUM 40 MG/1
40 TABLET, DELAYED RELEASE ORAL 2 TIMES DAILY
Qty: 28 TABLET | Refills: 0 | Status: SHIPPED | OUTPATIENT
Start: 2025-01-28 | End: 2025-02-11

## 2025-01-28 RX ORDER — PANTOPRAZOLE SODIUM 40 MG/1
40 TABLET, DELAYED RELEASE ORAL 2 TIMES DAILY
Qty: 180 TABLET | Refills: 1 | Status: SHIPPED | OUTPATIENT
Start: 2025-01-28

## 2025-01-28 NOTE — TELEPHONE ENCOUNTER
Reason for call:   [x] Refill   [] Prior Auth  [x] Other: Pt needs SS sent to local and 90 to mail     Office:   [x] PCP/Provider -   [] Specialty/Provider -     Medication:     pantoprazole (PROTONIX) 40 mg Take 1 tablet (40 mg total) by mouth 2 (two) times a day            Pharmacy: You     Does the patient have enough for 3 days?   [] Yes   [x] No - Send as HP to POD

## 2025-01-30 ENCOUNTER — TELEMEDICINE (OUTPATIENT)
Dept: BEHAVIORAL/MENTAL HEALTH CLINIC | Facility: CLINIC | Age: 75
End: 2025-01-30
Payer: MEDICARE

## 2025-01-30 DIAGNOSIS — F33.41 RECURRENT MAJOR DEPRESSIVE DISORDER, IN PARTIAL REMISSION (HCC): Primary | ICD-10-CM

## 2025-01-30 PROCEDURE — 90834 PSYTX W PT 45 MINUTES: CPT | Performed by: SOCIAL WORKER

## 2025-01-30 NOTE — PSYCH
Virtual Regular Visit    Verification of patient location:    Patient is located at Home in the following state in which I hold an active license [place]      Assessment/Plan:    Problem List Items Addressed This Visit    None  Visit Diagnoses         Recurrent major depressive disorder, in partial remission (HCC)    -  Primary            Goals addressed in session: Goal 1     Depression Follow-up Plan Completed: Not applicable    Reason for visit is No chief complaint on file.       Encounter provider Vannesa Stone LCSW      Recent Visits  No visits were found meeting these conditions.  Showing recent visits within past 7 days and meeting all other requirements  Today's Visits  Date Type Provider Dept   01/30/25 Telemedicine Vannesa Stone LCSW [facility] Therapist [place]   Showing today's visits and meeting all other requirements  Future Appointments  No visits were found meeting these conditions.  Showing future appointments within next 150 days and meeting all other requirements       The patient was identified by name and date of birth. Zulma Marcelo was informed that this is a telemedicine visit and that the visit is being conducted throughthe Epic Embedded platform. She agrees to proceed..  My office door was closed. No one else was in the room.  She acknowledged consent and understanding of privacy and security of the video platform. The patient has agreed to participate and understands they can discontinue the visit at any time.    Patient is aware this is a billable service.     Subjective  Zulma Marcelo is a 74 y.o. female  .      HPI     Past Medical History:   Diagnosis Date    Acid reflux     Anxiety     Arthritis     Asthma     Cardiac disease     Chronic kidney disease     passed on own kidney stone    Depression     Diverticulitis     GERD (gastroesophageal reflux disease)     Hayfever     [place] (hard of hearing)     bilateral hearing aids    Hyperlipemia     Hypertension      Panic attack     Tachycardia        Past Surgical History:   Procedure Laterality Date    ABLATION OF DYSRHYTHMIC FOCUS      APPENDECTOMY      CHOLECYSTECTOMY      open    FRACTURE SURGERY      ORIF fx (L) tibia, fibula 2009    GASTRIC BYPASS OPEN      HYSTERECTOMY      WA LAPS ABD PRTM&OMENTUM DX W/WO SPEC BR/WA SPX N/A 12/16/2024    Procedure: LAPAROSCOPY DIAGNOSTIC,  LYSIS OF ADHESIONS, REPAIR PERFORATED MARGINAL ULCER, INTRA-OP EGD;  Surgeon: Alex Mohr MD;  Location: [facility] MAIN OR;  Service: Bariatrics    WA XCAPSL CTRC RMVL INSJ IO LENS PROSTH W/O ECP Left 4/18/2016    Procedure: EXTRACTION EXTRACAPSULAR CATARACT PHACO INTRAOCULAR LENS (IOL);  Surgeon: Dane Wells MD;  Location: [facility] MAIN OR;  Service: Ophthalmology    WA XCAPSL CTRC RMVL INSJ IO LENS PROSTH W/O ECP Right 3/1/2021    Procedure: EXTRACTION EXTRACAPSULAR CATARACT PHACO INTRAOCULAR LENS (IOL);  Surgeon: Dane Wells MD;  Location: [facility] MAIN OR;  Service: Ophthalmology    TONSILECTOMY AND ADNOIDECTOMY         Current Outpatient Medications   Medication Sig Dispense Refill    albuterol (PROVENTIL HFA,VENTOLIN HFA) 90 mcg/act inhaler Inhale 2 puffs every 6 (six) hours as needed for wheezing 20.4 g 3    allopurinol (ZYLOPRIM) 100 mg tablet TAKE ONE TABLET BY MOUTH EVERY DAY 90 tablet 1    amLODIPine (NORVASC) 10 mg tablet Take 1 tablet (10 mg total) by mouth daily 90 tablet 3    amLODIPine (NORVASC) 10 mg tablet Take 1 tablet (10 mg total) by mouth daily 30 tablet 0    buPROPion (WELLBUTRIN XL) 150 mg 24 hr tablet Take 1 tablet (150 mg total) by mouth daily 90 tablet 1    busPIRone (BUSPAR) 5 mg tablet Take 1 tablet (5 mg total) by mouth 3 (three) times a day 270 tablet 1    dextromethorphan-guaifenesin (MUCINEX DM)  MG per 12 hr tablet Take 1 tablet by mouth every 12 (twelve) hours as needed for cough 30 tablet 0    Diclofenac Sodium (VOLTAREN) 1 % Apply 2 g topically 4 (four) times a day 100 g 3    donepezil (ARICEPT) 10 mg tablet  "Take 1 tablet (10 mg total) by mouth daily at bedtime 90 tablet 3    donepezil (ARICEPT) 10 mg tablet Take 1 tablet (10 mg total) by mouth daily at bedtime 90 tablet 3    escitalopram (LEXAPRO) 20 mg tablet Take 1 tablet (20 mg total) by mouth daily 90 tablet 1    fluticasone (FLONASE) 50 mcg/act nasal spray 1 spray into each nostril 2 (two) times a day 9.9 mL 1    Ketotifen Fumarate (ZADITOR) 0.035 % ophthalmic solution Administer 1 drop to both eyes 2 (two) times a day 3 mL 1    nebivolol (BYSTOLIC) 5 mg tablet Take 1 tablet (5 mg total) by mouth daily 90 tablet 1    ondansetron (ZOFRAN-ODT) 4 mg disintegrating tablet Take 1 tablet (4 mg total) by mouth every 6 (six) hours as needed for nausea for up to 3 days 20 tablet 0    pantoprazole (PROTONIX) 40 mg tablet Take 1 tablet (40 mg total) by mouth 2 (two) times a day 180 tablet 1    pantoprazole (PROTONIX) 40 mg tablet Take 1 tablet (40 mg total) by mouth 2 (two) times a day for 14 days 28 tablet 0    sucralfate (CARAFATE) 1 g tablet Take 1 tablet (1 g total) by mouth 4 (four) times a day (before meals and at bedtime) 120 tablet 3    terbinafine (LamISIL) 250 mg tablet 1 TAB BY MOUTH EVERY OTHER DAY. 15 tablet 0    traZODone (DESYREL) 100 mg tablet Take 1 tablet (100 mg total) by mouth daily at bedtime 90 tablet 1     No current facility-administered medications for this visit.        Allergies   Allergen Reactions    Augmentin [Amoxicillin-Pot Clavulanate] GI Intolerance, Other (See Comments) and Hives     VOMITING  VOMITING  Reaction Date: 07Dec2004;     Sulfa Antibiotics Itching, Other (See Comments) and Hives     RASH, ITCHY  RASH, ITCHY    Morphine Other (See Comments)     \"DOESN'T WORK\"    Latex Rash     Unsure if this is an allergy       Review of Systems    Video Exam    There were no vitals filed for this visit.    Physical Exam     Visit Time    Visit Start Time: 08:00  Visit Stop Time: 08:50  Total Visit Duration:  50 minutes          Behavioral Health " "Psychotherapy Progress Note    Psychotherapy Provided: Individual Psychotherapy     1. Recurrent major depressive disorder, in partial remission (HCC)            Goals addressed in session: Goal 1     DATA: Zulma reports feeling well overall. Continues to follow up with PT, OT, and VNA. Is complying with recommendations and keeping up with daily in-fina exercises. Has gone out shopping a couple of times and feels stronger every day. Has no current emotional complaints and does report feeling optimistic towards the future. Is also speaking to Yin again and taking things slow but steady. Will continue to keep up with self care strategies and will reach out to family for support as needed. Return in 2 weeks.   During this session, this clinician used the following therapeutic modalities: Supportive Psychotherapy    Substance Abuse was not addressed during this session. If the client is diagnosed with a co-occurring substance use disorder, please indicate any changes in the frequency or amount of use: NA. Stage of change for addressing substance use diagnoses: No substance use/Not applicable    ASSESSMENT:  Zulma Marcelo presents with a Euthymic/ normal mood.     her affect is Normal range and intensity, which is congruent, with her mood and the content of the session. The client has made progress on their goals.    take medication as prescribed and practice positive coping strategies as needed  Zulma Marcelo presents with a none risk of suicide, none risk of self-harm, and none risk of harm to others.    For any risk assessment that surpasses a \"low\" rating, a safety plan must be developed.    A safety plan was indicated: no  If yes, describe in detail NA    PLAN: Between sessions, Zulma Marcelo will take medication as prescribed and practice positive coping strategies as needed . At the next session, the therapist will use Supportive Psychotherapy to address stressors.    Behavioral Health Treatment Plan and " Discharge Planning: Zulma Marcelo is aware of and agrees to continue to work on their treatment plan. They have identified and are working toward their discharge goals. yes    Depression Follow-up Plan Completed: Not applicable    Visit start and stop times:    01/30/25  Start Time: 0800  Stop Time: 0850  Total Visit Time: 50 minutes

## 2025-02-06 ENCOUNTER — TELEPHONE (OUTPATIENT)
Age: 75
End: 2025-02-06

## 2025-02-06 NOTE — TELEPHONE ENCOUNTER
Left msg to call back and ask to be connected to [facility] office.Concerns about time of day for [employer] to transport.

## 2025-02-06 NOTE — TELEPHONE ENCOUNTER
Caller: Zulma Marcelo    Doctor: Dr. Garcia    Reason for call: Pt has an OV on 2/27/25 & [facility]    Please advise.    Call back#: [phone]

## 2025-02-10 ENCOUNTER — TELEMEDICINE (OUTPATIENT)
Dept: BEHAVIORAL/MENTAL HEALTH CLINIC | Facility: CLINIC | Age: 75
End: 2025-02-10
Payer: MEDICARE

## 2025-02-10 DIAGNOSIS — F33.41 RECURRENT MAJOR DEPRESSIVE DISORDER, IN PARTIAL REMISSION (HCC): Primary | ICD-10-CM

## 2025-02-10 PROCEDURE — 90834 PSYTX W PT 45 MINUTES: CPT | Performed by: SOCIAL WORKER

## 2025-02-10 NOTE — PSYCH
Virtual Regular Visit    Verification of patient location:    Patient is located at Home in the following state in which I hold an active license [place]      Assessment/Plan:    Problem List Items Addressed This Visit    None  Visit Diagnoses         Recurrent major depressive disorder, in partial remission (HCC)    -  Primary            Goals addressed in session: Goal 1     Depression Follow-up Plan Completed: Not applicable    Reason for visit is No chief complaint on file.       Encounter provider Vannesa Stone LCSW      Recent Visits  No visits were found meeting these conditions.  Showing recent visits within past 7 days and meeting all other requirements  Today's Visits  Date Type Provider Dept   02/10/25 Telemedicine Vannesa Stone LCSW [facility] Therapist [place]   Showing today's visits and meeting all other requirements  Future Appointments  No visits were found meeting these conditions.  Showing future appointments within next 150 days and meeting all other requirements       The patient was identified by name and date of birth. Zulma Marcelo was informed that this is a telemedicine visit and that the visit is being conducted throughthe Epic Embedded platform. She agrees to proceed..  My office door was closed. No one else was in the room.  She acknowledged consent and understanding of privacy and security of the video platform. The patient has agreed to participate and understands they can discontinue the visit at any time.    Patient is aware this is a billable service.     Subjective  Zulma Marcelo is a 74 y.o. female  .      HPI     Past Medical History:   Diagnosis Date    Acid reflux     Anxiety     Arthritis     Asthma     Cardiac disease     Chronic kidney disease     passed on own kidney stone    Depression     Diverticulitis     GERD (gastroesophageal reflux disease)     Hayfever     Native (hard of hearing)     bilateral hearing aids    Hyperlipemia     Hypertension      Panic attack     Tachycardia        Past Surgical History:   Procedure Laterality Date    ABLATION OF DYSRHYTHMIC FOCUS      APPENDECTOMY      CHOLECYSTECTOMY      open    FRACTURE SURGERY      ORIF fx (L) tibia, fibula 2009    GASTRIC BYPASS OPEN      HYSTERECTOMY      MN LAPS ABD PRTM&OMENTUM DX W/WO SPEC BR/WA SPX N/A 12/16/2024    Procedure: LAPAROSCOPY DIAGNOSTIC,  LYSIS OF ADHESIONS, REPAIR PERFORATED MARGINAL ULCER, INTRA-OP EGD;  Surgeon: Alex Mohr MD;  Location: [facility] MAIN OR;  Service: Bariatrics    MN XCAPSL CTRC RMVL INSJ IO LENS PROSTH W/O ECP Left 4/18/2016    Procedure: EXTRACTION EXTRACAPSULAR CATARACT PHACO INTRAOCULAR LENS (IOL);  Surgeon: Dane Wells MD;  Location: [facility] MAIN OR;  Service: Ophthalmology    MN XCAPSL CTRC RMVL INSJ IO LENS PROSTH W/O ECP Right 3/1/2021    Procedure: EXTRACTION EXTRACAPSULAR CATARACT PHACO INTRAOCULAR LENS (IOL);  Surgeon: Dane Wells MD;  Location: [facility] MAIN OR;  Service: Ophthalmology    TONSILECTOMY AND ADNOIDECTOMY         Current Outpatient Medications   Medication Sig Dispense Refill    albuterol (PROVENTIL HFA,VENTOLIN HFA) 90 mcg/act inhaler Inhale 2 puffs every 6 (six) hours as needed for wheezing 20.4 g 3    allopurinol (ZYLOPRIM) 100 mg tablet TAKE ONE TABLET BY MOUTH EVERY DAY 90 tablet 1    amLODIPine (NORVASC) 10 mg tablet Take 1 tablet (10 mg total) by mouth daily 90 tablet 3    amLODIPine (NORVASC) 10 mg tablet Take 1 tablet (10 mg total) by mouth daily 30 tablet 0    buPROPion (WELLBUTRIN XL) 150 mg 24 hr tablet Take 1 tablet (150 mg total) by mouth daily 90 tablet 1    busPIRone (BUSPAR) 5 mg tablet Take 1 tablet (5 mg total) by mouth 3 (three) times a day 270 tablet 1    dextromethorphan-guaifenesin (MUCINEX DM)  MG per 12 hr tablet Take 1 tablet by mouth every 12 (twelve) hours as needed for cough 30 tablet 0    Diclofenac Sodium (VOLTAREN) 1 % Apply 2 g topically 4 (four) times a day 100 g 3    donepezil (ARICEPT) 10 mg tablet  "Take 1 tablet (10 mg total) by mouth daily at bedtime 90 tablet 3    donepezil (ARICEPT) 10 mg tablet Take 1 tablet (10 mg total) by mouth daily at bedtime 90 tablet 3    escitalopram (LEXAPRO) 20 mg tablet Take 1 tablet (20 mg total) by mouth daily 90 tablet 1    fluticasone (FLONASE) 50 mcg/act nasal spray 1 spray into each nostril 2 (two) times a day 9.9 mL 1    Ketotifen Fumarate (ZADITOR) 0.035 % ophthalmic solution Administer 1 drop to both eyes 2 (two) times a day 3 mL 1    nebivolol (BYSTOLIC) 5 mg tablet Take 1 tablet (5 mg total) by mouth daily 90 tablet 1    ondansetron (ZOFRAN-ODT) 4 mg disintegrating tablet Take 1 tablet (4 mg total) by mouth every 6 (six) hours as needed for nausea for up to 3 days 20 tablet 0    pantoprazole (PROTONIX) 40 mg tablet Take 1 tablet (40 mg total) by mouth 2 (two) times a day 180 tablet 1    pantoprazole (PROTONIX) 40 mg tablet Take 1 tablet (40 mg total) by mouth 2 (two) times a day for 14 days 28 tablet 0    sucralfate (CARAFATE) 1 g tablet Take 1 tablet (1 g total) by mouth 4 (four) times a day (before meals and at bedtime) 120 tablet 3    terbinafine (LamISIL) 250 mg tablet 1 TAB BY MOUTH EVERY OTHER DAY. 15 tablet 0    traZODone (DESYREL) 100 mg tablet Take 1 tablet (100 mg total) by mouth daily at bedtime 90 tablet 1     No current facility-administered medications for this visit.        Allergies   Allergen Reactions    Augmentin [Amoxicillin-Pot Clavulanate] GI Intolerance, Other (See Comments) and Hives     VOMITING  VOMITING  Reaction Date: 07Dec2004;     Sulfa Antibiotics Itching, Other (See Comments) and Hives     RASH, ITCHY  RASH, ITCHY    Morphine Other (See Comments)     \"DOESN'T WORK\"    Latex Rash     Unsure if this is an allergy       Review of Systems    Video Exam    There were no vitals filed for this visit.    Physical Exam     Visit Time    Visit Start Time: 10:00  Visit Stop Time: 10:50  Total Visit Duration:  50 minutes          Behavioral Health " "Psychotherapy Progress Note    Psychotherapy Provided: Individual Psychotherapy     1. Recurrent major depressive disorder, in partial remission (HCC)            Goals addressed in session: Goal 1     DATA: Zulma reported feeling very well stating that she is feeling stronger physically and mentally every day. She continues to receive assistance from a visiting nurse as well as Lidia Sinclair. Through this she has been able to connect with various rehab therapies as well as  who will help her ammend her will. She reports feeling more at ease with this as a result. She and Yin are on speaking terms and did go out to lunch last week. This was meaningful for Zulma because she wants a relationship with Yin. She reports being accepting of the fact that it will remain somewhat superficial but that is ok. No safety concerns.   During this session, this clinician used the following therapeutic modalities: Supportive Psychotherapy    Substance Abuse was not addressed during this session. If the client is diagnosed with a co-occurring substance use disorder, please indicate any changes in the frequency or amount of use: NA. Stage of change for addressing substance use diagnoses: No substance use/Not applicable    ASSESSMENT:  Zulma Marcelo presents with a Euthymic/ normal mood.     her affect is Normal range and intensity, which is congruent, with her mood and the content of the session. The client has made progress on their goals.    During this session the client was oriented to person, place, and time. The client was engaged in the session. The client was able to sustain direct eye contact and was without psychomotor agitation. The client's thought processes were linear and clear.   Zulma Marcelo presents with a none risk of suicide, none risk of self-harm, and none risk of harm to others.    For any risk assessment that surpasses a \"low\" rating, a safety plan must be developed.    A safety plan was indicated: " no  If yes, describe in detail NA    PLAN: Between sessions, Zulma Marcelo will take medication as prescribed and practice positive coping strategies as needed . At the next session, the therapist will use Supportive Psychotherapy to address stressors.    Behavioral Health Treatment Plan and Discharge Planning: Zulma Marcelo is aware of and agrees to continue to work on their treatment plan. They have identified and are working toward their discharge goals. yes    Depression Follow-up Plan Completed: Not applicable    Visit start and stop times:    02/10/25  Start Time: 1000  Stop Time: 1050  Total Visit Time: 50 minutes

## 2025-02-10 NOTE — BH CRISIS PLAN
"Client Name: Zulma Marcelo       Client YOB: 1950    [facility] Safety Plan      Creation Date: 2/12/24 Update Date: 2/10/26   Created By: Vannesa Stone LCSW       Step 1: Warning Signs:   Warning Signs   \"Focused on disappointments, not sleeping, feeling so depressd all of the time\"            Step 2: Internal Coping Strategies:   Internal Coping Strategies   Doing house chores, coloring            Step 3: People and social settings that provide distraction:   Name Contact Information   Rhett In cell phone    Places   No where           Step 4: People whom I can ask for help during a crisis:      Name Contact Information    My son, Rhett In cell phone      Step 5: Professionals or agencies I can contact during a crisis:      Clinican/Agency Name Phone Emergency Contact    [facility] [phone]       [facility] Emergency Department Emergency Department Phone Emergency Department Address    [facility] [phone]         Crisis Phone Numbers:   Suicide Prevention Lifeline: Call or Text  768 Crisis Text Line: Text HOME to 432-574   Please note: Some [facility] do not have a separate number for Child/Adolescent specific crisis. If your county is not listed under Child/Adolescent, please call the adult number for your county      Adult Crisis Numbers: Child/Adolescent Crisis Numbers   [facility]: [phone] [facility]: [phone]   [facility]: [phone] [facility]: [phone]   [facility]: [phone] [place], [place]: [phone]   [facility]: [phone] [place]: [phone]   [place]: [phone]   [facility]: [phone]   [facility]: [phone]   [facility]: [phone] (daytime) [phone] (after hours, weekends, holidays)      Step 6: Making the environment safer (plan for lethal means safety):   Patient did not identify any lethal methods: Yes     Optional: " "What is most important to me and worth living for?   \"Rhett\"     [place] Safety Plan. Manuela Begum and Manuel Jennings. Used with permission of the authors.           "

## 2025-02-11 NOTE — TELEPHONE ENCOUNTER
Caller: Zulma    Doctor: Dr. Garcia    Reason for call: Patient returning call. Got disconnected during transfer. Attempted to call back and was unsuccessful.     Call back#: [phone]

## 2025-02-11 NOTE — TELEPHONE ENCOUNTER
Caller: Zulma Marcelo    Doctor: Dr. Garcia    Call back #: [phone]    Reason for call: Patient called back to speak to Tresa about [employer]. Called Jerry and spoke to Laura. Laura advised me that Tresa will call the patient back.

## 2025-02-12 ENCOUNTER — TELEPHONE (OUTPATIENT)
Age: 75
End: 2025-02-12

## 2025-02-17 ENCOUNTER — OFFICE VISIT (OUTPATIENT)
Age: 75
End: 2025-02-17
Payer: MEDICARE

## 2025-02-17 VITALS — WEIGHT: 139 LBS | HEIGHT: 60 IN | HEART RATE: 60 BPM | BODY MASS INDEX: 27.29 KG/M2 | RESPIRATION RATE: 18 BRPM

## 2025-02-17 DIAGNOSIS — G89.29 CHRONIC PAIN OF LEFT ANKLE: Primary | ICD-10-CM

## 2025-02-17 DIAGNOSIS — M25.572 CHRONIC PAIN OF LEFT ANKLE: Primary | ICD-10-CM

## 2025-02-17 DIAGNOSIS — B35.1 ONYCHOMYCOSIS: ICD-10-CM

## 2025-02-17 DIAGNOSIS — M15.0 PRIMARY OSTEOARTHRITIS INVOLVING MULTIPLE JOINTS: ICD-10-CM

## 2025-02-17 PROCEDURE — 99212 OFFICE O/P EST SF 10 MIN: CPT | Performed by: PODIATRIST

## 2025-02-17 PROCEDURE — 20605 DRAIN/INJ JOINT/BURSA W/O US: CPT | Performed by: PODIATRIST

## 2025-02-17 RX ORDER — TRIAMCINOLONE ACETONIDE 40 MG/ML
20 INJECTION, SUSPENSION INTRA-ARTICULAR; INTRAMUSCULAR
Status: SHIPPED | OUTPATIENT
Start: 2025-02-17

## 2025-02-17 RX ADMIN — TRIAMCINOLONE ACETONIDE 20 MG: 40 INJECTION, SUSPENSION INTRA-ARTICULAR; INTRAMUSCULAR at 16:30

## 2025-02-17 NOTE — PROGRESS NOTES
"Medium joint arthrocentesis: L ankle  Universal Protocol:  procedure performed by consultantConsent: Verbal consent obtained. Written consent not obtained.  Risks and benefits: risks, benefits and alternatives were discussed  Consent given by: patient  Time out: Immediately prior to procedure a \"time out\" was called to verify the correct patient, procedure, equipment, support staff and site/side marked as required.  Timeout called at: 2/17/2025 4:43 PM.  Patient understanding: patient states understanding of the procedure being performed  Patient identity confirmed: verbally with patient  Supporting Documentation  Indications: pain and joint swelling   Procedure Details  Location: ankle - L ankle  Preparation: Patient was prepped and draped in the usual sterile fashion  Needle size: 25 G  Ultrasound guidance: no  Approach: anterolateral  Medications administered: 20 mg triamcinolone acetonide 40 mg/mL    Patient tolerance: patient tolerated the procedure well with no immediate complications  Dressing:  Sterile dressing applied         Foot Exam         General  General Appearance: appears stated age and healthy   Orientation: alert and oriented to person, place, and time   Affect: appropriate   Gait: antalgic   Assistance: cane use         Right Foot/Ankle      Inspection and Palpation  Swelling: dorsum   Arch: pes cavus  Hallux valgus: yes  Skin Exam: dry skin;      Neurovascular  Dorsalis pedis: 2+  Posterior tibial: 2+        Left Foot/Ankle       Inspection and Palpation  Tenderness: bony tenderness, lesser metatarsophalangeal joints and metatarsals   Swelling: dorsum   Arch: pes cavus  Hallux valgus: yes  Skin Exam: dry skin;      Neurovascular  Dorsalis pedis: 2+  Posterior tibial: 2+        Physical Exam  Vitals and nursing note reviewed.   Constitutional:       Appearance: Normal appearance.   Cardiovascular:      Rate and Rhythm: Normal rate and regular rhythm.      Pulses:           Dorsalis pedis pulses " are 2+ on the right side and 2+ on the left side.        Posterior tibial pulses are 2+ on the right side and 2+ on the left side.   Musculoskeletal:      Right foot: Bunion present.      Left foot: Bunion and bony tenderness present.   Feet:      Right foot:      Skin integrity: Dry skin present.      Left foot:      Skin integrity: Dry skin present.      Comments: Patient has decreased range of motion of left rear foot and ankle joint.  Patient has equinus deformity.  Pain with palpation left ankle, lateral aspect.  Prior x-rays demonstrate osteoarthritis.  There is evidence of prior left ankle ORIF.     Pain with palpation left Lisfranc joint.  Pain with range of motion of extensor tendons.  No evidence of rupture.  Skin:     Capillary Refill: Capillary refill takes less than 2 seconds.      Comments: All nails are dystrophic.  Hallux bilateral demonstrates distal mycosis.  Right hallux has ingrown fibular aspect.  Negative pus.  Positive paronychia.   Neurological:      Mental Status: She is alert.   Psychiatric:         Mood and Affect: Mood normal.         Behavior: Behavior normal.         Thought Content: Thought content normal.         Judgment: Judgment normal.         Plan.  Chart reviewed.  PCP notes reviewed.  Patient evaluated.  Left ankle arthrocentesis done.  Patient has residual mycosis.  She will remain on terbinafine.  This been reordered.  In addition we will add Mobic.  Patient has whole body arthritis.  She will be sent to rheumatology.

## 2025-02-18 DIAGNOSIS — K21.9 GASTROESOPHAGEAL REFLUX DISEASE WITHOUT ESOPHAGITIS: ICD-10-CM

## 2025-02-18 RX ORDER — SUCRALFATE 1 G/1
1 TABLET ORAL
Qty: 120 TABLET | Refills: 5 | Status: SHIPPED | OUTPATIENT
Start: 2025-02-18

## 2025-02-19 ENCOUNTER — OFFICE VISIT (OUTPATIENT)
Dept: BARIATRICS | Facility: CLINIC | Age: 75
End: 2025-02-19
Payer: MEDICARE

## 2025-02-19 VITALS
BODY MASS INDEX: 28.9 KG/M2 | HEART RATE: 95 BPM | SYSTOLIC BLOOD PRESSURE: 128 MMHG | DIASTOLIC BLOOD PRESSURE: 70 MMHG | HEIGHT: 60 IN | WEIGHT: 147.2 LBS

## 2025-02-19 DIAGNOSIS — K27.5 PERFORATED PEPTIC ULCER (HCC): ICD-10-CM

## 2025-02-19 DIAGNOSIS — E66.3 OVERWEIGHT WITH BODY MASS INDEX (BMI) OF 28 TO 28.9 IN ADULT: Primary | ICD-10-CM

## 2025-02-19 DIAGNOSIS — K29.70 GASTRITIS: ICD-10-CM

## 2025-02-19 DIAGNOSIS — Z98.84 HISTORY OF ROUX-EN-Y GASTRIC BYPASS: ICD-10-CM

## 2025-02-19 DIAGNOSIS — F33.1 MODERATE EPISODE OF RECURRENT MAJOR DEPRESSIVE DISORDER (HCC): Chronic | ICD-10-CM

## 2025-02-19 DIAGNOSIS — F41.1 GENERALIZED ANXIETY DISORDER: ICD-10-CM

## 2025-02-19 PROCEDURE — 99204 OFFICE O/P NEW MOD 45 MIN: CPT | Performed by: INTERNAL MEDICINE

## 2025-02-19 RX ORDER — BUPROPION HYDROCHLORIDE 150 MG/1
TABLET ORAL
Qty: 180 TABLET | Refills: 2 | Status: SHIPPED | OUTPATIENT
Start: 2025-02-19

## 2025-02-19 NOTE — ASSESSMENT & PLAN NOTE
Antiobesity Medications/Medical --overweight BMI of 28.75 with hypertension   Reviewed antiobesity medications.  Patient has no prior history of MI/CVA so per Medicare requirements will not be able to obtain Wegovy  Patient has had a prior history of sleep apnea prior to her bariatric surgery in 2015 but has not been evaluated recently.  May not be able to obtain Zepbound  Oral medications were reviewed:  Given age of 74 and prior history of SVT status post ablation-would avoid phentermine  Patient has had a history of kidney stones 30 years ago and also has some cognitive dysfunction currently on Aricept so would avoid Topamax  Patient currently on Wellbutrin at 150 mg.  We discussed increasing this to 300 mg daily  We could add naltrexone but patient was concerned about side effects  Metformin could be considered  Discussed limitations of oral generic medications in terms of weight loss potential.  Patient verbalized understanding      Nutrition:    Advised patient meeting and working with the surgical dietitian to help with decreasing calorie dense foods and incorporating more protein.  As evidenced by low creatinine of 0.5-suspect patient has sarcopenia and would benefit from increasing her protein intake and decreasing processed food and calorie rich foods    Physical Activity:   Continue physical therapy and home exercises for arthritis, walks with a cane    Sleep: -  prior sleep apnea before surgery no testing recently; 8 hours of sleep per night    Food Behaviors/Stress:   Patient has some cravings which could be mitigated by adequate protein and fiber as above   patient currently lives with her son who does most of the cooking and they help each other out.

## 2025-02-19 NOTE — PROGRESS NOTES
Assessment/Plan     Zulma Marcelo  is a 74 y.o. female  referred  by bariatric surgery team to [facility] Weight [facility]  for treatment of post-op weight regain.     Brief Summary: From chart review s/p Debra-en-Y gastric bypass in 2015 by Dr. Gonzalo Villasenor.   Patient was recently hospitalized in December 2024 for lysis of adhesions and repair of perforated marginal ulcer operated on by Dr. Mohr.  She was referred for postop weight regain    Preop weight: ?300 lbs  Dk weight : 115 lbs  Current weight on 2/19/2025  :66.8 kg (147 lb 3.2 oz)  Weight regain:+32 lbs        Start date: 2/19/2025   Intial weight on 2/19/2025 : 66.8 kg (147 lb 3.2 oz)    on 2/19/2025 :Body mass index is 28.75 kg/m².  Obesity Class: 25.0-29.9- Overweight  Goal weight: 125 lbs           Overweight with body mass index (BMI) of 28 to 28.9 in adult  Antiobesity Medications/Medical --overweight BMI of 28.75 with hypertension   Reviewed antiobesity medications.  Patient has no prior history of MI/CVA so per Medicare requirements will not be able to obtain Wegovy  Patient has had a prior history of sleep apnea prior to her bariatric surgery in 2015 but has not been evaluated recently.  May not be able to obtain Zepbound  Oral medications were reviewed:  Given age of 74 and prior history of SVT status post ablation-would avoid phentermine  Patient has had a history of kidney stones 30 years ago and also has some cognitive dysfunction currently on Aricept so would avoid Topamax  Patient currently on Wellbutrin at 150 mg.  We discussed increasing this to 300 mg daily  We could add naltrexone but patient was concerned about side effects  Metformin could be considered  Discussed limitations of oral generic medications in terms of weight loss potential.  Patient verbalized understanding      Nutrition:    Advised patient meeting and working with the surgical dietitian to help with decreasing calorie dense foods and incorporating more protein.  As  evidenced by low creatinine of 0.5-suspect patient has sarcopenia and would benefit from increasing her protein intake and decreasing processed food and calorie rich foods    Physical Activity:   Continue physical therapy and home exercises for arthritis, walks with a cane    Sleep: -  prior sleep apnea before surgery no testing recently; 8 hours of sleep per night    Food Behaviors/Stress:   Patient has some cravings which could be mitigated by adequate protein and fiber as above   patient currently lives with her son who does most of the cooking and they help each other out.         -Also counseled that weight regain may occur on stopping medication and it therefore may need to be continued as a weight maintenance medication long term if well tolerated. Patient informed to consider all the  factors outlined below before making an informed decision regarding initiating anti obesity pharmacotherapy.   -Counseled the patient that all AOM's are contraindicated in pregnancy and lactation and should be discontinued if patient were to become pregnant.  -In addition, please follow general recommendations below.          - Discussed at length and the role of weight loss medications and medication options   - Explained the importance of making lifestyle changes in addition to starting any anti-obesity medications if the  patient chooses.  -  Initial weight loss goal of 5-10% weight loss for improved health  - Weight loss can improve patient's co-morbid conditions and/or prevent weight-related complications.  - Weight is not at goal and patient has been unable to achieve a meaningful weight loss above 5% using various programs and tools for more than 6 months      General Lifestyle recommendations:    Nutrition   Do not skip meals. Avoid sugary beverages. At least 64oz of water daily. Counseled the patient on healthy eating with My plate method for macronutrient balance. Informed patient of the importance of focusing on  "protein goals and non starchy fiber rich vegetables for satiety effect and to help support a calorie deficit.  Limit processed food, refined sugars and grain.  Behavioral/Stress   Food log via maya or provided paper log (maya options include www.MediaWorks.com, sparkpeople.com, loseit.com, calorieking.com, [employer]). Encouraged mindful eating. Be sure to set aside time to eat, eat slowly, and savor your food. Consider meditation apps and/or taking a few minutes of mindfulness every AM. Weigh daily or atleast 2-3 times/ week  Physical Activity   Increase physical activity by 10 minutes daily. Gradually increase physical activity to a goal of 5 days per week for 30 minutes of MODERATE intensity ( should be able to pass the \"talk test\" but should not be able to sing. Target 150-300 minutes  PLUS 2 days per week of FULL BODY resistance training. Progression will be addressed at follow up visits. Encouraged contemplation regarding establishing a daily physical activity routine  Sleep   Encourage sleep hygiene and importance of having adequate sleep duration at least > 6 hours to support response in weight loss efforts    Handouts provided and reviewed:  THRIVE program at [facility]  MyPlate and food quality  Calorie goal and sample menu  Food log resources, phone maya or paper journal  Antiobesity medications options         Zulma was seen today for consult.    Diagnoses and all orders for this visit:    Overweight with body mass index (BMI) of 28 to 28.9 in adult    Perforated peptic ulcer (HCC)  -     Ambulatory Referral to Weight Management    History of Debra-en-Y gastric bypass  -     Ambulatory Referral to Weight Management    Gastritis  -     Ambulatory Referral to Weight Management    Moderate episode of recurrent major depressive disorder (HCC)  -     buPROPion (WELLBUTRIN XL) 150 mg 24 hr tablet; Take 2 tablets daily in the morning    Generalized anxiety disorder  -     buPROPion (WELLBUTRIN XL) 150 mg 24 hr " tablet; Take 2 tablets daily in the morning              Bupropion instructions     side effects may include tachyarrhythmia, constipation, nausea, confusion, dizziness, insomnia, tremor, agitation, anxiety, or xerostomia. Instruct patient to report symptoms of seizures, new or worsening depression, suicidal ideation, unusual behavior changes, psychosis, john, or hypomania;          Total time spent reviewing chart, interviewing patient, examining patient, discussing plan, answering all questions, and documentin min, with >50% face-to-face time spent counseling patient on nonsurgical interventions for the treatment of excess weight. Discussed in detail nonsurgical options including intensive lifestyle intervention program, very low-calorie diet program and conservative program.  Discussed the role of weight loss medications.  Counseled patient on diet behavior and exercise modification for weight loss.                HPI     Wt Readings from Last 30 Encounters:   25 66.8 kg (147 lb 3.2 oz)   25 63 kg (139 lb)   25 63.4 kg (139 lb 12.8 oz)   24 67.1 kg (148 lb)   24 67.6 kg (149 lb)   24 69.4 kg (153 lb)   24 66.2 kg (146 lb)   24 66.2 kg (146 lb)   24 66.2 kg (146 lb)   24 67.1 kg (148 lb)   24 66.8 kg (147 lb 3.2 oz)   24 66 kg (145 lb 6.4 oz)   24 65.6 kg (144 lb 9.6 oz)   24 65 kg (143 lb 3.2 oz)   23 71.2 kg (157 lb)   23 68 kg (150 lb)   23 68.1 kg (150 lb 3.2 oz)   23 73.9 kg (163 lb)   23 75.2 kg (165 lb 12.8 oz)   23 73 kg (161 lb)   23 73.5 kg (162 lb)   23 73.5 kg (162 lb)   02/15/23 73.9 kg (163 lb)   23 73.5 kg (162 lb)   23 73.5 kg (162 lb)   23 73.6 kg (162 lb 3.2 oz)   22 75.4 kg (166 lb 3.6 oz)   22 74.8 kg (165 lb)   22 72.6 kg (160 lb)   22 73.1 kg (161 lb 3.2 oz)    BMI Readings from Last 30 Encounters:   25 28.75  "kg/m²   25 27.15 kg/m²   25 27.30 kg/m²   24 28.90 kg/m²   24 29.10 kg/m²   24 25.46 kg/m²   24 24.30 kg/m²   24 28.51 kg/m²   24 28.51 kg/m²   24 28.90 kg/m²   24 28.75 kg/m²   24 28.40 kg/m²   24 28.24 kg/m²   24 27.97 kg/m²   23 31.18 kg/m²   23 29.29 kg/m²   23 29.33 kg/m²   23 31.83 kg/m²   23 32.38 kg/m²   23 31.44 kg/m²   23 31.64 kg/m²   23 31.64 kg/m²   02/15/23 31.83 kg/m²   23 31.64 kg/m²   23 31.64 kg/m²   23 31.65 kg/m²   22 32.20 kg/m²   22 31.96 kg/m²   22 30.99 kg/m²   22 31.22 kg/m²                     Right after her surgery craves \"liver and bananas\"  Son cooks mostly high-calorie calorie rich foods    Lifestyle questionnaire     From chart review,   Diet recall:  B: oatmeal or couple of scrambled eggs  S:  L: [place] and soup or Ramen noodles and hot dog  S: Crackers or peanuts   D: Son cooks and he is a \"gourmet cook\"  S: none  Frequency Eating out x/ week: Once a week    Food behaviors\"head\" hunger/cravings    Trouble area of the day : During the day    Beverages  Water-- 64 oz   Caffeine/tea-- 2 cups oz   SSB --elizabeth nectar cuts with Crystal light    Drug use: Prior marijuana use a year ago to help with pain from bone spurs not currently  Social History     Substance and Sexual Activity   Alcohol Use Yes    Comment: rarely      Social History     Tobacco Use   Smoking Status Former    Current packs/day: 0.00    Average packs/day: 1 pack/day for 67.1 years (67.1 ttl pk-yrs)    Types: Cigarettes    Start date: 6/15/1956    Quit date: 2023    Years since quittin.5    Passive exposure: Past   Smokeless Tobacco Never      Social History     Substance and Sexual Activity   Drug Use Not Currently    Types: Marijuana       Physical Activity --arthritis, does physical therapy walks with a cane and does exercises at " home      Sleep --prior sleep apnea before surgery no testing recently; 8 hours of sleep per night    Occupation-retired in sales for [employer]     Psycho social- lives with son Rhett who is 57 and disabled    Gyneac (Menopausal status/periods/contraception)- MP      Colonoscopy: 05/25/2022   Mammogram: 05/21/2021         Objective         The following portions of the patient's history were reviewed and updated as appropriate: allergies, current medications, past family history, past medical history, past social history, past surgical history, and problem list.      /70 (Patient Position: Sitting, Cuff Size: Standard)   Pulse 95   Ht 5' (1.524 m)   Wt 66.8 kg (147 lb 3.2 oz)   LMP  (LMP Unknown)   BMI 28.75 kg/m²         Review of Systems   Constitutional:  Negative for chills, fatigue and fever.   HENT:  Negative for ear pain and sore throat.    Eyes:  Negative for pain and visual disturbance.   Respiratory:  Negative for cough and shortness of breath.    Cardiovascular:  Negative for chest pain, palpitations and leg swelling.   Gastrointestinal:  Negative for abdominal pain, constipation, diarrhea, nausea and vomiting.   Genitourinary:  Negative for dysuria and hematuria.   Musculoskeletal:  Negative for arthralgias and back pain.   Skin:  Negative for color change and rash.   Neurological:  Negative for seizures, syncope and headaches.   Psychiatric/Behavioral:  Negative for dysphoric mood. The patient is not nervous/anxious.    All other systems reviewed and are negative.    Physical Exam  Vitals and nursing note reviewed.   Constitutional:       Appearance: Normal appearance.   HENT:      Head: Normocephalic.   Pulmonary:      Effort: Pulmonary effort is normal.   Neurological:      General: No focal deficit present.      Mental Status: He is alert and oriented to person, place, and time.   Psychiatric:         Mood and Affect: Mood normal.         Behavior: Behavior normal.         Thought Content:  Thought content normal.         Judgment: Judgment normal.       Current meds       Current Outpatient Medications:     albuterol (PROVENTIL HFA,VENTOLIN HFA) 90 mcg/act inhaler, Inhale 2 puffs every 6 (six) hours as needed for wheezing, Disp: 20.4 g, Rfl: 3    allopurinol (ZYLOPRIM) 100 mg tablet, TAKE ONE TABLET BY MOUTH EVERY DAY, Disp: 90 tablet, Rfl: 1    amLODIPine (NORVASC) 10 mg tablet, Take 1 tablet (10 mg total) by mouth daily, Disp: 90 tablet, Rfl: 3    buPROPion (WELLBUTRIN XL) 150 mg 24 hr tablet, Take 2 tablets daily in the morning, Disp: 180 tablet, Rfl: 2    busPIRone (BUSPAR) 5 mg tablet, Take 1 tablet (5 mg total) by mouth 3 (three) times a day, Disp: 270 tablet, Rfl: 1    dextromethorphan-guaifenesin (MUCINEX DM)  MG per 12 hr tablet, Take 1 tablet by mouth every 12 (twelve) hours as needed for cough, Disp: 30 tablet, Rfl: 0    donepezil (ARICEPT) 10 mg tablet, Take 1 tablet (10 mg total) by mouth daily at bedtime, Disp: 90 tablet, Rfl: 3    escitalopram (LEXAPRO) 20 mg tablet, Take 1 tablet (20 mg total) by mouth daily, Disp: 90 tablet, Rfl: 1    fluticasone (FLONASE) 50 mcg/act nasal spray, 1 spray into each nostril 2 (two) times a day, Disp: 9.9 mL, Rfl: 1    nebivolol (BYSTOLIC) 5 mg tablet, Take 1 tablet (5 mg total) by mouth daily, Disp: 90 tablet, Rfl: 1    ondansetron (ZOFRAN-ODT) 4 mg disintegrating tablet, Take 1 tablet (4 mg total) by mouth every 6 (six) hours as needed for nausea for up to 3 days, Disp: 20 tablet, Rfl: 0    pantoprazole (PROTONIX) 40 mg tablet, Take 1 tablet (40 mg total) by mouth 2 (two) times a day, Disp: 180 tablet, Rfl: 1    traZODone (DESYREL) 100 mg tablet, Take 1 tablet (100 mg total) by mouth daily at bedtime, Disp: 90 tablet, Rfl: 1    amLODIPine (NORVASC) 10 mg tablet, Take 1 tablet (10 mg total) by mouth daily (Patient not taking: Reported on 2/19/2025), Disp: 30 tablet, Rfl: 0    Diclofenac Sodium (VOLTAREN) 1 %, Apply 2 g topically 4 (four) times a  day (Patient not taking: Reported on 2/19/2025), Disp: 100 g, Rfl: 3    donepezil (ARICEPT) 10 mg tablet, Take 1 tablet (10 mg total) by mouth daily at bedtime (Patient not taking: Reported on 2/19/2025), Disp: 90 tablet, Rfl: 3    Ketotifen Fumarate (ZADITOR) 0.035 % ophthalmic solution, Administer 1 drop to both eyes 2 (two) times a day (Patient not taking: Reported on 2/19/2025), Disp: 3 mL, Rfl: 1    pantoprazole (PROTONIX) 40 mg tablet, Take 1 tablet (40 mg total) by mouth 2 (two) times a day for 14 days (Patient not taking: Reported on 2/19/2025), Disp: 28 tablet, Rfl: 0    sucralfate (CARAFATE) 1 g tablet, TAKE 1 TABLET BY MOUTH 4 TIMES A DAY (BEFORE MEALS AND AT BEDTIME) (Patient not taking: Reported on 2/19/2025), Disp: 120 tablet, Rfl: 5    terbinafine (LamISIL) 250 mg tablet, 1 TAB BY MOUTH EVERY OTHER DAY., Disp: 15 tablet, Rfl: 0    Current Facility-Administered Medications:     triamcinolone acetonide (Kenalog-40) 40 mg/mL injection 20 mg, 20 mg, Intra-articular, , , 20 mg at 02/17/25 1630         Medication considerations/contraindications     -Patient denies personal history of pancreatitis. Patient also denies personal and family history of medullary thyroid cancer and multiple endocrine neoplasia type 2 (MEN 2 tumor). -Patient denies any + history of kidney stones 30 years ago, seizures, or glaucoma, diabetic retinopathy, gall bladder disease, hyperthyroidism, gastroparesis.  -Denies Hx of CAD, PAD, palpitations, arrhythmia.   -Denies uncontrolled anxiety or depression, suicidal behavior or thinking , insomnia or sleep disturbance.         Labs     Most recent labs reviewed   Lab Results   Component Value Date    SODIUM 138 12/27/2024    K 3.0 (L) 12/27/2024     12/27/2024    CO2 25 12/27/2024    AGAP 9 12/27/2024    BUN 5 12/27/2024    CREATININE 0.45 (L) 12/27/2024    GLUC 97 12/27/2024    GLUF 98 12/26/2024    CALCIUM 8.1 (L) 12/27/2024    AST 18 12/26/2024    ALT 7 12/26/2024    ALKPHOS  42 12/26/2024    TP 5.6 (L) 12/26/2024    TBILI 0.37 12/26/2024    EGFR 97 12/27/2024     Lab Results   Component Value Date    HGBA1C 5.6 12/25/2024     Lab Results   Component Value Date    WTY6ZDBLBIBA 4.978 (H) 11/27/2023     Lab Results   Component Value Date    CHOLESTEROL 99 12/26/2024     Lab Results   Component Value Date    HDL 30 (L) 12/26/2024     Lab Results   Component Value Date    TRIG 114 12/26/2024     Lab Results   Component Value Date    LDLCALC 46 12/26/2024

## 2025-02-24 ENCOUNTER — TELEMEDICINE (OUTPATIENT)
Dept: BEHAVIORAL/MENTAL HEALTH CLINIC | Facility: CLINIC | Age: 75
End: 2025-02-24
Payer: MEDICARE

## 2025-02-24 DIAGNOSIS — F33.41 RECURRENT MAJOR DEPRESSIVE DISORDER, IN PARTIAL REMISSION (HCC): Primary | ICD-10-CM

## 2025-02-24 PROCEDURE — 90834 PSYTX W PT 45 MINUTES: CPT | Performed by: SOCIAL WORKER

## 2025-02-24 NOTE — PSYCH
Virtual Regular VisitName: Zulma Marcelo      : 1950      MRN: [id number]  Encounter Provider: Vannesa Stone LCSW  Encounter Date: 2025   Encounter department: [facility] THERAPIST [facility]  :  Assessment & Plan  Recurrent major depressive disorder, in partial remission (HCC)             Goals addressed in session: Goal 1     Depression Follow-up Plan Completed: Not applicable    Reason for visit is No chief complaint on file.     Recent Visits  No visits were found meeting these conditions.  Showing recent visits within past 7 days and meeting all other requirements  Today's Visits  Date Type Provider Dept   25 Telemedicine Vannesa Stone LCSW [facility] Therapist [facility]   Showing today's visits and meeting all other requirements  Future Appointments  No visits were found meeting these conditions.  Showing future appointments within next 150 days and meeting all other requirements     History of Present Illness     Zulma Marcelo is a 74 y.o. female  .  HPI    Past Medical History:   Diagnosis Date    Acid reflux     Anxiety     Arthritis     Asthma     Cardiac disease     Chronic kidney disease     passed on own kidney stone    Depression     Diverticulitis     GERD (gastroesophageal reflux disease)     Hayfever     Elim IRA (hard of hearing)     bilateral hearing aids    Hyperlipemia     Hypertension     Panic attack     Tachycardia      Past Surgical History:   Procedure Laterality Date    ABLATION OF DYSRHYTHMIC FOCUS      APPENDECTOMY      CHOLECYSTECTOMY      open    FRACTURE SURGERY      ORIF fx (L) tibia, fibula     GASTRIC BYPASS OPEN      HYSTERECTOMY      AL LAPS ABD PRTM&OMENTUM DX W/WO SPEC BR/WA SPX N/A 2024    Procedure: LAPAROSCOPY DIAGNOSTIC,  LYSIS OF ADHESIONS, REPAIR PERFORATED MARGINAL ULCER, INTRA-OP EGD;  Surgeon: Alex Mohr MD;  Location: [facility] MAIN OR;  Service: Bariatrics    AL XCAPSL CTRC RMVL INSJ IO LENS  PROSTH W/O ECP Left 4/18/2016    Procedure: EXTRACTION EXTRACAPSULAR CATARACT PHACO INTRAOCULAR LENS (IOL);  Surgeon: Dane Wells MD;  Location: [facility] MAIN OR;  Service: Ophthalmology    MO XCAPSL CTRC RMVL INSJ IO LENS PROSTH W/O ECP Right 3/1/2021    Procedure: EXTRACTION EXTRACAPSULAR CATARACT PHACO INTRAOCULAR LENS (IOL);  Surgeon: Dane Wells MD;  Location: [facility] MAIN OR;  Service: Ophthalmology    TONSILECTOMY AND ADNOIDECTOMY       Current Outpatient Medications   Medication Instructions    albuterol (PROVENTIL HFA,VENTOLIN HFA) 90 mcg/act inhaler 2 puffs, Inhalation, Every 6 hours PRN    allopurinol (ZYLOPRIM) 100 mg, Oral, Daily    amLODIPine (NORVASC) 10 mg, Oral, Daily    amLODIPine (NORVASC) 10 mg, Oral, Daily    buPROPion (WELLBUTRIN XL) 150 mg 24 hr tablet Take 2 tablets daily in the morning    busPIRone (BUSPAR) 5 mg, Oral, 3 times daily    dextromethorphan-guaifenesin (MUCINEX DM)  MG per 12 hr tablet 1 tablet, Oral, Every 12 hours PRN    Diclofenac Sodium (VOLTAREN) 2 g, Topical, 4 times daily    donepezil (ARICEPT) 10 mg, Oral, Daily at bedtime    donepezil (ARICEPT) 10 mg, Oral, Daily at bedtime    escitalopram (LEXAPRO) 20 mg, Oral, Daily    fluticasone (FLONASE) 50 mcg/act nasal spray 1 spray, Nasal, 2 times daily    Ketotifen Fumarate (ZADITOR) 0.035 % ophthalmic solution 1 drop, Both Eyes, 2 times daily    nebivolol (BYSTOLIC) 5 mg, Oral, Daily    ondansetron (ZOFRAN-ODT) 4 mg, Oral, Every 6 hours PRN    pantoprazole (PROTONIX) 40 mg, Oral, 2 times daily    pantoprazole (PROTONIX) 40 mg, Oral, 2 times daily    sucralfate (CARAFATE) 1 g, Oral, 4 times daily (before meals and at bedtime)    traZODone (DESYREL) 100 mg, Oral, Daily at bedtime     Allergies   Allergen Reactions    Augmentin [Amoxicillin-Pot Clavulanate] GI Intolerance, Other (See Comments) and Hives     VOMITING  VOMITING  Reaction Date: [id number];     Sulfa Antibiotics Itching, Other (See Comments) and Hives      "RASH, ITCHY  RASH, ITCHY    Morphine Other (See Comments)     \"DOESN'T WORK\"    Latex Rash     Unsure if this is an allergy       Review of Systems    Objective   LMP  (LMP Unknown)     Video Exam  Physical Exam     Administrative Statements   Encounter provider Vannesa Stone LCSW    The Patient is located at Home and in the following state in which I hold an active license [place].    The patient was identified by name and date of birth. Zulma Marcelo was informed that this is a telemedicine visit and that the visit is being conducted through the Epic Embedded platform. She agrees to proceed..  My office door was closed. No one else was in the room.  She acknowledged consent and understanding of privacy and security of the video platform. The patient has agreed to participate and understands they can discontinue the visit at any time.          Behavioral Health Psychotherapy Progress Note    Psychotherapy Provided: Individual Psychotherapy     1. Recurrent major depressive disorder, in partial remission (HCC)            Goals addressed in session: Goal 1     DATA: Zulma reports feeling well stating that \"everything is status quo\". [facility]  at completing paperwork so that she can get her bathroom renovated. This will help assist she and Rhett in being able to take greater care in themselves. She and Yin are still speaking occasionally and there is no tension between them. Her mood is good as well as her outlook. She continues to utilize coloring books and TV as a way to pass time and find enjoyment. Depressive symptoms are present but low. She reports finding meaning in herself and her day.   During this session, this clinician used the following therapeutic modalities: Supportive Psychotherapy    Substance Abuse was not addressed during this session. If the client is diagnosed with a co-occurring substance use disorder, please indicate any changes in the " "frequency or amount of use: NA. Stage of change for addressing substance use diagnoses: No substance use/Not applicable    ASSESSMENT:  Zulam Marcelo presents with a Euthymic/ normal mood.     her affect is Normal range and intensity, which is congruent, with her mood and the content of the session. The client has made progress on their goals.    During this session the client was oriented to person, place, and time. The client was engaged in the session. The client was able to sustain direct eye contact and was without psychomotor agitation. The client's thought processes were linear and clear.   Zulma Marcelo presents with a none risk of suicide, none risk of self-harm, and none risk of harm to others.    For any risk assessment that surpasses a \"low\" rating, a safety plan must be developed.    A safety plan was indicated: no  If yes, describe in detail NA    PLAN: Between sessions, Zulma Marcelo will take medication as prescribed and practice positive coping strategies as needed . At the next session, the therapist will use Supportive Psychotherapy to address stressors.    Behavioral Health Treatment Plan and Discharge Planning: Zulma Marcelo is aware of and agrees to continue to work on their treatment plan. They have identified and are working toward their discharge goals. yes    Depression Follow-up Plan Completed: Not applicable    Visit start and stop times:    02/24/25  Start Time: 1000  Stop Time: 1050  Total Visit Time: 50 minutes        "

## 2025-02-27 ENCOUNTER — OFFICE VISIT (OUTPATIENT)
Dept: CARDIOLOGY CLINIC | Facility: CLINIC | Age: 75
End: 2025-02-27
Payer: MEDICARE

## 2025-02-27 VITALS
WEIGHT: 146 LBS | BODY MASS INDEX: 28.66 KG/M2 | HEIGHT: 60 IN | HEART RATE: 62 BPM | DIASTOLIC BLOOD PRESSURE: 86 MMHG | SYSTOLIC BLOOD PRESSURE: 134 MMHG | OXYGEN SATURATION: 96 %

## 2025-02-27 DIAGNOSIS — I50.32 CHRONIC DIASTOLIC CONGESTIVE HEART FAILURE (HCC): ICD-10-CM

## 2025-02-27 DIAGNOSIS — I25.10 ARTERIOSCLEROTIC CARDIOVASCULAR DISEASE: ICD-10-CM

## 2025-02-27 DIAGNOSIS — I35.0 NONRHEUMATIC AORTIC VALVE STENOSIS: Primary | ICD-10-CM

## 2025-02-27 DIAGNOSIS — I10 ESSENTIAL HYPERTENSION: ICD-10-CM

## 2025-02-27 PROCEDURE — 99214 OFFICE O/P EST MOD 30 MIN: CPT | Performed by: INTERNAL MEDICINE

## 2025-02-27 RX ORDER — NEBIVOLOL 5 MG/1
5 TABLET ORAL DAILY
Qty: 90 TABLET | Refills: 1 | Status: SHIPPED | OUTPATIENT
Start: 2025-02-27 | End: 2025-08-26

## 2025-02-27 RX ORDER — AMLODIPINE BESYLATE 10 MG/1
10 TABLET ORAL DAILY
Qty: 90 TABLET | Refills: 3 | Status: SHIPPED | OUTPATIENT
Start: 2025-02-27

## 2025-02-27 NOTE — ASSESSMENT & PLAN NOTE
Wt Readings from Last 3 Encounters:   02/27/25 66.2 kg (146 lb)   02/19/25 66.8 kg (147 lb 3.2 oz)   02/17/25 63 kg (139 lb)     Importance of blood pressure control reviewed with her.  Currently free of edema.  Last echocardiogram showed normal ejection fraction.  Pulmonary pressures were elevated likely due to longstanding COPD.

## 2025-02-27 NOTE — PROGRESS NOTES
Cardiology   Stella Garcia DO, [facility]  Eduar Light MD, [facility]  Miguel Duke MD, [facility]  Eda Medina MD, [facility]  -------------------------------------------------------------------  [facility]  [address], [address], [address]  [place], [place], [address]  [phone]    Cardiology Follow Up  Zulma Marcelo  1950  [id number]          Assessment/Plan:    Assessment & Plan  Nonrheumatic aortic valve stenosis  Echocardiogram done in December showed no significant change compared to previous study done 2 years ago.  Will have repeat study in 1 to 2 years or if any new symptoms develop.  Essential hypertension  Blood pressure is controlled with amlodipine and nebivolol.  Medications were refilled.  She should monitor for edema with increased amlodipine dose.  If present, should reduce back to 5 mg daily.  Chronic diastolic congestive heart failure (HCC)  Wt Readings from Last 3 Encounters:   02/27/25 66.2 kg (146 lb)   02/19/25 66.8 kg (147 lb 3.2 oz)   02/17/25 63 kg (139 lb)     Importance of blood pressure control reviewed with her.  Currently free of edema.  Last echocardiogram showed normal ejection fraction.  Pulmonary pressures were elevated likely due to longstanding COPD.      Arteriosclerotic cardiovascular disease  Risk factor reduction reviewed.  She has greater than 65-pack-year history of smoking.        Interval History:     Zulma Marcelo is 74 y.o. female here for followup of hypertension and valve disease.  She was last seen 2 years ago.  She was complaining of shortness of breath and chest pain at that time and stress test was ordered.  Stress test showed no ischemia or infarction.  Ejection fraction 74%.  In addition, she has Valve disease and has moderate aortic valve stenosis there is mild mitral stenosis.    Had an echocardiogram in December 2024 while she was admitted for gastric ulcer bleed.  There is no change in valve disease in 2 years.   She has mild pulmonary hypertension present as well due to longstanding COPD.  She smoked since the age of 6 and quit only 2 years ago.  She has chronic dyspnea which has been unchanged recently.  Denies any chest pain or palpitations.  She denies any lower extremity edema, orthopnea or paroxysmal nocturnal dyspnea.  During December hospitalization, she was started on amlodipine.  Initially, she had lower extremity edema present that has resolved.         The following portions of the patient's history were reviewed and updated as appropriate: allergies, current medications, past family history, past medical history, past social history, past surgical history, and problem list.       Current Outpatient Medications:     albuterol (PROVENTIL HFA,VENTOLIN HFA) 90 mcg/act inhaler, Inhale 2 puffs every 6 (six) hours as needed for wheezing, Disp: 20.4 g, Rfl: 3    allopurinol (ZYLOPRIM) 100 mg tablet, TAKE ONE TABLET BY MOUTH EVERY DAY, Disp: 90 tablet, Rfl: 1    amLODIPine (NORVASC) 10 mg tablet, Take 1 tablet (10 mg total) by mouth daily, Disp: 90 tablet, Rfl: 3    buPROPion (WELLBUTRIN XL) 150 mg 24 hr tablet, Take 2 tablets daily in the morning, Disp: 180 tablet, Rfl: 2    busPIRone (BUSPAR) 5 mg tablet, Take 1 tablet (5 mg total) by mouth 3 (three) times a day, Disp: 270 tablet, Rfl: 1    dextromethorphan-guaifenesin (MUCINEX DM)  MG per 12 hr tablet, Take 1 tablet by mouth every 12 (twelve) hours as needed for cough, Disp: 30 tablet, Rfl: 0    escitalopram (LEXAPRO) 20 mg tablet, Take 1 tablet (20 mg total) by mouth daily, Disp: 90 tablet, Rfl: 1    fluticasone (FLONASE) 50 mcg/act nasal spray, 1 spray into each nostril 2 (two) times a day, Disp: 9.9 mL, Rfl: 1    pantoprazole (PROTONIX) 40 mg tablet, Take 1 tablet (40 mg total) by mouth 2 (two) times a day, Disp: 180 tablet, Rfl: 1    traZODone (DESYREL) 100 mg tablet, Take 1 tablet (100 mg total) by mouth daily at bedtime, Disp: 90 tablet, Rfl: 1     amLODIPine (NORVASC) 10 mg tablet, Take 1 tablet (10 mg total) by mouth daily (Patient not taking: Reported on 2/27/2025), Disp: 30 tablet, Rfl: 0    Diclofenac Sodium (VOLTAREN) 1 %, Apply 2 g topically 4 (four) times a day (Patient not taking: Reported on 2/19/2025), Disp: 100 g, Rfl: 3    donepezil (ARICEPT) 10 mg tablet, Take 1 tablet (10 mg total) by mouth daily at bedtime, Disp: 90 tablet, Rfl: 3    donepezil (ARICEPT) 10 mg tablet, Take 1 tablet (10 mg total) by mouth daily at bedtime (Patient not taking: Reported on 2/19/2025), Disp: 90 tablet, Rfl: 3    Ketotifen Fumarate (ZADITOR) 0.035 % ophthalmic solution, Administer 1 drop to both eyes 2 (two) times a day (Patient not taking: Reported on 2/19/2025), Disp: 3 mL, Rfl: 1    nebivolol (BYSTOLIC) 5 mg tablet, Take 1 tablet (5 mg total) by mouth daily, Disp: 90 tablet, Rfl: 1    ondansetron (ZOFRAN-ODT) 4 mg disintegrating tablet, Take 1 tablet (4 mg total) by mouth every 6 (six) hours as needed for nausea for up to 3 days, Disp: 20 tablet, Rfl: 0    pantoprazole (PROTONIX) 40 mg tablet, Take 1 tablet (40 mg total) by mouth 2 (two) times a day for 14 days (Patient not taking: Reported on 2/19/2025), Disp: 28 tablet, Rfl: 0    sucralfate (CARAFATE) 1 g tablet, TAKE 1 TABLET BY MOUTH 4 TIMES A DAY (BEFORE MEALS AND AT BEDTIME) (Patient not taking: Reported on 2/27/2025), Disp: 120 tablet, Rfl: 5    Current Facility-Administered Medications:     triamcinolone acetonide (Kenalog-40) 40 mg/mL injection 20 mg, 20 mg, Intra-articular, , , 20 mg at 02/17/25 1630        Review of Systems:  Review of Systems   Respiratory:  Positive for shortness of breath.    Cardiovascular:  Negative for chest pain, palpitations and leg swelling.   Musculoskeletal:  Positive for arthralgias.   All other systems reviewed and are negative.        Physical Exam:  Vitals:  Vitals:    02/27/25 1400   BP: 134/86   BP Location: Left arm   Patient Position: Sitting   Cuff Size: Standard    Pulse: 62   SpO2: 96%   Weight: 66.2 kg (146 lb)   Height: 5' (1.524 m)     Physical Exam   Constitutional: She appears healthy. No distress.   Eyes: Pupils are equal, round, and reactive to light. Conjunctivae are normal.   Neck: No JVD present.   Cardiovascular: Normal rate and regular rhythm. Exam reveals no gallop and no friction rub.   Murmur heard.  Systolic murmur is present.  Pulmonary/Chest: Effort normal and breath sounds normal. She has no wheezes. She has no rales.   Musculoskeletal:         General: No tenderness, deformity or edema.      Cervical back: Normal range of motion and neck supple.   Neurological: She is alert and oriented to person, place, and time.   Skin: Skin is warm and dry.        Cardiographics:  EKG: Personally reviewed December 27 ECG normal sinus rhythm  Last known EF: 60%    This note was completed in part utilizing M-Modal Fluency Direct Software.  Grammatical errors, random word insertions, spelling mistakes, and incomplete sentences can be an occasional consequence of this system secondary to software limitations, ambient noise, and hardware issues.  If you have any questions or concerns about the content, text, or information contained within the body of this dictation, please contact the provider for clarification.

## 2025-02-27 NOTE — ASSESSMENT & PLAN NOTE
Echocardiogram done in December showed no significant change compared to previous study done 2 years ago.  Will have repeat study in 1 to 2 years or if any new symptoms develop.

## 2025-02-27 NOTE — ASSESSMENT & PLAN NOTE
Blood pressure is controlled with amlodipine and nebivolol.  Medications were refilled.  She should monitor for edema with increased amlodipine dose.  If present, should reduce back to 5 mg daily.

## 2025-03-10 ENCOUNTER — TELEMEDICINE (OUTPATIENT)
Dept: BEHAVIORAL/MENTAL HEALTH CLINIC | Facility: CLINIC | Age: 75
End: 2025-03-10
Payer: MEDICARE

## 2025-03-10 DIAGNOSIS — F33.41 RECURRENT MAJOR DEPRESSIVE DISORDER, IN PARTIAL REMISSION (HCC): Primary | ICD-10-CM

## 2025-03-10 PROCEDURE — 90834 PSYTX W PT 45 MINUTES: CPT | Performed by: SOCIAL WORKER

## 2025-03-10 NOTE — PSYCH
Virtual Regular Visit    Verification of patient location:    Patient is located at Home in the following state in which I hold an active license [place]      Assessment/Plan:    Problem List Items Addressed This Visit    None  Visit Diagnoses         Recurrent major depressive disorder, in partial remission (HCC)    -  Primary            Goals addressed in session: Goal 1     Depression Follow-up Plan Completed: Not applicable    Reason for visit is No chief complaint on file.       Encounter provider Vannesa Stone LCSW      Recent Visits  No visits were found meeting these conditions.  Showing recent visits within past 7 days and meeting all other requirements  Today's Visits  Date Type Provider Dept   03/10/25 Telemedicine Vannesa Stone LCSW [facility] Therapist [place]   Showing today's visits and meeting all other requirements  Future Appointments  No visits were found meeting these conditions.  Showing future appointments within next 150 days and meeting all other requirements       The patient was identified by name and date of birth. Zulma Marcelo was informed that this is a telemedicine visit and that the visit is being conducted throughthe Epic Embedded platform. She agrees to proceed..  My office door was closed. No one else was in the room.  She acknowledged consent and understanding of privacy and security of the video platform. The patient has agreed to participate and understands they can discontinue the visit at any time.    Patient is aware this is a billable service.     Subjective  Zulma Marcelo is a 74 y.o. female  .      HPI     Past Medical History:   Diagnosis Date    Acid reflux     Anxiety     Arthritis     Asthma     Cardiac disease     Chronic kidney disease     passed on own kidney stone    Depression     Diverticulitis     GERD (gastroesophageal reflux disease)     Hayfever     [place] (hard of hearing)     bilateral hearing aids    Hyperlipemia     Hypertension      Panic attack     Tachycardia        Past Surgical History:   Procedure Laterality Date    ABLATION OF DYSRHYTHMIC FOCUS      APPENDECTOMY      CHOLECYSTECTOMY      open    FRACTURE SURGERY      ORIF fx (L) tibia, fibula 2009    GASTRIC BYPASS OPEN      HYSTERECTOMY      [facility] LAPS ABD PRTM&OMENTUM DX W/WO SPEC BR/WA SPX N/A 12/16/2024    Procedure: LAPAROSCOPY DIAGNOSTIC,  LYSIS OF ADHESIONS, REPAIR PERFORATED MARGINAL ULCER, INTRA-OP EGD;  Surgeon: Alex Mohr MD;  Location: [facility] MAIN OR;  Service: Bariatrics    VA XCAPSL CTRC RMVL INSJ IO LENS PROSTH W/O ECP Left 4/18/2016    Procedure: EXTRACTION EXTRACAPSULAR CATARACT PHACO INTRAOCULAR LENS (IOL);  Surgeon: Dane Wells MD;  Location: [facility] MAIN OR;  Service: Ophthalmology    VA XCAPSL CTRC RMVL INSJ IO LENS PROSTH W/O ECP Right 3/1/2021    Procedure: EXTRACTION EXTRACAPSULAR CATARACT PHACO INTRAOCULAR LENS (IOL);  Surgeon: Dane Wells MD;  Location: [facility] MAIN OR;  Service: Ophthalmology    TONSILECTOMY AND ADNOIDECTOMY         Current Outpatient Medications   Medication Sig Dispense Refill    albuterol (PROVENTIL HFA,VENTOLIN HFA) 90 mcg/act inhaler Inhale 2 puffs every 6 (six) hours as needed for wheezing 20.4 g 3    allopurinol (ZYLOPRIM) 100 mg tablet TAKE ONE TABLET BY MOUTH EVERY DAY 90 tablet 1    amLODIPine (NORVASC) 10 mg tablet Take 1 tablet (10 mg total) by mouth daily 90 tablet 3    buPROPion (WELLBUTRIN XL) 150 mg 24 hr tablet Take 2 tablets daily in the morning 180 tablet 2    busPIRone (BUSPAR) 5 mg tablet Take 1 tablet (5 mg total) by mouth 3 (three) times a day 270 tablet 1    dextromethorphan-guaifenesin (MUCINEX DM)  MG per 12 hr tablet Take 1 tablet by mouth every 12 (twelve) hours as needed for cough 30 tablet 0    donepezil (ARICEPT) 10 mg tablet Take 1 tablet (10 mg total) by mouth daily at bedtime 90 tablet 3    escitalopram (LEXAPRO) 20 mg tablet Take 1 tablet (20 mg total) by mouth daily 90 tablet 1    fluticasone  "(FLONASE) 50 mcg/act nasal spray 1 spray into each nostril 2 (two) times a day 9.9 mL 1    Ketotifen Fumarate (ZADITOR) 0.035 % ophthalmic solution Administer 1 drop to both eyes 2 (two) times a day (Patient not taking: Reported on 2/19/2025) 3 mL 1    nebivolol (BYSTOLIC) 5 mg tablet Take 1 tablet (5 mg total) by mouth daily 90 tablet 1    ondansetron (ZOFRAN-ODT) 4 mg disintegrating tablet Take 1 tablet (4 mg total) by mouth every 6 (six) hours as needed for nausea for up to 3 days 20 tablet 0    pantoprazole (PROTONIX) 40 mg tablet Take 1 tablet (40 mg total) by mouth 2 (two) times a day 180 tablet 1    pantoprazole (PROTONIX) 40 mg tablet Take 1 tablet (40 mg total) by mouth 2 (two) times a day for 14 days (Patient not taking: Reported on 2/19/2025) 28 tablet 0    sucralfate (CARAFATE) 1 g tablet TAKE 1 TABLET BY MOUTH 4 TIMES A DAY (BEFORE MEALS AND AT BEDTIME) (Patient not taking: Reported on 2/27/2025) 120 tablet 5    traZODone (DESYREL) 100 mg tablet Take 1 tablet (100 mg total) by mouth daily at bedtime 90 tablet 1     Current Facility-Administered Medications   Medication Dose Route Frequency Provider Last Rate Last Admin    triamcinolone acetonide (Kenalog-40) 40 mg/mL injection 20 mg  20 mg Intra-articular     20 mg at 02/17/25 1630        Allergies   Allergen Reactions    Augmentin [Amoxicillin-Pot Clavulanate] GI Intolerance, Other (See Comments) and Hives     VOMITING  VOMITING  Reaction Date: [id number];     Sulfa Antibiotics Itching, Other (See Comments) and Hives     RASH, ITCHY  RASH, ITCHY    Morphine Other (See Comments)     \"DOESN'T WORK\"    Latex Rash     Unsure if this is an allergy       Review of Systems    Video Exam    There were no vitals filed for this visit.    Physical Exam     Visit Time    Visit Start Time: 10:00  Visit Stop Time: 10:50  Total Visit Duration:  50 minutes        Behavioral Health Psychotherapy Progress Note    Psychotherapy Provided: Individual Psychotherapy     1. " "Recurrent major depressive disorder, in partial remission (HCC)            Goals addressed in session: Goal 1     DATA: Zulma reports feeling well stating that she is working through everyday stressors such as chronic pain and financial concerns. Continues to feel supported by her medication, Lidia Sinclair , and this office. As such she reports very little depressive or anxiety symptoms stating, \"I can manage. As long as I have you all, I can manage\". Continues to be on civil terms with [place] but is by no means close. Suggested that she look into other community outlets such as the [employer], emphasizing the need and benefit from emotional and practical support. She said that she would think on it. No safety concerns. Is doing well.   During this session, this clinician used the following therapeutic modalities: Client-centered Therapy    Substance Abuse was not addressed during this session. If the client is diagnosed with a co-occurring substance use disorder, please indicate any changes in the frequency or amount of use: NA. Stage of change for addressing substance use diagnoses: No substance use/Not applicable    ASSESSMENT:  Zulma Marcelo presents with a Euthymic/ normal mood.     her affect is Normal range and intensity, which is congruent, with her mood and the content of the session. The client has made progress on their goals.    During this session the client was oriented to person, place, and time. The client was engaged in the session. The client was able to sustain direct eye contact and was without psychomotor agitation. The client's thought processes were linear and clear.   Zulma Marcelo presents with a none risk of suicide, none risk of self-harm, and none risk of harm to others.    For any risk assessment that surpasses a \"low\" rating, a safety plan must be developed.    A safety plan was indicated: no  If yes, describe in detail NA    PLAN: Between sessions, Zulma Marcelo will " take medication as prescribed and practice positive coping strategies as needed . At the next session, the therapist will use Client-centered Therapy to address stressors.    Behavioral Health Treatment Plan and Discharge Planning: Zulma Marcelo is aware of and agrees to continue to work on their treatment plan. They have identified and are working toward their discharge goals. yes    Depression Follow-up Plan Completed: Not applicable    Visit start and stop times:    03/10/25  Start Time: 1000  Stop Time: 1050  Total Visit Time: 50 minutes

## 2025-03-12 ENCOUNTER — RA CDI HCC (OUTPATIENT)
Dept: OTHER | Facility: HOSPITAL | Age: 75
End: 2025-03-12

## 2025-03-12 PROBLEM — Z48.815 ENCOUNTER FOR SURGICAL AFTERCARE FOLLOWING SURGERY OF DIGESTIVE SYSTEM: Status: ACTIVE | Noted: 2023-08-04

## 2025-03-18 ENCOUNTER — TELEPHONE (OUTPATIENT)
Dept: ADMINISTRATIVE | Facility: OTHER | Age: 75
End: 2025-03-18

## 2025-03-19 ENCOUNTER — OFFICE VISIT (OUTPATIENT)
Dept: FAMILY MEDICINE CLINIC | Facility: CLINIC | Age: 75
End: 2025-03-19
Payer: MEDICARE

## 2025-03-19 VITALS
WEIGHT: 145 LBS | HEIGHT: 60 IN | OXYGEN SATURATION: 98 % | DIASTOLIC BLOOD PRESSURE: 80 MMHG | BODY MASS INDEX: 28.47 KG/M2 | HEART RATE: 62 BPM | RESPIRATION RATE: 18 BRPM | TEMPERATURE: 97.6 F | SYSTOLIC BLOOD PRESSURE: 140 MMHG

## 2025-03-19 DIAGNOSIS — I10 ESSENTIAL HYPERTENSION: Primary | ICD-10-CM

## 2025-03-19 DIAGNOSIS — R41.89 COGNITIVE IMPAIRMENT: ICD-10-CM

## 2025-03-19 DIAGNOSIS — F33.1 MODERATE EPISODE OF RECURRENT MAJOR DEPRESSIVE DISORDER (HCC): Chronic | ICD-10-CM

## 2025-03-19 DIAGNOSIS — J41.0 SIMPLE CHRONIC BRONCHITIS (HCC): ICD-10-CM

## 2025-03-19 PROCEDURE — 99214 OFFICE O/P EST MOD 30 MIN: CPT | Performed by: INTERNAL MEDICINE

## 2025-03-19 PROCEDURE — G2211 COMPLEX E/M VISIT ADD ON: HCPCS | Performed by: INTERNAL MEDICINE

## 2025-03-19 NOTE — ASSESSMENT & PLAN NOTE
Depression Screening Follow-up Plan: Patient's depression screening was positive with a PHQ-9 score of 14. Patient with underlying depression and was advised to continue current medications as prescribed. Patient declines further evaluation by mental health professional and/or medications. They have no active suicidal ideations. Brief counseling provided and recommend additional follow-up/re-evaluation at next office visit.    She continues to follow with psychiatry and will continue escitalopram, buspirone, and bupropion as ordered.

## 2025-03-19 NOTE — PROGRESS NOTES
Name: Zulma Marcelo      : 1950      MRN: [id number]  Encounter Provider: Lydia Cruz MD  Encounter Date: 3/19/2025   Encounter department: [facility]  :  Assessment & Plan  Essential hypertension  Well controlled and will continue nebivolol and amlodipine as ordered.         Moderate episode of recurrent major depressive disorder (HCC)  Depression Screening Follow-up Plan: Patient's depression screening was positive with a PHQ-9 score of 14. Patient with underlying depression and was advised to continue current medications as prescribed. Patient declines further evaluation by mental health professional and/or medications. They have no active suicidal ideations. Brief counseling provided and recommend additional follow-up/re-evaluation at next office visit.    She continues to follow with psychiatry and will continue escitalopram, buspirone, and bupropion as ordered.           Simple chronic bronchitis (HCC)  She feels this is stable, continues with prn albuterol.  Has not had to use this recently.       Cognitive impairment  This is well stable on donepezil and will continue.              History of Present Illness   Here for follow up visit.  She is having purposeful weight loss and is pleased.  Feels her breathing is stable.    Continues to follow with psychiatry and feels her mood is stable.  Denies depressive symptoms, SI/HI.      Review of Systems   Constitutional: Negative.    Respiratory:  Positive for shortness of breath. Negative for wheezing.    Cardiovascular: Negative.  Negative for chest pain, palpitations and leg swelling.   Psychiatric/Behavioral:  Negative for decreased concentration and suicidal ideas. The patient is not nervous/anxious.        Objective   /80   Pulse 62   Temp 97.6 °F (36.4 °C)   Resp 18   Ht 5' (1.524 m)   Wt 65.8 kg (145 lb)   LMP  (LMP Unknown)   SpO2 98%   BMI 28.32 kg/m²      Physical Exam  Constitutional:       Appearance: Normal  appearance.   HENT:      Head: Normocephalic and atraumatic.   Eyes:      Pupils: Pupils are equal, round, and reactive to light.   Cardiovascular:      Rate and Rhythm: Normal rate and regular rhythm.      Heart sounds: No murmur heard.     No friction rub. No gallop.   Pulmonary:      Effort: Pulmonary effort is normal.      Breath sounds: Normal breath sounds. No wheezing or rales.   Abdominal:      General: Abdomen is flat. Bowel sounds are normal.      Palpations: Abdomen is soft.      Tenderness: There is no abdominal tenderness.   Musculoskeletal:         General: No swelling.   Neurological:      Mental Status: She is alert.   Psychiatric:         Mood and Affect: Mood normal.         Behavior: Behavior normal.         Thought Content: Thought content normal.         Judgment: Judgment normal.

## 2025-03-20 ENCOUNTER — TELEPHONE (OUTPATIENT)
Age: 75
End: 2025-03-20

## 2025-03-20 NOTE — TELEPHONE ENCOUNTER
Patient called stating she is returning the call to Hira Stone. Writer did not see a message on her chart and patient stated Hira did not leave a message that someone called. Writer informed patient it might of been an automatic reminder of her upcoming virtual appointment. Pt expressed her understanding and confirmed appointment.

## 2025-03-26 ENCOUNTER — TELEMEDICINE (OUTPATIENT)
Dept: PSYCHIATRY | Facility: CLINIC | Age: 75
End: 2025-03-26
Payer: MEDICARE

## 2025-03-26 DIAGNOSIS — F33.1 MODERATE EPISODE OF RECURRENT MAJOR DEPRESSIVE DISORDER (HCC): Primary | Chronic | ICD-10-CM

## 2025-03-26 DIAGNOSIS — F41.1 GENERALIZED ANXIETY DISORDER: ICD-10-CM

## 2025-03-26 DIAGNOSIS — F51.01 PRIMARY INSOMNIA: ICD-10-CM

## 2025-03-26 PROCEDURE — 99214 OFFICE O/P EST MOD 30 MIN: CPT | Performed by: PHYSICIAN ASSISTANT

## 2025-03-26 PROCEDURE — G2211 COMPLEX E/M VISIT ADD ON: HCPCS | Performed by: PHYSICIAN ASSISTANT

## 2025-03-26 NOTE — PSYCH
MEDICATION MANAGEMENT NOTE    Name: Zulma Marcelo      : 1950      MRN: [id number]  Encounter Provider: Alis Kearns  Encounter Date: 3/26/2025   Encounter department: [facility]    Insurance: Payor: MEDICARE / Plan: MEDICARE A AND B / Product Type: Medicare A & B Fee for Service /      Reason for Visit:   Chief Complaint   Patient presents with    Virtual Regular Visit    Follow-up    Medication Management    Depression    Anxiety   :  Assessment & Plan  Moderate episode of recurrent major depressive disorder (HCC)  Stable - continue escitalopram 20 mg qd, bupropion  mg qAM, buspirone 5 mg TID; continue talk therapy; f/u in 6 weeks         Generalized anxiety disorder  Stable - continue escitalopram 20 mg qd, bupropion  mg qAM, buspirone 5 mg TID; continue talk therapy; f/u in 6 weeks         Primary insomnia  Not yet at goal - increase trazodone to 150 mg qhs; f/u in 6 weeks           Pt reports that mood is good and anxiety is manageable despite stressors. However, she is having more trouble with sleep. She tolerates trazodone well so can trial 150 mg qhs. No other med changes advised. PARQ completed including dizziness, headache, GI distress, sedation, confusion, priapism, suicidal thoughts, serotonin syndrome, drug interactions, induction of john and others.     Treatment Recommendations:    Continue current medications:     Wellbutrin  mg daily for depressive symptoms  BuSpar 5 mg 3 times daily for anxiety  Lexapro 20 mg daily for depressive symptoms    Increase medication:    Trazodone 100 mg to 150 mg qhs nightly for insomnia    Educated about diagnosis and treatment modalities. Verbalizes understanding and agreement with the treatment plan.  Discussed self monitoring of symptoms, and symptom monitoring tools.  Discussed medications and if treatment adjustment was needed or desired.  Medication management every 6 weeks  Aware of 24 hour  and weekend coverage for urgent situations accessed by calling [facility] main practice number  Continue psychotherapy with [facility] therapist Clemente Stone  I am scheduling this patient out for greater than 3 months: No    Medications Risks/Benefits:      Risks, Benefits And Possible Side Effects Of Medications:    Risks, benefits, and possible side effects of medications explained to Zulma and she (or legal representative) verbalizes understanding and agreement for treatment.    Controlled Medication Discussion:     Not applicable      History of Present Illness     Zulma is seen today for a follow up for Virtual Regular Visit, Follow-up, Medication Management, Depression, and Anxiety and insomnia. Since her last visit she reports her mood has been good. She has been more stressed with getting new bids to get multiple things done to the house (new heating system, new roof, etc) as well as getting her will changed, but she states that this is all good and will be a huge weight off of her shoulders once it's done. She feels anxiety is a little higher with the stress but she is managing it okay. She does report sleep isn't as good and she is waking up in the middle of the night with trouble falling back to sleep.     Past medication trials:  Prozac, Ambien (suicide attempt)    She denies any suicidal ideation, intent or plan at present; denies any homicidal ideation, intent or plan at present.    She denies any auditory hallucinations, denies any visual hallucinations, denies any delusions.    She denies any side effects from current psychiatric medications.    HPI ROS Appetite Changes and Sleep:     She reports difficulty sleeping, normal appetite, normal energy level    Review Of Systems: A review of systems is obtained and is negative except for the pertinent positives listed in HPI/Subjective above.      Current Rating Scores:     None completed today.    Areas of Improvement: reviewed in  "HPI/Subjective Section and reviewed in Assessment and Plan Section      Past Medical History:   Diagnosis Date    Acid reflux     Anxiety     Arthritis     Asthma     Cardiac disease     Chronic kidney disease     passed on own kidney stone    Depression     Diverticulitis     GERD (gastroesophageal reflux disease)     Hayfever     [place] (hard of hearing)     bilateral hearing aids    Hyperlipemia     Hypertension     Panic attack     Tachycardia         Past Surgical History:   Procedure Laterality Date    ABLATION OF DYSRHYTHMIC FOCUS      APPENDECTOMY      CHOLECYSTECTOMY      open    FRACTURE SURGERY      ORIF fx (L) tibia, fibula 2009    GASTRIC BYPASS OPEN      HYSTERECTOMY      NV LAPS ABD PRTM&OMENTUM DX W/WO SPEC BR/WA SPX N/A 12/16/2024    Procedure: LAPAROSCOPY DIAGNOSTIC,  LYSIS OF ADHESIONS, REPAIR PERFORATED MARGINAL ULCER, INTRA-OP EGD;  Surgeon: Alex Mohr MD;  Location: [facility] MAIN OR;  Service: Bariatrics    NV XCAPSL CTRC RMVL INSJ IO LENS PROSTH W/O ECP Left 4/18/2016    Procedure: EXTRACTION EXTRACAPSULAR CATARACT PHACO INTRAOCULAR LENS (IOL);  Surgeon: Dane Wells MD;  Location: [facility] MAIN OR;  Service: Ophthalmology    NV XCAPSL CTRC RMVL INSJ IO LENS PROSTH W/O ECP Right 3/1/2021    Procedure: EXTRACTION EXTRACAPSULAR CATARACT PHACO INTRAOCULAR LENS (IOL);  Surgeon: Dane Wells MD;  Location: [facility] MAIN OR;  Service: Ophthalmology    TONSILECTOMY AND ADNOIDECTOMY       Allergies:   Allergies   Allergen Reactions    Augmentin [Amoxicillin-Pot Clavulanate] GI Intolerance, Other (See Comments) and Hives     VOMITING  VOMITING  Reaction Date: 07Dec2004;     Sulfa Antibiotics Itching, Other (See Comments) and Hives     RASH, ITCHY  RASH, ITCHY    Morphine Other (See Comments)     \"DOESN'T WORK\"    Latex Rash     Unsure if this is an allergy       Current Outpatient Medications   Medication Sig Dispense Refill    albuterol (PROVENTIL HFA,VENTOLIN HFA) 90 mcg/act inhaler Inhale 2 puffs every " 6 (six) hours as needed for wheezing 20.4 g 3    allopurinol (ZYLOPRIM) 100 mg tablet TAKE ONE TABLET BY MOUTH EVERY DAY 90 tablet 1    amLODIPine (NORVASC) 10 mg tablet Take 1 tablet (10 mg total) by mouth daily 90 tablet 3    buPROPion (WELLBUTRIN XL) 150 mg 24 hr tablet Take 2 tablets daily in the morning 180 tablet 2    busPIRone (BUSPAR) 5 mg tablet Take 1 tablet (5 mg total) by mouth 3 (three) times a day 270 tablet 1    dextromethorphan-guaifenesin (MUCINEX DM)  MG per 12 hr tablet Take 1 tablet by mouth every 12 (twelve) hours as needed for cough 30 tablet 0    donepezil (ARICEPT) 10 mg tablet Take 1 tablet (10 mg total) by mouth daily at bedtime 90 tablet 3    escitalopram (LEXAPRO) 20 mg tablet Take 1 tablet (20 mg total) by mouth daily 90 tablet 1    fluticasone (FLONASE) 50 mcg/act nasal spray 1 spray into each nostril 2 (two) times a day 9.9 mL 1    Ketotifen Fumarate (ZADITOR) 0.035 % ophthalmic solution Administer 1 drop to both eyes 2 (two) times a day (Patient not taking: Reported on 2/19/2025) 3 mL 1    nebivolol (BYSTOLIC) 5 mg tablet Take 1 tablet (5 mg total) by mouth daily 90 tablet 1    ondansetron (ZOFRAN-ODT) 4 mg disintegrating tablet Take 1 tablet (4 mg total) by mouth every 6 (six) hours as needed for nausea for up to 3 days 20 tablet 0    pantoprazole (PROTONIX) 40 mg tablet Take 1 tablet (40 mg total) by mouth 2 (two) times a day 180 tablet 1    pantoprazole (PROTONIX) 40 mg tablet Take 1 tablet (40 mg total) by mouth 2 (two) times a day for 14 days (Patient not taking: Reported on 3/19/2025) 28 tablet 0    traZODone (DESYREL) 100 mg tablet Take 1 tablet (100 mg total) by mouth daily at bedtime 90 tablet 1     Current Facility-Administered Medications   Medication Dose Route Frequency Provider Last Rate Last Admin    triamcinolone acetonide (Kenalog-40) 40 mg/mL injection 20 mg  20 mg Intra-articular     20 mg at 02/17/25 1630       Substance Abuse History:    Social History      Substance and Sexual Activity   Alcohol Use Yes    Comment: rarely     Social History     Substance and Sexual Activity   Drug Use Not Currently    Types: Marijuana       Social History:    Social History     Socioeconomic History    Marital status:      Spouse name: Not on file    Number of children: Not on file    Years of education: Not on file    Highest education level: Not on file   Occupational History    Not on file   Tobacco Use    Smoking status: Former     Current packs/day: 0.00     Average packs/day: 1 pack/day for 67.1 years (67.1 ttl pk-yrs)     Types: Cigarettes     Start date: 6/15/1956     Quit date: 2023     Years since quittin.6     Passive exposure: Past    Smokeless tobacco: Never   Vaping Use    Vaping status: Never Used   Substance and Sexual Activity    Alcohol use: Yes     Comment: rarely    Drug use: Not Currently     Types: Marijuana    Sexual activity: Never   Other Topics Concern    Not on file   Social History Narrative    Not on file     Social Drivers of Health     Financial Resource Strain: High Risk (2023)    Overall Financial Resource Strain (CARDIA)     Difficulty of Paying Living Expenses: Hard   Food Insecurity: No Food Insecurity (2024)    Nursing - Inadequate Food Risk Classification     Worried About Running Out of Food in the Last Year: Never true     Ran Out of Food in the Last Year: Never true     Ran Out of Food in the Last Year: Never true   Recent Concern: Food Insecurity - Food Insecurity Present (2024)    Nursing - Inadequate Food Risk Classification     Worried About Running Out of Food in the Last Year: Never true     Ran Out of Food in the Last Year: Never true     Ran Out of Food in the Last Year: Often true   Transportation Needs: No Transportation Needs (2024)    Nursing - Transportation Risk Classification     Lack of Transportation: Not on file     Lack of Transportation: No   Physical Activity: Not on file   Stress:  Not on file   Social Connections: Not on file   Intimate Partner Violence: Unknown (2024)    Nursing IPS     Feels Physically and Emotionally Safe: Not on file     Physically Hurt by Someone: Not on file     Humiliated or Emotionally Abused by Someone: Not on file     Physically Hurt by Someone: No     Hurt or Threatened by Someone: No   Housing Stability: Unknown (2024)    Nursing: Inadequate Housing Risk Classification     Has Housing: Not on file     Worried About Losing Housing: Not on file     Unable to Get Utilities: Not on file     Unable to Pay for Housing in the Last Year: No     Has Housin       Family Psychiatric History:     Family History   Problem Relation Age of Onset    Heart disease Mother     Stroke Son     Stroke Maternal Grandfather     Breast cancer Daughter 40       Medical History Reviewed by provider this encounter:  Tobacco  Allergies  Meds  Problems  Med Hx  Surg Hx  Fam Hx          Objective   LMP  (LMP Unknown)      Mental Status Evaluation:    Appearance age appropriate, casually dressed, looks stated age   Behavior cooperative, calm   Speech normal rate, normal volume, normal pitch, spontaneous   Mood improved   Affect slightly brighter   Thought Processes organized, goal directed   Thought Content no overt delusions   Perceptual Disturbances: no auditory hallucinations, no visual hallucinations   Abnormal Thoughts  Risk Potential Suicidal ideation - None  Homicidal ideation - None  Potential for aggression - No   Orientation oriented to person, place, time/date, and situation   Memory recent and remote memory grossly intact   Consciousness alert and awake   Attention Span Concentration Span attention span and concentration are age appropriate   Intellect appears to be of average intelligence   Insight intact   Judgement intact   Muscle Strength and  Gait unable to assess today due to virtual visit   Motor activity unable to assess today due to virtual visit    Language no difficulty naming common objects, no difficulty repeating a phrase, no difficulty writing a sentence   Fund of Knowledge adequate knowledge of current events  adequate fund of knowledge regarding past history  adequate fund of knowledge regarding vocabulary    Pain none   Pain Scale 0       Laboratory Results: I have personally reviewed all pertinent laboratory/tests results    Suicide/Homicide Risk Assessment:    Risk of Harm to Self:  The following ratings are based on assessment at the time of the interview  Based on today's assessment, Zulma presents the following risk of harm to self: none    Risk of Harm to Others:  The following ratings are based on assessment at the time of the interview  Based on today's assessment, Zulma presents the following risk of harm to others: none    The following interventions are recommended: Continue medication management. No other intervention changes indicated at this time.    Psychotherapy Provided:     Individual psychotherapy provided: No    Treatment Plan:    Completed and signed during the session: Not applicable - Treatment Plan to be completed by [facility] therapist    Goals: Progress towards Treatment Plan goals - Yes, progressing, as evidenced by subjective findings in HPI/Subjective Section and in Assessment and Plan Section    Depression Follow-up Plan Completed: Not applicable    Note Share:    This note was not shared with the patient due to reasonable likelihood of causing patient harm    Administrative Statements   Encounter provider Alis Kearns    The Patient is located at Home and in the following state in which I hold an active license [place].    The patient was identified by name and date of birth. Zulma Marcelo was informed that this is a telemedicine visit and that the visit is being conducted through the Epic Embedded platform. She agrees to proceed..  My office door was closed. No one else was in the room.  She  acknowledged consent and understanding of privacy and security of the video platform. The patient has agreed to participate and understands they can discontinue the visit at any time.    I have spent a total time of 15 minutes in caring for this patient on the day of the visit/encounter including Prognosis, Risks and benefits of tx options, Instructions for management, Patient and family education, Importance of tx compliance, Impressions, Counseling / Coordination of care, and Obtaining or reviewing history  , not including the time spent for establishing the audio/video connection.    Visit Time  Visit Start Time: 9:30 AM  Visit Stop Time: 9:45 AM  Total Visit Duration:  15 minutes    Alis Kearns 03/26/25

## 2025-03-26 NOTE — ASSESSMENT & PLAN NOTE
Stable - continue escitalopram 20 mg qd, bupropion  mg qAM, buspirone 5 mg TID; continue talk therapy; f/u in 6 weeks

## 2025-03-31 ENCOUNTER — TELEPHONE (OUTPATIENT)
Age: 75
End: 2025-03-31

## 2025-03-31 ENCOUNTER — TELEMEDICINE (OUTPATIENT)
Dept: BEHAVIORAL/MENTAL HEALTH CLINIC | Facility: CLINIC | Age: 75
End: 2025-03-31
Payer: MEDICARE

## 2025-03-31 DIAGNOSIS — F41.1 GENERALIZED ANXIETY DISORDER: ICD-10-CM

## 2025-03-31 DIAGNOSIS — F33.1 MODERATE EPISODE OF RECURRENT MAJOR DEPRESSIVE DISORDER (HCC): Primary | ICD-10-CM

## 2025-03-31 PROCEDURE — 90834 PSYTX W PT 45 MINUTES: CPT | Performed by: SOCIAL WORKER

## 2025-03-31 NOTE — PSYCH
"Virtual Regular VisitName: Zulma Marcelo      : 1950      MRN: [id number]  Encounter Provider: Vannesa Stone LCSW  Encounter Date: 3/31/2025   Encounter department: [facility] THERAP[facility]  :  Assessment & Plan  Moderate episode of recurrent major depressive disorder (HCC)         Generalized anxiety disorder             Goals addressed in session: Goal 1     DATA: Zulma reported feeling anxious stating that she is anticipating leaving the home for an RSV vaccine and has noticed increased anxiety when needing to do similar tasks. Examined thought processes which drive this, such as, \"what if something goes wrong,\" worked to correct this with an appropriate ending, and paired it with paced breathing. We practiced this for several minutes which she said helped. She is also concerned that she will no longer have access to [employer] in May and is worried that she will not have anyone to support her. She said that she will look into the re-application process and reach out if she needs help. During this session, this clinician used the following therapeutic modalities: Supportive Psychotherapy    Substance Abuse was not addressed during this session. If the client is diagnosed with a co-occurring substance use disorder, please indicate any changes in the frequency or amount of use: NA. Stage of change for addressing substance use diagnoses: No substance use/Not applicable    ASSESSMENT:  Zulma presents with a Anxious mood. Jackies affect is Normal range and intensity, which is congruent, with their mood and the content of the session. The client has made progress on their goals as evidenced by distress tolerance.    Zulma presents with a none risk of suicide, none risk of self-harm, and none risk of harm to others.    For any risk assessment that surpasses a \"low\" rating, a safety plan must be developed.    A safety plan was indicated: no  If yes, describe in detail " NA    PLAN: Between sessions, Zulma will take medication as prescribed and practice positive coping strategies as needed . At the next session, the therapist will use Supportive Psychotherapy to address anxiety.    Behavioral Health Treatment Plan [facility]: Diagnosis and Treatment Plan explained to Zulma, Zulma relates understanding diagnosis and is agreeable to Treatment Plan. Yes     Depression Follow-up Plan Completed: No     Reason for visit is No chief complaint on file.     Recent Visits  No visits were found meeting these conditions.  Showing recent visits within past 7 days and meeting all other requirements  Today's Visits  Date Type Provider Dept   03/31/25 Telemedicine Vannesa Stone LCSW [facility] Therapist [place]   Showing today's visits and meeting all other requirements  Future Appointments  No visits were found meeting these conditions.  Showing future appointments within next 150 days and meeting all other requirements     History of Present Illness     HPI    Past Medical History   Past Medical History:   Diagnosis Date    Acid reflux     Anxiety     Arthritis     Asthma     Cardiac disease     Chronic kidney disease     passed on own kidney stone    Depression     Diverticulitis     GERD (gastroesophageal reflux disease)     Hayfever     [place] (hard of hearing)     bilateral hearing aids    Hyperlipemia     Hypertension     Panic attack     Tachycardia      Past Surgical History:   Procedure Laterality Date    ABLATION OF DYSRHYTHMIC FOCUS      APPENDECTOMY      CHOLECYSTECTOMY      open    FRACTURE SURGERY      ORIF fx (L) tibia, fibula 2009    GASTRIC BYPASS OPEN      HYSTERECTOMY      ID LAPS ABD PRTM&OMENTUM DX W/WO SPEC BR/WA SPX N/A 12/16/2024    Procedure: LAPAROSCOPY DIAGNOSTIC,  LYSIS OF ADHESIONS, REPAIR PERFORATED MARGINAL ULCER, INTRA-OP EGD;  Surgeon: Alex Mohr MD;  Location: [facility] MAIN OR;  Service: Bariatrics    ID XCAPSL CTRC RMVL INSJ IO LENS PROSTH W/O ECP Left  "4/18/2016    Procedure: EXTRACTION EXTRACAPSULAR CATARACT PHACO INTRAOCULAR LENS (IOL);  Surgeon: Dane Wells MD;  Location: [facility] MAIN OR;  Service: Ophthalmology    MI XCAPSL CTRC RMVL INSJ IO LENS PROSTH W/O ECP Right 3/1/2021    Procedure: EXTRACTION EXTRACAPSULAR CATARACT PHACO INTRAOCULAR LENS (IOL);  Surgeon: Dane Wells MD;  Location: [facility] MAIN OR;  Service: Ophthalmology    TONSILECTOMY AND ADNOIDECTOMY       Current Outpatient Medications   Medication Instructions    albuterol (PROVENTIL HFA,VENTOLIN HFA) 90 mcg/act inhaler 2 puffs, Inhalation, Every 6 hours PRN    allopurinol (ZYLOPRIM) 100 mg, Oral, Daily    amLODIPine (NORVASC) 10 mg, Oral, Daily    buPROPion (WELLBUTRIN XL) 150 mg 24 hr tablet Take 2 tablets daily in the morning    busPIRone (BUSPAR) 5 mg, Oral, 3 times daily    dextromethorphan-guaifenesin (MUCINEX DM)  MG per 12 hr tablet 1 tablet, Oral, Every 12 hours PRN    donepezil (ARICEPT) 10 mg, Oral, Daily at bedtime    escitalopram (LEXAPRO) 20 mg, Oral, Daily    fluticasone (FLONASE) 50 mcg/act nasal spray 1 spray, Nasal, 2 times daily    Ketotifen Fumarate (ZADITOR) 0.035 % ophthalmic solution 1 drop, Both Eyes, 2 times daily    nebivolol (BYSTOLIC) 5 mg, Oral, Daily    ondansetron (ZOFRAN-ODT) 4 mg, Oral, Every 6 hours PRN    pantoprazole (PROTONIX) 40 mg, Oral, 2 times daily    pantoprazole (PROTONIX) 40 mg, Oral, 2 times daily    traZODone (DESYREL) 100 mg, Oral, Daily at bedtime     Allergies   Allergen Reactions    Augmentin [Amoxicillin-Pot Clavulanate] GI Intolerance, Other (See Comments) and Hives     VOMITING  VOMITING  Reaction Date: 07Dec2004;     Sulfa Antibiotics Itching, Other (See Comments) and Hives     RASH, ITCHY  RASH, ITCHY    Morphine Other (See Comments)     \"DOESN'T WORK\"    Latex Rash     Unsure if this is an allergy       Objective   LMP  (LMP Unknown)     Video Exam  Physical Exam     Administrative Statements   Encounter provider Vannesa Stone, " [facility]    The Patient is located at Home and in the following state in which I hold an active license [place].    The patient was identified by name and date of birth. Zulma Marcelo was informed that this is a telemedicine visit and that the visit is being conducted through the Epic Embedded platform. She agrees to proceed..  My office door was closed. No one else was in the room.  She acknowledged consent and understanding of privacy and security of the video platform. The patient has agreed to participate and understands they can discontinue the visit at any time.      Visit Time  Start Time: 1100  Stop Time: 1150  Total Visit Time: 50 minutes

## 2025-03-31 NOTE — TELEPHONE ENCOUNTER
Patient's son called and reported that pt. Is struggling with her anxiety and needs appointment moved up. Scheduled for 4/1 at pm.

## 2025-04-01 ENCOUNTER — TELEMEDICINE (OUTPATIENT)
Dept: PSYCHIATRY | Facility: CLINIC | Age: 75
End: 2025-04-01
Payer: MEDICARE

## 2025-04-01 DIAGNOSIS — F41.1 GENERALIZED ANXIETY DISORDER: ICD-10-CM

## 2025-04-01 DIAGNOSIS — F33.1 MODERATE EPISODE OF RECURRENT MAJOR DEPRESSIVE DISORDER (HCC): Primary | Chronic | ICD-10-CM

## 2025-04-01 DIAGNOSIS — F51.01 PRIMARY INSOMNIA: ICD-10-CM

## 2025-04-01 PROCEDURE — 99214 OFFICE O/P EST MOD 30 MIN: CPT | Performed by: PHYSICIAN ASSISTANT

## 2025-04-01 PROCEDURE — G2211 COMPLEX E/M VISIT ADD ON: HCPCS | Performed by: PHYSICIAN ASSISTANT

## 2025-04-01 RX ORDER — BUSPIRONE HYDROCHLORIDE 7.5 MG/1
7.5 TABLET ORAL 3 TIMES DAILY
Qty: 270 TABLET | Refills: 1 | Status: SHIPPED | OUTPATIENT
Start: 2025-04-01 | End: 2025-09-28

## 2025-04-01 NOTE — ASSESSMENT & PLAN NOTE
Not at goal - increase buspirone to 7.5 mg TID; continue escitalopram 20 mg qd, bupropion  mg qAM; continue talk therapy; f/u in 1 month

## 2025-04-01 NOTE — PSYCH
MEDICATION MANAGEMENT NOTE    Name: Zulma Marcelo      : 1950      MRN: [id number]  Encounter Provider: Alis Kearns  Encounter Date: 2025   Encounter department: [facility]    Insurance: Payor: MEDICARE / Plan: MEDICARE A AND B / Product Type: Medicare A & B Fee for Service /      Reason for Visit:   Chief Complaint   Patient presents with    Virtual Regular Visit    Follow-up    Medication Management    Depression    Anxiety   :  Assessment & Plan  Moderate episode of recurrent major depressive disorder (HCC)  Not at goal - increase buspirone to 7.5 mg TID; continue escitalopram 20 mg qd, bupropion  mg qAM; continue talk therapy; f/u in 1 month    Orders:    busPIRone (BUSPAR) 7.5 mg tablet; Take 1 tablet (7.5 mg total) by mouth 3 (three) times a day    Generalized anxiety disorder  Not at goal - increase buspirone to 7.5 mg TID; continue escitalopram 20 mg qd, bupropion  mg qAM; continue talk therapy; f/u in 1 month    Orders:    busPIRone (BUSPAR) 7.5 mg tablet; Take 1 tablet (7.5 mg total) by mouth 3 (three) times a day    Primary insomnia  Not yet at goal - continue trazodone 150 mg qhs; f/u in 6 weeks           Unfortunately over the last few days pt has experienced increase in anxiety related to multiple stressors. This has resulted in several panic attacks per pt. Advised small increase in buspirone to 7.5 mg TID as well as continuing to work with Vannesa Stone LCSW, on coping skills. No other medication changes advised at this time.     Treatment Recommendations:    Continue current medications:     Wellbutrin  mg daily for depressive symptoms  Trazodone 150 mg qhs nightly for insomnia  Lexapro 20 mg daily for depressive symptoms     Increase medication:     BuSpar 5 mg TID to 7.5 mg TID for anxiety    Educated about diagnosis and treatment modalities. Verbalizes understanding and agreement with the treatment plan.  Discussed self  "monitoring of symptoms, and symptom monitoring tools.  Discussed medications and if treatment adjustment was needed or desired.  Medication management every 1 month  Aware of 24 hour and weekend coverage for urgent situations accessed by calling [facility] main practice number  Continue psychotherapy with [facility] therapist Clemente Stone  I am scheduling this patient out for greater than 3 months: No    Medications Risks/Benefits:      Risks, Benefits And Possible Side Effects Of Medications:    Risks, benefits, and possible side effects of medications explained to Zulma and she (or legal representative) verbalizes understanding and agreement for treatment.    Controlled Medication Discussion:     Not applicable      History of Present Illness     Zulma is seen today for a follow up for Virtual Regular Visit, Follow-up, Medication Management, Depression, and Anxiety and insomnia. Her son, Rhett, also presents and helps with the history. Unfortunately she has had several increased stressors related to the house and bids on people to fix the house. She also wound up having to pay a  to help with one of these various processes when she was told that it would be covered by whatever service she was using and this was a major expense. She is very anxious about \"the future\" since she doesn't know \"what will happen\". The trazodone increase was well tolerated but hasn't helped.     Past medication trials:  Prozac, Ambien (suicide attempt)     She denies any suicidal ideation, intent or plan at present; denies any homicidal ideation, intent or plan at present.    She denies any auditory hallucinations, denies any visual hallucinations, denies any delusions.    She denies any side effects from current psychiatric medications.    HPI ROS Appetite Changes and Sleep:     She reports normal sleep, normal appetite, normal energy level    Review Of Systems: A review of systems is obtained and is negative " "except for the pertinent positives listed in HPI/Subjective above.      Current Rating Scores:     None completed today.    Areas of Improvement: reviewed in HPI/Subjective Section and reviewed in Assessment and Plan Section    Past Medical History:   Diagnosis Date    Acid reflux     Anxiety     Arthritis     Asthma     Cardiac disease     Chronic kidney disease     passed on own kidney stone    Depression     Diverticulitis     GERD (gastroesophageal reflux disease)     Hayfever     [place] (hard of hearing)     bilateral hearing aids    Hyperlipemia     Hypertension     Panic attack     Tachycardia         Past Surgical History:   Procedure Laterality Date    ABLATION OF DYSRHYTHMIC FOCUS      APPENDECTOMY      CHOLECYSTECTOMY      open    FRACTURE SURGERY      ORIF fx (L) tibia, fibula 2009    GASTRIC BYPASS OPEN      HYSTERECTOMY      IL LAPS ABD PRTM&OMENTUM DX W/WO SPEC BR/WA SPX N/A 12/16/2024    Procedure: LAPAROSCOPY DIAGNOSTIC,  LYSIS OF ADHESIONS, REPAIR PERFORATED MARGINAL ULCER, INTRA-OP EGD;  Surgeon: Alex Mohr MD;  Location: [facility] MAIN OR;  Service: Bariatrics    IL XCAPSL CTRC RMVL INSJ IO LENS PROSTH W/O ECP Left 4/18/2016    Procedure: EXTRACTION EXTRACAPSULAR CATARACT PHACO INTRAOCULAR LENS (IOL);  Surgeon: Dane Wells MD;  Location: [facility] MAIN OR;  Service: Ophthalmology    IL XCAPSL CTRC RMVL INSJ IO LENS PROSTH W/O ECP Right 3/1/2021    Procedure: EXTRACTION EXTRACAPSULAR CATARACT PHACO INTRAOCULAR LENS (IOL);  Surgeon: Dane Wells MD;  Location: [facility] MAIN OR;  Service: Ophthalmology    TONSILECTOMY AND ADNOIDECTOMY       Allergies:   Allergies   Allergen Reactions    Augmentin [Amoxicillin-Pot Clavulanate] GI Intolerance, Other (See Comments) and Hives     VOMITING  VOMITING  Reaction Date: 07Dec2004;     Sulfa Antibiotics Itching, Other (See Comments) and Hives     RASH, ITCHY  RASH, ITCHY    Morphine Other (See Comments)     \"DOESN'T WORK\"    Latex Rash     Unsure if this is an " allergy       Current Outpatient Medications   Medication Sig Dispense Refill    busPIRone (BUSPAR) 7.5 mg tablet Take 1 tablet (7.5 mg total) by mouth 3 (three) times a day 270 tablet 1    albuterol (PROVENTIL HFA,VENTOLIN HFA) 90 mcg/act inhaler Inhale 2 puffs every 6 (six) hours as needed for wheezing 20.4 g 3    allopurinol (ZYLOPRIM) 100 mg tablet TAKE ONE TABLET BY MOUTH EVERY DAY 90 tablet 1    amLODIPine (NORVASC) 10 mg tablet Take 1 tablet (10 mg total) by mouth daily 90 tablet 3    buPROPion (WELLBUTRIN XL) 150 mg 24 hr tablet Take 2 tablets daily in the morning 180 tablet 2    dextromethorphan-guaifenesin (MUCINEX DM)  MG per 12 hr tablet Take 1 tablet by mouth every 12 (twelve) hours as needed for cough 30 tablet 0    donepezil (ARICEPT) 10 mg tablet Take 1 tablet (10 mg total) by mouth daily at bedtime 90 tablet 3    escitalopram (LEXAPRO) 20 mg tablet Take 1 tablet (20 mg total) by mouth daily 90 tablet 1    fluticasone (FLONASE) 50 mcg/act nasal spray 1 spray into each nostril 2 (two) times a day 9.9 mL 1    Ketotifen Fumarate (ZADITOR) 0.035 % ophthalmic solution Administer 1 drop to both eyes 2 (two) times a day (Patient not taking: Reported on 2/19/2025) 3 mL 1    nebivolol (BYSTOLIC) 5 mg tablet Take 1 tablet (5 mg total) by mouth daily 90 tablet 1    ondansetron (ZOFRAN-ODT) 4 mg disintegrating tablet Take 1 tablet (4 mg total) by mouth every 6 (six) hours as needed for nausea for up to 3 days 20 tablet 0    pantoprazole (PROTONIX) 40 mg tablet Take 1 tablet (40 mg total) by mouth 2 (two) times a day 180 tablet 1    pantoprazole (PROTONIX) 40 mg tablet Take 1 tablet (40 mg total) by mouth 2 (two) times a day for 14 days (Patient not taking: Reported on 3/19/2025) 28 tablet 0    traZODone (DESYREL) 100 mg tablet Take 1 tablet (100 mg total) by mouth daily at bedtime 90 tablet 1     Current Facility-Administered Medications   Medication Dose Route Frequency Provider Last Rate Last Admin     triamcinolone acetonide (Kenalog-40) 40 mg/mL injection 20 mg  20 mg Intra-articular     20 mg at 25 1630       Substance Abuse History:    Social History     Substance and Sexual Activity   Alcohol Use Yes    Comment: rarely     Social History     Substance and Sexual Activity   Drug Use Not Currently    Types: Marijuana       Social History:    Social History     Socioeconomic History    Marital status:      Spouse name: Not on file    Number of children: Not on file    Years of education: Not on file    Highest education level: Not on file   Occupational History    Not on file   Tobacco Use    Smoking status: Former     Current packs/day: 0.00     Average packs/day: 1 pack/day for 67.1 years (67.1 ttl pk-yrs)     Types: Cigarettes     Start date: 6/15/1956     Quit date: 2023     Years since quittin.6     Passive exposure: Past    Smokeless tobacco: Never   Vaping Use    Vaping status: Never Used   Substance and Sexual Activity    Alcohol use: Yes     Comment: rarely    Drug use: Not Currently     Types: Marijuana    Sexual activity: Never   Other Topics Concern    Not on file   Social History Narrative    Not on file     Social Drivers of Health     Financial Resource Strain: High Risk (2023)    Overall Financial Resource Strain (CARDIA)     Difficulty of Paying Living Expenses: Hard   Food Insecurity: No Food Insecurity (2024)    Nursing - Inadequate Food Risk Classification     Worried About Running Out of Food in the Last Year: Never true     Ran Out of Food in the Last Year: Never true     Ran Out of Food in the Last Year: Never true   Recent Concern: Food Insecurity - Food Insecurity Present (2024)    Nursing - Inadequate Food Risk Classification     Worried About Running Out of Food in the Last Year: Never true     Ran Out of Food in the Last Year: Never true     Ran Out of Food in the Last Year: Often true   Transportation Needs: No Transportation Needs (2024)     Nursing - Transportation Risk Classification     Lack of Transportation: Not on file     Lack of Transportation: No   Physical Activity: Not on file   Stress: Not on file   Social Connections: Not on file   Intimate Partner Violence: Unknown (2024)    Nursing IPS     Feels Physically and Emotionally Safe: Not on file     Physically Hurt by Someone: Not on file     Humiliated or Emotionally Abused by Someone: Not on file     Physically Hurt by Someone: No     Hurt or Threatened by Someone: No   Housing Stability: Unknown (2024)    Nursing: Inadequate Housing Risk Classification     Has Housing: Not on file     Worried About Losing Housing: Not on file     Unable to Get Utilities: Not on file     Unable to Pay for Housing in the Last Year: No     Has Housin       Family Psychiatric History:     Family History   Problem Relation Age of Onset    Heart disease Mother     Stroke Son     Stroke Maternal Grandfather     Breast cancer Daughter 40       Medical History Reviewed by provider this encounter:  Tobacco  Allergies  Meds  Problems  Med Hx  Surg Hx  Fam Hx          Objective   LMP  (LMP Unknown)      Mental Status Evaluation:    Appearance age appropriate, casually dressed, looks stated age   Behavior cooperative, appears anxious   Speech normal rate, normal volume, normal pitch, spontaneous   Mood more anxious   Affect mood-congruent   Thought Processes organized, goal directed   Thought Content no overt delusions   Perceptual Disturbances: no auditory hallucinations, no visual hallucinations   Abnormal Thoughts  Risk Potential Suicidal ideation - None  Homicidal ideation - None  Potential for aggression - No   Orientation oriented to person, place, time/date, and situation   Memory recent and remote memory grossly intact   Consciousness alert and awake   Attention Span Concentration Span attention span and concentration are age appropriate   Intellect appears to be of average intelligence    Insight intact   Judgement intact   Muscle Strength and  Gait unable to assess today due to virtual visit   Motor activity unable to assess today due to virtual visit   Language no difficulty naming common objects, no difficulty repeating a phrase, no difficulty writing a sentence   Fund of Knowledge adequate knowledge of current events  adequate fund of knowledge regarding past history  adequate fund of knowledge regarding vocabulary    Pain none   Pain Scale 0       Laboratory Results: I have personally reviewed all pertinent laboratory/tests results    Suicide/Homicide Risk Assessment:    Risk of Harm to Self:  The following ratings are based on assessment at the time of the interview  Based on today's assessment, Zulma presents the following risk of harm to self: none    Risk of Harm to Others:  The following ratings are based on assessment at the time of the interview  Based on today's assessment, Zulma presents the following risk of harm to others: none    The following interventions are recommended: Continue medication management. No other intervention changes indicated at this time.    Psychotherapy Provided:     Individual psychotherapy provided: No    Treatment Plan:    Completed and signed during the session: Not applicable - Treatment Plan to be completed by [facility] therapist    Goals: Progress towards Treatment Plan goals - Yes, progressing, as evidenced by subjective findings in HPI/Subjective Section and in Assessment and Plan Section    Depression Follow-up Plan Completed: Not applicable    Note Share:    This note was not shared with the patient due to reasonable likelihood of causing patient harm    Administrative Statements   Encounter provider Alis Kearns    The Patient is located at Home and in the following state in which I hold an active license [place].    The patient was identified by name and date of birth. Zulma Marcelo was informed that this is a telemedicine  visit and that the visit is being conducted through the Epic Embedded platform. She agrees to proceed..  My office door was closed. No one else was in the room.  She acknowledged consent and understanding of privacy and security of the video platform. The patient has agreed to participate and understands they can discontinue the visit at any time.    I have spent a total time of 15 minutes in caring for this patient on the day of the visit/encounter including Prognosis, Risks and benefits of tx options, Instructions for management, Patient and family education, Importance of tx compliance, Impressions, Counseling / Coordination of care, Reviewing/placing orders in the medical record (including tests, medications, and/or procedures), and Obtaining or reviewing history  , not including the time spent for establishing the audio/video connection.    Visit Time  Visit Start Time: 4:00 PM  Visit Stop Time: 4:15 PM  Total Visit Duration:  15 minutes    Alis Kearns 04/01/25

## 2025-04-02 ENCOUNTER — TELEPHONE (OUTPATIENT)
Dept: PSYCHIATRY | Facility: CLINIC | Age: 75
End: 2025-04-02

## 2025-04-02 ENCOUNTER — TELEPHONE (OUTPATIENT)
Dept: BEHAVIORAL HEALTH UNIT | Facility: HOSPITAL | Age: 75
End: 2025-04-02

## 2025-04-02 NOTE — TELEPHONE ENCOUNTER
Please allow at least 7-10 business days for referrals to be processed.    Pt added to CM work queue. Please send a message to our CM Pool at [phone] if Pt contacts office in regards to a referral that is being processed.

## 2025-04-02 NOTE — TELEPHONE ENCOUNTER
Received a phone call from the phone number associated with this chart 04/01/2025 @ 4:07PM.  Returned call and left  with callback number.

## 2025-04-02 NOTE — TELEPHONE ENCOUNTER
----- Edited Message from Alis Kearns sent at 4/2/2025  9:46 AM EDT -----  Regarding: RE: [employer] extension?  Are we able to help her get this extension?    Just got off with pt and her son, she said that her [employer] ICM ends on 5/22 but that her ICM (Julissa Lopez?) said that her provider should give a new referral to get a two month extension.

## 2025-04-07 ENCOUNTER — TELEMEDICINE (OUTPATIENT)
Dept: BEHAVIORAL/MENTAL HEALTH CLINIC | Facility: CLINIC | Age: 75
End: 2025-04-07
Payer: MEDICARE

## 2025-04-07 DIAGNOSIS — F33.1 MODERATE EPISODE OF RECURRENT MAJOR DEPRESSIVE DISORDER (HCC): Primary | ICD-10-CM

## 2025-04-07 PROCEDURE — 90834 PSYTX W PT 45 MINUTES: CPT | Performed by: SOCIAL WORKER

## 2025-04-07 NOTE — PSYCH
"Virtual Regular VisitName: Zulma Marcelo      : 1950      MRN: [id number]  Encounter Provider: Vannesa Stone LCSW  Encounter Date: 2025   Encounter department: [facility] THERAP[facility]  :  Assessment & Plan  Moderate episode of recurrent major depressive disorder (HCC)             Goals addressed in session: Goal 1     DATA: Zulma reports feeling well overall. She reports feeling in pain today due to the weather and as such, is taking it easy. Reports that she is otherwise in a \"wait and see\" kind of mode wherein she has lots of work pending and there is nothing more for her to do other than wait. Admittingly this contributes to anxiety but she is working through it with deep breathing and redirection. Rhett continues to be a big support to her as well as this office. She is hopeful that she will have [employer] reinstated for a period of time as she does acknowledge needing additional support and check ins. No new symptoms.   During this session, this clinician used the following therapeutic modalities: Supportive Psychotherapy    Substance Abuse was not addressed during this session. If the client is diagnosed with a co-occurring substance use disorder, please indicate any changes in the frequency or amount of use: NA. Stage of change for addressing substance use diagnoses: No substance use/Not applicable    ASSESSMENT:  Zulma presents with a Euthymic/ normal mood. Jackies affect is Normal range and intensity, which is congruent, with their mood and the content of the session. The client has made progress on their goals as evidenced by increased insight and improved distress tolerance.    Zulma presents with a none risk of suicide, none risk of self-harm, and none risk of harm to others.    For any risk assessment that surpasses a \"low\" rating, a safety plan must be developed.    A safety plan was indicated: no  If yes, describe in detail NA    PLAN: Between " sessions, Zulma will take medication as prescribed and practice positive coping strategies as needed . At the next session, the therapist will use Supportive Psychotherapy to address stressors.    Behavioral Health Treatment Plan [facility]: Diagnosis and Treatment Plan explained to Zulma, Zulma relates understanding diagnosis and is agreeable to Treatment Plan. Yes     Depression Follow-up Plan Completed: Not applicable     Reason for visit is No chief complaint on file.     Recent Visits  Date Type Provider Dept   03/31/25 Telemedicine Vannesa Stone LCSW [facility] Therapist [facility]   Showing recent visits within past 7 days and meeting all other requirements  Today's Visits  Date Type Provider Dept   04/07/25 Telemedicine Vannesa Stone LCSW [facility] Therapist [facility]   Showing today's visits and meeting all other requirements  Future Appointments  No visits were found meeting these conditions.  Showing future appointments within next 150 days and meeting all other requirements     History of Present Illness     HPI    Past Medical History   Past Medical History:   Diagnosis Date    Acid reflux     Anxiety     Arthritis     Asthma     Cardiac disease     Chronic kidney disease     passed on own kidney stone    Depression     Diverticulitis     GERD (gastroesophageal reflux disease)     Hayfever     [place] (hard of hearing)     bilateral hearing aids    Hyperlipemia     Hypertension     Panic attack     Tachycardia      Past Surgical History:   Procedure Laterality Date    ABLATION OF DYSRHYTHMIC FOCUS      APPENDECTOMY      CHOLECYSTECTOMY      open    FRACTURE SURGERY      ORIF fx (L) tibia, fibula 2009    GASTRIC BYPASS OPEN      HYSTERECTOMY      CT LAPS ABD PRTM&OMENTUM DX W/WO SPEC BR/WA SPX N/A 12/16/2024    Procedure: LAPAROSCOPY DIAGNOSTIC,  LYSIS OF ADHESIONS, REPAIR PERFORATED MARGINAL ULCER, INTRA-OP EGD;  Surgeon: Alex Mohr MD;  Location: [facility];  Service:  "Bariatrics    KS XCAPSL CTRC RMVL INSJ IO LENS PROSTH W/O ECP Left 4/18/2016    Procedure: EXTRACTION EXTRACAPSULAR CATARACT PHACO INTRAOCULAR LENS (IOL);  Surgeon: Dane Wells MD;  Location: [facility] MAIN OR;  Service: Ophthalmology    [facility] W/O ECP Right 3/1/2021    Procedure: EXTRACTION EXTRACAPSULAR CATARACT PHACO INTRAOCULAR LENS (IOL);  Surgeon: Daen Wells MD;  Location: [facility] MAIN OR;  Service: Ophthalmology    TONSILECTOMY AND ADNOIDECTOMY       Current Outpatient Medications   Medication Instructions    albuterol (PROVENTIL HFA,VENTOLIN HFA) 90 mcg/act inhaler 2 puffs, Inhalation, Every 6 hours PRN    allopurinol (ZYLOPRIM) 100 mg, Oral, Daily    amLODIPine (NORVASC) 10 mg, Oral, Daily    buPROPion (WELLBUTRIN XL) 150 mg 24 hr tablet Take 2 tablets daily in the morning    busPIRone (BUSPAR) 7.5 mg, Oral, 3 times daily    dextromethorphan-guaifenesin (MUCINEX DM)  MG per 12 hr tablet 1 tablet, Oral, Every 12 hours PRN    donepezil (ARICEPT) 10 mg, Oral, Daily at bedtime    escitalopram (LEXAPRO) 20 mg, Oral, Daily    fluticasone (FLONASE) 50 mcg/act nasal spray 1 spray, Nasal, 2 times daily    Ketotifen Fumarate (ZADITOR) 0.035 % ophthalmic solution 1 drop, Both Eyes, 2 times daily    nebivolol (BYSTOLIC) 5 mg, Oral, Daily    ondansetron (ZOFRAN-ODT) 4 mg, Oral, Every 6 hours PRN    pantoprazole (PROTONIX) 40 mg, Oral, 2 times daily    pantoprazole (PROTONIX) 40 mg, Oral, 2 times daily    traZODone (DESYREL) 100 mg, Oral, Daily at bedtime     Allergies   Allergen Reactions    Augmentin [Amoxicillin-Pot Clavulanate] GI Intolerance, Other (See Comments) and Hives     VOMITING  VOMITING  Reaction Date: 07Dec2004;     Sulfa Antibiotics Itching, Other (See Comments) and Hives     RASH, ITCHY  RASH, ITCHY    Morphine Other (See Comments)     \"DOESN'T WORK\"    Latex Rash     Unsure if this is an allergy       Objective   LMP  (LMP Unknown)     Video Exam  Physical Exam "     Administrative Statements   Encounter provider Vannesa Stone LCSW    The Patient is located at Home and in the following state in which I hold an active license [place].    The patient was identified by name and date of birth. Zulma CARD Davian was informed that this is a telemedicine visit and that the visit is being conducted through the Epic Embedded platform. She agrees to proceed..  My office door was closed. No one else was in the room.  She acknowledged consent and understanding of privacy and security of the video platform. The patient has agreed to participate and understands they can discontinue the visit at any time.    Visit Time  Start Time: 1000  Stop Time: 1050  Total Visit Time: 50 minutes

## 2025-04-14 ENCOUNTER — TELEPHONE (OUTPATIENT)
Age: 75
End: 2025-04-14

## 2025-04-14 NOTE — TELEPHONE ENCOUNTER
Patients son called to check if appointment was today.     Writer confirmed correction in chart.    Writer confirmed patient has an appointment for 4/21/25 at 10am.

## 2025-04-21 ENCOUNTER — TELEMEDICINE (OUTPATIENT)
Dept: BEHAVIORAL/MENTAL HEALTH CLINIC | Facility: CLINIC | Age: 75
End: 2025-04-21
Payer: MEDICARE

## 2025-04-21 DIAGNOSIS — F33.1 MODERATE EPISODE OF RECURRENT MAJOR DEPRESSIVE DISORDER (HCC): Primary | ICD-10-CM

## 2025-04-21 DIAGNOSIS — F51.01 PRIMARY INSOMNIA: ICD-10-CM

## 2025-04-21 PROCEDURE — 90834 PSYTX W PT 45 MINUTES: CPT | Performed by: SOCIAL WORKER

## 2025-04-21 RX ORDER — TRAZODONE HYDROCHLORIDE 100 MG/1
100 TABLET ORAL
Qty: 90 TABLET | Refills: 1 | Status: SHIPPED | OUTPATIENT
Start: 2025-04-21 | End: 2025-10-18

## 2025-04-21 NOTE — TELEPHONE ENCOUNTER
Medication Refill Request     Name of Medication Trazodone  Dose/Frequency 100mg daily at bedtime  Quantity 90  Verified pharmacy   [x]  Verified ordering Provider   [x]  Does patient have enough for the next 3 days? Yes [] No [x]  Does patient have a follow-up appointment scheduled? Yes [] No []   If so when is appointment: 5/2/25 at 2:30p      Pt is also requesting a script go to the local pharmacy [facility] as she is completely out as the mail order can take about 14 days to arrive.

## 2025-04-21 NOTE — PSYCH
"Virtual Regular VisitName: Zulma Marcelo      : 1950      MRN: [id number]  Encounter Provider: Vannesa Stone LCSW  Encounter Date: 2025   Encounter department: [facility]  :  Assessment & Plan  Moderate episode of recurrent major depressive disorder (HCC)             Goals addressed in session: Goal 1     DATA: Zulma reports feeling well stating that she got a new haircut which is making her feel more put together. This, in addition to the increase in Wellbutrin, is helping her feel more optimistic towards the future. She is able to tolerate her waiting of several pending projects with more ease. She saw pictures and videos of the baby which is making her feel more connected to the family although she still remains understanding and accepting of the fact that there will always be a limit to their closeness, as per her report. No new complaints or acute issues. Return in 2 weeks.   During this session, this clinician used the following therapeutic modalities: Supportive Psychotherapy    Substance Abuse was not addressed during this session. If the client is diagnosed with a co-occurring substance use disorder, please indicate any changes in the frequency or amount of use: NA. Stage of change for addressing substance use diagnoses: No substance use/Not applicable    ASSESSMENT:  Zulma presents with a Euthymic/ normal mood. Zulma's affect is Normal range and intensity, which is congruent, with their mood and the content of the session. The client has made progress on their goals as evidenced by decreased depression.    Zulma presents with a none risk of suicide, none risk of self-harm, and none risk of harm to others.    For any risk assessment that surpasses a \"low\" rating, a safety plan must be developed.    A safety plan was indicated: no  If yes, describe in detail NA    PLAN: Between sessions, Zulma will take medication as prescribed and practice " positive coping strategies as needed . At the next session, the therapist will use Supportive Psychotherapy to address stressors.    Behavioral Health Treatment Plan [facility]: Diagnosis and Treatment Plan explained to Zulma, Zulma relates understanding diagnosis and is agreeable to Treatment Plan. Yes     Depression Follow-up Plan Completed: No     Reason for visit is No chief complaint on file.     Recent Visits  No visits were found meeting these conditions.  Showing recent visits within past 7 days and meeting all other requirements  Today's Visits  Date Type Provider Dept   04/21/25 Telemedicine Vannesa Stone LCSW [facility]   Showing today's visits and meeting all other requirements  Future Appointments  No visits were found meeting these conditions.  Showing future appointments within next 150 days and meeting all other requirements     History of Present Illness     HPI    Past Medical History   Past Medical History:   Diagnosis Date    Acid reflux     Anxiety     Arthritis     Asthma     Cardiac disease     Chronic kidney disease     passed on own kidney stone    Depression     Diverticulitis     GERD (gastroesophageal reflux disease)     Hayfever     [place] (hard of hearing)     bilateral hearing aids    Hyperlipemia     Hypertension     Panic attack     Tachycardia      Past Surgical History:   Procedure Laterality Date    ABLATION OF DYSRHYTHMIC FOCUS      APPENDECTOMY      CHOLECYSTECTOMY      open    FRACTURE SURGERY      ORIF fx (L) tibia, fibula 2009    GASTRIC BYPASS OPEN      HYSTERECTOMY      MI LAPS ABD PRTM&OMENTUM DX W/WO SPEC BR/WA SPX N/A 12/16/2024    Procedure: LAPAROSCOPY DIAGNOSTIC,  LYSIS OF ADHESIONS, REPAIR PERFORATED MARGINAL ULCER, INTRA-OP EGD;  Surgeon: Alex Mohr MD;  Location: [facility];  Service: Bariatrics    MI XCAPSL CTRC RMVL INSJ IO LENS PROSTH W/O ECP Left 4/18/2016    Procedure: EXTRACTION EXTRACAPSULAR CATARACT PHACO INTRAOCULAR LENS  "(IOL);  Surgeon: Dane Wells MD;  Location: [facility] MAIN OR;  Service: Ophthalmology    MD XCAPSL CTRC RMVL INSJ IO LENS PROSTH W/O ECP Right 3/1/2021    Procedure: EXTRACTION EXTRACAPSULAR CATARACT PHACO INTRAOCULAR LENS (IOL);  Surgeon: Dane Wells MD;  Location: [facility] MAIN OR;  Service: Ophthalmology    TONSILECTOMY AND ADNOIDECTOMY       Current Outpatient Medications   Medication Instructions    albuterol (PROVENTIL HFA,VENTOLIN HFA) 90 mcg/act inhaler 2 puffs, Inhalation, Every 6 hours PRN    allopurinol (ZYLOPRIM) 100 mg, Oral, Daily    amLODIPine (NORVASC) 10 mg, Oral, Daily    buPROPion (WELLBUTRIN XL) 150 mg 24 hr tablet Take 2 tablets daily in the morning    busPIRone (BUSPAR) 7.5 mg, Oral, 3 times daily    dextromethorphan-guaifenesin (MUCINEX DM)  MG per 12 hr tablet 1 tablet, Oral, Every 12 hours PRN    donepezil (ARICEPT) 10 mg, Oral, Daily at bedtime    escitalopram (LEXAPRO) 20 mg, Oral, Daily    fluticasone (FLONASE) 50 mcg/act nasal spray 1 spray, Nasal, 2 times daily    Ketotifen Fumarate (ZADITOR) 0.035 % ophthalmic solution 1 drop, Both Eyes, 2 times daily    nebivolol (BYSTOLIC) 5 mg, Oral, Daily    ondansetron (ZOFRAN-ODT) 4 mg, Oral, Every 6 hours PRN    pantoprazole (PROTONIX) 40 mg, Oral, 2 times daily    pantoprazole (PROTONIX) 40 mg, Oral, 2 times daily    traZODone (DESYREL) 100 mg, Oral, Daily at bedtime     Allergies   Allergen Reactions    Augmentin [Amoxicillin-Pot Clavulanate] GI Intolerance, Other (See Comments) and Hives     VOMITING  VOMITING  Reaction Date: 07Dec2004;     Sulfa Antibiotics Itching, Other (See Comments) and Hives     RASH, ITCHY  RASH, ITCHY    Morphine Other (See Comments)     \"DOESN'T WORK\"    Latex Rash     Unsure if this is an allergy       Objective   LMP  (LMP Unknown)     Video Exam  Physical Exam     Administrative Statements   Encounter provider Vannesa Stone LCSW    The Patient is located at Home and in the following state in which I " hold an active license [place].    The patient was identified by name and date of birth. Zulma Marcelo was informed that this is a telemedicine visit and that the visit is being conducted through the Epic Embedded platform. She agrees to proceed..  My office door was closed. No one else was in the room.  She acknowledged consent and understanding of privacy and security of the video platform. The patient has agreed to participate and understands they can discontinue the visit at any time.      Visit Time  Start Time: 1000  Stop Time: 1050  Total Visit Time: 50 minutes

## 2025-05-05 ENCOUNTER — TELEMEDICINE (OUTPATIENT)
Dept: BEHAVIORAL/MENTAL HEALTH CLINIC | Facility: CLINIC | Age: 75
End: 2025-05-05
Payer: MEDICARE

## 2025-05-05 DIAGNOSIS — F33.1 MODERATE EPISODE OF RECURRENT MAJOR DEPRESSIVE DISORDER (HCC): Primary | ICD-10-CM

## 2025-05-05 PROCEDURE — 90832 PSYTX W PT 30 MINUTES: CPT | Performed by: SOCIAL WORKER

## 2025-05-07 NOTE — PSYCH
"Virtual Regular VisitName: Zulma Marcelo      : 1950      MRN: [id number]  Encounter Provider: Vannesa Stone LCSW  Encounter Date: 2025   Encounter department: [facility]  :  Assessment & Plan  Moderate episode of recurrent major depressive disorder (HCC)             Goals addressed in session: Goal 1     DATA: Zulma reports feeling well overall stating that funding needed to make functional improvements in her house was approved and so now her stress is dramatically reduced. This, in addition, to her medication adjustment has helped her a lot in the past couple of weeks. She reports sleeping better and overall feeling more calm. She is keeping herself occupied with watching TV, vsiting with [employer], and helping Rhett with cooking. No new symptoms or safety concerns. Is doing well.   During this session, this clinician used the following therapeutic modalities: Client-centered Therapy    Substance Abuse was not addressed during this session. If the client is diagnosed with a co-occurring substance use disorder, please indicate any changes in the frequency or amount of use: NA. Stage of change for addressing substance use diagnoses: No substance use/Not applicable    ASSESSMENT:  Zulma presents with a Euthymic/ normal mood. Zulma's affect is Normal range and intensity, which is congruent, with their mood and the content of the session. The client has made progress on their goals as evidenced by improved insight and decreased depression and anxiety.    Zulma presents with a none risk of suicide, none risk of self-harm, and none risk of harm to others.    For any risk assessment that surpasses a \"low\" rating, a safety plan must be developed.    A safety plan was indicated: no  If yes, describe in detail NA    PLAN: Between sessions, Zulma will take medication as prescribed and practice positive coping strategies as needed . At the next session, the " therapist will use Supportive Psychotherapy to address stressors.    Behavioral Health Treatment Plan [facility]: Diagnosis and Treatment Plan explained to Zulma, Zulma relates understanding diagnosis and is agreeable to Treatment Plan. Yes     Depression Follow-up Plan Completed: No     Reason for visit is No chief complaint on file.     Recent Visits  Date Type Provider Dept   05/05/25 Telemedicine Vannesa Stone LCSW [facility]   Showing recent visits within past 7 days and meeting all other requirements  Future Appointments  No visits were found meeting these conditions.  Showing future appointments within next 150 days and meeting all other requirements     History of Present Illness     HPI    Past Medical History   Past Medical History:   Diagnosis Date    Acid reflux     Anxiety     Arthritis     Asthma     Cardiac disease     Chronic kidney disease     passed on own kidney stone    Depression     Diverticulitis     GERD (gastroesophageal reflux disease)     Hayfever     [place] (hard of hearing)     bilateral hearing aids    Hyperlipemia     Hypertension     Panic attack     Tachycardia      Past Surgical History:   Procedure Laterality Date    ABLATION OF DYSRHYTHMIC FOCUS      APPENDECTOMY      CHOLECYSTECTOMY      open    FRACTURE SURGERY      ORIF fx (L) tibia, fibula 2009    GASTRIC BYPASS OPEN      HYSTERECTOMY      [place] LAPS ABD PRTM&OMENTUM DX W/WO SPEC BR/WA SPX N/A 12/16/2024    Procedure: LAPAROSCOPY DIAGNOSTIC,  LYSIS OF ADHESIONS, REPAIR PERFORATED MARGINAL ULCER, INTRA-OP EGD;  Surgeon: Alex Mohr MD;  Location: [facility];  Service: Bariatrics    VA XCAPSL CTRC RMVL INSJ IO LENS PROSTH W/O ECP Left 4/18/2016    Procedure: EXTRACTION EXTRACAPSULAR CATARACT PHACO INTRAOCULAR LENS (IOL);  Surgeon: Dane Wells MD;  Location: [facility] MAIN OR;  Service: Ophthalmology    VA XCAPSL CTRC RMVL INSJ IO LENS PROSTH W/O ECP Right 3/1/2021    Procedure: EXTRACTION  "EXTRACAPSULAR CATARACT PHACO INTRAOCULAR LENS (IOL);  Surgeon: Dane Wells MD;  Location: [facility] MAIN OR;  Service: Ophthalmology    TONSILECTOMY AND ADNOIDECTOMY       Current Outpatient Medications   Medication Instructions    albuterol (PROVENTIL HFA,VENTOLIN HFA) 90 mcg/act inhaler 2 puffs, Inhalation, Every 6 hours PRN    allopurinol (ZYLOPRIM) 100 mg, Oral, Daily    amLODIPine (NORVASC) 10 mg, Oral, Daily    buPROPion (WELLBUTRIN XL) 150 mg 24 hr tablet Take 2 tablets daily in the morning    busPIRone (BUSPAR) 7.5 mg, Oral, 3 times daily    dextromethorphan-guaifenesin (MUCINEX DM)  MG per 12 hr tablet 1 tablet, Oral, Every 12 hours PRN    donepezil (ARICEPT) 10 mg, Oral, Daily at bedtime    escitalopram (LEXAPRO) 20 mg, Oral, Daily    fluticasone (FLONASE) 50 mcg/act nasal spray 1 spray, Nasal, 2 times daily    Ketotifen Fumarate (ZADITOR) 0.035 % ophthalmic solution 1 drop, Both Eyes, 2 times daily    nebivolol (BYSTOLIC) 5 mg, Oral, Daily    ondansetron (ZOFRAN-ODT) 4 mg, Oral, Every 6 hours PRN    pantoprazole (PROTONIX) 40 mg, Oral, 2 times daily    pantoprazole (PROTONIX) 40 mg, Oral, 2 times daily    traZODone (DESYREL) 100 mg, Oral, Daily at bedtime     Allergies   Allergen Reactions    Augmentin [Amoxicillin-Pot Clavulanate] GI Intolerance, Other (See Comments) and Hives     VOMITING  VOMITING  Reaction Date: 07Dec2004;     Sulfa Antibiotics Itching, Other (See Comments) and Hives     RASH, ITCHY  RASH, ITCHY    Morphine Other (See Comments)     \"DOESN'T WORK\"    Latex Rash     Unsure if this is an allergy       Objective   LMP  (LMP Unknown)     Video Exam  Physical Exam     Administrative Statements   Encounter provider Vannesa Stone LCSW    The Patient is located at Home and in the following state in which I hold an active license [place].    The patient was identified by name and date of birth. Zulma Marcelo was informed that this is a telemedicine visit and that the visit is being " conducted through the Epic Embedded platform. She agrees to proceed..  My office door was closed. No one else was in the room.  She acknowledged consent and understanding of privacy and security of the video platform. The patient has agreed to participate and understands they can discontinue the visit at any time.        Visit Time  Start Time: 1000  Stop Time: 1016  Total Visit Time: 16 minutes

## 2025-05-12 DIAGNOSIS — K21.9 GASTROESOPHAGEAL REFLUX DISEASE WITHOUT ESOPHAGITIS: ICD-10-CM

## 2025-05-12 RX ORDER — SUCRALFATE 1 G/1
1 TABLET ORAL
Qty: 120 TABLET | Refills: 6 | Status: SHIPPED | OUTPATIENT
Start: 2025-05-12

## 2025-05-19 ENCOUNTER — TELEMEDICINE (OUTPATIENT)
Dept: BEHAVIORAL/MENTAL HEALTH CLINIC | Facility: CLINIC | Age: 75
End: 2025-05-19
Payer: MEDICARE

## 2025-05-19 DIAGNOSIS — F33.1 MODERATE EPISODE OF RECURRENT MAJOR DEPRESSIVE DISORDER (HCC): Primary | ICD-10-CM

## 2025-05-19 PROCEDURE — 90832 PSYTX W PT 30 MINUTES: CPT | Performed by: SOCIAL WORKER

## 2025-05-20 NOTE — PSYCH
"Virtual Regular VisitName: Zulma Marcelo      : 1950      MRN: [id number]  Encounter Provider: Vannesa Stone LCSW  Encounter Date: 2025   Encounter department: [facility]  :  Assessment & Plan  Moderate episode of recurrent major depressive disorder (HCC)             Goals addressed in session: Goal 1     DATA: Zulma reports feeling well overall stating that not much is going on with regard to stressors. Work has begun on her home which is a huge source of relief. She also has a cleaning person and her laundry service coming over today as well. As a result she feels much more on top of things and supported. She reports feeling like she and Rhett can survive this way. She denied any major symptoms of depression and anxiety stating that her increase in medication has helped a lot. She is not currently struggling with automatic negative thoughts. No safety concerns. Return in 2 weeks.   During this session, this clinician used the following therapeutic modalities: Supportive Psychotherapy    Substance Abuse was not addressed during this session. If the client is diagnosed with a co-occurring substance use disorder, please indicate any changes in the frequency or amount of use: NA. Stage of change for addressing substance use diagnoses: No substance use/Not applicable    ASSESSMENT:  Zulma presents with a Euthymic/ normal mood. Zulma's affect is Normal range and intensity, which is congruent, with their mood and the content of the session. The client has made progress on their goals as evidenced by increased insight and decrease in symptoms.    Zulma presents with a none risk of suicide, none risk of self-harm, and none risk of harm to others.    For any risk assessment that surpasses a \"low\" rating, a safety plan must be developed.    A safety plan was indicated: no  If yes, describe in detail NA    PLAN: Between sessions, Zulma will take medication as " prescribed and practice positive coping strategies as needed . At the next session, the therapist will use Supportive Psychotherapy to address stressors.    Behavioral Health Treatment Plan [facility]: Diagnosis and Treatment Plan explained to Zulma, Zulma relates understanding diagnosis and is agreeable to Treatment Plan. Yes     Depression Follow-up Plan Completed: Yes     Reason for visit is No chief complaint on file.     Recent Visits  Date Type Provider Dept   05/19/25 Telemedicine Vannesa Stone LCSW [facility]rap[facility]   Showing recent visits within past 7 days and meeting all other requirements  Future Appointments  No visits were found meeting these conditions.  Showing future appointments within next 150 days and meeting all other requirements     History of Present Illness     HPI    Past Medical History   Past Medical History:   Diagnosis Date    Acid reflux     Anxiety     Arthritis     Asthma     Cardiac disease     Chronic kidney disease     passed on own kidney stone    Depression     Diverticulitis     GERD (gastroesophageal reflux disease)     Hayfever     [place] (hard of hearing)     bilateral hearing aids    Hyperlipemia     Hypertension     Panic attack     Tachycardia      Past Surgical History:   Procedure Laterality Date    ABLATION OF DYSRHYTHMIC FOCUS      APPENDECTOMY      CHOLECYSTECTOMY      open    FRACTURE SURGERY      ORIF fx (L) tibia, fibula 2009    GASTRIC BYPASS OPEN      HYSTERECTOMY      [place] LAPS ABD PRTM&OMENTUM DX W/WO SPEC BR/WA SPX N/A 12/16/2024    Procedure: LAPAROSCOPY DIAGNOSTIC,  LYSIS OF ADHESIONS, REPAIR PERFORATED MARGINAL ULCER, INTRA-OP EGD;  Surgeon: Alex Mohr MD;  Location: [facility];  Service: Bariatrics    PA XCAPSL CTRC RMVL INSJ IO LENS PROSTH W/O ECP Left 4/18/2016    Procedure: EXTRACTION EXTRACAPSULAR CATARACT PHACO INTRAOCULAR LENS (IOL);  Surgeon: Dane Wells MD;  Location: [facility] MAIN OR;  Service: Ophthalmology    PA  XCAPSL CTRC RMVL INSJ IO LENS PROSTH W/O ECP Right 3/1/2021    Procedure: EXTRACTION EXTRACAPSULAR CATARACT PHACO INTRAOCULAR LENS (IOL);  Surgeon: Dane Wells MD;  Location: [facility] MAIN OR;  Service: Ophthalmology    TONSILECTOMY AND ADNOIDECTOMY       Current Outpatient Medications   Medication Instructions    albuterol (PROVENTIL HFA,VENTOLIN HFA) 90 mcg/act inhaler 2 puffs, Inhalation, Every 6 hours PRN    allopurinol (ZYLOPRIM) 100 mg, Oral, Daily    amLODIPine (NORVASC) 10 mg, Oral, Daily    buPROPion (WELLBUTRIN XL) 150 mg 24 hr tablet Take 2 tablets daily in the morning    busPIRone (BUSPAR) 7.5 mg, Oral, 3 times daily    dextromethorphan-guaifenesin (MUCINEX DM)  MG per 12 hr tablet 1 tablet, Oral, Every 12 hours PRN    donepezil (ARICEPT) 10 mg, Oral, Daily at bedtime    escitalopram (LEXAPRO) 20 mg, Oral, Daily    fluticasone (FLONASE) 50 mcg/act nasal spray 1 spray, Nasal, 2 times daily    Ketotifen Fumarate (ZADITOR) 0.035 % ophthalmic solution 1 drop, Both Eyes, 2 times daily    nebivolol (BYSTOLIC) 5 mg, Oral, Daily    ondansetron (ZOFRAN-ODT) 4 mg, Oral, Every 6 hours PRN    pantoprazole (PROTONIX) 40 mg, Oral, 2 times daily    pantoprazole (PROTONIX) 40 mg, Oral, 2 times daily    sucralfate (CARAFATE) 1 g, Oral, 4 times daily (before meals and at bedtime)    traZODone (DESYREL) 100 mg, Oral, Daily at bedtime     Allergies[1]    Objective   LMP  (LMP Unknown)     Video Exam  Physical Exam     Administrative Statements   Encounter provider Vannesa Stone LCSW    The Patient is located at Home and in the following state in which I hold an active license [place].    The patient was identified by name and date of birth. Zulma Marcelo was informed that this is a telemedicine visit and that the visit is being conducted through the Epic Embedded platform. She agrees to proceed..  My office door was closed. No one else was in the room.  She acknowledged consent and understanding of privacy and  "security of the video platform. The patient has agreed to participate and understands they can discontinue the visit at any time.        Visit Time  Start Time: 1000  Stop Time: 1016  Total Visit Time: 16 minutes       [1]   Allergies  Allergen Reactions    Augmentin [Amoxicillin-Pot Clavulanate] GI Intolerance, Other (See Comments) and Hives     VOMITING  VOMITING  Reaction Date: 07Dec2004;     Sulfa Antibiotics Itching, Other (See Comments) and Hives     RASH, ITCHY  RASH, ITCHY    Morphine Other (See Comments)     \"DOESN'T WORK\"    Latex Rash     Unsure if this is an allergy     "

## 2025-06-02 ENCOUNTER — TELEMEDICINE (OUTPATIENT)
Dept: BEHAVIORAL/MENTAL HEALTH CLINIC | Facility: CLINIC | Age: 75
End: 2025-06-02
Payer: MEDICARE

## 2025-06-02 DIAGNOSIS — F33.1 MODERATE EPISODE OF RECURRENT MAJOR DEPRESSIVE DISORDER (HCC): Primary | ICD-10-CM

## 2025-06-02 PROCEDURE — 90832 PSYTX W PT 30 MINUTES: CPT | Performed by: SOCIAL WORKER

## 2025-06-02 NOTE — ASSESSMENT & PLAN NOTE
Doing well on lexapro with prn xanax, she will continue  ED  Recommendation:  ED- Rec. (06/02/25 1511 : Kasey Almonte RN)

## 2025-06-02 NOTE — PSYCH
"Virtual Regular VisitName: Zulma Marcelo      : 1950      MRN: [id number]  Encounter Provider: Vannesa Stone LCSW  Encounter Date: 2025   Encounter department: [facility] THERAPIST [facility]  :  Assessment & Plan  Moderate episode of recurrent major depressive disorder (HCC)             Goals addressed in session: Goal 1     DATA: Zulma reports feeling well overall stating that a lot has happened with regard to her house projects. She discussed several tasks being in the works and as a result, she can now \"sleep at night\". She discussed feeling a tremendous weight lifted off of her shoulders which in many ways has been shocking for her because she feels overwhelmed with how fast time has flown by. Her mood is consistently content and she is future oriented. She did however have to end the call early because her dad got out of the back door and needed to assist Rhett in finding her. Confirmed next appointment in 2 weeks. Is doing well overall; no safety concerns.   During this session, this clinician used the following therapeutic modalities: Supportive Psychotherapy    Substance Abuse was not addressed during this session. If the client is diagnosed with a co-occurring substance use disorder, please indicate any changes in the frequency or amount of use: NA. Stage of change for addressing substance use diagnoses: No substance use/Not applicable    ASSESSMENT:  Zulma presents with a Euthymic/ normal mood. Jackies affect is Normal range and intensity, which is congruent, with their mood and the content of the session. The client has made progress on their goals as evidenced by increased mood and distress tolerance.    Zulma presents with a none risk of suicide, none risk of self-harm, and none risk of harm to others.    For any risk assessment that surpasses a \"low\" rating, a safety plan must be developed.    A safety plan was indicated: no  If yes, describe in detail " NA    PLAN: Between sessions, Zulma will take medication as prescribed and practice positive coping strategies as needed . At the next session, the therapist will use Supportive Psychotherapy to address stressors.    Behavioral Health Treatment Plan [facility]: Diagnosis and Treatment Plan explained to Zulma, Zulma relates understanding diagnosis and is agreeable to Treatment Plan. Yes     Depression Follow-up Plan Completed: No     Reason for visit is No chief complaint on file.     Recent Visits  No visits were found meeting these conditions.  Showing recent visits within past 7 days and meeting all other requirements  Today's Visits  Date Type Provider Dept   06/02/25 Telemedicine Vannesa Stone LCSW [facility] Therapist [place]   Showing today's visits and meeting all other requirements  Future Appointments  No visits were found meeting these conditions.  Showing future appointments within next 150 days and meeting all other requirements     History of Present Illness     HPI    Past Medical History   Past Medical History[1]  Past Surgical History[2]  Current Outpatient Medications   Medication Instructions    albuterol (PROVENTIL HFA,VENTOLIN HFA) 90 mcg/act inhaler 2 puffs, Inhalation, Every 6 hours PRN    allopurinol (ZYLOPRIM) 100 mg, Oral, Daily    amLODIPine (NORVASC) 10 mg, Oral, Daily    buPROPion (WELLBUTRIN XL) 150 mg 24 hr tablet Take 2 tablets daily in the morning    busPIRone (BUSPAR) 7.5 mg, Oral, 3 times daily    dextromethorphan-guaifenesin (MUCINEX DM)  MG per 12 hr tablet 1 tablet, Oral, Every 12 hours PRN    donepezil (ARICEPT) 10 mg, Oral, Daily at bedtime    escitalopram (LEXAPRO) 20 mg, Oral, Daily    fluticasone (FLONASE) 50 mcg/act nasal spray 1 spray, Nasal, 2 times daily    Ketotifen Fumarate (ZADITOR) 0.035 % ophthalmic solution 1 drop, Both Eyes, 2 times daily    nebivolol (BYSTOLIC) 5 mg, Oral, Daily    ondansetron (ZOFRAN-ODT) 4 mg, Oral, Every 6 hours PRN     pantoprazole (PROTONIX) 40 mg, Oral, 2 times daily    pantoprazole (PROTONIX) 40 mg, Oral, 2 times daily    sucralfate (CARAFATE) 1 g, Oral, 4 times daily (before meals and at bedtime)    traZODone (DESYREL) 100 mg, Oral, Daily at bedtime     Allergies[3]    Objective   LMP  (LMP Unknown)     Video Exam  Physical Exam     Administrative Statements   Encounter provider Vannesa Stone LCSW    The Patient is located at Home and in the following state in which I hold an active license [place].    The patient was identified by name and date of birth. Zulma Marcelo was informed that this is a telemedicine visit and that the visit is being conducted through the Epic Embedded platform. She agrees to proceed..  My office door was closed. No one else was in the room.  She acknowledged consent and understanding of privacy and security of the video platform. The patient has agreed to participate and understands they can discontinue the visit at any time.        Visit Time  Start Time: 1000  Stop Time: 1016  Total Visit Time: 16 minutes       [1]   Past Medical History:  Diagnosis Date    Acid reflux     Anxiety     Arthritis     Asthma     Cardiac disease     Chronic kidney disease     passed on own kidney stone    Depression     Diverticulitis     GERD (gastroesophageal reflux disease)     Hayfever     [place] (hard of hearing)     bilateral hearing aids    Hyperlipemia     Hypertension     Panic attack     Tachycardia    [2]   Past Surgical History:  Procedure Laterality Date    ABLATION OF DYSRHYTHMIC FOCUS      APPENDECTOMY      CHOLECYSTECTOMY      open    FRACTURE SURGERY      ORIF fx (L) tibia, fibula 2009    GASTRIC BYPASS OPEN      HYSTERECTOMY      IN LAPS ABD PRTM&OMENTUM DX W/WO SPEC BR/WA SPX N/A 12/16/2024    Procedure: LAPAROSCOPY DIAGNOSTIC,  LYSIS OF ADHESIONS, REPAIR PERFORATED MARGINAL ULCER, INTRA-OP EGD;  Surgeon: Alex Mohr MD;  Location: [facility];  Service: Bariatrics    IN XCAPSL CTRC RMVL INSJ IO  "LENS PROSTH W/O ECP Left 4/18/2016    Procedure: EXTRACTION EXTRACAPSULAR CATARACT PHACO INTRAOCULAR LENS (IOL);  Surgeon: Dane Wells MD;  Location: [facility] MAIN OR;  Service: Ophthalmology    [place] XCAPSL CTRC RMVL INSJ IO LENS PROSTH W/O ECP Right 3/1/2021    Procedure: EXTRACTION EXTRACAPSULAR CATARACT PHACO INTRAOCULAR LENS (IOL);  Surgeon: Dane Wells MD;  Location: [facility] MAIN OR;  Service: Ophthalmology    TONSILECTOMY AND ADNOIDECTOMY     [3]   Allergies  Allergen Reactions    Augmentin [Amoxicillin-Pot Clavulanate] GI Intolerance, Other (See Comments) and Hives     VOMITING  VOMITING  Reaction Date: 07Dec2004;     Sulfa Antibiotics Itching, Other (See Comments) and Hives     RASH, ITCHY  RASH, ITCHY    Morphine Other (See Comments)     \"DOESN'T WORK\"    Latex Rash     Unsure if this is an allergy     "

## 2025-06-06 DIAGNOSIS — K28.9 MARGINAL ULCER: ICD-10-CM

## 2025-06-06 DIAGNOSIS — R41.89 COGNITIVE IMPAIRMENT: ICD-10-CM

## 2025-06-06 DIAGNOSIS — F51.01 PRIMARY INSOMNIA: ICD-10-CM

## 2025-06-06 RX ORDER — DONEPEZIL HYDROCHLORIDE 10 MG/1
10 TABLET, FILM COATED ORAL
Qty: 90 TABLET | Refills: 3 | Status: CANCELLED | OUTPATIENT
Start: 2025-06-06 | End: 2025-12-03

## 2025-06-06 RX ORDER — TRAZODONE HYDROCHLORIDE 100 MG/1
100 TABLET ORAL
Qty: 90 TABLET | Refills: 1 | Status: CANCELLED | OUTPATIENT
Start: 2025-06-06 | End: 2025-12-03

## 2025-06-06 RX ORDER — PANTOPRAZOLE SODIUM 40 MG/1
40 TABLET, DELAYED RELEASE ORAL 2 TIMES DAILY
Qty: 180 TABLET | Refills: 1 | Status: CANCELLED | OUTPATIENT
Start: 2025-06-06

## 2025-06-06 RX ORDER — DONEPEZIL HYDROCHLORIDE 10 MG/1
10 TABLET, FILM COATED ORAL
Qty: 90 TABLET | Refills: 1 | Status: SHIPPED | OUTPATIENT
Start: 2025-06-06 | End: 2025-12-03

## 2025-06-06 RX ORDER — PANTOPRAZOLE SODIUM 40 MG/1
40 TABLET, DELAYED RELEASE ORAL 2 TIMES DAILY
Qty: 180 TABLET | Refills: 1 | Status: SHIPPED | OUTPATIENT
Start: 2025-06-06

## 2025-06-06 NOTE — TELEPHONE ENCOUNTER
Patient requesting refills for  Requested Prescriptions     Pending Prescriptions Disp Refills    donepezil (ARICEPT) 10 mg tablet 90 tablet 3     Sig: Take 1 tablet (10 mg total) by mouth daily at bedtime    pantoprazole (PROTONIX) 40 mg tablet 180 tablet 1     Sig: Take 1 tablet (40 mg total) by mouth 2 (two) times a day

## 2025-06-09 ENCOUNTER — TELEPHONE (OUTPATIENT)
Age: 75
End: 2025-06-09

## 2025-06-09 NOTE — TELEPHONE ENCOUNTER
Patient contacted the office to schedule a follow up visit with provider. Patient is now scheduled for 6/12  at 2;30 virtually.

## 2025-06-12 ENCOUNTER — TELEMEDICINE (OUTPATIENT)
Dept: PSYCHIATRY | Facility: CLINIC | Age: 75
End: 2025-06-12
Payer: MEDICARE

## 2025-06-12 DIAGNOSIS — F51.01 PRIMARY INSOMNIA: ICD-10-CM

## 2025-06-12 DIAGNOSIS — F41.1 GENERALIZED ANXIETY DISORDER: ICD-10-CM

## 2025-06-12 DIAGNOSIS — F33.1 MODERATE EPISODE OF RECURRENT MAJOR DEPRESSIVE DISORDER (HCC): Primary | Chronic | ICD-10-CM

## 2025-06-12 PROCEDURE — G2211 COMPLEX E/M VISIT ADD ON: HCPCS | Performed by: PHYSICIAN ASSISTANT

## 2025-06-12 PROCEDURE — 99214 OFFICE O/P EST MOD 30 MIN: CPT | Performed by: PHYSICIAN ASSISTANT

## 2025-06-12 NOTE — PSYCH
MEDICATION MANAGEMENT NOTE    Name: Zulma Marcelo      : 1950      MRN: [id number]  Encounter Provider: Alis Kearns  Encounter Date: 2025   Encounter department: [facility]    Insurance: Payor: MEDICARE / Plan: MEDICARE A AND B / Product Type: Medicare A & B Fee for Service /      Reason for Visit:   Chief Complaint   Patient presents with    Virtual Regular Visit    Follow-up    Medication Management    Depression    Anxiety     :  Assessment & Plan  Moderate episode of recurrent major depressive disorder (HCC)  Improving - continue escitalopram 20 mg qd, bupropion  mg qAM, buspirone 7.5 mg TID; continue talk therapy; f/u in 2 months        Generalized anxiety disorder  Improving - continue escitalopram 20 mg qd, bupropion  mg qAM, buspirone 7.5 mg TID; continue talk therapy; f/u in 2 months        Primary insomnia  Improving - continue trazodone 150 mg qhs; f/u in 2 months          Thankfully she reports she is doing much better in terms of mood, anxiety, and sleep. She is managing her current stressors better as well. She does not want any medication changes at this time.     Treatment Recommendations:    Continue current medications:     Wellbutrin  mg daily for depressive symptoms  Trazodone 150 mg qhs nightly for insomnia  Lexapro 20 mg daily for depressive symptoms   BuSpar 5 mg TID to 7.5 mg TID for anxiety    Educated about diagnosis and treatment modalities. Verbalizes understanding and agreement with the treatment plan.  Discussed self monitoring of symptoms, and symptom monitoring tools.  Discussed medications and if treatment adjustment was needed or desired.  Medication management every 2 months  Does not want any medication changes  Aware of 24 hour and weekend coverage for urgent situations accessed by calling [facility] main practice number  Continue psychotherapy with [facility] therapist Vannesa  Bertha  I am scheduling this patient out for greater than 3 months: No    Medications Risks/Benefits:      Risks, Benefits And Possible Side Effects Of Medications:    Risks, benefits, and possible side effects of medications explained to Zulma and she (or legal representative) verbalizes understanding and agreement for treatment.    Controlled Medication Discussion:     Not applicable      History of Present Illness     Zulma is seen today for a follow up for Virtual Regular Visit, Follow-up, Medication Management, Depression, and Anxiety. Since her last visit she did increase buspirone and tolerated this well. She feels her sleep, mood, and anxiety are all improved. She still has some stressors, largely related to the house, but she feels she is managing them much better than previously.     Past medication trials:  Prozac, Ambien (suicide attempt)     She denies any suicidal ideation, intent or plan at present; denies any homicidal ideation, intent or plan at present.    She denies any auditory hallucinations, denies any visual hallucinations, denies any delusions.    She denies any side effects from current psychiatric medications.    HPI ROS Appetite Changes and Sleep:     She reports adequate number of sleep hours, adequate appetite, low energy    Review Of Systems: A review of systems is obtained and is negative except for the pertinent positives listed in HPI/Subjective above.      Current Rating Scores:     None completed today.    Areas of Improvement: reviewed in HPI/Subjective Section and reviewed in Assessment and Plan Section      Past Medical History:   Diagnosis Date    Acid reflux     Anxiety     Arthritis     Asthma     Cardiac disease     Chronic kidney disease     passed on own kidney stone    Depression     Diverticulitis     GERD (gastroesophageal reflux disease)     Hayfever     [place] (hard of hearing)     bilateral hearing aids    Hyperlipemia     Hypertension     Panic attack     Tachycardia   "    Past Surgical History:   Procedure Laterality Date    ABLATION OF DYSRHYTHMIC FOCUS      APPENDECTOMY      CHOLECYSTECTOMY      open    FRACTURE SURGERY      ORIF fx (L) tibia, fibula 2009    GASTRIC BYPASS OPEN      HYSTERECTOMY      [place] LAPS ABD PRTM&OMENTUM DX W/WO SPEC BR/WA SPX N/A 12/16/2024    Procedure: LAPAROSCOPY DIAGNOSTIC,  LYSIS OF ADHESIONS, REPAIR PERFORATED MARGINAL ULCER, INTRA-OP EGD;  Surgeon: Alex Mohr MD;  Location: [facility];  Service: Bariatrics    WV XCAPSL CTRC RMVL INSJ IO LENS PROSTH W/O ECP Left 4/18/2016    Procedure: EXTRACTION EXTRACAPSULAR CATARACT PHACO INTRAOCULAR LENS (IOL);  Surgeon: Dane Wells MD;  Location: [facility] MAIN OR;  Service: Ophthalmology    WV XCAPSL CTRC RMVL INSJ IO LENS PROSTH W/O ECP Right 3/1/2021    Procedure: EXTRACTION EXTRACAPSULAR CATARACT PHACO INTRAOCULAR LENS (IOL);  Surgeon: Dane Wells MD;  Location: [facility] MAIN OR;  Service: Ophthalmology    TONSILECTOMY AND ADNOIDECTOMY       Allergies:   Allergies   Allergen Reactions    Augmentin [Amoxicillin-Pot Clavulanate] GI Intolerance, Other (See Comments) and Hives     VOMITING  VOMITING  Reaction Date: 07Dec2004;     Sulfa Antibiotics Itching, Other (See Comments) and Hives     RASH, ITCHY  RASH, ITCHY    Morphine Other (See Comments)     \"DOESN'T WORK\"    Latex Rash     Unsure if this is an allergy       Current Outpatient Medications   Medication Instructions    albuterol (PROVENTIL HFA,VENTOLIN HFA) 90 mcg/act inhaler 2 puffs, Inhalation, Every 6 hours PRN    allopurinol (ZYLOPRIM) 100 mg, Oral, Daily    amLODIPine (NORVASC) 10 mg, Oral, Daily    buPROPion (WELLBUTRIN XL) 150 mg 24 hr tablet Take 2 tablets daily in the morning    busPIRone (BUSPAR) 7.5 mg, Oral, 3 times daily    dextromethorphan-guaifenesin (MUCINEX DM)  MG per 12 hr tablet 1 tablet, Oral, Every 12 hours PRN    donepezil (ARICEPT) 10 mg, Oral, Daily at bedtime    escitalopram (LEXAPRO) 20 mg, Oral, Daily    " fluticasone (FLONASE) 50 mcg/act nasal spray 1 spray, Nasal, 2 times daily    Ketotifen Fumarate (ZADITOR) 0.035 % ophthalmic solution 1 drop, Both Eyes, 2 times daily    nebivolol (BYSTOLIC) 5 mg, Oral, Daily    ondansetron (ZOFRAN-ODT) 4 mg, Oral, Every 6 hours PRN    pantoprazole (PROTONIX) 40 mg, Oral, 2 times daily    pantoprazole (PROTONIX) 40 mg, Oral, 2 times daily    sucralfate (CARAFATE) 1 g, Oral, 4 times daily (before meals and at bedtime)    traZODone (DESYREL) 100 mg, Oral, Daily at bedtime        Substance Abuse History:    Tobacco, Alcohol and Drug Use History     Tobacco Use    Smoking status: Former     Current packs/day: 0.00     Average packs/day: 1 pack/day for 67.1 years (67.1 ttl pk-yrs)     Types: Cigarettes     Start date: 6/15/1956     Quit date: 2023     Years since quittin.8     Passive exposure: Past    Smokeless tobacco: Never   Vaping Use    Vaping status: Never Used   Substance Use Topics    Alcohol use: Yes     Comment: rarely    Drug use: Not Currently     Types: Marijuana     Alcohol Use: Not At Risk (2023)    AUDIT-C     Frequency of Alcohol Consumption: Never     Average Number of Drinks: Patient does not drink     Frequency of Binge Drinking: Never       Social History:    Social History     Socioeconomic History    Marital status:      Spouse name: Not on file    Number of children: Not on file    Years of education: Not on file    Highest education level: Not on file   Occupational History    Not on file   Other Topics Concern    Not on file   Social History Narrative    Not on file        Family Psychiatric History:     Family History   Problem Relation Name Age of Onset    Heart disease Mother      Stroke Son      Stroke Maternal Grandfather      Breast cancer Daughter  40       Medical History Reviewed by provider this encounter:  Tobacco  Allergies  Meds  Problems  Med Hx  Surg Hx  Fam Hx          Objective   LMP  (LMP Unknown)      Mental  Status Evaluation:    Appearance age appropriate, casually dressed   Behavior cooperative, calm   Speech normal rate, normal volume, normal pitch, spontaneous   Mood improved   Affect brighter   Thought Processes organized, goal directed   Thought Content no overt delusions   Perceptual Disturbances: no auditory hallucinations, no visual hallucinations   Abnormal Thoughts  Risk Potential Suicidal ideation - None  Homicidal ideation - None  Potential for aggression - No   Orientation oriented to person, place, time/date, and situation   Memory recent and remote memory grossly intact   Consciousness alert and awake   Attention Span Concentration Span attention span and concentration are age appropriate   Intellect appears to be of average intelligence   Insight intact   Judgement intact   Muscle Strength and  Gait unable to assess today due to virtual visit   Motor activity unable to assess today due to virtual visit   Language no difficulty naming common objects, no difficulty repeating a phrase, no difficulty writing a sentence   Fund of Knowledge adequate knowledge of current events  adequate fund of knowledge regarding past history  adequate fund of knowledge regarding vocabulary        Laboratory Results: I have personally reviewed all pertinent laboratory/tests results    Suicide/Homicide Risk Assessment:    Risk of Harm to Self:  The following ratings are based on assessment at the time of the interview  Based on today's assessment, Zulma presents the following risk of harm to self: none    Risk of Harm to Others:  The following ratings are based on assessment at the time of the interview  Based on today's assessment, Zulma presents the following risk of harm to others: none    The following interventions are recommended: Continue medication management. No other intervention changes indicated at this time.    Psychotherapy Provided:     Individual psychotherapy provided: No    Treatment Plan:    Completed and  "signed during the session: Not applicable - Treatment Plan to be completed by [facility] therapist.    Goals: Progress towards Treatment Plan goals - Yes, progressing, as evidenced by subjective findings in HPI/Subjective Section and in Assessment and Plan Section    Depression Follow-up Plan Completed: Not applicable    Note Share:    This note was shared with patient.    Administrative Statements   Encounter provider Alis Kearns    The Patient is located at Home and in the following state in which I hold an active license [place].    The patient was identified by name and date of birth. Zulma Marcelo was informed that this is a telemedicine visit and that the visit is being conducted through the Epic Embedded platform. She agrees to proceed..  My office door was closed. No one else was in the room.  She acknowledged consent and understanding of privacy and security of the video platform. The patient has agreed to participate and understands they can discontinue the visit at any time.    I have spent a total time of 10 minutes in caring for this patient on the day of the visit/encounter including Prognosis, Risks and benefits of tx options, Instructions for management, Patient and family education, Importance of tx compliance, Impressions, Counseling / Coordination of care, and Obtaining or reviewing history  , not including the time spent for establishing the audio/video connection.    Visit Time  Visit Start Time: 2:30 PM  Visit Stop Time: 2:40 PM  Total Visit Duration: 10 minutes    Portions of the record may have been created with voice recognition software. Occasional wrong word or \"sound a like\" substitutions may have occurred due to the inherent limitations of voice recognition software. Read the chart carefully and recognize, using context, where substitutions have occurred.    Alis Kearns 06/12/25  "

## 2025-06-12 NOTE — ASSESSMENT & PLAN NOTE
Improving - continue escitalopram 20 mg qd, bupropion  mg qAM, buspirone 7.5 mg TID; continue talk therapy; f/u in 2 months

## 2025-06-16 ENCOUNTER — TELEMEDICINE (OUTPATIENT)
Dept: BEHAVIORAL/MENTAL HEALTH CLINIC | Facility: CLINIC | Age: 75
End: 2025-06-16
Payer: MEDICARE

## 2025-06-16 DIAGNOSIS — F33.1 MODERATE EPISODE OF RECURRENT MAJOR DEPRESSIVE DISORDER (HCC): Primary | ICD-10-CM

## 2025-06-16 PROCEDURE — 90832 PSYTX W PT 30 MINUTES: CPT | Performed by: SOCIAL WORKER

## 2025-06-16 NOTE — PSYCH
"Virtual Regular VisitName: Zulma Marcelo      : 1950      MRN: [id number]  Encounter Provider: Vannesa Stone LCSW  Encounter Date: 2025   Encounter department: [facility]  :  Assessment & Plan  Moderate episode of recurrent major depressive disorder (HCC)             Goals addressed in session: Goal 1     DATA: Zulma reports feeling well overall. Has been talking to Yin more which has been nice. Recognizes that in order for them to stay in contact their interactions needs to be brief and somewhat superficial. Recognizes that this is ultimately due to Yin's drinking which Zulma has a problem with. Is working on accepting her as well as her parents. Has been doing some \"soul searching\" and working on letting go of what she cannot control. Using radical acceptance as a tool in this regard. No other complaints or symptoms. Has been doing well overall.   During this session, this clinician used the following therapeutic modalities: Supportive Psychotherapy    Substance Abuse was not addressed during this session. If the client is diagnosed with a co-occurring substance use disorder, please indicate any changes in the frequency or amount of use: NA. Stage of change for addressing substance use diagnoses: No substance use/Not applicable    ASSESSMENT:  Zulam presents with a Euthymic/ normal mood. Zulma's affect is Normal range and intensity, which is congruent, with their mood and the content of the session. The client has made progress on their goals as evidenced by increased distress tolerance.    Zulma presents with a none risk of suicide, none risk of self-harm, and none risk of harm to others.    For any risk assessment that surpasses a \"low\" rating, a safety plan must be developed.    A safety plan was indicated: no  If yes, describe in detail NA    PLAN: Between sessions, Zulma will take medication as prescribed and practice positive coping " strategies as needed . At the next session, the therapist will use Supportive Psychotherapy to address stressors.    Behavioral Health Treatment Plan [facility]: Diagnosis and Treatment Plan explained to Zulma, Zulma relates understanding diagnosis and is agreeable to Treatment Plan. Yes     Depression Follow-up Plan Completed: No     Reason for visit is No chief complaint on file.     Recent Visits  No visits were found meeting these conditions.  Showing recent visits within past 7 days and meeting all other requirements  Today's Visits  Date Type Provider Dept   06/16/25 Telemedicine Vannesa Stone LCSW [facility]rap[facility]   Showing today's visits and meeting all other requirements  Future Appointments  No visits were found meeting these conditions.  Showing future appointments within next 150 days and meeting all other requirements     History of Present Illness     HPI    Past Medical History   Past Medical History[1]  Past Surgical History[2]  Current Outpatient Medications   Medication Instructions    albuterol (PROVENTIL HFA,VENTOLIN HFA) 90 mcg/act inhaler 2 puffs, Inhalation, Every 6 hours PRN    allopurinol (ZYLOPRIM) 100 mg, Oral, Daily    amLODIPine (NORVASC) 10 mg, Oral, Daily    buPROPion (WELLBUTRIN XL) 150 mg 24 hr tablet Take 2 tablets daily in the morning    busPIRone (BUSPAR) 7.5 mg, Oral, 3 times daily    dextromethorphan-guaifenesin (MUCINEX DM)  MG per 12 hr tablet 1 tablet, Oral, Every 12 hours PRN    donepezil (ARICEPT) 10 mg, Oral, Daily at bedtime    escitalopram (LEXAPRO) 20 mg, Oral, Daily    fluticasone (FLONASE) 50 mcg/act nasal spray 1 spray, Nasal, 2 times daily    Ketotifen Fumarate (ZADITOR) 0.035 % ophthalmic solution 1 drop, Both Eyes, 2 times daily    nebivolol (BYSTOLIC) 5 mg, Oral, Daily    ondansetron (ZOFRAN-ODT) 4 mg, Oral, Every 6 hours PRN    pantoprazole (PROTONIX) 40 mg, Oral, 2 times daily    pantoprazole (PROTONIX) 40 mg, Oral, 2 times  daily    sucralfate (CARAFATE) 1 g, Oral, 4 times daily (before meals and at bedtime)    traZODone (DESYREL) 100 mg, Oral, Daily at bedtime     Allergies[3]    Objective   LMP  (LMP Unknown)     Video Exam  Physical Exam     Administrative Statements   Encounter provider Vannesa Stone [facility]    The Patient is located at Home and in the following state in which I hold an active license [place].    The patient was identified by name and date of birth. Zulma Marcelo was informed that this is a telemedicine visit and that the visit is being conducted through the Epic Embedded platform. She agrees to proceed..  My office door was closed. No one else was in the room.  She acknowledged consent and understanding of privacy and security of the video platform. The patient has agreed to participate and understands they can discontinue the visit at any time.        Visit Time  Start Time: 1000  Stop Time: 1020  Total Visit Time: 20 minutes       [1]   Past Medical History:  Diagnosis Date    Acid reflux     Anxiety     Arthritis     Asthma     Cardiac disease     Chronic kidney disease     passed on own kidney stone    Depression     Diverticulitis     GERD (gastroesophageal reflux disease)     Hayfever     [place] (hard of hearing)     bilateral hearing aids    Hyperlipemia     Hypertension     Panic attack     Tachycardia    [2]   Past Surgical History:  Procedure Laterality Date    ABLATION OF DYSRHYTHMIC FOCUS      APPENDECTOMY      CHOLECYSTECTOMY      open    FRACTURE SURGERY      ORIF fx (L) tibia, fibula 2009    GASTRIC BYPASS OPEN      HYSTERECTOMY      [place] LAPS ABD PRTM&OMENTUM DX W/WO SPEC BR/WA SPX N/A 12/16/2024    Procedure: LAPAROSCOPY DIAGNOSTIC,  LYSIS OF ADHESIONS, REPAIR PERFORATED MARGINAL ULCER, INTRA-OP EGD;  Surgeon: Alex Mohr MD;  Location: [facility];  Service: Bariatrics    KS XCAPSL CTRC RMVL INSJ IO LENS PROSTH W/O ECP Left 4/18/2016    Procedure: EXTRACTION EXTRACAPSULAR CATARACT PHACO INTRAOCULAR  "LENS (IOL);  Surgeon: Dane Wells MD;  Location: [facility] MAIN OR;  Service: Ophthalmology    [place] XCAPSL CTRC RMVL INSJ IO LENS PROSTH W/O ECP Right 3/1/2021    Procedure: EXTRACTION EXTRACAPSULAR CATARACT PHACO INTRAOCULAR LENS (IOL);  Surgeon: Dane Wells MD;  Location: [facility] MAIN OR;  Service: Ophthalmology    TONSILECTOMY AND ADNOIDECTOMY     [3]   Allergies  Allergen Reactions    Augmentin [Amoxicillin-Pot Clavulanate] GI Intolerance, Other (See Comments) and Hives     VOMITING  VOMITING  Reaction Date: 07Dec2004;     Sulfa Antibiotics Itching, Other (See Comments) and Hives     RASH, ITCHY  RASH, ITCHY    Morphine Other (See Comments)     \"DOESN'T WORK\"    Latex Rash     Unsure if this is an allergy     "

## 2025-06-26 ENCOUNTER — TELEPHONE (OUTPATIENT)
Age: 75
End: 2025-06-26

## 2025-06-26 ENCOUNTER — TELEPHONE (OUTPATIENT)
Dept: PSYCHIATRY | Facility: CLINIC | Age: 75
End: 2025-06-26

## 2025-06-26 NOTE — TELEPHONE ENCOUNTER
The patient is requesting an update on the below:  [employer] paperwork that is being worked on by:  Alis DAMIAN    Reason:  Request for more care time/for her medical condition    Writer was unable to locate an update for the patient at this time.    Additionally, the patient verified her appointment for 6.30.25 @ 9:00am.    Please call Zulma when possible:  [phone] -regarding the [employer] paperwork update    Thank you.

## 2025-06-26 NOTE — TELEPHONE ENCOUNTER
Referral to Lidia Sinclair (from Robert to Alis) (Scanned in Media).  Called patient and she is aware that SEPIDEH is needed for Lidia Sinclair. Placed in front office desk bin for signature and paperwork to be picked up by patient.

## 2025-06-30 ENCOUNTER — TELEPHONE (OUTPATIENT)
Age: 75
End: 2025-06-30

## 2025-06-30 ENCOUNTER — TELEMEDICINE (OUTPATIENT)
Dept: BEHAVIORAL/MENTAL HEALTH CLINIC | Facility: CLINIC | Age: 75
End: 2025-06-30
Payer: MEDICARE

## 2025-06-30 DIAGNOSIS — F41.1 GENERALIZED ANXIETY DISORDER: Primary | ICD-10-CM

## 2025-06-30 DIAGNOSIS — F33.1 MODERATE EPISODE OF RECURRENT MAJOR DEPRESSIVE DISORDER (HCC): Primary | ICD-10-CM

## 2025-06-30 PROCEDURE — 90832 PSYTX W PT 30 MINUTES: CPT | Performed by: SOCIAL WORKER

## 2025-06-30 RX ORDER — HYDROXYZINE PAMOATE 25 MG/1
25 CAPSULE ORAL 3 TIMES DAILY PRN
Qty: 30 CAPSULE | Refills: 1 | Status: SHIPPED | OUTPATIENT
Start: 2025-06-30 | End: 2025-07-20

## 2025-06-30 NOTE — TELEPHONE ENCOUNTER
"Returned Zulma's call.   Asked if she has ever tried Vistaril.  Zulma said she never heard of it.  I informed her the generic is Hydroxyzine and it can cause headaches, drowsiness and constipation if taking it routinely.  Zulma reports having colitis and \"crapping my pants\" right now. Therefore she is not worried.  Instructed her to take as needed and to call the office if she has any questions.  She is requesting that it be sent to [facility] and that they deliver it.  Informed her that she may have to set up the delivery and she will call the pharmacy.    Will refer to Alis Kearns for review.   "
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
## 2025-06-30 NOTE — TELEPHONE ENCOUNTER
Called Zulma back and informed her that script for 30 capsules was sent to her pharmacy.  Informed her she can take 1 capsule as needed up to 3 times a day.  Zulma states that she will need a script sent to her mail order pharmacy also.  I informed her to call the office when she runs low so that provider can review if refill is warranted based on how this medication works for her.  Zulma is in agreement with this plan.
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
## 2025-07-14 ENCOUNTER — TELEMEDICINE (OUTPATIENT)
Dept: BEHAVIORAL/MENTAL HEALTH CLINIC | Facility: CLINIC | Age: 75
End: 2025-07-14
Payer: MEDICARE

## 2025-07-14 DIAGNOSIS — F33.1 MODERATE EPISODE OF RECURRENT MAJOR DEPRESSIVE DISORDER (HCC): Primary | ICD-10-CM

## 2025-07-14 DIAGNOSIS — F41.1 GENERALIZED ANXIETY DISORDER: ICD-10-CM

## 2025-07-14 PROCEDURE — 90832 PSYTX W PT 30 MINUTES: CPT | Performed by: SOCIAL WORKER

## 2025-07-14 NOTE — PSYCH
"Virtual Regular VisitName: Zulma Marcelo      : 1950      MRN: [id number]  Encounter Provider: Vannesa Stone LCSW  Encounter Date: 2025   Encounter department: [facility]  :  Assessment & Plan  Moderate episode of recurrent major depressive disorder (HCC)             Goals addressed in session: Goal 1     DATA: Zulma reports feeling ok stating that she is experiencing stress related to her new and unexpected home projects. She reports that in addition to her electrical work, her stove needs to be replaced after it broke last week. She reports that she is trying to stay positive and preoccupied with watching TV and coloring. She reports that her colitis is acting up and that she has requested a refill of Hydroxyzine from Alis Kearns PA-C. No safety concerns. Will continue to monitor symptoms, use coping skills as needed, and call back should she experience any new or worsening symptoms.   During this session, this clinician used the following therapeutic modalities: Supportive Psychotherapy    Substance Abuse was not addressed during this session. If the client is diagnosed with a co-occurring substance use disorder, please indicate any changes in the frequency or amount of use: NA. Stage of change for addressing substance use diagnoses: No substance use/Not applicable    ASSESSMENT:  Zulma presents with a Euthymic/ normal mood. Jackies affect is Normal range and intensity, which is congruent, with their mood and the content of the session. The client has made progress on their goals as evidenced by improved insight and mood.    Zulma presents with a none risk of suicide, none risk of self-harm, and none risk of harm to others.    For any risk assessment that surpasses a \"low\" rating, a safety plan must be developed.    A safety plan was indicated: no  If yes, describe in detail NA    PLAN: Between sessions, Zulma will take medication as prescribed " and practice positive coping strategies as needed . At the next session, the therapist will use Cognitive Behavioral Therapy, Dialectical Behavior Therapy, and Supportive Psychotherapy to address stressors depression and anxiety.    Behavioral Health Treatment Plan [facility]: Diagnosis and Treatment Plan explained to Zulma, Zulma relates understanding diagnosis and is agreeable to Treatment Plan. Yes     Depression Follow-up Plan Completed: No     Reason for visit is No chief complaint on file.     Recent Visits  No visits were found meeting these conditions.  Showing recent visits within past 7 days and meeting all other requirements  Today's Visits  Date Type Provider Dept   07/14/25 Telemedicine Vannesa Stone LCSW [facility]rapist [facility]   Showing today's visits and meeting all other requirements  Future Appointments  No visits were found meeting these conditions.  Showing future appointments within next 150 days and meeting all other requirements     History of Present Illness     HPI    Past Medical History   Past Medical History[1]  Past Surgical History[2]  Current Outpatient Medications   Medication Instructions    albuterol (PROVENTIL HFA,VENTOLIN HFA) 90 mcg/act inhaler 2 puffs, Inhalation, Every 6 hours PRN    allopurinol (ZYLOPRIM) 100 mg, Oral, Daily    amLODIPine (NORVASC) 10 mg, Oral, Daily    buPROPion (WELLBUTRIN XL) 150 mg 24 hr tablet Take 2 tablets daily in the morning    busPIRone (BUSPAR) 7.5 mg, Oral, 3 times daily    dextromethorphan-guaifenesin (MUCINEX DM)  MG per 12 hr tablet 1 tablet, Oral, Every 12 hours PRN    donepezil (ARICEPT) 10 mg, Oral, Daily at bedtime    escitalopram (LEXAPRO) 20 mg, Oral, Daily    fluticasone (FLONASE) 50 mcg/act nasal spray 1 spray, Nasal, 2 times daily    hydrOXYzine pamoate (VISTARIL) 25 mg, Oral, 3 times daily PRN    Ketotifen Fumarate (ZADITOR) 0.035 % ophthalmic solution 1 drop, Both Eyes, 2 times daily    nebivolol (BYSTOLIC) 5  mg, Oral, Daily    ondansetron (ZOFRAN-ODT) 4 mg, Oral, Every 6 hours PRN    pantoprazole (PROTONIX) 40 mg, Oral, 2 times daily    pantoprazole (PROTONIX) 40 mg, Oral, 2 times daily    sucralfate (CARAFATE) 1 g, Oral, 4 times daily (before meals and at bedtime)    traZODone (DESYREL) 100 mg, Oral, Daily at bedtime     Allergies[3]    Objective   LMP  (LMP Unknown)     Video Exam  Physical Exam     Administrative Statements   Encounter provider Vannesa Stone LCSW    The Patient is located at Home and in the following state in which I hold an active license [place].    The patient was identified by name and date of birth. Zulma Marcelo was informed that this is a telemedicine visit and that the visit is being conducted through the Epic Embedded platform. She agrees to proceed..  My office door was closed. No one else was in the room.  She acknowledged consent and understanding of privacy and security of the video platform. The patient has agreed to participate and understands they can discontinue the visit at any time.      Visit Time  Start Time: 1000  Stop Time: 1020  Total Visit Time: 20 minutes         [1]   Past Medical History:  Diagnosis Date    Acid reflux     Anxiety     Arthritis     Asthma     Cardiac disease     Chronic kidney disease     passed on own kidney stone    Depression     Diverticulitis     GERD (gastroesophageal reflux disease)     Hayfever     [place] (hard of hearing)     bilateral hearing aids    Hyperlipemia     Hypertension     Panic attack     Tachycardia    [2]   Past Surgical History:  Procedure Laterality Date    ABLATION OF DYSRHYTHMIC FOCUS      APPENDECTOMY      CHOLECYSTECTOMY      open    FRACTURE SURGERY      ORIF fx (L) tibia, fibula 2009    GASTRIC BYPASS OPEN      HYSTERECTOMY      RI LAPS ABD PRTM&OMENTUM DX W/WO SPEC BR/WA SPX N/A 12/16/2024    Procedure: LAPAROSCOPY DIAGNOSTIC,  LYSIS OF ADHESIONS, REPAIR PERFORATED MARGINAL ULCER, INTRA-OP EGD;  Surgeon: Alex Mohr MD;   "Location: [facility];  Service: Bariatrics    PA XCAPSL CTRC RMVL INSJ IO LENS PROSTH W/O ECP Left 4/18/2016    Procedure: EXTRACTION EXTRACAPSULAR CATARACT PHACO INTRAOCULAR LENS (IOL);  Surgeon: Dane Wells MD;  Location: [facility] MAIN OR;  Service: Ophthalmology    PA XCAPSL CTRC RMVL INSJ IO LENS PROSTH W/O ECP Right 3/1/2021    Procedure: EXTRACTION EXTRACAPSULAR CATARACT PHACO INTRAOCULAR LENS (IOL);  Surgeon: Dane Wells MD;  Location: [facility] MAIN OR;  Service: Ophthalmology    TONSILECTOMY AND ADNOIDECTOMY     [3]   Allergies  Allergen Reactions    Augmentin [Amoxicillin-Pot Clavulanate] GI Intolerance, Other (See Comments) and Hives     VOMITING  VOMITING  Reaction Date: 07Dec2004;     Sulfa Antibiotics Itching, Other (See Comments) and Hives     RASH, ITCHY  RASH, ITCHY    Morphine Other (See Comments)     \"DOESN'T WORK\"    Latex Rash     Unsure if this is an allergy     "

## 2025-07-15 ENCOUNTER — TELEMEDICINE (OUTPATIENT)
Dept: PSYCHIATRY | Facility: CLINIC | Age: 75
End: 2025-07-15
Payer: MEDICARE

## 2025-07-15 DIAGNOSIS — F51.01 PRIMARY INSOMNIA: ICD-10-CM

## 2025-07-15 DIAGNOSIS — F33.1 MODERATE EPISODE OF RECURRENT MAJOR DEPRESSIVE DISORDER (HCC): Primary | Chronic | ICD-10-CM

## 2025-07-15 DIAGNOSIS — F41.1 GENERALIZED ANXIETY DISORDER: ICD-10-CM

## 2025-07-15 PROCEDURE — 99214 OFFICE O/P EST MOD 30 MIN: CPT | Performed by: PHYSICIAN ASSISTANT

## 2025-07-15 PROCEDURE — G2211 COMPLEX E/M VISIT ADD ON: HCPCS | Performed by: PHYSICIAN ASSISTANT

## 2025-07-15 RX ORDER — HYDROXYZINE PAMOATE 25 MG/1
25 CAPSULE ORAL 3 TIMES DAILY PRN
Qty: 270 CAPSULE | Refills: 1 | Status: SHIPPED | OUTPATIENT
Start: 2025-07-15 | End: 2025-07-15 | Stop reason: SDUPTHER

## 2025-07-15 RX ORDER — HYDROXYZINE PAMOATE 25 MG/1
25 CAPSULE ORAL 3 TIMES DAILY PRN
Qty: 42 CAPSULE | Refills: 0 | Status: SHIPPED | OUTPATIENT
Start: 2025-07-15 | End: 2025-07-29

## 2025-07-21 ENCOUNTER — OFFICE VISIT (OUTPATIENT)
Dept: FAMILY MEDICINE CLINIC | Facility: CLINIC | Age: 75
End: 2025-07-21
Payer: MEDICARE

## 2025-07-21 VITALS
DIASTOLIC BLOOD PRESSURE: 70 MMHG | WEIGHT: 150 LBS | HEIGHT: 60 IN | TEMPERATURE: 97.2 F | OXYGEN SATURATION: 97 % | BODY MASS INDEX: 29.45 KG/M2 | SYSTOLIC BLOOD PRESSURE: 132 MMHG | RESPIRATION RATE: 18 BRPM | HEART RATE: 62 BPM

## 2025-07-21 DIAGNOSIS — Z99.89 AMBULATES WITH CANE: ICD-10-CM

## 2025-07-21 DIAGNOSIS — J43.9 PULMONARY EMPHYSEMA, UNSPECIFIED EMPHYSEMA TYPE (HCC): ICD-10-CM

## 2025-07-21 DIAGNOSIS — F33.1 MODERATE EPISODE OF RECURRENT MAJOR DEPRESSIVE DISORDER (HCC): Chronic | ICD-10-CM

## 2025-07-21 DIAGNOSIS — F41.1 GENERALIZED ANXIETY DISORDER: ICD-10-CM

## 2025-07-21 DIAGNOSIS — I10 ESSENTIAL HYPERTENSION: Primary | ICD-10-CM

## 2025-07-21 DIAGNOSIS — R41.89 COGNITIVE IMPAIRMENT: ICD-10-CM

## 2025-07-21 PROBLEM — Z48.815 ENCOUNTER FOR SURGICAL AFTERCARE FOLLOWING SURGERY OF DIGESTIVE SYSTEM: Status: RESOLVED | Noted: 2023-08-04 | Resolved: 2025-07-21

## 2025-07-21 PROBLEM — K28.9 ANASTOMOTIC ULCER: Status: RESOLVED | Noted: 2023-08-04 | Resolved: 2025-07-21

## 2025-07-21 PROBLEM — K27.5 PERFORATED PEPTIC ULCER (HCC): Status: RESOLVED | Noted: 2024-12-18 | Resolved: 2025-07-21

## 2025-07-21 PROBLEM — E66.3 OVERWEIGHT WITH BODY MASS INDEX (BMI) OF 28 TO 28.9 IN ADULT: Status: RESOLVED | Noted: 2025-02-19 | Resolved: 2025-07-21

## 2025-07-21 PROBLEM — K65.9 PERITONITIS (HCC): Status: RESOLVED | Noted: 2024-12-18 | Resolved: 2025-07-21

## 2025-07-21 PROCEDURE — 99214 OFFICE O/P EST MOD 30 MIN: CPT | Performed by: INTERNAL MEDICINE

## 2025-07-21 PROCEDURE — G2211 COMPLEX E/M VISIT ADD ON: HCPCS | Performed by: INTERNAL MEDICINE

## 2025-07-21 NOTE — ASSESSMENT & PLAN NOTE
Well controlled with nebivolol and amlodipine and will continue as ordered.   Orders:  •  CBC; Future  •  Comprehensive metabolic panel; Future  •  Lipid panel; Future

## 2025-07-21 NOTE — ASSESSMENT & PLAN NOTE
Depression Screening Follow-up Plan: Patient's depression screening was positive with a PHQ-9 score of 9. Patient with underlying depression and was advised to continue current medications as prescribed.

## 2025-07-21 NOTE — PROGRESS NOTES
Name: Zulma Marcelo      : 1950      MRN: [id number]  Encounter Provider: Lydia Cruz MD  Encounter Date: 2025   Encounter department: [facility]  :  Assessment & Plan  Essential hypertension  Well controlled with nebivolol and amlodipine and will continue as ordered.   Orders:  •  CBC; Future  •  Comprehensive metabolic panel; Future  •  Lipid panel; Future    Cognitive impairment  Stable, continue donepezil at current dose.        Pulmonary emphysema, unspecified emphysema type (HCC)  Stable and will continue inhaler as ordered.        Generalized anxiety disorder  Managed by psychiatry.       Moderate episode of recurrent major depressive disorder (HCC)  Depression Screening Follow-up Plan: Patient's depression screening was positive with a PHQ-9 score of 9. Patient with underlying depression and was advised to continue current medications as prescribed.       Ambulates with cane               Depression Screening and Follow-up Plan:   Patient with underlying depression and was advised to continue current medications as prescribed.       History of Present Illness   Here for follow up visit.  She is having a lot of renovations done in her house, which is stressful.  She otherwise feels well.  Denies chest pain or dyspnea. Feels her COPD is well controlled. Continues to follow with psychiatry and feels her mood is stable.        Review of Systems   Constitutional: Negative.    Respiratory: Negative.  Negative for cough, shortness of breath and wheezing.    Cardiovascular: Negative.  Negative for chest pain and palpitations.   Psychiatric/Behavioral:  Positive for dysphoric mood. Negative for self-injury, sleep disturbance and suicidal ideas.        Objective   /70   Pulse 62   Temp (!) 97.2 °F (36.2 °C)   Resp 18   Ht 5' (1.524 m)   Wt 68 kg (150 lb)   LMP  (LMP Unknown)   SpO2 97%   BMI 29.29 kg/m²      Physical Exam  Constitutional:       Appearance: Normal  appearance.   HENT:      Head: Normocephalic and atraumatic.     Eyes:      Pupils: Pupils are equal, round, and reactive to light.       Cardiovascular:      Rate and Rhythm: Normal rate and regular rhythm.      Heart sounds: No murmur heard.     No friction rub. No gallop.   Pulmonary:      Effort: Pulmonary effort is normal.      Breath sounds: Normal breath sounds. Decreased air movement present. No wheezing or rales.   Abdominal:      General: Abdomen is flat. Bowel sounds are normal.      Palpations: Abdomen is soft.      Tenderness: There is no abdominal tenderness.     Musculoskeletal:         General: No swelling.     Neurological:      Mental Status: She is alert.     Psychiatric:         Attention and Perception: Attention normal.         Mood and Affect: Mood normal.         Speech: Speech normal.         Behavior: Behavior normal.         Thought Content: Thought content normal.

## 2025-07-28 ENCOUNTER — TELEMEDICINE (OUTPATIENT)
Dept: BEHAVIORAL/MENTAL HEALTH CLINIC | Facility: CLINIC | Age: 75
End: 2025-07-28
Payer: MEDICARE

## 2025-07-28 DIAGNOSIS — F33.1 MODERATE EPISODE OF RECURRENT MAJOR DEPRESSIVE DISORDER (HCC): Primary | ICD-10-CM

## 2025-07-28 PROCEDURE — 90832 PSYTX W PT 30 MINUTES: CPT | Performed by: SOCIAL WORKER

## 2025-08-11 ENCOUNTER — TELEMEDICINE (OUTPATIENT)
Dept: BEHAVIORAL/MENTAL HEALTH CLINIC | Facility: CLINIC | Age: 75
End: 2025-08-11
Payer: MEDICARE

## (undated) DEVICE — TROCAR: Brand: KII FIOS FIRST ENTRY

## (undated) DEVICE — AIR INJECT CANNULA 27GA: Brand: OPHTHALMIC CANNULA

## (undated) DEVICE — Device: Brand: OMNICLOSE TROCAR SITE CLOSURE DEVICE

## (undated) DEVICE — ENDOPATH PNEUMONEEDLE INSUFFLATION NEEDLES WITH LUER LOCK CONNECTORS 120MM: Brand: ENDOPATH

## (undated) DEVICE — DECANTER: Brand: UNBRANDED

## (undated) DEVICE — LAPAROSCOPIC DUAL RIGID APPLICATOR: Brand: ETHICON

## (undated) DEVICE — HARMONIC 1100 SHEARS, 36CM SHAFT LENGTH: Brand: HARMONIC

## (undated) DEVICE — VISUALIZATION SYSTEM: Brand: CLEARIFY

## (undated) DEVICE — CHLORAPREP HI-LITE 26ML ORANGE

## (undated) DEVICE — NEPTUNE E-SEP SMOKE EVACUATION PENCIL, COATED, 70MM BLADE, PUSH BUTTON SWITCH: Brand: NEPTUNE E-SEP

## (undated) DEVICE — GENERAL/ PLASTIC TRAY STANDARD: Brand: DEROYAL

## (undated) DEVICE — JACKSON-PRATT 100CC BULB RESERVOIR: Brand: CARDINAL HEALTH

## (undated) DEVICE — [HIGH FLOW INSUFFLATOR,  DO NOT USE IF PACKAGE IS DAMAGED,  KEEP DRY,  KEEP AWAY FROM SUNLIGHT,  PROTECT FROM HEAT AND RADIOACTIVE SOURCES.]: Brand: PNEUMOSURE

## (undated) DEVICE — DRESSING MEPILEX AG BORDER 3 X 3 IN

## (undated) DEVICE — MICROSURGICAL INSTRUMENT IRR. CYSTITOME 25GA STRAIGHT-REVERSE CUTTING: Brand: ALCON

## (undated) DEVICE — PROXIMATE SKIN STAPLERS (35 WIDE) CONTAINS 35 STAINLESS STEEL STAPLES (FIXED HEAD): Brand: PROXIMATE

## (undated) DEVICE — THE MONARCH® "D" CARTRIDGE IS A SINGLE-USE POLYPROPYLENE CARTRIDGE FOR POSTERIOR CHAMBER IOL DELIVERY: Brand: MONARCH® III

## (undated) DEVICE — GLOVE SRG BIOGEL 7.5

## (undated) DEVICE — EYE PACK CUSTOM -FINNEGAN

## (undated) DEVICE — INTREPID® TRANSFORMER IA HP: Brand: INTREPID®

## (undated) DEVICE — SURGICAL GOWN, XL SMARTSLEEVE: Brand: CONVERTORS

## (undated) DEVICE — B-H IRRIGATING CAN 19GA FLAT ANGLED 8MM: Brand: OPHTHALMIC CANNULA

## (undated) DEVICE — TROCAR: Brand: KII® SLEEVE

## (undated) DEVICE — INTENDED FOR TISSUE SEPARATION, AND OTHER PROCEDURES THAT REQUIRE A SHARP SURGICAL BLADE TO PUNCTURE OR CUT.: Brand: BARD-PARKER SAFETY BLADES SIZE 15, STERILE

## (undated) DEVICE — STERILE DOUBLE BASIN SET PACK: Brand: CARDINAL HEALTH

## (undated) DEVICE — ENDOPATH XCEL BLADELESS TROCARS WITH STABILITY SLEEVES: Brand: ENDOPATH XCEL

## (undated) DEVICE — SHEARS: Brand: ENDO MINI-SHEARS

## (undated) DEVICE — SUT VICRYL 2-0 SH 27 IN UNDYED J417H

## (undated) DEVICE — ACTIVE FMS W/ INTREPID* ULTRA SLEEVES, 0.9MM 45° ABS* INTREPID* BALANCED TIP: Brand: ALCON

## (undated) DEVICE — SEALANT FIBRIN VISTASEAL 10ML

## (undated) DEVICE — CLEARCUT® SLIT KNIFE INTREPID MICRO-COAXIAL SYSTEM 2.4 SB: Brand: CLEARCUT®; INTREPID

## (undated) DEVICE — JP CHANNEL DRAIN 19FR, FULL FLUTES: Brand: JACKSON-PRATT

## (undated) DEVICE — Device: Brand: DEFENDO AIR/WATER/SUCTION AND BIOPSY VALVE